# Patient Record
Sex: FEMALE | Race: BLACK OR AFRICAN AMERICAN | Employment: OTHER | URBAN - METROPOLITAN AREA
[De-identification: names, ages, dates, MRNs, and addresses within clinical notes are randomized per-mention and may not be internally consistent; named-entity substitution may affect disease eponyms.]

---

## 2017-10-01 ENCOUNTER — APPOINTMENT (OUTPATIENT)
Dept: GENERAL RADIOLOGY | Age: 77
End: 2017-10-01
Attending: EMERGENCY MEDICINE
Payer: MEDICARE

## 2017-10-01 ENCOUNTER — HOSPITAL ENCOUNTER (EMERGENCY)
Age: 77
Discharge: HOME | End: 2017-10-01
Attending: EMERGENCY MEDICINE
Payer: MEDICARE

## 2017-10-01 ENCOUNTER — APPOINTMENT (OUTPATIENT)
Dept: CT IMAGING | Age: 77
End: 2017-10-01
Attending: EMERGENCY MEDICINE
Payer: MEDICARE

## 2017-10-01 VITALS
HEART RATE: 68 BPM | TEMPERATURE: 98.4 F | RESPIRATION RATE: 16 BRPM | WEIGHT: 277 LBS | DIASTOLIC BLOOD PRESSURE: 81 MMHG | BODY MASS INDEX: 47.29 KG/M2 | SYSTOLIC BLOOD PRESSURE: 162 MMHG | HEIGHT: 64 IN | OXYGEN SATURATION: 98 %

## 2017-10-01 DIAGNOSIS — I82.401 LEG DVT (DEEP VENOUS THROMBOEMBOLISM), ACUTE, RIGHT (HCC): ICD-10-CM

## 2017-10-01 DIAGNOSIS — E87.6 HYPOKALEMIA: ICD-10-CM

## 2017-10-01 DIAGNOSIS — R20.2 PARESTHESIA OF BILATERAL LEGS: ICD-10-CM

## 2017-10-01 DIAGNOSIS — I10 UNCONTROLLED HYPERTENSION: Primary | ICD-10-CM

## 2017-10-01 LAB
ALBUMIN SERPL-MCNC: 3.5 G/DL (ref 3.4–5)
ALBUMIN/GLOB SERPL: 0.9 {RATIO} (ref 0.8–1.7)
ALP SERPL-CCNC: 77 U/L (ref 45–117)
ALT SERPL-CCNC: 24 U/L (ref 13–56)
ANION GAP SERPL CALC-SCNC: 9 MMOL/L (ref 3–18)
APPEARANCE UR: CLEAR
AST SERPL-CCNC: 18 U/L (ref 15–37)
BASOPHILS # BLD: 0 K/UL (ref 0–0.06)
BASOPHILS NFR BLD: 0 % (ref 0–2)
BILIRUB SERPL-MCNC: 0.4 MG/DL (ref 0.2–1)
BILIRUB UR QL: NEGATIVE
BNP SERPL-MCNC: 194 PG/ML (ref 0–1800)
BUN SERPL-MCNC: 23 MG/DL (ref 7–18)
BUN/CREAT SERPL: 26 (ref 12–20)
CALCIUM SERPL-MCNC: 8.6 MG/DL (ref 8.5–10.1)
CHLORIDE SERPL-SCNC: 97 MMOL/L (ref 100–108)
CK MB CFR SERPL CALC: 0.8 % (ref 0–4)
CK MB SERPL-MCNC: 1.3 NG/ML (ref 5–25)
CK SERPL-CCNC: 163 U/L (ref 26–192)
CO2 SERPL-SCNC: 30 MMOL/L (ref 21–32)
COLOR UR: YELLOW
CREAT SERPL-MCNC: 0.9 MG/DL (ref 0.6–1.3)
D DIMER PPP FEU-MCNC: 2.78 UG/ML(FEU)
DIFFERENTIAL METHOD BLD: NORMAL
EOSINOPHIL # BLD: 0.1 K/UL (ref 0–0.4)
EOSINOPHIL NFR BLD: 1 % (ref 0–5)
ERYTHROCYTE [DISTWIDTH] IN BLOOD BY AUTOMATED COUNT: 14.2 % (ref 11.6–14.5)
GLOBULIN SER CALC-MCNC: 3.9 G/DL (ref 2–4)
GLUCOSE SERPL-MCNC: 96 MG/DL (ref 74–99)
GLUCOSE UR STRIP.AUTO-MCNC: NEGATIVE MG/DL
HCT VFR BLD AUTO: 38.7 % (ref 35–45)
HGB BLD-MCNC: 12.4 G/DL (ref 12–16)
HGB UR QL STRIP: NEGATIVE
KETONES UR QL STRIP.AUTO: NEGATIVE MG/DL
LEUKOCYTE ESTERASE UR QL STRIP.AUTO: NEGATIVE
LYMPHOCYTES # BLD: 2.2 K/UL (ref 0.9–3.6)
LYMPHOCYTES NFR BLD: 28 % (ref 21–52)
MCH RBC QN AUTO: 28 PG (ref 24–34)
MCHC RBC AUTO-ENTMCNC: 32 G/DL (ref 31–37)
MCV RBC AUTO: 87.4 FL (ref 74–97)
MONOCYTES # BLD: 0.7 K/UL (ref 0.05–1.2)
MONOCYTES NFR BLD: 8 % (ref 3–10)
NEUTS SEG # BLD: 5 K/UL (ref 1.8–8)
NEUTS SEG NFR BLD: 63 % (ref 40–73)
NITRITE UR QL STRIP.AUTO: NEGATIVE
PH UR STRIP: 6 [PH] (ref 5–8)
PLATELET # BLD AUTO: 235 K/UL (ref 135–420)
PMV BLD AUTO: 10.7 FL (ref 9.2–11.8)
POTASSIUM SERPL-SCNC: 3.2 MMOL/L (ref 3.5–5.5)
PROT SERPL-MCNC: 7.4 G/DL (ref 6.4–8.2)
PROT UR STRIP-MCNC: NEGATIVE MG/DL
RBC # BLD AUTO: 4.43 M/UL (ref 4.2–5.3)
SODIUM SERPL-SCNC: 136 MMOL/L (ref 136–145)
SP GR UR REFRACTOMETRY: 1 (ref 1–1.03)
TROPONIN I SERPL-MCNC: <0.02 NG/ML (ref 0–0.06)
UROBILINOGEN UR QL STRIP.AUTO: 0.2 EU/DL (ref 0.2–1)
WBC # BLD AUTO: 8 K/UL (ref 4.6–13.2)

## 2017-10-01 PROCEDURE — 80053 COMPREHEN METABOLIC PANEL: CPT | Performed by: EMERGENCY MEDICINE

## 2017-10-01 PROCEDURE — 93970 EXTREMITY STUDY: CPT

## 2017-10-01 PROCEDURE — 85025 COMPLETE CBC W/AUTO DIFF WBC: CPT | Performed by: EMERGENCY MEDICINE

## 2017-10-01 PROCEDURE — 74011250637 HC RX REV CODE- 250/637: Performed by: EMERGENCY MEDICINE

## 2017-10-01 PROCEDURE — 81003 URINALYSIS AUTO W/O SCOPE: CPT | Performed by: EMERGENCY MEDICINE

## 2017-10-01 PROCEDURE — 93005 ELECTROCARDIOGRAM TRACING: CPT

## 2017-10-01 PROCEDURE — 82550 ASSAY OF CK (CPK): CPT | Performed by: EMERGENCY MEDICINE

## 2017-10-01 PROCEDURE — 83880 ASSAY OF NATRIURETIC PEPTIDE: CPT | Performed by: EMERGENCY MEDICINE

## 2017-10-01 PROCEDURE — 71010 XR CHEST PORT: CPT

## 2017-10-01 PROCEDURE — 99285 EMERGENCY DEPT VISIT HI MDM: CPT

## 2017-10-01 PROCEDURE — 70450 CT HEAD/BRAIN W/O DYE: CPT

## 2017-10-01 PROCEDURE — 85379 FIBRIN DEGRADATION QUANT: CPT | Performed by: EMERGENCY MEDICINE

## 2017-10-01 RX ORDER — POTASSIUM CHLORIDE 20 MEQ/1
40 TABLET, EXTENDED RELEASE ORAL
Status: COMPLETED | OUTPATIENT
Start: 2017-10-01 | End: 2017-10-01

## 2017-10-01 RX ORDER — POTASSIUM CHLORIDE 750 MG/1
20 CAPSULE, EXTENDED RELEASE ORAL DAILY
Qty: 10 CAP | Refills: 0 | Status: SHIPPED | OUTPATIENT
Start: 2017-10-01 | End: 2017-10-06

## 2017-10-01 RX ORDER — VALSARTAN 160 MG/1
TABLET ORAL DAILY
Status: ON HOLD | COMMUNITY
End: 2021-04-23

## 2017-10-01 RX ADMIN — POTASSIUM CHLORIDE 40 MEQ: 20 TABLET, EXTENDED RELEASE ORAL at 19:26

## 2017-10-01 RX ADMIN — RIVAROXABAN 15 MG: 15 TABLET, FILM COATED ORAL at 19:50

## 2017-10-01 NOTE — ED TRIAGE NOTES
Patient arrived to ER via EMS for HTN, and light headedness. Patient reports that when she took her blood pressure at home it was high. Unable to remember the exact reading. Patient is alert and oriented on arrival. No acute distress noted. Able to stand and pivot to use BSC to void. Urine sample obtained and labs drawn. Vitals are stable.

## 2017-10-01 NOTE — PROCEDURES
[facility]  *** FINAL REPORT ***    Name: Drew Adler  MRN: [id number]  : 12 May 1940  HIS Order #: [id number]  [id number] [facility] Visit #: [id number]  Date: 01 Oct 2017    TYPE OF TEST: Peripheral Venous Testing    REASON FOR TEST  Pain in limb    Right Leg:-  Deep venous thrombosis:           Yes  Proximal extent of thrombus:      Soleal  Superficial venous thrombosis:    No  Deep venous insufficiency:        Not examined  Superficial venous insufficiency: Not examined    Left Leg:-  Deep venous thrombosis:           No  Superficial venous thrombosis:    No  Deep venous insufficiency:        Not examined  Superficial venous insufficiency: Not examined      INTERPRETATION/FINDINGS  Duplex images were obtained using 2-D gray scale, color flow, and  spectral Doppler analysis. Right leg :  1. Acute occlusive deep vein thrombosis identified  in the soleal  vein. 2. Deep vein(s) visualized include the common femoral, proximal  femoral, mid femoral, distal femoral, popliteal(above knee),  popliteal(fossa), popliteal(below knee) and posterior tibial veins. 3. No evidence of superficial thrombosis detected. Left leg :  1. Deep vein(s) visualized include the common femoral, proximal  femoral, mid femoral, distal femoral, popliteal(above knee),  popliteal(fossa), popliteal(below knee), posterior tibial and peroneal   veins. 2. No evidence of deep venous thrombosis detected in the veins  visualized. 3. No evidence of superficial thrombosis detected. ADDITIONAL COMMENTS  I notified Dr Carlos Pace of the finding. I have personally reviewed the data relevant to the interpretation of  this  study.     TECHNOLOGIST: John Spring, [facility], RVT/  Signed: 10/01/2017 07:33 PM    PHYSICIAN: Price Long MD  Signed: 10/04/2017 03:36 PM

## 2017-10-01 NOTE — ED PROVIDER NOTES
[address]  EMERGENCY DEPARTMENT HISTORY AND PHYSICAL EXAM       Date: 10/1/2017   Patient Name: Jack Anthony   YOB: 1940  Medical Record Number: [id number]    History of Presenting Illness     Chief Complaint   Patient presents with    Hypertension        History Provided By:  patient    Additional History:   4:49 PM   Jack Anthony is a 68 y.o. female PMHx HTN (for which she takes Diovan) and Bell's Palsy presenting to the ED via EMS C/O elevated BP, onset PTA. Associated sxs include constant numbness in the left foot that intermittently spreads up to her chest x 1 week, which makes her hyperventilate.  Numbness is worse at night. Pt states she took her BP at home and states its high sometimes, but it changes.  At baseline, pt has right foot numbness due to previous ankle reduction after a fall, BLE swelling, right hand weakness (x 1 year), and left-sided facial droop. PCP is Dr. Tyrone Weir. Pt denies PMHX MI, DM, pain, fall, SOB, new weakness, and any other sxs or complaints. Primary Care Provider: Deepa Miller MD   Specialist:    Past History     Past Medical History:   Past Medical History:   Diagnosis Date    Hypertension     Sleep disorder         Past Surgical History:   No past surgical history on file. Family History:   No family history on file. Social History:   Social History   Substance Use Topics    Smoking status: Never Smoker    Smokeless tobacco: Never Used    Alcohol use 0.6 oz/week     1 Glasses of wine per week      Comment: EACH NIGHT        Allergies: Allergies   Allergen Reactions    Seafood Hives     crabs    Sulfa (Sulfonamide Antibiotics) Hives        Review of Systems   Review of Systems   Constitutional: Negative for activity change, appetite change, fever and unexpected weight change. (+) elevated BP   HENT: Negative for congestion and sore throat. Eyes: Negative for pain and redness.    Respiratory: Negative for cough and shortness of breath. (+) \"hyperventilates\" when numbness spreads up to her chest   Cardiovascular: Positive for leg swelling (BLE at baseline). Negative for chest pain and palpitations. Gastrointestinal: Negative for abdominal pain, diarrhea, nausea and vomiting. Endocrine: Negative for polydipsia and polyuria. Genitourinary: Negative for difficulty urinating and dysuria. Musculoskeletal: Negative for back pain and neck pain. Skin: Negative for pallor and rash. Neurological: Positive for facial asymmetry (at baseline), weakness (right hand at baseline) and numbness (LLE numbness that intermittently spreads up to the chest; RLE numbness at baseline). Negative for headaches. All other systems reviewed and are negative. Physical Exam  Vitals:    10/01/17 1815 10/01/17 1830 10/01/17 1845 10/01/17 1900   BP: 176/71 158/72 138/64 162/81   Pulse: 73 69 68    Resp: 17 14 16    Temp:       SpO2:       Weight:       Height:           Physical Exam   Constitutional: She is oriented to person, place, and time. She appears well-developed and well-nourished. Obese   HENT:   Head: Normocephalic and atraumatic. Right Ear: External ear normal.   Left Ear: External ear normal.   Nose: Nose normal.   Mouth/Throat: Oropharynx is clear and moist.   Eyes: Conjunctivae and EOM are normal. Pupils are equal, round, and reactive to light. Neck: Normal range of motion. Neck supple. No JVD present. No tracheal deviation present. Cardiovascular: Normal rate, regular rhythm, normal heart sounds and intact distal pulses. Exam reveals no gallop and no friction rub. No murmur heard. Pulmonary/Chest: Effort normal and breath sounds normal. No respiratory distress. She has no wheezes. She has no rales. Abdominal: Soft. Bowel sounds are normal. She exhibits no distension and no mass. There is no tenderness. There is no rebound and no guarding. Musculoskeletal: Normal range of motion.  She exhibits no edema or tenderness. Right lower leg: She exhibits no edema. Left lower leg: She exhibits no edema. Neurological: She is alert and oriented to person, place, and time. She has normal reflexes. No cranial nerve deficit. Left lower facial droop which is chronic in nature. Prior Bell's palsy. Subjective numbness over BLE. CN II-XII intact, No pronator drift; finger-nose-finger intact; good  and equal strength 5/5 bilateral upper and lower extremities; DTRs: 2+ upper and lower extremities, symmetric bilaterally; sensation is intact to light touch and position sense upper and lower extremities symmetric bilaterally;  Gait intact. Skin: Skin is warm and dry. No rash noted. Right upper back dorsal lipoma about the size of a grapefruit, soft. Psychiatric: She has a normal mood and affect. Her behavior is normal.   Nursing note and vitals reviewed. Diagnostic Study Results     Labs -      Recent Results (from the past 12 hour(s))   CBC WITH AUTOMATED DIFF    Collection Time: 10/01/17  5:04 PM   Result Value Ref Range    WBC 8.0 4.6 - 13.2 K/uL    RBC 4.43 4.20 - 5.30 M/uL    HGB 12.4 12.0 - 16.0 g/dL    HCT 38.7 35.0 - 45.0 %    MCV 87.4 74.0 - 97.0 FL    MCH 28.0 24.0 - 34.0 PG    MCHC 32.0 31.0 - 37.0 g/dL    RDW 14.2 11.6 - 14.5 %    PLATELET 978 322 - 067 K/uL    MPV 10.7 9.2 - 11.8 FL    NEUTROPHILS 63 40 - 73 %    LYMPHOCYTES 28 21 - 52 %    MONOCYTES 8 3 - 10 %    EOSINOPHILS 1 0 - 5 %    BASOPHILS 0 0 - 2 %    ABS. NEUTROPHILS 5.0 1.8 - 8.0 K/UL    ABS. LYMPHOCYTES 2.2 0.9 - 3.6 K/UL    ABS. MONOCYTES 0.7 0.05 - 1.2 K/UL    ABS. EOSINOPHILS 0.1 0.0 - 0.4 K/UL    ABS.  BASOPHILS 0.0 0.0 - 0.06 K/UL    DF AUTOMATED     URINALYSIS W/ RFLX MICROSCOPIC    Collection Time: 10/01/17  5:04 PM   Result Value Ref Range    Color YELLOW      Appearance CLEAR      Specific gravity 1.005 1.005 - 1.030      pH (UA) 6.0 5.0 - 8.0      Protein NEGATIVE  NEG mg/dL    Glucose NEGATIVE  NEG mg/dL Ketone NEGATIVE  NEG mg/dL    Bilirubin NEGATIVE  NEG      Blood NEGATIVE  NEG      Urobilinogen 0.2 0.2 - 1.0 EU/dL    Nitrites NEGATIVE  NEG      Leukocyte Esterase NEGATIVE  NEG     D DIMER    Collection Time: 10/01/17  5:04 PM   Result Value Ref Range    D DIMER 2.78 (H) <0.46 ug/ml(FEU)   EKG, 12 LEAD, INITIAL    Collection Time: 10/01/17  5:25 PM   Result Value Ref Range    Ventricular Rate 76 BPM    Atrial Rate 76 BPM    P-R Interval 182 ms    QRS Duration 84 ms    Q-T Interval 394 ms    QTC Calculation (Bezet) 443 ms    Calculated P Axis 51 degrees    Calculated R Axis 15 degrees    Calculated T Axis 50 degrees    Diagnosis       Normal sinus rhythm  Normal ECG  No previous ECGs available     CARDIAC PANEL,(CK, CKMB & TROPONIN)    Collection Time: 10/01/17  5:46 PM   Result Value Ref Range     26 - 192 U/L    CK - MB 1.3 <3.6 ng/ml    CK-MB Index 0.8 0.0 - 4.0 %    Troponin-I, Qt. <0.02 0.00 - 9.53 NG/ML   METABOLIC PANEL, COMPREHENSIVE    Collection Time: 10/01/17  5:46 PM   Result Value Ref Range    Sodium 136 136 - 145 mmol/L    Potassium 3.2 (L) 3.5 - 5.5 mmol/L    Chloride 97 (L) 100 - 108 mmol/L    CO2 30 21 - 32 mmol/L    Anion gap 9 3.0 - 18 mmol/L    Glucose 96 74 - 99 mg/dL    BUN 23 (H) 7.0 - 18 MG/DL    Creatinine 0.90 0.6 - 1.3 MG/DL    BUN/Creatinine ratio 26 (H) 12 - 20      GFR est AA >60 >60 ml/min/1.73m2    GFR est non-AA >60 >60 ml/min/1.73m2    Calcium 8.6 8.5 - 10.1 MG/DL    Bilirubin, total 0.4 0.2 - 1.0 MG/DL    ALT (SGPT) 24 13 - 56 U/L    AST (SGOT) 18 15 - 37 U/L    Alk. phosphatase 77 45 - 117 U/L    Protein, total 7.4 6.4 - 8.2 g/dL    Albumin 3.5 3.4 - 5.0 g/dL    Globulin 3.9 2.0 - 4.0 g/dL    A-G Ratio 0.9 0.8 - 1.7     NT-PRO BNP    Collection Time: 10/01/17  5:46 PM   Result Value Ref Range    NT pro- 0 - 1800 PG/ML       Radiologic Studies -    7:26 PM  RADIOLOGY FINDINGS  Chest X-ray shows cardiomegaly.    Pending review by Radiologist  Recorded by Jennifer Powell, ED Scribe, as dictated by Yesi Yeh MD     CT HEAD WO CONT   Final Result   No acute intracranial abnormalities. As read by the radiologist.      XR CHEST PORT    (Results Pending)     DUPLEX LOWER EXT VENOUS BILAT   Preliminary Result   Right leg :  1. Acute occlusive deep vein thrombosis identified  in the soleal  vein. 2. Deep vein(s) visualized include the common femoral, proximal  femoral, mid femoral, distal femoral, popliteal(above knee),  popliteal(fossa), popliteal(below knee) and posterior tibial veins. 3. No evidence of superficial thrombosis detected. Left leg :  1. Deep vein(s) visualized include the common femoral, proximal  femoral, mid femoral, distal femoral, popliteal(above knee),  popliteal(fossa), popliteal(below knee), posterior tibial and peroneal   veins. 2. No evidence of deep venous thrombosis detected in the veins  visualized. 3. No evidence of superficial thrombosis detected. As read by the radiologist.        Medical Decision Making   I am the first provider for this patient. I reviewed the vital signs, available nursing notes, past medical history, past surgical history, family history and social history. Vital Signs-Reviewed the patient's vital signs. Patient Vitals for the past 12 hrs:   Temp Pulse Resp BP SpO2   10/01/17 1900 - - - 162/81 -   10/01/17 1845 - 68 16 138/64 -   10/01/17 1830 - 69 14 158/72 -   10/01/17 1815 - 73 17 176/71 -   10/01/17 1800 - - - 154/59 -   10/01/17 1716 - - - - 98 %   10/01/17 1715 - 69 19 161/58 93 %   10/01/17 1709 98.4 °F (36.9 °C) 77 24 172/82 98 %       DDX: Isolated systolic HTN, neuropathy, electrolyte abnormality, DVT, doubt CVA, TIA    Pulse Oximetry Analysis - Normal 98% on Ra. No intervention needed. Cardiac Monitor:   Rate: 80 bpm  Rhythm: Normal Sinus Rhythm     EKG interpretation: (Preliminary)  5:25 PM  76 bpm, NSR, no acute changes.    EKG read by Yesi Yeh MD at 5:27 PM     ED Course:     4:49 PM Initial assessment performed. The patients presenting problems have been discussed, and they are in agreement with the care plan formulated and outlined with them. I have encouraged them to ask questions as they arise throughout their visit. 7:24 PM Vascular tech at bedside performing BLE doppler. States pt has a RLE DVT. Will put pt on Xarelto. Medications Given in the ED:  Medications   rivaroxaban (XARELTO) tablet 15 mg (not administered)   potassium chloride (K-DUR, KLOR-CON) SR tablet 40 mEq (40 mEq Oral Given 10/1/17 1926)        Discharge Note:  7:28 PM  Patients results have been reviewed with them. Patient and/or family have verbally conveyed their understanding and agreement of the patient's signs, symptoms, diagnosis, treatment and prognosis and additionally agree to follow up as recommended or return to the Emergency Room should their condition change prior to their follow-up appointment. Patient verbally agrees with the care-plan and verbally conveys that all of their questions have been answered. Discharge instructions have also been provided to the patient with some educational information regarding their diagnosis as well a list of reasons why they would want to return to the ER prior to their follow-up appointment should their condition change. Diagnosis   Clinical Impression:   1. Uncontrolled hypertension    2. Hypokalemia    3. Leg DVT (deep venous thromboembolism), acute, right (HCC)         Discussion: Pt was hypertensive in ED with c/o BLE numbness. Head CT unremarkable. ECG and trop showed no evidence of ACS. Labs remarkable for hypokalemia and elevated D-dimer. CXR showed cardiomegaly. Duplex Doppler of BLE showed RLE DVT. Pt was given potassium replacement and Xarelto. Pt will follow up with PCP and neurology for further evaluation tomorrow.      Follow-up Information     Follow up With Details Comments [address] [place] Schedule an appointment as soon as possible for a visit in 2 days  [place] [address]      THE [facility] EMERGENCY DEPT  As needed, If symptoms worsen [address] [place] [facility] [id number] [phone]          Current Discharge Medication List      START taking these medications    Details   rivaroxaban (XARELTO) 15 mg (42)- 20 mg (9) DsPk Take one 15 mg tablet twice a day with food for the first 21 days. Then, take one 20 mg tablet once a day with food for 9 days. Qty: 1 Dose Pack, Refills: 0      potassium chloride SA (MICRO-K) 10 mEq capsule Take 2 Caps by mouth daily for 5 days. Qty: 10 Cap, Refills: 0             _______________________________   Attestations:     SCRIBE ATTESTATION:  This note is prepared by Sarah Khan, acting as Scribe for Millicent Castro MD.    PROVIDER ATTESTATION:  Millicent Castro MD: The scribe's documentation has been prepared under my direction and personally reviewed by me in its entirety.  I confirm that the note above accurately reflects all work, treatment, procedures, and medical decision making performed by me.   _______________________________

## 2017-10-01 NOTE — DISCHARGE INSTRUCTIONS
Numbness and Tingling: Care Instructions  Your Care Instructions  Many things can cause numbness or tingling. Swelling may put pressure on a nerve. This could cause you to lose feeling or have a pins-and-needles sensation on part of your body. Nerves may be damaged from trauma, toxins, or diseases, such as diabetes or multiple sclerosis (MS). Sometimes, though, the cause is not clear. If there is no clear reason for your symptoms, and you are not having any other symptoms, your doctor may suggest watching and waiting for a while to see if the numbness or tingling goes away on its own. Your doctor may want you to have blood or nerve tests to find the cause of your symptoms. Follow-up care is a key part of your treatment and safety. Be sure to make and go to all appointments, and call your doctor if you are having problems. It's also a good idea to know your test results and keep a list of the medicines you take. How can you care for yourself at home? · If your doctor prescribes medicine, take it exactly as directed. Call your doctor if you think you are having a problem with your medicine. · If you have any swelling, put ice or a cold pack on the area for 10 to 20 minutes at a time. Put a thin cloth between the ice and your skin. When should you call for help? Call 911 anytime you think you may need emergency care. For example, call if:  · You have weakness, numbness, or tingling in both legs. · You lose bowel or bladder control. · You have symptoms of a stroke. These may include:  ¨ Sudden numbness, tingling, weakness, or loss of movement in your face, arm, or leg, especially on only one side of your body. ¨ Sudden vision changes. ¨ Sudden trouble speaking. ¨ Sudden confusion or trouble understanding simple statements. ¨ Sudden problems with walking or balance. ¨ A sudden, severe headache that is different from past headaches.   Watch closely for changes in your health, and be sure to contact your doctor if you have any problems, or if:  · You do not get better as expected. Where can you learn more? Go to http://jennifer-christie.info/. Enter H860 in the search box to learn more about \"Numbness and Tingling: Care Instructions. \"  Current as of: October 14, 2016  Content Version: 11.3  © 4459-2888 [employer]. Care instructions adapted under license by [employer] (which disclaims liability or warranty for this information). If you have questions about a medical condition or this instruction, always ask your healthcare professional. [address] any warranty or liability for your use of this information. Deep Vein Thrombosis: Care Instructions  Your Care Instructions    A deep vein thrombosis (DVT) is a blood clot in certain veins of the legs, pelvis, or arms. Blood clots in these veins need to be treated because they can get bigger, break loose, and travel through the bloodstream to the lungs. A blood clot in a lung can be life-threatening. The doctor may have given you a blood thinner (anticoagulant). A blood thinner can stop the blood clot from growing larger and prevent new clots from forming. You will need to take a blood thinner for 3 to 6 months or longer. The doctor has checked you carefully, but problems can develop later. If you notice any problems or new symptoms, get medical treatment right away. Follow-up care is a key part of your treatment and safety. Be sure to make and go to all appointments, and call your doctor if you are having problems. It's also a good idea to know your test results and keep a list of the medicines you take. How can you care for yourself at home? · Take your medicines exactly as prescribed. Call your doctor if you think you are having a problem with your medicine. · If you are taking a blood thinner, be sure you get instructions about how to take your medicine safely.  Blood thinners can cause serious bleeding problems. · Wear compression stockings if your doctor recommends them. These stockings are tighter at the feet than on the legs. They may reduce pain and swelling in your legs. But there are different types of stockings, and they need to fit right. So your doctor will recommend what you need. · When you sit, use a pillow to raise the arm or leg that has the blood clot. Try to keep it above the level of your heart. When should you call for help? Call 911 anytime you think you may need emergency care. For example, call if:  · You passed out (lost consciousness). · You have symptoms of a blood clot in your lung (called a pulmonary embolism). These include:  ¨ Sudden chest pain. ¨ Trouble breathing. ¨ Coughing up blood. Call your doctor now or seek immediate medical care if:  · You have new or worse trouble breathing. · You are dizzy or lightheaded, or you feel like you may faint. · You have symptoms of a blood clot in your arm or leg. These may include:  ¨ Pain in the arm, calf, back of the knee, thigh, or groin. ¨ Redness and swelling in the arm, leg, or groin. Watch closely for changes in your health, and be sure to contact your doctor if:  · You do not get better as expected. Where can you learn more? Go to http://jennifer-christie.info/. Enter A272 in the search box to learn more about \"Deep Vein Thrombosis: Care Instructions. \"  Current as of: March 20, 2017  Content Version: 11.3  © 5271-2554 [employer]. Care instructions adapted under license by [employer] (which disclaims liability or warranty for this information). If you have questions about a medical condition or this instruction, always ask your healthcare professional. [address] any warranty or liability for your use of this information.

## 2017-10-02 NOTE — ED NOTES
Discharged for primary nurse. The patient's diagnosis, condition and treatment were explained to patient and daughter and written aftercare instructions were given. The patient and daughter verbalized good understanding. Patient armband removed and both labels and armband were placed in shred bin. Patient left ER via wheelchair in no acute distress.

## 2017-10-07 LAB
ATRIAL RATE: 76 BPM
CALCULATED P AXIS, ECG09: 51 DEGREES
CALCULATED R AXIS, ECG10: 15 DEGREES
CALCULATED T AXIS, ECG11: 50 DEGREES
DIAGNOSIS, 93000: NORMAL
P-R INTERVAL, ECG05: 182 MS
Q-T INTERVAL, ECG07: 394 MS
QRS DURATION, ECG06: 84 MS
QTC CALCULATION (BEZET), ECG08: 443 MS
VENTRICULAR RATE, ECG03: 76 BPM

## 2021-04-23 ENCOUNTER — APPOINTMENT (OUTPATIENT)
Dept: CT IMAGING | Age: 81
DRG: 948 | End: 2021-04-23
Attending: EMERGENCY MEDICINE
Payer: MEDICARE

## 2021-04-23 ENCOUNTER — APPOINTMENT (OUTPATIENT)
Dept: GENERAL RADIOLOGY | Age: 81
DRG: 948 | End: 2021-04-23
Attending: EMERGENCY MEDICINE
Payer: MEDICARE

## 2021-04-23 ENCOUNTER — HOSPITAL ENCOUNTER (INPATIENT)
Age: 81
LOS: 3 days | Discharge: HOME HEALTH | DRG: 948 | End: 2021-04-26
Attending: EMERGENCY MEDICINE | Admitting: FAMILY MEDICINE
Payer: MEDICARE

## 2021-04-23 DIAGNOSIS — R94.31 ABNORMAL EKG: ICD-10-CM

## 2021-04-23 DIAGNOSIS — N28.9 RENAL INSUFFICIENCY: ICD-10-CM

## 2021-04-23 DIAGNOSIS — E86.0 DEHYDRATION: ICD-10-CM

## 2021-04-23 DIAGNOSIS — R77.8 ELEVATED TROPONIN: Primary | ICD-10-CM

## 2021-04-23 PROBLEM — I10 HTN (HYPERTENSION): Status: ACTIVE | Noted: 2021-04-23

## 2021-04-23 PROBLEM — E66.9 OBESITY (BMI 30-39.9): Status: ACTIVE | Noted: 2021-04-23

## 2021-04-23 PROBLEM — R53.83 FATIGUE: Status: ACTIVE | Noted: 2021-04-23

## 2021-04-23 PROBLEM — I50.9 CHF (CONGESTIVE HEART FAILURE) (HCC): Status: ACTIVE | Noted: 2021-04-23

## 2021-04-23 PROBLEM — G47.33 OSA TREATED WITH BIPAP: Status: ACTIVE | Noted: 2021-04-23

## 2021-04-23 LAB
ALBUMIN SERPL-MCNC: 3.8 G/DL (ref 3.4–5)
ALBUMIN/GLOB SERPL: 1 {RATIO} (ref 0.8–1.7)
ALP SERPL-CCNC: 110 U/L (ref 45–117)
ALT SERPL-CCNC: 52 U/L (ref 13–56)
ANION GAP SERPL CALC-SCNC: 7 MMOL/L (ref 3–18)
APPEARANCE UR: CLEAR
APTT PPP: 24.6 SEC (ref 23–36.4)
AST SERPL-CCNC: 49 U/L (ref 10–38)
BACTERIA URNS QL MICRO: ABNORMAL /HPF
BASOPHILS # BLD: 0 K/UL (ref 0–0.1)
BASOPHILS NFR BLD: 0 % (ref 0–2)
BILIRUB SERPL-MCNC: 0.4 MG/DL (ref 0.2–1)
BILIRUB UR QL: NEGATIVE
BUN SERPL-MCNC: 62 MG/DL (ref 7–18)
BUN/CREAT SERPL: 41 (ref 12–20)
CALCIUM SERPL-MCNC: 10.1 MG/DL (ref 8.5–10.1)
CHLORIDE SERPL-SCNC: 96 MMOL/L (ref 100–111)
CK MB CFR SERPL CALC: 0.9 % (ref 0–4)
CK MB CFR SERPL CALC: 1.2 % (ref 0–4)
CK MB SERPL-MCNC: 2.1 NG/ML (ref 5–25)
CK MB SERPL-MCNC: 2.5 NG/ML (ref 5–25)
CK SERPL-CCNC: 217 U/L (ref 26–192)
CK SERPL-CCNC: 238 U/L (ref 26–192)
CO2 SERPL-SCNC: 36 MMOL/L (ref 21–32)
COLOR UR: YELLOW
CREAT SERPL-MCNC: 1.53 MG/DL (ref 0.6–1.3)
DIFFERENTIAL METHOD BLD: ABNORMAL
EOSINOPHIL # BLD: 0.1 K/UL (ref 0–0.4)
EOSINOPHIL NFR BLD: 1 % (ref 0–5)
EPITH CASTS URNS QL MICRO: ABNORMAL /LPF (ref 0–5)
ERYTHROCYTE [DISTWIDTH] IN BLOOD BY AUTOMATED COUNT: 14.6 % (ref 11.6–14.5)
GLOBULIN SER CALC-MCNC: 3.7 G/DL (ref 2–4)
GLUCOSE SERPL-MCNC: 111 MG/DL (ref 74–99)
GLUCOSE UR STRIP.AUTO-MCNC: NEGATIVE MG/DL
HCT VFR BLD AUTO: 39.4 % (ref 35–45)
HEMOCCULT STL QL: NEGATIVE
HGB BLD-MCNC: 12.4 G/DL (ref 12–16)
HGB UR QL STRIP: ABNORMAL
INR PPP: 1.1 (ref 0.8–1.2)
KETONES UR QL STRIP.AUTO: NEGATIVE MG/DL
LEUKOCYTE ESTERASE UR QL STRIP.AUTO: NEGATIVE
LYMPHOCYTES # BLD: 2.4 K/UL (ref 0.9–3.6)
LYMPHOCYTES NFR BLD: 40 % (ref 21–52)
MAGNESIUM SERPL-MCNC: 1.5 MG/DL (ref 1.6–2.6)
MCH RBC QN AUTO: 30.2 PG (ref 24–34)
MCHC RBC AUTO-ENTMCNC: 31.5 G/DL (ref 31–37)
MCV RBC AUTO: 96.1 FL (ref 74–97)
MONOCYTES # BLD: 0.8 K/UL (ref 0.05–1.2)
MONOCYTES NFR BLD: 14 % (ref 3–10)
NEUTS SEG # BLD: 2.7 K/UL (ref 1.8–8)
NEUTS SEG NFR BLD: 45 % (ref 40–73)
NITRITE UR QL STRIP.AUTO: NEGATIVE
PH UR STRIP: 7 [PH] (ref 5–8)
PLATELET # BLD AUTO: 235 K/UL (ref 135–420)
PMV BLD AUTO: 10.2 FL (ref 9.2–11.8)
POTASSIUM SERPL-SCNC: 3.3 MMOL/L (ref 3.5–5.5)
PROT SERPL-MCNC: 7.5 G/DL (ref 6.4–8.2)
PROT UR STRIP-MCNC: ABNORMAL MG/DL
PROTHROMBIN TIME: 13.7 SEC (ref 11.5–15.2)
RBC # BLD AUTO: 4.1 M/UL (ref 4.2–5.3)
RBC #/AREA URNS HPF: ABNORMAL /HPF (ref 0–5)
SODIUM SERPL-SCNC: 139 MMOL/L (ref 136–145)
SP GR UR REFRACTOMETRY: 1.01 (ref 1–1.03)
TROPONIN I SERPL-MCNC: 0.21 NG/ML (ref 0–0.04)
TROPONIN I SERPL-MCNC: 0.24 NG/ML (ref 0–0.04)
UROBILINOGEN UR QL STRIP.AUTO: 0.2 EU/DL (ref 0.2–1)
WBC # BLD AUTO: 6 K/UL (ref 4.6–13.2)
WBC URNS QL MICRO: ABNORMAL /HPF (ref 0–5)

## 2021-04-23 PROCEDURE — 85730 THROMBOPLASTIN TIME PARTIAL: CPT

## 2021-04-23 PROCEDURE — 81001 URINALYSIS AUTO W/SCOPE: CPT

## 2021-04-23 PROCEDURE — 85610 PROTHROMBIN TIME: CPT

## 2021-04-23 PROCEDURE — 93005 ELECTROCARDIOGRAM TRACING: CPT

## 2021-04-23 PROCEDURE — 74011250637 HC RX REV CODE- 250/637: Performed by: FAMILY MEDICINE

## 2021-04-23 PROCEDURE — 94762 N-INVAS EAR/PLS OXIMTRY CONT: CPT

## 2021-04-23 PROCEDURE — 71045 X-RAY EXAM CHEST 1 VIEW: CPT

## 2021-04-23 PROCEDURE — 80053 COMPREHEN METABOLIC PANEL: CPT

## 2021-04-23 PROCEDURE — 85025 COMPLETE CBC W/AUTO DIFF WBC: CPT

## 2021-04-23 PROCEDURE — 96372 THER/PROPH/DIAG INJ SC/IM: CPT

## 2021-04-23 PROCEDURE — 99285 EMERGENCY DEPT VISIT HI MDM: CPT

## 2021-04-23 PROCEDURE — 82550 ASSAY OF CK (CPK): CPT

## 2021-04-23 PROCEDURE — 82272 OCCULT BLD FECES 1-3 TESTS: CPT

## 2021-04-23 PROCEDURE — 83735 ASSAY OF MAGNESIUM: CPT

## 2021-04-23 PROCEDURE — 70450 CT HEAD/BRAIN W/O DYE: CPT

## 2021-04-23 PROCEDURE — 65660000000 HC RM CCU STEPDOWN

## 2021-04-23 PROCEDURE — 96360 HYDRATION IV INFUSION INIT: CPT

## 2021-04-23 PROCEDURE — 74011250636 HC RX REV CODE- 250/636: Performed by: EMERGENCY MEDICINE

## 2021-04-23 RX ORDER — CARVEDILOL 3.12 MG/1
3.12 TABLET ORAL 2 TIMES DAILY WITH MEALS
Status: DISCONTINUED | OUTPATIENT
Start: 2021-04-23 | End: 2021-04-26 | Stop reason: HOSPADM

## 2021-04-23 RX ORDER — ROSUVASTATIN CALCIUM 5 MG/1
5 TABLET, COATED ORAL
Status: DISCONTINUED | OUTPATIENT
Start: 2021-04-23 | End: 2021-04-26 | Stop reason: HOSPADM

## 2021-04-23 RX ORDER — SODIUM CHLORIDE 0.9 % (FLUSH) 0.9 %
5-40 SYRINGE (ML) INJECTION AS NEEDED
Status: DISCONTINUED | OUTPATIENT
Start: 2021-04-23 | End: 2021-04-26 | Stop reason: HOSPADM

## 2021-04-23 RX ORDER — ASPIRIN 81 MG/1
81 TABLET ORAL DAILY
Status: DISCONTINUED | OUTPATIENT
Start: 2021-04-24 | End: 2021-04-26 | Stop reason: HOSPADM

## 2021-04-23 RX ORDER — ACETAMINOPHEN 650 MG/1
650 SUPPOSITORY RECTAL
Status: DISCONTINUED | OUTPATIENT
Start: 2021-04-23 | End: 2021-04-26 | Stop reason: HOSPADM

## 2021-04-23 RX ORDER — FAMOTIDINE 20 MG/1
20 TABLET, FILM COATED ORAL 2 TIMES DAILY
Status: DISCONTINUED | OUTPATIENT
Start: 2021-04-23 | End: 2021-04-26 | Stop reason: DRUGHIGH

## 2021-04-23 RX ORDER — BUMETANIDE 1 MG/1
2 TABLET ORAL DAILY
Status: DISCONTINUED | OUTPATIENT
Start: 2021-04-24 | End: 2021-04-25

## 2021-04-23 RX ORDER — ONDANSETRON 2 MG/ML
4 INJECTION INTRAMUSCULAR; INTRAVENOUS
Status: DISCONTINUED | OUTPATIENT
Start: 2021-04-23 | End: 2021-04-26 | Stop reason: HOSPADM

## 2021-04-23 RX ORDER — POTASSIUM CHLORIDE 750 MG/1
10 TABLET, EXTENDED RELEASE ORAL DAILY
Status: DISCONTINUED | OUTPATIENT
Start: 2021-04-24 | End: 2021-04-24

## 2021-04-23 RX ORDER — METOLAZONE 5 MG/1
2.5 TABLET ORAL
Status: DISCONTINUED | OUTPATIENT
Start: 2021-04-27 | End: 2021-04-25

## 2021-04-23 RX ORDER — BUMETANIDE 2 MG/1
2 TABLET ORAL DAILY
COMMUNITY
End: 2021-04-26

## 2021-04-23 RX ORDER — ENOXAPARIN SODIUM 150 MG/ML
1 INJECTION SUBCUTANEOUS EVERY 12 HOURS
Status: DISCONTINUED | OUTPATIENT
Start: 2021-04-23 | End: 2021-04-24

## 2021-04-23 RX ORDER — POLYETHYLENE GLYCOL 3350 17 G/17G
17 POWDER, FOR SOLUTION ORAL DAILY PRN
Status: DISCONTINUED | OUTPATIENT
Start: 2021-04-23 | End: 2021-04-26 | Stop reason: HOSPADM

## 2021-04-23 RX ORDER — POTASSIUM CHLORIDE 750 MG/1
10 TABLET, FILM COATED, EXTENDED RELEASE ORAL DAILY
COMMUNITY

## 2021-04-23 RX ORDER — COLCHICINE 0.6 MG/1
0.6 TABLET ORAL 2 TIMES DAILY
Status: ON HOLD | COMMUNITY
End: 2022-03-03 | Stop reason: CLARIF

## 2021-04-23 RX ORDER — ASPIRIN 81 MG/1
81 TABLET ORAL DAILY
COMMUNITY
End: 2022-03-23

## 2021-04-23 RX ORDER — SODIUM CHLORIDE 0.9 % (FLUSH) 0.9 %
5-40 SYRINGE (ML) INJECTION EVERY 8 HOURS
Status: DISCONTINUED | OUTPATIENT
Start: 2021-04-23 | End: 2021-04-26 | Stop reason: HOSPADM

## 2021-04-23 RX ORDER — ERGOCALCIFEROL 1.25 MG/1
50000 CAPSULE ORAL
Status: ON HOLD | COMMUNITY
End: 2022-03-03 | Stop reason: CLARIF

## 2021-04-23 RX ORDER — CARVEDILOL 3.12 MG/1
3.12 TABLET ORAL 2 TIMES DAILY WITH MEALS
Status: DISCONTINUED | OUTPATIENT
Start: 2021-04-24 | End: 2021-04-23

## 2021-04-23 RX ORDER — METOLAZONE 5 MG/1
2.5 TABLET ORAL DAILY
Status: DISCONTINUED | OUTPATIENT
Start: 2021-04-24 | End: 2021-04-23 | Stop reason: DRUGHIGH

## 2021-04-23 RX ORDER — ACETAMINOPHEN 325 MG/1
650 TABLET ORAL
Status: DISCONTINUED | OUTPATIENT
Start: 2021-04-23 | End: 2021-04-26 | Stop reason: HOSPADM

## 2021-04-23 RX ORDER — COLCHICINE 0.6 MG/1
0.6 TABLET ORAL DAILY
Status: DISCONTINUED | OUTPATIENT
Start: 2021-04-24 | End: 2021-04-26 | Stop reason: HOSPADM

## 2021-04-23 RX ORDER — CARVEDILOL 3.12 MG/1
3.12 TABLET ORAL DAILY
COMMUNITY
End: 2022-03-23

## 2021-04-23 RX ORDER — PROMETHAZINE HYDROCHLORIDE 25 MG/1
12.5 TABLET ORAL
Status: DISCONTINUED | OUTPATIENT
Start: 2021-04-23 | End: 2021-04-26 | Stop reason: HOSPADM

## 2021-04-23 RX ORDER — METOLAZONE 2.5 MG/1
2.5 TABLET ORAL DAILY
COMMUNITY
End: 2021-04-26

## 2021-04-23 RX ADMIN — SODIUM CHLORIDE 500 ML: 900 INJECTION, SOLUTION INTRAVENOUS at 17:21

## 2021-04-23 RX ADMIN — Medication 10 ML: at 22:37

## 2021-04-23 RX ADMIN — FAMOTIDINE 20 MG: 20 TABLET, FILM COATED ORAL at 22:36

## 2021-04-23 RX ADMIN — ENOXAPARIN SODIUM 110 MG: 120 INJECTION SUBCUTANEOUS at 18:57

## 2021-04-23 RX ADMIN — SODIUM CHLORIDE 500 ML: 900 INJECTION, SOLUTION INTRAVENOUS at 18:54

## 2021-04-23 NOTE — ED TRIAGE NOTES
Pt in for c/o weakness, excessive sleeping and  decreased level of activity x 1 mos. Pt reports she has noticed abnormal bruising x 2 mos and fell last Saturday. Pt reports she cele hit her head during fall.

## 2021-04-23 NOTE — H&P
History & Physical    Patient: Roann Hamman MRN: [id number]  CSN: [id number]    YOB: 1940  Age: [de-identified] y.o. Sex: female      DOA: 4/23/2021  Primary Care Provider:  Manuel Bonilla MD      Assessment/Plan     Patient Active Problem List   Diagnosis Code    Fatigue R53.83    Elevated troponin R77.8    Obesity (BMI 30-39. 9) E66.9    HTN (hypertension) I10    SERENA treated with BiPAP G47.33    CHF (congestive heart failure) ([facility]) I489     22-year-old female with obesity, hypertension, SERENA on BiPAP, and CHF is admitted with worsening fatigue and elevated troponin. Is possible that her fatigue is due to a recent NSTEMI in the past week. Elevated troponin with possible NSTEMI  Telemetry  Trend troponins  Therapeutic Lovenox  Cardiology consult for possible cardiac catheterization, although patient prefers medical management if possible  Echocardiogram  Add Crestor 5 mg at night given statin induced myopathy with other medications  Check duplex Dopplers of the lower extremities for possible DVT  Consider nuclear medicine scan of the chest to rule out PE if the ultrasound shows DVT    Fatiguepossibly due to recent NSTEMI  Physical therapy evaluation to see if home health PT or rehab is needed    CHFwithout acute exacerbation  Echocardiogram  Continue current medications    Hypertensionat goal  Continue current medications    ObesityBMI 39, has had over 100 pound weight loss by changing diet and exercise  Follow-up A1c and lipid panel  Encouraged continuing lifestyle modification    SERENA treated with BiPAP  Ordered for nighttime use    DNR/DNIconfirmed by the patient and her daughter at bedside    Family discussionher daughter was at bedside during my evaluation and was involved in the care plans. The patient sister was also on the phone during the encounter. All family questions were answered.     Prophylaxis: Pepcid p.o., Therapeutic lovenox SQ    Estimated length of stay : 2 nights    MD Sanjeev Yusufurnist  ---------------------------------------------------------------------------------------------------------------------------------------------------------------------------------------------------------------  CC: Fatigue       HPI:     Harsha Sharma is a [de-identified] y.o. female with obesity, hypertension, SERENA on BiPAP, and CHF presents with her daughter for worsening fatigue over the past 3 weeks, especially bad this past week. Her daughter reports that she is a hard time ambulating even with her walker over the past week. She does not report any chest pain or shortness of breath. She has never had a heart attack or cardiac catheterization. She denies any increase in her baseline leg pain. She has a history of chronic leg pain with the right foot drop and difficulty walking from a remote fracture in childhood as well as a femur fracture in adulthood. She denies any leg swelling, orthopnea, or difficulty sleeping. She had both COVID vaccines with the last one 3 weeks ago. Past Medical History:   Diagnosis Date    Hypertension     Sleep disorder        Past Surgical History:   Procedure Laterality Date    HX ORTHOPAEDIC      broken right ankle, left femur 30 yrs ago       Family History   Problem Relation Age of Onset    Diabetes Mother     Heart Disease Mother     Heart Disease Father    myasthenia gravis in mother and niece    Social History     Socioeconomic History    Marital status: SINGLE     Spouse name: Not on file    Number of children: Not on file    Years of education: Not on file    Highest education level: Not on file   Tobacco Use    Smoking status: Never Smoker    Smokeless tobacco: Never Used   Substance and Sexual Activity    Alcohol use:  Yes     Alcohol/week: 1.0 standard drinks     Types: 1 Glasses of wine per week     Comment: soc    Drug use: No   Other Topics Concern       Prior to Admission medications    Medication Sig Start Date End Date Taking? Authorizing Provider   multivitamin, tx-iron-ca-min (THERA-M w/ IRON) 9 mg iron-400 mcg tab tablet Take 1 Tab by mouth daily. Yes Provider, Historical   metOLazone (ZAROXOLYN) 2.5 mg tablet Take 2.5 mg by mouth daily. On Tuesday and Thursday only   Yes Provider, Historical   bumetanide (BUMEX) 2 mg tablet Take 2 mg by mouth daily. Yes Provider, Historical   ergocalciferol (Vitamin D2) 1,250 mcg (50,000 unit) capsule Take 50,000 Units by mouth every seven (7) days. Every Friday for 12 weeks   Yes Provider, Historical   colchicine 0.6 mg tablet Take 0.6 mg by mouth two (2) times a day. Indications: treatment to prevent acute gout attack   Yes Provider, Historical   aspirin delayed-release 81 mg tablet Take 81 mg by mouth daily. Yes Provider, Historical   potassium chloride SR (KLOR-CON 10) 10 mEq tablet Take 10 mEq by mouth daily. Yes Provider, Historical   carvediloL (COREG) 3.125 mg tablet Take 3.125 mg by mouth two (2) times daily (with meals). Yes Provider, Historical       Allergies   Allergen Reactions    Seafood Hives     crabs    Sulfa (Sulfonamide Antibiotics) Hives       Review of Systems  Gen: No fever, chills, +malaise, no weight loss/gain. Heent: No headache, rhinorrhea, sore throat. Heart: No chest pain, palpitations, LOPEZ, pnd, or orthopnea. Resp: No cough, wheezing and shortness of breath. GI: No nausea, vomiting, diarrhea, constipation, melena or hematochezia. : No urinary obstruction, dysuria or hematuria. Derm: No rash, new skin lesion or pruritis. Musc/skeletal: chronic bilateral leg pain  Vasc: No edema, cyanosis or claudication. Endo: No heat/cold intolerance, no polyuria,polydipsia or polyphagia. Neuro: No unilateral weakness, numbness, tingling. No seizures. Heme: No easy bruising or bleeding.           Physical Exam:     Physical Exam:  Visit Vitals  /73   Pulse 77   Temp 98 °F (36.7 °C)   Resp 19   Ht 5' 5\" (1.651 m)   Wt 107.5 kg (237 lb) SpO2 95%   BMI 39.44 kg/m²           Temp (24hrs), Av °F (36.7 °C), Min:98 °F (36.7 °C), Max:98 °F (36.7 °C)    1901 -  0700  In: 500 [I.V.:500]  Out: 650 [Urine:650]   701 - 1900  In: 500 [I.V.:500]  Out: -     General:  Awake, cooperative, no distress. Head:  Normocephalic, without obvious abnormality, atraumatic. Eyes:  Conjunctivae/corneas clear, sclera anicteric. Neck: Supple, symmetrical, trachea midline. Lungs:   Clear to auscultation bilaterally. Heart:  Regular rate and rhythm, S1, S2 normal, no murmur, click, rub or gallop. Abdomen: Soft, non-tender. Bowel sounds normal. No masses,  No organomegaly. Extremities: Extremities normal, atraumatic, left calf slightly larger than the right and is tender to palpation (patient states this is baseline for her). Capillary refill normal.   Pulses: 2+ and symmetric all extremities. Skin: Skin color pink, turgor normal. No rashes or lesions   Neurologic: No focal motor or sensory deficit. Labs Reviewed: All lab results for the last 24 hours reviewed.   Recent Results (from the past 24 hour(s))   EKG, 12 LEAD, INITIAL    Collection Time: 21  5:11 PM   Result Value Ref Range    Ventricular Rate 75 BPM    Atrial Rate 75 BPM    P-R Interval 168 ms    QRS Duration 82 ms    Q-T Interval 430 ms    QTC Calculation (Bezet) 480 ms    Calculated P Axis 62 degrees    Calculated R Axis -8 degrees    Calculated T Axis 37 degrees    Diagnosis       Sinus rhythm with marked sinus arrhythmia  Otherwise normal ECG  When compared with ECG of 01-OCT-2017 17:25,  No significant change was found     CBC WITH AUTOMATED DIFF    Collection Time: 21  5:15 PM   Result Value Ref Range    WBC 6.0 4.6 - 13.2 K/uL    RBC 4.10 (L) 4.20 - 5.30 M/uL    HGB 12.4 12.0 - 16.0 g/dL    HCT 39.4 35.0 - 45.0 %    MCV 96.1 74.0 - 97.0 FL    MCH 30.2 24.0 - 34.0 PG    MCHC 31.5 31.0 - 37.0 g/dL    RDW 14.6 (H) 11.6 - 14.5 %    PLATELET 905 185 - 420 K/uL    MPV 10.2 9.2 - 11.8 FL    NEUTROPHILS 45 40 - 73 %    LYMPHOCYTES 40 21 - 52 %    MONOCYTES 14 (H) 3 - 10 %    EOSINOPHILS 1 0 - 5 %    BASOPHILS 0 0 - 2 %    ABS. NEUTROPHILS 2.7 1.8 - 8.0 K/UL    ABS. LYMPHOCYTES 2.4 0.9 - 3.6 K/UL    ABS. MONOCYTES 0.8 0.05 - 1.2 K/UL    ABS. EOSINOPHILS 0.1 0.0 - 0.4 K/UL    ABS. BASOPHILS 0.0 0.0 - 0.1 K/UL    DF AUTOMATED     METABOLIC PANEL, COMPREHENSIVE    Collection Time: 04/23/21  5:15 PM   Result Value Ref Range    Sodium 139 136 - 145 mmol/L    Potassium 3.3 (L) 3.5 - 5.5 mmol/L    Chloride 96 (L) 100 - 111 mmol/L    CO2 36 (H) 21 - 32 mmol/L    Anion gap 7 3.0 - 18 mmol/L    Glucose 111 (H) 74 - 99 mg/dL    BUN 62 (H) 7.0 - 18 MG/DL    Creatinine 1.53 (H) 0.6 - 1.3 MG/DL    BUN/Creatinine ratio 41 (H) 12 - 20      GFR est AA 40 (L) >60 ml/min/1.73m2    GFR est non-AA 33 (L) >60 ml/min/1.73m2    Calcium 10.1 8.5 - 10.1 MG/DL    Bilirubin, total 0.4 0.2 - 1.0 MG/DL    ALT (SGPT) 52 13 - 56 U/L    AST (SGOT) 49 (H) 10 - 38 U/L    Alk.  phosphatase 110 45 - 117 U/L    Protein, total 7.5 6.4 - 8.2 g/dL    Albumin 3.8 3.4 - 5.0 g/dL    Globulin 3.7 2.0 - 4.0 g/dL    A-G Ratio 1.0 0.8 - 1.7     PROTHROMBIN TIME + INR    Collection Time: 04/23/21  5:15 PM   Result Value Ref Range    Prothrombin time 13.7 11.5 - 15.2 sec    INR 1.1 0.8 - 1.2     PTT    Collection Time: 04/23/21  5:15 PM   Result Value Ref Range    aPTT 24.6 23.0 - 36.4 SEC   MAGNESIUM    Collection Time: 04/23/21  5:15 PM   Result Value Ref Range    Magnesium 1.5 (L) 1.6 - 2.6 mg/dL   CARDIAC PANEL,(CK, CKMB & TROPONIN)    Collection Time: 04/23/21  5:15 PM   Result Value Ref Range    CK - MB 2.1 <3.6 ng/ml    CK-MB Index 0.9 0.0 - 4.0 %     (H) 26 - 192 U/L    Troponin-I, QT 0.21 (H) 0.0 - 0.045 NG/ML   OCCULT BLOOD, STOOL    Collection Time: 04/23/21  6:55 PM   Result Value Ref Range    Occult blood, stool Negative NEG     CARDIAC PANEL,(CK, CKMB & TROPONIN)    Collection Time: 04/23/21 8:20 PM   Result Value Ref Range    CK - MB 2.5 <3.6 ng/ml    CK-MB Index 1.2 0.0 - 4.0 %     (H) 26 - 192 U/L    Troponin-I, QT 0.24 (H) 0.0 - 0.045 NG/ML   URINALYSIS W/ RFLX MICROSCOPIC    Collection Time: 04/23/21  8:50 PM   Result Value Ref Range    Color YELLOW      Appearance CLEAR      Specific gravity 1.007 1.005 - 1.030      pH (UA) 7.0 5.0 - 8.0      Protein TRACE (A) NEG mg/dL    Glucose Negative NEG mg/dL    Ketone Negative NEG mg/dL    Bilirubin Negative NEG      Blood TRACE (A) NEG      Urobilinogen 0.2 0.2 - 1.0 EU/dL    Nitrites Negative NEG      Leukocyte Esterase Negative NEG     URINE MICROSCOPIC ONLY    Collection Time: 04/23/21  8:50 PM   Result Value Ref Range    WBC 1 to 3 0 - 5 /hpf    RBC 0 to 1 0 - 5 /hpf    Epithelial cells FEW 0 - 5 /lpf    Bacteria FEW (A) NEG /hpf     Results  XR CHEST PORT (Accession [id number]) (Order [id number])  Allergies     Not Specified: Seafood;  Sulfa (Sulfonamide Antibiotics)   Exam Information    Status Exam Begun  Exam Ended    Final [99] 4/23/2021 17:02 4/23/2021  5:15 PM [id number]  5:15 PM   Result Information    Status: Final result (Exam End: 4/23/2021 17:15) Provider Status: Open   Study Result    EXAM: XR CHEST PORT     CLINICAL INDICATION/HISTORY: gen fatigue  -Additional: None     COMPARISON: 10/1/2017     TECHNIQUE: Portable frontal view of the chest     _______________     FINDINGS:     SUPPORT DEVICES: None.     HEART AND MEDIASTINUM: Mild cardiomegaly.     LUNGS AND PLEURAL SPACES: Unremarkable.     BONY THORAX AND SOFT TISSUES: Unremarkable.     _______________     IMPRESSION     Mild cardiomegaly. . No acute cardiopulmonary process.

## 2021-04-23 NOTE — ED PROVIDER NOTES
EMERGENCY DEPARTMENT HISTORY AND PHYSICAL EXAM    Date: 4/23/2021  Patient Name: Jessica Cobb    History of Presenting Illness     Chief Complaint   Patient presents with    Fatigue    Bleeding/Bruising    Fall         History Provided By: Patient and Patient's Daughter    Additional History (Context):   Jessica Cobb is a [de-identified] y.o. 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 Patient's daughter reports that they had a telemedicine consult with her PCP today who advised that the patient come to the emergency department for evaluation. Was not evaluated in person. Patient reports that the fatigue has been increasing over the last month. However denies any lateralizing weakness, numbness, vision changes. Patient's daughter is also concerned that she has noticed bruising around patient's shoulders, leg, no real history of any recent injury. However patient endorses that she \"rolled out of bed\" 6 days ago. Patient denies any head injury. Denies any neck pain, back pain. Patient's daughter endorsing that the patient is still on Xarelto. Pt denies pain, abdominal pain, nausea, vomiting, and any other sxs or complaints. No relieving or exacerbating factors identified.     PCP: Tacho East MD    Current Facility-Administered Medications   Medication Dose Route Frequency Provider Last Rate Last Admin    sodium chloride 0.9 % bolus infusion 500 mL  500 mL IntraVENous ONCE Ruby Burnette,  mL/hr at 04/23/21 1721 500 mL at 04/23/21 1721    sodium chloride 0.9 % bolus infusion 500 mL  500 mL IntraVENous ONCE Ruby Burnette, DO        enoxaparin (LOVENOX) injection 110 mg  1 mg/kg SubCUTAneous Q12H Ruby Burnette DO         Current Outpatient Medications   Medication Sig Dispense Refill    valsartan (DIOVAN) 160 mg tablet Take  by mouth daily.  rivaroxaban (XARELTO) 15 mg (42)- 20 mg (9) DsPk Take one 15 mg tablet twice a day with food for the first 21 days. Then, take one 20 mg tablet once a day with food for 9 days. 1 Dose Pack 0       Past History     Past Medical History:  Past Medical History:   Diagnosis Date    Hypertension     Sleep disorder        Past Surgical History:  Past Surgical History:   Procedure Laterality Date    HX ORTHOPAEDIC      broken right ankle, left femur 30 yrs ago       Family History:  History reviewed. No pertinent family history. Social History:  Social History     Tobacco Use    Smoking status: Never Smoker    Smokeless tobacco: Never Used   Substance Use Topics    Alcohol use: Yes     Alcohol/week: 1.0 standard drinks     Types: 1 Glasses of wine per week     Comment: soc    Drug use: No       Allergies: Allergies   Allergen Reactions    Seafood Hives     crabs    Sulfa (Sulfonamide Antibiotics) Hives         Review of Systems   Review of Systems   Constitutional: Positive for fatigue. Negative for chills and fever. HENT: Negative for congestion, ear pain, sinus pain and sore throat. Eyes: Negative for pain and visual disturbance. Respiratory: Negative for cough and shortness of breath. Cardiovascular: Negative for chest pain and leg swelling. Gastrointestinal: Positive for diarrhea. Negative for abdominal pain, constipation, nausea and vomiting. Genitourinary: Negative for dysuria, hematuria, vaginal bleeding and vaginal discharge. Musculoskeletal: Negative for back pain and neck pain. Skin: Negative for rash and wound. Neurological: Positive for weakness. Negative for dizziness, tremors, light-headedness and numbness. All other systems reviewed and are negative.       Physical Exam     Vitals:    04/23/21 1637   BP: (!) 150/78   Pulse: 80   Resp: 18   Temp: 98 °F (36.7 °C)   SpO2: 98%   Weight: 107.5 kg (237 lb)   Height: 5' 5\" (1.651 m)     Physical Exam    Nursing note and vitals reviewed    Constitutional: Elderly -American female, no acute distress, appears stated age  Head: Normocephalic, Atraumatic  Eyes: Pupils are equal, round, and reactive to light, EOMI, noninjected conjunctiva  Neck: Supple, non-tender, trachea midline, no JVD  Cardiovascular: Regular rate and rhythm, no murmurs, rubs, or gallops, + 2 radial pulses bilaterally  Chest: Normal work of breathing and symmetrical chest excursion bilaterally  Lungs: Clear to ausculation bilaterally, no wheezes, no rhonchi  Abdomen: Soft, non tender, non distended, normoactive bowel sounds  Back: No evidence of trauma or deformity  Extremities: Small areas of old ecchymoses along her left shoulder, upper thighs bilaterally no evidence of trauma or deformity, no LE edema.  No streaking erythema, vesicular lesions, ulcerations or bulla  Skin: Warm and dry, normal cap refill  Neuro: Alert and appropriate, CN intact, normal speech, moving all 4 extremities freely and symmetrically  Psychiatric: Normal mood and affect       Diagnostic Study Results     Labs -     Recent Results (from the past 12 hour(s))   EKG, 12 LEAD, INITIAL    Collection Time: 04/23/21  5:11 PM   Result Value Ref Range    Ventricular Rate 75 BPM    Atrial Rate 75 BPM    P-R Interval 168 ms    QRS Duration 82 ms    Q-T Interval 430 ms    QTC Calculation (Bezet) 480 ms    Calculated P Axis 62 degrees    Calculated R Axis -8 degrees    Calculated T Axis 37 degrees    Diagnosis       Sinus rhythm with marked sinus arrhythmia  Otherwise normal ECG  When compared with ECG of 01-OCT-2017 17:25,  No significant change was found     CBC WITH AUTOMATED DIFF    Collection Time: 04/23/21  5:15 PM   Result Value Ref Range    WBC 6.0 4.6 - 13.2 K/uL    RBC 4.10 (L) 4.20 - 5.30 M/uL    HGB 12.4 12.0 - 16.0 g/dL    HCT 39.4 35.0 - 45.0 %    MCV 96.1 74.0 - 97.0 FL    MCH 30.2 24.0 - 34.0 PG    MCHC 31.5 31.0 - 37.0 g/dL    RDW 14.6 (H) 11.6 - 14.5 %    PLATELET 914 997 - 267 K/uL    MPV 10.2 9.2 - 11.8 FL    NEUTROPHILS 45 40 - 73 %    LYMPHOCYTES 40 21 - 52 %    MONOCYTES 14 (H) 3 - 10 %    EOSINOPHILS 1 0 - 5 %    BASOPHILS 0 0 - 2 %    ABS. NEUTROPHILS 2.7 1.8 - 8.0 K/UL    ABS. LYMPHOCYTES 2.4 0.9 - 3.6 K/UL    ABS. MONOCYTES 0.8 0.05 - 1.2 K/UL    ABS. EOSINOPHILS 0.1 0.0 - 0.4 K/UL    ABS. BASOPHILS 0.0 0.0 - 0.1 K/UL    DF AUTOMATED     METABOLIC PANEL, COMPREHENSIVE    Collection Time: 04/23/21  5:15 PM   Result Value Ref Range    Sodium 139 136 - 145 mmol/L    Potassium 3.3 (L) 3.5 - 5.5 mmol/L    Chloride 96 (L) 100 - 111 mmol/L    CO2 36 (H) 21 - 32 mmol/L    Anion gap 7 3.0 - 18 mmol/L    Glucose 111 (H) 74 - 99 mg/dL    BUN 62 (H) 7.0 - 18 MG/DL    Creatinine 1.53 (H) 0.6 - 1.3 MG/DL    BUN/Creatinine ratio 41 (H) 12 - 20      GFR est AA 40 (L) >60 ml/min/1.73m2    GFR est non-AA 33 (L) >60 ml/min/1.73m2    Calcium 10.1 8.5 - 10.1 MG/DL    Bilirubin, total 0.4 0.2 - 1.0 MG/DL    ALT (SGPT) 52 13 - 56 U/L    AST (SGOT) 49 (H) 10 - 38 U/L    Alk. phosphatase 110 45 - 117 U/L    Protein, total 7.5 6.4 - 8.2 g/dL    Albumin 3.8 3.4 - 5.0 g/dL    Globulin 3.7 2.0 - 4.0 g/dL    A-G Ratio 1.0 0.8 - 1.7     PROTHROMBIN TIME + INR    Collection Time: 04/23/21  5:15 PM   Result Value Ref Range    Prothrombin time 13.7 11.5 - 15.2 sec    INR 1.1 0.8 - 1.2     PTT    Collection Time: 04/23/21  5:15 PM   Result Value Ref Range    aPTT 24.6 23.0 - 36.4 SEC   MAGNESIUM    Collection Time: 04/23/21  5:15 PM   Result Value Ref Range    Magnesium 1.5 (L) 1.6 - 2.6 mg/dL   CARDIAC PANEL,(CK, CKMB & TROPONIN)    Collection Time: 04/23/21  5:15 PM   Result Value Ref Range    CK - MB 2.1 <3.6 ng/ml    CK-MB Index 0.9 0.0 - 4.0 %     (H) 26 - 192 U/L    Troponin-I, QT 0.21 (H) 0.0 - 0.045 NG/ML       Radiologic Studies -   XR CHEST PORT   Final Result      Mild cardiomegaly. . No acute cardiopulmonary process.       CT HEAD WO CONT    (Results Pending)     CT Results  (Last 48 hours)    None        CXR Results  (Last 48 hours)               04/23/21 1715  XR CHEST PORT Final result    Impression:      Mild cardiomegaly. . No acute cardiopulmonary process. Narrative:  EXAM: XR CHEST PORT       CLINICAL INDICATION/HISTORY: gen fatigue   -Additional: None       COMPARISON: 10/1/2017       TECHNIQUE: Portable frontal view of the chest       _______________       FINDINGS:       SUPPORT DEVICES: None. HEART AND MEDIASTINUM: Mild cardiomegaly. LUNGS AND PLEURAL SPACES: Unremarkable. BONY THORAX AND SOFT TISSUES: Unremarkable.       _______________                   Medical Decision Making   I am the first provider for this patient. I reviewed the vital signs, available nursing notes, past medical history, past surgical history, family history and social history. Vital Signs-Reviewed the patient's vital signs. Pulse Oximetry Analysis -98% on room air    Cardiac Monitor:  Rate: 80 bpm  Rhythm: Regular    EKG interpretation: (Preliminary)  4:45 PM   Sinus rhythm at 75 bpm.  VT interval 168 ms. QRS 82 ms. QTc 480 ms. No acute ST elevation  Records Reviewed: Nursing Notes and Old Medical Records    Provider Notes:   [de-identified] y.o. female with a history of DVT on Xarelto, hypertension who presents with her daughter for 1 month of increasing fatigue, generalized weakness. On presentation, patient appears nontoxic, in no acute distress. Blood pressure 150/78. Afebrile, in no respiratory distress, saturating 98% on room air. Patient noted to have small areas of ecchymosis which is not unusual secondary to patient's blood thinner. No large hematomas that is concerning for severe trauma. Patient with no focal neurological deficits, not consistent with an acute stroke. However with her recent fall out of bed, will obtain CT imaging of her head to rule out intracranial bleed.   Will evaluate for any metabolic derangements and explain her fatigue. We will also evaluate for anemia, infectious etiology, check chest x-ray and urinalysis. Procedures:  Procedures    ED Course:   4:46 PM   Initial assessment performed. The patients presenting problems have been discussed, and they are in agreement with the care plan formulated and outlined with them. I have encouraged them to ask questions as they arise throughout their visit. 6:14 PM  Patient's lab work showing stable hemoglobin 12.8. No leukocytosis or bandemia. However her BUN is elevated at 62 and creatinine is elevated 1.53. Patient also noted to have an elevated troponin of 0.21. This may be indicative of dehydration as the patient is on Bumex. On reassessment, patient endorses that she has no chest pain, does not remember history of any chest pain or shortness of breath. At this time, patient and the daughter was reviewing patient's prescriptions and notes that she is no longer on Xarelto. 6:15 PM Discussed patient's history, exam, and available diagnostics results with Bill Alvarez MD, cardiology, who recommends checking Hemoccult with her elevated BUN, starting on Lovenox if the patient has not been taking her Xarelto. Recommend serial cardiac enzymes within 2 hours. Admission to the hospital service    Again on reassessment, went through the patient's medications, confirm being that patient is not currently on Xarelto. Rectal exam with no palpable masses. Good rectal tone. Brown stool obtained. Diagnosis and Disposition       6:17 PM  I have spent 75 minutes of critical care time involved in lab review, consultations with specialist, family decision-making, and documentation. During this entire length of time I was immediately available to the patient. Critical Care:   The reason for providing this level of medical care for this critically ill patient was due a critical illness that impaired one or more vital organ systems such that there was a high probability of imminent or life threatening deterioration in the patients condition. This care involved high complexity decision making to assess, manipulate, and support vital system functions, to treat this degreee vital organ system failure and to prevent further life threatening deterioration of the patients condition. Core Measures:  For Hospitalized Patients:    1. Hospitalization Decision Time:  The decision to hospitalize the patient was made by Aurea Delacruz DO at 6:17 PM on 4/23/2021    2. Aspirin: Aspirin was given    6:17 PM  Patient is being admitted to the hospital by Dr. Davis West  . The results of their tests and reasons for their admission have been discussed with them and/or available family. They convey agreement and understanding for the need to be admitted and for their admission diagnosis. CONDITIONS ON ADMISSION:  Sepsis is not present at the time of admission. Pneumonia is not present at the time of admission. MRSA is not present at the time of admission. Wound infection is not present at the time of admission. Pressure Ulcer is not present at the time of admission. CLINICAL IMPRESSION:    1. Elevated troponin    2. Renal insufficiency    3. Dehydration          ____________________________________     Please note that this dictation was completed with [employer], the computer voice recognition software. Quite often unanticipated grammatical, syntax, homophones, and other interpretive errors are inadvertently transcribed by the computer software. Please disregard these errors. Please excuse any errors that have escaped final proofreading.

## 2021-04-24 LAB
ALBUMIN SERPL-MCNC: 3.2 G/DL (ref 3.4–5)
ALBUMIN/GLOB SERPL: 1 {RATIO} (ref 0.8–1.7)
ALP SERPL-CCNC: 90 U/L (ref 45–117)
ALT SERPL-CCNC: 46 U/L (ref 13–56)
ANION GAP SERPL CALC-SCNC: 8 MMOL/L (ref 3–18)
AST SERPL-CCNC: 42 U/L (ref 10–38)
ATRIAL RATE: 75 BPM
BILIRUB SERPL-MCNC: 0.7 MG/DL (ref 0.2–1)
BUN SERPL-MCNC: 55 MG/DL (ref 7–18)
BUN/CREAT SERPL: 41 (ref 12–20)
CALCIUM SERPL-MCNC: 9.5 MG/DL (ref 8.5–10.1)
CALCULATED P AXIS, ECG09: 62 DEGREES
CALCULATED R AXIS, ECG10: -8 DEGREES
CALCULATED T AXIS, ECG11: 37 DEGREES
CHLORIDE SERPL-SCNC: 100 MMOL/L (ref 100–111)
CK MB CFR SERPL CALC: 0.9 % (ref 0–4)
CK MB CFR SERPL CALC: 1.1 % (ref 0–4)
CK MB CFR SERPL CALC: 1.3 % (ref 0–4)
CK MB SERPL-MCNC: 1.6 NG/ML (ref 5–25)
CK MB SERPL-MCNC: 1.9 NG/ML (ref 5–25)
CK MB SERPL-MCNC: 2.3 NG/ML (ref 5–25)
CK SERPL-CCNC: 171 U/L (ref 26–192)
CK SERPL-CCNC: 172 U/L (ref 26–192)
CK SERPL-CCNC: 180 U/L (ref 26–192)
CO2 SERPL-SCNC: 35 MMOL/L (ref 21–32)
CREAT SERPL-MCNC: 1.33 MG/DL (ref 0.6–1.3)
DIAGNOSIS, 93000: NORMAL
ERYTHROCYTE [DISTWIDTH] IN BLOOD BY AUTOMATED COUNT: 15.8 % (ref 11.6–14.5)
GLOBULIN SER CALC-MCNC: 3.3 G/DL (ref 2–4)
GLUCOSE SERPL-MCNC: 91 MG/DL (ref 74–99)
HCT VFR BLD AUTO: 35.2 % (ref 35–45)
HGB BLD-MCNC: 11.6 G/DL (ref 12–16)
MCH RBC QN AUTO: 32.3 PG (ref 24–34)
MCHC RBC AUTO-ENTMCNC: 33 G/DL (ref 31–37)
MCV RBC AUTO: 98.1 FL (ref 74–97)
P-R INTERVAL, ECG05: 168 MS
PLATELET # BLD AUTO: 208 K/UL (ref 135–420)
PMV BLD AUTO: 10.3 FL (ref 9.2–11.8)
POTASSIUM SERPL-SCNC: 2.9 MMOL/L (ref 3.5–5.5)
PROT SERPL-MCNC: 6.5 G/DL (ref 6.4–8.2)
Q-T INTERVAL, ECG07: 430 MS
QRS DURATION, ECG06: 82 MS
QTC CALCULATION (BEZET), ECG08: 480 MS
RBC # BLD AUTO: 3.59 M/UL (ref 4.2–5.3)
SODIUM SERPL-SCNC: 143 MMOL/L (ref 136–145)
TROPONIN I SERPL-MCNC: 0.29 NG/ML (ref 0–0.04)
TROPONIN I SERPL-MCNC: 0.33 NG/ML (ref 0–0.04)
TROPONIN I SERPL-MCNC: 0.33 NG/ML (ref 0–0.04)
VENTRICULAR RATE, ECG03: 75 BPM
WBC # BLD AUTO: 5 K/UL (ref 4.6–13.2)

## 2021-04-24 PROCEDURE — 82553 CREATINE MB FRACTION: CPT

## 2021-04-24 PROCEDURE — 36415 COLL VENOUS BLD VENIPUNCTURE: CPT

## 2021-04-24 PROCEDURE — 97530 THERAPEUTIC ACTIVITIES: CPT

## 2021-04-24 PROCEDURE — 2709999900 HC NON-CHARGEABLE SUPPLY

## 2021-04-24 PROCEDURE — 65660000000 HC RM CCU STEPDOWN

## 2021-04-24 PROCEDURE — 85027 COMPLETE CBC AUTOMATED: CPT

## 2021-04-24 PROCEDURE — 74011250636 HC RX REV CODE- 250/636: Performed by: FAMILY MEDICINE

## 2021-04-24 PROCEDURE — 97161 PT EVAL LOW COMPLEX 20 MIN: CPT

## 2021-04-24 PROCEDURE — 80053 COMPREHEN METABOLIC PANEL: CPT

## 2021-04-24 PROCEDURE — 94660 CPAP INITIATION&MGMT: CPT

## 2021-04-24 PROCEDURE — 74011250636 HC RX REV CODE- 250/636: Performed by: EMERGENCY MEDICINE

## 2021-04-24 PROCEDURE — 5A09357 ASSISTANCE WITH RESPIRATORY VENTILATION, LESS THAN 24 CONSECUTIVE HOURS, CONTINUOUS POSITIVE AIRWAY PRESSURE: ICD-10-PCS | Performed by: FAMILY MEDICINE

## 2021-04-24 PROCEDURE — 74011250637 HC RX REV CODE- 250/637: Performed by: FAMILY MEDICINE

## 2021-04-24 PROCEDURE — 97116 GAIT TRAINING THERAPY: CPT

## 2021-04-24 RX ORDER — MAGNESIUM SULFATE 1 G/100ML
1 INJECTION INTRAVENOUS ONCE
Status: COMPLETED | OUTPATIENT
Start: 2021-04-24 | End: 2021-04-24

## 2021-04-24 RX ORDER — ENOXAPARIN SODIUM 100 MG/ML
40 INJECTION SUBCUTANEOUS EVERY 24 HOURS
Status: DISCONTINUED | OUTPATIENT
Start: 2021-04-25 | End: 2021-04-26 | Stop reason: HOSPADM

## 2021-04-24 RX ADMIN — Medication 10 ML: at 21:02

## 2021-04-24 RX ADMIN — FAMOTIDINE 20 MG: 20 TABLET, FILM COATED ORAL at 20:58

## 2021-04-24 RX ADMIN — FAMOTIDINE 20 MG: 20 TABLET, FILM COATED ORAL at 08:13

## 2021-04-24 RX ADMIN — CARVEDILOL 3.12 MG: 3.12 TABLET, FILM COATED ORAL at 17:39

## 2021-04-24 RX ADMIN — CARVEDILOL 3.12 MG: 3.12 TABLET, FILM COATED ORAL at 08:14

## 2021-04-24 RX ADMIN — ROSUVASTATIN CALCIUM 5 MG: 5 TABLET, COATED ORAL at 21:00

## 2021-04-24 RX ADMIN — Medication 10 ML: at 06:02

## 2021-04-24 RX ADMIN — MULTIPLE VITAMINS W/ MINERALS TAB 1 TABLET: TAB at 08:13

## 2021-04-24 RX ADMIN — ROSUVASTATIN CALCIUM 5 MG: 5 TABLET, COATED ORAL at 02:24

## 2021-04-24 RX ADMIN — COLCHICINE 0.6 MG: 0.6 TABLET, FILM COATED ORAL at 08:13

## 2021-04-24 RX ADMIN — Medication 81 MG: at 08:14

## 2021-04-24 RX ADMIN — MAGNESIUM SULFATE HEPTAHYDRATE 1 G: 1 INJECTION, SOLUTION INTRAVENOUS at 05:52

## 2021-04-24 RX ADMIN — POTASSIUM BICARBONATE 40 MEQ: 782 TABLET, EFFERVESCENT ORAL at 20:59

## 2021-04-24 RX ADMIN — Medication 10 ML: at 13:41

## 2021-04-24 RX ADMIN — BUMETANIDE 2 MG: 1 TABLET ORAL at 08:13

## 2021-04-24 RX ADMIN — POTASSIUM BICARBONATE 40 MEQ: 782 TABLET, EFFERVESCENT ORAL at 08:14

## 2021-04-24 RX ADMIN — ENOXAPARIN SODIUM 110 MG: 120 INJECTION SUBCUTANEOUS at 05:52

## 2021-04-24 NOTE — PROGRESS NOTES
Problem: Mobility Impaired (Adult and Pediatric)  Goal: *Acute Goals and Plan of Care (Insert Text)  Description: Physical Therapy Goals  Initiated 4/24/2021 and to be accomplished within 7 day(s)  1. Patient will move from supine to sit and sit to supine  in bed with modified independence. 2.  Patient will transfer from bed to chair and chair to bed with modified independence using the least restrictive device. 3.  Patient will perform sit to stand with modified independence. 4.  Patient will ambulate with modified independence for 150 feet with the least restrictive device. Prior Level of Function:   Patient was modified independence for all mobility including gait using bariatric rolling walker (wheelchair for long distances during community mobility). Patient lives with daughter in a [facility], [address] but pt has a stair lift so she doesn't have to worry about stairs. Pt states that she walks daily around the house and in the backyard. Outcome: Progressing Towards Goal   PHYSICAL THERAPY EVALUATION    Patient: Alex Burton (70 y.o. female)  Date: 4/24/2021  Primary Diagnosis: Elevated troponin [R77.8]  Fatigue [R53.83]        Precautions:  Fall    ASSESSMENT :  RN ok's PT eval. Pt supine upon arrival. Agreeable to tx. Daughter at bedside. Supine to sit at L EOB with min A. Pt cued to scoot to EOB, feet on floor to improve sitting posture/balance. STS w/ bariatric RW, GB, and CGA. Cues for foot/hand placement and PLB technique. Pt ambulates 10'x2 with RW, GB, and CGA. Pt requires cues for RW management, posture, PLB, and self-pacing. Pt returns to bed and back to supine with mod A (assist required for LE's). Pt educated on HEP including AP, QS, GS. LAQ. Pt encouraged to sit up in chair for meals. Pt educated on home safety and fall prevention. Pt left with all needs within reach. RN updated on pt progress.      Patient will benefit from skilled intervention to address the above impairments. Patient's rehabilitation potential is considered to be Good  Factors which may influence rehabilitation potential include:   []         None noted  []         Mental ability/status  [x]         Medical condition  [x]         Home/family situation and support systems  [x]         Safety awareness  [x]         Pain tolerance/management  []         Other:      PLAN :  Recommendations and Planned Interventions:   [x]           Bed Mobility Training             [x]    Neuromuscular Re-Education  [x]           Transfer Training                   []    Orthotic/Prosthetic Training  [x]           Gait Training                          []    Modalities  [x]           Therapeutic Exercises           []    Edema Management/Control  [x]           Therapeutic Activities            [x]    Family Training/Education  [x]           Patient Education  []           Other (comment):    Frequency/Duration: Patient will be followed by physical therapy 1-2 times per day/4-7 days per week to address goals. Discharge Recommendations: Home Health Physical Therapy  Further Equipment Recommendations for Discharge: pt reports she has all equipment needed at home     SUBJECTIVE:   Patient stated I can do it myself.     OBJECTIVE DATA SUMMARY:     Past Medical History:   Diagnosis Date    Hypertension     Sleep disorder      Past Surgical History:   Procedure Laterality Date    HX ORTHOPAEDIC      broken right ankle, left femur 30 yrs ago     Barriers to Learning/Limitations: yes;  physical  Compensate with: Verbal Cues  Home Situation:  Home Situation  Home Environment: Private residence  # Steps to Enter:  4(pt has stair lift)  Rails to Enter: Yes  Wheelchair Ramp: No  One/Two Story Residence: One story  Living Alone: No  Support Systems: Family member(s)  Patient Expects to be Discharged to[de-identified] Private residence  Current DME Used/Available at Home: Walker, rolling, Wheelchair, Shower chair(Stair lift)  Critical Behavior:  Neurologic State: Alert; Appropriate for age  Orientation Level: Oriented X4  Cognition: Appropriate decision making  Safety/Judgement: Awareness of environment  Psychosocial  Patient Behaviors: Calm; Cooperative  Family  Behaviors: Calm;Supportive  Family  Behaviors: Calm;Supportive  Strength:    Strength: Generally decreased, functional  Tone & Sensation:   Tone: Normal  Sensation: Intact  Range Of Motion:  AROM: Generally decreased, functional  Functional Mobility:  Bed Mobility:  Supine to Sit: Contact guard assistance; Adaptive equipment; Additional time  Sit to Supine: Moderate assistance; Additional time  Scooting: Contact guard assistance  Transfers:  Sit to Stand: Contact guard assistance; Adaptive equipment; Additional time  Stand to Sit: Contact guard assistance; Adaptive equipment; Additional time  Balance:   Sitting: Intact; Without support  Standing: Intact; With support  Ambulation/Gait Training:  Distance (ft): 20 Feet (ft)  Assistive Device: Gait belt;Orthotic device; Walker, rolling  Ambulation - Level of Assistance: Contact guard assistance  Gait Abnormalities: Decreased step clearance; Step to gait  Base of Support: Widened  Speed/[place]opa: Slow  Step Length: Left shortened;Right shortened  Interventions: Safety awareness training;Verbal cues  Therapeutic Exercises:   Pt educated on HEP including AP, QS, GS. LAQ. Pain:  Pain level pre-treatment: 0/10   Pain level post-treatment: 0/10     Activity Tolerance:   Good  Please refer to the flowsheet for vital signs taken during this treatment. After treatment:   []         Patient left in no apparent distress sitting up in chair  [x]         Patient left in no apparent distress in bed  [x]         Call bell left within reach  [x]         Nursing notified  [x]         Daughter present  []         Bed alarm activated  []         SCDs applied    COMMUNICATION/EDUCATION:   [x]         Role of Physical Therapy in the acute care setting.   [x]         Fall prevention education was provided and the patient/caregiver indicated understanding. [x]         Patient/family have participated as able in goal setting and plan of care. [x]         Patient/family agree to work toward stated goals and plan of care. []         Patient understands intent and goals of therapy, but is neutral about his/her participation. []         Patient is unable to participate in goal setting/plan of care: ongoing with therapy staff.  []         Other:     Thank you for this referral.  Luis Antonio James, PT   Time Calculation: 38 mins      Eval Complexity: History: MEDIUM  Complexity : 1-2 comorbidities / personal factors will impact the outcome/ POC Exam:MEDIUM Complexity : 3 Standardized tests and measures addressing body structure, function, activity limitation and / or participation in recreation  Presentation: MEDIUM Complexity : Evolving with changing characteristics  Clinical Decision Making:Medium Complexity    Overall Complexity:MEDIUM

## 2021-04-24 NOTE — PROGRESS NOTES
Problem: Falls - Risk of  Goal: *Absence of Falls  Description: Document Tod Tushar Fall Risk and appropriate interventions in the flowsheet.   Outcome: Progressing Towards Goal  Note: Fall Risk Interventions:  Mobility Interventions: Bed/chair exit alarm         Medication Interventions: Patient to call before getting OOB    Elimination Interventions: Call light in reach    History of Falls Interventions: Bed/chair exit alarm         Problem: Patient Education: Go to Patient Education Activity  Goal: Patient/Family Education  Outcome: Progressing Towards Goal

## 2021-04-24 NOTE — CONSULTS
[facility] Consult Note      Patient: Genoveva Gonzales MRN: [id number]  SSN: [id number]    YOB: 1940  Age: [de-identified] y.o. Sex: female    Date of Consultation: 04/23/2021  Referring Physician: Freya Osler  Reason for Consultation: Abnormal troponin    Chief complain: Fatigue    HPI;  70-year-old female came to emergency room with complaining of increasing fatigue for last few weeks. She is complaining of exertional fatigue and tired. She denies any chest pain. She denies any unusual shortness of breath on exertion. She denies any dizziness, palpitation, presyncope or syncope. She denies any smoking or alcohol abuse. Cardiology consult called for abnormal troponin. She is complaining of diarrhea for last few days.     Past Medical History:   Diagnosis Date    Hypertension     Sleep disorder      Past Surgical History:   Procedure Laterality Date    HX ORTHOPAEDIC      broken right ankle, left femur 30 yrs ago     Current Facility-Administered Medications   Medication Dose Route Frequency    potassium bicarb-citric acid (EFFER-K) tablet 40 mEq  40 mEq Oral BID    [START ON 4/25/2021] enoxaparin (LOVENOX) injection 40 mg  40 mg SubCUTAneous Q24H    sodium chloride (NS) flush 5-40 mL  5-40 mL IntraVENous Q8H    sodium chloride (NS) flush 5-40 mL  5-40 mL IntraVENous PRN    acetaminophen (TYLENOL) tablet 650 mg  650 mg Oral Q6H PRN    Or    acetaminophen (TYLENOL) suppository 650 mg  650 mg Rectal Q6H PRN    polyethylene glycol (MIRALAX) packet 17 g  17 g Oral DAILY PRN    promethazine (PHENERGAN) tablet 12.5 mg  12.5 mg Oral Q6H PRN    Or    ondansetron (ZOFRAN) injection 4 mg  4 mg IntraVENous Q6H PRN    famotidine (PEPCID) tablet 20 mg  20 mg Oral BID    aspirin delayed-release tablet 81 mg  81 mg Oral DAILY    bumetanide (BUMEX) tablet 2 mg  2 mg Oral DAILY    colchicine tablet 0.6 mg  0.6 mg Oral DAILY    multivitamin, tx-iron-ca-min (THERA-M w/ IRON) tablet 1 Tab  1 Tab Oral DAILY    carvediloL (COREG) tablet 3.125 mg  3.125 mg Oral BID WITH MEALS    rosuvastatin (CRESTOR) tablet 5 mg  5 mg Oral QHS    [START ON 4/27/2021] metOLazone (ZAROXOLYN) tablet 2.5 mg  2.5 mg Oral EVERY TU & TH       Allergies and Intolerances: Allergies   Allergen Reactions    Seafood Hives     crabs    Sulfa (Sulfonamide Antibiotics) Hives       Family History:   Family History   Problem Relation Age of Onset    Diabetes Mother     Heart Disease Mother     Heart Disease Father        Social History:   She  reports that she has never smoked. She has never used smokeless tobacco.  She  reports current alcohol use of about 1.0 standard drinks of alcohol per week. Review of Systems:     Gen: No fever, chills, malaise, weight loss/gain. Heent: No headache, rhinorrhea, epistaxis, ear pain, hearing loss, sinus pain, neck pain/stiffness, sore throat. Heart: No chest pain, palpitations,   Shortness of breath on exertion, pnd, or orthopnea. Resp: No cough, hemoptysis, wheezing and  dyspnea. GI: No nausea, vomiting, diarrhea, constipation, melena or hematochezia. : No urinary obstruction, dysuria or hematuria. Derm: No rash, new skin lesion or pruritis. Musc/skeletal:  positive bone or joint complains. Vasc: No edema, cyanosis or claudication. Endo: No heat/cold intolerance, no polyuria,polydipsia or polyphagia. Neuro: No unilateral weakness, numbness, tingling. No seizures. Heme: No easy bruising or bleeding. Physical:   Patient Vitals for the past 6 hrs:   Temp Pulse Resp BP SpO2   04/24/21 1232 97.9 °F (36.6 °C) 76 18 116/77 98 %         Exam:   General Appearance: Comfortable, not using accessory muscles of respiration. HEENT: NIGEL. HEAD: Atraumatic  NECK: No JVD, no thyroidomeglay. CAROTIDS: no bruit  LUNGS: Clear bilaterally. HEART: S1+S2 audible, no murmur, no pericardial rub. ABD: Non-tender, BS Audible    EXT: No edema, and no cyanosis.   VASCULAR EXAM: Pulses are intact. PSYCHIATRIC EXAM: Mood is appropriate. NEUROLOGICAL: AAO times 3, Motor and sensory sytem intact     Review of Data:   LABS:   Lab Results   Component Value Date/Time    WBC 5.0 04/24/2021 02:15 AM    HGB 11.6 (L) 04/24/2021 02:15 AM    HCT 35.2 04/24/2021 02:15 AM    PLATELET 687 69/64/0404 02:15 AM     Lab Results   Component Value Date/Time    Sodium 143 04/24/2021 02:15 AM    Potassium 2.9 (LL) 04/24/2021 02:15 AM    Chloride 100 04/24/2021 02:15 AM    CO2 35 (H) 04/24/2021 02:15 AM    Glucose 91 04/24/2021 02:15 AM    BUN 55 (H) 04/24/2021 02:15 AM    Creatinine 1.33 (H) 04/24/2021 02:15 AM     No results found for: CHOL, CHOLX, CHLST, CHOLV, HDL, HDLP, LDL, LDLC, DLDLP, TGLX, TRIGL, TRIGP  No components found for: GPT  No results found for: HBA1C, HGBE8, KKT9JRUH, VDO3IHEG      Cardiology Procedures:   Results for orders placed or performed during the hospital encounter of 04/23/21   EKG, 12 LEAD, INITIAL   Result Value Ref Range    Ventricular Rate 75 BPM    Atrial Rate 75 BPM    P-R Interval 168 ms    QRS Duration 82 ms    Q-T Interval 430 ms    QTC Calculation (Bezet) 480 ms    Calculated P Axis 62 degrees    Calculated R Axis -8 degrees    Calculated T Axis 37 degrees    Diagnosis       Sinus rhythm with marked sinus arrhythmia  Poor R wave progression  Non specific ST changes              Impression / Plan:    Patient Active Problem List   Diagnosis Code    Fatigue R53.83    Elevated troponin R77.8    Obesity (BMI 30-39. 9) E66.9    HTN (hypertension) I10    SERENA treated with BiPAP G47.33           abnormal troponin    Hypokalemia     [de-identified]year-old female came with complaining of fatigue. She denies any chest pain or unusual shortness of breath. Troponin is abnormal.  Troponin elevation is not following trend of MI it is flat. Continue aspirin, carvedilol and statin. Discontinue full dose of Lovenox. Give Lovenox for DVT prophylaxis. Continue Bumex and metolazone for now. Will monitor renal function.    echocardiogram   further cardiac workup as clinically indicated    Signed By: Maritza Ramirez MD     April 24, 2021

## 2021-04-24 NOTE — PROGRESS NOTES
Hospitalist Progress Note    Patient: Viktor Walker MRN: [id number]  CSN: [id number]    YOB: 1940  Age: [de-identified] y.o. Sex: female    DOA: 4/23/2021 LOS:  LOS: 1 day            Assessment/Plan     Principal Problem:    Elevated troponin (4/23/2021)    Active Problems:    Fatigue (4/23/2021)      Obesity (BMI 30-39.9) (4/23/2021)      HTN (hypertension) (4/23/2021)      SERENA treated with BiPAP (4/23/2021)      CHF (congestive heart failure) ([facility] [address] (4/23/2021)      CC: 60-year-old female with obesity, hypertension, SERENA on BiPAP, and CHF is admitted with worsening fatigue and elevated troponin. Is possible that her fatigue is due to a recent NSTEMI in the past week.     Elevated troponin with possible NSTEMI: OnTelemetry  Monitor trend troponins: Slightly elevated  Therapeutic Lovenox  Cardiology consult for possible cardiac catheterization, although patient prefers medical management if possible  Echocardiogram  On Crestor 5 mg at night given statin induced myopathy with other medications  Check duplex Dopplers of the lower extremities for possible DVT.        Fatigue: possibly due to recent NSTEMI and hypokalemia. Refer to Physical therapy evaluation to see if home health PT or rehab is needed    Hypokalemia: K repletion     CHFwithout acute exacerbation: Pending echocardiogram  Continue current medications     Hypertension: Controlled continue current medications     Obesity with BMI 39: Increased risks of morbidity and mortality  Follow-up A1c and lipid panel  Encouraged continuing lifestyle modification     SERENA treated with BiPAP at night     DNR/DNI     Prophylaxis: Pepcid p.o., Therapeutic lovenox SQ     Dispo: Home with home health in 2 to 3 days         Subjective:     Pt was seen and examined with the nurse in the morning round. \" I am feeling better\"     Review of systems  General: No fevers or chills. Cardiovascular: No chest pain or pressure. No palpitations.    Pulmonary: No cough, SOB  Gastrointestinal: No nausea, vomiting. Objective:      Visit Vitals  /69 (BP 1 Location: Right arm, BP Patient Position: At rest)   Pulse 80   Temp 98.2 °F (36.8 °C)   Resp 20   Ht 5' 5\" (1.651 m)   Wt 107.5 kg (237 lb)   SpO2 91%   BMI 39.44 kg/m²       Physical Exam:    Gen: NAD, obese. Heent:  MMM, NC, AT. Cor: s1s2 RRR. No MRG. PMI mid 5th intercostal space. Resp:  CTA b/l. No w/r/r. Nml effort and diaphragmatic excursion. Abd:  NT ND.  BS positive. No rebound or guarding. No masses. Ext: No edema or cyanosis. Intake and Output:  Current Shift:  No intake/output data recorded. Last three shifts:  04/22 1901 - 04/24 0700  In: 1000 [I.V.:1000]  Out: 650 [Urine:650]    Labs: Results:       Chemistry Recent Labs     04/24/21 0215 04/23/21 1715   GLU 91 111*    139   K 2.9* 3.3*    96*   CO2 35* 36*   BUN 55* 62*   CREA 1.33* 1.53*   CA 9.5 10.1   AGAP 8 7   BUCR 41* 41*   AP 90 110   TP 6.5 7.5   ALB 3.2* 3.8   GLOB 3.3 3.7   AGRAT 1.0 1.0      CBC w/Diff Recent Labs     04/24/21 0215 04/23/21 1715   WBC 5.0 6.0   RBC 3.59* 4.10*   HGB 11.6* 12.4   HCT 35.2 39.4    235   GRANS  --  45   LYMPH  --  40   EOS  --  1      Cardiac Enzymes Recent Labs     04/24/21 0215 04/23/21 2020    217*   CKND1 1.3 1.2      Coagulation Recent Labs     04/23/21 1715   PTP 13.7   INR 1.1   APTT 24.6       Lipid Panel No results found for: CHOL, CHOLPOCT, CHOLX, CHLST, CHOLV, 780785, HDL, HDLP, LDL, LDLC, DLDLP, 302210, VLDLC, VLDL, TGLX, TRIGL, TRIGP, TGLPOCT, CHHD, CHHDX   BNP No results for input(s): BNPP in the last 72 hours.    Liver Enzymes Recent Labs     04/24/21  0215   TP 6.5   ALB 3.2*   AP 90      Thyroid Studies No results found for: T4, T3U, TSH, TSHEXT     Procedures/imaging: see electronic medical records for all procedures/Xrays and details which were not copied into this note but were reviewed prior to creation of Plan      Medications Reviewed  Marleny Jane, MD

## 2021-04-24 NOTE — ROUTINE PROCESS
Bedside shift change report given to [address] [place] [address] (oncoming nurse) by Clayton Jerome RN (offgoing nurse). Report included the following information SBAR, Kardex, MAR and Recent Results.

## 2021-04-24 NOTE — ED NOTES
TRANSFER - OUT REPORT:    Verbal report given to Krysten MANZANARES(name) on Lindell Cheadle  being transferred to Telemetry (unit) for routine progression of care       Report consisted of patients Situation, Background, Assessment and   Recommendations(SBAR). Information from the following report(s) SBAR, Kardex and MAR was reviewed with the receiving nurse. Lines:   Peripheral IV 04/23/21 Antecubital (Active)   Site Assessment Clean, dry, & intact 04/23/21 1715   Phlebitis Assessment 0 04/23/21 1715   Infiltration Assessment 0 04/23/21 1715   Dressing Status Clean, dry, & intact 04/23/21 1715   Dressing Type Transparent 04/23/21 1715   Hub Color/Line Status Pink;Flushed;Patent 04/23/21 1715   Action Taken Blood drawn 04/23/21 1715        Opportunity for questions and clarification was provided.       Patient transported with:   Monitor  Registered Nurse

## 2021-04-25 ENCOUNTER — APPOINTMENT (OUTPATIENT)
Dept: VASCULAR SURGERY | Age: 81
DRG: 948 | End: 2021-04-25
Attending: FAMILY MEDICINE
Payer: MEDICARE

## 2021-04-25 ENCOUNTER — APPOINTMENT (OUTPATIENT)
Dept: NON INVASIVE DIAGNOSTICS | Age: 81
DRG: 948 | End: 2021-04-25
Attending: FAMILY MEDICINE
Payer: MEDICARE

## 2021-04-25 LAB
ALBUMIN SERPL-MCNC: 3 G/DL (ref 3.4–5)
ALBUMIN/GLOB SERPL: 1 {RATIO} (ref 0.8–1.7)
ALP SERPL-CCNC: 99 U/L (ref 45–117)
ALT SERPL-CCNC: 44 U/L (ref 13–56)
ANION GAP SERPL CALC-SCNC: 5 MMOL/L (ref 3–18)
AST SERPL-CCNC: 39 U/L (ref 10–38)
BILIRUB SERPL-MCNC: 0.4 MG/DL (ref 0.2–1)
BUN SERPL-MCNC: 52 MG/DL (ref 7–18)
BUN/CREAT SERPL: 35 (ref 12–20)
CALCIUM SERPL-MCNC: 9.2 MG/DL (ref 8.5–10.1)
CHLORIDE SERPL-SCNC: 97 MMOL/L (ref 100–111)
CHOLEST SERPL-MCNC: 183 MG/DL
CO2 SERPL-SCNC: 38 MMOL/L (ref 21–32)
CREAT SERPL-MCNC: 1.48 MG/DL (ref 0.6–1.3)
ECHO AV ANNULUS DIAM: 3.14 CM
ECHO AV AREA PEAK VELOCITY: 1.38 CM2
ECHO AV AREA VTI: 1.24 CM2
ECHO AV AREA/BSA PEAK VELOCITY: 0.7 CM2/M2
ECHO AV AREA/BSA VTI: 0.6 CM2/M2
ECHO AV MEAN GRADIENT: 7.14 MMHG
ECHO AV PEAK GRADIENT: 12.01 MMHG
ECHO AV PEAK VELOCITY: 173 CM/S
ECHO AV VTI: 41.64 CM
ECHO IVC PROX: 2.04 CM
ECHO LA VOL 2C: 94.76 ML (ref 22–52)
ECHO LA VOL 4C: 120.44 ML (ref 22–52)
ECHO LA VOL BP: 125.52 ML (ref 22–52)
ECHO LV E' LATERAL VELOCITY: 8 CM/S
ECHO LV E' SEPTAL VELOCITY: 5 CM/S
ECHO LV EDV A2C: 89.83 ML
ECHO LV EDV A4C: 91.83 ML
ECHO LV EDV BP: 93.64 ML (ref 56–104)
ECHO LV EJECTION FRACTION A2C: 43 %
ECHO LV EJECTION FRACTION A4C: 60 %
ECHO LV EJECTION FRACTION BIPLANE: 53.5 % (ref 55–100)
ECHO LV ESV A2C: 51.51 ML
ECHO LV ESV A4C: 36.28 ML
ECHO LV ESV BP: 43.51 ML (ref 19–49)
ECHO LV INTERNAL DIMENSION DIASTOLIC: 4.73 CM (ref 3.9–5.3)
ECHO LV INTERNAL DIMENSION SYSTOLIC: 2.94 CM
ECHO LV IVSD: 1.53 CM (ref 0.6–0.9)
ECHO LV MASS 2D: 305.9 G (ref 67–162)
ECHO LV MASS INDEX 2D: 147.4 G/M2 (ref 43–95)
ECHO LV POSTERIOR WALL DIASTOLIC: 1.53 CM (ref 0.6–0.9)
ECHO LVOT CARDIAC OUTPUT: 3.9 L/MIN
ECHO LVOT DIAM: 2.01 CM
ECHO LVOT PEAK GRADIENT: 2.27 MMHG
ECHO LVOT PEAK VELOCITY: 75 CM/S
ECHO LVOT SV: 38.3 ML
ECHO LVOT SV: 51.5 ML
ECHO LVOT VTI: 16.26 CM
ECHO MV A VELOCITY: 66 CM/S
ECHO MV AREA PHT: 4.16 CM2
ECHO MV E DECELERATION TIME (DT): 182.45 MS
ECHO MV E VELOCITY: 100 CM/S
ECHO MV E/A RATIO: 1.52
ECHO MV E/E' LATERAL: 12.5
ECHO MV E/E' RATIO (AVERAGED): 16.25
ECHO MV E/E' SEPTAL: 20
ECHO MV PRESSURE HALF TIME (PHT): 52.91 MS
ECHO RA AREA 4C: 21.54 CM2
ECHO RV INTERNAL DIMENSION: 4.33 CM
ECHO TV REGURGITANT MAX VELOCITY: 273 CM/S
ECHO TV REGURGITANT PEAK GRADIENT: 29.86 MMHG
ERYTHROCYTE [DISTWIDTH] IN BLOOD BY AUTOMATED COUNT: 15.6 % (ref 11.6–14.5)
GLOBULIN SER CALC-MCNC: 3.1 G/DL (ref 2–4)
GLUCOSE SERPL-MCNC: 100 MG/DL (ref 74–99)
HBA1C MFR BLD: 5.3 % (ref 4.2–5.6)
HCT VFR BLD AUTO: 33.2 % (ref 35–45)
HDLC SERPL-MCNC: 101 MG/DL (ref 40–60)
HDLC SERPL: 1.8 {RATIO} (ref 0–5)
HGB BLD-MCNC: 11.2 G/DL (ref 12–16)
LDLC SERPL CALC-MCNC: 69.8 MG/DL (ref 0–100)
LIPID PROFILE,FLP: ABNORMAL
LVOT MG: 1.36 MMHG
MCH RBC QN AUTO: 33.3 PG (ref 24–34)
MCHC RBC AUTO-ENTMCNC: 33.7 G/DL (ref 31–37)
MCV RBC AUTO: 98.8 FL (ref 74–97)
PLATELET # BLD AUTO: 190 K/UL (ref 135–420)
PMV BLD AUTO: 10.6 FL (ref 9.2–11.8)
POTASSIUM SERPL-SCNC: 3.3 MMOL/L (ref 3.5–5.5)
PROT SERPL-MCNC: 6.1 G/DL (ref 6.4–8.2)
RBC # BLD AUTO: 3.36 M/UL (ref 4.2–5.3)
SODIUM SERPL-SCNC: 140 MMOL/L (ref 136–145)
TRIGL SERPL-MCNC: 61 MG/DL (ref ?–150)
VLDLC SERPL CALC-MCNC: 12.2 MG/DL
WBC # BLD AUTO: 5.7 K/UL (ref 4.6–13.2)

## 2021-04-25 PROCEDURE — 85027 COMPLETE CBC AUTOMATED: CPT

## 2021-04-25 PROCEDURE — 97530 THERAPEUTIC ACTIVITIES: CPT

## 2021-04-25 PROCEDURE — 36415 COLL VENOUS BLD VENIPUNCTURE: CPT

## 2021-04-25 PROCEDURE — 94660 CPAP INITIATION&MGMT: CPT

## 2021-04-25 PROCEDURE — 93970 EXTREMITY STUDY: CPT

## 2021-04-25 PROCEDURE — 83036 HEMOGLOBIN GLYCOSYLATED A1C: CPT

## 2021-04-25 PROCEDURE — 65660000000 HC RM CCU STEPDOWN

## 2021-04-25 PROCEDURE — 93306 TTE W/DOPPLER COMPLETE: CPT

## 2021-04-25 PROCEDURE — 74011250637 HC RX REV CODE- 250/637: Performed by: FAMILY MEDICINE

## 2021-04-25 PROCEDURE — 74011250636 HC RX REV CODE- 250/636: Performed by: INTERNAL MEDICINE

## 2021-04-25 PROCEDURE — 80053 COMPREHEN METABOLIC PANEL: CPT

## 2021-04-25 PROCEDURE — 80061 LIPID PANEL: CPT

## 2021-04-25 PROCEDURE — 97116 GAIT TRAINING THERAPY: CPT

## 2021-04-25 RX ORDER — BUMETANIDE 1 MG/1
1 TABLET ORAL DAILY
Status: DISCONTINUED | OUTPATIENT
Start: 2021-04-26 | End: 2021-04-26 | Stop reason: HOSPADM

## 2021-04-25 RX ADMIN — Medication 10 ML: at 21:57

## 2021-04-25 RX ADMIN — FAMOTIDINE 20 MG: 20 TABLET, FILM COATED ORAL at 08:32

## 2021-04-25 RX ADMIN — COLCHICINE 0.6 MG: 0.6 TABLET, FILM COATED ORAL at 08:32

## 2021-04-25 RX ADMIN — ENOXAPARIN SODIUM 40 MG: 40 INJECTION SUBCUTANEOUS at 09:46

## 2021-04-25 RX ADMIN — MULTIPLE VITAMINS W/ MINERALS TAB 1 TABLET: TAB at 08:32

## 2021-04-25 RX ADMIN — BUMETANIDE 2 MG: 1 TABLET ORAL at 08:31

## 2021-04-25 RX ADMIN — ROSUVASTATIN CALCIUM 5 MG: 5 TABLET, COATED ORAL at 21:57

## 2021-04-25 RX ADMIN — Medication 81 MG: at 08:32

## 2021-04-25 RX ADMIN — CARVEDILOL 3.12 MG: 3.12 TABLET, FILM COATED ORAL at 08:32

## 2021-04-25 RX ADMIN — FAMOTIDINE 20 MG: 20 TABLET, FILM COATED ORAL at 21:57

## 2021-04-25 NOTE — PROGRESS NOTES
Hospitalist Progress Note    Patient: Madie Zavala MRN: [id number]  CSN: [id number]    YOB: 1940  Age: [de-identified] y.o. Sex: female    DOA: 4/23/2021 LOS:  LOS: 2 days            Assessment/Plan     Principal Problem:    Elevated troponin (4/23/2021)    Active Problems:    Fatigue (4/23/2021)      Obesity (BMI 30-39.9) (4/23/2021)      HTN (hypertension) (4/23/2021)      SERENA treated with BiPAP (4/23/2021)      CHF (congestive heart failure) ([facility] [address] (4/23/2021)      CC: 72-year-old female with obesity, hypertension, SERENA on BiPAP, and CHF is admitted with worsening fatigue and elevated troponin.  Is possible that her fatigue is due to a recent NSTEMI in the past week.     Elevated troponin with possible NSTEMI: OnTelemetry  Monitor trend troponins: Slightly elevated  Therapeutic Lovenox  Cardiology consult for possible cardiac catheterization, although patient prefers medical management if possible  Echocardiogram pending  On Crestor 5 mg at night given statin induced myopathy with other medications  Pending duplex Dopplers of the lower extremities for possible DVT.      Fatigue: possibly due to recent NSTEMI and hypokalemia. Refer to Physical therapy evaluation to see if home health PT or rehab is needed     Hypokalemia: K repletion     CHFwithout acute exacerbation: Pending echocardiogram  Continue current medications     Hypertension: Controlled continue current medications     Obesity with BMI 39: Increased risks of morbidity and mortality  Follow-up A1c and lipid panel  Encouraged continuing lifestyle modification     SERENA treated with BiPAP at night     DNR/DNI     Prophylaxis: Pepcid p.o., Therapeutic lovenox SQ     Dispo: Home with home health in 1-2 days         Subjective:      Pt was seen and examined with the nurse in the morning round. \" I am feeling better. Let me go home. I do not need any ultrasound\"     Review of systems  General: No fevers or chills.   Cardiovascular: No chest pain or pressure. No palpitations. Pulmonary: No cough, SOB  Gastrointestinal: No nausea, vomiting. Objective:      Visit Vitals  BP (!) 147/77   Pulse 72   Temp 97.7 °F (36.5 °C)   Resp 16   Ht 5' 5\" (1.651 m)   Wt 101.6 kg (224 lb)   SpO2 97%   BMI 37.28 kg/m²       Physical Exam:    Gen: NAD, morbid obese. Heent:  MMM, NC, AT. Cor: s1s2 RRR. No MRG. PMI mid 5th intercostal space. Resp:  CTA b/l. No w/r/r. Nml effort and diaphragmatic excursion. Abd:  NT ND.  BS positive. No rebound or guarding. No masses. Ext: No edema or cyanosis. Intake and Output:  Current Shift:  04/25 0701 - 04/25 1900  In: -   Out: 1200 [Urine:1200]  Last three shifts:  04/23 1901 - 04/25 0700  In: 740 [P.O.:240; I.V.:500]  Out: 1650 [Urine:1650]    Labs: Results:       Chemistry Recent Labs     04/25/21  0115 04/24/21 0215 04/23/21  1715   * 91 111*    143 139   K 3.3* 2.9* 3.3*   CL 97* 100 96*   CO2 38* 35* 36*   BUN 52* 55* 62*   CREA 1.48* 1.33* 1.53*   CA 9.2 9.5 10.1   AGAP 5 8 7   BUCR 35* 41* 41*   AP 99 90 110   TP 6.1* 6.5 7.5   ALB 3.0* 3.2* 3.8   GLOB 3.1 3.3 3.7   AGRAT 1.0 1.0 1.0      CBC w/Diff Recent Labs     04/25/21  0115 04/24/21  0215 04/23/21  1715   WBC 5.7 5.0 6.0   RBC 3.36* 3.59* 4.10*   HGB 11.2* 11.6* 12.4   HCT 33.2* 35.2 39.4    208 235   GRANS  --   --  45   LYMPH  --   --  40   EOS  --   --  1      Cardiac Enzymes Recent Labs     04/24/21  1353 04/24/21  0808    171   CKND1 0.9 1.1      Coagulation Recent Labs     04/23/21  1715   PTP 13.7   INR 1.1   APTT 24.6       Lipid Panel No results found for: CHOL, CHOLPOCT, CHOLX, CHLST, CHOLV, 443628, HDL, HDLP, LDL, LDLC, DLDLP, 968330, VLDLC, VLDL, TGLX, TRIGL, TRIGP, TGLPOCT, CHHD, CHHDX   BNP No results for input(s): BNPP in the last 72 hours.    Liver Enzymes Recent Labs     04/25/21  0115   TP 6.1*   ALB 3.0*   AP 99      Thyroid Studies No results found for: T4, T3U, TSH, TSHEXT     Procedures/imaging: see electronic medical records for all procedures/Xrays and details which were not copied into this note but were reviewed prior to creation of Plan      Medications Reviewed  Narcisa Sawant MD

## 2021-04-25 NOTE — PROGRESS NOTES
Problem: Falls - Risk of  Goal: *Absence of Falls  Description: Document Delmi Abernathy Fall Risk and appropriate interventions in the flowsheet.   Outcome: Progressing Towards Goal  Note: Fall Risk Interventions:  Mobility Interventions: Bed/chair exit alarm, Communicate number of staff needed for ambulation/transfer, Utilize walker, cane, or other assistive device         Medication Interventions: Patient to call before getting OOB    Elimination Interventions: Bed/chair exit alarm, Call light in reach, Patient to call for help with toileting needs    History of Falls Interventions: Bed/chair exit alarm

## 2021-04-25 NOTE — PROGRESS NOTES
Cardiology Progress Note        Patient: Macho España        Sex: female          DOA: 4/23/2021  YOB: 1940      Age:  [de-identified] y.o.        LOS:  LOS: 2 days     patient seen and examined, chart reviewed  Assessment/Plan     Patient Active Problem List   Diagnosis Code    Fatigue R53.83    Elevated troponin R77.8    Obesity (BMI 30-39. 9) E66.9    HTN (hypertension) I10    SERENA treated with BiPAP G47.33    CHF (congestive heart failure) (HCC) I50.9       abnormal troponin no clear evidence of acute coronary syndrome   abnormal echocardiogram  Hypokalemia   Renal  Insufficiency. In 2017 created in was normal.  Recent baseline is not available     echocardiogram revealed    · Left Ventricle: Normal cavity size and systolic function (ejection fraction normal). Moderate concentric hypertrophy. The estimated EF is 55 - 60%. There is mild (grade 1) left ventricular diastolic dysfunction E/E' ratio is 16.25. Wall Scoring: The following segments are hypokinetic: basal inferior, basal inferolateral, mid inferior and mid inferolateral. All other segments are normal.  · Left Atrium: Severely dilated left atrium. · Right Ventricle: Normal global systolic function. Mildly dilated right ventricle. Assessment of RV function: TAPSE = 20.2 mm. · Right Atrium: Mildly dilated right atrium. · Pulmonary Artery: Pulmonary arteries not well visualized. Pulmonary arterial systolic pressure (PASP) is 33 mmHg. Pulmonary hypertension found to be mild. Plan:    Continue aspirin, carvedilol and Crestor. Discontinue metolazone. Change Bumex to 1 mg once a day  Advised about ischemia workup in view of shortness of breath on exertion, abnormal EKG, abnormal cardiac enzymes and wall motion abnormality on echocardiogram.    Discussed with patient about cardiac catheterization versus nuclear stress test.    All risks, benefits and alternatives explained to patient and she verbally understood. Patient would like to proceed for nuclear stress test.    Schedule for Lexiscan stress test.    NPO after midnight                Subjective:    cc:   denies any chest pain. I feel I have more energy now. REVIEW OF SYSTEMS:     General: No fevers or chills. Cardiovascular: No chest pain,No palpitations, No orthopnea, No PND, No leg swelling, No claudication, Positive shortness of breath on exertion  Pulmonary: No dyspnea. Gastrointestinal: No nausea, vomiting, bleeding  Neurology: No Dizziness    Objective:      Visit Vitals  BP (!) 124/59 (BP 1 Location: Right arm, BP Patient Position: At rest)   Pulse 75   Temp 98.1 °F (36.7 °C)   Resp 16   Ht 5' 5\" (1.651 m)   Wt 101.6 kg (224 lb)   SpO2 97%   BMI 37.28 kg/m²     Body mass index is 37.28 kg/m². Physical Exam:  General Appearance: Comfortable, not using accessory muscles of respiration. HEENT: NIGEL. HEAD: Atraumatic  NECK: No JVD, no thyroidomeglay. CAROTIDS: No bruit  LUNGS: Clear bilaterally. HEART: S1+S2 audible, no murmur, no pericardial rub. ABD: Non-tender, BS Audible    EXT: No edema, and no cyanosis. VASCULAR EXAM: Pulses are intact. PSYCHIATRIC EXAM: Mood is appropriate. MUSCULOSKELETAL: Grossly no joint deformity.   NEUROLOGICAL: AAO times 3, No motor and sensory deficit      Medication:  Current Facility-Administered Medications   Medication Dose Route Frequency    enoxaparin (LOVENOX) injection 40 mg  40 mg SubCUTAneous Q24H    sodium chloride (NS) flush 5-40 mL  5-40 mL IntraVENous Q8H    sodium chloride (NS) flush 5-40 mL  5-40 mL IntraVENous PRN    acetaminophen (TYLENOL) tablet 650 mg  650 mg Oral Q6H PRN    Or    acetaminophen (TYLENOL) suppository 650 mg  650 mg Rectal Q6H PRN    polyethylene glycol (MIRALAX) packet 17 g  17 g Oral DAILY PRN    promethazine (PHENERGAN) tablet 12.5 mg  12.5 mg Oral Q6H PRN    Or    ondansetron (ZOFRAN) injection 4 mg  4 mg IntraVENous Q6H PRN    famotidine (PEPCID) tablet 20 mg 20 mg Oral BID    aspirin delayed-release tablet 81 mg  81 mg Oral DAILY    bumetanide (BUMEX) tablet 2 mg  2 mg Oral DAILY    colchicine tablet 0.6 mg  0.6 mg Oral DAILY    multivitamin, tx-iron-ca-min (THERA-M w/ IRON) tablet 1 Tab  1 Tab Oral DAILY    carvediloL (COREG) tablet 3.125 mg  3.125 mg Oral BID WITH MEALS    rosuvastatin (CRESTOR) tablet 5 mg  5 mg Oral QHS               Lab/Data Reviewed:       Recent Labs     04/25/21  0115 04/24/21 0215 04/23/21  1715   WBC 5.7 5.0 6.0   HGB 11.2* 11.6* 12.4   HCT 33.2* 35.2 39.4    208 235     Recent Labs     04/25/21 0115 04/24/21 0215 04/23/21  1715    143 139   K 3.3* 2.9* 3.3*   CL 97* 100 96*   CO2 38* 35* 36*   * 91 111*   BUN 52* 55* 62*   CREA 1.48* 1.33* 1.53*   CA 9.2 9.5 10.1       Signed By: Prabha Vega MD     April 25, 2021

## 2021-04-25 NOTE — PROGRESS NOTES
04/24/21 2440   Oxygen Therapy   O2 Device CPAP nasal   Patient is on her home Cpap Mask. HR 75, Oxygen Saturation  96%. RN notified. Will continue to monitor.

## 2021-04-25 NOTE — PROGRESS NOTES
Problem: Falls - Risk of  Goal: *Absence of Falls  Description: Document Triston Harris Fall Risk and appropriate interventions in the flowsheet.   4/25/2021 0738 by Mary Wyatt RN  Outcome: Progressing Towards Goal  Note: Fall Risk Interventions:  Mobility Interventions: Patient to call before getting OOB         Medication Interventions: Patient to call before getting OOB    Elimination Interventions: Call light in reach    History of Falls Interventions: Door open when patient unattended      4/25/2021 0737 by Mary Wyatt RN  Outcome: Progressing Towards Goal  Note: Fall Risk Interventions:  Mobility Interventions: Patient to call before getting OOB         Medication Interventions: Patient to call before getting OOB    Elimination Interventions: Call light in reach    History of Falls Interventions: Door open when patient unattended         Problem: Patient Education: Go to Patient Education Activity  Goal: Patient/Family Education  4/25/2021 0738 by Mary Wyatt RN  Outcome: Progressing Towards Goal  4/25/2021 0737 by Mary Wyatt RN  Outcome: Progressing Towards Goal     Problem: Patient Education: Go to Patient Education Activity  Goal: Patient/Family Education  4/25/2021 0738 by Mary Wyatt RN  Outcome: Progressing Towards Goal  4/25/2021 0737 by aMry Wyatt RN  Outcome: Progressing Towards Goal

## 2021-04-26 ENCOUNTER — APPOINTMENT (OUTPATIENT)
Dept: NON INVASIVE DIAGNOSTICS | Age: 81
DRG: 948 | End: 2021-04-26
Attending: INTERNAL MEDICINE
Payer: MEDICARE

## 2021-04-26 ENCOUNTER — HOSPITAL ENCOUNTER (INPATIENT)
Dept: NUCLEAR MEDICINE | Age: 81
Discharge: HOME | DRG: 948 | End: 2021-04-26
Attending: INTERNAL MEDICINE
Payer: MEDICARE

## 2021-04-26 VITALS
BODY MASS INDEX: 37.32 KG/M2 | DIASTOLIC BLOOD PRESSURE: 74 MMHG | TEMPERATURE: 98.6 F | HEIGHT: 65 IN | HEART RATE: 73 BPM | RESPIRATION RATE: 17 BRPM | OXYGEN SATURATION: 100 % | WEIGHT: 224 LBS | SYSTOLIC BLOOD PRESSURE: 143 MMHG

## 2021-04-26 LAB
ALBUMIN SERPL-MCNC: 3 G/DL (ref 3.4–5)
ALBUMIN/GLOB SERPL: 0.9 {RATIO} (ref 0.8–1.7)
ALP SERPL-CCNC: 86 U/L (ref 45–117)
ALT SERPL-CCNC: 45 U/L (ref 13–56)
ANION GAP SERPL CALC-SCNC: 4 MMOL/L (ref 3–18)
AST SERPL-CCNC: 41 U/L (ref 10–38)
BILIRUB SERPL-MCNC: 0.6 MG/DL (ref 0.2–1)
BUN SERPL-MCNC: 43 MG/DL (ref 7–18)
BUN/CREAT SERPL: 30 (ref 12–20)
CALCIUM SERPL-MCNC: 9.1 MG/DL (ref 8.5–10.1)
CHLORIDE SERPL-SCNC: 95 MMOL/L (ref 100–111)
CO2 SERPL-SCNC: 39 MMOL/L (ref 21–32)
CREAT SERPL-MCNC: 1.41 MG/DL (ref 0.6–1.3)
ERYTHROCYTE [DISTWIDTH] IN BLOOD BY AUTOMATED COUNT: 15.9 % (ref 11.6–14.5)
GLOBULIN SER CALC-MCNC: 3.3 G/DL (ref 2–4)
GLUCOSE SERPL-MCNC: 100 MG/DL (ref 74–99)
HCT VFR BLD AUTO: 34.4 % (ref 35–45)
HGB BLD-MCNC: 11.4 G/DL (ref 12–16)
MCH RBC QN AUTO: 32.8 PG (ref 24–34)
MCHC RBC AUTO-ENTMCNC: 33.1 G/DL (ref 31–37)
MCV RBC AUTO: 98.9 FL (ref 74–97)
PLATELET # BLD AUTO: 188 K/UL (ref 135–420)
PMV BLD AUTO: 10.4 FL (ref 9.2–11.8)
POTASSIUM SERPL-SCNC: 3.3 MMOL/L (ref 3.5–5.5)
PROT SERPL-MCNC: 6.3 G/DL (ref 6.4–8.2)
RBC # BLD AUTO: 3.48 M/UL (ref 4.2–5.3)
SODIUM SERPL-SCNC: 138 MMOL/L (ref 136–145)
STRESS BASELINE HR: 71 BPM
STRESS ESTIMATED WORKLOAD: 1 METS
STRESS EXERCISE DUR MIN: NORMAL
STRESS PEAK DIAS BP: 64 MMHG
STRESS PEAK SYS BP: 127 MMHG
STRESS PERCENT HR ACHIEVED: 58 %
STRESS POST PEAK HR: 81 BPM
STRESS RATE PRESSURE PRODUCT: NORMAL BPM*MMHG
STRESS ST DEPRESSION: 0 MM
STRESS ST ELEVATION: 0 MM
STRESS TARGET HR: 140 BPM
WBC # BLD AUTO: 6 K/UL (ref 4.6–13.2)

## 2021-04-26 PROCEDURE — 80053 COMPREHEN METABOLIC PANEL: CPT

## 2021-04-26 PROCEDURE — 74011250637 HC RX REV CODE- 250/637: Performed by: INTERNAL MEDICINE

## 2021-04-26 PROCEDURE — 97116 GAIT TRAINING THERAPY: CPT

## 2021-04-26 PROCEDURE — 74011250636 HC RX REV CODE- 250/636: Performed by: INTERNAL MEDICINE

## 2021-04-26 PROCEDURE — 97530 THERAPEUTIC ACTIVITIES: CPT

## 2021-04-26 PROCEDURE — 74011250637 HC RX REV CODE- 250/637: Performed by: FAMILY MEDICINE

## 2021-04-26 PROCEDURE — 93017 CV STRESS TEST TRACING ONLY: CPT

## 2021-04-26 PROCEDURE — A9500 TC99M SESTAMIBI: HCPCS

## 2021-04-26 PROCEDURE — 36415 COLL VENOUS BLD VENIPUNCTURE: CPT

## 2021-04-26 PROCEDURE — 85027 COMPLETE CBC AUTOMATED: CPT

## 2021-04-26 PROCEDURE — 77010033678 HC OXYGEN DAILY

## 2021-04-26 RX ORDER — POTASSIUM CHLORIDE 20 MEQ/1
40 TABLET, EXTENDED RELEASE ORAL
Status: DISCONTINUED | OUTPATIENT
Start: 2021-04-26 | End: 2021-04-26 | Stop reason: HOSPADM

## 2021-04-26 RX ORDER — ROSUVASTATIN CALCIUM 5 MG/1
5 TABLET, COATED ORAL
Qty: 30 TAB | Refills: 0 | Status: ON HOLD | OUTPATIENT
Start: 2021-04-26 | End: 2022-03-03 | Stop reason: CLARIF

## 2021-04-26 RX ORDER — FAMOTIDINE 20 MG/1
20 TABLET, FILM COATED ORAL DAILY
Status: DISCONTINUED | OUTPATIENT
Start: 2021-04-27 | End: 2021-04-26 | Stop reason: HOSPADM

## 2021-04-26 RX ORDER — BUMETANIDE 1 MG/1
1 TABLET ORAL DAILY
Qty: 30 TAB | Refills: 0 | Status: ON HOLD | OUTPATIENT
Start: 2021-04-27 | End: 2022-03-03 | Stop reason: CLARIF

## 2021-04-26 RX ADMIN — BUMETANIDE 1 MG: 1 TABLET ORAL at 08:53

## 2021-04-26 RX ADMIN — MULTIPLE VITAMINS W/ MINERALS TAB 1 TABLET: TAB at 08:53

## 2021-04-26 RX ADMIN — CARVEDILOL 3.12 MG: 3.12 TABLET, FILM COATED ORAL at 17:00

## 2021-04-26 RX ADMIN — REGADENOSON 0.4 MG: 0.08 INJECTION, SOLUTION INTRAVENOUS at 11:31

## 2021-04-26 RX ADMIN — Medication 5 ML: at 14:00

## 2021-04-26 RX ADMIN — Medication 10 ML: at 06:11

## 2021-04-26 RX ADMIN — FAMOTIDINE 20 MG: 20 TABLET, FILM COATED ORAL at 08:53

## 2021-04-26 RX ADMIN — Medication 81 MG: at 08:52

## 2021-04-26 RX ADMIN — CARVEDILOL 3.12 MG: 3.12 TABLET, FILM COATED ORAL at 08:53

## 2021-04-26 RX ADMIN — ENOXAPARIN SODIUM 40 MG: 40 INJECTION SUBCUTANEOUS at 08:53

## 2021-04-26 RX ADMIN — COLCHICINE 0.6 MG: 0.6 TABLET, FILM COATED ORAL at 08:53

## 2021-04-26 NOTE — PROGRESS NOTES
Problem: Mobility Impaired (Adult and Pediatric)  Goal: *Acute Goals and Plan of Care (Insert Text)  Description: Physical Therapy Goals  Initiated 4/24/2021 and to be accomplished within 7 day(s)  1. Patient will move from supine to sit and sit to supine  in bed with modified independence. 2.  Patient will transfer from bed to chair and chair to bed with modified independence using the least restrictive device. 3.  Patient will perform sit to stand with modified independence. 4.  Patient will ambulate with modified independence for 150 feet with the least restrictive device. Prior Level of Function:   Patient was modified independence for all mobility including gait using bariatric rolling walker (wheelchair for long distances during community mobility). Patient lives with daughter in a [facility], [address] but pt has a stair lift so she doesn't have to worry about stairs. Pt states that she walks daily around the house and in the backyard. 4/26/2021  PT treatment not completed due to:  [x] Off Unit for testing/procedure- at [address]  [] Pain  [] Eating  [] Patient too lethargic  [] Nausea/vomiting  [] Dialysis treatment in progress   [] Other:  Will f/u at later time accordingly.     Isrrael Umaña, PT

## 2021-04-26 NOTE — PROGRESS NOTES
Reason for Admission: Elevated troponin    Chart reviewed. Per H&P: Daniel Coats is a [de-identified] y.o. female with obesity, hypertension, SERENA on BiPAP, and CHF presents with her daughter for worsening fatigue over the past 3 weeks, especially bad this past week. Her daughter reports that she is a hard time ambulating even with her walker over the past week. She does not report any chest pain or shortness of breath. She has never had a heart attack or cardiac catheterization. She denies any increase in her baseline leg pain. She has a history of chronic leg pain with the right foot drop and difficulty walking from a remote fracture in childhood as well as a femur fracture in adulthood. She denies any leg swelling, orthopnea, or difficulty sleeping. She had both COVID vaccines with the last one 3 weeks ago. \"    Care Management/Patient Conversation: [id number]: Chart reviewed. CM spoke with the patient and informed her of the CM's role. The patient confirmed demographics and PCP. CM and the patient discussed discharge plans to include transportation upon discharge, DME, family support, and transportation to and from appointments. The patient states that she currently lives at home with her daughter, using a walker for ambulation. The patient states that she doesn't drive but her daughter, Dianelys Elder, can pick her up when she is ready to discharge and can take her to and from any MD visits. CM informed the patient that therapy is recommending home health upon discharge and the patient states that she is agreeable to this. The patient denies any questions or concerns at this time. CM to follow the patient's progress and be available to assist with discharge planning as needed. CMS referral placed.      Prior to admission patient was living: DaughterTyler    Prior to admission patient was using the following DME: Margarita Tony Score: 15%                   Plan for utilizing home health: TBD         PCP: First and Last name: Melani Amin MD    [facility]   Are you a current patient: Yes/No: Yes   Approximate date of last visit: Roughly 2 months   Can you participate in a virtual visit with your PCP:                     Current Advanced Directive/Advance Care Plan: DNR no ACP docs on file    Healthcare Decision Maker: Jv Bailey Nieto: [phone]                         Transition of Care Plan: In progress , likely home with home health, family assist and MD follow-up     Care Management Interventions  PCP Verified by CM: Yes  Mode of Transport at Discharge:  Other (see comment)(daughter)  Transition of Care Consult (CM Consult): Discharge Planning, [address]: Yes  Physical Therapy Consult: Yes  Occupational Therapy Consult: Yes  Current Support Network: Relative's Home(lives with daughter)  Confirm Follow Up Transport: Family  The Plan for Transition of Care is Related to the Following Treatment Goals : Elevated troponin  The Patient and/or Patient Representative was Provided with a Choice of Provider and Agrees with the Discharge Plan?: Yes  Name of the Patient Representative Who was Provided with a Choice of Provider and Agrees with the Discharge Plan: William Box  Freedom of Choice List was Provided with Basic Dialogue that Supports the Patient's Individualized Plan of Care/Goals, Treatment Preferences and Shares the Quality Data Associated with the Providers?: Yes  Discharge Location  Discharge Placement: Home with home health

## 2021-04-26 NOTE — DISCHARGE SUMMARY
Discharge Summary    Patient: Titi Montoya MRN: [id number]  CSN: [id number]    YOB: 1940  Age: [de-identified] y.o. Sex: female    DOA: 4/23/2021 LOS:  LOS: 3 days   Discharge Date:      Primary Care Provider:  Mejia Motta MD    Admission Diagnoses: Elevated troponin [R77.8]  Fatigue [R53.83]    Discharge Diagnoses:    Problem List as of 4/26/2021 Date Reviewed: 4/23/2021          Codes Class Noted - Resolved    Fatigue ICD-10-CM: R53.83  ICD-9-CM: 780.79  4/23/2021 - Present        * (Principal) Elevated troponin ICD-10-CM: R77.8  ICD-9-CM: 790.6  4/23/2021 - Present        Obesity (BMI 30-39. 9) ICD-10-CM: E66.9  ICD-9-CM: 278.00  4/23/2021 - Present        HTN (hypertension) ICD-10-CM: I10  ICD-9-CM: 401.9  4/23/2021 - Present        SERENA treated with BiPAP ICD-10-CM: G47.33  ICD-9-CM: 327.23  4/23/2021 - Present        CHF (congestive heart failure) (HCC) ICD-10-CM: I50.9  ICD-9-CM: 428.0  4/23/2021 - Present              Discharge Medications:     Current Discharge Medication List      START taking these medications    Details   rosuvastatin (CRESTOR) 5 mg tablet Take 1 Tab by mouth nightly. Qty: 30 Tab, Refills: 0         CONTINUE these medications which have CHANGED    Details   bumetanide (BUMEX) 1 mg tablet Take 1 Tab by mouth daily. Qty: 30 Tab, Refills: 0         CONTINUE these medications which have NOT CHANGED    Details   multivitamin, tx-iron-ca-min (THERA-M w/ IRON) 9 mg iron-400 mcg tab tablet Take 1 Tab by mouth daily. ergocalciferol (Vitamin D2) 1,250 mcg (50,000 unit) capsule Take 50,000 Units by mouth every seven (7) days. Every Friday for 12 weeks      colchicine 0.6 mg tablet Take 0.6 mg by mouth two (2) times a day. Indications: treatment to prevent acute gout attack      aspirin delayed-release 81 mg tablet Take 81 mg by mouth daily. potassium chloride SR (KLOR-CON 10) 10 mEq tablet Take 10 mEq by mouth daily.       carvediloL (COREG) 3.125 mg tablet Take 3.125 mg by mouth two (2) times daily (with meals). STOP taking these medications       metOLazone (ZAROXOLYN) 2.5 mg tablet Comments:   Reason for Stopping:               Discharge Condition: Good    Procedures : None    Consults: Cardiology      PHYSICAL EXAM   Visit Vitals  BP (!) 143/74   Pulse 73   Temp 98.6 °F (37 °C)   Resp 17   Ht 5' 5\" (1.651 m)   Wt 101.6 kg (224 lb)   SpO2 100%   BMI 37.28 kg/m²     General: Awake, cooperative, no acute distress    HEENT: NC, Atraumatic. PERRLA, EOMI. Anicteric sclerae. Lungs:  CTA Bilaterally. No Wheezing/Rhonchi/Rales. Heart:  Regular  rhythm,  No murmur, No Rubs, No Gallops  Abdomen: Soft, Non distended, Non tender. +Bowel sounds,   Extremities: No c/c/e  Psych:   Not anxious or agitated. Neurologic:  No acute neurological deficits. Admission HPI :   William Ivan is a [de-identified] y.o. female with obesity, hypertension, SERENA on BiPAP, and CHF presents with her daughter for worsening fatigue over the past 3 weeks, especially bad this past week. Her daughter reports that she is a hard time ambulating even with her walker over the past week. She does not report any chest pain or shortness of breath. She has never had a heart attack or cardiac catheterization. She denies any increase in her baseline leg pain. She has a history of chronic leg pain with the right foot drop and difficulty walking from a remote fracture in childhood as well as a femur fracture in adulthood. She denies any leg swelling, orthopnea, or difficulty sleeping. She had both COVID vaccines with the last one 3 weeks ago.       Hospital Course :   Ms. Elvis Jiménez was admitted to monitored floor. She was seen and followed by cardiology. She did not had any acute events during hospitalization. Elevated troponin -  Trended troponins, flat  Initially started on Therapeutic Lovenox, once determined no ACS, stopped.   Cardiology consulted f  Echocardiogram with normal LVF, EF of 55-60%  She was continued on her betablocker and aspirn and started on Crestor 5 mg  duplex Dopplers of the lower extremities negative for DVT.   Stress test negative     Hypokalemia -   K repletion     CHFwithout acute exacerbation:   Echo as above  She was in pre renal azotemia. She is on high dose of diuresis with both metolazone and bumex. His diuretics reduced to 1mg daily of bumex.     Hypertension -   Controlled with current medications     Obesity with BMI 39: Increased risks of morbidity and mortality  Encouraged continuing lifestyle modification     SERENA continued with BiPAP at night    Activity: Activity as tolerated    Diet: Cardiac Diet    Follow-up: PCP, Cardiology    Disposition: home    Minutes spent on discharge: 45       Labs: Results:       Chemistry Recent Labs     04/26/21 0315 04/25/21  0115 04/24/21  0215   * 100* 91    140 143   K 3.3* 3.3* 2.9*   CL 95* 97* 100   CO2 39* 38* 35*   BUN 43* 52* 55*   CREA 1.41* 1.48* 1.33*   CA 9.1 9.2 9.5   AGAP 4 5 8   BUCR 30* 35* 41*   AP 86 99 90   TP 6.3* 6.1* 6.5   ALB 3.0* 3.0* 3.2*   GLOB 3.3 3.1 3.3   AGRAT 0.9 1.0 1.0      CBC w/Diff Recent Labs     04/26/21 0315 04/25/21  0115 04/24/21  0215   WBC 6.0 5.7 5.0   RBC 3.48* 3.36* 3.59*   HGB 11.4* 11.2* 11.6*   HCT 34.4* 33.2* 35.2    190 208      Cardiac Enzymes Recent Labs     04/24/21  1353 04/24/21  0808    171   CKND1 0.9 1.1      Coagulation No results for input(s): PTP, INR, APTT, INREXT in the last 72 hours. Lipid Panel Lab Results   Component Value Date/Time    Cholesterol, total 183 04/25/2021 04:00 PM    HDL Cholesterol 101 (H) 04/25/2021 04:00 PM    LDL, calculated 69.8 04/25/2021 04:00 PM    VLDL, calculated 12.2 04/25/2021 04:00 PM    Triglyceride 61 04/25/2021 04:00 PM    CHOL/HDL Ratio 1.8 04/25/2021 04:00 PM      BNP No results for input(s): BNPP in the last 72 hours.    Liver Enzymes Recent Labs     04/26/21  0315   TP 6.3*   ALB 3.0*   AP 86 Thyroid Studies No results found for: T4, T3U, TSH, TSHEXT         Significant Diagnostic Studies: Ct Head Wo Cont    Result Date: 4/23/2021  EXAM: CT head INDICATION: Status post fall with headache. COMPARISON: None. TECHNIQUE: Axial CT imaging of the head was performed without intravenous contrast. One or more dose reduction techniques were used on this CT: automated exposure control, adjustment of the mAs and/or kVp according to patient size, and iterative reconstruction techniques. The specific techniques used on this CT exam have been documented in the patient's electronic medical record. Digital Imaging and Communications in Medicine (DICOM) format image data are available to nonaffiliated external healthcare facilities or entities on a secured, media free, reciprocally searchable basis with patient authorization for at least a 12-month period after this study. _______________ FINDINGS: BRAIN AND POSTERIOR FOSSA: There is no intracranial hemorrhage, mass effect, or midline shift. There are no areas of abnormal parenchymal attenuation. EXTRA-AXIAL SPACES AND MENINGES: There are no abnormal extra-axial fluid collections. CALVARIUM: Intact. SINUSES: Clear. OTHER: None. _______________     1. No acute intracranial process. Xr Chest Port    Result Date: 4/23/2021  EXAM: XR CHEST PORT CLINICAL INDICATION/HISTORY: gen fatigue -Additional: None COMPARISON: 10/1/2017 TECHNIQUE: Portable frontal view of the chest _______________ FINDINGS: SUPPORT DEVICES: None. HEART AND MEDIASTINUM: Mild cardiomegaly. LUNGS AND PLEURAL SPACES: Unremarkable. BONY THORAX AND SOFT TISSUES: Unremarkable. _______________     Mild cardiomegaly. . No acute cardiopulmonary process. Echo Adult Complete    Result Date: 4/25/2021  · Left Ventricle: Normal cavity size and systolic function (ejection fraction normal). Moderate concentric hypertrophy. The estimated EF is 55 - 60%.  There is mild (grade 1) left ventricular diastolic dysfunction E/E' ratio is 16.25. Wall Scoring: The following segments are hypokinetic: basal inferior, basal inferolateral, mid inferior and mid inferolateral. All other segments are normal. · Left Atrium: Severely dilated left atrium. · Right Ventricle: Normal global systolic function. Mildly dilated right ventricle. Assessment of RV function: TAPSE = 20.2 mm. · Right Atrium: Mildly dilated right atrium. · Pulmonary Artery: Pulmonary arteries not well visualized. Pulmonary arterial systolic pressure (PASP) is 33 mmHg. Pulmonary hypertension found to be mild. Duplex Lower Ext Venous Bilat    Result Date: 4/26/2021  · No evidence of deep vein thrombosis in the right lower extremity. · No evidence of deep vein thrombosis in the left lower extremity. No results found for this or any previous visit. Please note that this dictation was completed with Play[employer], the computer voice recognition software. Quite often unanticipated grammatical, syntax, homophones, and other interpretive errors are inadvertently transcribed by the computer software. Please disregard these errors. Please excuse any errors that have escaped final proofreading.      CC: Jarocho Knight MD

## 2021-04-26 NOTE — PROGRESS NOTES
Problem: Mobility Impaired (Adult and Pediatric)  Goal: *Acute Goals and Plan of Care (Insert Text)  Description: Physical Therapy Goals  Initiated 4/24/2021 and to be accomplished within 7 day(s)  1. Patient will move from supine to sit and sit to supine  in bed with modified independence. 2.  Patient will transfer from bed to chair and chair to bed with modified independence using the least restrictive device. 3.  Patient will perform sit to stand with modified independence. 4.  Patient will ambulate with modified independence for 150 feet with the least restrictive device. Prior Level of Function:   Patient was modified independence for all mobility including gait using bariatric rolling walker (wheelchair for long distances during community mobility). Patient lives with daughter in a [facility], [address] but pt has a stair lift so she doesn't have to worry about stairs. Pt states that she walks daily around the house and in the backyard. Outcome: Progressing Towards Goal     physical Therapy TREATMENT    Patient: Quintin Brian (77 y.o. female)  Date: 4/25/2021  Diagnosis: Elevated troponin [R77.8]  Fatigue [R53.83] Elevated troponin       Precautions: Fall   Chart, physical therapy assessment, plan of care and goals were reviewed. ASSESSMENT:  Session initiated to address MD request for potential clearance. Pt is able to increase amb inside room and Ms. Eder Bedolla was demonstrates increase motivation to return home setting. Physical assist is required to return to supine, but most independent with additional time. D/c had been cancelled during session, but update provided to hospitalist and RN.     Progression toward goals:  [x]      Improving appropriately and progressing toward goals  []      Improving slowly and progressing toward goals  []      Not making progress toward goals and plan of care will be adjusted     PLAN:  Patient continues to benefit from skilled intervention to address the above impairments. Continue treatment per established plan of care. Discharge Recommendations:  Home Health  Further Equipment Recommendations for Discharge:  rolling walker     SUBJECTIVE:   Patient stated I'm tired of getting stuck! I want to go home!     OBJECTIVE DATA SUMMARY:   Critical Behavior:  Neurologic State: Alert  Orientation Level: Oriented X4  Cognition: Follows commands  Safety/Judgement: Awareness of environment  Functional Mobility Training:  Bed Mobility:  Rolling: Contact guard assistance  Supine to Sit: Stand-by assistance; Additional time  Sit to Supine: Minimum assistance; Additional time  Scooting: Contact guard assistance  Transfers:  Sit to Stand: Stand-by assistance; Additional time  Stand to Sit: Stand-by assistance; Additional time  Balance:  Sitting: Intact  Standing: Intact; With support  Ambulation/Gait Training:  Distance (ft): 30 Feet (ft)  Assistive Device: Gait belt;Orthotic device; Walker, rolling  Ambulation - Level of Assistance: Stand-by assistance  Gait Abnormalities: Decreased step clearance; Step to gait  Base of Support: Widened  Speed/Roopa: Slow  Step Length: Left shortened;Right shortened  Interventions: Safety awareness training;Verbal cues  Pain:  Pain in: 3 (All over)  Pain out: 3   Activity Tolerance:   Good-  Please refer to the flowsheet for vital signs taken during this treatment.   After treatment:   [] Patient left in no apparent distress sitting up in chair  [x] Patient left in no apparent distress in bed  [x] Call bell left within reach  [x] Nursing notified  [] Caregiver present  [] Bed alarm activated      Jovana Aleman PTA   Time Calculation: 25 mins

## 2021-04-27 ENCOUNTER — HOME HEALTH ADMISSION (OUTPATIENT)
Dept: HOME HEALTH SERVICES | Facility: HOME HEALTH | Age: 81
End: 2021-04-27
Payer: MEDICARE

## 2021-04-27 NOTE — PROGRESS NOTES
Cardiology Progress Note        Patient: Jessica Cobb        Sex: female          DOA: 4/23/2021  YOB: 1940      Age:  [de-identified] y.o.        LOS:  LOS: 3 days     patient seen and examined, chart reviewed  Assessment/Plan     Patient Active Problem List   Diagnosis Code    Fatigue R53.83    Elevated troponin R77.8    Obesity (BMI 30-39. 9) E66.9    HTN (hypertension) I10    SERENA treated with BiPAP G47.33    CHF (congestive heart failure) (HCC) I50.9       abnormal troponin no clear evidence of acute coronary syndrome   abnormal echocardiogram  Hypokalemia   Renal  Insufficiency. In 2017 created in was normal.  Recent baseline is not available     echocardiogram revealed    · Left Ventricle: Normal cavity size and systolic function (ejection fraction normal). Moderate concentric hypertrophy. The estimated EF is 55 - 60%. There is mild (grade 1) left ventricular diastolic dysfunction E/E' ratio is 16.25. Wall Scoring: The following segments are hypokinetic: basal inferior, basal inferolateral, mid inferior and mid inferolateral. All other segments are normal.  · Left Atrium: Severely dilated left atrium. · Right Ventricle: Normal global systolic function. Mildly dilated right ventricle. Assessment of RV function: TAPSE = 20.2 mm. · Right Atrium: Mildly dilated right atrium. · Pulmonary Artery: Pulmonary arteries not well visualized. Pulmonary arterial systolic pressure (PASP) is 33 mmHg. Pulmonary hypertension found to be mild. Plan:    Continue aspirin, carvedilol and Crestor. Continue Bumex to 1 mg once a day   Proceed for Lexiscan stress test.                    Subjective:    cc:   denies any chest pain. I feel I have more energy now. REVIEW OF SYSTEMS:     General: No fevers or chills. Cardiovascular: No chest pain,No palpitations, No orthopnea, No PND, No leg swelling, No claudication, Positive shortness of breath on exertion  Pulmonary: No dyspnea. Gastrointestinal: No nausea, vomiting, bleeding  Neurology: No Dizziness    Objective:      Visit Vitals  BP (!) 143/74   Pulse 73   Temp 98.6 °F (37 °C)   Resp 17   Ht 5' 5\" (1.651 m)   Wt 101.6 kg (224 lb)   SpO2 100%   BMI 37.28 kg/m²     Body mass index is 37.28 kg/m². Physical Exam:  General Appearance: Comfortable, not using accessory muscles of respiration. HEENT: NIGEL. HEAD: Atraumatic  NECK: No JVD, no thyroidomeglay. CAROTIDS: No bruit  LUNGS: Clear bilaterally. HEART: S1+S2 audible, no murmur, no pericardial rub. ABD: Non-tender, BS Audible    EXT: No edema, and no cyanosis. VASCULAR EXAM: Pulses are intact. PSYCHIATRIC EXAM: Mood is appropriate. MUSCULOSKELETAL: Grossly no joint deformity.   NEUROLOGICAL: AAO times 3, No motor and sensory deficit      Medication:  Current Facility-Administered Medications   Medication Dose Route Frequency    [START ON 4/27/2021] famotidine (PEPCID) tablet 20 mg  20 mg Oral DAILY    potassium chloride (K-DUR, KLOR-CON) SR tablet 40 mEq  40 mEq Oral NOW    bumetanide (BUMEX) tablet 1 mg  1 mg Oral DAILY    enoxaparin (LOVENOX) injection 40 mg  40 mg SubCUTAneous Q24H    sodium chloride (NS) flush 5-40 mL  5-40 mL IntraVENous Q8H    sodium chloride (NS) flush 5-40 mL  5-40 mL IntraVENous PRN    acetaminophen (TYLENOL) tablet 650 mg  650 mg Oral Q6H PRN    Or    acetaminophen (TYLENOL) suppository 650 mg  650 mg Rectal Q6H PRN    polyethylene glycol (MIRALAX) packet 17 g  17 g Oral DAILY PRN    promethazine (PHENERGAN) tablet 12.5 mg  12.5 mg Oral Q6H PRN    Or    ondansetron (ZOFRAN) injection 4 mg  4 mg IntraVENous Q6H PRN    aspirin delayed-release tablet 81 mg  81 mg Oral DAILY    colchicine tablet 0.6 mg  0.6 mg Oral DAILY    multivitamin, tx-iron-ca-min (THERA-M w/ IRON) tablet 1 Tab  1 Tab Oral DAILY    carvediloL (COREG) tablet 3.125 mg  3.125 mg Oral BID WITH MEALS    rosuvastatin (CRESTOR) tablet 5 mg  5 mg Oral QHS     Current Outpatient Medications   Medication Sig    [START ON 4/27/2021] bumetanide (BUMEX) 1 mg tablet Take 1 Tab by mouth daily.  rosuvastatin (CRESTOR) 5 mg tablet Take 1 Tab by mouth nightly.  multivitamin, tx-iron-ca-min (THERA-M w/ IRON) 9 mg iron-400 mcg tab tablet Take 1 Tab by mouth daily.  ergocalciferol (Vitamin D2) 1,250 mcg (50,000 unit) capsule Take 50,000 Units by mouth every seven (7) days. Every Friday for 12 weeks    colchicine 0.6 mg tablet Take 0.6 mg by mouth two (2) times a day. Indications: treatment to prevent acute gout attack    aspirin delayed-release 81 mg tablet Take 81 mg by mouth daily.  potassium chloride SR (KLOR-CON 10) 10 mEq tablet Take 10 mEq by mouth daily.  carvediloL (COREG) 3.125 mg tablet Take 3.125 mg by mouth two (2) times daily (with meals).                Lab/Data Reviewed:       Recent Labs     04/26/21 0315 04/25/21  0115 04/24/21 0215   WBC 6.0 5.7 5.0   HGB 11.4* 11.2* 11.6*   HCT 34.4* 33.2* 35.2    190 208     Recent Labs     04/26/21 0315 04/25/21  0115 04/24/21  0215    140 143   K 3.3* 3.3* 2.9*   CL 95* 97* 100   CO2 39* 38* 35*   * 100* 91   BUN 43* 52* 55*   CREA 1.41* 1.48* 1.33*   CA 9.1 9.2 9.5       Signed By: Rosey Milton MD     April 26, 2021

## 2021-04-28 ENCOUNTER — HOME CARE VISIT (OUTPATIENT)
Dept: SCHEDULING | Facility: HOME HEALTH | Age: 81
End: 2021-04-28
Payer: MEDICARE

## 2021-04-28 VITALS
OXYGEN SATURATION: 98 % | DIASTOLIC BLOOD PRESSURE: 60 MMHG | TEMPERATURE: 97.2 F | SYSTOLIC BLOOD PRESSURE: 115 MMHG | RESPIRATION RATE: 17 BRPM | HEART RATE: 70 BPM

## 2021-04-28 PROCEDURE — 3331090001 HH PPS REVENUE CREDIT

## 2021-04-28 PROCEDURE — 400013 HH SOC

## 2021-04-28 PROCEDURE — G0151 HHCP-SERV OF PT,EA 15 MIN: HCPCS

## 2021-04-28 PROCEDURE — 400018 HH-NO PAY CLAIM PROCEDURE

## 2021-04-28 PROCEDURE — 3331090002 HH PPS REVENUE DEBIT

## 2021-04-29 PROCEDURE — 3331090002 HH PPS REVENUE DEBIT

## 2021-04-29 PROCEDURE — 3331090001 HH PPS REVENUE CREDIT

## 2021-04-30 PROCEDURE — 3331090002 HH PPS REVENUE DEBIT

## 2021-04-30 PROCEDURE — 3331090001 HH PPS REVENUE CREDIT

## 2021-05-01 PROCEDURE — 3331090002 HH PPS REVENUE DEBIT

## 2021-05-01 PROCEDURE — 3331090001 HH PPS REVENUE CREDIT

## 2021-05-02 PROCEDURE — 3331090001 HH PPS REVENUE CREDIT

## 2021-05-02 PROCEDURE — 3331090002 HH PPS REVENUE DEBIT

## 2021-05-03 ENCOUNTER — HOME CARE VISIT (OUTPATIENT)
Dept: SCHEDULING | Facility: HOME HEALTH | Age: 81
End: 2021-05-03
Payer: MEDICARE

## 2021-05-03 VITALS
HEART RATE: 77 BPM | DIASTOLIC BLOOD PRESSURE: 84 MMHG | OXYGEN SATURATION: 93 % | TEMPERATURE: 98.2 F | SYSTOLIC BLOOD PRESSURE: 122 MMHG

## 2021-05-03 PROCEDURE — 3331090001 HH PPS REVENUE CREDIT

## 2021-05-03 PROCEDURE — G0152 HHCP-SERV OF OT,EA 15 MIN: HCPCS

## 2021-05-03 PROCEDURE — 3331090002 HH PPS REVENUE DEBIT

## 2021-05-04 PROCEDURE — 3331090001 HH PPS REVENUE CREDIT

## 2021-05-04 PROCEDURE — 3331090002 HH PPS REVENUE DEBIT

## 2021-05-05 ENCOUNTER — HOME CARE VISIT (OUTPATIENT)
Dept: SCHEDULING | Facility: HOME HEALTH | Age: 81
End: 2021-05-05
Payer: MEDICARE

## 2021-05-05 PROCEDURE — 3331090002 HH PPS REVENUE DEBIT

## 2021-05-05 PROCEDURE — 3331090001 HH PPS REVENUE CREDIT

## 2021-05-05 PROCEDURE — G0152 HHCP-SERV OF OT,EA 15 MIN: HCPCS

## 2021-05-06 VITALS
HEART RATE: 80 BPM | DIASTOLIC BLOOD PRESSURE: 88 MMHG | SYSTOLIC BLOOD PRESSURE: 142 MMHG | TEMPERATURE: 98.2 F | OXYGEN SATURATION: 95 %

## 2021-05-06 PROCEDURE — 3331090001 HH PPS REVENUE CREDIT

## 2021-05-06 PROCEDURE — 3331090002 HH PPS REVENUE DEBIT

## 2021-05-07 PROCEDURE — 3331090002 HH PPS REVENUE DEBIT

## 2021-05-07 PROCEDURE — 3331090001 HH PPS REVENUE CREDIT

## 2021-05-08 ENCOUNTER — HOME CARE VISIT (OUTPATIENT)
Dept: SCHEDULING | Facility: HOME HEALTH | Age: 81
End: 2021-05-08
Payer: MEDICARE

## 2021-05-08 VITALS
SYSTOLIC BLOOD PRESSURE: 120 MMHG | DIASTOLIC BLOOD PRESSURE: 80 MMHG | OXYGEN SATURATION: 90 % | RESPIRATION RATE: 17 BRPM | TEMPERATURE: 97.1 F | HEART RATE: 81 BPM

## 2021-05-08 PROCEDURE — G0151 HHCP-SERV OF PT,EA 15 MIN: HCPCS

## 2021-05-08 PROCEDURE — 3331090001 HH PPS REVENUE CREDIT

## 2021-05-08 PROCEDURE — 3331090002 HH PPS REVENUE DEBIT

## 2021-05-09 PROCEDURE — 3331090001 HH PPS REVENUE CREDIT

## 2021-05-09 PROCEDURE — 3331090002 HH PPS REVENUE DEBIT

## 2021-05-10 ENCOUNTER — HOME CARE VISIT (OUTPATIENT)
Dept: SCHEDULING | Facility: HOME HEALTH | Age: 81
End: 2021-05-10
Payer: MEDICARE

## 2021-05-10 PROCEDURE — 3331090001 HH PPS REVENUE CREDIT

## 2021-05-10 PROCEDURE — 3331090002 HH PPS REVENUE DEBIT

## 2021-05-10 PROCEDURE — G0152 HHCP-SERV OF OT,EA 15 MIN: HCPCS

## 2021-05-11 ENCOUNTER — HOME CARE VISIT (OUTPATIENT)
Dept: SCHEDULING | Facility: HOME HEALTH | Age: 81
End: 2021-05-11
Payer: MEDICARE

## 2021-05-11 VITALS
DIASTOLIC BLOOD PRESSURE: 90 MMHG | SYSTOLIC BLOOD PRESSURE: 132 MMHG | HEART RATE: 68 BPM | TEMPERATURE: 98.2 F | OXYGEN SATURATION: 92 %

## 2021-05-11 VITALS
DIASTOLIC BLOOD PRESSURE: 86 MMHG | SYSTOLIC BLOOD PRESSURE: 124 MMHG | TEMPERATURE: 95.7 F | HEART RATE: 71 BPM | OXYGEN SATURATION: 91 %

## 2021-05-11 PROCEDURE — G0157 HHC PT ASSISTANT EA 15: HCPCS

## 2021-05-11 PROCEDURE — 3331090001 HH PPS REVENUE CREDIT

## 2021-05-11 PROCEDURE — 3331090002 HH PPS REVENUE DEBIT

## 2021-05-12 ENCOUNTER — HOME CARE VISIT (OUTPATIENT)
Dept: SCHEDULING | Facility: HOME HEALTH | Age: 81
End: 2021-05-12
Payer: MEDICARE

## 2021-05-12 VITALS
TEMPERATURE: 97.8 F | HEART RATE: 66 BPM | DIASTOLIC BLOOD PRESSURE: 86 MMHG | OXYGEN SATURATION: 93 % | SYSTOLIC BLOOD PRESSURE: 140 MMHG

## 2021-05-12 PROCEDURE — 3331090001 HH PPS REVENUE CREDIT

## 2021-05-12 PROCEDURE — 3331090002 HH PPS REVENUE DEBIT

## 2021-05-12 PROCEDURE — G0152 HHCP-SERV OF OT,EA 15 MIN: HCPCS

## 2021-05-13 ENCOUNTER — HOME CARE VISIT (OUTPATIENT)
Dept: HOME HEALTH SERVICES | Facility: HOME HEALTH | Age: 81
End: 2021-05-13
Payer: MEDICARE

## 2021-05-13 PROCEDURE — 3331090001 HH PPS REVENUE CREDIT

## 2021-05-13 PROCEDURE — 3331090002 HH PPS REVENUE DEBIT

## 2021-05-14 PROCEDURE — 3331090002 HH PPS REVENUE DEBIT

## 2021-05-14 PROCEDURE — 3331090001 HH PPS REVENUE CREDIT

## 2021-05-15 PROCEDURE — 3331090002 HH PPS REVENUE DEBIT

## 2021-05-15 PROCEDURE — 3331090001 HH PPS REVENUE CREDIT

## 2021-05-16 PROCEDURE — 3331090002 HH PPS REVENUE DEBIT

## 2021-05-16 PROCEDURE — 3331090001 HH PPS REVENUE CREDIT

## 2021-05-17 ENCOUNTER — HOME CARE VISIT (OUTPATIENT)
Dept: SCHEDULING | Facility: HOME HEALTH | Age: 81
End: 2021-05-17
Payer: MEDICARE

## 2021-05-17 PROCEDURE — G0152 HHCP-SERV OF OT,EA 15 MIN: HCPCS

## 2021-05-17 PROCEDURE — 3331090001 HH PPS REVENUE CREDIT

## 2021-05-17 PROCEDURE — 3331090002 HH PPS REVENUE DEBIT

## 2021-05-18 ENCOUNTER — HOME CARE VISIT (OUTPATIENT)
Dept: SCHEDULING | Facility: HOME HEALTH | Age: 81
End: 2021-05-18
Payer: MEDICARE

## 2021-05-18 VITALS
SYSTOLIC BLOOD PRESSURE: 135 MMHG | DIASTOLIC BLOOD PRESSURE: 88 MMHG | OXYGEN SATURATION: 97 % | TEMPERATURE: 97.8 F | HEART RATE: 82 BPM

## 2021-05-18 VITALS
TEMPERATURE: 96.7 F | OXYGEN SATURATION: 96 % | HEART RATE: 72 BPM | SYSTOLIC BLOOD PRESSURE: 152 MMHG | DIASTOLIC BLOOD PRESSURE: 91 MMHG

## 2021-05-18 PROCEDURE — G0157 HHC PT ASSISTANT EA 15: HCPCS

## 2021-05-18 PROCEDURE — 3331090001 HH PPS REVENUE CREDIT

## 2021-05-18 PROCEDURE — 3331090002 HH PPS REVENUE DEBIT

## 2021-05-19 ENCOUNTER — HOME CARE VISIT (OUTPATIENT)
Dept: SCHEDULING | Facility: HOME HEALTH | Age: 81
End: 2021-05-19
Payer: MEDICARE

## 2021-05-19 VITALS
HEART RATE: 60 BPM | DIASTOLIC BLOOD PRESSURE: 80 MMHG | OXYGEN SATURATION: 93 % | TEMPERATURE: 98.3 F | SYSTOLIC BLOOD PRESSURE: 130 MMHG

## 2021-05-19 PROCEDURE — G0152 HHCP-SERV OF OT,EA 15 MIN: HCPCS

## 2021-05-19 PROCEDURE — 3331090002 HH PPS REVENUE DEBIT

## 2021-05-19 PROCEDURE — 3331090001 HH PPS REVENUE CREDIT

## 2021-05-20 ENCOUNTER — HOME CARE VISIT (OUTPATIENT)
Dept: SCHEDULING | Facility: HOME HEALTH | Age: 81
End: 2021-05-20
Payer: MEDICARE

## 2021-05-20 PROCEDURE — 3331090002 HH PPS REVENUE DEBIT

## 2021-05-20 PROCEDURE — 3331090001 HH PPS REVENUE CREDIT

## 2021-05-20 PROCEDURE — G0157 HHC PT ASSISTANT EA 15: HCPCS

## 2021-05-21 PROCEDURE — 3331090001 HH PPS REVENUE CREDIT

## 2021-05-21 PROCEDURE — 3331090002 HH PPS REVENUE DEBIT

## 2021-05-22 PROCEDURE — 3331090002 HH PPS REVENUE DEBIT

## 2021-05-22 PROCEDURE — 3331090001 HH PPS REVENUE CREDIT

## 2021-05-23 PROCEDURE — 3331090002 HH PPS REVENUE DEBIT

## 2021-05-23 PROCEDURE — 3331090001 HH PPS REVENUE CREDIT

## 2021-05-24 PROCEDURE — 3331090002 HH PPS REVENUE DEBIT

## 2021-05-24 PROCEDURE — 3331090001 HH PPS REVENUE CREDIT

## 2021-05-25 ENCOUNTER — HOME CARE VISIT (OUTPATIENT)
Dept: SCHEDULING | Facility: HOME HEALTH | Age: 81
End: 2021-05-25
Payer: MEDICARE

## 2021-05-25 VITALS
HEART RATE: 88 BPM | TEMPERATURE: 96.8 F | SYSTOLIC BLOOD PRESSURE: 148 MMHG | OXYGEN SATURATION: 95 % | DIASTOLIC BLOOD PRESSURE: 100 MMHG

## 2021-05-25 PROCEDURE — G0157 HHC PT ASSISTANT EA 15: HCPCS

## 2021-05-25 PROCEDURE — 3331090002 HH PPS REVENUE DEBIT

## 2021-05-25 PROCEDURE — 3331090001 HH PPS REVENUE CREDIT

## 2021-05-26 ENCOUNTER — HOME CARE VISIT (OUTPATIENT)
Dept: SCHEDULING | Facility: HOME HEALTH | Age: 81
End: 2021-05-26
Payer: MEDICARE

## 2021-05-26 VITALS
OXYGEN SATURATION: 100 % | TEMPERATURE: 98 F | SYSTOLIC BLOOD PRESSURE: 124 MMHG | RESPIRATION RATE: 16 BRPM | HEART RATE: 82 BPM | DIASTOLIC BLOOD PRESSURE: 72 MMHG

## 2021-05-26 PROCEDURE — 3331090001 HH PPS REVENUE CREDIT

## 2021-05-26 PROCEDURE — G0151 HHCP-SERV OF PT,EA 15 MIN: HCPCS

## 2021-05-26 PROCEDURE — 3331090002 HH PPS REVENUE DEBIT

## 2022-03-02 ENCOUNTER — HOSPITAL ENCOUNTER (INPATIENT)
Age: 82
LOS: 21 days | Discharge: HOME HEALTH | DRG: 981 | End: 2022-03-23
Attending: EMERGENCY MEDICINE | Admitting: INTERNAL MEDICINE
Payer: MEDICARE

## 2022-03-02 ENCOUNTER — APPOINTMENT (OUTPATIENT)
Dept: GENERAL RADIOLOGY | Age: 82
DRG: 981 | End: 2022-03-02
Attending: EMERGENCY MEDICINE
Payer: MEDICARE

## 2022-03-02 DIAGNOSIS — I50.9 ACUTE ON CHRONIC CONGESTIVE HEART FAILURE, UNSPECIFIED HEART FAILURE TYPE (HCC): ICD-10-CM

## 2022-03-02 DIAGNOSIS — I48.91 ATRIAL FIBRILLATION WITH RVR (HCC): ICD-10-CM

## 2022-03-02 DIAGNOSIS — R09.02 HYPOXIA: Primary | ICD-10-CM

## 2022-03-02 PROBLEM — J96.01 ACUTE RESPIRATORY FAILURE WITH HYPOXEMIA (HCC): Status: ACTIVE | Noted: 2022-03-02

## 2022-03-02 LAB
ALBUMIN SERPL-MCNC: 3.8 G/DL (ref 3.4–5)
ALBUMIN/GLOB SERPL: 1 {RATIO} (ref 0.8–1.7)
ALP SERPL-CCNC: 177 U/L (ref 45–117)
ALT SERPL-CCNC: 190 U/L (ref 13–56)
ANION GAP SERPL CALC-SCNC: 4 MMOL/L (ref 3–18)
APPEARANCE UR: ABNORMAL
APTT PPP: 25.3 SEC (ref 23–36.4)
ARTERIAL PATENCY WRIST A: POSITIVE
AST SERPL-CCNC: 113 U/L (ref 10–38)
BACTERIA URNS QL MICRO: ABNORMAL /HPF
BASE EXCESS BLD CALC-SCNC: 0.7 MMOL/L
BASOPHILS # BLD: 0 K/UL (ref 0–0.1)
BASOPHILS NFR BLD: 0 % (ref 0–2)
BDY SITE: ABNORMAL
BILIRUB SERPL-MCNC: 0.7 MG/DL (ref 0.2–1)
BILIRUB UR QL: NEGATIVE
BNP SERPL-MCNC: ABNORMAL PG/ML (ref 0–1800)
BODY TEMPERATURE: 98.6
BUN SERPL-MCNC: 49 MG/DL (ref 7–18)
BUN/CREAT SERPL: 29 (ref 12–20)
CALCIUM SERPL-MCNC: 10.3 MG/DL (ref 8.5–10.1)
CALCULATED R AXIS, ECG10: -81 DEGREES
CALCULATED T AXIS, ECG11: -20 DEGREES
CHLORIDE SERPL-SCNC: 103 MMOL/L (ref 100–111)
CO2 SERPL-SCNC: 30 MMOL/L (ref 21–32)
COLOR UR: YELLOW
COVID-19 RAPID TEST, COVR: NOT DETECTED
CREAT SERPL-MCNC: 1.71 MG/DL (ref 0.6–1.3)
D DIMER PPP FEU-MCNC: 10.94 UG/ML(FEU)
DIAGNOSIS, 93000: NORMAL
DIFFERENTIAL METHOD BLD: ABNORMAL
EOSINOPHIL # BLD: 0 K/UL (ref 0–0.4)
EOSINOPHIL NFR BLD: 0 % (ref 0–5)
EPITH CASTS URNS QL MICRO: ABNORMAL /LPF (ref 0–5)
ERYTHROCYTE [DISTWIDTH] IN BLOOD BY AUTOMATED COUNT: 15.3 % (ref 11.6–14.5)
FLUAV AG NPH QL IA: NEGATIVE
FLUBV AG NOSE QL IA: NEGATIVE
GAS FLOW.O2 O2 DELIVERY SYS: ABNORMAL L/MIN
GLOBULIN SER CALC-MCNC: 3.7 G/DL (ref 2–4)
GLUCOSE SERPL-MCNC: 141 MG/DL (ref 74–99)
GLUCOSE UR STRIP.AUTO-MCNC: NEGATIVE MG/DL
HCO3 BLD-SCNC: 29.9 MMOL/L (ref 22–26)
HCT VFR BLD AUTO: 42.6 % (ref 35–45)
HGB BLD-MCNC: 13.5 G/DL (ref 12–16)
HGB UR QL STRIP: ABNORMAL
IMM GRANULOCYTES # BLD AUTO: 0 K/UL (ref 0–0.04)
IMM GRANULOCYTES NFR BLD AUTO: 0 % (ref 0–0.5)
INR PPP: 1.2 (ref 0.8–1.2)
KETONES UR QL STRIP.AUTO: ABNORMAL MG/DL
LACTATE BLD-SCNC: 0.89 MMOL/L (ref 0.4–2)
LEUKOCYTE ESTERASE UR QL STRIP.AUTO: NEGATIVE
LYMPHOCYTES # BLD: 0.7 K/UL (ref 0.9–3.6)
LYMPHOCYTES NFR BLD: 10 % (ref 21–52)
MAGNESIUM SERPL-MCNC: 1.9 MG/DL (ref 1.6–2.6)
MCH RBC QN AUTO: 31.2 PG (ref 24–34)
MCHC RBC AUTO-ENTMCNC: 31.7 G/DL (ref 31–37)
MCV RBC AUTO: 98.4 FL (ref 78–100)
MONOCYTES # BLD: 0.5 K/UL (ref 0.05–1.2)
MONOCYTES NFR BLD: 8 % (ref 3–10)
NEUTS SEG # BLD: 5.4 K/UL (ref 1.8–8)
NEUTS SEG NFR BLD: 81 % (ref 40–73)
NITRITE UR QL STRIP.AUTO: NEGATIVE
NRBC # BLD: 0 K/UL (ref 0–0.01)
NRBC BLD-RTO: 0 PER 100 WBC
PCO2 BLD: 66.4 MMHG (ref 35–45)
PH BLD: 7.26 [PH] (ref 7.35–7.45)
PH UR STRIP: 5 [PH] (ref 5–8)
PLATELET # BLD AUTO: 204 K/UL (ref 135–420)
PMV BLD AUTO: 10.5 FL (ref 9.2–11.8)
PO2 BLD: 112 MMHG (ref 80–100)
POTASSIUM SERPL-SCNC: 5 MMOL/L (ref 3.5–5.5)
PROT SERPL-MCNC: 7.5 G/DL (ref 6.4–8.2)
PROT UR STRIP-MCNC: 300 MG/DL
PROTHROMBIN TIME: 14.1 SEC (ref 11.5–15.2)
Q-T INTERVAL, ECG07: 336 MS
QRS DURATION, ECG06: 72 MS
QTC CALCULATION (BEZET), ECG08: 437 MS
RBC # BLD AUTO: 4.33 M/UL (ref 4.2–5.3)
RBC #/AREA URNS HPF: ABNORMAL /HPF (ref 0–5)
SAO2 % BLD: 97.3 % (ref 92–97)
SERVICE CMNT-IMP: ABNORMAL
SODIUM SERPL-SCNC: 137 MMOL/L (ref 136–145)
SOURCE, COVRS: NORMAL
SP GR UR REFRACTOMETRY: 1.02 (ref 1–1.03)
SPECIMEN TYPE: ABNORMAL
TROPONIN-HIGH SENSITIVITY: 208 NG/L (ref 0–54)
UROBILINOGEN UR QL STRIP.AUTO: 1 EU/DL (ref 0.2–1)
VENTRICULAR RATE, ECG03: 102 BPM
WBC # BLD AUTO: 6.7 K/UL (ref 4.6–13.2)
WBC URNS QL MICRO: ABNORMAL /HPF (ref 0–5)

## 2022-03-02 PROCEDURE — 87086 URINE CULTURE/COLONY COUNT: CPT

## 2022-03-02 PROCEDURE — 74011000250 HC RX REV CODE- 250: Performed by: EMERGENCY MEDICINE

## 2022-03-02 PROCEDURE — 96374 THER/PROPH/DIAG INJ IV PUSH: CPT

## 2022-03-02 PROCEDURE — 81001 URINALYSIS AUTO W/SCOPE: CPT

## 2022-03-02 PROCEDURE — 65660000000 HC RM CCU STEPDOWN

## 2022-03-02 PROCEDURE — 71045 X-RAY EXAM CHEST 1 VIEW: CPT

## 2022-03-02 PROCEDURE — 74011000258 HC RX REV CODE- 258: Performed by: EMERGENCY MEDICINE

## 2022-03-02 PROCEDURE — 85025 COMPLETE CBC W/AUTO DIFF WBC: CPT

## 2022-03-02 PROCEDURE — 85610 PROTHROMBIN TIME: CPT

## 2022-03-02 PROCEDURE — 82803 BLOOD GASES ANY COMBINATION: CPT

## 2022-03-02 PROCEDURE — 87635 SARS-COV-2 COVID-19 AMP PRB: CPT

## 2022-03-02 PROCEDURE — 87804 INFLUENZA ASSAY W/OPTIC: CPT

## 2022-03-02 PROCEDURE — 93005 ELECTROCARDIOGRAM TRACING: CPT

## 2022-03-02 PROCEDURE — 74011000250 HC RX REV CODE- 250: Performed by: INTERNAL MEDICINE

## 2022-03-02 PROCEDURE — 87077 CULTURE AEROBIC IDENTIFY: CPT

## 2022-03-02 PROCEDURE — 80053 COMPREHEN METABOLIC PANEL: CPT

## 2022-03-02 PROCEDURE — 96375 TX/PRO/DX INJ NEW DRUG ADDON: CPT

## 2022-03-02 PROCEDURE — 74011250636 HC RX REV CODE- 250/636: Performed by: EMERGENCY MEDICINE

## 2022-03-02 PROCEDURE — 99285 EMERGENCY DEPT VISIT HI MDM: CPT

## 2022-03-02 PROCEDURE — 87186 SC STD MICRODIL/AGAR DIL: CPT

## 2022-03-02 PROCEDURE — 83880 ASSAY OF NATRIURETIC PEPTIDE: CPT

## 2022-03-02 PROCEDURE — 36600 WITHDRAWAL OF ARTERIAL BLOOD: CPT

## 2022-03-02 PROCEDURE — 94640 AIRWAY INHALATION TREATMENT: CPT

## 2022-03-02 PROCEDURE — 94760 N-INVAS EAR/PLS OXIMETRY 1: CPT

## 2022-03-02 PROCEDURE — 85379 FIBRIN DEGRADATION QUANT: CPT

## 2022-03-02 PROCEDURE — 83605 ASSAY OF LACTIC ACID: CPT

## 2022-03-02 PROCEDURE — 85730 THROMBOPLASTIN TIME PARTIAL: CPT

## 2022-03-02 PROCEDURE — 84484 ASSAY OF TROPONIN QUANT: CPT

## 2022-03-02 PROCEDURE — 83735 ASSAY OF MAGNESIUM: CPT

## 2022-03-02 PROCEDURE — 87040 BLOOD CULTURE FOR BACTERIA: CPT

## 2022-03-02 RX ORDER — ONDANSETRON 2 MG/ML
4 INJECTION INTRAMUSCULAR; INTRAVENOUS
Status: DISCONTINUED | OUTPATIENT
Start: 2022-03-02 | End: 2022-03-24 | Stop reason: HOSPADM

## 2022-03-02 RX ORDER — SODIUM CHLORIDE 0.9 % (FLUSH) 0.9 %
5-40 SYRINGE (ML) INJECTION AS NEEDED
Status: DISCONTINUED | OUTPATIENT
Start: 2022-03-02 | End: 2022-03-24 | Stop reason: HOSPADM

## 2022-03-02 RX ORDER — ACETAMINOPHEN 325 MG/1
650 TABLET ORAL
Status: DISCONTINUED | OUTPATIENT
Start: 2022-03-02 | End: 2022-03-24 | Stop reason: HOSPADM

## 2022-03-02 RX ORDER — ENOXAPARIN SODIUM 150 MG/ML
1 INJECTION SUBCUTANEOUS
Status: COMPLETED | OUTPATIENT
Start: 2022-03-02 | End: 2022-03-02

## 2022-03-02 RX ORDER — POTASSIUM CHLORIDE 20 MEQ/1
20 TABLET, EXTENDED RELEASE ORAL DAILY
Status: DISCONTINUED | OUTPATIENT
Start: 2022-03-03 | End: 2022-03-11

## 2022-03-02 RX ORDER — ONDANSETRON 4 MG/1
4 TABLET, ORALLY DISINTEGRATING ORAL
Status: DISCONTINUED | OUTPATIENT
Start: 2022-03-02 | End: 2022-03-24 | Stop reason: HOSPADM

## 2022-03-02 RX ORDER — FUROSEMIDE 10 MG/ML
40 INJECTION INTRAMUSCULAR; INTRAVENOUS
Status: COMPLETED | OUTPATIENT
Start: 2022-03-02 | End: 2022-03-02

## 2022-03-02 RX ORDER — ALLOPURINOL 100 MG/1
100 TABLET ORAL DAILY
COMMUNITY

## 2022-03-02 RX ORDER — IPRATROPIUM BROMIDE AND ALBUTEROL SULFATE 2.5; .5 MG/3ML; MG/3ML
3 SOLUTION RESPIRATORY (INHALATION)
Status: COMPLETED | OUTPATIENT
Start: 2022-03-02 | End: 2022-03-02

## 2022-03-02 RX ORDER — ROSUVASTATIN CALCIUM 5 MG/1
5 TABLET, COATED ORAL
Status: DISCONTINUED | OUTPATIENT
Start: 2022-03-02 | End: 2022-03-02

## 2022-03-02 RX ORDER — SODIUM CHLORIDE 0.9 % (FLUSH) 0.9 %
5-40 SYRINGE (ML) INJECTION EVERY 8 HOURS
Status: DISCONTINUED | OUTPATIENT
Start: 2022-03-02 | End: 2022-03-24 | Stop reason: HOSPADM

## 2022-03-02 RX ORDER — FUROSEMIDE 10 MG/ML
40 INJECTION INTRAMUSCULAR; INTRAVENOUS EVERY 12 HOURS
Status: DISCONTINUED | OUTPATIENT
Start: 2022-03-03 | End: 2022-03-03

## 2022-03-02 RX ORDER — ASPIRIN 81 MG/1
81 TABLET ORAL DAILY
Status: DISCONTINUED | OUTPATIENT
Start: 2022-03-03 | End: 2022-03-24 | Stop reason: HOSPADM

## 2022-03-02 RX ORDER — POLYETHYLENE GLYCOL 3350 17 G/17G
17 POWDER, FOR SOLUTION ORAL DAILY PRN
Status: DISCONTINUED | OUTPATIENT
Start: 2022-03-02 | End: 2022-03-24 | Stop reason: HOSPADM

## 2022-03-02 RX ORDER — ENOXAPARIN SODIUM 150 MG/ML
1 INJECTION SUBCUTANEOUS EVERY 12 HOURS
Status: DISCONTINUED | OUTPATIENT
Start: 2022-03-02 | End: 2022-03-03

## 2022-03-02 RX ORDER — ACETAMINOPHEN 325 MG/1
650 TABLET ORAL
Status: DISCONTINUED | OUTPATIENT
Start: 2022-03-02 | End: 2022-03-11 | Stop reason: SDUPTHER

## 2022-03-02 RX ORDER — SODIUM CHLORIDE 0.9 % (FLUSH) 0.9 %
5-10 SYRINGE (ML) INJECTION AS NEEDED
Status: DISCONTINUED | OUTPATIENT
Start: 2022-03-02 | End: 2022-03-04

## 2022-03-02 RX ORDER — CARVEDILOL 3.12 MG/1
3.12 TABLET ORAL 2 TIMES DAILY WITH MEALS
Status: DISCONTINUED | OUTPATIENT
Start: 2022-03-03 | End: 2022-03-24 | Stop reason: HOSPADM

## 2022-03-02 RX ORDER — ACETAMINOPHEN 650 MG/1
650 SUPPOSITORY RECTAL
Status: DISCONTINUED | OUTPATIENT
Start: 2022-03-02 | End: 2022-03-24 | Stop reason: HOSPADM

## 2022-03-02 RX ADMIN — SODIUM CHLORIDE, PRESERVATIVE FREE 10 ML: 5 INJECTION INTRAVENOUS at 23:13

## 2022-03-02 RX ADMIN — ENOXAPARIN SODIUM 120 MG: 150 INJECTION SUBCUTANEOUS at 22:22

## 2022-03-02 RX ADMIN — METHYLPREDNISOLONE SODIUM SUCCINATE 125 MG: 125 INJECTION, POWDER, FOR SOLUTION INTRAMUSCULAR; INTRAVENOUS at 22:17

## 2022-03-02 RX ADMIN — FUROSEMIDE 40 MG: 10 INJECTION, SOLUTION INTRAVENOUS at 22:18

## 2022-03-02 RX ADMIN — IPRATROPIUM BROMIDE AND ALBUTEROL SULFATE 3 ML: .5; 2.5 SOLUTION RESPIRATORY (INHALATION) at 21:38

## 2022-03-02 RX ADMIN — CEFTRIAXONE SODIUM 2 G: 2 INJECTION, POWDER, FOR SOLUTION INTRAMUSCULAR; INTRAVENOUS at 22:19

## 2022-03-02 NOTE — Clinical Note
Status[de-identified] INPATIENT [101]   Type of Bed: Telemetry [19]   Cardiac Monitoring Required?: Yes   Inpatient Hospitalization Certified Necessary for the Following Reasons: 3.  Patient receiving treatment that can only be provided in an inpatient setting (further clarification in H&P documentation)   Admitting Diagnosis: Acute exacerbation of CHF (congestive heart failure) [facility]) [[id number]]   Admitting Physician: Herbert Monte [[id number]]   Attending Physician: Herbert Monte [[id number]]   Estimated Length of Stay: 2 Midnights   Discharge Plan[de-identified] Home with Office Follow-up

## 2022-03-03 ENCOUNTER — HOSPITAL ENCOUNTER (INPATIENT)
Dept: VASCULAR SURGERY | Age: 82
Discharge: HOME | DRG: 981 | End: 2022-03-03
Attending: INTERNAL MEDICINE
Payer: MEDICARE

## 2022-03-03 ENCOUNTER — APPOINTMENT (OUTPATIENT)
Dept: NUCLEAR MEDICINE | Age: 82
DRG: 981 | End: 2022-03-03
Attending: INTERNAL MEDICINE
Payer: MEDICARE

## 2022-03-03 ENCOUNTER — APPOINTMENT (OUTPATIENT)
Dept: CT IMAGING | Age: 82
DRG: 981 | End: 2022-03-03
Attending: INTERNAL MEDICINE
Payer: MEDICARE

## 2022-03-03 ENCOUNTER — APPOINTMENT (OUTPATIENT)
Dept: NON INVASIVE DIAGNOSTICS | Age: 82
DRG: 981 | End: 2022-03-03
Attending: INTERNAL MEDICINE
Payer: MEDICARE

## 2022-03-03 PROBLEM — N18.30 STAGE 3 CHRONIC KIDNEY DISEASE (HCC): Status: ACTIVE | Noted: 2022-03-03

## 2022-03-03 PROBLEM — G47.33 OSA (OBSTRUCTIVE SLEEP APNEA): Status: ACTIVE | Noted: 2022-03-03

## 2022-03-03 PROBLEM — I50.33 ACUTE ON CHRONIC DIASTOLIC CONGESTIVE HEART FAILURE (HCC): Status: ACTIVE | Noted: 2021-04-23

## 2022-03-03 LAB
ALBUMIN SERPL-MCNC: 3.3 G/DL (ref 3.4–5)
ALBUMIN/GLOB SERPL: 1 {RATIO} (ref 0.8–1.7)
ALP SERPL-CCNC: 163 U/L (ref 45–117)
ALT SERPL-CCNC: 186 U/L (ref 13–56)
ANION GAP SERPL CALC-SCNC: 4 MMOL/L (ref 3–18)
AST SERPL-CCNC: 138 U/L (ref 10–38)
BASOPHILS # BLD: 0 K/UL (ref 0–0.1)
BASOPHILS NFR BLD: 0 % (ref 0–2)
BILIRUB SERPL-MCNC: 0.6 MG/DL (ref 0.2–1)
BUN SERPL-MCNC: 50 MG/DL (ref 7–18)
BUN/CREAT SERPL: 27 (ref 12–20)
CALCIUM SERPL-MCNC: 9.8 MG/DL (ref 8.5–10.1)
CHLORIDE SERPL-SCNC: 102 MMOL/L (ref 100–111)
CO2 SERPL-SCNC: 31 MMOL/L (ref 21–32)
CREAT SERPL-MCNC: 1.85 MG/DL (ref 0.6–1.3)
DIFFERENTIAL METHOD BLD: ABNORMAL
ECHO AO ROOT DIAM: 3.3 CM
ECHO AO ROOT INDEX: 1.55 CM/M2
ECHO AV AREA PEAK VELOCITY: 1.8 CM2
ECHO AV AREA VTI: 2.2 CM2
ECHO AV AREA/BSA PEAK VELOCITY: 0.8 CM2/M2
ECHO AV AREA/BSA VTI: 1 CM2/M2
ECHO AV MEAN GRADIENT: 9 MMHG
ECHO AV MEAN VELOCITY: 1.5 M/S
ECHO AV PEAK GRADIENT: 14 MMHG
ECHO AV PEAK VELOCITY: 1.9 M/S
ECHO AV VELOCITY RATIO: 0.47
ECHO AV VTI: 31.2 CM
ECHO EST RA PRESSURE: 15 MMHG
ECHO LA DIAMETER INDEX: 2.3 CM/M2
ECHO LA DIAMETER: 4.9 CM
ECHO LA TO AORTIC ROOT RATIO: 1.48
ECHO LA VOL 4C: 100 ML (ref 22–52)
ECHO LA VOLUME INDEX A4C: 47 ML/M2 (ref 16–34)
ECHO LV E' LATERAL VELOCITY: 11 CM/S
ECHO LV E' SEPTAL VELOCITY: 9 CM/S
ECHO LV EDV A4C: 78 ML
ECHO LV EDV INDEX A4C: 37 ML/M2
ECHO LV EJECTION FRACTION A4C: 63 %
ECHO LV ESV A4C: 29 ML
ECHO LV ESV INDEX A4C: 14 ML/M2
ECHO LV FRACTIONAL SHORTENING: 24 % (ref 28–44)
ECHO LV INTERNAL DIMENSION DIASTOLE INDEX: 1.74 CM/M2
ECHO LV INTERNAL DIMENSION DIASTOLIC: 3.7 CM (ref 3.9–5.3)
ECHO LV INTERNAL DIMENSION SYSTOLIC INDEX: 1.31 CM/M2
ECHO LV INTERNAL DIMENSION SYSTOLIC: 2.8 CM
ECHO LV IVSD: 2 CM (ref 0.6–0.9)
ECHO LV MASS 2D: 282.1 G (ref 67–162)
ECHO LV MASS INDEX 2D: 132.5 G/M2 (ref 43–95)
ECHO LV POSTERIOR WALL DIASTOLIC: 1.6 CM (ref 0.6–0.9)
ECHO LV RELATIVE WALL THICKNESS RATIO: 0.86
ECHO LVOT AREA: 3.8 CM2
ECHO LVOT AV VTI INDEX: 0.61
ECHO LVOT DIAM: 2.2 CM
ECHO LVOT MEAN GRADIENT: 2 MMHG
ECHO LVOT PEAK GRADIENT: 3 MMHG
ECHO LVOT PEAK VELOCITY: 0.9 M/S
ECHO LVOT STROKE VOLUME INDEX: 33.9 ML/M2
ECHO LVOT SV: 72.2 ML
ECHO LVOT VTI: 19 CM
ECHO MV E VELOCITY: 1.1 M/S
ECHO MV E/E' LATERAL: 10
ECHO MV E/E' RATIO (AVERAGED): 11.11
ECHO MV E/E' SEPTAL: 12.22
ECHO PULMONARY ARTERY END DIASTOLIC PRESSURE: 8 MMHG
ECHO PV REGURGITANT MAX VELOCITY: 1.4 M/S
ECHO RIGHT VENTRICULAR SYSTOLIC PRESSURE (RVSP): 51 MMHG
ECHO RV FREE WALL PEAK S': 10 CM/S
ECHO RV INTERNAL DIMENSION: 4.6 CM
ECHO RV TAPSE: 2 CM (ref 1.5–2)
ECHO TV REGURGITANT MAX VELOCITY: 2.99 M/S
ECHO TV REGURGITANT PEAK GRADIENT: 36 MMHG
EOSINOPHIL # BLD: 0 K/UL (ref 0–0.4)
EOSINOPHIL NFR BLD: 0 % (ref 0–5)
ERYTHROCYTE [DISTWIDTH] IN BLOOD BY AUTOMATED COUNT: 15.8 % (ref 11.6–14.5)
GLOBULIN SER CALC-MCNC: 3.3 G/DL (ref 2–4)
GLUCOSE SERPL-MCNC: 161 MG/DL (ref 74–99)
HCT VFR BLD AUTO: 38.9 % (ref 35–45)
HGB BLD-MCNC: 12.3 G/DL (ref 12–16)
IMM GRANULOCYTES # BLD AUTO: 0 K/UL (ref 0–0.04)
IMM GRANULOCYTES NFR BLD AUTO: 1 % (ref 0–0.5)
LYMPHOCYTES # BLD: 0.3 K/UL (ref 0.9–3.6)
LYMPHOCYTES NFR BLD: 6 % (ref 21–52)
MAGNESIUM SERPL-MCNC: 1.9 MG/DL (ref 1.6–2.6)
MCH RBC QN AUTO: 31.6 PG (ref 24–34)
MCHC RBC AUTO-ENTMCNC: 31.6 G/DL (ref 31–37)
MCV RBC AUTO: 100 FL (ref 78–100)
MONOCYTES # BLD: 0.2 K/UL (ref 0.05–1.2)
MONOCYTES NFR BLD: 3 % (ref 3–10)
NEUTS SEG # BLD: 5.1 K/UL (ref 1.8–8)
NEUTS SEG NFR BLD: 90 % (ref 40–73)
NRBC # BLD: 0 K/UL (ref 0–0.01)
NRBC BLD-RTO: 0 PER 100 WBC
PLATELET # BLD AUTO: 187 K/UL (ref 135–420)
PMV BLD AUTO: 10.6 FL (ref 9.2–11.8)
POTASSIUM SERPL-SCNC: 5.2 MMOL/L (ref 3.5–5.5)
PROT SERPL-MCNC: 6.6 G/DL (ref 6.4–8.2)
RBC # BLD AUTO: 3.89 M/UL (ref 4.2–5.3)
SODIUM SERPL-SCNC: 137 MMOL/L (ref 136–145)
TROPONIN-HIGH SENSITIVITY: 191 NG/L (ref 0–54)
TROPONIN-HIGH SENSITIVITY: 217 NG/L (ref 0–54)
TSH SERPL DL<=0.05 MIU/L-ACNC: 0.58 UIU/ML (ref 0.36–3.74)
WBC # BLD AUTO: 5.7 K/UL (ref 4.6–13.2)

## 2022-03-03 PROCEDURE — 84484 ASSAY OF TROPONIN QUANT: CPT

## 2022-03-03 PROCEDURE — 65660000000 HC RM CCU STEPDOWN

## 2022-03-03 PROCEDURE — 74011250636 HC RX REV CODE- 250/636: Performed by: EMERGENCY MEDICINE

## 2022-03-03 PROCEDURE — 74011250636 HC RX REV CODE- 250/636: Performed by: HOSPITALIST

## 2022-03-03 PROCEDURE — A9540 TC99M MAA: HCPCS

## 2022-03-03 PROCEDURE — 84443 ASSAY THYROID STIM HORMONE: CPT

## 2022-03-03 PROCEDURE — C9113 INJ PANTOPRAZOLE SODIUM, VIA: HCPCS | Performed by: INTERNAL MEDICINE

## 2022-03-03 PROCEDURE — 94660 CPAP INITIATION&MGMT: CPT

## 2022-03-03 PROCEDURE — 93306 TTE W/DOPPLER COMPLETE: CPT

## 2022-03-03 PROCEDURE — 93970 EXTREMITY STUDY: CPT

## 2022-03-03 PROCEDURE — 80053 COMPREHEN METABOLIC PANEL: CPT

## 2022-03-03 PROCEDURE — 71250 CT THORAX DX C-: CPT

## 2022-03-03 PROCEDURE — 97530 THERAPEUTIC ACTIVITIES: CPT

## 2022-03-03 PROCEDURE — 74011250637 HC RX REV CODE- 250/637: Performed by: INTERNAL MEDICINE

## 2022-03-03 PROCEDURE — 85025 COMPLETE CBC W/AUTO DIFF WBC: CPT

## 2022-03-03 PROCEDURE — 97167 OT EVAL HIGH COMPLEX 60 MIN: CPT

## 2022-03-03 PROCEDURE — 83735 ASSAY OF MAGNESIUM: CPT

## 2022-03-03 PROCEDURE — 36415 COLL VENOUS BLD VENIPUNCTURE: CPT

## 2022-03-03 PROCEDURE — 74011000258 HC RX REV CODE- 258: Performed by: EMERGENCY MEDICINE

## 2022-03-03 PROCEDURE — 99221 1ST HOSP IP/OBS SF/LOW 40: CPT | Performed by: NURSE PRACTITIONER

## 2022-03-03 PROCEDURE — 74011000250 HC RX REV CODE- 250: Performed by: INTERNAL MEDICINE

## 2022-03-03 PROCEDURE — 97161 PT EVAL LOW COMPLEX 20 MIN: CPT

## 2022-03-03 PROCEDURE — 97535 SELF CARE MNGMENT TRAINING: CPT

## 2022-03-03 PROCEDURE — 74011250636 HC RX REV CODE- 250/636: Performed by: INTERNAL MEDICINE

## 2022-03-03 RX ORDER — ENOXAPARIN SODIUM 150 MG/ML
1.5 INJECTION SUBCUTANEOUS EVERY 24 HOURS
Status: DISCONTINUED | OUTPATIENT
Start: 2022-03-04 | End: 2022-03-04

## 2022-03-03 RX ORDER — MAGNESIUM SULFATE 1 G/100ML
1 INJECTION INTRAVENOUS ONCE
Status: COMPLETED | OUTPATIENT
Start: 2022-03-03 | End: 2022-03-03

## 2022-03-03 RX ORDER — PANTOPRAZOLE SODIUM 40 MG/10ML
40 INJECTION, POWDER, LYOPHILIZED, FOR SOLUTION INTRAVENOUS DAILY
Status: DISCONTINUED | OUTPATIENT
Start: 2022-03-03 | End: 2022-03-04 | Stop reason: ALTCHOICE

## 2022-03-03 RX ORDER — FUROSEMIDE 10 MG/ML
20 INJECTION INTRAMUSCULAR; INTRAVENOUS EVERY 12 HOURS
Status: DISCONTINUED | OUTPATIENT
Start: 2022-03-03 | End: 2022-03-04

## 2022-03-03 RX ADMIN — FUROSEMIDE 20 MG: 10 INJECTION, SOLUTION INTRAMUSCULAR; INTRAVENOUS at 20:17

## 2022-03-03 RX ADMIN — CEFTRIAXONE SODIUM 2 G: 2 INJECTION, POWDER, FOR SOLUTION INTRAMUSCULAR; INTRAVENOUS at 20:17

## 2022-03-03 RX ADMIN — ACETAMINOPHEN 650 MG: 325 TABLET ORAL at 16:51

## 2022-03-03 RX ADMIN — SODIUM CHLORIDE, PRESERVATIVE FREE 5 ML: 5 INJECTION INTRAVENOUS at 14:00

## 2022-03-03 RX ADMIN — ACETAMINOPHEN 650 MG: 325 TABLET ORAL at 07:52

## 2022-03-03 RX ADMIN — ASPIRIN 81 MG: 81 TABLET, COATED ORAL at 09:00

## 2022-03-03 RX ADMIN — AZITHROMYCIN MONOHYDRATE 500 MG: 500 INJECTION, POWDER, LYOPHILIZED, FOR SOLUTION INTRAVENOUS at 00:18

## 2022-03-03 RX ADMIN — CARVEDILOL 3.12 MG: 3.12 TABLET, FILM COATED ORAL at 08:10

## 2022-03-03 RX ADMIN — POTASSIUM CHLORIDE 20 MEQ: 20 TABLET, EXTENDED RELEASE ORAL at 11:18

## 2022-03-03 RX ADMIN — CARVEDILOL 3.12 MG: 3.12 TABLET, FILM COATED ORAL at 16:51

## 2022-03-03 RX ADMIN — AZITHROMYCIN MONOHYDRATE 500 MG: 500 INJECTION, POWDER, LYOPHILIZED, FOR SOLUTION INTRAVENOUS at 21:13

## 2022-03-03 RX ADMIN — MAGNESIUM SULFATE HEPTAHYDRATE 1 G: 1 INJECTION, SOLUTION INTRAVENOUS at 14:00

## 2022-03-03 RX ADMIN — FUROSEMIDE 40 MG: 10 INJECTION, SOLUTION INTRAVENOUS at 08:11

## 2022-03-03 RX ADMIN — ENOXAPARIN SODIUM 120 MG: 120 INJECTION SUBCUTANEOUS at 10:29

## 2022-03-03 RX ADMIN — PANTOPRAZOLE SODIUM 40 MG: 40 INJECTION, POWDER, FOR SOLUTION INTRAVENOUS at 08:10

## 2022-03-03 NOTE — H&P
History & Physical    Patient: Joni Welch MRN: [id number]  CSN: [id number]    YOB: 1940  Age: 80 y.o. Sex: female      DOA: 3/2/2022    Chief Complaint:   Chief Complaint   Patient presents with    Shortness of Breath          HPI:     Joni Welch is a 80 y.o.  female who has a history of congestive heart failure, sleep apnea, right leg weakness, presents to the emergency room with acute onset of dyspnea and shortness of breath. Patient was found to be hypoxic on room air with sats in the 83% based likely resolve that improved to 95% on 3 L patient was also found to be a new onset rapid ventricular response A. fib that also improved with the addition of oxygen. Patient notes that for the last 4 days she has taken short of breath with minimal exertion and easily fatigued. Her exercise tolerance and activities has also diminished over the last 4 to 5 days. She chronically right leg when she walks but she notes that the last few days she is not been able to move or lift her right leg. Patient lives with her daughter and has had decreased mobility over the last few months. He denies any COVID-19 exposures she has had 2 out of 3 vaccines. In the emergency room she was found to be in new onset A. fib which improved with oxygen as well as CHF she was given 40 of Lasix with diuresis of about 500 cc and improvement of her dyspnea  Patient also complains of a constant tickle down her throat that is causing her to cough but no fevers chills sputum production that is green in nature.               Past Medical History:   Diagnosis Date    Hypertension     Sleep disorder        Past Surgical History:   Procedure Laterality Date    HX ORTHOPAEDIC      broken right ankle, left femur 30 yrs ago       Family History   Problem Relation Age of Onset    Diabetes Mother     Heart Disease Mother     Heart Disease Father        Social History     Socioeconomic History    Marital status: SINGLE Tobacco Use    Smoking status: Never Smoker    Smokeless tobacco: Never Used   Vaping Use    Vaping Use: Never used   Substance and Sexual Activity    Alcohol use: Yes     Alcohol/week: 1.0 standard drink     Types: 1 Glasses of wine per week     Comment: soc    Drug use: No       Prior to Admission medications    Medication Sig Start Date End Date Taking? Authorizing Provider   acetaminophen (TYLENOL) 325 mg tablet Take 650 mg by mouth every six (6) hours as needed for Pain. Provider, Historical   niacin (NIASPAN) 1,000 mg Tb24 tab Take 1,000 mg by mouth daily. Provider, Historical   ibuprofen (AdviL) 200 mg tablet Take 400 mg by mouth every six (6) hours as needed for Pain. Provider, Historical   trolamine salicylate-aloe vera 19% (ASPERCREME) topical cream Apply 2 g to affected area as needed for Pain. Provider, Historical   bumetanide (BUMEX) 1 mg tablet Take 1 Tab by mouth daily. Patient taking differently: Take 2 mg by mouth daily. 4/27/21   Ning Tinajero MD   rosuvastatin (CRESTOR) 5 mg tablet Take 1 Tab by mouth nightly. 4/26/21   Ning Tinajero MD   multivitamin, tx-iron-ca-min (THERA-M w/ IRON) 9 mg iron-400 mcg tab tablet Take 1 Tab by mouth daily. Provider, Historical   ergocalciferol (Vitamin D2) 1,250 mcg (50,000 unit) capsule Take 50,000 Units by mouth every seven (7) days. Every Friday for 12 weeks    Provider, Historical   colchicine 0.6 mg tablet Take 0.6 mg by mouth two (2) times a day. Indications: treatment to prevent acute gout attack    Provider, Historical   aspirin delayed-release 81 mg tablet Take 81 mg by mouth daily. Provider, Historical   potassium chloride SR (KLOR-CON 10) 10 mEq tablet Take 20 mEq by mouth daily. Provider, Historical   carvediloL (COREG) 3.125 mg tablet Take 3.125 mg by mouth two (2) times daily (with meals).     Provider, Historical       Allergies   Allergen Reactions    Seafood Hives     crabs    Statins-Hmg-Coa Reductase Inhibitors Unknown (comments)    Sulfa (Sulfonamide Antibiotics) Hives         Review of Systems  GENERAL: Patient alert, awake and oriented times 3, able to communicate full sentences and not in distress. HEENT: No change in vision, no earache, tinnitus, sore throat or sinus congestion. NECK: No pain or stiffness. PULMONARY: +shortness of breath, cough or wheeze. Cardiovascular: + pnd or orthopnea, no CP constant tickle in clinic  GASTROINTESTINAL: + abdominal pain, nausea, vomiting +diarrhea, melena or bright red blood per rectum. Patient recently started on colchicine by her PCP with discontinuation of one of her diuretics her weakness worsened on colchicine with abdominal pain and diarrhea  GENITOURINARY: No urinary frequency, urgency, hesitancy or dysuria. MUSCULOSKELETAL: No joint or muscle pain, no back pain, no recent trauma. Chronic heaviness on her right leg that is worse over the last 4 days her left leg is shorter than her right leg she uses a left decreased mobility over the last week  DERMATOLOGIC: No rash, no itching, no lesions. ENDOCRINE: No polyuria, polydipsia, no heat or cold intolerance. No recent change in weight. HEMATOLOGICAL: No anemia or easy +bruising or bleeding. NEUROLOGIC: No headache, seizures, numbness, tingling+r weakness. Physical Exam:     Physical Exam:  Visit Vitals  BP (!) 141/90   Pulse 94   Temp 98.6 °F (37 °C)   Resp 23   Ht 5' 5\" (1.651 m)   Wt 115.2 kg (254 lb)   SpO2 99%   BMI 42.27 kg/m²    O2 Flow Rate (L/min): 3 l/min O2 Device: Nasal cannula    Temp (24hrs), Av.6 °F (37 °C), Min:98.6 °F (37 °C), Max:98.6 °F (37 °C)    No intake/output data recorded. No intake/output data recorded. General:  Alert, cooperative, no distress, appears stated age. Head: Normocephalic, without obvious abnormality, atraumatic. Eyes:  Conjunctivae/corneas clear. PERRL, EOMs intact. Nose: Nares normal. No drainage or sinus tenderness.    Neck: Supple, symmetrical, trachea midline, no adenopathy, thyroid: no enlargement, no carotid bruit and no JVD. Lungs:   Clear to auscultation bilaterally. Heart:   Irregularly irregular and rhythm, S1, S2 normal.     Abdomen: Soft, non-tender. Bowel sounds normal.    Extremities:  Right lower leg swelling posterior in nature with weakness compared to the left leg limb deformity noted, atraumatic, no cyanosis or edema. Pulses: 2+ and symmetric all extremities. Skin:  No rashes or lesions   Neurologic: AAOx3, No focal motor or sensory deficit. Labs Reviewed: All lab results for the last 24 hours reviewed. and EKG    Procedures/imaging: see electronic medical records for all procedures/Xrays and details which were not copied into this note but were reviewed prior to creation of Plan      Assessment/Plan     Active Problems:  1. Congestive heart failure  Renal insufficiency she started on IV Lasix for the last month held she is continued on Coreg and echocardiogram will be ordered as well as cardiac enzyme she is placed on a 2 L fluid restriction and low-sodium diet and daily weights    2. New onset A. Fib  Due to difficulty with IV access she started on Lovenox 1 david per cake every 12 TSH is ordered echocardiogram is ordered rate control with carvedilol although this is not necessary as she improved with oxygenation  A VQ scan was also ordered as well as duplex of her legs  3. Possible infiltrate and early UTI she started on Rocephin and Zithromax CT of the chest is ordered    4. Diarrhea with colchicine and vague abdominal pain we will check a dry CT  5. Sleep apnea respiratory therapist consulted for CPAP machine patient will bring her home CPAP machine to regular  6.   Elevated D-dimer with dyspnea check V/Q echo and duplex of legs PT OT will be ordered for generalized deconditioning    Lastly she wishes to be a DO NOT RESUSCITATE       DVT/GI Prophylaxis: Lovenox and Protonix        Olivier Lopez MD [place]  3/2/2022 10:20 PM

## 2022-03-03 NOTE — CONSULTS
Palliative Medicine Consult    Patient Name: Madie Barriga  YOB: 1940    Date of Initial Consult: 3/3/2022  Reason for Consult: Goals of care discussions  Requesting Provider: Dr. Yazan David  Primary Care Physician: Paul Mota MD      SUMMARY:   Madie Barriga is a 80 y.o. with a past history of congestive heart failure, sleep apnea, right leg weakness, and CKD stage 3, who was admitted on 3/2/2022 from home with a diagnosis of New onset A-fib, and acute on chronic diastolic congestive heart failure. Current medical issues leading to Palliative Medicine involvement include: Support and goals of care discussions. PALLIATIVE DIAGNOSES:   1. Goals of care discussions  2. New onset A-fib  3. Acute on chronic diastolic congestive heart failure  4. Advanced age/debility       PLAN:   1. Goals of care: Palliative medicine team including  CECILIO Palumbo and I met with patient at patient's bedside. Also present was patient's sister Joann Rios. Patient is awake, alert, oriented x4. She states that her breathing has improved since admission, overall feels well. Introduced the role of palliative medicine. Patient states she has completed an AMD before naming her daughter Balta Freeman as medical power of . Patient is not , and her daughter Irish Mcclelland is legal NOK. We do not have a copy of this AMD on file, she will ask her daughter if she has a copy. Reconfirmed goals of care for DNR/DNI. She is unsure if she has ever completed a DDNR. She states her daughter is aware and supportive of her healthcare wishes. Patient and patient's sister state that they will review patient's paperwork with daughter today, and patient is willing to complete any necessary forms tomorrow to protect her wishes for DNR/DNI. We will follow up tomorrow once she has opportunity to review her current paperwork.   2. New onset A. fib: Echo pending, cardiology consulted by primary team.  She denies pain, shortness of breath has improved since admission. 3. Acute on chronic diastolic congestive heart failure: Echo results pending, cardiology has been consulted. Had a VQ scan which showed no presence of a pulmonary embolism. She is on Lasix, which has been reduced due to worsening renal function. Monitoring renal function per primary team.  4. Advanced age/debility: 80-year-old female, lives with her daughter Sarah Adan. Ambulatory short distances, needs assist with functional ADLs. 5. Initial consult note routed to primary continuity provider  6.  Communicated plan of care with: Palliative IDT       GOALS OF CARE / TREATMENT PREFERENCES:   [====Goals of Care====]  GOALS OF CARE: DNR/DNI  Patient/Health Care Proxy Stated Goals: Prolong life      TREATMENT PREFERENCES:   Code Status: DNR    Advance Care Planning:  Advance Care Planning 4/23/2021   Patient's Healthcare Decision Maker is: Legal Next of Kin   Confirm Advance Directive None   Patient Would Like to Complete Advance Directive Yes       Medical Interventions: Limited additional interventions            The palliative care team has discussed with patient / health care proxy about goals of care / treatment preferences for patient.  [====Goals of Care====]         HISTORY:     History obtained from: patient, family, chart    CHIEF COMPLAINT: generalized weakness, shortness of breath with has improved since hospitalization     HPI/SUBJECTIVE:    The patient is:   [x] Verbal and participatory  [] Non-participatory due to:   Oriented x 4     Clinical Pain Assessment (nonverbal scale for severity on nonverbal patients):   Clinical Pain Assessment  Severity: 0            FUNCTIONAL ASSESSMENT:     Palliative Performance Scale (PPS):  PPS: 50       PSYCHOSOCIAL/SPIRITUAL SCREENING:     Advance Care Planning:  Advance Care Planning 4/23/2021   Patient's Healthcare Decision Maker is: Legal Next of Kin   Confirm Advance Directive None   Patient Would Like to Complete Advance Directive Yes        Any spiritual / Congregation concerns:  [] Yes /  [x] No    Caregiver Burnout:  [] Yes /  [] No /  [x] No Caregiver Present      Anticipatory grief assessment:   [x] Normal  / [] Maladaptive            REVIEW OF SYSTEMS:     Positive and pertinent negative findings in ROS are noted above in HPI. The following systems were [x] reviewed / [] unable to be reviewed as noted in HPI  Other findings are noted below. Systems: constitutional, ears/nose/mouth/throat, respiratory, gastrointestinal, genitourinary, musculoskeletal, integumentary, neurologic, psychiatric, endocrine. Positive findings noted below. Modified ESAS Completed by: provider   Fatigue: 0       Pain: 0   Anxiety: 0 Nausea: 0     Dyspnea: 0     Constipation: No              PHYSICAL EXAM:     From RN flowsheet:  Wt Readings from Last 3 Encounters:   03/03/22 107.9 kg (237 lb 12.8 oz)   04/25/21 101.6 kg (224 lb)   10/01/17 125.6 kg (277 lb)     Blood pressure 127/81, pulse 67, temperature 97.9 °F (36.6 °C), resp. rate 16, height 5' 5\" (1.651 m), weight 107.9 kg (237 lb 12.8 oz), SpO2 100 %, not currently breastfeeding.     Pain Scale 1: Numeric (0 - 10)  Pain Intensity 1: 10     Pain Location 1: Knee        Pain Intervention(s) 1: Medication (see MAR)      Constitutional:Awake, alert, NAD, appears stated age, obese  Eyes: pupils equal, anicteric  ENMT: no nasal discharge, moist mucous membranes  Cardiovascular: regular rhythm, distal pulses intact  Respiratory: breathing not labored, symmetric  Gastrointestinal: soft non-tender   Musculoskeletal: no deformity, no tenderness to palpation  Skin: warm, dry  Neurologic: following commands, moving all extremities, oriented x 4  Psychiatric: full affect, no hallucinations       HISTORY:     Principal Problem:    Acute respiratory failure with hypoxemia (HCC) (3/2/2022)    Active Problems:    Elevated troponin (4/23/2021)      HTN (hypertension) (4/23/2021)      Acute on chronic diastolic congestive heart failure ([facility] (4/23/2021)      Atrial fibrillation [facility] (3/2/2022)      Stage 3 chronic kidney disease ([facility] (3/3/2022)      SERENA (obstructive sleep apnea) (3/3/2022)      Adult BMI 39.0-39.9 kg/sq m (3/3/2022)      Past Medical History:   Diagnosis Date    Hypertension     Sleep disorder       Past Surgical History:   Procedure Laterality Date    HX ORTHOPAEDIC      broken right ankle, left femur 30 yrs ago      Family History   Problem Relation Age of Onset    Diabetes Mother     Heart Disease Mother     Heart Disease Father       History reviewed, no pertinent family history. Social History     Tobacco Use    Smoking status: Never Smoker    Smokeless tobacco: Never Used   Substance Use Topics    Alcohol use:  Yes     Alcohol/week: 1.0 standard drink     Types: 1 Glasses of wine per week     Comment: soc     Allergies   Allergen Reactions    Seafood Hives     crabs    Statins-Hmg-Coa Reductase Inhibitors Unknown (comments)    Sulfa (Sulfonamide Antibiotics) Hives      Current Facility-Administered Medications   Medication Dose Route Frequency    pantoprazole (PROTONIX) injection 40 mg  40 mg IntraVENous DAILY    furosemide (LASIX) injection 20 mg  20 mg IntraVENous Q12H    [START ON 3/4/2022] enoxaparin (LOVENOX) injection 180 mg  1.5 mg/kg SubCUTAneous Q24H    magnesium sulfate 1 g/100 ml IVPB (premix or compounded)  1 g IntraVENous ONCE    sodium chloride (NS) flush 5-10 mL  5-10 mL IntraVENous PRN    cefTRIAXone (ROCEPHIN) 2 g in 0.9% sodium chloride (MBP/ADV) 50 mL MBP  2 g IntraVENous Q24H    azithromycin (ZITHROMAX) 500 mg in 0.9% sodium chloride 250 mL (VIAL-MATE)  500 mg IntraVENous Q24H    sodium chloride (NS) flush 5-40 mL  5-40 mL IntraVENous Q8H    sodium chloride (NS) flush 5-40 mL  5-40 mL IntraVENous PRN    acetaminophen (TYLENOL) tablet 650 mg  650 mg Oral Q6H PRN    Or    acetaminophen (TYLENOL) suppository 650 mg  650 mg Rectal Q6H PRN    polyethylene glycol (MIRALAX) packet 17 g  17 g Oral DAILY PRN    ondansetron (ZOFRAN ODT) tablet 4 mg  4 mg Oral Q8H PRN    Or    ondansetron (ZOFRAN) injection 4 mg  4 mg IntraVENous Q6H PRN    [Held by provider] potassium chloride (K-DUR, KLOR-CON M20) SR tablet 20 mEq  20 mEq Oral DAILY    carvediloL (COREG) tablet 3.125 mg  3.125 mg Oral BID WITH MEALS    aspirin delayed-release tablet 81 mg  81 mg Oral DAILY    acetaminophen (TYLENOL) tablet 650 mg  650 mg Oral Q6H PRN          LAB AND IMAGING FINDINGS:     Lab Results   Component Value Date/Time    WBC 5.7 03/03/2022 02:21 AM    HGB 12.3 03/03/2022 02:21 AM    PLATELET 377 06/32/1054 02:21 AM     Lab Results   Component Value Date/Time    Sodium 137 03/03/2022 02:21 AM    Potassium 5.2 03/03/2022 02:21 AM    Chloride 102 03/03/2022 02:21 AM    CO2 31 03/03/2022 02:21 AM    BUN 50 (H) 03/03/2022 02:21 AM    Creatinine 1.85 (H) 03/03/2022 02:21 AM    Calcium 9.8 03/03/2022 02:21 AM    Magnesium 1.9 03/03/2022 02:21 AM      Lab Results   Component Value Date/Time    Alk. phosphatase 163 (H) 03/03/2022 02:21 AM    Protein, total 6.6 03/03/2022 02:21 AM    Albumin 3.3 (L) 03/03/2022 02:21 AM    Globulin 3.3 03/03/2022 02:21 AM     Lab Results   Component Value Date/Time    INR 1.2 03/02/2022 08:48 PM    Prothrombin time 14.1 03/02/2022 08:48 PM    aPTT 25.3 03/02/2022 08:48 PM      No results found for: IRON, FE, TIBC, IBCT, PSAT, FERR   No results found for: PH, PCO2, PO2  No components found for: [place]   Lab Results   Component Value Date/Time     04/24/2021 01:53 PM    CK - MB 1.6 04/24/2021 01:53 PM                Total time: 30 minutes  Counseling / coordination time, spent as noted above:   > 50% counseling / coordination?: yes, patient and family    Prolonged service was provided for  []30 min   []75 min in face to face time in the presence of the patient, spent as noted above.   Time Start:   Time End:   Note: this can only be billed with 56912 (initial) or 26228 (follow up). If multiple start / stop times, list each separately.

## 2022-03-03 NOTE — PROGRESS NOTES
Problem: Mobility Impaired (Adult and Pediatric)  Goal: *Acute Goals and Plan of Care (Insert Text)  Description: In one week:  1. Pt will transfer supine <> sitting EOB min assist for increased ability to sit upright during meal times. 2. Pt will transfer sit <> stand min assist for improved lower extremity strengthening. 3. Pt will roll in bed in both directions for increased independence with pressure relief. 4. Pt will sit EOB unsupported for > 5 minutes without LOB for increased safety and improved sitting balance. Note:     PHYSICAL THERAPY EVALUATION    Patient: Valentin Gan (89 y.o. female)  Date: 3/3/2022  Primary Diagnosis: Acute exacerbation of CHF (congestive heart failure) [facility] [I50.9]        Precautions:  Fall  WBAT  PLOF: household ambulation with RW and rollator walker    ASSESSMENT :  Based on the objective data described below, the patient presents with decreased UE strength and ROM R>L, decreased LE strength and ROM L>R, decreased sitting balance, and decreased ability to participate in bed mobility affecting functional ability. Pt reports ambulating with RW and rollator walker within the household. Pt reports increasing weakness in the R LE and demonstrates pitting edema from the R ankle up to the R mid calf. Pt reported 10/10 pain and transitioned supine to sitting EOB max assist x2 with difficulty moving the R LE. Pt scooted forward to sit EOB max assist x3. In sitting, pt required constant support to sit upright from bilateral UEs and intermittent PT support to avoid posterior and lateral trunk lean. Pt sat for 5 minutes supported with verbal cueing to lift head to look straight forward. Pt had decreased bilateral shoulder flexion and R knee extension ROM. Pt was able to reach across midline in sitting without LOB with difficulty in the UEs L>R. Pt transitioned sitting EOB to supine max assist x2 with support to the LEs and trunk.  Pt expressed interest in using a strap to assist moving the R LE while in bed until her daughter can bring her strap from home. PT tightly wrapped a sheet and looped it around the R LE, pt was able adduct the R LE active assist with the strap. PT also placed a rolled towel under the R LE which pt reported increased comfort. Pt was left supine in bed with all needs within reach, nurse aware of end of session and pt positioning. PT recommends continued acute rehab to address the impairments listed above. Home vs rehab upon discharge depending on pt progression and home support. Patient will benefit from skilled intervention to address the above impairments. Patient's rehabilitation potential is considered to be Good  Factors which may influence rehabilitation potential include:   []         None noted  []         Mental ability/status  [x]         Medical condition  [x]         Home/family situation and support systems  []         Safety awareness  [x]         Pain tolerance/management  []         Other:      PLAN :  Recommendations and Planned Interventions:   [x]           Bed Mobility Training             []    Neuromuscular Re-Education  [x]           Transfer Training                   []    Orthotic/Prosthetic Training  [x]           Gait Training                          []    Modalities  [x]           Therapeutic Exercises           []    Edema Management/Control  [x]           Therapeutic Activities            []    Family Training/Education  [x]           Patient Education  []           Other (comment):    Frequency/Duration: Patient will be followed by physical therapy 1-2 times per day/4-7 days per week to address goals. Discharge Recommendations: To Be Determined  Further Equipment Recommendations for Discharge: N/A     SUBJECTIVE:   Patient stated I can try.     OBJECTIVE DATA SUMMARY:     Past Medical History:   Diagnosis Date    Hypertension     Sleep disorder      Past Surgical History:   Procedure Laterality Date    HX ORTHOPAEDIC broken right ankle, left femur 30 yrs ago     Barriers to Learning/Limitations: yes;  sensory deficits-vision/hearing/speech and physical  Compensate with: Visual Cues, Verbal Cues, and Tactile Cues  Home Situation:  Home Situation  Home Environment: Private residence  # Steps to Enter: 4 (has lift to enter home)  One/Two Story Residence: One story  Living Alone: No  Support Systems: Child(doc)  Patient Expects to be Discharged to[de-identified] Home  Current DME Used/Available at Home: Marylou Brianly, rolling,Walker, rollator,Raised toilet seat,Commode, bedside,Grab bars,Other (comment) (adjustable bed)  Tub or Shower Type: Shower  Critical Behavior:  Neurologic State: Alert;Drowsy  Orientation Level: Oriented to person;Oriented to place;Oriented to situation  Cognition: Follows commands  Safety/Judgement: Fall prevention; Awareness of environment; Insight into deficits  Psychosocial  Patient Behaviors: Calm; Cooperative  Family  Behaviors: Calm;Supportive  Purposeful Interaction: Yes  Pt Identified Daily Priority: Clinical issues (comment); Communication issues (comment)  Caritas Process: Nurture loving kindness;Enable kenton/hope;Establish trust;Nurture spiritual self;Teaching/learning; Attend basic human needs;Create healing environment  Caring Interventions: Reassure; Therapeutic modalities  Reassure: Therapeutic listening; Informing; Acceptance; Instilling kenton and hope;Caring rounds  Therapeutic Modalities: Humor; Intentional therapeutic touch  Skin Condition/Temp: Warm;Dry  Family  Behaviors: Calm;Supportive  Skin Integrity: Other (comment) (pitting edema R ankle and LE below knee)  Skin Integumentary  Skin Color: Appropriate for ethnicity  Skin Condition/Temp: Warm;Dry  Skin Integrity: Other (comment) (pitting edema R ankle and LE below knee)     Strength:    Strength: Generally decreased, functional                    Tone & Sensation:   Tone: Normal              Sensation: Intact               Range Of Motion:  AROM: Generally decreased, functional                       Posture:        Functional Mobility:  Bed Mobility:  Rolling: Maximum assistance;Assist x2  Supine to Sit: Maximum assistance;Assist x2; Additional time  Sit to Supine: Maximum assistance;Assist x2  Scooting: Maximum assistance; Other (comment) (assist x3)  Transfers:  Sit to Stand: Maximum assistance; Total assistance; Additional time;Assist x1  Stand to Sit: Maximum assistance; Additional time;Assist x1                       Balance:   Sitting: Impaired; With support  Sitting - Static: Fair (occasional)  Sitting - Dynamic: Fair (occasional)  Standing: Impaired;Pull to stand; With support  Standing - Static: Poor;Constant support  Standing - Dynamic : Not tested    Pain:  Pain level pre-treatment: 10/10   Pain level post-treatment: 10/10   Pain Intervention(s) : Medication (see MAR); Rest, Ice, Repositioning  Response to intervention: Nurse notified, See doc flow    Activity Tolerance:   Good  Please refer to the flowsheet for vital signs taken during this treatment. After treatment:   []         Patient left in no apparent distress sitting up in chair  [x]         Patient left in no apparent distress in bed  [x]         Call bell left within reach  [x]         Nursing notified  [x]         Caregiver present  []         Bed alarm activated  []         SCDs applied    COMMUNICATION/EDUCATION:   [x]         Role of Physical Therapy in the acute care setting. [x]         Fall prevention education was provided and the patient/caregiver indicated understanding. [x]         Patient/family have participated as able in goal setting and plan of care. [x]         Patient/family agree to work toward stated goals and plan of care. []         Patient understands intent and goals of therapy, but is neutral about his/her participation. []         Patient is unable to participate in goal setting/plan of care: ongoing with therapy staff.  []         Other:     Thank you for this referral.  Melinda Solders, SPT   Time Calculation: 43 mins      Eval Complexity: History: HIGH Complexity :3+ comorbidities / personal factors will impact the outcome/ POC Exam:HIGH Complexity : 4+ Standardized tests and measures addressing body structure, function, activity limitation and / or participation in recreation  Presentation: LOW Complexity : Stable, uncomplicated  Clinical Decision Making:Low Complexity    Overall Complexity:LOW

## 2022-03-03 NOTE — ED PROVIDER NOTES
EMERGENCY DEPARTMENT HISTORY AND PHYSICAL EXAM    Date: 3/2/2022  Patient Name: Chirag Hoang    History of Presenting Illness     Chief Complaint   Patient presents with    Shortness of Breath         History Provided By: Patient and EMS    8:05 PM  Chirag Hoang is a 80 y.o. female with PMHX of CHF, hypertension, SERENA, morbid obesity, fully vaccinated for COVID-19 who presents to the emergency department C/O redness of breath. Patient reports she has become progressively more short of breath over the past 4 to 5 days. Denies fever, chest pain, vomiting, bowel complaints. Does note she has chronic lower extremity edema but does not report that it is worse than normal.  No known sick contacts or recent travel. No clear relieving or exacerbating factors identified. Per EMS patient was hypoxic on room air in the 80s today placed on nasal cannula oxygen. PCP: Fer Qureshi MD    Current Facility-Administered Medications   Medication Dose Route Frequency Provider Last Rate Last Admin    sodium chloride (NS) flush 5-10 mL  5-10 mL IntraVENous PRN Melina Camacho MD        cefTRIAXone (ROCEPHIN) 2 g in 0.9% sodium chloride (MBP/ADV) 50 mL MBP  2 g IntraVENous Q24H Mihaela Camacho  mL/hr at 03/02/22 2219 2 g at 03/02/22 2219    azithromycin (ZITHROMAX) 500 mg in 0.9% sodium chloride 250 mL (VIAL-MATE)  500 mg IntraVENous Q24H Melina Camacho MD         Current Outpatient Medications   Medication Sig Dispense Refill    acetaminophen (TYLENOL) 325 mg tablet Take 650 mg by mouth every six (6) hours as needed for Pain.  niacin (NIASPAN) 1,000 mg Tb24 tab Take 1,000 mg by mouth daily.  ibuprofen (AdviL) 200 mg tablet Take 400 mg by mouth every six (6) hours as needed for Pain.  trolamine salicylate-aloe vera 54% (ASPERCREME) topical cream Apply 2 g to affected area as needed for Pain.  bumetanide (BUMEX) 1 mg tablet Take 1 Tab by mouth daily.  (Patient taking differently: Take 2 mg by mouth daily.) 30 Tab 0    rosuvastatin (CRESTOR) 5 mg tablet Take 1 Tab by mouth nightly. 30 Tab 0    multivitamin, tx-iron-ca-min (THERA-M w/ IRON) 9 mg iron-400 mcg tab tablet Take 1 Tab by mouth daily.  ergocalciferol (Vitamin D2) 1,250 mcg (50,000 unit) capsule Take 50,000 Units by mouth every seven (7) days. Every Friday for 12 weeks      colchicine 0.6 mg tablet Take 0.6 mg by mouth two (2) times a day. Indications: treatment to prevent acute gout attack      aspirin delayed-release 81 mg tablet Take 81 mg by mouth daily.  potassium chloride SR (KLOR-CON 10) 10 mEq tablet Take 20 mEq by mouth daily.  carvediloL (COREG) 3.125 mg tablet Take 3.125 mg by mouth two (2) times daily (with meals). Past History       Past Medical History:  Past Medical History:   Diagnosis Date    Hypertension     Sleep disorder        Past Surgical History:  Past Surgical History:   Procedure Laterality Date    HX ORTHOPAEDIC      broken right ankle, left femur 30 yrs ago       Family History:  Family History   Problem Relation Age of Onset    Diabetes Mother     Heart Disease Mother     Heart Disease Father        Social History:  Social History     Tobacco Use    Smoking status: Never Smoker    Smokeless tobacco: Never Used   Vaping Use    Vaping Use: Never used   Substance Use Topics    Alcohol use: Yes     Alcohol/week: 1.0 standard drink     Types: 1 Glasses of wine per week     Comment: soc    Drug use: No       Allergies: Allergies   Allergen Reactions    Seafood Hives     crabs    Statins-Hmg-Coa Reductase Inhibitors Unknown (comments)    Sulfa (Sulfonamide Antibiotics) Hives         Review of Systems   Review of Systems   Constitutional: Negative for fever. Respiratory: Positive for shortness of breath. Cardiovascular: Positive for leg swelling. Negative for chest pain. Gastrointestinal: Negative for abdominal pain.    All other systems reviewed and are negative.         Physical Exam     Vitals:    03/02/22 2008 03/02/22 2016 03/02/22 2115 03/02/22 2127   BP: (!) 141/90      Pulse: (!) 102  (!) 106 94   Resp: 18 22 23   Temp: 98.6 °F (37 °C)      SpO2: (!) 86% 99%     Weight: 115.2 kg (254 lb)      Height: 5' 5\" (1.651 m)        Physical Exam    Nursing notes and vital signs reviewed    Constitutional: Non toxic appearing, morbidly obese, moderate distress  Head: Normocephalic, Atraumatic  Eyes: EOMI  Neck: Supple  Cardiovascular: Tachycardic and irregularly irregular rate and rhythm, no murmurs, rubs, or gallops  Chest: Normal work of breathing and chest excursion bilaterally  Lungs: Diminished to ausculation bilaterally  Abdomen: Soft, non tender, non distended  Back: No evidence of trauma or deformity  Extremities: No evidence of trauma or deformity, moderate bilateral symmetric LE edema  Skin: Warm and dry, normal cap refill  Neuro: Alert and appropriate  Psychiatric: Normal mood and affect      Diagnostic Study Results     Labs -     Recent Results (from the past 12 hour(s))   EKG, 12 LEAD, INITIAL    Collection Time: 03/02/22  8:12 PM   Result Value Ref Range    Ventricular Rate 102 BPM    QRS Duration 72 ms    Q-T Interval 336 ms    QTC Calculation (Bezet) 437 ms    Calculated R Axis -81 degrees    Calculated T Axis -20 degrees    Diagnosis       Atrial fibrillation with rapid ventricular response with premature   ventricular or aberrantly conducted complexes  Left axis deviation  Low voltage QRS  Abnormal ECG  When compared with ECG of 23-APR-2021 17:11,  Atrial fibrillation has replaced Sinus rhythm     INFLUENZA A & B AG (RAPID TEST)    Collection Time: 03/02/22  8:25 PM   Result Value Ref Range    Influenza A Antigen Negative NEG      Influenza B Antigen Negative NEG     COVID-19 RAPID TEST    Collection Time: 03/02/22  8:25 PM   Result Value Ref Range    Specimen source Nasopharyngeal      COVID-19 rapid test Not detected NOTD     BLOOD GAS, ARTERIAL POC    Collection Time: 03/02/22  8:31 PM   Result Value Ref Range    Device: NASAL CANNULA      pH (POC) 7.26 (L) 7.35 - 7.45      pCO2 (POC) 66.4 (H) 35.0 - 45.0 MMHG    pO2 (POC) 112 (H) 80 - 100 MMHG    HCO3 (POC) 29.9 (H) 22 - 26 MMOL/L    sO2 (POC) 97.3 (H) 92 - 97 %    Base excess (POC) 0.7 mmol/L    Allens test (POC) Positive      Site RIGHT RADIAL      Patient temp. 98.6      Specimen type (POC) ARTERIAL      Performed by [facility]    CBC WITH AUTOMATED DIFF    Collection Time: 03/02/22  8:48 PM   Result Value Ref Range    WBC 6.7 4.6 - 13.2 K/uL    RBC 4.33 4.20 - 5.30 M/uL    HGB 13.5 12.0 - 16.0 g/dL    HCT 42.6 35.0 - 45.0 %    MCV 98.4 78.0 - 100.0 FL    MCH 31.2 24.0 - 34.0 PG    MCHC 31.7 31.0 - 37.0 g/dL    RDW 15.3 (H) 11.6 - 14.5 %    PLATELET 467 319 - 423 K/uL    MPV 10.5 9.2 - 11.8 FL    NRBC 0.0 0  WBC    ABSOLUTE NRBC 0.00 0.00 - 0.01 K/uL    NEUTROPHILS 81 (H) 40 - 73 %    LYMPHOCYTES 10 (L) 21 - 52 %    MONOCYTES 8 3 - 10 %    EOSINOPHILS 0 0 - 5 %    BASOPHILS 0 0 - 2 %    IMMATURE GRANULOCYTES 0 0.0 - 0.5 %    ABS. NEUTROPHILS 5.4 1.8 - 8.0 K/UL    ABS. LYMPHOCYTES 0.7 (L) 0.9 - 3.6 K/UL    ABS. MONOCYTES 0.5 0.05 - 1.2 K/UL    ABS. EOSINOPHILS 0.0 0.0 - 0.4 K/UL    ABS. BASOPHILS 0.0 0.0 - 0.1 K/UL    ABS. IMM. GRANS. 0.0 0.00 - 0.04 K/UL    DF AUTOMATED     METABOLIC PANEL, COMPREHENSIVE    Collection Time: 03/02/22  8:48 PM   Result Value Ref Range    Sodium 137 136 - 145 mmol/L    Potassium 5.0 3.5 - 5.5 mmol/L    Chloride 103 100 - 111 mmol/L    CO2 30 21 - 32 mmol/L    Anion gap 4 3.0 - 18 mmol/L    Glucose 141 (H) 74 - 99 mg/dL    BUN 49 (H) 7.0 - 18 MG/DL    Creatinine 1.71 (H) 0.6 - 1.3 MG/DL    BUN/Creatinine ratio 29 (H) 12 - 20      GFR est AA 35 (L) >60 ml/min/1.73m2    GFR est non-AA 29 (L) >60 ml/min/1.73m2    Calcium 10.3 (H) 8.5 - 10.1 MG/DL    Bilirubin, total 0.7 0.2 - 1.0 MG/DL    ALT (SGPT) 190 (H) 13 - 56 U/L    AST (SGOT) 113 (H) 10 - 38 U/L    Alk. phosphatase 177 (H) 45 - 117 U/L    Protein, total 7.5 6.4 - 8.2 g/dL    Albumin 3.8 3.4 - 5.0 g/dL    Globulin 3.7 2.0 - 4.0 g/dL    A-G Ratio 1.0 0.8 - 1.7     NT-PRO BNP    Collection Time: 03/02/22  8:48 PM   Result Value Ref Range    NT pro-BNP 13,434 (H) 0 - 1,800 PG/ML   TROPONIN-HIGH SENSITIVITY    Collection Time: 03/02/22  8:48 PM   Result Value Ref Range    Troponin-High Sensitivity 208 (HH) 0 - 54 ng/L   PROTHROMBIN TIME + INR    Collection Time: 03/02/22  8:48 PM   Result Value Ref Range    Prothrombin time 14.1 11.5 - 15.2 sec    INR 1.2 0.8 - 1.2     PTT    Collection Time: 03/02/22  8:48 PM   Result Value Ref Range    aPTT 25.3 23.0 - 36.4 SEC   MAGNESIUM    Collection Time: 03/02/22  8:48 PM   Result Value Ref Range    Magnesium 1.9 1.6 - 2.6 mg/dL   D DIMER    Collection Time: 03/02/22  8:48 PM   Result Value Ref Range    D DIMER 10.94 (H) <0.46 ug/ml(FEU)   POC LACTIC ACID    Collection Time: 03/02/22  8:52 PM   Result Value Ref Range    Lactic Acid (POC) 0.89 0.40 - 2.00 mmol/L   URINALYSIS W/ RFLX MICROSCOPIC    Collection Time: 03/02/22  9:04 PM   Result Value Ref Range    Color YELLOW      Appearance CLOUDY      Specific gravity 1.021 1.005 - 1.030      pH (UA) 5.0 5.0 - 8.0      Protein 300 (A) NEG mg/dL    Glucose Negative NEG mg/dL    Ketone TRACE (A) NEG mg/dL    Bilirubin Negative NEG      Blood MODERATE (A) NEG      Urobilinogen 1.0 0.2 - 1.0 EU/dL    Nitrites Negative NEG      Leukocyte Esterase Negative NEG     URINE MICROSCOPIC ONLY    Collection Time: 03/02/22  9:04 PM   Result Value Ref Range    WBC 0 to 3 0 - 5 /hpf    RBC 0 to 3 0 - 5 /hpf    Epithelial cells 2+ 0 - 5 /lpf    Bacteria FEW (A) NEG /hpf       Radiologic Studies -   XR CHEST PORT   Final Result   Increased right retrocardiac density which may represent airspace   disease or underlying pulmonary nodule. Recommend CT chest for further   evaluation.       NM LUNG PERFUSION W VENT    (Results Pending)     CT Results (Last 48 hours)    None        CXR Results  (Last 48 hours)               03/02/22 2129  XR CHEST PORT Final result    Impression:  Increased right retrocardiac density which may represent airspace   disease or underlying pulmonary nodule. Recommend CT chest for further   evaluation. Narrative:  EXAM:  AP Portable Chest X-ray 1 view        INDICATION: Shortness of breath       COMPARISON: April 23, 2021       _______________       FINDINGS: Cardiomegaly and mediastinal contours are within normal limits for   portable radiograph. There is increased right retrocardiac/right paraspinal   density. There are no pleural effusions. No acute osseous findings. ________________                     Medications given in the ED-  Medications   sodium chloride (NS) flush 5-10 mL (has no administration in time range)   cefTRIAXone (ROCEPHIN) 2 g in 0.9% sodium chloride (MBP/ADV) 50 mL MBP (2 g IntraVENous New Bag 3/2/22 2219)   azithromycin (ZITHROMAX) 500 mg in 0.9% sodium chloride 250 mL (VIAL-MATE) (has no administration in time range)   methylPREDNISolone (PF) (Solu-MEDROL) injection 125 mg (125 mg IntraVENous Given 3/2/22 2217)   albuterol-ipratropium (DUO-NEB) 2.5 MG-0.5 MG/3 ML (3 mL Nebulization Given 3/2/22 2138)   furosemide (LASIX) injection 40 mg (40 mg IntraVENous Given 3/2/22 2218)   enoxaparin (LOVENOX) injection 120 mg (120 mg SubCUTAneous Given 3/2/22 2222)         Medical Decision Making   I am the first provider for this patient. I reviewed the vital signs, available nursing notes, past medical history, past surgical history, family history and social history. Vital Signs-Reviewed the patient's vital signs.     Pulse Oximetry Analysis - 99% on room air, not hypoxic     Cardiac Monitor:  Rate: 99 bpm  Rhythm: Atrial fibrillation    EKG interpretation: (Preliminary)  EKG read by Dr. Hook Look at 8:16 PM  Atrial fibrillation with RVR at a rate of 102 bpm, QS duration 72 ms, QTC of 437 ms, compared to prior in April 2021 atrial fibrillation appears to be new    Records Reviewed: Nursing Notes, Old Medical Records and Previous electrocardiograms    Provider Notes (Medical Decision Making): Shaquille Sanders is a 80 y.o. female presenting for shortness of breath over the last 4 to 5 days. Patient hypoxic on room air but improved with supplemental oxygen by nasal cannula. Found to be in new onset atrial fibrillation with RVR but heart rate improved with submental oxygen. Labs with significantly elevated proBNP and troponin. X-ray also consistent with volume overload. Patient has chronic kidney disease. Significantly elevated D-dimer but CTA precluded due to kidney disease. VQ ordered and pending. Discussed with cardiology and given Lovenox here in ED as patient is very difficult IV access. IV Lasix for volume overload and antibiotics initiated for possible pneumonia on chest x-ray. Rapid flu and Covid negative. Discussed with both cardiology and hospitalist for further in-hospital evaluation management. Patient and her daughter understand and agree with this plan. .    Procedures:  Procedures    ED Course:   9:53 PM  Updated patient and her daughter on all results and plan. All questions answered. CONSULT NOTE:   10:08 PM  Dr. Abad Benitez spoke with Dr. Richie Dos Santos   Specialty: Cardiology  Discussed pt's hx, disposition, and available diagnostic and imaging results over the telephone. Reviewed care plans. Start on heparin gtt or lovenox and admit. CONSULT NOTE:   10:19 PM  Dr. Abad Benitez spoke with Dr. Michelle Powell   Specialty: Hospitalist  Discussed pt's hx, disposition, and available diagnostic and imaging results over the telephone. Reviewed care plans. Accepts to telemetry. Diagnosis and Disposition     Critical Care Time: None    Core Measures:  For Hospitalized Patients:    1.  Hospitalization Decision Time:  The decision to hospitalize the patient was made by Dr. Abad Benitez at 10:05 PM on 3/2/2022    2. Aspirin: Aspirin was not given because the patient did not present with a stroke at the time of their Emergency Department evaluation    10:08 PM  Patient is being admitted to the hospital by Dr. Alberto Villaseñor. The results of their tests and reasons for their admission have been discussed with them and/or available family. They convey agreement and understanding for the need to be admitted and for their admission diagnosis. CONDITIONS ON ADMISSION:  Sepsis is not present at the time of admission. Deep Vein Thrombosis maybe present at the time of admission. Thrombosis maybe present at the time of admission. Urinary Tract Infection is not present at the time of admission. Pneumonia maybe present at the time of admission. MRSA is not present at the time of admission. Wound infection is not present at the time of admission. Pressure Ulcer is not present at the time of admission. CLINICAL IMPRESSION:    1. Hypoxia    2. Acute on chronic congestive heart failure, unspecified heart failure type ([address]    3. Atrial fibrillation with RVR ([address]      _______________________________      Please note that this dictation was completed with Whisper, the computer voice recognition software. Quite often unanticipated grammatical, syntax, homophones, and other interpretive errors are inadvertently transcribed by the computer software. Please disregard these errors. Please excuse any errors that have escaped final proofreading.

## 2022-03-03 NOTE — PROGRESS NOTES
Hospitalist Progress Note-critical care note     Patient: Enma Morgan MRN: [id number]  [facility]: [id number]    YOB: 1940  Age: 80 y.o. Sex: female    DOA: 3/2/2022 LOS:  LOS: 1 day            Chief complaint: ckd3, afib, elevated trop.      Assessment/Plan         Hospital Problems  Date Reviewed: 3/2/2022          Codes Class Noted POA    Stage 3 chronic kidney disease ([facility] ICD-10-CM: N18.30  ICD-9-CM: 585.3  3/3/2022 Unknown        SERENA (obstructive sleep apnea) ICD-10-CM: G47.33  ICD-9-CM: 327.23  3/3/2022 Unknown        Adult BMI 39.0-39.9 kg/sq m ICD-10-CM: Z68.39  ICD-9-CM: V85.39  3/3/2022 Unknown        * (Principal) Acute respiratory failure with hypoxemia (HCC) ICD-10-CM: J96.01  ICD-9-CM: 518.81  3/2/2022 Unknown        Atrial fibrillation (HCC) ICD-10-CM: I48.91  ICD-9-CM: 427.31  3/2/2022 Unknown        Elevated troponin ICD-10-CM: R77.8  ICD-9-CM: 790.6  4/23/2021 Yes        HTN (hypertension) ICD-10-CM: I10  ICD-9-CM: 401.9  4/23/2021 Yes        Acute on chronic diastolic congestive heart failure (HCC) ICD-10-CM: I50.33  ICD-9-CM: 428.33, 428.0  4/23/2021 Yes                 Congestive heart failure , acute on chronic diastolic   Echo done results pending   Echo in 2021 with normal ef , diastolic dysfunction and mild pulm Hypertension  Dr. Joseph Tejeda is consulted   Trop trending down   V/q scan :Perfusion images show no segmental perfusion defects to suggest the presence of  pulmonary embolism  Will decrease lasix to 20 bid due to worsening renal function , hold K replacement for now K 5.2 today   Continue monitoring renal function and K level   Low na and liquid restriction     Elevated trop  No chest pain    Trop trending down   No acs     New afib   Change lovenox to renal dose for ac   Echo pending   tsh wnl , mg 1.9-will give 1 g to keep at 2     Elevated d dimer   V/q scan negative   pvl pending   On lovenox therapeutic     Diarrhea   Colchicine hold for now   Continue monitoring , reported improving     francia   On cpap or bipap ? at night ,  Need verify with pt     ckd3   Mild elevated from baseline   Decrease lasix  Continue monitoring       htn continue current regimen     ?uti on rocephin for 3 days      subjective: sob mild improving     Sister was at the bedside. Daughter is care giver   All questions have been answered. 35 total min's spent on patient care including >50% on counseling/coordinating care. Discussed the above assessments. also discussed labs, medications and hospital course    Will have palliative care  Team on board      Disposition :tbd,   Review of systems:    General: No fevers or chills. Cardiovascular: No chest pain or pressure. No palpitations. Pulmonary: +shortness of breath. Gastrointestinal: No nausea, vomiting. Vital signs/Intake and Output:  Visit Vitals  /81   Pulse 67   Temp 97.9 °F (36.6 °C)   Resp 16   Ht 5' 5\" (1.651 m)   Wt 107.9 kg (237 lb 12.8 oz)   SpO2 100%   Breastfeeding No   BMI 39.57 kg/m²     Current Shift:  No intake/output data recorded. Last three shifts:  03/01 1901 - 03/03 0700  In: 582 [P. O.:582]  Out: 1000 [Urine:1000]    Physical Exam:  General: WD, WN. Alert, cooperative, no acute distress    HEENT: NC, Atraumatic. PERRLA, anicteric sclerae. Lungs: CTA Bilaterally. No Wheezing/Rhonchi/Rales. Heart:  Regular  rhythm,  No murmur, No Rubs, No Gallops  Abdomen: Soft, Non distended, Non tender. +Bowel sounds,   Extremities: No c/c, mild   Psych:   Not anxious or agitated. Neurologic:  No acute neurological deficit.              Labs: Results:       Chemistry Recent Labs     03/03/22 0221 03/02/22 2048   * 141*    137   K 5.2 5.0    103   CO2 31 30   BUN 50* 49*   CREA 1.85* 1.71*   CA 9.8 10.3*   AGAP 4 4   BUCR 27* 29*   * 177*   TP 6.6 7.5   ALB 3.3* 3.8   GLOB 3.3 3.7   AGRAT 1.0 1.0      CBC w/Diff Recent Labs     03/03/22 0221 03/02/22 2048   WBC 5.7 6.7   RBC 3.89* 4.33   HGB 12.3 13.5 HCT 38.9 42.6    204   GRANS 90* 81*   LYMPH 6* 10*   EOS 0 0      Cardiac Enzymes No results for input(s): CPK, CKND1, FARA in the last 72 hours. No lab exists for component: CKRMB, TROIP   Coagulation Recent Labs     03/02/22 2048   PTP 14.1   INR 1.2   APTT 25.3       Lipid Panel Lab Results   Component Value Date/Time    Cholesterol, total 183 04/25/2021 04:00 PM    HDL Cholesterol 101 (H) 04/25/2021 04:00 PM    LDL, calculated 69.8 04/25/2021 04:00 PM    VLDL, calculated 12.2 04/25/2021 04:00 PM    Triglyceride 61 04/25/2021 04:00 PM    CHOL/HDL Ratio 1.8 04/25/2021 04:00 PM      BNP No results for input(s): BNPP in the last 72 hours. Liver Enzymes Recent Labs     03/03/22 0221   TP 6.6   ALB 3.3*   *      Thyroid Studies Lab Results   Component Value Date/Time    TSH 0.58 03/03/2022 02:21 AM        Procedures/imaging: see electronic medical records for all procedures/Xrays and details which were not copied into this note but were reviewed prior to creation of Plan    NM LUNG SCAN PERF    Result Date: 3/3/2022  EXAM: NM LUNG SCAN PERF. CLINICAL INDICATION/HISTORY: d dimer increase dyspnea. -Additional: Clinical concern for pulmonary embolus. COMPARISON: Correlation is made with the radiographic study performed one day prior. TECHNIQUE: 7.2 mCi Tc-99m MAA was injected intravenously and the 8 standard views of the chest were obtained. This perfusion only examination is interpreted using the PISAPED criteria. _______________ FINDINGS: Perfusion images show no segmental perfusion defects to suggest the presence of pulmonary embolism. _______________     o scintigraphic findings to suggest the presence of acute pulmonary embolism.  _______________     CT CHEST ABD PELV WO CONT    Result Date: 3/3/2022  EXAM: CT CHEST, ABDOMEN AND PELVIS CLINICAL INDICATION/HISTORY: Hypoxemia, retrocardiac density, diarrhea, weakness and shortness of breath COMPARISON: 3/2/2022 TECHNIQUE: Axial CT imaging of the chest, abdomen, and pelvis was performed without intravenous contrast. Multiplanar reformats were generated. One or more dose reduction techniques were used on this CT: automated exposure control, adjustment of the mAs and/or kVp according to patient size, and iterative reconstruction techniques. The specific techniques used on this CT exam have been documented in the patient's electronic medical record. Digital Imaging and Communications in Medicine (DICOM) format image data are available to nonaffiliated external healthcare facilities or entities on a secure, media free, reciprocally searchable basis with patient authorization for at least a 12-month period after this study. ________________ FINDINGS: LIMITATIONS:   > Suboptimal evaluation given lack of intravenous contrast.   > Motion artifact is present which limits evaluation.   > Suboptimal exam due to patient body habitus and arms by the side. CHEST: LUNGS: Respiratory motion significantly limits evaluation. Interlobar septal thickening in the upper lobes. Mild bilateral dependent atelectasis. No large consolidation or suspicious pulmonary mass. PLEURA: Very small volume bilateral pleural effusions. AIRWAY: Normal. MEDIASTINUM: Four-chamber cardiomegaly with asymmetric biatrial enlargement, mitral and aortic annular calcifications. The main pulmonary artery is enlarged suggesting pulmonary arterial hypertension. Normal caliber aorta with scattered calcified atherosclerotic plaque. No pericardial effusion. LYMPH NODES: No enlarged lymph nodes. CHEST WALL: Diffuse anasarca. Heterogeneous thyroid. _______________ ABDOMEN/PELVIS: LIVER: No enhancing hepatic mass. The portal and hepatic veins are patent. BILIARY: Gallstones are present in the nondistended gallbladder lumen. No biliary dilation. SPLEEN: Normal. PANCREAS: Normal. ADRENALS: Left adrenal gland measures 9 HU (axial image 76), most consistent with lipid rich adenoma. Normal right adrenal gland. KIDNEYS: Asymmetrically small left kidney. No rate of a stone. No hydronephrosis. GI TRACT:  A small hiatal hernia is present. Normal caliber small and large bowel loops. No morphology of bowel obstruction. Normal appendix. BLADDER: Diffuse wall thickening in the largely decompressed bladder. PELVIC ORGANS: No acute abnormality. VASCULATURE: No arterial aneurysm. 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 LYMPH NODES: No mesenteric or retroperitoneal lymphadenopathy. OTHER: No free intraperitoneal air. No ascites. Diffuse anasarca. OSSEOUS STRUCTURES: No acute osseous abnormality. Multilevel degenerative changes most pronounced in the lumbar spine. Degenerative changes in both shoulders (right greater than left). Please note the included portions of the upper extremities are not well evaluated on this study _______________     1. Findings of congestive heart failure with cardiomegaly, interstitial edema, very small bilateral pleural effusions, and diffuse anasarca. 2.  Bladder wall thickening may be due to underdistention though superimposed infection cannot be excluded. Recommend correlation for cystitis. 3.  Osseous degenerative changes and additional findings, as detailed in the body of the report. XR CHEST PORT    Result Date: 3/2/2022  EXAM:  AP Portable Chest X-ray 1 view INDICATION: Shortness of breath COMPARISON: April 23, 2021 _______________ FINDINGS: Cardiomegaly and mediastinal contours are within normal limits for portable radiograph. There is increased right retrocardiac/right paraspinal density. There are no pleural effusions. No acute osseous findings. ________________      Increased right retrocardiac density which may represent airspace disease or underlying pulmonary nodule. Recommend CT chest for further evaluation.       Jamil Maciel MD

## 2022-03-03 NOTE — PROGRESS NOTES
D/C Plan: TBD. Home w/ HH vs SNF. Chart Review assessment completed. Therapy is at bedside to work w/ pt. The pt came from home. The pt is on room air at baseline. D/c Plan pending therapy recommendations and discussion w/ pt and family. Reason for Admission:  Per H&P Rj Ulloa is a 80 y.o.  female who has a history of congestive heart failure, sleep apnea, right leg weakness, presents to the emergency room with acute onset of dyspnea and shortness of breath. Patient was found to be hypoxic on room air with sats in the 83% based likely resolve that improved to 95% on 3 L patient was also found to be a new onset rapid ventricular response A. fib that also improved with the addition of oxygen. Patient notes that for the last 4 days she has taken short of breath with minimal exertion and easily fatigued. Her exercise tolerance and activities has also diminished over the last 4 to 5 days. She chronically right leg when she walks but she notes that the last few days she is not been able to move or lift her right leg. Patient lives with her daughter and has had decreased mobility over the last few months. He denies any COVID-19 exposures she has had 2 out of 3 vaccines. In the emergency room she was found to be in new onset A. fib which improved with oxygen as well as CHF she was given 40 of Lasix with diuresis of about 500 cc and improvement of her dyspnea  Patient also complains of a constant tickle down her throat that is causing her to cough but no fevers chills sputum production that is green in nature. \"                     RUR Score:         14%            Plan for utilizing home health:   TBD.  [facility]       PCP: First and Last name:  Mer Mak MD     Name of Practice:    Are you a current patient: Yes/No:    Approximate date of last visit:    Can you participate in a virtual visit with your PCP:                     Current Advanced Directive/Advance Care Plan: DNR      Healthcare Decision Maker:   Click here to complete [id number] including selection of the Healthcare Decision Maker Relationship (ie \"Primary\")             Primary Decision MakerGlo Bailey - [phone]                  Transition of Care Plan:        TBD pending therapy recommendations and discussion w/ pt and family. Home w/ HH vs SNF     Care Management Interventions  PCP Verified by CM: Yes (Chart review: Dr. Glenroy Zheng. )  Mode of Transport at Discharge: Other (see comment) (family )  Transition of Care Consult (CM Consult):  Other (TBD: Home w/ HH vs SNF. )  Discharge Durable Medical Equipment:  (TBD)  Physical Therapy Consult: Yes  Occupational Therapy Consult: Yes  Support Systems: Child(doc)  Confirm Follow Up Transport: Family  Discharge Location  Patient Expects to be Discharged to[de-identified]  (TBD)

## 2022-03-03 NOTE — PROGRESS NOTES
[address] [facility] [phone]  DR. HAIDER'[facility] [phone]        Palliative Care Initial Visit:    This writer, along with Nia Echeverria NP, with the Palliative Care team; visited with patient today to offer support and to also discuss advance medical directive (AMD) and goals of care. Patient was laying in bed and alert and oriented. Patient has a history of CHF and sleep apnea. Patient resides with her daughter CELESTE Black#[phone]). Patient's sister Paul Schneider, BY#[phone]) was at the bedside. Ms. Amanda June was texting patient's daughter, during this visit. The topic of AMD/POA was then brought up. Patient stated that she has completed an advance direct; naming her daughter Obi [place]) as her primary decision maker. Patient stated that Gerda Parks has a copy of the paperwork. Patient is not  and her daughter is her only child. Gerda Parks is her legal next of kin. The topic of goals of care was then brought up. Patient verbalized that she would not want to be resuscitated, if her heart and breathing were to stop. Patient stated that she thinks that she completed a DNR. Patient's sister sent patient's daughter a text to bring in all the paperwork that has been completed, with patient, tomorrow (3/4/2022). The Palliative Care team will visit with patient, tomorrow, to determine what paperwork is available and what paperwork needs to be completed. All paperwork that will need to be completed; can be completed by the Palliative Care team, with patient, tomorrow. At this time, patient will remain a DNR/DNI, per the verbal order. Recommendations: The Palliative Care team will continue to offer support to patient and her family, at this time. The Palliative Care team will follow up with patient and her family, tomorrow (3/4/2022) to determine if they have the needed paperwork.  Palliative Care is prepared to complete new paperwork, with patient, if needed. Holly Mera., [facility]  Palliative Care   NQ#[phone]

## 2022-03-03 NOTE — ED TRIAGE NOTES
Patient arrived from home via EMS c/o shortness of breath over the past few days but worse this evening. Patient 86% on room air. Increased to 97% on 3L oxygen via nasal cannula. Patient denies any chest pain.

## 2022-03-03 NOTE — CONSULTS
[facility] Consult Note      Patient: Ridge Dowling MRN: [id number]  SSN: [id number]    YOB: 1940  Age: 80 y.o. Sex: female        Date of Consultation: 3/3/2022  Referring Physician: Dr. Jovani Andrew  Reason for Consultation: SOB and CHF/AFIB    HPI:  I was asked by  to see this patient for shortness of breath and atrial fibrillation, CHF. Arnold Wang a 80year-old pleasant patient came to the hospital with symptoms of shortness of breath and she was hypoxic which improved with the 3 L nasal cannula and after diuretic treatment. Patient have history of chronic congestive heart failure history of paroxysmal atrial fibrillation not on any anticoagulation due to recurrent fall, obesity see sleep apnea, diastolic heart failure. Patient does not want invasive treatment. I have discussed in detail about anticoagulation with patient and sister at this point  Both does not want therapeutic anticoagulation. No history of CAD /CABG.                   Past Medical History:   Diagnosis Date    Hypertension     Sleep disorder      Past Surgical History:   Procedure Laterality Date    HX ORTHOPAEDIC      broken right ankle, left femur 30 yrs ago     Current Facility-Administered Medications   Medication Dose Route Frequency    pantoprazole (PROTONIX) injection 40 mg  40 mg IntraVENous DAILY    furosemide (LASIX) injection 20 mg  20 mg IntraVENous Q12H    [START ON 3/4/2022] enoxaparin (LOVENOX) injection 180 mg  1.5 mg/kg SubCUTAneous Q24H    magnesium sulfate 1 g/100 ml IVPB (premix or compounded)  1 g IntraVENous ONCE    sodium chloride (NS) flush 5-10 mL  5-10 mL IntraVENous PRN    cefTRIAXone (ROCEPHIN) 2 g in 0.9% sodium chloride (MBP/ADV) 50 mL MBP  2 g IntraVENous Q24H    azithromycin (ZITHROMAX) 500 mg in 0.9% sodium chloride 250 mL (VIAL-MATE)  500 mg IntraVENous Q24H    sodium chloride (NS) flush 5-40 mL  5-40 mL IntraVENous Q8H    sodium chloride (NS) flush 5-40 mL  5-40 mL IntraVENous PRN    acetaminophen (TYLENOL) tablet 650 mg  650 mg Oral Q6H PRN    Or    acetaminophen (TYLENOL) suppository 650 mg  650 mg Rectal Q6H PRN    polyethylene glycol (MIRALAX) packet 17 g  17 g Oral DAILY PRN    ondansetron (ZOFRAN ODT) tablet 4 mg  4 mg Oral Q8H PRN    Or    ondansetron (ZOFRAN) injection 4 mg  4 mg IntraVENous Q6H PRN    [Held by provider] potassium chloride (K-DUR, KLOR-CON M20) SR tablet 20 mEq  20 mEq Oral DAILY    carvediloL (COREG) tablet 3.125 mg  3.125 mg Oral BID WITH MEALS    aspirin delayed-release tablet 81 mg  81 mg Oral DAILY    acetaminophen (TYLENOL) tablet 650 mg  650 mg Oral Q6H PRN       Allergies and Intolerances: Allergies   Allergen Reactions    Seafood Hives     crabs    Statins-Hmg-Coa Reductase Inhibitors Unknown (comments)    Sulfa (Sulfonamide Antibiotics) Hives       Family History:   Family History   Problem Relation Age of Onset    Diabetes Mother     Heart Disease Mother     Heart Disease Father        Social History:   She  reports that she has never smoked. She has never used smokeless tobacco.  She  reports current alcohol use of about 1.0 standard drink of alcohol per week. Review of Systems  Gen: No fever, chills, malaise, weight loss/gain. Heent: No headache, rhinorrhea, epistaxis, ear pain, hearing loss, sinus pain, neck pain/stiffness, sore throat. Heart: No chest pain, palpitations, LOPEZ, pnd, or orthopnea. Resp: No cough, hemoptysis, wheezing and +shortness of breath. GI: No nausea, vomiting, diarrhea, constipation, melena or hematochezia. : No urinary obstruction, dysuria or hematuria. Derm: No rash, new skin lesion or pruritis. Musc/skeletal: no bone or joint complains. Vasc: No edema, cyanosis or claudication. Endo: No heat/cold intolerance, no polyuria,polydipsia or polyphagia. Neuro: No unilateral weakness, numbness, tingling. No seizures. Heme: No easy bruising or bleeding.         Physical: Patient Vitals for the past 6 hrs:   Pulse Resp BP SpO2   03/03/22 1204 67 16 127/81 100 %         Exam:   General Appearance: Comfortable, not using accessory muscles of respiration. HEENT: NIGEL. HEAD: Atraumatic  NECK: No JVD, no thyroidomeglay. :  LUNGS: Clear bilaterally. HEART: S1 irregular +S2     ABD: Non-tender, BS Audible    EXT: No edema, and no cysnosis. VASCULAR EXAM: Pulses are intact. PSYCHIATRIC EXAM: Mood is appropriate. MUSCULOSKELETAL: Grossly no joint deformity. NEUROLOGICAL: Motor and sensory sytem intact and Cranial nerves II-XII intact. Review of Data:   LABS:   Lab Results   Component Value Date/Time    WBC 5.7 03/03/2022 02:21 AM    HGB 12.3 03/03/2022 02:21 AM    HCT 38.9 03/03/2022 02:21 AM    PLATELET 434 69/15/3580 02:21 AM     Lab Results   Component Value Date/Time    Sodium 137 03/03/2022 02:21 AM    Potassium 5.2 03/03/2022 02:21 AM    Chloride 102 03/03/2022 02:21 AM    CO2 31 03/03/2022 02:21 AM    Glucose 161 (H) 03/03/2022 02:21 AM    BUN 50 (H) 03/03/2022 02:21 AM    Creatinine 1.85 (H) 03/03/2022 02:21 AM     Lab Results   Component Value Date/Time    Cholesterol, total 183 04/25/2021 04:00 PM    HDL Cholesterol 101 (H) 04/25/2021 04:00 PM    LDL, calculated 69.8 04/25/2021 04:00 PM    Triglyceride 61 04/25/2021 04:00 PM     No components found for: GPT  Lab Results   Component Value Date/Time    Hemoglobin A1c 5.3 04/25/2021 01:15 AM       RADIOLOGY:  CT Results  (Last 48 hours)               03/03/22 0558  CT CHEST ABD PELV WO CONT Final result    Impression:      1. Findings of congestive heart failure with cardiomegaly, interstitial edema,   very small bilateral pleural effusions, and diffuse anasarca. 2.  Bladder wall thickening may be due to underdistention though superimposed   infection cannot be excluded. Recommend correlation for cystitis. 3.  Osseous degenerative changes and additional findings, as detailed in the   body of the report. Narrative:  EXAM: CT CHEST, ABDOMEN AND PELVIS        CLINICAL INDICATION/HISTORY: Hypoxemia, retrocardiac density, diarrhea, weakness   and shortness of breath       COMPARISON: 3/2/2022       TECHNIQUE: Axial CT imaging of the chest, abdomen, and pelvis was performed   without intravenous contrast. Multiplanar reformats were generated. One or more   dose reduction techniques were used on this CT: automated exposure control,   adjustment of the mAs and/or kVp according to patient size, and iterative   reconstruction techniques. The specific techniques used on this CT exam have   been documented in the patient's electronic medical record. Digital Imaging and   Communications in Medicine (DICOM) format image data are available to   nonaffiliated external healthcare facilities or entities on a secure, media   free, reciprocally searchable basis with patient authorization for at least a   12-month period after this study. ________________       FINDINGS:       LIMITATIONS:      > Suboptimal evaluation given lack of intravenous contrast.      > Motion artifact is present which limits evaluation.     > Suboptimal exam due to patient body habitus and arms by the side. CHEST:       LUNGS: Respiratory motion significantly limits evaluation. Interlobar septal   thickening in the upper lobes. Mild bilateral dependent atelectasis. No large   consolidation or suspicious pulmonary mass. PLEURA: Very small volume bilateral pleural effusions. AIRWAY: Normal.       MEDIASTINUM: Four-chamber cardiomegaly with asymmetric biatrial enlargement,   mitral and aortic annular calcifications. The main pulmonary artery is enlarged   suggesting pulmonary arterial hypertension. Normal caliber aorta with scattered   calcified atherosclerotic plaque. No pericardial effusion. LYMPH NODES: No enlarged lymph nodes. CHEST WALL: Diffuse anasarca.  Heterogeneous thyroid.       _______________       ABDOMEN/PELVIS: LIVER: No enhancing hepatic mass. The portal and hepatic veins are patent. BILIARY: Gallstones are present in the nondistended gallbladder lumen. No   biliary dilation. SPLEEN: Normal.       PANCREAS: Normal.       ADRENALS: Left adrenal gland measures 9 HU (axial image 76), most consistent   with lipid rich adenoma. Normal right adrenal gland. KIDNEYS: Asymmetrically small left kidney. No rate of a stone. No   hydronephrosis. GI TRACT:  A small hiatal hernia is present. Normal caliber small and large   bowel loops. No morphology of bowel obstruction. Normal appendix. BLADDER: Diffuse wall thickening in the largely decompressed bladder. PELVIC ORGANS: No acute abnormality. VASCULATURE: No arterial aneurysm. Fusiform dilation of the infrarenal abdominal   aorta measures up to 2.6 cm. LYMPH NODES: No mesenteric or retroperitoneal lymphadenopathy. OTHER: No free intraperitoneal air. No ascites. Diffuse anasarca. OSSEOUS STRUCTURES: No acute osseous abnormality. Multilevel degenerative   changes most pronounced in the lumbar spine. Degenerative changes in both   shoulders (right greater than left). Please note the included portions of the   upper extremities are not well evaluated on this study       _______________               CXR Results  (Last 48 hours)               03/02/22 2129  XR CHEST PORT Final result    Impression:  Increased right retrocardiac density which may represent airspace   disease or underlying pulmonary nodule. Recommend CT chest for further   evaluation. Narrative:  EXAM:  AP Portable Chest X-ray 1 view        INDICATION: Shortness of breath       COMPARISON: April 23, 2021       _______________       FINDINGS: Cardiomegaly and mediastinal contours are within normal limits for   portable radiograph. There is increased right retrocardiac/right paraspinal   density. There are no pleural effusions.  No acute osseous findings. ________________                       Cardiology Procedures:   Results for orders placed or performed during the hospital encounter of 03/02/22   EKG, 12 LEAD, INITIAL   Result Value Ref Range    Ventricular Rate 102 BPM    QRS Duration 72 ms    Q-T Interval 336 ms    QTC Calculation (Bezet) 437 ms    Calculated R Axis -81 degrees    Calculated T Axis -20 degrees    Diagnosis       Atrial fibrillation with rapid ventricular response with premature   ventricular or aberrantly conducted complexes  Left axis deviation  Low voltage QRS  Abnormal ECG  When compared with ECG of 23-APR-2021 17:11,  Atrial fibrillation has replaced Sinus rhythm  Confirmed by Alexis Kapoor MD. ([id number]) on 3/2/2022 10:26:52 PM        Echo Results  (Last 48 hours)    None       Cardiolite (Tc-99m Sestamibi) stress test        Impression / Plan:    Patient Active Problem List   Diagnosis Code    Fatigue R53.83    Elevated troponin R77.8    Obesity (BMI 30-39. 9) E66.9    HTN (hypertension) I10    SERENA treated with BiPAP G47.33    Acute on chronic diastolic congestive heart failure (HCC) I50.33    Acute respiratory failure with hypoxemia (HCC) J96.01    Atrial fibrillation (HCC) I48.91    Stage 3 chronic kidney disease (HCC) N18.30    SERENA (obstructive sleep apnea) G47.33    Adult BMI 39.0-39.9 kg/sq m Z68.39         A/P    Acute on chronic diastolic heart failure. Paroxysmal atrial fib  Hypertension  Obesity   Obstructive sleep apnea  CKD  Recurrent Fall      Plan:     I have discussed in detail with patient and sister both does not want anticoagulation due to recurrent fall.   Mitral valve is calcified not well visualized on transthoracic echocardiogram and patient does not want LORRAINE  Fluid restriction 1800 mL per day  Salt restriction 2-3 g per day  Continue with Lasix  Continue with antihypertensive treatment including carvedilol  Continue with treatment of sleep apnea     Signed By: Patricio Landa MD     March 3, 2022

## 2022-03-03 NOTE — PROGRESS NOTES
Problem: Pressure Injury - Risk of  Goal: *Prevention of pressure injury  Description: Document Capo Scale and appropriate interventions in the flowsheet. Outcome: Progressing Towards Goal  Note: Pressure Injury Interventions:  Sensory Interventions: Assess changes in LOC,Minimize linen layers    Moisture Interventions: Absorbent underpads    Activity Interventions: Pressure redistribution bed/mattress(bed type)    Mobility Interventions: PT/OT evaluation    Nutrition Interventions: Document food/fluid/supplement intake    Friction and Shear Interventions: HOB 30 degrees or less                Problem: Patient Education: Go to Patient Education Activity  Goal: Patient/Family Education  Outcome: Progressing Towards Goal     Problem: Falls - Risk of  Goal: *Absence of Falls  Description: Document Andrei Fall Risk and appropriate interventions in the flowsheet.   Outcome: Progressing Towards Goal  Note: Fall Risk Interventions:            Medication Interventions: Patient to call before getting OOB    Elimination Interventions: Call light in reach,Patient to call for help with toileting needs

## 2022-03-03 NOTE — PROGRESS NOTES
Problem: Self Care Deficits Care Plan (Adult)  Goal: *Acute Goals and Plan of Care (Insert Text)  Description: Occupational Therapy Goals  Initiated 3/3/2022 within 7 day(s). 1.  Patient will perform grooming with moderate assistance seated in chair. 2.  Patient will perform toilet transfers with moderate assistance . 3. Patient will perform all aspects of toileting with moderate assistance . 4. Patient will participate in upper extremity therapeutic exercise/activities with minimal assistance/contact guard assist for 10 minutes. 5.  Patient will utilize energy conservation techniques during functional activities with verbal, visual, and tactile cues. OCCUPATIONAL THERAPY EVALUATION    Patient: Franco Buerger (89 y.o. female)  Date: 3/3/2022  Primary Diagnosis: Acute exacerbation of CHF (congestive heart failure) [facility] [I50.9]        Precautions:   Fall  PLOF: mod-min A for ADLs and transfers; reported multiple falls in the past few months    ASSESSMENT :  Based on the objective data described below, the patient presents with decreased strength, endurance, balance for carryover of ADLs and transfers following above mentioned medical diagnosis. Pt presented supine in bed at the beginning of session and agrees to participate with therapy. Pt performed bed mobility, supine<>sit with mod A. Pt demo poor/fair- sitting balance and requires frequent cues to maintain midline in sitting during this session; tolerate sitting EOB for ~10 minutes. Pt attempted sit<>stand transfers x2 but unable to clear buttocks off the bed at both attempts. Pt requires max A for LB dressing but reports having AEs for LB ADLs at home that pt uses on a daily basis. Pt was left in supine in bed at the end of session in NAD. Patient will benefit from skilled intervention to address the above impairments.   Patient's rehabilitation potential is considered to be Good  Factors which may influence rehabilitation potential include: []             None noted  []             Mental ability/status  [x]             Medical condition  []             Home/family situation and support systems  []             Safety awareness  []             Pain tolerance/management  []             Other:      PLAN :  Recommendations and Planned Interventions:   [x]               Self Care Training                  [x]      Therapeutic Activities  [x]               Functional Mobility Training   []      Cognitive Retraining  [x]               Therapeutic Exercises           [x]      Endurance Activities  [x]               Balance Training                    []      Neuromuscular Re-Education  []               Visual/Perceptual Training     [x]      Home Safety Training  [x]               Patient Education                   []      Family Training/Education  []               Other (comment):    Frequency/Duration: Patient will be followed by occupational therapy 1-2 times per day/4-7 days per week to address goals. Discharge Recommendations: Rehab vs Home Health  Further Equipment Recommendations for Discharge: N/A     SUBJECTIVE:   Patient stated  I would like to get up.     OBJECTIVE DATA SUMMARY:     Past Medical History:   Diagnosis Date    Hypertension     Sleep disorder      Past Surgical History:   Procedure Laterality Date    HX ORTHOPAEDIC      broken right ankle, left femur 30 yrs ago     Barriers to Learning/Limitations: None  Compensate with: visual, verbal, tactile, kinesthetic cues/model    Home Situation:   Home Situation  Home Environment: Private residence  # Steps to Enter: 4 (has list to get up the steps to enter house)  One/Two Story Residence: One story  Living Alone: No  Support Systems: Child(doc),Other Family Member(s)  Patient Expects to be Discharged to[de-identified] Home  Current DME Used/Available at Home: Cane, straight,Walker, rolling,Walker, rollator,Commode, bedside,Grab bars,CPAP,Shower chair,Adaptive dressing aides (adjustable bed)  Tub or Shower Type: Shower  []  Right hand dominant   []  Left hand dominant    Cognitive/Behavioral Status:  Neurologic State: Alert  Orientation Level: Oriented X4  Cognition: Appropriate for age attention/concentration; Follows commands  Safety/Judgement: Fall prevention    Skin: intact  Edema: none    Vision/Perceptual:    Tracking: Able to track stimulus in all quadrants w/o difficulty    Corrective Lenses: Glasses  Coordination: BUE  Coordination: Within functional limits  Fine Motor Skills-Upper: Left Intact; Right Intact    Gross Motor Skills-Upper: Left Intact; Right Intact  Balance:  Sitting: Impaired; With support  Sitting - Static: Fair (occasional)  Sitting - Dynamic: Fair (occasional) (-)  Standing: Impaired;Pull to stand; With support  Standing - Static: Poor;Constant support  Standing - Dynamic : Not tested  Strength: BUE  Strength: Generally decreased, functional  Tone & Sensation: BUE  Sensation: Intact  Range of Motion: BUE  AROM: Generally decreased, functional  Functional Mobility and Transfers for ADLs:  Bed Mobility:  Rolling: Moderate assistance  Supine to Sit: Maximum assistance  Sit to Supine: Moderate assistance  Scooting: Moderate assistance;Minimum assistance  Transfers:  Sit to Stand: Maximum assistance; Total assistance; Additional time;Assist x1  Stand to Sit: Maximum assistance; Additional time;Assist x1  ADL Assessment:   Feeding: Independent    Oral Facial Hygiene/Grooming: Minimum assistance;Contact guard assistance    Upper Body Dressing: Minimum assistance    Lower Body Dressing: Minimum assistance    Toileting: Maximum assistance; Moderate assistance  ADL Intervention:  Lower Body Dressing Assistance  Dressing Assistance: Moderate assistance  Shoes with Velcro:  Moderate assistance  Leg Crossed Method Used: No  Position Performed: Seated edge of bed  Cues: Don;Doff    Cognitive Retraining  Safety/Judgement: Fall prevention  Pain:  Pain level pre-treatment: 0/10   Pain level post-treatment: 0/10 Pain Intervention(s): Medication (see MAR); Rest, Ice, Repositioning   Response to intervention: Nurse notified, See doc flow    Activity Tolerance:   Fair     Please refer to the flowsheet for vital signs taken during this treatment. After treatment:   [] Patient left in no apparent distress sitting up in chair  [x] Patient left in no apparent distress in bed  [x] Call bell left within reach  [x] Nursing notified  [x] Caregiver present  [] Bed alarm activated    COMMUNICATION/EDUCATION:   [x] Role of Occupational Therapy in the acute care setting  [x] Home safety education was provided and the patient/caregiver indicated understanding. [x] Patient/family have participated as able in goal setting and plan of care. [x] Patient/family agree to work toward stated goals and plan of care. [] Patient understands intent and goals of therapy, but is neutral about his/her participation. [] Patient is unable to participate in goal setting and plan of care. Thank you for this referral.  Sander Che, OTR/L  Time Calculation: 39 mins    Eval Complexity: History: HIGH Complexity : Extensive review of history including physical, cognitive and psychosocial history ; Examination: HIGH Complexity : 5 or more performance deficits relating to physical, cognitive , or psychosocial skils that result in activity limitations and / or participation restrictions; Decision Making:HIGH Complexity : Patient presents with comorbidities that affect occupational performance.  Signifigant modification of tasks or assistance (eg, physical or verbal) with assessment (s) is necessary to enable patient to complete evaluation

## 2022-03-03 NOTE — PROGRESS NOTES
TRANSFER - IN REPORT:    Verbal report received from Nima Livingston RN(name) on Chirag Hoang  being received from 3N(unit) for routine progression of care      Report consisted of patients Situation, Background, Assessment and   Recommendations(SBAR). Information from the following report(s) SBAR, Kardex, ED Summary, Intake/Output, MAR, Recent Results, Med Rec Status and Cardiac Rhythm Afib was reviewed with the receiving nurse. Opportunity for questions and clarification was provided. Assessment completed upon patients arrival to unit and care assumed. 5307  Admission assessment complete  Pt resting quietly with eyes open and chest rising and falling evenly   No c/o pain or signs of distress  Bed locked and in lowest position   Call light within reach     0500  Pt taken to [address] Cardiac/Medical Night Shift Chart Audit    Chart Audit completed? YES    Bedside and Verbal shift change report given to Ladi Arriaga (oncoming nurse) by Nils Brantley RN (offgoing nurse). Report included the following information SBAR, Kardex, ED Summary, Intake/Output, MAR, Recent Results, Med Rec Status and Cardiac Rhythm Afib.

## 2022-03-04 LAB
ALBUMIN SERPL-MCNC: 2.9 G/DL (ref 3.4–5)
ALBUMIN/GLOB SERPL: 0.8 {RATIO} (ref 0.8–1.7)
ALP SERPL-CCNC: 136 U/L (ref 45–117)
ALT SERPL-CCNC: 135 U/L (ref 13–56)
ANION GAP SERPL CALC-SCNC: 4 MMOL/L (ref 3–18)
AST SERPL-CCNC: 59 U/L (ref 10–38)
BASOPHILS # BLD: 0 K/UL (ref 0–0.1)
BASOPHILS NFR BLD: 0 % (ref 0–2)
BILIRUB SERPL-MCNC: 0.4 MG/DL (ref 0.2–1)
BUN SERPL-MCNC: 57 MG/DL (ref 7–18)
BUN/CREAT SERPL: 31 (ref 12–20)
CALCIUM SERPL-MCNC: 9.8 MG/DL (ref 8.5–10.1)
CHLORIDE SERPL-SCNC: 101 MMOL/L (ref 100–111)
CO2 SERPL-SCNC: 30 MMOL/L (ref 21–32)
CREAT SERPL-MCNC: 1.81 MG/DL (ref 0.6–1.3)
DIFFERENTIAL METHOD BLD: ABNORMAL
EOSINOPHIL # BLD: 0 K/UL (ref 0–0.4)
EOSINOPHIL NFR BLD: 0 % (ref 0–5)
ERYTHROCYTE [DISTWIDTH] IN BLOOD BY AUTOMATED COUNT: 15.2 % (ref 11.6–14.5)
GLOBULIN SER CALC-MCNC: 3.5 G/DL (ref 2–4)
GLUCOSE SERPL-MCNC: 109 MG/DL (ref 74–99)
HCT VFR BLD AUTO: 36.5 % (ref 35–45)
HGB BLD-MCNC: 11.5 G/DL (ref 12–16)
IMM GRANULOCYTES # BLD AUTO: 0 K/UL (ref 0–0.04)
IMM GRANULOCYTES NFR BLD AUTO: 0 % (ref 0–0.5)
LYMPHOCYTES # BLD: 0.9 K/UL (ref 0.9–3.6)
LYMPHOCYTES NFR BLD: 13 % (ref 21–52)
MAGNESIUM SERPL-MCNC: 2.2 MG/DL (ref 1.6–2.6)
MCH RBC QN AUTO: 30.7 PG (ref 24–34)
MCHC RBC AUTO-ENTMCNC: 31.5 G/DL (ref 31–37)
MCV RBC AUTO: 97.3 FL (ref 78–100)
MONOCYTES # BLD: 0.7 K/UL (ref 0.05–1.2)
MONOCYTES NFR BLD: 10 % (ref 3–10)
NEUTS SEG # BLD: 5.7 K/UL (ref 1.8–8)
NEUTS SEG NFR BLD: 77 % (ref 40–73)
NRBC # BLD: 0 K/UL (ref 0–0.01)
NRBC BLD-RTO: 0 PER 100 WBC
PLATELET # BLD AUTO: 169 K/UL (ref 135–420)
PMV BLD AUTO: 10.9 FL (ref 9.2–11.8)
POTASSIUM SERPL-SCNC: 4.8 MMOL/L (ref 3.5–5.5)
PROT SERPL-MCNC: 6.4 G/DL (ref 6.4–8.2)
RBC # BLD AUTO: 3.75 M/UL (ref 4.2–5.3)
SODIUM SERPL-SCNC: 135 MMOL/L (ref 136–145)
WBC # BLD AUTO: 7.3 K/UL (ref 4.6–13.2)

## 2022-03-04 PROCEDURE — 80053 COMPREHEN METABOLIC PANEL: CPT

## 2022-03-04 PROCEDURE — 36415 COLL VENOUS BLD VENIPUNCTURE: CPT

## 2022-03-04 PROCEDURE — 74011250637 HC RX REV CODE- 250/637: Performed by: INTERNAL MEDICINE

## 2022-03-04 PROCEDURE — C9113 INJ PANTOPRAZOLE SODIUM, VIA: HCPCS | Performed by: INTERNAL MEDICINE

## 2022-03-04 PROCEDURE — 97530 THERAPEUTIC ACTIVITIES: CPT

## 2022-03-04 PROCEDURE — 74011000250 HC RX REV CODE- 250: Performed by: INTERNAL MEDICINE

## 2022-03-04 PROCEDURE — 94660 CPAP INITIATION&MGMT: CPT

## 2022-03-04 PROCEDURE — 99231 SBSQ HOSP IP/OBS SF/LOW 25: CPT | Performed by: NURSE PRACTITIONER

## 2022-03-04 PROCEDURE — 85025 COMPLETE CBC W/AUTO DIFF WBC: CPT

## 2022-03-04 PROCEDURE — 74011000258 HC RX REV CODE- 258: Performed by: EMERGENCY MEDICINE

## 2022-03-04 PROCEDURE — 83735 ASSAY OF MAGNESIUM: CPT

## 2022-03-04 PROCEDURE — 65660000000 HC RM CCU STEPDOWN

## 2022-03-04 PROCEDURE — 74011250636 HC RX REV CODE- 250/636: Performed by: EMERGENCY MEDICINE

## 2022-03-04 PROCEDURE — 74011250636 HC RX REV CODE- 250/636: Performed by: INTERNAL MEDICINE

## 2022-03-04 PROCEDURE — 74011250636 HC RX REV CODE- 250/636: Performed by: HOSPITALIST

## 2022-03-04 RX ORDER — PANTOPRAZOLE SODIUM 40 MG/1
40 TABLET, DELAYED RELEASE ORAL
Status: DISCONTINUED | OUTPATIENT
Start: 2022-03-05 | End: 2022-03-11 | Stop reason: ALTCHOICE

## 2022-03-04 RX ORDER — FUROSEMIDE 10 MG/ML
40 INJECTION INTRAMUSCULAR; INTRAVENOUS EVERY 12 HOURS
Status: DISCONTINUED | OUTPATIENT
Start: 2022-03-04 | End: 2022-03-12

## 2022-03-04 RX ADMIN — CARVEDILOL 3.12 MG: 3.12 TABLET, FILM COATED ORAL at 17:22

## 2022-03-04 RX ADMIN — FUROSEMIDE 40 MG: 10 INJECTION, SOLUTION INTRAMUSCULAR; INTRAVENOUS at 20:37

## 2022-03-04 RX ADMIN — ACETAMINOPHEN 650 MG: 325 TABLET ORAL at 06:26

## 2022-03-04 RX ADMIN — ASPIRIN 81 MG: 81 TABLET, COATED ORAL at 08:32

## 2022-03-04 RX ADMIN — CARVEDILOL 3.12 MG: 3.12 TABLET, FILM COATED ORAL at 08:33

## 2022-03-04 RX ADMIN — AZITHROMYCIN MONOHYDRATE 500 MG: 500 INJECTION, POWDER, LYOPHILIZED, FOR SOLUTION INTRAVENOUS at 21:40

## 2022-03-04 RX ADMIN — CEFTRIAXONE SODIUM 2 G: 2 INJECTION, POWDER, FOR SOLUTION INTRAMUSCULAR; INTRAVENOUS at 20:37

## 2022-03-04 RX ADMIN — SODIUM CHLORIDE, PRESERVATIVE FREE 10 ML: 5 INJECTION INTRAVENOUS at 14:00

## 2022-03-04 RX ADMIN — PANTOPRAZOLE SODIUM 40 MG: 40 INJECTION, POWDER, FOR SOLUTION INTRAVENOUS at 08:32

## 2022-03-04 RX ADMIN — ENOXAPARIN SODIUM 180 MG: 120 INJECTION SUBCUTANEOUS at 13:15

## 2022-03-04 RX ADMIN — FUROSEMIDE 20 MG: 10 INJECTION, SOLUTION INTRAMUSCULAR; INTRAVENOUS at 08:33

## 2022-03-04 NOTE — PROGRESS NOTES
Advance Care Planning     General Advance Care Planning (ACP) Conversation    Date of Conversation: 3/4/2022  Conducted with: Patient with Decision Making Capacity    Healthcare Decision Maker:     Primary Decision Maker: Agus Putnam - Leo - [phone]    Today we documented Decision Maker(s). The patient has been asked to provide ACP documents. Content/Action Overview: Has ACP document(s) NOT on file - requested patient to provide  Reviewed DNR/DNI and patient confirms current DNR status    3/4/2022 0915 Seen today in room 358 along with Gila Hooks NP. Lying in bed. Awake, alert. Oriented x 4 Respirations unlabored. Oxygen on at 2 lpm per NC. States she wasn't feeling all that well and was tired. Reviewed the conversation from yesterday about her MPOA documents and DDNR documents. She said that her sister was checking in on it. She did not want to complete new documents at this time. Will follow up next week if she remains an inpatient. Disposition plan: anticipate home with family support but awaiting input from therapy    Palliative care will continue to follow Carlee Jane  and her family during her hospitalization and support them as they make healthcare decisions and define goals of care.       Lesvia Kessler, RN, MSN  Palliative Medicine  P: [phone]

## 2022-03-04 NOTE — PROGRESS NOTES
Alert and oriented x4, assessments and VS completed, pt has no reports of pain, pt encouraged to turn frequently, intake and output monitored

## 2022-03-04 NOTE — ROUTINE PROCESS
Bedside and Verbal shift change report given to John Mcnally RN (oncoming nurse) by Cheri Osman RN (offgoing nurse). Report included the following information SBAR, Kardex, MAR and Recent Results.

## 2022-03-04 NOTE — PROGRESS NOTES
Problem: Pressure Injury - Risk of  Goal: *Prevention of pressure injury  Description: Document Capo Scale and appropriate interventions in the flowsheet. Outcome: Progressing Towards Goal  Note: Pressure Injury Interventions:  Sensory Interventions: Assess changes in LOC,Minimize linen layers    Moisture Interventions: Absorbent underpads    Activity Interventions: Pressure redistribution bed/mattress(bed type)    Mobility Interventions: PT/OT evaluation    Nutrition Interventions: Document food/fluid/supplement intake    Friction and Shear Interventions: HOB 30 degrees or less                Problem: Patient Education: Go to Patient Education Activity  Goal: Patient/Family Education  Outcome: Progressing Towards Goal     Problem: Falls - Risk of  Goal: *Absence of Falls  Description: Document Andrei Fall Risk and appropriate interventions in the flowsheet.   Outcome: Progressing Towards Goal  Note: Fall Risk Interventions:            Medication Interventions: Patient to call before getting OOB    Elimination Interventions: Call light in reach,Patient to call for help with toileting needs              Problem: Patient Education: Go to Patient Education Activity  Goal: Patient/Family Education  Outcome: Progressing Towards Goal     Problem: Patient Education: Go to Patient Education Activity  Goal: Patient/Family Education  Outcome: Progressing Towards Goal     Problem: Patient Education: Go to Patient Education Activity  Goal: Patient/Family Education  Outcome: Progressing Towards Goal

## 2022-03-04 NOTE — PROGRESS NOTES
Problem: Self Care Deficits Care Plan (Adult)  Goal: *Acute Goals and Plan of Care (Insert Text)  Description: Occupational Therapy Goals  Initiated 3/3/2022 within 7 day(s). 1.  Patient will perform grooming with moderate assistance seated in chair. 2.  Patient will perform toilet transfers with moderate assistance . 3. Patient will perform all aspects of toileting with moderate assistance . 4. Patient will participate in upper extremity therapeutic exercise/activities with minimal assistance/contact guard assist for 10 minutes. 5.  Patient will utilize energy conservation techniques during functional activities with verbal, visual, and tactile cues. Outcome: Progressing Towards Goal  OCCUPATIONAL THERAPY TREATMENT    Patient: Jessy Alves (41 y.o. female)  Date: 3/4/2022  Diagnosis: Acute exacerbation of CHF (congestive heart failure) ([facility]) [I50.9] Acute respiratory failure with hypoxemia [facility])       Precautions: Fall  PLOF:     Chart, occupational therapy assessment, plan of care, and goals were reviewed. ASSESSMENT:  Pt was seen with PT for extra skilled hands. Pt presented long sit in bed with sister in room. Pt required TotalA to sit EOB after attempts to use Leg  for RLE. Pt was able to complete AROM of the RUE to facilitate Independent ADLS. Next Pt completed weight bearing activities on BUE with tactile and verbal cues required. Pt then was transferred Spine with Andrew Jose 50 x2. Progression toward goals:  [x]          Improving appropriately and progressing toward goals  []          Improving slowly and progressing toward goals  []          Not making progress toward goals and plan of care will be adjusted     PLAN:  Patient continues to benefit from skilled intervention to address the above impairments. Continue treatment per established plan of care.   Discharge Recommendations:  Perry Abraham  Further Equipment Recommendations for Discharge:  bedside commode, shower chair, transfer bench, and rolling walker     SUBJECTIVE:   Patient stated I pray.     OBJECTIVE DATA SUMMARY:   Cognitive/Behavioral Status:  Neurologic State: Alert  Orientation Level: Oriented X4  Cognition: Appropriate decision making,Appropriate for age attention/concentration,Follows commands  Safety/Judgement: Fall prevention,Awareness of environment,Insight into deficits    Functional Mobility and Transfers for ADLs:   Bed Mobility:  Rolling: Total assistance  Supine to Sit: Total assistance  Sit to Supine: Total assistance;Assist x2  Scooting: Maximum assistance   Transfers:         Balance:  Sitting: Impaired; With support  Sitting - Static: Fair (occasional)  Sitting - Dynamic: Fair (occasional)  Standing: Impaired;Pull to stand  Standing - Static: Poor    ADL Intervention:       Neuro Re-Education:       UE Therapeutic Exercises:   AROM of BUE  Weight bearing EOB (BUE)    Pain:  Pain level pre-treatment: /10   Pain level post-treatment: /10  Pain Intervention(s): Medication (see MAR); Rest, Ice, Repositioning   Response to intervention: Nurse notified, See doc flow    Activity Tolerance:    Fair  Please refer to the flowsheet for vital signs taken during this treatment. After treatment:   []  Patient left in no apparent distress sitting up in chair  [x]  Patient left in no apparent distress in bed  [x]  Call bell left within reach  []  Nursing notified  [x]  Caregiver present  []  Bed alarm activated    COMMUNICATION/EDUCATION:   [x] Role of Occupational Therapy in the acute care setting  [] Home safety education was provided and the patient/caregiver indicated understanding. [x] Patient/family have participated as able in working towards goals and plan of care. [] Patient/family agree to work toward stated goals and plan of care. [] Patient understands intent and goals of therapy, but is neutral about his/her participation.   [] Patient is unable to participate in goal setting and plan of care.      Thank you for this referral.  Olinda Irwin  Time Calculation: 32 mins

## 2022-03-04 NOTE — PROGRESS NOTES
Palliative Medicine Consult    Patient Name: Rodriguez Baker  YOB: 1940    Date of Initial Consult: 3/3/2022  Date of follow-up: 3/4/2022  Reason for Consult: Goals of care discussions  Requesting Provider: Dr. Sherrie Essex  Primary Care Physician: Citlali Doll MD      SUMMARY:   Rodriguez Baker is a 80 y.o. with a past history of congestive heart failure, sleep apnea, right leg weakness, and CKD stage 3, who was admitted on 3/2/2022 from home with a diagnosis of New onset A-fib, and acute on chronic diastolic congestive heart failure. Current medical issues leading to Palliative Medicine involvement include: Support and goals of care discussions. 3/4/2022: Patient is awake, alert, and oriented x4. No family present. States she did not sleep well last night due to the hospital CPAP machine being louder than when she is used to at home. PALLIATIVE DIAGNOSES:   1. Goals of care discussions  2. New onset A-fib  3. Acute on chronic diastolic congestive heart failure  4. Advanced age/debility       PLAN:   3/4/2022: Palliative medicine team including Favian More RN and I met with patient at patient's bedside. Patient is awake, alert, and oriented x4. No family present. Followed up on goals of care discussions from yesterday (see below). Family is still looking at current documentation regarding AMD and DO NOT RESUSCITATE paperwork. Patient is not comfortable signing any paperwork until she is able to review her current paperwork. Explained that we will honor the DNR/DNI order while she is an inpatient, however when she discharges she needs paperwork to protect her. She did not wish to complete new documents at this time. We will follow up next week if she remains inpatient. Advised patient that if she gets discharged prior to document completion, she can complete this paperwork to protect her wishes with her primary care provider. At this time continue DNR/DNI.     See previous discussions below    3/3/2022: Goals of care: Palliative medicine team including  Shelbi Franco, CECILIO and I met with patient at patient's bedside. Also present was patient's sister Murali Fam. Patient is awake, alert, oriented x4. She states that her breathing has improved since admission, overall feels well. Introduced the role of palliative medicine. Patient states she has completed an AMD before naming her daughter Katherine Tan as medical power of . Patient is not , and her daughter Chai Thornton is legal NOK. We do not have a copy of this AMD on file, she will ask her daughter if she has a copy. Reconfirmed goals of care for DNR/DNI. She is unsure if she has ever completed a DDNR. She states her daughter is aware and supportive of her healthcare wishes. Patient and patient's sister state that they will review patient's paperwork with daughter today, and patient is willing to complete any necessary forms tomorrow to protect her wishes for DNR/DNI. We will follow up tomorrow once she has opportunity to review her current paperwork. 1. New onset A. fib: Echo pending, cardiology consulted by primary team.  She denies pain, shortness of breath has improved since admission. 2. Acute on chronic diastolic congestive heart failure: Echo results pending, cardiology has been consulted. Had a VQ scan which showed no presence of a pulmonary embolism. She is on Lasix, which has been reduced due to worsening renal function. Monitoring renal function per primary team.  3. Advanced age/debility: 44-year-old female, lives with her daughter Chai Thornton. Ambulatory short distances, needs assist with functional ADLs. 4. Initial consult note routed to primary continuity provider  5.  Communicated plan of care with: Palliative IDT       GOALS OF CARE / TREATMENT PREFERENCES:   [====Goals of Care====]  GOALS OF CARE: DNR/DNI  Patient/Health Care Proxy Stated Goals: Prolong life      TREATMENT PREFERENCES:   Code Status: DNR    Advance Care Planning:  Advance Care Planning 3/3/2022   Patient's Healthcare Decision Maker is: -   Confirm Advance Directive Yes, on file   Patient Would Like to Complete Advance Directive No   Does the patient have other document types Do Not Resuscitate       Medical Interventions: Limited additional interventions            The palliative care team has discussed with patient / health care proxy about goals of care / treatment preferences for patient.  [====Goals of Care====]         HISTORY:     History obtained from: patient, family, chart    CHIEF COMPLAINT: generalized weakness, shortness of breath with has improved since hospitalization     HPI/SUBJECTIVE:    The patient is:   [x] Verbal and participatory  [] Non-participatory due to:   Oriented x 4     Clinical Pain Assessment (nonverbal scale for severity on nonverbal patients):   Clinical Pain Assessment  Severity: 0            FUNCTIONAL ASSESSMENT:     Palliative Performance Scale (PPS):  PPS: 50       PSYCHOSOCIAL/SPIRITUAL SCREENING:     Advance Care Planning:  Advance Care Planning 3/3/2022   Patient's Healthcare Decision Maker is: -   Confirm Advance Directive Yes, on file   Patient Would Like to Complete Advance Directive No   Does the patient have other document types Do Not Resuscitate        Any spiritual / Protestant concerns:  [] Yes /  [x] No    Caregiver Burnout:  [] Yes /  [] No /  [x] No Caregiver Present      Anticipatory grief assessment:   [x] Normal  / [] Maladaptive            REVIEW OF SYSTEMS:     Positive and pertinent negative findings in ROS are noted above in HPI. The following systems were [x] reviewed / [] unable to be reviewed as noted in HPI  Other findings are noted below. Systems: constitutional, ears/nose/mouth/throat, respiratory, gastrointestinal, genitourinary, musculoskeletal, integumentary, neurologic, psychiatric, endocrine. Positive findings noted below.   Modified ESAS Completed by: provider   Fatigue: 5 Pain: 0   Anxiety: 0 Nausea: 0     Dyspnea: 0     Constipation: No              PHYSICAL EXAM:     From RN flowsheet:  Wt Readings from Last 3 Encounters:   03/04/22 112.5 kg (248 lb)   04/25/21 101.6 kg (224 lb)   10/01/17 125.6 kg (277 lb)     Blood pressure 114/84, pulse 82, temperature 97.3 °F (36.3 °C), resp. rate 17, height 5' 5\" (1.651 m), weight 112.5 kg (248 lb), SpO2 96 %, not currently breastfeeding. Pain Scale 1: Numeric (0 - 10)  Pain Intensity 1: 0     Pain Location 1: Hip  Pain Orientation 1: Right  Pain Description 1: Aching  Pain Intervention(s) 1: Medication (see MAR)      Constitutional:Awake, alert, NAD, appears stated age, obese  Eyes: pupils equal, anicteric  ENMT: no nasal discharge, moist mucous membranes  Cardiovascular: regular rhythm, distal pulses intact  Respiratory: breathing not labored, symmetric  Gastrointestinal: soft non-tender   Musculoskeletal: no deformity, no tenderness to palpation  Skin: warm, dry  Neurologic: following commands, moving all extremities, oriented x 4  Psychiatric: full affect, no hallucinations       HISTORY:     Principal Problem:    Acute respiratory failure with hypoxemia (HCC) (3/2/2022)    Active Problems:    Elevated troponin (4/23/2021)      HTN (hypertension) (4/23/2021)      Acute on chronic diastolic congestive heart failure (HCC) (4/23/2021)      Atrial fibrillation (HCC) (3/2/2022)      Stage 3 chronic kidney disease (HCC) (3/3/2022)      SERENA (obstructive sleep apnea) (3/3/2022)      Adult BMI 39.0-39.9 kg/sq m (3/3/2022)      Past Medical History:   Diagnosis Date    Hypertension     Sleep disorder       Past Surgical History:   Procedure Laterality Date    HX ORTHOPAEDIC      broken right ankle, left femur 30 yrs ago      Family History   Problem Relation Age of Onset    Diabetes Mother     Heart Disease Mother     Heart Disease Father       History reviewed, no pertinent family history.   Social History     Tobacco Use    Smoking status: Never Smoker    Smokeless tobacco: Never Used   Substance Use Topics    Alcohol use:  Yes     Alcohol/week: 1.0 standard drink     Types: 1 Glasses of wine per week     Comment: soc     Allergies   Allergen Reactions    Seafood Hives     crabs    Statins-Hmg-Coa Reductase Inhibitors Unknown (comments)    Sulfa (Sulfonamide Antibiotics) Hives      Current Facility-Administered Medications   Medication Dose Route Frequency    pantoprazole (PROTONIX) injection 40 mg  40 mg IntraVENous DAILY    furosemide (LASIX) injection 20 mg  20 mg IntraVENous Q12H    enoxaparin (LOVENOX) injection 180 mg  1.5 mg/kg SubCUTAneous Q24H    sodium chloride (NS) flush 5-10 mL  5-10 mL IntraVENous PRN    cefTRIAXone (ROCEPHIN) 2 g in 0.9% sodium chloride (MBP/ADV) 50 mL MBP  2 g IntraVENous Q24H    azithromycin (ZITHROMAX) 500 mg in 0.9% sodium chloride 250 mL (VIAL-MATE)  500 mg IntraVENous Q24H    sodium chloride (NS) flush 5-40 mL  5-40 mL IntraVENous Q8H    sodium chloride (NS) flush 5-40 mL  5-40 mL IntraVENous PRN    acetaminophen (TYLENOL) tablet 650 mg  650 mg Oral Q6H PRN    Or    acetaminophen (TYLENOL) suppository 650 mg  650 mg Rectal Q6H PRN    polyethylene glycol (MIRALAX) packet 17 g  17 g Oral DAILY PRN    ondansetron (ZOFRAN ODT) tablet 4 mg  4 mg Oral Q8H PRN    Or    ondansetron (ZOFRAN) injection 4 mg  4 mg IntraVENous Q6H PRN    [Held by provider] potassium chloride (K-DUR, KLOR-CON M20) SR tablet 20 mEq  20 mEq Oral DAILY    carvediloL (COREG) tablet 3.125 mg  3.125 mg Oral BID WITH MEALS    aspirin delayed-release tablet 81 mg  81 mg Oral DAILY    acetaminophen (TYLENOL) tablet 650 mg  650 mg Oral Q6H PRN          LAB AND IMAGING FINDINGS:     Lab Results   Component Value Date/Time    WBC 7.3 03/04/2022 03:24 AM    HGB 11.5 (L) 03/04/2022 03:24 AM    PLATELET 565 02/30/4605 03:24 AM     Lab Results   Component Value Date/Time    Sodium 135 (L) 03/04/2022 03:24 AM    Potassium 4.8 03/04/2022 03:24 AM    Chloride 101 03/04/2022 03:24 AM    CO2 30 03/04/2022 03:24 AM    BUN 57 (H) 03/04/2022 03:24 AM    Creatinine 1.81 (H) 03/04/2022 03:24 AM    Calcium 9.8 03/04/2022 03:24 AM    Magnesium 2.2 03/04/2022 03:24 AM      Lab Results   Component Value Date/Time    Alk. phosphatase 136 (H) 03/04/2022 03:24 AM    Protein, total 6.4 03/04/2022 03:24 AM    Albumin 2.9 (L) 03/04/2022 03:24 AM    Globulin 3.5 03/04/2022 03:24 AM     Lab Results   Component Value Date/Time    INR 1.2 03/02/2022 08:48 PM    Prothrombin time 14.1 03/02/2022 08:48 PM    aPTT 25.3 03/02/2022 08:48 PM      No results found for: IRON, FE, TIBC, IBCT, PSAT, FERR   No results found for: PH, PCO2, PO2  No components found for: [place]   Lab Results   Component Value Date/Time     04/24/2021 01:53 PM    CK - MB 1.6 04/24/2021 01:53 PM                Total time: 15 minutes  Counseling / coordination time, spent as noted above:   > 50% counseling / coordination?: yes, patient and family    Prolonged service was provided for  []30 min   []75 min in face to face time in the presence of the patient, spent as noted above. Time Start:   Time End:   Note: this can only be billed with 01549 (initial) or 45514 (follow up). If multiple start / stop times, list each separately.

## 2022-03-04 NOTE — PROGRESS NOTES
Pharmacy Dosing Services: IV - PO conversion    This patient meets  [address] [employer] approved criteria for conversion from IV to oral therapy for the following medication: Pantoprazole. Pt has regular diet ordered and is taking other oral medications. Order adjusted to:  Pantoprazole 40 mg PO daily before breakfast.     (prior order:  Pantoprazole 40 mg IV daily)    Pharmacy will continue to monitor the patient's status daily.    Signed ARAMIS Lorenzo Contact information:   [phone]

## 2022-03-04 NOTE — ROUTINE PROCESS
Bedside and Verbal shift change report given to [place] RN  (oncoming nurse) by Charles Nino RN  (offgoing nurse). Report given with SBAR, Kardex, Intake/Output and Recent Results.

## 2022-03-04 NOTE — PROGRESS NOTES
Problem: Mobility Impaired (Adult and Pediatric)  Goal: *Acute Goals and Plan of Care (Insert Text)  Description: In one week:  1. Pt will transfer supine <> sitting EOB min assist for increased ability to sit upright during meal times. 2. Pt will transfer sit <> stand min assist for improved lower extremity strengthening. 3. Pt will roll in bed in both directions for increased independence with pressure relief. 4. Pt will sit EOB unsupported for > 5 minutes without LOB for increased safety and improved sitting balance. Outcome: Progressing Towards Goal   physical Therapy TREATMENT    Patient: Roshan Cruz (10 y.o. female)  Date: 3/4/2022  Diagnosis: Acute exacerbation of CHF (congestive heart failure) ([facility]) [I50.9] Acute respiratory failure with hypoxemia [facility])       Precautions: Fall   Chart, physical therapy assessment, plan of care and goals were reviewed. ASSESSMENT:  Pt is found in bed, with sister at bedside. C/o moderate R LE pain and is presents with external rotation. Pt is not able to provide enough self assistance for transition to EOB. Totally assist to sitting. Dependent for shoe donning. Poor sitting balance, requiring support 70% of the time (static). Exercises were PROM for R side due to poor activation. Pt is not safe for standing attempt, due to poor strength. Pt is considering SNF, as she would like to be able to make transfers. Progression toward goals:  []      Improving appropriately and progressing toward goals  [x]      Improving slowly and progressing toward goals  []      Not making progress toward goals and plan of care will be adjusted     PLAN:  Patient continues to benefit from skilled intervention to address the above impairments. Continue treatment per established plan of care.   Discharge Recommendations:  Perry Abraham  Further Equipment Recommendations for Discharge:  hospital bed, mechanical lift, and rolling walker     SUBJECTIVE:   Patient stated I can usually do all this stuff by myself .     OBJECTIVE DATA SUMMARY:   Critical Behavior:  Neurologic State: Alert  Orientation Level: Oriented X4  Cognition: Appropriate decision making,Appropriate for age attention/concentration  Safety/Judgement: Fall prevention,Awareness of environment,Insight into deficits  Functional Mobility Training:  Bed Mobility:  Rolling: Total assistance  Supine to Sit: Total assistance  Sit to Supine: Total assistance;Assist x2  Scooting: Maximum assistance  Balance:  Sitting: Impaired; With support  Sitting - Static: Fair (occasional)  Sitting - Dynamic: Fair (occasional)  Standing: Impaired;Pull to stand  Standing - Static: Poor  Therapeutic Exercises:       EXERCISE   Sets   Reps   Active Active Assist   Passive Self ROM   Comments   Ankle Pumps 1 20  [] [] [x] []    Quad Sets/Glut Sets   [] [] [] []    Hamstring Sets   [] [] [] []    Short Arc Quads   [] [] [] []    Heel Slides   [] [] [] []    Straight Leg Raises   [] [] [] []    Hip Abd/Add 1 10 [] [] [x] []    Long Arc Quads 1 5 [] [] [x] []    Seated Marching 1 3 [] [] [x] []    Standing Marching   [] [] [] []       [] [] [] []        Pain:  Pain Scale 1: Numeric (0 - 10)  Pain Intensity 1: 4 (R LE)  Pain out: 4  Activity Tolerance:   Fair-  Please refer to the flowsheet for vital signs taken during this treatment.   After treatment:   [] Patient left in no apparent distress sitting up in chair  [x] Patient left in no apparent distress in bed  [x] Call bell left within reach  [x] Nursing notified  [x] Caregiver present  [] Bed alarm activated      Fide Nipper, PTA   Time Calculation: 35 mins

## 2022-03-04 NOTE — PROGRESS NOTES
[id number] Shift Summary: Pt rested well overnight with no complaints. No new clinical concerns noted.      Nightshift Chart Audit Completed

## 2022-03-04 NOTE — PROGRESS NOTES
Hospitalist Progress Note-critical care note     Patient: Carmen Mota MRN: [id number]  CSN: [id number]    YOB: 1940  Age: 80 y.o. Sex: female    DOA: 3/2/2022 LOS:  LOS: 2 days            Chief complaint: ckd3, afib, elevated trop.      Assessment/Plan         Hospital Problems  Date Reviewed: 3/2/2022          Codes Class Noted POA    Stage 3 chronic kidney disease ([facility] [address] ICD-10-CM: N18.30  ICD-9-CM: 585.3  3/3/2022 Unknown        SERENA (obstructive sleep apnea) ICD-10-CM: G47.33  ICD-9-CM: 327.23  3/3/2022 Unknown        Adult BMI 39.0-39.9 kg/sq m ICD-10-CM: Z68.39  ICD-9-CM: V85.39  3/3/2022 Unknown        * (Principal) Acute respiratory failure with hypoxemia (HCC) ICD-10-CM: J96.01  ICD-9-CM: 518.81  3/2/2022 Unknown        Atrial fibrillation (HCC) ICD-10-CM: I48.91  ICD-9-CM: 427.31  3/2/2022 Unknown        Elevated troponin ICD-10-CM: R77.8  ICD-9-CM: 790.6  4/23/2021 Yes        HTN (hypertension) ICD-10-CM: I10  ICD-9-CM: 401.9  4/23/2021 Yes        Acute on chronic diastolic congestive heart failure (HCC) ICD-10-CM: I50.33  ICD-9-CM: 428.33, 428.0  4/23/2021 Yes                 Congestive heart failure , acute on chronic diastolic   Echo done with normal ef,  Pulmonary hypertension found to be moderate-LORRAINE is declined per pt   Dr. Manuel Olszewski f/u  Trop trending down   V/q scan :Perfusion images show no segmental perfusion defects to suggest the presence of  pulmonary embolism, pvl negative for dvt   Continue monitoring renal function and K level   Low na and liquid restriction     Elevated trop  No chest pain    Trop trending down   No acs     New afib   On On lovenox therapeutic, coreg   tsh wnl , mg 1.9-will give 1 g to keep at 2     Elevated d dimer   V/q scan negative   pvl no dvt        Diarrhea   Colchicine hold for now   Continue monitoring , reported improving     serena   On cpap at home       ckd3   Stable now   Continue monitoring       htn continue current regimen     ?uti on rocephin for 3 days      subjective: I feel fine, not using oxygen at home, use cpap         Disposition :tbd,   Review of systems:    General: No fevers or chills. Cardiovascular: No chest pain or pressure. No palpitations. Pulmonary: +shortness of breath. Gastrointestinal: No nausea, vomiting. Vital signs/Intake and Output:  Visit Vitals  /84   Pulse 82   Temp 97.3 °F (36.3 °C)   Resp 17   Ht 5' 5\" (1.651 m)   Wt 112.5 kg (248 lb)   SpO2 96%   Breastfeeding No   BMI 41.27 kg/m²     Current Shift:  No intake/output data recorded. Last three shifts:  03/02 1901 - 03/04 0700  In: 582 [P. O.:582]  Out: 1600 [Urine:1600]    Physical Exam:  General: WD, WN. Alert, cooperative, no acute distress    HEENT: NC, Atraumatic. PERRLA, anicteric sclerae. Lungs: CTA Bilaterally. No Wheezing/Rhonchi/Rales. Heart:  Regular  rhythm,  No murmur, No Rubs, No Gallops  Abdomen: Soft, Non distended, Non tender. +Bowel sounds,   Extremities: No c/c, mild edema   Psych:   Not anxious or agitated. Neurologic:  No acute neurological deficit. Labs: Results:       Chemistry Recent Labs     03/04/22 0324 03/03/22 0221 03/02/22 2048   * 161* 141*   * 137 137   K 4.8 5.2 5.0    102 103   CO2 30 31 30   BUN 57* 50* 49*   CREA 1.81* 1.85* 1.71*   CA 9.8 9.8 10.3*   AGAP 4 4 4   BUCR 31* 27* 29*   * 163* 177*   TP 6.4 6.6 7.5   ALB 2.9* 3.3* 3.8   GLOB 3.5 3.3 3.7   AGRAT 0.8 1.0 1.0      CBC w/Diff Recent Labs     03/04/22 0324 03/03/22 0221 03/02/22 2048   WBC 7.3 5.7 6.7   RBC 3.75* 3.89* 4.33   HGB 11.5* 12.3 13.5   HCT 36.5 38.9 42.6    187 204   GRANS 77* 90* 81*   LYMPH 13* 6* 10*   EOS 0 0 0      Cardiac Enzymes No results for input(s): CPK, CKND1, FARA in the last 72 hours.     No lab exists for component: CKRMB, TROIP   Coagulation Recent Labs     03/02/22 2048   PTP 14.1   INR 1.2   APTT 25.3       Lipid Panel Lab Results   Component Value Date/Time    Cholesterol, total 183 04/25/2021 04:00 PM    HDL Cholesterol 101 (H) 04/25/2021 04:00 PM    LDL, calculated 69.8 04/25/2021 04:00 PM    VLDL, calculated 12.2 04/25/2021 04:00 PM    Triglyceride 61 04/25/2021 04:00 PM    CHOL/HDL Ratio 1.8 04/25/2021 04:00 PM      BNP No results for input(s): BNPP in the last 72 hours. Liver Enzymes Recent Labs     03/04/22  0324   TP 6.4   ALB 2.9*   *      Thyroid Studies Lab Results   Component Value Date/Time    TSH 0.58 03/03/2022 02:21 AM        Procedures/imaging: see electronic medical records for all procedures/Xrays and details which were not copied into this note but were reviewed prior to creation of Plan    NM LUNG SCAN PERF    Result Date: 3/3/2022  EXAM: NM LUNG SCAN PERF. CLINICAL INDICATION/HISTORY: d dimer increase dyspnea. -Additional: Clinical concern for pulmonary embolus. COMPARISON: Correlation is made with the radiographic study performed one day prior. TECHNIQUE: 7.2 mCi Tc-99m MAA was injected intravenously and the 8 standard views of the chest were obtained. This perfusion only examination is interpreted using the PISAPED criteria. _______________ FINDINGS: Perfusion images show no segmental perfusion defects to suggest the presence of pulmonary embolism. _______________     o scintigraphic findings to suggest the presence of acute pulmonary embolism. _______________     CT CHEST ABD PELV WO CONT    Result Date: 3/3/2022  EXAM: CT CHEST, ABDOMEN AND PELVIS CLINICAL INDICATION/HISTORY: Hypoxemia, retrocardiac density, diarrhea, weakness and shortness of breath COMPARISON: 3/2/2022 TECHNIQUE: Axial CT imaging of the chest, abdomen, and pelvis was performed without intravenous contrast. Multiplanar reformats were generated. One or more dose reduction techniques were used on this CT: automated exposure control, adjustment of the mAs and/or kVp according to patient size, and iterative reconstruction techniques.   The specific techniques used on this CT exam have been documented in the patient's electronic medical record. Digital Imaging and Communications in Medicine (DICOM) format image data are available to nonaffiliated external healthcare facilities or entities on a secure, media free, reciprocally searchable basis with patient authorization for at least a 12-month period after this study. ________________ FINDINGS: LIMITATIONS:   > Suboptimal evaluation given lack of intravenous contrast.   > Motion artifact is present which limits evaluation.   > Suboptimal exam due to patient body habitus and arms by the side. CHEST: LUNGS: Respiratory motion significantly limits evaluation. Interlobar septal thickening in the upper lobes. Mild bilateral dependent atelectasis. No large consolidation or suspicious pulmonary mass. PLEURA: Very small volume bilateral pleural effusions. AIRWAY: Normal. MEDIASTINUM: Four-chamber cardiomegaly with asymmetric biatrial enlargement, mitral and aortic annular calcifications. The main pulmonary artery is enlarged suggesting pulmonary arterial hypertension. Normal caliber aorta with scattered calcified atherosclerotic plaque. No pericardial effusion. LYMPH NODES: No enlarged lymph nodes. CHEST WALL: Diffuse anasarca. Heterogeneous thyroid. _______________ ABDOMEN/PELVIS: LIVER: No enhancing hepatic mass. The portal and hepatic veins are patent. BILIARY: Gallstones are present in the nondistended gallbladder lumen. No biliary dilation. SPLEEN: Normal. PANCREAS: Normal. ADRENALS: Left adrenal gland measures 9 HU (axial image 76), most consistent with lipid rich adenoma. Normal right adrenal gland. KIDNEYS: Asymmetrically small left kidney. No rate of a stone. No hydronephrosis. GI TRACT:  A small hiatal hernia is present. Normal caliber small and large bowel loops. No morphology of bowel obstruction. Normal appendix. BLADDER: Diffuse wall thickening in the largely decompressed bladder. PELVIC ORGANS: No acute abnormality.  VASCULATURE: No arterial aneurysm. 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 LYMPH NODES: No mesenteric or retroperitoneal lymphadenopathy. OTHER: No free intraperitoneal air. No ascites. Diffuse anasarca. OSSEOUS STRUCTURES: No acute osseous abnormality. Multilevel degenerative changes most pronounced in the lumbar spine. Degenerative changes in both shoulders (right greater than left). Please note the included portions of the upper extremities are not well evaluated on this study _______________     1. Findings of congestive heart failure with cardiomegaly, interstitial edema, very small bilateral pleural effusions, and diffuse anasarca. 2.  Bladder wall thickening may be due to underdistention though superimposed infection cannot be excluded. Recommend correlation for cystitis. 3.  Osseous degenerative changes and additional findings, as detailed in the body of the report. XR CHEST PORT    Result Date: 3/2/2022  EXAM:  AP Portable Chest X-ray 1 view INDICATION: Shortness of breath COMPARISON: April 23, 2021 _______________ FINDINGS: Cardiomegaly and mediastinal contours are within normal limits for portable radiograph. There is increased right retrocardiac/right paraspinal density. There are no pleural effusions. No acute osseous findings. ________________      Increased right retrocardiac density which may represent airspace disease or underlying pulmonary nodule. Recommend CT chest for further evaluation.       Daron Antoine MD

## 2022-03-04 NOTE — PROGRESS NOTES
D/C Plan: Inpt rehab vs SNF. Pt will require stretcher transport. Met w/ pt at bedside. Pt's sister is in the room and pt's dtr: Alexandra Hurley is on the phone. The pt resides with her dtr; Marlena. Pt is independent in ADLs at baseline and utilizes a cane and sometimes a RW. The pt has a cpap at home, but is on Room air at baseline. The pt's goals for d/c are rehab prior to d/c home. Pt's dtr: Alexandra Hurley is in agreement. Alexandra Hurley would like the pt to d/c to [facility]. Explained to the pt and her dtr that she needs to be able to complete 3 hours of therapy a day. Advised them that CM would place the referral. Also provided them w/ a list of skilled nursing facilities to review this weekend in the event [facility] denies her. CM to f/u. Care Management Interventions  PCP Verified by CM:  (Dr. Polina Leo (female) )  Mode of Transport at Discharge: BLS (.)  Transition of Care Consult (CM Consult): Acute Rehab (Pt's family is requesting [facility] at d/c. Referral placed.)  Discharge Durable Medical Equipment:  (TBD)  Physical Therapy Consult: Yes  Occupational Therapy Consult: Yes  Support Systems: Child(doc),Other Family Member(s)  Confirm Follow Up Transport: Family  The Plan for Transition of Care is Related to the Following Treatment Goals : acute rehab at [facility]. The Patient and/or Patient Representative was Provided with a Choice of Provider and Agrees with the Discharge Plan?: Yes (The pt and her dtr: Victory Akron)  [place] of Choice List was Provided with Basic Dialogue that Supports the Patient's Individualized Plan of Care/Goals, Treatment Preferences and Shares the Quality Data Associated with the Providers?: Yes (Pt's dtr has requested [facility]. Advised her the referral would be placed. However, it requires the pt being able to w/stand 3 hours of therapy.  Provided the pt's family w/ a list of SNFs in the area to review this weekend in case [facility] denies her. )  Discharge Location  Patient Expects to be Discharged to[de-identified] Skilled nursing facility

## 2022-03-05 ENCOUNTER — APPOINTMENT (OUTPATIENT)
Dept: GENERAL RADIOLOGY | Age: 82
DRG: 981 | End: 2022-03-05
Attending: INTERNAL MEDICINE
Payer: MEDICARE

## 2022-03-05 LAB
ALBUMIN SERPL-MCNC: 3.1 G/DL (ref 3.4–5)
ALBUMIN/GLOB SERPL: 0.8 {RATIO} (ref 0.8–1.7)
ALP SERPL-CCNC: 129 U/L (ref 45–117)
ALT SERPL-CCNC: 125 U/L (ref 13–56)
ANION GAP SERPL CALC-SCNC: 6 MMOL/L (ref 3–18)
ARTERIAL PATENCY WRIST A: POSITIVE
AST SERPL-CCNC: 125 U/L (ref 10–38)
BACTERIA SPEC CULT: ABNORMAL
BASE EXCESS BLD CALC-SCNC: 2.2 MMOL/L
BASOPHILS # BLD: 0 K/UL (ref 0–0.1)
BASOPHILS NFR BLD: 0 % (ref 0–2)
BDY SITE: ABNORMAL
BILIRUB SERPL-MCNC: 0.5 MG/DL (ref 0.2–1)
BUN SERPL-MCNC: 69 MG/DL (ref 7–18)
BUN/CREAT SERPL: 32 (ref 12–20)
CALCIUM SERPL-MCNC: 9.8 MG/DL (ref 8.5–10.1)
CC UR VC: ABNORMAL
CHLORIDE SERPL-SCNC: 101 MMOL/L (ref 100–111)
CO2 SERPL-SCNC: 25 MMOL/L (ref 21–32)
CREAT SERPL-MCNC: 2.13 MG/DL (ref 0.6–1.3)
DIFFERENTIAL METHOD BLD: ABNORMAL
EOSINOPHIL # BLD: 0 K/UL (ref 0–0.4)
EOSINOPHIL NFR BLD: 1 % (ref 0–5)
ERYTHROCYTE [DISTWIDTH] IN BLOOD BY AUTOMATED COUNT: 14.9 % (ref 11.6–14.5)
GAS FLOW.O2 O2 DELIVERY SYS: ABNORMAL L/MIN
GLOBULIN SER CALC-MCNC: 3.9 G/DL (ref 2–4)
GLUCOSE BLD STRIP.AUTO-MCNC: 123 MG/DL (ref 70–110)
GLUCOSE SERPL-MCNC: 104 MG/DL (ref 74–99)
HCO3 BLD-SCNC: 33.5 MMOL/L (ref 22–26)
HCT VFR BLD AUTO: 39.8 % (ref 35–45)
HGB BLD-MCNC: 12.5 G/DL (ref 12–16)
IMM GRANULOCYTES # BLD AUTO: 0.1 K/UL (ref 0–0.04)
IMM GRANULOCYTES NFR BLD AUTO: 1 % (ref 0–0.5)
LYMPHOCYTES # BLD: 1.3 K/UL (ref 0.9–3.6)
LYMPHOCYTES NFR BLD: 18 % (ref 21–52)
MAGNESIUM SERPL-MCNC: 2.1 MG/DL (ref 1.6–2.6)
MCH RBC QN AUTO: 30.7 PG (ref 24–34)
MCHC RBC AUTO-ENTMCNC: 31.4 G/DL (ref 31–37)
MCV RBC AUTO: 97.8 FL (ref 78–100)
MONOCYTES # BLD: 1 K/UL (ref 0.05–1.2)
MONOCYTES NFR BLD: 14 % (ref 3–10)
NEUTS SEG # BLD: 4.9 K/UL (ref 1.8–8)
NEUTS SEG NFR BLD: 66 % (ref 40–73)
NRBC # BLD: 0 K/UL (ref 0–0.01)
NRBC BLD-RTO: 0 PER 100 WBC
O2/TOTAL GAS SETTING VFR VENT: 28 %
PCO2 BLD: 85.7 MMHG (ref 35–45)
PH BLD: 7.2 [PH] (ref 7.35–7.45)
PLATELET # BLD AUTO: 147 K/UL (ref 135–420)
PMV BLD AUTO: 10.8 FL (ref 9.2–11.8)
PO2 BLD: 87 MMHG (ref 80–100)
POTASSIUM SERPL-SCNC: 5 MMOL/L (ref 3.5–5.5)
PROT SERPL-MCNC: 7 G/DL (ref 6.4–8.2)
RBC # BLD AUTO: 4.07 M/UL (ref 4.2–5.3)
SAO2 % BLD: 93.3 % (ref 92–97)
SERVICE CMNT-IMP: ABNORMAL
SERVICE CMNT-IMP: ABNORMAL
SODIUM SERPL-SCNC: 132 MMOL/L (ref 136–145)
SPECIMEN TYPE: ABNORMAL
WBC # BLD AUTO: 7.4 K/UL (ref 4.6–13.2)

## 2022-03-05 PROCEDURE — 74011250636 HC RX REV CODE- 250/636: Performed by: HOSPITALIST

## 2022-03-05 PROCEDURE — 85025 COMPLETE CBC W/AUTO DIFF WBC: CPT

## 2022-03-05 PROCEDURE — 83735 ASSAY OF MAGNESIUM: CPT

## 2022-03-05 PROCEDURE — 36415 COLL VENOUS BLD VENIPUNCTURE: CPT

## 2022-03-05 PROCEDURE — 82962 GLUCOSE BLOOD TEST: CPT

## 2022-03-05 PROCEDURE — 36600 WITHDRAWAL OF ARTERIAL BLOOD: CPT

## 2022-03-05 PROCEDURE — 94660 CPAP INITIATION&MGMT: CPT

## 2022-03-05 PROCEDURE — 71045 X-RAY EXAM CHEST 1 VIEW: CPT

## 2022-03-05 PROCEDURE — 80053 COMPREHEN METABOLIC PANEL: CPT

## 2022-03-05 PROCEDURE — 82803 BLOOD GASES ANY COMBINATION: CPT

## 2022-03-05 PROCEDURE — 77010033678 HC OXYGEN DAILY

## 2022-03-05 PROCEDURE — 74011250637 HC RX REV CODE- 250/637: Performed by: INTERNAL MEDICINE

## 2022-03-05 PROCEDURE — 74011000250 HC RX REV CODE- 250: Performed by: INTERNAL MEDICINE

## 2022-03-05 PROCEDURE — 65660000000 HC RM CCU STEPDOWN

## 2022-03-05 RX ADMIN — FUROSEMIDE 40 MG: 10 INJECTION, SOLUTION INTRAMUSCULAR; INTRAVENOUS at 09:15

## 2022-03-05 RX ADMIN — APIXABAN 2.5 MG: 2.5 TABLET, FILM COATED ORAL at 21:52

## 2022-03-05 RX ADMIN — PANTOPRAZOLE SODIUM 40 MG: 40 TABLET, DELAYED RELEASE ORAL at 09:15

## 2022-03-05 RX ADMIN — SODIUM CHLORIDE, PRESERVATIVE FREE 10 ML: 5 INJECTION INTRAVENOUS at 21:56

## 2022-03-05 RX ADMIN — FUROSEMIDE 40 MG: 10 INJECTION, SOLUTION INTRAMUSCULAR; INTRAVENOUS at 21:52

## 2022-03-05 RX ADMIN — APIXABAN 2.5 MG: 2.5 TABLET, FILM COATED ORAL at 09:15

## 2022-03-05 RX ADMIN — CARVEDILOL 3.12 MG: 3.12 TABLET, FILM COATED ORAL at 09:15

## 2022-03-05 RX ADMIN — SODIUM CHLORIDE, PRESERVATIVE FREE 10 ML: 5 INJECTION INTRAVENOUS at 17:47

## 2022-03-05 RX ADMIN — POLYETHYLENE GLYCOL 3350 17 G: 17 POWDER, FOR SOLUTION ORAL at 09:15

## 2022-03-05 RX ADMIN — ASPIRIN 81 MG: 81 TABLET, COATED ORAL at 09:15

## 2022-03-05 RX ADMIN — ACETAMINOPHEN 650 MG: 325 TABLET ORAL at 02:30

## 2022-03-05 NOTE — PROGRESS NOTES
Hospitalist Progress Note    Patient: Jeane Quintanilla MRN: [id number]  [facility]: [id number]    YOB: 1940  Age: 80 y.o. Sex: female    DOA: 3/2/2022 LOS:  LOS: 3 days            Patient Active Problem List   Diagnosis Code    Fatigue R53.83    Elevated troponin R77.8    Obesity (BMI 30-39. 9) E66.9    HTN (hypertension) I10    SERENA treated with BiPAP G47.33    Acute on chronic diastolic congestive heart failure (HCC) I50.33    Acute respiratory failure with hypoxemia (HCC) J96.01    Atrial fibrillation (HCC) I48.91    Stage 3 chronic kidney disease (HCC) N18.30    SERENA (obstructive sleep apnea) G47.33    Adult BMI 39.0-39.9 kg/sq m Z68.39        IMPRESSION and Plan:    Jeane Quintanilla is a 80 y.o. female with   Patient Active Problem List    Diagnosis Date Noted    Stage 3 chronic kidney disease ([facility] [address] 03/03/2022    SERENA (obstructive sleep apnea) 03/03/2022    Adult BMI 39.0-39.9 kg/sq m 03/03/2022    Acute respiratory failure with hypoxemia (HCC) 03/02/2022    Atrial fibrillation (HCC) 03/02/2022    Fatigue 04/23/2021    Elevated troponin 04/23/2021    Obesity (BMI 30-39.9) 04/23/2021    HTN (hypertension) 04/23/2021    SERENA treated with BiPAP 04/23/2021    Acute on chronic diastolic congestive heart failure ([facility] [address] 04/23/2021     Principal Problem:    Acute respiratory failure with hypoxemia (HCC) (3/2/2022)    Active Problems:    Elevated troponin (4/23/2021)      HTN (hypertension) (4/23/2021)      Acute on chronic diastolic congestive heart failure (HCC) (4/23/2021)      Atrial fibrillation (HCC) (3/2/2022)      Stage 3 chronic kidney disease (HCC) (3/3/2022)      SERENA (obstructive sleep apnea) (3/3/2022)      Adult BMI 39.0-39.9 kg/sq m (3/3/2022)          Congestive heart failure , acute on chronic diastolic     -- cont IV lasxi.        Elevated trop  No chest pain    Trop trending down   No acs      New afib  -- on eliquis         Diarrhea  -- improved    serena   On cpap at home       TK  With ckd3 -- cr is somehwhat worse            htn bp/hr stable    uti - dc ROcephin (completed 3 days course)    On Citroma --- no pneumonia on cxr. Will stop    Patient's condition is fair    D/w sister        Recommend to continue hospitalization. Discussed with patient. Chief Complaints:   Chief Complaint   Patient presents with    Shortness of Breath     SUBJECTIVE:  Pt is seen and examined. No CP or SOB  No Fever, chills, Nausea, vomitting. Review of systems:    Review of Systems   Constitutional: Positive for malaise/fatigue. HENT: Negative. Eyes: Negative. Respiratory: Negative for shortness of breath. Cardiovascular: Negative for chest pain, palpitations, orthopnea and leg swelling. Gastrointestinal: Negative. Negative for abdominal pain, diarrhea and heartburn. Genitourinary: Negative for dysuria and hematuria. Skin: Negative. Neurological: Positive for weakness. Psychiatric/Behavioral: Negative for depression, substance abuse and suicidal ideas. The patient is not nervous/anxious. PE:  Patient Vitals for the past 24 hrs:   BP Temp Pulse Resp SpO2   03/05/22 0305 130/78 97.6 °F (36.4 °C) 82 16 90 %   03/05/22 0000 106/64 97.8 °F (36.6 °C) 88 18 98 %   03/04/22 2104 112/76 97.6 °F (36.4 °C) 70 18 96 %   03/04/22 1628 (!) 118/59 98 °F (36.7 °C) 97 17 100 %   03/04/22 1122 (!) 104/90 97.3 °F (36.3 °C) 78 18 100 %       Intake/Output Summary (Last 24 hours) at 3/5/2022 0801  Last data filed at 3/4/2022 1122  Gross per 24 hour   Intake 360 ml   Output 200 ml   Net 160 ml     Patient Vitals for the past 120 hrs:   Weight   03/02/22 2008 115.2 kg (254 lb)   03/03/22 0400 107.9 kg (237 lb 12.8 oz)   03/04/22 0017 112.5 kg (248 lb)         Physical Exam  Vitals and nursing note reviewed. Constitutional:       General: She is in acute distress. Appearance: She is ill-appearing. Neck:      Vascular: No JVD.    Cardiovascular:      Rate and Rhythm: Normal rate and regular rhythm. Heart sounds: Normal heart sounds. Pulmonary:      Effort: No respiratory distress. Breath sounds: Normal breath sounds. Abdominal:      General: Bowel sounds are normal. There is no distension. Palpations: Abdomen is soft. Tenderness: There is no abdominal tenderness. There is no rebound. Musculoskeletal:         General: Normal range of motion. Cervical back: Normal range of motion and neck supple. Skin:     General: Skin is warm and dry. Neurological:      Mental Status: She is alert and oriented to person, place, and time. Psychiatric:         Mood and Affect: Affect normal.             Intake and Output:  Current Shift:  No intake/output data recorded. Last three shifts:  03/03 1901 - 03/05 0700  In: 5 [P.O.:720]  Out: 800 [Urine:800]    Lab/Data Reviewed:  No results found for this or any previous visit (from the past 8 hour(s)).   Medications:  Current Facility-Administered Medications   Medication Dose Route Frequency    furosemide (LASIX) injection 40 mg  40 mg IntraVENous Q12H    pantoprazole (PROTONIX) tablet 40 mg  40 mg Oral ACB    apixaban (ELIQUIS) tablet 2.5 mg  2.5 mg Oral BID    cefTRIAXone (ROCEPHIN) 2 g in 0.9% sodium chloride (MBP/ADV) 50 mL MBP  2 g IntraVENous Q24H    azithromycin (ZITHROMAX) 500 mg in 0.9% sodium chloride 250 mL (VIAL-MATE)  500 mg IntraVENous Q24H    sodium chloride (NS) flush 5-40 mL  5-40 mL IntraVENous Q8H    sodium chloride (NS) flush 5-40 mL  5-40 mL IntraVENous PRN    acetaminophen (TYLENOL) tablet 650 mg  650 mg Oral Q6H PRN    Or    acetaminophen (TYLENOL) suppository 650 mg  650 mg Rectal Q6H PRN    polyethylene glycol (MIRALAX) packet 17 g  17 g Oral DAILY PRN    ondansetron (ZOFRAN ODT) tablet 4 mg  4 mg Oral Q8H PRN    Or    ondansetron (ZOFRAN) injection 4 mg  4 mg IntraVENous Q6H PRN    [Held by provider] potassium chloride (K-DUR, KLOR-CON M20) SR tablet 20 mEq  20 mEq Oral DAILY    carvediloL (COREG) tablet 3.125 mg  3.125 mg Oral BID WITH MEALS    aspirin delayed-release tablet 81 mg  81 mg Oral DAILY    acetaminophen (TYLENOL) tablet 650 mg  650 mg Oral Q6H PRN       No results found for this or any previous visit (from the past 24 hour(s)).     Procedures/imaging: see electronic medical records for all procedures/Xrays and details which were not copied into this note but were reviewed prior to creation of Nelly Ramsey MD   3/5/2022, 8:01 AM

## 2022-03-05 NOTE — ROUTINE PROCESS
Bedside and Verbal shift change report given to [place] RN  (oncoming nurse) by Fiorella Dailey RN  (offgoing nurse). Report given with SBAR, Kardex, Intake/Output and Recent Results.

## 2022-03-05 NOTE — PROGRESS NOTES
Cardiology Progress Note        Patient: Tobi Wynne        Sex: female          DOA: 3/2/2022  YOB: 1940      Age:  80 y.o.        LOS:  LOS: 3 days   Assessment/Plan     Principal Problem:    Acute respiratory failure with hypoxemia ([address] (3/2/2022)    Active Problems:    Elevated troponin (4/23/2021)      HTN (hypertension) (4/23/2021)      Acute on chronic diastolic congestive heart failure ([address] (4/23/2021)      Atrial fibrillation ([address] (3/2/2022)      Stage 3 chronic kidney disease ([address] (3/3/2022)      SERENA (obstructive sleep apnea) (3/3/2022)      Adult BMI 39.0-39.9 kg/sq m (3/3/2022)        Plan:    A. fib rate controlled  Patient will need p.o. Lasix on discharge  Continue Eliquis 2.5 mg twice daily  Discussed with patient and family      Continue with Lasix 40 mg twice daily  I have long lengthy discussion with the patient and daughter about anticoagulation both of them agreed with low-dose Eliquis 2.5 mg twice daily  DC Lovenox   start Eliquis 2.5 mg twice daily from a.m. Discussed with patient and daughter findings of echocardiogram including mitral valve disease, prefer not to do transesophageal echocardiogram  Continue with current medical treatment                        Subjective:    cc:      Shortness of breath  A. fib        REVIEW OF SYSTEMS:     General: No fevers or chills. Cardiovascular: No chest pain or pressure. No palpitations. mild ankle swelling  Pulmonary: ++SOB, orthopnea, PND  Gastrointestinal: No nausea, vomiting or diarrhea      Objective:      Visit Vitals  /88   Pulse 66   Temp 97.8 °F (36.6 °C)   Resp 18   Ht 5' 5\" (1.651 m)   Wt 112.5 kg (248 lb)   SpO2 94%   Breastfeeding No   BMI 41.27 kg/m²     Body mass index is 41.27 kg/m². Physical Exam:  General Appearance: Comfortable, not using accessory muscles of respiration. NECK: No JVD, no thyroidomeglay. LUNGS: Clear bilaterally.    HEART: S1 irregular +S2 audible,    ABD: Non-tender, BS Audible    EXT: No edema, and no cysnosis. VASCULAR EXAM: Pulses are intact. PSYCHIATRIC EXAM: Mood is appropriate. Medication:  Current Facility-Administered Medications   Medication Dose Route Frequency    furosemide (LASIX) injection 40 mg  40 mg IntraVENous Q12H    pantoprazole (PROTONIX) tablet 40 mg  40 mg Oral ACB    apixaban (ELIQUIS) tablet 2.5 mg  2.5 mg Oral BID    cefTRIAXone (ROCEPHIN) 2 g in 0.9% sodium chloride (MBP/ADV) 50 mL MBP  2 g IntraVENous Q24H    azithromycin (ZITHROMAX) 500 mg in 0.9% sodium chloride 250 mL (VIAL-MATE)  500 mg IntraVENous Q24H    sodium chloride (NS) flush 5-40 mL  5-40 mL IntraVENous Q8H    sodium chloride (NS) flush 5-40 mL  5-40 mL IntraVENous PRN    acetaminophen (TYLENOL) tablet 650 mg  650 mg Oral Q6H PRN    Or    acetaminophen (TYLENOL) suppository 650 mg  650 mg Rectal Q6H PRN    polyethylene glycol (MIRALAX) packet 17 g  17 g Oral DAILY PRN    ondansetron (ZOFRAN ODT) tablet 4 mg  4 mg Oral Q8H PRN    Or    ondansetron (ZOFRAN) injection 4 mg  4 mg IntraVENous Q6H PRN    [Held by provider] potassium chloride (K-DUR, KLOR-CON M20) SR tablet 20 mEq  20 mEq Oral DAILY    carvediloL (COREG) tablet 3.125 mg  3.125 mg Oral BID WITH MEALS    aspirin delayed-release tablet 81 mg  81 mg Oral DAILY    acetaminophen (TYLENOL) tablet 650 mg  650 mg Oral Q6H PRN               Lab/Data Reviewed:  Procedures/imaging: see electronic medical records for all procedures/Xrays   and details which were not copied into this note but were reviewed prior to creation of Plan       All lab results for the last 24 hours reviewed.      Recent Labs     03/05/22  0930 03/04/22 0324 03/03/22 0221   WBC 7.4 7.3 5.7   HGB 12.5 11.5* 12.3   HCT 39.8 36.5 38.9    169 187     Recent Labs     03/05/22  0721 03/04/22 0324 03/03/22 0221   * 135* 137   K 5.0 4.8 5.2    101 102   CO2 25 30 31   * 109* 161*   BUN 69* 57* 50* CREA 2.13* 1.81* 1.85*   CA 9.8 9.8 9.8       RADIOLOGY:  CT Results  (Last 48 hours)    None        CXR Results  (Last 48 hours)    None            Cardiology Procedures:   Results for orders placed or performed during the hospital encounter of 03/02/22   EKG, 12 LEAD, INITIAL   Result Value Ref Range    Ventricular Rate 102 BPM    QRS Duration 72 ms    Q-T Interval 336 ms    QTC Calculation (Bezet) 437 ms    Calculated R Axis -81 degrees    Calculated T Axis -20 degrees    Diagnosis       Atrial fibrillation with rapid ventricular response with premature   ventricular or aberrantly conducted complexes  Left axis deviation  Low voltage QRS  Abnormal ECG  When compared with ECG of 23-APR-2021 17:11,  Atrial fibrillation has replaced Sinus rhythm  Confirmed by James Doll MD. ([id number]) on 3/2/2022 10:26:52 PM        Echo Results  (Last 48 hours)    None       Cardiolite (Tc-99m Sestamibi) stress test    Signed By: Brionna Mo MD     March 5, 2022

## 2022-03-05 NOTE — PROGRESS NOTES
Cardiology Progress Note        Patient: Sam Pandey        Sex: female          DOA: 3/2/2022  YOB: 1940      Age:  80 y.o.        LOS:  LOS: 2 days   Assessment/Plan     Principal Problem:    Acute respiratory failure with hypoxemia ([address] (3/2/2022)    Active Problems:    Elevated troponin (4/23/2021)      HTN (hypertension) (4/23/2021)      Acute on chronic diastolic congestive heart failure ([address] (4/23/2021)      Atrial fibrillation ([address] (3/2/2022)      Stage 3 chronic kidney disease ([address] (3/3/2022)      SERENA (obstructive sleep apnea) (3/3/2022)      Adult BMI 39.0-39.9 kg/sq m (3/3/2022)        Plan:    Continue with Lasix 40 mg twice daily  I have long lengthy discussion with the patient and daughter about anticoagulation both of them agreed with low-dose Eliquis 2.5 mg twice daily  DC Lovenox   start Eliquis 2.5 mg twice daily from a.m. Discussed with patient and daughter findings of echocardiogram including mitral valve disease, prefer not to do transesophageal echocardiogram  Continue with current medical treatment                        Subjective:    cc:      Shortness of breath  A. fib        REVIEW OF SYSTEMS:     General: No fevers or chills. Cardiovascular: No chest pain or pressure. No palpitations. mild ankle swelling  Pulmonary: ++SOB, orthopnea, PND  Gastrointestinal: No nausea, vomiting or diarrhea      Objective:      Visit Vitals  BP (!) 118/59   Pulse 97   Temp 98 °F (36.7 °C)   Resp 17   Ht 5' 5\" (1.651 m)   Wt 112.5 kg (248 lb)   SpO2 100%   Breastfeeding No   BMI 41.27 kg/m²     Body mass index is 41.27 kg/m². Physical Exam:  General Appearance: Comfortable, not using accessory muscles of respiration. NECK: No JVD, no thyroidomeglay. LUNGS: Clear bilaterally. HEART: S1 irregular +S2 audible,    ABD: Non-tender, BS Audible    EXT: No edema, and no cysnosis. VASCULAR EXAM: Pulses are intact.     PSYCHIATRIC EXAM: Mood is appropriate. Medication:  Current Facility-Administered Medications   Medication Dose Route Frequency    furosemide (LASIX) injection 40 mg  40 mg IntraVENous Q12H    [START ON 3/5/2022] pantoprazole (PROTONIX) tablet 40 mg  40 mg Oral ACB    enoxaparin (LOVENOX) injection 180 mg  1.5 mg/kg SubCUTAneous Q24H    cefTRIAXone (ROCEPHIN) 2 g in 0.9% sodium chloride (MBP/ADV) 50 mL MBP  2 g IntraVENous Q24H    azithromycin (ZITHROMAX) 500 mg in 0.9% sodium chloride 250 mL (VIAL-MATE)  500 mg IntraVENous Q24H    sodium chloride (NS) flush 5-40 mL  5-40 mL IntraVENous Q8H    sodium chloride (NS) flush 5-40 mL  5-40 mL IntraVENous PRN    acetaminophen (TYLENOL) tablet 650 mg  650 mg Oral Q6H PRN    Or    acetaminophen (TYLENOL) suppository 650 mg  650 mg Rectal Q6H PRN    polyethylene glycol (MIRALAX) packet 17 g  17 g Oral DAILY PRN    ondansetron (ZOFRAN ODT) tablet 4 mg  4 mg Oral Q8H PRN    Or    ondansetron (ZOFRAN) injection 4 mg  4 mg IntraVENous Q6H PRN    [Held by provider] potassium chloride (K-DUR, KLOR-CON M20) SR tablet 20 mEq  20 mEq Oral DAILY    carvediloL (COREG) tablet 3.125 mg  3.125 mg Oral BID WITH MEALS    aspirin delayed-release tablet 81 mg  81 mg Oral DAILY    acetaminophen (TYLENOL) tablet 650 mg  650 mg Oral Q6H PRN               Lab/Data Reviewed:  Procedures/imaging: see electronic medical records for all procedures/Xrays   and details which were not copied into this note but were reviewed prior to creation of Plan       All lab results for the last 24 hours reviewed.      Recent Labs     03/04/22 0324 03/03/22 0221 03/02/22 2048   WBC 7.3 5.7 6.7   HGB 11.5* 12.3 13.5   HCT 36.5 38.9 42.6    187 204     Recent Labs     03/04/22 0324 03/03/22 0221 03/02/22 2048   * 137 137   K 4.8 5.2 5.0    102 103   CO2 30 31 30   * 161* 141*   BUN 57* 50* 49*   CREA 1.81* 1.85* 1.71*   CA 9.8 9.8 10.3*       RADIOLOGY:  CT Results  (Last 48 hours) 03/03/22 0558  CT CHEST ABD PELV WO CONT Final result    Impression:      1. Findings of congestive heart failure with cardiomegaly, interstitial edema,   very small bilateral pleural effusions, and diffuse anasarca. 2.  Bladder wall thickening may be due to underdistention though superimposed   infection cannot be excluded. Recommend correlation for cystitis. 3.  Osseous degenerative changes and additional findings, as detailed in the   body of the report. Narrative:  EXAM: CT CHEST, ABDOMEN AND PELVIS        CLINICAL INDICATION/HISTORY: Hypoxemia, retrocardiac density, diarrhea, weakness   and shortness of breath       COMPARISON: 3/2/2022       TECHNIQUE: Axial CT imaging of the chest, abdomen, and pelvis was performed   without intravenous contrast. Multiplanar reformats were generated. One or more   dose reduction techniques were used on this CT: automated exposure control,   adjustment of the mAs and/or kVp according to patient size, and iterative   reconstruction techniques. The specific techniques used on this CT exam have   been documented in the patient's electronic medical record. Digital Imaging and   Communications in Medicine (DICOM) format image data are available to   nonaffiliated external healthcare facilities or entities on a secure, media   free, reciprocally searchable basis with patient authorization for at least a   12-month period after this study. ________________       FINDINGS:       LIMITATIONS:      > Suboptimal evaluation given lack of intravenous contrast.      > Motion artifact is present which limits evaluation.     > Suboptimal exam due to patient body habitus and arms by the side. CHEST:       LUNGS: Respiratory motion significantly limits evaluation. Interlobar septal   thickening in the upper lobes. Mild bilateral dependent atelectasis. No large   consolidation or suspicious pulmonary mass. PLEURA: Very small volume bilateral pleural effusions. AIRWAY: Normal.       MEDIASTINUM: Four-chamber cardiomegaly with asymmetric biatrial enlargement,   mitral and aortic annular calcifications. The main pulmonary artery is enlarged   suggesting pulmonary arterial hypertension. Normal caliber aorta with scattered   calcified atherosclerotic plaque. No pericardial effusion. LYMPH NODES: No enlarged lymph nodes. CHEST WALL: Diffuse anasarca. Heterogeneous thyroid.       _______________       ABDOMEN/PELVIS:       LIVER: No enhancing hepatic mass. The portal and hepatic veins are patent. BILIARY: Gallstones are present in the nondistended gallbladder lumen. No   biliary dilation. SPLEEN: Normal.       PANCREAS: Normal.       ADRENALS: Left adrenal gland measures 9 HU (axial image 76), most consistent   with lipid rich adenoma. Normal right adrenal gland. KIDNEYS: Asymmetrically small left kidney. No rate of a stone. No   hydronephrosis. GI TRACT:  A small hiatal hernia is present. Normal caliber small and large   bowel loops. No morphology of bowel obstruction. Normal appendix. BLADDER: Diffuse wall thickening in the largely decompressed bladder. PELVIC ORGANS: No acute abnormality. VASCULATURE: No arterial aneurysm. Fusiform dilation of the infrarenal abdominal   aorta measures up to 2.6 cm. LYMPH NODES: No mesenteric or retroperitoneal lymphadenopathy. OTHER: No free intraperitoneal air. No ascites. Diffuse anasarca. OSSEOUS STRUCTURES: No acute osseous abnormality. Multilevel degenerative   changes most pronounced in the lumbar spine. Degenerative changes in both   shoulders (right greater than left).  Please note the included portions of the   upper extremities are not well evaluated on this study       _______________               CXR Results  (Last 48 hours)               03/02/22 6682  XR CHEST PORT Final result    Impression:  Increased right retrocardiac density which may represent airspace   disease or underlying pulmonary nodule. Recommend CT chest for further   evaluation. Narrative:  EXAM:  AP Portable Chest X-ray 1 view        INDICATION: Shortness of breath       COMPARISON: April 23, 2021       _______________       FINDINGS: Cardiomegaly and mediastinal contours are within normal limits for   portable radiograph. There is increased right retrocardiac/right paraspinal   density. There are no pleural effusions. No acute osseous findings.        ________________                       Cardiology Procedures:   Results for orders placed or performed during the hospital encounter of 03/02/22   EKG, 12 LEAD, INITIAL   Result Value Ref Range    Ventricular Rate 102 BPM    QRS Duration 72 ms    Q-T Interval 336 ms    QTC Calculation (Bezet) 437 ms    Calculated R Axis -81 degrees    Calculated T Axis -20 degrees    Diagnosis       Atrial fibrillation with rapid ventricular response with premature   ventricular or aberrantly conducted complexes  Left axis deviation  Low voltage QRS  Abnormal ECG  When compared with ECG of 23-APR-2021 17:11,  Atrial fibrillation has replaced Sinus rhythm  Confirmed by Shanta Blake MD. ([id number]) on 3/2/2022 10:26:52 PM        Echo Results  (Last 48 hours)    None       Cardiolite (Tc-99m Sestamibi) stress test    Signed By: Pan Calhoun MD     March 4, 2022

## 2022-03-05 NOTE — PROGRESS NOTES
[id number] Shift Summary: The pt rested fair overnight with no complaints. No new clinical concerns noted.      Nightshift Chart Audit Completed

## 2022-03-05 NOTE — PROGRESS NOTES
Alert and oriented x4, assessments and VS completed, medications administered, pt has no reports of pain. within the 1700 hour, pt noted to be more lethargic and slow to follow commands and answer questions . Provider called, chest xray ordered and ABG. RRT called at 1810 due to abnormal abg's. Per provider order to place patient on CPAP.

## 2022-03-05 NOTE — PROGRESS NOTES
Pt just starting to eat lunch will follow up as schedule permits. 1350: Pt can hardly keep eyes open to talk, will follow up as schedule permits.

## 2022-03-06 LAB
ALBUMIN SERPL-MCNC: 3 G/DL (ref 3.4–5)
ALBUMIN/GLOB SERPL: 0.8 {RATIO} (ref 0.8–1.7)
ALP SERPL-CCNC: 127 U/L (ref 45–117)
ALT SERPL-CCNC: 106 U/L (ref 13–56)
ANION GAP SERPL CALC-SCNC: 7 MMOL/L (ref 3–18)
AST SERPL-CCNC: 36 U/L (ref 10–38)
BASOPHILS # BLD: 0 K/UL (ref 0–0.1)
BASOPHILS NFR BLD: 0 % (ref 0–2)
BILIRUB SERPL-MCNC: 0.5 MG/DL (ref 0.2–1)
BUN SERPL-MCNC: 74 MG/DL (ref 7–18)
BUN/CREAT SERPL: 36 (ref 12–20)
CALCIUM SERPL-MCNC: 10 MG/DL (ref 8.5–10.1)
CHLORIDE SERPL-SCNC: 99 MMOL/L (ref 100–111)
CO2 SERPL-SCNC: 29 MMOL/L (ref 21–32)
CREAT SERPL-MCNC: 2.04 MG/DL (ref 0.6–1.3)
DIFFERENTIAL METHOD BLD: ABNORMAL
EOSINOPHIL # BLD: 0 K/UL (ref 0–0.4)
EOSINOPHIL NFR BLD: 1 % (ref 0–5)
ERYTHROCYTE [DISTWIDTH] IN BLOOD BY AUTOMATED COUNT: 15.1 % (ref 11.6–14.5)
GLOBULIN SER CALC-MCNC: 3.8 G/DL (ref 2–4)
GLUCOSE SERPL-MCNC: 117 MG/DL (ref 74–99)
HCT VFR BLD AUTO: 37.5 % (ref 35–45)
HGB BLD-MCNC: 11.8 G/DL (ref 12–16)
IMM GRANULOCYTES # BLD AUTO: 0 K/UL (ref 0–0.04)
IMM GRANULOCYTES NFR BLD AUTO: 1 % (ref 0–0.5)
LYMPHOCYTES # BLD: 1.1 K/UL (ref 0.9–3.6)
LYMPHOCYTES NFR BLD: 18 % (ref 21–52)
MAGNESIUM SERPL-MCNC: 2.1 MG/DL (ref 1.6–2.6)
MCH RBC QN AUTO: 31.4 PG (ref 24–34)
MCHC RBC AUTO-ENTMCNC: 31.5 G/DL (ref 31–37)
MCV RBC AUTO: 99.7 FL (ref 78–100)
MONOCYTES # BLD: 0.9 K/UL (ref 0.05–1.2)
MONOCYTES NFR BLD: 13 % (ref 3–10)
NEUTS SEG # BLD: 4.3 K/UL (ref 1.8–8)
NEUTS SEG NFR BLD: 68 % (ref 40–73)
NRBC # BLD: 0.02 K/UL (ref 0–0.01)
NRBC BLD-RTO: 0.3 PER 100 WBC
PHOSPHATE SERPL-MCNC: 3.9 MG/DL (ref 2.5–4.9)
PLATELET # BLD AUTO: 172 K/UL (ref 135–420)
PMV BLD AUTO: 10.8 FL (ref 9.2–11.8)
POTASSIUM SERPL-SCNC: 4.8 MMOL/L (ref 3.5–5.5)
PROT SERPL-MCNC: 6.8 G/DL (ref 6.4–8.2)
RBC # BLD AUTO: 3.76 M/UL (ref 4.2–5.3)
SODIUM SERPL-SCNC: 135 MMOL/L (ref 136–145)
WBC # BLD AUTO: 6.3 K/UL (ref 4.6–13.2)

## 2022-03-06 PROCEDURE — 36415 COLL VENOUS BLD VENIPUNCTURE: CPT

## 2022-03-06 PROCEDURE — 97530 THERAPEUTIC ACTIVITIES: CPT

## 2022-03-06 PROCEDURE — 80053 COMPREHEN METABOLIC PANEL: CPT

## 2022-03-06 PROCEDURE — 65660000000 HC RM CCU STEPDOWN

## 2022-03-06 PROCEDURE — 85025 COMPLETE CBC W/AUTO DIFF WBC: CPT

## 2022-03-06 PROCEDURE — 74011000250 HC RX REV CODE- 250: Performed by: INTERNAL MEDICINE

## 2022-03-06 PROCEDURE — 84100 ASSAY OF PHOSPHORUS: CPT

## 2022-03-06 PROCEDURE — 74011250636 HC RX REV CODE- 250/636: Performed by: HOSPITALIST

## 2022-03-06 PROCEDURE — 83735 ASSAY OF MAGNESIUM: CPT

## 2022-03-06 PROCEDURE — 74011250637 HC RX REV CODE- 250/637: Performed by: INTERNAL MEDICINE

## 2022-03-06 RX ADMIN — PANTOPRAZOLE SODIUM 40 MG: 40 TABLET, DELAYED RELEASE ORAL at 08:41

## 2022-03-06 RX ADMIN — ASPIRIN 81 MG: 81 TABLET, COATED ORAL at 08:41

## 2022-03-06 RX ADMIN — CARVEDILOL 3.12 MG: 3.12 TABLET, FILM COATED ORAL at 17:27

## 2022-03-06 RX ADMIN — SODIUM CHLORIDE, PRESERVATIVE FREE 10 ML: 5 INJECTION INTRAVENOUS at 05:26

## 2022-03-06 RX ADMIN — FUROSEMIDE 40 MG: 10 INJECTION, SOLUTION INTRAMUSCULAR; INTRAVENOUS at 20:48

## 2022-03-06 RX ADMIN — SODIUM CHLORIDE, PRESERVATIVE FREE 10 ML: 5 INJECTION INTRAVENOUS at 14:00

## 2022-03-06 RX ADMIN — APIXABAN 2.5 MG: 2.5 TABLET, FILM COATED ORAL at 20:48

## 2022-03-06 RX ADMIN — ACETAMINOPHEN 650 MG: 325 TABLET ORAL at 09:43

## 2022-03-06 RX ADMIN — FUROSEMIDE 40 MG: 10 INJECTION, SOLUTION INTRAMUSCULAR; INTRAVENOUS at 08:42

## 2022-03-06 RX ADMIN — APIXABAN 2.5 MG: 2.5 TABLET, FILM COATED ORAL at 08:42

## 2022-03-06 RX ADMIN — CARVEDILOL 3.12 MG: 3.12 TABLET, FILM COATED ORAL at 08:42

## 2022-03-06 NOTE — PROGRESS NOTES
Problem: Mobility Impaired (Adult and Pediatric)  Goal: *Acute Goals and Plan of Care (Insert Text)  Description: In one week:  1. Pt will transfer supine <> sitting EOB min assist for increased ability to sit upright during meal times. 2. Pt will transfer sit <> stand min assist for improved lower extremity strengthening. 3. Pt will roll in bed in both directions for increased independence with pressure relief. 4. Pt will sit EOB unsupported for > 5 minutes without LOB for increased safety and improved sitting balance. Note:   physical Therapy TREATMENT    Patient: Aurelio Adhikari (23 y.o. female)  Date: 3/6/2022  Diagnosis: Acute exacerbation of CHF (congestive heart failure) ([facility]) [I50.9] Acute respiratory failure with hypoxemia [facility])       Precautions: Fall   Chart, physical therapy assessment, plan of care and goals were reviewed. ASSESSMENT:  Pt resistant to mobility however with encouragement and support from daughter via phone able to progress with mobility. Pt required extra time, frequent rest breaks and totalA to transition to sitting EOB. Pt shaking head when attempting Right LE movement. Pt with stiffness in B LE with all movements. Will continue to attempt to increase joint and muscle flexibility which will in turn reduce soreness. Appreciate RN assistance at end of session to position pt to maximize comfort and family education. Consider SNF at d/c. Progression toward goals:  []      Improving appropriately and progressing toward goals  []      Improving slowly and progressing toward goals  [x]      Not making progress toward goals     PLAN:  Patient continues to benefit from skilled intervention to address the above impairments. Continue treatment per established plan of care. Discharge Recommendations:  Perry Abraham  Further Equipment Recommendations for Discharge:  N/A     SUBJECTIVE:   Patient stated I'm sore.     OBJECTIVE DATA SUMMARY:   Critical Behavior:  Neurologic State: Alert  Orientation Level: Oriented X4  Cognition: Follows commands  Safety/Judgement: Fall prevention,Awareness of environment,Insight into deficits  Functional Mobility Training:  Bed Mobility:  Rolling: Total assistance  Supine to Sit: Total assistance  Sit to Supine: Total assistance  Balance:  Sitting: Impaired; With support  Pain:  Pain Scale 1: Numeric (0 - 10)  Pain Intensity 1: 0  Activity Tolerance:   Poor  Please refer to the flowsheet for vital signs taken during this treatment.   After treatment:   [] Patient left in no apparent distress sitting up in chair  [x] Patient left in no apparent distress in bed  [x] Call bell left within reach  [x] Nursing notified  [] Caregiver present  [] Bed alarm activated      Everette Johnson   Time Calculation: 23 mins

## 2022-03-06 NOTE — PROGRESS NOTES
Shift change report received on pt    2034: shift assessment completed, pt daughter and family member at bedside, pt alert and able to hold conversation, she denies any pain and no signs of discomfort noted.

## 2022-03-06 NOTE — PROGRESS NOTES
Patient remains on Resmed CPAP with oxygen bled in, titrated   oxygen from 3 liters to 2 , SP02 96%. Patient alert and talking, daughter at bedside. RN updated.

## 2022-03-06 NOTE — PROGRESS NOTES
Bedside shift change report given to Ishaan Webber RN (oncoming nurse) by Wesly Wild RN (offgoing nurse). Report included the following information SBAR, Procedure Summary, Intake/Output, MAR and Recent Results.

## 2022-03-06 NOTE — PROGRESS NOTES
Alert and oriented x4, assessments and VS completed per orders, medications admininstered, intake and output monitored.

## 2022-03-06 NOTE — PROGRESS NOTES
Cardiology Progress Note        Patient: Rio Roth        Sex: female          DOA: 3/2/2022  YOB: 1940      Age:  80 y.o.        LOS:  LOS: 4 days   Assessment/Plan     Principal Problem:    Acute respiratory failure with hypoxemia ([address] (3/2/2022)    Active Problems:    Elevated troponin (4/23/2021)      HTN (hypertension) (4/23/2021)      Acute on chronic diastolic congestive heart failure ([address] (4/23/2021)      Atrial fibrillation ([address] (3/2/2022)      Stage 3 chronic kidney disease ([address] (3/3/2022)      SERENA (obstructive sleep apnea) (3/3/2022)      Adult BMI 39.0-39.9 kg/sq m (3/3/2022)        Plan:  Patient continues to do well. H&H stable  Patient will need diuretic on discharge probably Lasix 40 mg twice daily  Discussed with patient and family in the room      A. fib rate controlled  Patient will need p.o. Lasix on discharge  Continue Eliquis 2.5 mg twice daily  Discussed with patient and family      Continue with Lasix 40 mg twice daily  I have long lengthy discussion with the patient and daughter about anticoagulation both of them agreed with low-dose Eliquis 2.5 mg twice daily  DC Lovenox   start Eliquis 2.5 mg twice daily from a.m. Discussed with patient and daughter findings of echocardiogram including mitral valve disease, prefer not to do transesophageal echocardiogram  Continue with current medical treatment                        Subjective:    cc:      Shortness of breath  A. fib        REVIEW OF SYSTEMS:     General: No fevers or chills. Cardiovascular: No chest pain or pressure. No palpitations. mild ankle swelling  Pulmonary: ++SOB, orthopnea, PND  Gastrointestinal: No nausea, vomiting or diarrhea      Objective:      Visit Vitals  /69   Pulse 73   Temp 98.1 °F (36.7 °C)   Resp 20   Ht 5' 5\" (1.651 m)   Wt 113.9 kg (251 lb)   SpO2 99%   Breastfeeding No   BMI 41.77 kg/m²     Body mass index is 41.77 kg/m².     Physical Exam:  General Appearance: Comfortable, not using accessory muscles of respiration. NECK: No JVD, no thyroidomeglay. LUNGS: Clear bilaterally. HEART: S1 irregular +S2 audible,    ABD: Non-tender, BS Audible    EXT: No edema, and no cysnosis. VASCULAR EXAM: Pulses are intact. PSYCHIATRIC EXAM: Mood is appropriate. Medication:  Current Facility-Administered Medications   Medication Dose Route Frequency    furosemide (LASIX) injection 40 mg  40 mg IntraVENous Q12H    pantoprazole (PROTONIX) tablet 40 mg  40 mg Oral ACB    apixaban (ELIQUIS) tablet 2.5 mg  2.5 mg Oral BID    sodium chloride (NS) flush 5-40 mL  5-40 mL IntraVENous Q8H    sodium chloride (NS) flush 5-40 mL  5-40 mL IntraVENous PRN    acetaminophen (TYLENOL) tablet 650 mg  650 mg Oral Q6H PRN    Or    acetaminophen (TYLENOL) suppository 650 mg  650 mg Rectal Q6H PRN    polyethylene glycol (MIRALAX) packet 17 g  17 g Oral DAILY PRN    ondansetron (ZOFRAN ODT) tablet 4 mg  4 mg Oral Q8H PRN    Or    ondansetron (ZOFRAN) injection 4 mg  4 mg IntraVENous Q6H PRN    [Held by provider] potassium chloride (K-DUR, KLOR-CON M20) SR tablet 20 mEq  20 mEq Oral DAILY    carvediloL (COREG) tablet 3.125 mg  3.125 mg Oral BID WITH MEALS    aspirin delayed-release tablet 81 mg  81 mg Oral DAILY    acetaminophen (TYLENOL) tablet 650 mg  650 mg Oral Q6H PRN               Lab/Data Reviewed:  Procedures/imaging: see electronic medical records for all procedures/Xrays   and details which were not copied into this note but were reviewed prior to creation of Plan       All lab results for the last 24 hours reviewed.      Recent Labs     03/06/22 0319 03/05/22  0930 03/04/22  0324   WBC 6.3 7.4 7.3   HGB 11.8* 12.5 11.5*   HCT 37.5 39.8 36.5    147 169     Recent Labs     03/06/22 0319 03/05/22  0721 03/04/22  0324   * 132* 135*   K 4.8 5.0 4.8   CL 99* 101 101   CO2 29 25 30   * 104* 109*   BUN 74* 69* 57*   CREA 2.04* 2.13* 1.81*   CA 10.0 9.8 9. 8       RADIOLOGY:  CT Results  (Last 48 hours)    None        CXR Results  (Last 48 hours)               03/05/22 1814  XR CHEST PORT Final result    Impression:      Subsegmental atelectasis left base. Possible small effusions. Moderate   cardiomegaly without change. Narrative:  EXAM: PORTABLE CHEST       HISTORY: Shortness of breath. COMPARISON: 3/2/2022       TECHNIQUE: Single portable view. _______________       FINDINGS:       SUPPORT DEVICES: None       HEART AND MEDIASTINUM: Moderate cardiomegaly. LUNGS AND PLEURAL SPACES: Subsegmental atelectasis left base. Possible small   effusions. BONES AND SOFT TISSUES: Dextroscoliosis.        _______________                   Cardiology Procedures:   Results for orders placed or performed during the hospital encounter of 03/02/22   EKG, 12 LEAD, INITIAL   Result Value Ref Range    Ventricular Rate 102 BPM    QRS Duration 72 ms    Q-T Interval 336 ms    QTC Calculation (Bezet) 437 ms    Calculated R Axis -81 degrees    Calculated T Axis -20 degrees    Diagnosis       Atrial fibrillation with rapid ventricular response with premature   ventricular or aberrantly conducted complexes  Left axis deviation  Low voltage QRS  Abnormal ECG  When compared with ECG of 23-APR-2021 17:11,  Atrial fibrillation has replaced Sinus rhythm  Confirmed by Tonie Odom MD. ([id number]) on 3/2/2022 10:26:52 PM        Echo Results  (Last 48 hours)    None       Cardiolite (Tc-99m Sestamibi) stress test    Signed By: Patricio Landa MD     March 6, 2022

## 2022-03-06 NOTE — PROGRESS NOTES
Hospitalist Progress Note    Patient: Danette Woodard MRN: [id number]  [facility]: [id number]    YOB: 1940  Age: 80 y.o. Sex: female    DOA: 3/2/2022 LOS:  LOS: 4 days            Patient Active Problem List   Diagnosis Code    Fatigue R53.83    Elevated troponin R77.8    Obesity (BMI 30-39. 9) E66.9    HTN (hypertension) I10    FRANCIA treated with BiPAP G47.33    Acute on chronic diastolic congestive heart failure (HCC) I50.33    Acute respiratory failure with hypoxemia (HCC) J96.01    Atrial fibrillation (HCC) I48.91    Stage 3 chronic kidney disease (HCC) N18.30    FRANCIA (obstructive sleep apnea) G47.33    Adult BMI 39.0-39.9 kg/sq m Z68.39        IMPRESSION and Plan:    Danette Woodard is a 80 y.o. female with   Patient Active Problem List    Diagnosis Date Noted    Stage 3 chronic kidney disease ([facility] [address] 03/03/2022    FRANCIA (obstructive sleep apnea) 03/03/2022    Adult BMI 39.0-39.9 kg/sq m 03/03/2022    Acute respiratory failure with hypoxemia (HCC) 03/02/2022    Atrial fibrillation (HCC) 03/02/2022    Fatigue 04/23/2021    Elevated troponin 04/23/2021    Obesity (BMI 30-39.9) 04/23/2021    HTN (hypertension) 04/23/2021    FRANCIA treated with BiPAP 04/23/2021    Acute on chronic diastolic congestive heart failure ([facility] [address] 04/23/2021     Principal Problem:    Acute respiratory failure with hypoxemia (HCC) (3/2/2022)    Active Problems:    Elevated troponin (4/23/2021)      HTN (hypertension) (4/23/2021)      Acute on chronic diastolic congestive heart failure (HCC) (4/23/2021)      Atrial fibrillation (HCC) (3/2/2022)      Stage 3 chronic kidney disease (HCC) (3/3/2022)      FRANCIA (obstructive sleep apnea) (3/3/2022)      Adult BMI 39.0-39.9 kg/sq m (3/3/2022)          Congestive heart failure , acute on chronic diastolic     -- cont IV lasix.  Good diuresis      Elevated trop  No chest pain    Trop trending down   No acs      New afib  -- on eliquis         Diarrhea  -- improved    francia   On cpap at home         TK  With ckd3 -- cr is somehwhat improved  Cont to monitor     AMS / metabolic encephalopathy -- improving. Cont to monitor    [employer]. Pt/ot. moslty will need SNF              htn bp/hr stable    uti -  completed abx         Patient's condition is fair             Recommend to continue hospitalization. Discussed with patient. Chief Complaints:   Chief Complaint   Patient presents with    Shortness of Breath     SUBJECTIVE:  Pt is seen and examined. \" feels better\" per nurisng staff, much more alert and oriented         Review of systems:    Review of Systems   Constitutional: Positive for malaise/fatigue. HENT: Negative. Eyes: Negative. Respiratory: Negative for shortness of breath. Cardiovascular: Negative for chest pain, palpitations, orthopnea and leg swelling. Gastrointestinal: Negative. Negative for abdominal pain, diarrhea and heartburn. Genitourinary: Negative for dysuria and hematuria. Skin: Negative. Neurological: Positive for weakness. Psychiatric/Behavioral: Negative for depression, substance abuse and suicidal ideas. The patient is not nervous/anxious.         PE:  Patient Vitals for the past 24 hrs:   BP Temp Pulse Resp SpO2 Weight   03/06/22 0837 (!) 152/88 98.4 °F (36.9 °C) 95 19 92 %    03/06/22 0606   95  95 %    03/06/22 0441 (!) 145/77 97.7 °F (36.5 °C) 81 18 94 %    03/06/22 0102 132/76 98.6 °F (37 °C) 74 20 96 % 113.9 kg (251 lb)   03/05/22 2016 110/69 97.5 °F (36.4 °C) 70 20 96 %    03/05/22 1814 121/72 97.9 °F (36.6 °C) 78 20 100 %    03/05/22 1726 124/79        03/05/22 1541 107/89 98.1 °F (36.7 °C)  19 94 %    03/05/22 1135 136/88 97.8 °F (36.6 °C) 66 18 94 %        Intake/Output Summary (Last 24 hours) at 3/6/2022 1008  Last data filed at 3/6/2022 0758  Gross per 24 hour   Intake 60 ml   Output 2700 ml   Net -2640 ml     Patient Vitals for the past 120 hrs:   Weight   03/02/22 2008 115.2 kg (254 lb)   03/03/22 0400 107.9 kg (237 lb 12.8 oz)   03/04/22 0017 112.5 kg (248 lb)   03/06/22 0102 113.9 kg (251 lb)         Physical Exam  Vitals and nursing note reviewed. Constitutional:       General: She is in acute distress. Appearance: She is ill-appearing. Neck:      Vascular: No JVD. Cardiovascular:      Rate and Rhythm: Normal rate and regular rhythm. Heart sounds: Normal heart sounds. Pulmonary:      Effort: No respiratory distress. Breath sounds: Normal breath sounds. Abdominal:      General: Bowel sounds are normal. There is no distension. Palpations: Abdomen is soft. Tenderness: There is no abdominal tenderness. There is no rebound. Musculoskeletal:         General: Normal range of motion. Cervical back: Normal range of motion and neck supple. Skin:     General: Skin is warm and dry. Neurological:      Mental Status: She is alert and oriented to person, place, and time.    Psychiatric:         Mood and Affect: Affect normal.             Intake and Output:  Current Shift:  03/06 0701 - 03/06 1900  In: -   Out: 400 [Urine:400]  Last three shifts:  03/04 1901 - 03/06 0700  In: 61 [P.O.:60]  Out: 2300 [Urine:2300]    Lab/Data Reviewed:  Recent Results (from the past 8 hour(s))   MAGNESIUM    Collection Time: 03/06/22  3:19 AM   Result Value Ref Range    Magnesium 2.1 1.6 - 2.6 mg/dL   CBC WITH AUTOMATED DIFF    Collection Time: 03/06/22  3:19 AM   Result Value Ref Range    WBC 6.3 4.6 - 13.2 K/uL    RBC 3.76 (L) 4.20 - 5.30 M/uL    HGB 11.8 (L) 12.0 - 16.0 g/dL    HCT 37.5 35.0 - 45.0 %    MCV 99.7 78.0 - 100.0 FL    MCH 31.4 24.0 - 34.0 PG    MCHC 31.5 31.0 - 37.0 g/dL    RDW 15.1 (H) 11.6 - 14.5 %    PLATELET 936 606 - 314 K/uL    MPV 10.8 9.2 - 11.8 FL    NRBC 0.3 (H) 0  WBC    ABSOLUTE NRBC 0.02 (H) 0.00 - 0.01 K/uL    NEUTROPHILS 68 40 - 73 %    LYMPHOCYTES 18 (L) 21 - 52 %    MONOCYTES 13 (H) 3 - 10 %    EOSINOPHILS 1 0 - 5 %    BASOPHILS 0 0 - 2 %    IMMATURE GRANULOCYTES 1 (H) 0.0 - 0.5 %    ABS. NEUTROPHILS 4.3 1.8 - 8.0 K/UL    ABS. LYMPHOCYTES 1.1 0.9 - 3.6 K/UL    ABS. MONOCYTES 0.9 0.05 - 1.2 K/UL    ABS. EOSINOPHILS 0.0 0.0 - 0.4 K/UL    ABS. BASOPHILS 0.0 0.0 - 0.1 K/UL    ABS. IMM. GRANS. 0.0 0.00 - 0.04 K/UL    DF AUTOMATED     METABOLIC PANEL, COMPREHENSIVE    Collection Time: 03/06/22  3:19 AM   Result Value Ref Range    Sodium 135 (L) 136 - 145 mmol/L    Potassium 4.8 3.5 - 5.5 mmol/L    Chloride 99 (L) 100 - 111 mmol/L    CO2 29 21 - 32 mmol/L    Anion gap 7 3.0 - 18 mmol/L    Glucose 117 (H) 74 - 99 mg/dL    BUN 74 (H) 7.0 - 18 MG/DL    Creatinine 2.04 (H) 0.6 - 1.3 MG/DL    BUN/Creatinine ratio 36 (H) 12 - 20      GFR est AA 28 (L) >60 ml/min/1.73m2    GFR est non-AA 23 (L) >60 ml/min/1.73m2    Calcium 10.0 8.5 - 10.1 MG/DL    Bilirubin, total 0.5 0.2 - 1.0 MG/DL    ALT (SGPT) 106 (H) 13 - 56 U/L    AST (SGOT) 36 10 - 38 U/L    Alk.  phosphatase 127 (H) 45 - 117 U/L    Protein, total 6.8 6.4 - 8.2 g/dL    Albumin 3.0 (L) 3.4 - 5.0 g/dL    Globulin 3.8 2.0 - 4.0 g/dL    A-G Ratio 0.8 0.8 - 1.7     PHOSPHORUS    Collection Time: 03/06/22  3:19 AM   Result Value Ref Range    Phosphorus 3.9 2.5 - 4.9 MG/DL     Medications:  Current Facility-Administered Medications   Medication Dose Route Frequency    furosemide (LASIX) injection 40 mg  40 mg IntraVENous Q12H    pantoprazole (PROTONIX) tablet 40 mg  40 mg Oral ACB    apixaban (ELIQUIS) tablet 2.5 mg  2.5 mg Oral BID    sodium chloride (NS) flush 5-40 mL  5-40 mL IntraVENous Q8H    sodium chloride (NS) flush 5-40 mL  5-40 mL IntraVENous PRN    acetaminophen (TYLENOL) tablet 650 mg  650 mg Oral Q6H PRN    Or    acetaminophen (TYLENOL) suppository 650 mg  650 mg Rectal Q6H PRN    polyethylene glycol (MIRALAX) packet 17 g  17 g Oral DAILY PRN    ondansetron (ZOFRAN ODT) tablet 4 mg  4 mg Oral Q8H PRN    Or    ondansetron (ZOFRAN) injection 4 mg  4 mg IntraVENous Q6H PRN    [Held by provider] potassium chloride (K-DUR, KLOR-CON M20) SR tablet 20 mEq  20 mEq Oral DAILY    carvediloL (COREG) tablet 3.125 mg  3.125 mg Oral BID WITH MEALS    aspirin delayed-release tablet 81 mg  81 mg Oral DAILY    acetaminophen (TYLENOL) tablet 650 mg  650 mg Oral Q6H PRN       Recent Results (from the past 24 hour(s))   BLOOD GAS, ARTERIAL POC    Collection Time: 03/05/22  6:07 PM   Result Value Ref Range    Device: NASAL CANNULA      FIO2 (POC) 28 %    pH (POC) 7.20 (LL) 7.35 - 7.45      pCO2 (POC) 85.7 (H) 35.0 - 45.0 MMHG    pO2 (POC) 87 80 - 100 MMHG    HCO3 (POC) 33.5 (H) 22 - 26 MMOL/L    sO2 (POC) 93.3 92 - 97 %    Base excess (POC) 2.2 mmol/L    Allens test (POC) Positive      Site RIGHT RADIAL      Specimen type (POC) ARTERIAL      Performed by Corey FLOWER    GLUCOSE, POC    Collection Time: 03/05/22  6:14 PM   Result Value Ref Range    Glucose (POC) 123 (H) 70 - 110 mg/dL   MAGNESIUM    Collection Time: 03/06/22  3:19 AM   Result Value Ref Range    Magnesium 2.1 1.6 - 2.6 mg/dL   CBC WITH AUTOMATED DIFF    Collection Time: 03/06/22  3:19 AM   Result Value Ref Range    WBC 6.3 4.6 - 13.2 K/uL    RBC 3.76 (L) 4.20 - 5.30 M/uL    HGB 11.8 (L) 12.0 - 16.0 g/dL    HCT 37.5 35.0 - 45.0 %    MCV 99.7 78.0 - 100.0 FL    MCH 31.4 24.0 - 34.0 PG    MCHC 31.5 31.0 - 37.0 g/dL    RDW 15.1 (H) 11.6 - 14.5 %    PLATELET 481 136 - 854 K/uL    MPV 10.8 9.2 - 11.8 FL    NRBC 0.3 (H) 0  WBC    ABSOLUTE NRBC 0.02 (H) 0.00 - 0.01 K/uL    NEUTROPHILS 68 40 - 73 %    LYMPHOCYTES 18 (L) 21 - 52 %    MONOCYTES 13 (H) 3 - 10 %    EOSINOPHILS 1 0 - 5 %    BASOPHILS 0 0 - 2 %    IMMATURE GRANULOCYTES 1 (H) 0.0 - 0.5 %    ABS. NEUTROPHILS 4.3 1.8 - 8.0 K/UL    ABS. LYMPHOCYTES 1.1 0.9 - 3.6 K/UL    ABS. MONOCYTES 0.9 0.05 - 1.2 K/UL    ABS. EOSINOPHILS 0.0 0.0 - 0.4 K/UL    ABS. BASOPHILS 0.0 0.0 - 0.1 K/UL    ABS. IMM.  GRANS. 0.0 0.00 - 0.04 K/UL    DF AUTOMATED     METABOLIC PANEL, COMPREHENSIVE    Collection Time: 03/06/22  3:19 AM   Result Value Ref Range    Sodium 135 (L) 136 - 145 mmol/L    Potassium 4.8 3.5 - 5.5 mmol/L    Chloride 99 (L) 100 - 111 mmol/L    CO2 29 21 - 32 mmol/L    Anion gap 7 3.0 - 18 mmol/L    Glucose 117 (H) 74 - 99 mg/dL    BUN 74 (H) 7.0 - 18 MG/DL    Creatinine 2.04 (H) 0.6 - 1.3 MG/DL    BUN/Creatinine ratio 36 (H) 12 - 20      GFR est AA 28 (L) >60 ml/min/1.73m2    GFR est non-AA 23 (L) >60 ml/min/1.73m2    Calcium 10.0 8.5 - 10.1 MG/DL    Bilirubin, total 0.5 0.2 - 1.0 MG/DL    ALT (SGPT) 106 (H) 13 - 56 U/L    AST (SGOT) 36 10 - 38 U/L    Alk.  phosphatase 127 (H) 45 - 117 U/L    Protein, total 6.8 6.4 - 8.2 g/dL    Albumin 3.0 (L) 3.4 - 5.0 g/dL    Globulin 3.8 2.0 - 4.0 g/dL    A-G Ratio 0.8 0.8 - 1.7     PHOSPHORUS    Collection Time: 03/06/22  3:19 AM   Result Value Ref Range    Phosphorus 3.9 2.5 - 4.9 MG/DL       Procedures/imaging: see electronic medical records for all procedures/Xrays and details which were not copied into this note but were reviewed prior to creation of Nohelia Paul MD   3/6/2022, 8:01 AM

## 2022-03-07 ENCOUNTER — APPOINTMENT (OUTPATIENT)
Dept: CT IMAGING | Age: 82
DRG: 981 | End: 2022-03-07
Attending: INTERNAL MEDICINE
Payer: MEDICARE

## 2022-03-07 ENCOUNTER — APPOINTMENT (OUTPATIENT)
Dept: GENERAL RADIOLOGY | Age: 82
DRG: 981 | End: 2022-03-07
Attending: INTERNAL MEDICINE
Payer: MEDICARE

## 2022-03-07 LAB
ALBUMIN SERPL-MCNC: 2.9 G/DL (ref 3.4–5)
ALBUMIN/GLOB SERPL: 0.8 {RATIO} (ref 0.8–1.7)
ALP SERPL-CCNC: 113 U/L (ref 45–117)
ALT SERPL-CCNC: 89 U/L (ref 13–56)
ANION GAP SERPL CALC-SCNC: 5 MMOL/L (ref 3–18)
ARTERIAL PATENCY WRIST A: POSITIVE
AST SERPL-CCNC: 28 U/L (ref 10–38)
BASE EXCESS BLD CALC-SCNC: 7 MMOL/L
BASOPHILS # BLD: 0 K/UL (ref 0–0.1)
BASOPHILS NFR BLD: 0 % (ref 0–2)
BDY SITE: ABNORMAL
BILIRUB SERPL-MCNC: 0.5 MG/DL (ref 0.2–1)
BUN SERPL-MCNC: 69 MG/DL (ref 7–18)
BUN/CREAT SERPL: 37 (ref 12–20)
CALCIUM SERPL-MCNC: 9.8 MG/DL (ref 8.5–10.1)
CHLORIDE SERPL-SCNC: 99 MMOL/L (ref 100–111)
CO2 SERPL-SCNC: 33 MMOL/L (ref 21–32)
CREAT SERPL-MCNC: 1.87 MG/DL (ref 0.6–1.3)
DIFFERENTIAL METHOD BLD: ABNORMAL
EOSINOPHIL # BLD: 0 K/UL (ref 0–0.4)
EOSINOPHIL NFR BLD: 1 % (ref 0–5)
ERYTHROCYTE [DISTWIDTH] IN BLOOD BY AUTOMATED COUNT: 15.7 % (ref 11.6–14.5)
GAS FLOW.O2 O2 DELIVERY SYS: ABNORMAL L/MIN
GLOBULIN SER CALC-MCNC: 3.6 G/DL (ref 2–4)
GLUCOSE SERPL-MCNC: 115 MG/DL (ref 74–99)
HCO3 BLD-SCNC: 35.8 MMOL/L (ref 22–26)
HCT VFR BLD AUTO: 35.1 % (ref 35–45)
HGB BLD-MCNC: 11.5 G/DL (ref 12–16)
IMM GRANULOCYTES # BLD AUTO: 0 K/UL (ref 0–0.04)
IMM GRANULOCYTES NFR BLD AUTO: 0 % (ref 0–0.5)
LYMPHOCYTES # BLD: 1.1 K/UL (ref 0.9–3.6)
LYMPHOCYTES NFR BLD: 20 % (ref 21–52)
MAGNESIUM SERPL-MCNC: 2 MG/DL (ref 1.6–2.6)
MCH RBC QN AUTO: 32.8 PG (ref 24–34)
MCHC RBC AUTO-ENTMCNC: 32.8 G/DL (ref 31–37)
MCV RBC AUTO: 100 FL (ref 78–100)
MONOCYTES # BLD: 0.9 K/UL (ref 0.05–1.2)
MONOCYTES NFR BLD: 15 % (ref 3–10)
NEUTS SEG # BLD: 3.6 K/UL (ref 1.8–8)
NEUTS SEG NFR BLD: 64 % (ref 40–73)
NRBC # BLD: 0.02 K/UL (ref 0–0.01)
NRBC BLD-RTO: 0.4 PER 100 WBC
PCO2 BLD: 68.6 MMHG (ref 35–45)
PH BLD: 7.33 [PH] (ref 7.35–7.45)
PHOSPHATE SERPL-MCNC: 3.7 MG/DL (ref 2.5–4.9)
PLATELET # BLD AUTO: 166 K/UL (ref 135–420)
PMV BLD AUTO: 11 FL (ref 9.2–11.8)
PO2 BLD: 48 MMHG (ref 80–100)
POTASSIUM SERPL-SCNC: 4.4 MMOL/L (ref 3.5–5.5)
PROT SERPL-MCNC: 6.5 G/DL (ref 6.4–8.2)
RBC # BLD AUTO: 3.51 M/UL (ref 4.2–5.3)
SAO2 % BLD: 78.5 % (ref 92–97)
SERVICE CMNT-IMP: ABNORMAL
SODIUM SERPL-SCNC: 137 MMOL/L (ref 136–145)
SPECIMEN TYPE: ABNORMAL
WBC # BLD AUTO: 5.6 K/UL (ref 4.6–13.2)

## 2022-03-07 PROCEDURE — 85025 COMPLETE CBC W/AUTO DIFF WBC: CPT

## 2022-03-07 PROCEDURE — 77010033678 HC OXYGEN DAILY

## 2022-03-07 PROCEDURE — 74011000250 HC RX REV CODE- 250: Performed by: INTERNAL MEDICINE

## 2022-03-07 PROCEDURE — 73502 X-RAY EXAM HIP UNI 2-3 VIEWS: CPT

## 2022-03-07 PROCEDURE — 82803 BLOOD GASES ANY COMBINATION: CPT

## 2022-03-07 PROCEDURE — 70450 CT HEAD/BRAIN W/O DYE: CPT

## 2022-03-07 PROCEDURE — 97530 THERAPEUTIC ACTIVITIES: CPT

## 2022-03-07 PROCEDURE — 65660000000 HC RM CCU STEPDOWN

## 2022-03-07 PROCEDURE — 36600 WITHDRAWAL OF ARTERIAL BLOOD: CPT

## 2022-03-07 PROCEDURE — 74011250636 HC RX REV CODE- 250/636: Performed by: HOSPITALIST

## 2022-03-07 PROCEDURE — 84100 ASSAY OF PHOSPHORUS: CPT

## 2022-03-07 PROCEDURE — 74011250637 HC RX REV CODE- 250/637: Performed by: INTERNAL MEDICINE

## 2022-03-07 PROCEDURE — 36415 COLL VENOUS BLD VENIPUNCTURE: CPT

## 2022-03-07 PROCEDURE — 80053 COMPREHEN METABOLIC PANEL: CPT

## 2022-03-07 PROCEDURE — 99232 SBSQ HOSP IP/OBS MODERATE 35: CPT | Performed by: NURSE PRACTITIONER

## 2022-03-07 PROCEDURE — 83735 ASSAY OF MAGNESIUM: CPT

## 2022-03-07 RX ADMIN — APIXABAN 2.5 MG: 2.5 TABLET, FILM COATED ORAL at 21:43

## 2022-03-07 RX ADMIN — FUROSEMIDE 40 MG: 10 INJECTION, SOLUTION INTRAMUSCULAR; INTRAVENOUS at 21:43

## 2022-03-07 RX ADMIN — FUROSEMIDE 40 MG: 10 INJECTION, SOLUTION INTRAMUSCULAR; INTRAVENOUS at 16:01

## 2022-03-07 RX ADMIN — SODIUM CHLORIDE, PRESERVATIVE FREE 10 ML: 5 INJECTION INTRAVENOUS at 21:44

## 2022-03-07 RX ADMIN — SODIUM CHLORIDE, PRESERVATIVE FREE 10 ML: 5 INJECTION INTRAVENOUS at 06:29

## 2022-03-07 RX ADMIN — SODIUM CHLORIDE, PRESERVATIVE FREE 10 ML: 5 INJECTION INTRAVENOUS at 00:30

## 2022-03-07 NOTE — PROGRESS NOTES
Palliative Medicine Consult    Patient Name: Connie Andres  YOB: 1940    Date of Initial Consult: 3/3/2022  Date of follow-up: 3/7/2022  Reason for Consult: Goals of care discussions  Requesting Provider: Dr. Jack Pearson  Primary Care Physician: Kamran Ghosh NP      SUMMARY:   Connie Andres is a 80 y.o. with a past history of congestive heart failure, sleep apnea, right leg weakness, and CKD stage 3, who was admitted on 3/2/2022 from home with a diagnosis of New onset A-fib, and acute on chronic diastolic congestive heart failure. Current medical issues leading to Palliative Medicine involvement include: Support and goals of care discussions. 3/4/2022: Patient is awake, alert, and oriented x4. No family present. States she did not sleep well last night due to the hospital CPAP machine being louder than when she is used to at home. 3/7/2022: Patient was drowsy and not engaged in goals of care conversations today. PALLIATIVE DIAGNOSES:   1. Goals of care discussions  2. New onset A-fib  3. Acute on chronic diastolic congestive heart failure  4. Advanced age/debility       PLAN:   3/7/2022: Palliative medicine team including Agusto Deras RN and I met with patient at patient's bedside. Also present was patient's daughter/MPOA/legal next of kin Arpit. Reviewed previous conversations had (see below). Marlena did bring AMD naming herself Gamaliel Saini) as patient's medical power of . Copy placed on paper chart. Reviewed patient's previous wish for DNR/DNI. Daughter is supportive, and states that this has been patient's consistent wish. Patient was drowsy and not engaged in goals of care conversations today. POST form reviewed daughter, she will review with her mother once she is less drowsy, and we will follow up tomorrow for POST Form completion. At this time continue DNR/DNI.     See previous discussions below per palliative team    3/4/2022: Palliative medicine team including Mushtaq Rousseau RN and I met with patient at patient's bedside. Patient is awake, alert, and oriented x4. No family present. Followed up on goals of care discussions from yesterday (see below). Family is still looking at current documentation regarding AMD and DO NOT RESUSCITATE paperwork. Patient is not comfortable signing any paperwork until she is able to review her current paperwork. Explained that we will honor the DNR/DNI order while she is an inpatient, however when she discharges she needs paperwork to protect her. She did not wish to complete new documents at this time. We will follow up next week if she remains inpatient. Advised patient that if she gets discharged prior to document completion, she can complete this paperwork to protect her wishes with her primary care provider. At this time continue DNR/DNI. See previous discussions below    3/3/2022: Goals of care: Palliative medicine team including  CECILIO Hwang and I met with patient at patient's bedside. Also present was patient's sister Jimena Martínez. Patient is awake, alert, oriented x4. She states that her breathing has improved since admission, overall feels well. Introduced the role of palliative medicine. Patient states she has completed an AMD before naming her daughter Sulma Ventura as medical power of . Patient is not , and her daughter Jyoti Grady is legal NOK. We do not have a copy of this AMD on file, she will ask her daughter if she has a copy. Reconfirmed goals of care for DNR/DNI. She is unsure if she has ever completed a DDNR. She states her daughter is aware and supportive of her healthcare wishes. Patient and patient's sister state that they will review patient's paperwork with daughter today, and patient is willing to complete any necessary forms tomorrow to protect her wishes for DNR/DNI. We will follow up tomorrow once she has opportunity to review her current paperwork. 1. New onset A. fib: Echo pending, cardiology consulted by primary team.  She denies pain, shortness of breath has improved since admission. 2. Acute on chronic diastolic congestive heart failure: Echo results pending, cardiology has been consulted. Had a VQ scan which showed no presence of a pulmonary embolism. She is on Lasix, which has been reduced due to worsening renal function. Monitoring renal function per primary team.  3. Advanced age/debility: 29-year-old female, lives with her daughter [place]. Ambulatory short distances, needs assist with functional ADLs. 4. Initial consult note routed to primary continuity provider  5.  Communicated plan of care with: Palliative IDT       GOALS OF CARE / TREATMENT PREFERENCES:   [====Goals of Care====]  GOALS OF CARE: DNR/DNI  Patient/Health Care Proxy Stated Goals: Prolong life      TREATMENT PREFERENCES:   Code Status: DNR    Advance Care Planning:  Advance Care Planning 3/3/2022   Patient's Healthcare Decision Maker is: -   Confirm Advance Directive Yes, on file   Patient Would Like to Complete Advance Directive No   Does the patient have other document types Do Not Resuscitate       Medical Interventions: Limited additional interventions            The palliative care team has discussed with patient / health care proxy about goals of care / treatment preferences for patient.  [====Goals of Care====]         HISTORY:     History obtained from: patient, family, chart    CHIEF COMPLAINT: generalized weakness, shortness of breath with has improved since hospitalization     HPI/SUBJECTIVE:    The patient is:   [x] Verbal and participatory  [] Non-participatory due to:   Oriented x 4 but drowsy this AM    Clinical Pain Assessment (nonverbal scale for severity on nonverbal patients):   Clinical Pain Assessment  Severity: 0            FUNCTIONAL ASSESSMENT:     Palliative Performance Scale (PPS):  PPS: 50       PSYCHOSOCIAL/SPIRITUAL SCREENING:     Advance Care Planning:  Advance Care Planning 3/3/2022   Patient's Healthcare Decision Maker is: -   Confirm Advance Directive Yes, on file   Patient Would Like to Complete Advance Directive No   Does the patient have other document types Do Not Resuscitate        Any spiritual / Jain concerns:  [] Yes /  [x] No    Caregiver Burnout:  [] Yes /  [] No /  [x] No Caregiver Present      Anticipatory grief assessment:   [x] Normal  / [] Maladaptive            REVIEW OF SYSTEMS:     Positive and pertinent negative findings in ROS are noted above in HPI. The following systems were [x] reviewed / [] unable to be reviewed as noted in HPI  Other findings are noted below. Systems: constitutional, ears/nose/mouth/throat, respiratory, gastrointestinal, genitourinary, musculoskeletal, integumentary, neurologic, psychiatric, endocrine. Positive findings noted below. Modified ESAS Completed by: provider   Fatigue: 5       Pain: 0   Anxiety: 0 Nausea: 0     Dyspnea: 0     Constipation: No              PHYSICAL EXAM:     From RN flowsheet:  Wt Readings from Last 3 Encounters:   03/07/22 112 kg (246 lb 14.4 oz)   04/25/21 101.6 kg (224 lb)   10/01/17 125.6 kg (277 lb)     Blood pressure 127/74, pulse 74, temperature 98.2 °F (36.8 °C), resp. rate 18, height 5' 5\" (1.651 m), weight 112 kg (246 lb 14.4 oz), SpO2 100 %, not currently breastfeeding.     Pain Scale 1: Numeric (0 - 10)  Pain Intensity 1: 0     Pain Location 1: Hip  Pain Orientation 1: Right  Pain Description 1: Aching  Pain Intervention(s) 1: Medication (see MAR)      Constitutional: drowsy, NAD, appears stated age, obese  Eyes: pupils equal, anicteric  ENMT: no nasal discharge, moist mucous membranes  Cardiovascular: regular rhythm, distal pulses intact  Respiratory: breathing not labored, symmetric  Gastrointestinal: soft non-tender   Musculoskeletal: no deformity, no tenderness to palpation  Skin: warm, dry  Neurologic: following commands, moving all extremities, oriented x 4  Psychiatric: drowsy affect, no hallucinations       HISTORY:     Principal Problem:    Acute respiratory failure with hypoxemia ([facility] [address] (3/2/2022)    Active Problems:    Elevated troponin (4/23/2021)      HTN (hypertension) (4/23/2021)      Acute on chronic diastolic congestive heart failure (HCC) (4/23/2021)      Atrial fibrillation ([facility] [address] (3/2/2022)      Stage 3 chronic kidney disease ([facility] [address] (3/3/2022)      SERENA (obstructive sleep apnea) (3/3/2022)      Adult BMI 39.0-39.9 kg/sq m (3/3/2022)      Past Medical History:   Diagnosis Date    Hypertension     Sleep disorder       Past Surgical History:   Procedure Laterality Date    HX ORTHOPAEDIC      broken right ankle, left femur 30 yrs ago      Family History   Problem Relation Age of Onset    Diabetes Mother     Heart Disease Mother     Heart Disease Father       History reviewed, no pertinent family history. Social History     Tobacco Use    Smoking status: Never Smoker    Smokeless tobacco: Never Used   Substance Use Topics    Alcohol use:  Yes     Alcohol/week: 1.0 standard drink     Types: 1 Glasses of wine per week     Comment: soc     Allergies   Allergen Reactions    Seafood Hives     crabs    Statins-Hmg-Coa Reductase Inhibitors Unknown (comments)    Sulfa (Sulfonamide Antibiotics) Hives      Current Facility-Administered Medications   Medication Dose Route Frequency    furosemide (LASIX) injection 40 mg  40 mg IntraVENous Q12H    pantoprazole (PROTONIX) tablet 40 mg  40 mg Oral ACB    apixaban (ELIQUIS) tablet 2.5 mg  2.5 mg Oral BID    sodium chloride (NS) flush 5-40 mL  5-40 mL IntraVENous Q8H    sodium chloride (NS) flush 5-40 mL  5-40 mL IntraVENous PRN    acetaminophen (TYLENOL) tablet 650 mg  650 mg Oral Q6H PRN    Or    acetaminophen (TYLENOL) suppository 650 mg  650 mg Rectal Q6H PRN    polyethylene glycol (MIRALAX) packet 17 g  17 g Oral DAILY PRN    ondansetron (ZOFRAN ODT) tablet 4 mg  4 mg Oral Q8H PRN    Or    ondansetron (ZOFRAN) injection 4 mg 4 mg IntraVENous Q6H PRN    [Held by provider] potassium chloride (K-DUR, KLOR-CON M20) SR tablet 20 mEq  20 mEq Oral DAILY    carvediloL (COREG) tablet 3.125 mg  3.125 mg Oral BID WITH MEALS    aspirin delayed-release tablet 81 mg  81 mg Oral DAILY    acetaminophen (TYLENOL) tablet 650 mg  650 mg Oral Q6H PRN          LAB AND IMAGING FINDINGS:     Lab Results   Component Value Date/Time    WBC 5.6 03/07/2022 03:45 AM    HGB 11.5 (L) 03/07/2022 03:45 AM    PLATELET 187 51/45/2872 03:45 AM     Lab Results   Component Value Date/Time    Sodium 137 03/07/2022 03:45 AM    Potassium 4.4 03/07/2022 03:45 AM    Chloride 99 (L) 03/07/2022 03:45 AM    CO2 33 (H) 03/07/2022 03:45 AM    BUN 69 (H) 03/07/2022 03:45 AM    Creatinine 1.87 (H) 03/07/2022 03:45 AM    Calcium 9.8 03/07/2022 03:45 AM    Magnesium 2.0 03/07/2022 03:45 AM    Phosphorus 3.7 03/07/2022 03:45 AM      Lab Results   Component Value Date/Time    Alk. phosphatase 113 03/07/2022 03:45 AM    Protein, total 6.5 03/07/2022 03:45 AM    Albumin 2.9 (L) 03/07/2022 03:45 AM    Globulin 3.6 03/07/2022 03:45 AM     Lab Results   Component Value Date/Time    INR 1.2 03/02/2022 08:48 PM    Prothrombin time 14.1 03/02/2022 08:48 PM    aPTT 25.3 03/02/2022 08:48 PM      No results found for: IRON, FE, TIBC, IBCT, PSAT, FERR   No results found for: PH, PCO2, PO2  No components found for: [place]   Lab Results   Component Value Date/Time     04/24/2021 01:53 PM    CK - MB 1.6 04/24/2021 01:53 PM                Total time: 25 minutes  Counseling / coordination time, spent as noted above:   > 50% counseling / coordination?: yes, patient and family     Prolonged service was provided for  []30 min   []75 min in face to face time in the presence of the patient, spent as noted above. Time Start:   Time End:   Note: this can only be billed with 08860 (initial) or 58832 (follow up). If multiple start / stop times, list each separately.

## 2022-03-07 NOTE — PROGRESS NOTES
Palliative Medicine    CODE STATUS: DNR    AMD Status: on file animg her daughter Erika Sotelo as her MPOA (place on chart today for scanning)     3/7/2022 0915 Seen today in room 358 along with Adalberto Lam NP. Lying in bed with eyes closed. Shy at bedside. Respirations unlabored. Oxygen on at 2 lpm per NC . Reviewed code status with Shy and completing a POST for formalizing choices and having documents at home. Reviewed POST with Shy and left a copy for her to read and discuss with her mother. Will follow up tomorrow to answer any questions offer opportunity for document completion, if desired. Disposition plan: PT recommending SNF. Will await final discharge plans    Palliative care will continue to follow Michael Cummings  and her family during her hospitalization and support them as they make healthcare decisions and define goals of care.       eBn Saravia RN, MSN  Palliative Medicine  P: [phone]

## 2022-03-07 NOTE — PROGRESS NOTES
1215 ABG drawn on RA per doctor order. PAO2 48. Increased Fio2 to 3lpm nc.  Doctor Amarilis called results

## 2022-03-07 NOTE — PROGRESS NOTES
Problem: Mobility Impaired (Adult and Pediatric)  Goal: *Acute Goals and Plan of Care (Insert Text)  Description: In one week:  1. Pt will transfer supine <> sitting EOB min assist for increased ability to sit upright during meal times. 2. Pt will transfer sit <> stand min assist for improved lower extremity strengthening. 3. Pt will roll in bed in both directions for increased independence with pressure relief. 4. Pt will sit EOB unsupported for > 5 minutes without LOB for increased safety and improved sitting balance. Outcome: Not Progressing Towards Goal   PHYSICAL THERAPY TREATMENT    Patient: Dinesh Mayo (58 y.o. female)  Date: 3/7/2022  Diagnosis: Acute exacerbation of CHF (congestive heart failure) ([facility]) [I50.9] Acute respiratory failure with hypoxemia ([facility])       Precautions: Fall  PLOF: ambulated in home with rollator or RW, per daughter dragged her right LE    ASSESSMENT:  Pt was in bed and calm on arrival, would not open her eyes but was continually saying \"Claudy, Claudy\" in a sing song voice. Patient's daughter was present during session and attempting to motivate patient to participate. Pt did not actively move either LE however did not resist or grimace with PROM on either leg. Bruising noted on right LE just below the knee joint and patient's daughter thinks she hit her leg when she fell at home prior to admission. Initiated supine to sit transfer however patient began moaning when right LE was moved during attempted transfer. Due to noted bruising, pain and fall history patient was returned to supine and further PT intervention was held for today. Progression toward goals:   []      Improving appropriately and progressing toward goals  []      Improving slowly and progressing toward goals  [x]      Not making progress toward goals and plan of care will be adjusted     PLAN:  Patient continues to benefit from skilled intervention to address the above impairments.   Continue treatment per established plan of care. Discharge Recommendations:  Perry Dillarduel  Further Equipment Recommendations for Discharge:  N/A     SUBJECTIVE:   Patient stated Claudy.     OBJECTIVE DATA SUMMARY:   Critical Behavior:  Did not open eyes, patient was not resisting movement but also did not attempt to assist    Functional Mobility Training:  Bed Mobility:  Rolling: Total assistance     Therapeutic Exercises:         EXERCISE   Sets   Reps   Active Active Assist   Passive Self ROM   Comments   Ankle Pumps 1 10  [] [] [x] []    Quad Sets/Glut Sets    [] [] [] [] Hold for 5 secs   Hamstring Sets   [] [] [] []    Short Arc Quads   [] [] [] []    Heel Slides 1 6 [] [] [x] []    Straight Leg Raises   [] [] [] []    Hip abd/Add 1 6 [] [] [x] []    Long Arc Quads   [] [] [] []    Seated Marching   [] [] [] []    Standing Marching   [] [] [] []       [] [] [] []        Pain:  Pain level pre-treatment: 0/10  Pain level post-treatment: 0/10   Pain Intervention(s):  Rest  Response to intervention: Nurse notified, See doc flow    Activity Tolerance:   poor  Please refer to the flowsheet for vital signs taken during this treatment. After treatment:   [] Patient left in no apparent distress sitting up in chair  [x] Patient left in no apparent distress in bed  [x] Call bell left within reach  [x] Nursing notified  [] Caregiver present  [] Bed alarm activated  [] SCDs applied      COMMUNICATION/EDUCATION:   [x]         Role of Physical Therapy in the acute care setting. [x]         Fall prevention education was provided and the caregiver indicated understanding. [x]         family have participated as able in working toward goals and plan of care. [x]         family agree to work toward stated goals and plan of care. []         Patient understands intent and goals of therapy, but is neutral about his/her participation.   []         Patient is unable to participate in stated goals/plan of care: ongoing with therapy staff.  []         Other:        Mayelin Del Cid, PT   Time Calculation: 15 mins

## 2022-03-07 NOTE — PROGRESS NOTES
D/c Plan: SNF pending acceptance. Pt will need a stretcher transport at d/c. Met w/ pt's sister at bedside and spoke w/ her dtr via phone. Advised them that [facility] has stated that the pt is not appropriate for that level of care at this time per their review of therapy notes. They verbalized an understanding. FOC offered for SNF. They chose [facility] as it is currently the only 5 star facility in this area. Informed them that they take their residents first. However, CM will place the referral for review and f/u w/ them s/p an acceptance or decline. Care Management Interventions  PCP Verified by CM:  (Dr. Elif Krishnan (female) )  Mode of Transport at Discharge: BLS (.)  Transition of Care Consult (CM Consult): SNF [facility] has stated that according to the current therapy notes, she is not Acute inpt rehab appropriate. SNF choices obtained from the pt's dtr;  [facility]. )  Partner SNF: No  Discharge Durable Medical Equipment:  (TBD)  Physical Therapy Consult: Yes  Occupational Therapy Consult: Yes  Support Systems: Child(doc),Other Family Member(s)  Confirm Follow Up Transport: Family  The Plan for Transition of Care is Related to the Following Treatment Goals : skilled nursing facility  The Patient and/or Patient Representative was Provided with a Choice of Provider and Agrees with the Discharge Plan?: Yes (The pt and her dtr: Marlena)  Freedom of Choice List was Provided with Basic Dialogue that Supports the Patient's Individualized Plan of Care/Goals, Treatment Preferences and Shares the Quality Data Associated with the Providers?: Yes ([facility])  Discharge Location  Patient Expects to be Discharged to[de-identified] Skilled nursing facility

## 2022-03-07 NOTE — PROGRESS NOTES
Hospitalist Progress Note    Patient: Alexis Boland MRN: [id number]  CSN: [id number]    YOB: 1940  Age: 80 y.o. Sex: female    DOA: 3/2/2022 LOS:  LOS: 5 days          Chief Complaint:          Assessment/Plan   Congestive heart failure , acute on chronic diastolic   Echo done Mitral stenosis,  Pulmonary hypertension found to be moderate-LORRAINE is deferred  Dr. Davion Reddy f/u  Trop trending down   V/q scan :Perfusion images show no segmental perfusion defects to suggest the presence of  pulmonary embolism, pvl negative for dvt   Continue monitoring renal function and K level   Low na and liquid restriction      Lethargy  ?hypercapnia  Will check CT head and abg    Recent fall  Check xray of hip    Elevated trop  No chest pain    Trop trending down   No acs likely demand ischemia     New afib   On On Eliquis therapeutic, coreg   tsh wnl ,       Elevated d dimer   V/q scan negative   pvl no dvt      SERENA   On cpap at home   Now on oxygen did not use prior to hospital    Patient Active Problem List   Diagnosis Code    Fatigue R53.83    Elevated troponin R77.8    Obesity (BMI 30-39. 9) E66.9    HTN (hypertension) I10    SERENA treated with BiPAP G47.33    Acute on chronic diastolic congestive heart failure (HCC) I50.33    Acute respiratory failure with hypoxemia (HCC) J96.01    Atrial fibrillation (HCC) I48.91    Stage 3 chronic kidney disease (HCC) N18.30    SERENA (obstructive sleep apnea) G47.33    Adult BMI 39.0-39.9 kg/sq m Z68.39       Subjective:      Daughter at bedside  Slow to answer questions  Not speaking as well as on admission  Did not wear cpap entire night  Last Co2 was high    Review of systems:    Constitutional: denies fevers, chills, myalgias  Respiratory: + SOB, cough  Cardiovascular: denies chest pain, palpitations  Gastrointestinal: denies nausea, vomiting, diarrhea      Vital signs/Intake and Output:  Visit Vitals  /79 (BP 1 Location: Left lower arm, BP Patient Position:  At rest)   Pulse 77   Temp 98.3 °F (36.8 °C)   Resp 18   Ht 5' 5\" (1.651 m)   Wt 112 kg (246 lb 14.4 oz)   SpO2 100%   Breastfeeding No   BMI 41.09 kg/m²     Current Shift:  No intake/output data recorded. Last three shifts:  03/05 1901 - 03/07 0700  In: 60 [P.O.:60]  Out: 3800 [Urine:3800]    Exam:    General: Well developed, alert, NAD, OX3  Head/Neck: NCAT, supple, No masses, No lymphadenopathy  CVS:Regular rate and rhythm, no M/R/G, S1/S2 heard, no thrill  Lungs:Clear to auscultation bilaterally, no wheezes, rhonchi, or rales  Abdomen: Soft, Nontender, No distention, Normal Bowel sounds, No hepatomegaly  Extremities: No C/C/E, pulses palpable 2+  Skin:normal texture and turgor, no rashes, no lesions  Neuro:grossly normal , follows commands  Psych:appropriate                Labs: Results:       Chemistry Recent Labs     03/07/22 0345 03/06/22 0319 03/05/22  0721   * 117* 104*    135* 132*   K 4.4 4.8 5.0   CL 99* 99* 101   CO2 33* 29 25   BUN 69* 74* 69*   CREA 1.87* 2.04* 2.13*   CA 9.8 10.0 9.8   AGAP 5 7 6   BUCR 37* 36* 32*    127* 129*   TP 6.5 6.8 7.0   ALB 2.9* 3.0* 3.1*   GLOB 3.6 3.8 3.9   AGRAT 0.8 0.8 0.8      CBC w/Diff Recent Labs     03/07/22 0345 03/06/22 0319 03/05/22  0930   WBC 5.6 6.3 7.4   RBC 3.51* 3.76* 4.07*   HGB 11.5* 11.8* 12.5   HCT 35.1 37.5 39.8    172 147   GRANS 64 68 66   LYMPH 20* 18* 18*   EOS 1 1 1      Cardiac Enzymes No results for input(s): CPK, CKND1, FARA in the last 72 hours. No lab exists for component: CKRMB, TROIP   Coagulation No results for input(s): PTP, INR, APTT, INREXT in the last 72 hours.     Lipid Panel Lab Results   Component Value Date/Time    Cholesterol, total 183 04/25/2021 04:00 PM    HDL Cholesterol 101 (H) 04/25/2021 04:00 PM    LDL, calculated 69.8 04/25/2021 04:00 PM    VLDL, calculated 12.2 04/25/2021 04:00 PM    Triglyceride 61 04/25/2021 04:00 PM    CHOL/HDL Ratio 1.8 04/25/2021 04:00 PM      BNP No results for input(s): BNPP in the last 72 hours.    Liver Enzymes Recent Labs     03/07/22  0345   TP 6.5   ALB 2.9*         Thyroid Studies Lab Results   Component Value Date/Time    TSH 0.58 03/03/2022 02:21 AM        Procedures/imaging: see electronic medical records for all procedures/Xrays and details which were not copied into this note but were reviewed prior to creation of Alex Jade MD

## 2022-03-07 NOTE — ROUTINE PROCESS
Bedside shift change report given to Puma Barksdale RN (oncoming nurse) by Mabel Mo RN (offgoing nurse). Report included the following information SBAR, Procedure Summary, Intake/Output, MAR and Recent Results.

## 2022-03-08 PROBLEM — S72.001A FRACTURE OF NECK OF RIGHT FEMUR (HCC): Status: ACTIVE | Noted: 2022-03-08

## 2022-03-08 LAB
BACTERIA SPEC CULT: NORMAL
BACTERIA SPEC CULT: NORMAL
MAGNESIUM SERPL-MCNC: 1.9 MG/DL (ref 1.6–2.6)
SERVICE CMNT-IMP: NORMAL
SERVICE CMNT-IMP: NORMAL

## 2022-03-08 PROCEDURE — 74011250637 HC RX REV CODE- 250/637: Performed by: INTERNAL MEDICINE

## 2022-03-08 PROCEDURE — 36415 COLL VENOUS BLD VENIPUNCTURE: CPT

## 2022-03-08 PROCEDURE — 74011000258 HC RX REV CODE- 258: Performed by: INTERNAL MEDICINE

## 2022-03-08 PROCEDURE — 74011250636 HC RX REV CODE- 250/636: Performed by: HOSPITALIST

## 2022-03-08 PROCEDURE — 74011000250 HC RX REV CODE- 250: Performed by: INTERNAL MEDICINE

## 2022-03-08 PROCEDURE — 65660000000 HC RM CCU STEPDOWN

## 2022-03-08 PROCEDURE — 77010033678 HC OXYGEN DAILY

## 2022-03-08 PROCEDURE — 99231 SBSQ HOSP IP/OBS SF/LOW 25: CPT | Performed by: NURSE PRACTITIONER

## 2022-03-08 PROCEDURE — 74011250636 HC RX REV CODE- 250/636: Performed by: INTERNAL MEDICINE

## 2022-03-08 PROCEDURE — 83735 ASSAY OF MAGNESIUM: CPT

## 2022-03-08 RX ADMIN — APIXABAN 2.5 MG: 2.5 TABLET, FILM COATED ORAL at 09:37

## 2022-03-08 RX ADMIN — SODIUM CHLORIDE, PRESERVATIVE FREE 20 ML: 5 INJECTION INTRAVENOUS at 05:55

## 2022-03-08 RX ADMIN — FUROSEMIDE 40 MG: 10 INJECTION, SOLUTION INTRAMUSCULAR; INTRAVENOUS at 09:37

## 2022-03-08 RX ADMIN — CEFTRIAXONE 1 G: 1 INJECTION, POWDER, FOR SOLUTION INTRAMUSCULAR; INTRAVENOUS at 13:39

## 2022-03-08 RX ADMIN — FUROSEMIDE 40 MG: 10 INJECTION, SOLUTION INTRAMUSCULAR; INTRAVENOUS at 21:20

## 2022-03-08 RX ADMIN — CARVEDILOL 3.12 MG: 3.12 TABLET, FILM COATED ORAL at 09:38

## 2022-03-08 RX ADMIN — SODIUM CHLORIDE, PRESERVATIVE FREE 10 ML: 5 INJECTION INTRAVENOUS at 13:39

## 2022-03-08 RX ADMIN — CARVEDILOL 3.12 MG: 3.12 TABLET, FILM COATED ORAL at 16:49

## 2022-03-08 RX ADMIN — ACETAMINOPHEN 650 MG: 325 TABLET ORAL at 16:49

## 2022-03-08 RX ADMIN — SODIUM CHLORIDE, PRESERVATIVE FREE 10 ML: 5 INJECTION INTRAVENOUS at 21:20

## 2022-03-08 RX ADMIN — ASPIRIN 81 MG: 81 TABLET, COATED ORAL at 09:37

## 2022-03-08 RX ADMIN — PANTOPRAZOLE SODIUM 40 MG: 40 TABLET, DELAYED RELEASE ORAL at 09:38

## 2022-03-08 NOTE — PROGRESS NOTES
Cardiology Progress Note        Patient: Alfredito Melchor        Sex: female          DOA: 3/2/2022  YOB: 1940      Age:  80 y.o.        LOS:  LOS: 5 days   Assessment/Plan     Principal Problem:    Acute respiratory failure with hypoxemia ([address] (3/2/2022)    Active Problems:    Elevated troponin (4/23/2021)      HTN (hypertension) (4/23/2021)      Acute on chronic diastolic congestive heart failure (HCC) (4/23/2021)      Atrial fibrillation ([address] (3/2/2022)      Stage 3 chronic kidney disease ([address] (3/3/2022)      SERENA (obstructive sleep apnea) (3/3/2022)      Adult BMI 39.0-39.9 kg/sq m (3/3/2022)        Plan:  Patient denied any chest pain   Mitral calcification without stenosis  Continue with current medical treatment    Patient continues to do well. H&H stable  Patient will need diuretic on discharge probably Lasix 40 mg twice daily  Discussed with patient and family in the room      A. fib rate controlled  Patient will need p.o. Lasix on discharge  Continue Eliquis 2.5 mg twice daily  Discussed with patient and family      Continue with Lasix 40 mg twice daily  I have long lengthy discussion with the patient and daughter about anticoagulation both of them agreed with low-dose Eliquis 2.5 mg twice daily  DC Lovenox   start Eliquis 2.5 mg twice daily from a.m. Discussed with patient and daughter findings of echocardiogram including mitral valve disease, prefer not to do transesophageal echocardiogram  Continue with current medical treatment                        Subjective:    cc:      Shortness of breath  A. fib        REVIEW OF SYSTEMS:     General: No fevers or chills. Cardiovascular: No chest pain or pressure. No palpitations.  mild ankle swelling  Pulmonary: ++SOB, orthopnea, PND  Gastrointestinal: No nausea, vomiting or diarrhea      Objective:      Visit Vitals  /78 (BP 1 Location: Left lower arm, BP Patient Position: At rest)   Pulse 91   Temp 98.3 °F (36.8 °C)   Resp 16   Ht 5' 5\" (1.651 m)   Wt 112 kg (246 lb 14.4 oz)   SpO2 99%   Breastfeeding No   BMI 41.09 kg/m²     Body mass index is 41.09 kg/m². Physical Exam:  General Appearance: Comfortable, not using accessory muscles of respiration. NECK: No JVD, no thyroidomeglay. LUNGS: Clear bilaterally. HEART: S1 irregular +S2 audible,    ABD: Non-tender, BS Audible    EXT: No edema, and no cysnosis. VASCULAR EXAM: Pulses are intact. PSYCHIATRIC EXAM: Mood is appropriate. Medication:  Current Facility-Administered Medications   Medication Dose Route Frequency    furosemide (LASIX) injection 40 mg  40 mg IntraVENous Q12H    pantoprazole (PROTONIX) tablet 40 mg  40 mg Oral ACB    apixaban (ELIQUIS) tablet 2.5 mg  2.5 mg Oral BID    sodium chloride (NS) flush 5-40 mL  5-40 mL IntraVENous Q8H    sodium chloride (NS) flush 5-40 mL  5-40 mL IntraVENous PRN    acetaminophen (TYLENOL) tablet 650 mg  650 mg Oral Q6H PRN    Or    acetaminophen (TYLENOL) suppository 650 mg  650 mg Rectal Q6H PRN    polyethylene glycol (MIRALAX) packet 17 g  17 g Oral DAILY PRN    ondansetron (ZOFRAN ODT) tablet 4 mg  4 mg Oral Q8H PRN    Or    ondansetron (ZOFRAN) injection 4 mg  4 mg IntraVENous Q6H PRN    [Held by provider] potassium chloride (K-DUR, KLOR-CON M20) SR tablet 20 mEq  20 mEq Oral DAILY    carvediloL (COREG) tablet 3.125 mg  3.125 mg Oral BID WITH MEALS    aspirin delayed-release tablet 81 mg  81 mg Oral DAILY    acetaminophen (TYLENOL) tablet 650 mg  650 mg Oral Q6H PRN               Lab/Data Reviewed:  Procedures/imaging: see electronic medical records for all procedures/Xrays   and details which were not copied into this note but were reviewed prior to creation of Plan       All lab results for the last 24 hours reviewed.      Recent Labs     03/07/22  0345 03/06/22  0319 03/05/22  0930   WBC 5.6 6.3 7.4   HGB 11.5* 11.8* 12.5   HCT 35.1 37.5 39.8    172 147     Recent Labs 03/07/22  0345 03/06/22  0319 03/05/22  0721    135* 132*   K 4.4 4.8 5.0   CL 99* 99* 101   CO2 33* 29 25   * 117* 104*   BUN 69* 74* 69*   CREA 1.87* 2.04* 2.13*   CA 9.8 10.0 9.8       RADIOLOGY:  CT Results  (Last 48 hours)               03/07/22 1140  CT HEAD WO CONT Final result    Impression:      No acute intracranial abnormality. Narrative:  EXAM: CT head       INDICATION: Altered mental status. COMPARISON: 4/23/2021. TECHNIQUE: Axial CT imaging of the head was performed without intravenous   contrast. Standard multiplanar coronal and sagittal reformatted images were   obtained and are included in interpretation. One or more dose reduction techniques were used on this CT: automated exposure   control, adjustment of the mAs and/or kVp according to patient size, and   iterative reconstruction techniques. The specific techniques used on this CT   exam have been documented in the patient's electronic medical record. Digital   Imaging and Communications in Medicine (DICOM) format image data are available   to nonaffiliated external healthcare facilities or entities on a secure, media   free, reciprocally searchable basis with patient authorization for at least a   12-month period after this study. _______________       FINDINGS:       BRAIN AND POSTERIOR FOSSA: Periventricular white matter hypoattenuation which is   nonspecific but likely represents chronic ischemic changes. No evidence of   acute large vessel transcortical infarct or acute parenchymal hemorrhage. No   midline shift or hydrocephalus. EXTRA-AXIAL SPACES AND MENINGES: There are no abnormal extra-axial fluid   collections. CALVARIUM: Intact. SINUSES: Clear.        OTHER: None.       _______________               CXR Results  (Last 48 hours)    None            Cardiology Procedures:   Results for orders placed or performed during the hospital encounter of 03/02/22   EKG, 12 LEAD, INITIAL   Result Value Ref Range    Ventricular Rate 102 BPM    QRS Duration 72 ms    Q-T Interval 336 ms    QTC Calculation (Bezet) 437 ms    Calculated R Axis -81 degrees    Calculated T Axis -20 degrees    Diagnosis       Atrial fibrillation with rapid ventricular response with premature   ventricular or aberrantly conducted complexes  Left axis deviation  Low voltage QRS  Abnormal ECG  When compared with ECG of 23-APR-2021 17:11,  Atrial fibrillation has replaced Sinus rhythm  Confirmed by Mauricio Mills MD. ([id number]) on 3/2/2022 10:26:52 PM        Echo Results  (Last 48 hours)    None       Cardiolite (Tc-99m Sestamibi) stress test    Signed By: Kassidy Hillman MD     March 7, 2022

## 2022-03-08 NOTE — PROGRESS NOTES
Hospitalist Progress Note    Patient: Charo Owens MRN: [id number]  CSN: [id number]    YOB: 1940  Age: 80 y.o. Sex: female    DOA: 3/2/2022 LOS:  LOS: 6 days          Chief Complaint:      Still Sleepy  Daughter at bedside  Questions about  Hip fracture answered  She was able to show me video of her mom walking with rolling walker and letting dog out  She has always dragged her foot     Assessment/Plan   Congestive heart failure , acute on chronic diastolic   Echo done Mitral stenosis,  Pulmonary hypertension found to be moderate-LORRAINE is deferred  Dr. Vincent Roe f/u  Trop trending down   V/q scan :Perfusion images show no segmental perfusion defects to suggest the presence of  pulmonary embolism, pvl negative for dvt   Continue monitoring renal function and K level   Low na and liquid restriction      Lethargy  Hypercapnia improved   check CT head and abg CT no stroke  ABG improving but hypoxic      UTI  Urine pos for klebsiella  Will restart Rocephin    Recent fall  Check xray of hip which showed fracture  Appreciate ortho input   Discussed quality vs quantity with daughter   Will need aggressive rehab  To improve to her previous level one week ago which was  Marginal     Elevated trop  No chest pain    Trop trending down   No acs likely demand ischemia     New afib   On On Eliquis therapeutic, coreg   tsh wnl ,  Appreciate input will check with cardiac for clearance and transition to heparin drip  Last dose was at 9 am   Of eliquis will hold second dose for now in anticipation      Elevated d dimer   V/q scan negative   pvl no dvt      SERENA   On cpap at home   Now on oxygen did not use prior to hospital    Patient Active Problem List   Diagnosis Code    Fatigue R53.83    Elevated troponin R77.8    Obesity (BMI 30-39. 9) E66.9    HTN (hypertension) I10    SERENA treated with BiPAP G47.33    Acute on chronic diastolic congestive heart failure (HCC) I50.33    Acute respiratory failure with hypoxemia ([facility] J96.01    Atrial fibrillation (HCC) I48.91    Stage 3 chronic kidney disease (HCC) N18.30    SERENA (obstructive sleep apnea) G47.33    Adult BMI 39.0-39.9 kg/sq m Z68.39       Subjective:      Daughter at bedside  Slow to answer questions  Not speaking as well as on admission  Did not wear cpap entire night  Last Co2 was high    Review of systems: limited due lethargy    Constitutional: denies fevers, chills, myalgias  Respiratory: + SOB, cough  Cardiovascular: denies chest pain, palpitations  Gastrointestinal: denies nausea, vomiting, diarrhea      Vital signs/Intake and Output:  Visit Vitals  BP 93/67 (BP 1 Location: Left arm, BP Patient Position: At rest)   Pulse 79   Temp 98.6 °F (37 °C)   Resp 16   Ht 5' 5\" (1.651 m)   Wt 105.1 kg (231 lb 9.6 oz)   SpO2 100%   Breastfeeding No   BMI 38.54 kg/m²     Current Shift:  03/08 0701 - 03/08 1900  In: -   Out: 900 [Urine:900]  Last three shifts:  03/06 1901 - 03/08 0700  In: -   Out: 4750 [Urine:4750]    Exam:    General: Well developed, alert, arousable but lethargic  Head/Neck: NCAT, supple, No masses, No lymphadenopathy  CVS:irregularly irregular ll  Lungs:Clear to auscultation bilaterally, no wheezes, rhonchi, or rales  Abdomen: Soft, Nontender, No distention, Normal Bowel sounds, No hepatomegaly  Extremities: No C/C/E, pulses palpable 2+                  Labs: Results:       Chemistry Recent Labs     03/07/22 0345 03/06/22 0319   * 117*    135*   K 4.4 4.8   CL 99* 99*   CO2 33* 29   BUN 69* 74*   CREA 1.87* 2.04*   CA 9.8 10.0   AGAP 5 7   BUCR 37* 36*    127*   TP 6.5 6.8   ALB 2.9* 3.0*   GLOB 3.6 3.8   AGRAT 0.8 0.8      CBC w/Diff Recent Labs     03/07/22  0345 03/06/22 0319   WBC 5.6 6.3   RBC 3.51* 3.76*   HGB 11.5* 11.8*   HCT 35.1 37.5    172   GRANS 64 68   LYMPH 20* 18*   EOS 1 1      Cardiac Enzymes No results for input(s): CPK, CKND1, FARA in the last 72 hours.     No lab exists for component: [place] Coagulation No results for input(s): PTP, INR, APTT, INREXT, INREXT in the last 72 hours. Lipid Panel Lab Results   Component Value Date/Time    Cholesterol, total 183 04/25/2021 04:00 PM    HDL Cholesterol 101 (H) 04/25/2021 04:00 PM    LDL, calculated 69.8 04/25/2021 04:00 PM    VLDL, calculated 12.2 04/25/2021 04:00 PM    Triglyceride 61 04/25/2021 04:00 PM    CHOL/HDL Ratio 1.8 04/25/2021 04:00 PM      BNP No results for input(s): BNPP in the last 72 hours.    Liver Enzymes Recent Labs     03/07/22  0345   TP 6.5   ALB 2.9*         Thyroid Studies Lab Results   Component Value Date/Time    TSH 0.58 03/03/2022 02:21 AM        Procedures/imaging: see electronic medical records for all procedures/Xrays and details which were not copied into this note but were reviewed prior to creation of Ariela Hobson MD

## 2022-03-08 NOTE — PROGRESS NOTES
Physician Progress Note      PATIENT:               Roge Rios  CSN #:                  [id number]  :                       1940  ADMIT DATE:       3/2/2022 8:02 PM  [address] [place] DATE:  RESPONDING  PROVIDER #:        Rosalina Coello MD          QUERY TEXT:    Dear Dr. Keary Brittle,    Pt admitted with CHF and UTI. Culture and sensitivity was performed on 3/2/2022 and results indicated resistance to Ampicillin. After further review, can this patient be considered to have: The medical record reflects the following:  Risk Factors: UTI, CKD3, KT, acute on chronic diastolic CHF    Clinical Indicators:  \"uti -  completed abx\"  per Dr. Keary Brittle, PN, 3/6. [place] Count   >100,000 COLONIES/mL  Culture result:   KLEBSIELLA PNEUMONIAE  Collected 3/2/2022 21:04  Status: Final result  resistant to ampicillin  per urine culture results, 3/2/2022. Treatment: Rocephin IV    Thank you,  [email or web address]  Options provided:  -- Ampicillin Resistance  -- No antibiotic resistance  -- Other - I will add my own diagnosis  -- Disagree - Not applicable / Not valid  -- Disagree - Clinically unable to determine / Unknown  -- Refer to Clinical Documentation Reviewer    PROVIDER RESPONSE TEXT:    This patient does not have antibiotic resistance. Query created by:  Lakhwinder Westbrook on 3/8/2022 11:24 AM      Electronically signed by:  Rosalina Coello MD 3/8/2022 5:09 PM

## 2022-03-08 NOTE — PROGRESS NOTES
Pt now with a R hip fracture, will hold PT until consulted by ortho and will rony a weight bearing order.

## 2022-03-08 NOTE — PROGRESS NOTES
Cardiology Progress Note        Patient: Joni Welch        Sex: female          DOA: 3/2/2022  YOB: 1940      Age:  80 y.o.        LOS:  LOS: 6 days   Assessment/Plan     Principal Problem:    Acute respiratory failure with hypoxemia ([address] (3/2/2022)    Active Problems:    Elevated troponin (4/23/2021)      HTN (hypertension) (4/23/2021)      Acute on chronic diastolic congestive heart failure (HCC) (4/23/2021)      Atrial fibrillation ([address] (3/2/2022)      Stage 3 chronic kidney disease ([address] (3/3/2022)      SERENA (obstructive sleep apnea) (3/3/2022)      Adult BMI 39.0-39.9 kg/sq m (3/3/2022)      Fracture of neck of right femur ([address] (3/8/2022)        Plan:  Patient is diagnosed with hip fracture and being scheduled for surgery  I have long lengthy discussion with patient and daughter Kimber Fayr in the room  Patient have negative stress test in April 2021  EF is stable  Patient is with elevated but acceptable risk for hip surgery  Eliquis can be hold  Consider DVT prophylaxis anticoagulation from tomorrow patient have taken morning dose of Eliquis      Patient denied any chest pain   Mitral calcification without stenosis  Continue with current medical treatment    Patient continues to do well. H&H stable  Patient will need diuretic on discharge probably Lasix 40 mg twice daily  Discussed with patient and family in the room      A. fib rate controlled  Patient will need p.o. Lasix on discharge  Continue Eliquis 2.5 mg twice daily  Discussed with patient and family      Continue with Lasix 40 mg twice daily  I have long lengthy discussion with the patient and daughter about anticoagulation both of them agreed with low-dose Eliquis 2.5 mg twice daily  DC Lovenox   start Eliquis 2.5 mg twice daily from a.m.     Discussed with patient and daughter findings of echocardiogram including mitral valve disease, prefer not to do transesophageal echocardiogram  Continue with current medical treatment                        Subjective:    cc:      Shortness of breath  A. fib        REVIEW OF SYSTEMS:     General: No fevers or chills. Cardiovascular: No chest pain or pressure. No palpitations. mild ankle swelling  Pulmonary: ++SOB, orthopnea, PND  Gastrointestinal: No nausea, vomiting or diarrhea      Objective:      Visit Vitals  BP (!) 128/92 (BP 1 Location: Left leg, BP Patient Position: At rest)   Pulse 95   Temp 98.7 °F (37.1 °C)   Resp 16   Ht 5' 5\" (1.651 m)   Wt 105.1 kg (231 lb 9.6 oz)   SpO2 100%   Breastfeeding No   BMI 38.54 kg/m²     Body mass index is 38.54 kg/m². Physical Exam:  General Appearance: Comfortable, not using accessory muscles of respiration. NECK: No JVD, no thyroidomeglay. LUNGS: Clear bilaterally. HEART: S1 irregular +S2 audible,    ABD: Non-tender, BS Audible    EXT: No edema, and no cysnosis. VASCULAR EXAM: Pulses are intact. PSYCHIATRIC EXAM: Mood is appropriate.     Medication:  Current Facility-Administered Medications   Medication Dose Route Frequency    cefTRIAXone (ROCEPHIN) 1 g in 0.9% sodium chloride (MBP/ADV) 50 mL MBP  1 g IntraVENous Q24H    furosemide (LASIX) injection 40 mg  40 mg IntraVENous Q12H    pantoprazole (PROTONIX) tablet 40 mg  40 mg Oral ACB    [Held by provider] apixaban (ELIQUIS) tablet 2.5 mg  2.5 mg Oral BID    sodium chloride (NS) flush 5-40 mL  5-40 mL IntraVENous Q8H    sodium chloride (NS) flush 5-40 mL  5-40 mL IntraVENous PRN    acetaminophen (TYLENOL) tablet 650 mg  650 mg Oral Q6H PRN    Or    acetaminophen (TYLENOL) suppository 650 mg  650 mg Rectal Q6H PRN    polyethylene glycol (MIRALAX) packet 17 g  17 g Oral DAILY PRN    ondansetron (ZOFRAN ODT) tablet 4 mg  4 mg Oral Q8H PRN    Or    ondansetron (ZOFRAN) injection 4 mg  4 mg IntraVENous Q6H PRN    [Held by provider] potassium chloride (K-DUR, KLOR-CON M20) SR tablet 20 mEq  20 mEq Oral DAILY    carvediloL (COREG) tablet 3.125 mg  3.125 mg Oral BID WITH MEALS    aspirin delayed-release tablet 81 mg  81 mg Oral DAILY    acetaminophen (TYLENOL) tablet 650 mg  650 mg Oral Q6H PRN               Lab/Data Reviewed:  Procedures/imaging: see electronic medical records for all procedures/Xrays   and details which were not copied into this note but were reviewed prior to creation of Plan       All lab results for the last 24 hours reviewed. Recent Labs     03/07/22  0345 03/06/22  0319   WBC 5.6 6.3   HGB 11.5* 11.8*   HCT 35.1 37.5    172     Recent Labs     03/07/22  0345 03/06/22  0319    135*   K 4.4 4.8   CL 99* 99*   CO2 33* 29   * 117*   BUN 69* 74*   CREA 1.87* 2.04*   CA 9.8 10.0       RADIOLOGY:  CT Results  (Last 48 hours)               03/07/22 1140  CT HEAD WO CONT Final result    Impression:      No acute intracranial abnormality. Narrative:  EXAM: CT head       INDICATION: Altered mental status. COMPARISON: 4/23/2021. TECHNIQUE: Axial CT imaging of the head was performed without intravenous   contrast. Standard multiplanar coronal and sagittal reformatted images were   obtained and are included in interpretation. One or more dose reduction techniques were used on this CT: automated exposure   control, adjustment of the mAs and/or kVp according to patient size, and   iterative reconstruction techniques. The specific techniques used on this CT   exam have been documented in the patient's electronic medical record. Digital   Imaging and Communications in Medicine (DICOM) format image data are available   to nonaffiliated external healthcare facilities or entities on a secure, media   free, reciprocally searchable basis with patient authorization for at least a   12-month period after this study. _______________       FINDINGS:       BRAIN AND POSTERIOR FOSSA: Periventricular white matter hypoattenuation which is   nonspecific but likely represents chronic ischemic changes.   No evidence of   acute large vessel transcortical infarct or acute parenchymal hemorrhage. No   midline shift or hydrocephalus. EXTRA-AXIAL SPACES AND MENINGES: There are no abnormal extra-axial fluid   collections. CALVARIUM: Intact. SINUSES: Clear.        OTHER: None.       _______________               CXR Results  (Last 48 hours)    None            Cardiology Procedures:   Results for orders placed or performed during the hospital encounter of 03/02/22   EKG, 12 LEAD, INITIAL   Result Value Ref Range    Ventricular Rate 102 BPM    QRS Duration 72 ms    Q-T Interval 336 ms    QTC Calculation (Bezet) 437 ms    Calculated R Axis -81 degrees    Calculated T Axis -20 degrees    Diagnosis       Atrial fibrillation with rapid ventricular response with premature   ventricular or aberrantly conducted complexes  Left axis deviation  Low voltage QRS  Abnormal ECG  When compared with ECG of 23-APR-2021 17:11,  Atrial fibrillation has replaced Sinus rhythm  Confirmed by James Romero MD. ([id number]) on 3/2/2022 10:26:52 PM        Echo Results  (Last 48 hours)    None       Cardiolite (Tc-99m Sestamibi) stress test    Signed By: Karlos Hay MD     March 8, 2022

## 2022-03-08 NOTE — PROGRESS NOTES
Palliative Medicine Consult    Patient Name: Connie Andres  YOB: 1940    Date of Initial Consult: 3/3/2022  Date of follow-up: 3/8/2022  Reason for Consult: Goals of care discussions  Requesting Provider: Dr. Jack Pearson  Primary Care Physician: Kamran Ghosh NP      SUMMARY:   Connie Andres is a 80 y.o. with a past history of congestive heart failure, sleep apnea, right leg weakness, and CKD stage 3, who was admitted on 3/2/2022 from home with a diagnosis of New onset A-fib, and acute on chronic diastolic congestive heart failure. Current medical issues leading to Palliative Medicine involvement include: Support and goals of care discussions. 3/4/2022: Patient is awake, alert, and oriented x4. No family present. States she did not sleep well last night due to the hospital CPAP machine being louder than when she is used to at home. 3/7/2022: Patient was drowsy and not engaged in goals of care conversations today. 3/8/2022: Patient is awake, alert, talking to her brother on the phone. Patient and daughter agreeable to POST form completion to protect patient's healthcare wishes. PALLIATIVE DIAGNOSES:   1. Goals of care discussions  2. New onset A-fib  3. Acute on chronic diastolic congestive heart failure  4. Advanced age/debility       PLAN:   3/8/2022: Palliative medicine team including Agusto Deras RN and I met with patient at patient's bedside. Daughter/AUBREY Carl also present. Patient and daughter agreeable to POST form completion to protect patient's healthcare wishes. Patient deferred signing to daughter/AUBREY Solares. Marlena signed POST Form with the following measures: DNR/DNI, limited interventions, no feeding tubes. Will sign off as goals of care have been established. Please re-consult should it be warranted. Thank you for the opportunity to provide care to this patient.      See previous discussions below:    3/7/2022: Palliative medicine team including Anayeli Colindres GLENNA Marlow and I met with patient at patient's bedside. Also present was patient's daughter/MPOA/legal next of kin Arpit. Reviewed previous conversations had (see below). Marlena did bring AMD naming herself Flavia Hammonds) as patient's medical power of . Copy placed on paper chart. Reviewed patient's previous wish for DNR/DNI. Daughter is supportive, and states that this has been patient's consistent wish. Patient was drowsy and not engaged in goals of care conversations today. POST form reviewed daughter, she will review with her mother once she is less drowsy, and we will follow up tomorrow for POST Form completion. At this time continue DNR/DNI. See previous discussions below per palliative team    3/4/2022: Palliative medicine team including John Vazquez RN and I met with patient at patient's bedside. Patient is awake, alert, and oriented x4. No family present. Followed up on goals of care discussions from yesterday (see below). Family is still looking at current documentation regarding AMD and DO NOT RESUSCITATE paperwork. Patient is not comfortable signing any paperwork until she is able to review her current paperwork. Explained that we will honor the DNR/DNI order while she is an inpatient, however when she discharges she needs paperwork to protect her. She did not wish to complete new documents at this time. We will follow up next week if she remains inpatient. Advised patient that if she gets discharged prior to document completion, she can complete this paperwork to protect her wishes with her primary care provider. At this time continue DNR/DNI. See previous discussions below    3/3/2022: Goals of care: Palliative medicine team including  CECILIO Garcia and I met with patient at patient's bedside. Also present was patient's sister Anayeli Rojas. Patient is awake, alert, oriented x4.   She states that her breathing has improved since admission, overall feels well.  Introduced the role of palliative medicine. Patient states she has completed an AMD before naming her daughter Dorene Solares as medical power of . Patient is not , and her daughter Musa Cral is legal NOK. We do not have a copy of this AMD on file, she will ask her daughter if she has a copy. Reconfirmed goals of care for DNR/DNI. She is unsure if she has ever completed a DDNR. She states her daughter is aware and supportive of her healthcare wishes. Patient and patient's sister state that they will review patient's paperwork with daughter today, and patient is willing to complete any necessary forms tomorrow to protect her wishes for DNR/DNI. We will follow up tomorrow once she has opportunity to review her current paperwork. 1. New onset A. fib: Echo pending, cardiology consulted by primary team.  She denies pain, shortness of breath has improved since admission. 2. Acute on chronic diastolic congestive heart failure: Echo results pending, cardiology has been consulted. Had a VQ scan which showed no presence of a pulmonary embolism. She is on Lasix, which has been reduced due to worsening renal function. Monitoring renal function per primary team.  3. Advanced age/debility: 44-year-old female, lives with her daughter Musa Carl. Ambulatory short distances, needs assist with functional ADLs. 4. Initial consult note routed to primary continuity provider  5.  Communicated plan of care with: Palliative IDT       GOALS OF CARE / TREATMENT PREFERENCES:   [====Goals of Care====]  GOALS OF CARE: DNR/DNI, limited interventions, no feeding tubes  Patient/Health Care Proxy Stated Goals: Prolong life      TREATMENT PREFERENCES:   Code Status: DNR    Advance Care Planning:  Advance Care Planning 3/3/2022   Patient's Healthcare Decision Maker is: -   Confirm Advance Directive Yes, on file   Patient Would Like to Complete Advance Directive No   Does the patient have other document types Do Not Resuscitate       Medical Interventions: Limited additional interventions     Artificially Administered Nutrition: No feeding tube      The palliative care team has discussed with patient / health care proxy about goals of care / treatment preferences for patient.  [====Goals of Care====]         HISTORY:     History obtained from: patient, family, chart    CHIEF COMPLAINT: generalized weakness, shortness of breath with has improved since hospitalization     HPI/SUBJECTIVE:    The patient is:   [x] Verbal and participatory  [] Non-participatory due to:   Oriented x 4     Clinical Pain Assessment (nonverbal scale for severity on nonverbal patients):   Clinical Pain Assessment  Severity: 0            FUNCTIONAL ASSESSMENT:     Palliative Performance Scale (PPS):  PPS: 50       PSYCHOSOCIAL/SPIRITUAL SCREENING:     Advance Care Planning:  Advance Care Planning 3/3/2022   Patient's Healthcare Decision Maker is: -   Confirm Advance Directive Yes, on file   Patient Would Like to Complete Advance Directive No   Does the patient have other document types Do Not Resuscitate        Any spiritual / Hindu concerns:  [] Yes /  [x] No    Caregiver Burnout:  [] Yes /  [] No /  [x] No Caregiver Present      Anticipatory grief assessment:   [x] Normal  / [] Maladaptive            REVIEW OF SYSTEMS:     Positive and pertinent negative findings in ROS are noted above in HPI. The following systems were [x] reviewed / [] unable to be reviewed as noted in HPI  Other findings are noted below. Systems: constitutional, ears/nose/mouth/throat, respiratory, gastrointestinal, genitourinary, musculoskeletal, integumentary, neurologic, psychiatric, endocrine. Positive findings noted below.   Modified ESAS Completed by: provider   Fatigue: 5       Pain: 0   Anxiety: 0 Nausea: 0     Dyspnea: 0     Constipation: No              PHYSICAL EXAM:     From RN flowsheet:  Wt Readings from Last 3 Encounters:   03/08/22 105.1 kg (231 lb 9.6 oz) 04/25/21 101.6 kg (224 lb)   10/01/17 125.6 kg (277 lb)     Blood pressure 138/89, pulse 91, temperature 98.1 °F (36.7 °C), resp. rate 16, height 5' 5\" (1.651 m), weight 105.1 kg (231 lb 9.6 oz), SpO2 95 %, not currently breastfeeding. Pain Scale 1: Numeric (0 - 10)  Pain Intensity 1: 0     Pain Location 1: Hip  Pain Orientation 1: Right  Pain Description 1: Aching  Pain Intervention(s) 1: Medication (see MAR)      Constitutional: awake, NAD, appears stated age, obese  Eyes: pupils equal, anicteric  ENMT: no nasal discharge, moist mucous membranes  Cardiovascular: regular rhythm, distal pulses intact  Respiratory: breathing not labored, symmetric  Gastrointestinal: soft non-tender   Musculoskeletal: no deformity, no tenderness to palpation  Skin: warm, dry  Neurologic: following commands, moving all extremities, oriented x 4  Psychiatric: full affect, no hallucinations       HISTORY:     Principal Problem:    Acute respiratory failure with hypoxemia (HCC) (3/2/2022)    Active Problems:    Elevated troponin (4/23/2021)      HTN (hypertension) (4/23/2021)      Acute on chronic diastolic congestive heart failure (HCC) (4/23/2021)      Atrial fibrillation (HCC) (3/2/2022)      Stage 3 chronic kidney disease (HCC) (3/3/2022)      SERENA (obstructive sleep apnea) (3/3/2022)      Adult BMI 39.0-39.9 kg/sq m (3/3/2022)      Past Medical History:   Diagnosis Date    Hypertension     Sleep disorder       Past Surgical History:   Procedure Laterality Date    HX ORTHOPAEDIC      broken right ankle, left femur 30 yrs ago      Family History   Problem Relation Age of Onset    Diabetes Mother     Heart Disease Mother     Heart Disease Father       History reviewed, no pertinent family history. Social History     Tobacco Use    Smoking status: Never Smoker    Smokeless tobacco: Never Used   Substance Use Topics    Alcohol use:  Yes     Alcohol/week: 1.0 standard drink     Types: 1 Glasses of wine per week     Comment: soc     Allergies   Allergen Reactions    Seafood Hives     crabs    Statins-Hmg-Coa Reductase Inhibitors Unknown (comments)    Sulfa (Sulfonamide Antibiotics) Hives      Current Facility-Administered Medications   Medication Dose Route Frequency    furosemide (LASIX) injection 40 mg  40 mg IntraVENous Q12H    pantoprazole (PROTONIX) tablet 40 mg  40 mg Oral ACB    apixaban (ELIQUIS) tablet 2.5 mg  2.5 mg Oral BID    sodium chloride (NS) flush 5-40 mL  5-40 mL IntraVENous Q8H    sodium chloride (NS) flush 5-40 mL  5-40 mL IntraVENous PRN    acetaminophen (TYLENOL) tablet 650 mg  650 mg Oral Q6H PRN    Or    acetaminophen (TYLENOL) suppository 650 mg  650 mg Rectal Q6H PRN    polyethylene glycol (MIRALAX) packet 17 g  17 g Oral DAILY PRN    ondansetron (ZOFRAN ODT) tablet 4 mg  4 mg Oral Q8H PRN    Or    ondansetron (ZOFRAN) injection 4 mg  4 mg IntraVENous Q6H PRN    [Held by provider] potassium chloride (K-DUR, KLOR-CON M20) SR tablet 20 mEq  20 mEq Oral DAILY    carvediloL (COREG) tablet 3.125 mg  3.125 mg Oral BID WITH MEALS    aspirin delayed-release tablet 81 mg  81 mg Oral DAILY    acetaminophen (TYLENOL) tablet 650 mg  650 mg Oral Q6H PRN          LAB AND IMAGING FINDINGS:     Lab Results   Component Value Date/Time    WBC 5.6 03/07/2022 03:45 AM    HGB 11.5 (L) 03/07/2022 03:45 AM    PLATELET 467 35/03/1982 03:45 AM     Lab Results   Component Value Date/Time    Sodium 137 03/07/2022 03:45 AM    Potassium 4.4 03/07/2022 03:45 AM    Chloride 99 (L) 03/07/2022 03:45 AM    CO2 33 (H) 03/07/2022 03:45 AM    BUN 69 (H) 03/07/2022 03:45 AM    Creatinine 1.87 (H) 03/07/2022 03:45 AM    Calcium 9.8 03/07/2022 03:45 AM    Magnesium 1.9 03/08/2022 04:50 AM    Phosphorus 3.7 03/07/2022 03:45 AM      Lab Results   Component Value Date/Time    Alk.  phosphatase 113 03/07/2022 03:45 AM    Protein, total 6.5 03/07/2022 03:45 AM    Albumin 2.9 (L) 03/07/2022 03:45 AM    Globulin 3.6 03/07/2022 03:45 AM Lab Results   Component Value Date/Time    INR 1.2 03/02/2022 08:48 PM    Prothrombin time 14.1 03/02/2022 08:48 PM    aPTT 25.3 03/02/2022 08:48 PM      No results found for: IRON, FE, TIBC, IBCT, PSAT, FERR   No results found for: PH, PCO2, PO2  No components found for: [place]   Lab Results   Component Value Date/Time     04/24/2021 01:53 PM    CK - MB 1.6 04/24/2021 01:53 PM                Total time: 15 minutes  Counseling / coordination time, spent as noted above:   > 50% counseling / coordination?: yes, patient and family     Prolonged service was provided for  []30 min   []75 min in face to face time in the presence of the patient, spent as noted above. Time Start:   Time End:   Note: this can only be billed with 71507 (initial) or 21661 (follow up). If multiple start / stop times, list each separately.

## 2022-03-08 NOTE — CONSULTS
Consult Note    Patient: Valentin Gan               Sex: female          DOA: 3/2/2022         YOB: 1940      Age:  80 y.o.        LOS:  LOS: 6 days              HPI:     Valentin Gan is a 80 y.o. female who has been seen for right hip pain. Pt not answering questions but daughter present. Pt normally not very mobile but alert and MS intact. Here for Afib and CHF. Past Medical History:   Diagnosis Date    Hypertension     Sleep disorder        Past Surgical History:   Procedure Laterality Date    HX ORTHOPAEDIC      broken right ankle, left femur 30 yrs ago       Family History   Problem Relation Age of Onset    Diabetes Mother     Heart Disease Mother     Heart Disease Father        Social History     Socioeconomic History    Marital status: SINGLE   Tobacco Use    Smoking status: Never Smoker    Smokeless tobacco: Never Used   Vaping Use    Vaping Use: Never used   Substance and Sexual Activity    Alcohol use: Yes     Alcohol/week: 1.0 standard drink     Types: 1 Glasses of wine per week     Comment: soc    Drug use: No       Prior to Admission medications    Medication Sig Start Date End Date Taking? Authorizing Provider   allopurinoL (ZYLOPRIM) 100 mg tablet Take 100 mg by mouth daily. Yes Provider, Historical   acetaminophen (TYLENOL) 325 mg tablet Take 650 mg by mouth every six (6) hours as needed for Pain. Yes Provider, Historical   niacin (NIASPAN) 1,000 mg Tb24 tab Take 1,000 mg by mouth daily. Yes Provider, Historical   trolamine salicylate-aloe vera 72% (ASPERCREME) topical cream Apply 2 g to affected area as needed for Pain. Yes Provider, Historical   multivitamin, tx-iron-ca-min (THERA-M w/ IRON) 9 mg iron-400 mcg tab tablet Take 1 Tab by mouth daily. Yes Provider, Historical   aspirin delayed-release 81 mg tablet Take 81 mg by mouth daily. Yes Provider, Historical   potassium chloride SR (KLOR-CON 10) 10 mEq tablet Take 10 mEq by mouth daily.    Yes Provider, Historical   carvediloL (COREG) 3.125 mg tablet Take 3.125 mg by mouth daily. Yes Provider, Historical       Allergies   Allergen Reactions    Seafood Hives     crabs    Statins-Hmg-Coa Reductase Inhibitors Unknown (comments)    Sulfa (Sulfonamide Antibiotics) Hives       Review of Systems  Pertinent items are noted in the History of Present Illness. Physical Exam:      Visit Vitals  /89 (BP 1 Location: Left arm, BP Patient Position: At rest)   Pulse 91   Temp 98.1 °F (36.7 °C)   Resp 16   Ht 5' 5\" (1.651 m)   Wt 105.1 kg (231 lb 9.6 oz)   SpO2 95%   Breastfeeding No   BMI 38.54 kg/m²       Physical Exam:  no motion right foot, does not follow commands well    Labs Reviewed: All lab results for the last 24 hours reviewed. Assessment/Plan     Principal Problem:    Acute respiratory failure with hypoxemia (HCC) (3/2/2022)    Active Problems:    Elevated troponin (4/23/2021)      HTN (hypertension) (4/23/2021)      Acute on chronic diastolic congestive heart failure (HCC) (4/23/2021)      Atrial fibrillation ([facility] (3/2/2022)      Stage 3 chronic kidney disease ([facility] [facility] (3/3/2022)      SERENA (obstructive sleep apnea) (3/3/2022)      Adult BMI 39.0-39.9 kg/sq m (3/3/2022)        Displaced right subcaptial fracture  Previous foot drop, LLD  Afib  Mental status changes  Limited mobility    Discussed with daughter operative vs non operative. On eliquis will need to discuss holding for possible Caden arthroplasty    Will discuss with .

## 2022-03-08 NOTE — PROGRESS NOTES
03/07/22 2004   Oxygen Therapy   O2 Sat (%) 99 %   Pulse via Oximetry 71 beats per minute   O2 Device Nasal cannula   O2 Flow Rate (L/min) 2 l/min  (Reduced form 3lpm)

## 2022-03-08 NOTE — PROGRESS NOTES
DC Plan:  SNF    Care manager met with daughter Gabriela Solorzano at bedside; reviewed with her that patient has been accepted to [facility] and [facility]; daughter does not want any of these and requested search extend to [place] facilities to include [address] [place] [address] and [facility]; transfer consideration on hold at this time until ortho evaluates right hip fx for treatment intervention,    Care Management Interventions  PCP Verified by CM:  (Dr. Shayne Wakefield (female) )  Mode of Transport at Discharge: BLS (.)  Transition of Care Consult (CM Consult): SN[facility] has stated that according to the current therapy notes, she is not Acute inpt rehab appropriate. SNF choices obtained from the pt's dtr;  [facility] [place]. )  Partner SNF: No  Discharge Durable Medical Equipment:  (TBD)  Physical Therapy Consult: Yes  Occupational Therapy Consult: Yes  Support Systems: Child(doc),Other Family Member(s)  Confirm Follow Up Transport: Family  The Plan for Transition of Care is Related to the Following Treatment Goals : skilled nursing facility  The Patient and/or Patient Representative was Provided with a Choice of Provider and Agrees with the Discharge Plan?: Yes (The pt and her dtr: Marlena)  Freedom of Choice List was Provided with Basic Dialogue that Supports the Patient's Individualized Plan of Care/Goals, Treatment Preferences and Shares the Quality Data Associated with the Providers?: Yes ([facility] worth)  Discharge Location  Patient Expects to be Discharged to[de-identified] Skilled nursing facility

## 2022-03-08 NOTE — PROGRESS NOTES
Occupational Therapy Treatment Attempt     Chart reviewed. Attempted Occupational Therapy Treatment, however, patient unable to be seen due to:  []  Nausea/vomiting  []  Eating  []  Pain  []  Patient too lethargic  []  Off Unit for testing/procedure  []  Dialysis treatment in progress  []  Telemetry Results  [x]  Other: pt now with R hip fracture. Per ortho consult, On eliquis will need to discuss holding for possible Caden arthroplasty. Will f/u tomorrow as patient's schedule allows.   Desi Saavedra, OTR/L

## 2022-03-09 ENCOUNTER — ANESTHESIA EVENT (OUTPATIENT)
Dept: SURGERY | Age: 82
DRG: 981 | End: 2022-03-09
Payer: MEDICARE

## 2022-03-09 LAB
ALBUMIN SERPL-MCNC: 2.8 G/DL (ref 3.4–5)
ALBUMIN/GLOB SERPL: 0.8 {RATIO} (ref 0.8–1.7)
ALP SERPL-CCNC: 118 U/L (ref 45–117)
ALT SERPL-CCNC: 62 U/L (ref 13–56)
ANION GAP SERPL CALC-SCNC: 1 MMOL/L (ref 3–18)
AST SERPL-CCNC: 21 U/L (ref 10–38)
BASOPHILS # BLD: 0 K/UL (ref 0–0.1)
BASOPHILS NFR BLD: 0 % (ref 0–2)
BILIRUB SERPL-MCNC: 0.5 MG/DL (ref 0.2–1)
BUN SERPL-MCNC: 64 MG/DL (ref 7–18)
BUN/CREAT SERPL: 46 (ref 12–20)
CALCIUM SERPL-MCNC: 10.3 MG/DL (ref 8.5–10.1)
CHLORIDE SERPL-SCNC: 101 MMOL/L (ref 100–111)
CO2 SERPL-SCNC: 41 MMOL/L (ref 21–32)
CREAT SERPL-MCNC: 1.39 MG/DL (ref 0.6–1.3)
DIFFERENTIAL METHOD BLD: ABNORMAL
EOSINOPHIL # BLD: 0.1 K/UL (ref 0–0.4)
EOSINOPHIL NFR BLD: 1 % (ref 0–5)
ERYTHROCYTE [DISTWIDTH] IN BLOOD BY AUTOMATED COUNT: 14.8 % (ref 11.6–14.5)
GLOBULIN SER CALC-MCNC: 3.4 G/DL (ref 2–4)
GLUCOSE SERPL-MCNC: 127 MG/DL (ref 74–99)
HCT VFR BLD AUTO: 40 % (ref 35–45)
HGB BLD-MCNC: 11.8 G/DL (ref 12–16)
IMM GRANULOCYTES # BLD AUTO: 0 K/UL (ref 0–0.04)
IMM GRANULOCYTES NFR BLD AUTO: 1 % (ref 0–0.5)
LYMPHOCYTES # BLD: 0.8 K/UL (ref 0.9–3.6)
LYMPHOCYTES NFR BLD: 10 % (ref 21–52)
MAGNESIUM SERPL-MCNC: 2 MG/DL (ref 1.6–2.6)
MCH RBC QN AUTO: 30.2 PG (ref 24–34)
MCHC RBC AUTO-ENTMCNC: 29.5 G/DL (ref 31–37)
MCV RBC AUTO: 102.3 FL (ref 78–100)
MONOCYTES # BLD: 1.2 K/UL (ref 0.05–1.2)
MONOCYTES NFR BLD: 16 % (ref 3–10)
NEUTS SEG # BLD: 5.7 K/UL (ref 1.8–8)
NEUTS SEG NFR BLD: 74 % (ref 40–73)
NRBC # BLD: 0 K/UL (ref 0–0.01)
NRBC BLD-RTO: 0 PER 100 WBC
PLATELET # BLD AUTO: 203 K/UL (ref 135–420)
PMV BLD AUTO: 10.3 FL (ref 9.2–11.8)
POTASSIUM SERPL-SCNC: 4.4 MMOL/L (ref 3.5–5.5)
PROT SERPL-MCNC: 6.2 G/DL (ref 6.4–8.2)
RBC # BLD AUTO: 3.91 M/UL (ref 4.2–5.3)
SODIUM SERPL-SCNC: 143 MMOL/L (ref 136–145)
WBC # BLD AUTO: 7.7 K/UL (ref 4.6–13.2)

## 2022-03-09 PROCEDURE — 74011250636 HC RX REV CODE- 250/636: Performed by: HOSPITALIST

## 2022-03-09 PROCEDURE — 77010033678 HC OXYGEN DAILY

## 2022-03-09 PROCEDURE — 85025 COMPLETE CBC W/AUTO DIFF WBC: CPT

## 2022-03-09 PROCEDURE — 74011000250 HC RX REV CODE- 250: Performed by: INTERNAL MEDICINE

## 2022-03-09 PROCEDURE — 74011250636 HC RX REV CODE- 250/636: Performed by: INTERNAL MEDICINE

## 2022-03-09 PROCEDURE — P9047 ALBUMIN (HUMAN), 25%, 50ML: HCPCS | Performed by: INTERNAL MEDICINE

## 2022-03-09 PROCEDURE — 74011000258 HC RX REV CODE- 258: Performed by: INTERNAL MEDICINE

## 2022-03-09 PROCEDURE — 65660000000 HC RM CCU STEPDOWN

## 2022-03-09 PROCEDURE — 83735 ASSAY OF MAGNESIUM: CPT

## 2022-03-09 PROCEDURE — 92526 ORAL FUNCTION THERAPY: CPT

## 2022-03-09 PROCEDURE — 92610 EVALUATE SWALLOWING FUNCTION: CPT

## 2022-03-09 PROCEDURE — 74011250637 HC RX REV CODE- 250/637: Performed by: INTERNAL MEDICINE

## 2022-03-09 PROCEDURE — 80053 COMPREHEN METABOLIC PANEL: CPT

## 2022-03-09 PROCEDURE — 36415 COLL VENOUS BLD VENIPUNCTURE: CPT

## 2022-03-09 RX ORDER — NYSTATIN 100000 [USP'U]/ML
500000 SUSPENSION ORAL 4 TIMES DAILY
Status: DISCONTINUED | OUTPATIENT
Start: 2022-03-09 | End: 2022-03-24 | Stop reason: HOSPADM

## 2022-03-09 RX ORDER — ALBUMIN HUMAN 250 G/1000ML
25 SOLUTION INTRAVENOUS ONCE
Status: COMPLETED | OUTPATIENT
Start: 2022-03-09 | End: 2022-03-10

## 2022-03-09 RX ORDER — SODIUM CHLORIDE 9 MG/ML
250 INJECTION, SOLUTION INTRAVENOUS ONCE
Status: COMPLETED | OUTPATIENT
Start: 2022-03-09 | End: 2022-03-10

## 2022-03-09 RX ADMIN — SODIUM CHLORIDE, PRESERVATIVE FREE 20 ML: 5 INJECTION INTRAVENOUS at 06:00

## 2022-03-09 RX ADMIN — SODIUM CHLORIDE 250 ML: 9 INJECTION, SOLUTION INTRAVENOUS at 16:51

## 2022-03-09 RX ADMIN — ALBUMIN (HUMAN) 25 G: 0.25 INJECTION, SOLUTION INTRAVENOUS at 16:51

## 2022-03-09 RX ADMIN — FUROSEMIDE 40 MG: 10 INJECTION, SOLUTION INTRAMUSCULAR; INTRAVENOUS at 22:01

## 2022-03-09 RX ADMIN — NYSTATIN 500000 UNITS: 100000 SUSPENSION ORAL at 13:22

## 2022-03-09 RX ADMIN — PANTOPRAZOLE SODIUM 40 MG: 40 TABLET, DELAYED RELEASE ORAL at 08:22

## 2022-03-09 RX ADMIN — CARVEDILOL 3.12 MG: 3.12 TABLET, FILM COATED ORAL at 08:22

## 2022-03-09 RX ADMIN — CEFTRIAXONE 1 G: 1 INJECTION, POWDER, FOR SOLUTION INTRAMUSCULAR; INTRAVENOUS at 13:22

## 2022-03-09 RX ADMIN — SODIUM CHLORIDE, PRESERVATIVE FREE 10 ML: 5 INJECTION INTRAVENOUS at 22:01

## 2022-03-09 RX ADMIN — ACETAMINOPHEN 650 MG: 325 TABLET ORAL at 13:22

## 2022-03-09 RX ADMIN — NYSTATIN 500000 UNITS: 100000 SUSPENSION ORAL at 18:53

## 2022-03-09 RX ADMIN — FUROSEMIDE 40 MG: 10 INJECTION, SOLUTION INTRAMUSCULAR; INTRAVENOUS at 08:23

## 2022-03-09 RX ADMIN — ASPIRIN 81 MG: 81 TABLET, COATED ORAL at 08:22

## 2022-03-09 RX ADMIN — SODIUM CHLORIDE, PRESERVATIVE FREE 10 ML: 5 INJECTION INTRAVENOUS at 13:23

## 2022-03-09 NOTE — CONSULTS
Consult Note    Patient: Joni Welch               Sex: female          DOA: 3/2/2022         YOB: 1940      Age:  80 y.o.        LOS:  LOS: 7 days              HPI:     Joni Welch is a 80 y.o. female who has been seen for right hip pain. Patient's daughter and sister in the room. Patient is sleeping comfortably  Here for Afib and CHF. Past Medical History:   Diagnosis Date    Hypertension     Sleep disorder        Past Surgical History:   Procedure Laterality Date    HX ORTHOPAEDIC      broken right ankle, left femur 30 yrs ago       Family History   Problem Relation Age of Onset    Diabetes Mother     Heart Disease Mother     Heart Disease Father        Social History     Socioeconomic History    Marital status: SINGLE   Tobacco Use    Smoking status: Never Smoker    Smokeless tobacco: Never Used   Vaping Use    Vaping Use: Never used   Substance and Sexual Activity    Alcohol use: Yes     Alcohol/week: 1.0 standard drink     Types: 1 Glasses of wine per week     Comment: soc    Drug use: No       Prior to Admission medications    Medication Sig Start Date End Date Taking? Authorizing Provider   allopurinoL (ZYLOPRIM) 100 mg tablet Take 100 mg by mouth daily. Yes Provider, Historical   acetaminophen (TYLENOL) 325 mg tablet Take 650 mg by mouth every six (6) hours as needed for Pain. Yes Provider, Historical   niacin (NIASPAN) 1,000 mg Tb24 tab Take 1,000 mg by mouth daily. Yes Provider, Historical   trolamine salicylate-aloe vera 09% (ASPERCREME) topical cream Apply 2 g to affected area as needed for Pain. Yes Provider, Historical   multivitamin, tx-iron-ca-min (THERA-M w/ IRON) 9 mg iron-400 mcg tab tablet Take 1 Tab by mouth daily. Yes Provider, Historical   aspirin delayed-release 81 mg tablet Take 81 mg by mouth daily. Yes Provider, Historical   potassium chloride SR (KLOR-CON 10) 10 mEq tablet Take 10 mEq by mouth daily.    Yes Provider, Historical   carvediloL (COREG) 3.125 mg tablet Take 3.125 mg by mouth daily. Yes Provider, Historical       Allergies   Allergen Reactions    Seafood Hives     crabs    Statins-Hmg-Coa Reductase Inhibitors Unknown (comments)    Sulfa (Sulfonamide Antibiotics) Hives       Review of Systems  Pertinent items are noted in the History of Present Illness. Physical Exam:      Visit Vitals  BP (!) 119/59   Pulse 86   Temp 97.2 °F (36.2 °C)   Resp 16   Ht 5' 5\" (1.651 m)   Wt 104.2 kg (229 lb 12.8 oz)   SpO2 100%   Breastfeeding No   BMI 38.24 kg/m²       Physical Exam:  no motion right foot, does not follow commands well. Patient patient's right hip is shortened and externally rotated. She does have good DP and PT pulses. Neurologic exam unable to be performed as patient is sleeping. Labs Reviewed: All lab results for the last 24 hours reviewed. Assessment/Plan     Principal Problem:    Acute respiratory failure with hypoxemia ([address] (3/2/2022)    Active Problems:    Elevated troponin (4/23/2021)      HTN (hypertension) (4/23/2021)      Acute on chronic diastolic congestive heart failure (HCC) (4/23/2021)      Atrial fibrillation ([address] (3/2/2022)      Stage 3 chronic kidney disease ([address] (3/3/2022)      SERENA (obstructive sleep apnea) (3/3/2022)      Adult BMI 39.0-39.9 kg/sq m (3/3/2022)      Fracture of neck of right femur ([address] (3/8/2022)        Displaced right subcaptial fracture  Previous foot drop, LLD  Afib  Mental status changes  Limited mobility    Eliquis stopped yesterday. I discussed the case in detail with the patient's daughter. Dr. Viri Mills has reviewed the x-rays. Plan is for medical optimization. Plan is to proceed with right total hip arthroplasty versus possible hemiarthroplasty tomorrow. Patient will be n.p.o. after midnight. The risks associated with procedure were reviewed and all questions were answered. The patient's daughter understands and is willing to proceed.     Case discussed with Dr. Viri Mills who is in agreement with the above.

## 2022-03-09 NOTE — PROGRESS NOTES
1632-Patient BP trending low. MD paged. Coreg to be held per MD. Other orders to be placed. Will continue to monitor.

## 2022-03-09 NOTE — PROGRESS NOTES
Chart reviewed for OT session. Pt scheduled for surgery tomorrow; BLAIR vs possible hemiarthroplasty. Please re-order therapy post procedure.    Thank you for the referral.     Alpa Krueger, OTR/L

## 2022-03-09 NOTE — PROGRESS NOTES
Bedside and Verbal shift change report given to Sandy García RN (oncoming nurse) by Lynne Adler RN (offgoing nurse). Report included the following information SBAR, Kardex, Intake/Output, MAR and Recent Results.

## 2022-03-09 NOTE — ANESTHESIA PREPROCEDURE EVALUATION
Relevant Problems   RESPIRATORY SYSTEM   (+) SERENA (obstructive sleep apnea)   (+) SERENA treated with BiPAP      CARDIOVASCULAR   (+) Acute on chronic diastolic congestive heart failure (HCC)   (+) Atrial fibrillation (HCC)   (+) HTN (hypertension)      RENAL FAILURE   (+) Stage 3 chronic kidney disease (HCC)       Anesthetic History        Pertinent negatives: No PONV       Review of Systems / Medical History  Patient summary reviewed, nursing notes reviewed and pertinent labs reviewed    Pulmonary        Sleep apnea: BiPAP      Pertinent negatives: No smoker     Neuro/Psych              Cardiovascular    Hypertension      CHF  Dysrhythmias : atrial fibrillation      Exercise tolerance: <4 METS: Mobility problems over last several months  Comments: Echo 3/3/22: LVH EF 55-60%, no aortic stenosis,  mitral valve calcification without stenosis, moderate pulmonary HTN    Stress Test 20181.   Small-to-moderate area of inferior and apical infarction with very   minimal ischemia. 2.   Gated SPECT image showed normal wall motion with an estimated   ejection fraction of 59%. Cleared by cardiology with elevated but not prohibitive risk. GI/Hepatic/Renal         Renal disease (stage 3 CKD): CRI       Endo/Other        Obesity and morbid obesity     Other Findings            Physical Exam    Airway  Mallampati: III  TM Distance: < 4 cm  Neck ROM: normal range of motion   Mouth opening: Normal     Cardiovascular    Rhythm: irregular  Rate: abnormal        Comments: afib with HR 90's to 100's Dental         Pulmonary  Breath sounds clear to auscultation               Abdominal  GI exam deferred       Other Findings            Anesthetic Plan    ASA: 4  Anesthesia type: general          Induction: Intravenous  Anesthetic plan and risks discussed with: Patient      Options of general anesthesia vs. spinal block discussed with daughter since pt is not responding appropriately to questions.   Risks associated with spinal block and PENG block include but not limited to pain, bleeding/hematoma formation, infection/abcess, block failure or procedure duration outlasting block duration necessitating conversion to general anesthesia, and nerve irritation/damage (transient or permanent) resulting from trauma, infection and/or bleeding. Pt with newly diagnosed afib with RVR and placed on eliquis. Last dose eliquis 3/8/22. CKD stage 3 with GFR 27-31. According to MARY guidelines, minimum time between last dose of eliquis and neuraxial injection based on patient's GFR is 72 hours. Because only 48 hours will have elapsed from the time of her last eliquis dose to her surgical time, neuraxial anesthesia contraindicated. Will discuss PENG block for post-op pain with patient. PENG block discussed but after U/S scanning aborted due to inability to distinguish landmarks due to body habitus.

## 2022-03-09 NOTE — PROGRESS NOTES
Hospitalist Progress Note    Patient: Madie Barriga MRN: [id number]  CSN: [id number]    YOB: 1940  Age: 80 y.o. Sex: female    DOA: 3/2/2022 LOS:  LOS: 7 days          Chief Complaint:    Singing Anabaptist songs with sister at bedside for surgery tomorrow      Assessment/Plan   Congestive heart failure , acute on chronic diastolic   Echo done Mitral stenosis,  Pulmonary hypertension found to be moderate-LORRAINE is deferred  Dr. Jaylene Nicolas f/u  Trop trending down   V/q scan :Perfusion images show no segmental perfusion defects to suggest the presence of  pulmonary embolism, pvl negative for dvt   Continue monitoring renal function and K level   Low na and liquid restriction      Lethargy  Hypercapnia improved   check CT head and abg CT no stroke  ABG improving but hypoxic oxygen adjusted  Speech consult         UTI  Urine pos for klebsiella  IV Rocephin    Recent fall  Check xray of hip which showed fracture  Appreciate ortho input will have surgery  3/10  Discussed quality vs quantity with daughter   Will need aggressive rehab  To improve to her previous level one week ago which was  Marginal (watched video)    Elevated trop  No chest pain    Trop trending down   No acs likely demand ischemia  Needs diuretic on discharge     New afib   On On Eliquis  Last dose 3/8 at 8 am still on hold started SCD for DVT prevention  Heparin when ok with ortho   Elevated d dimer   V/q scan negative   pvl no dvt  SCD now for prevention      SERENA   On cpap at home   Now on oxygen did not use oxygen prior to hospital   Wean as tolerated    Patient Active Problem List   Diagnosis Code    Fatigue R53.83    Elevated troponin R77.8    Obesity (BMI 30-39. 9) E66.9    HTN (hypertension) I10    SERENA treated with BiPAP G47.33    Acute on chronic diastolic congestive heart failure (HCC) I50.33    Acute respiratory failure with hypoxemia (HCC) J96.01    Atrial fibrillation (HCC) I48.91    Stage 3 chronic kidney disease ([facility] N18.30    SERENA (obstructive sleep apnea) G47.33    Adult BMI 39.0-39.9 kg/sq m Z68.39    Fracture of neck of right femur (HCC) S72.001A       Subjective:      Sister at bedside  Singing in Episcopalian songs  She is more awake today  With periodic slurring of speech   Which family says happens at home    Review of systems: limited     Constitutional: denies fevers, chills, myalgias  Respiratory: + SOB improved, cough  Cardiovascular: denies chest pain, palpitations  Gastrointestinal: denies nausea, vomiting, diarrhea      Vital signs/Intake and Output:  Visit Vitals  BP (!) 119/59   Pulse 86   Temp 97.2 °F (36.2 °C)   Resp 16   Ht 5' 5\" (1.651 m)   Wt 104.2 kg (229 lb 12.8 oz)   SpO2 100%   Breastfeeding No   BMI 38.24 kg/m²     Current Shift:  No intake/output data recorded. Last three shifts:  03/07 1901 - 03/09 0700  In: -   Out: 4400 [Urine:4400]    Exam:    General: Well developed, alert, arousable but less lethargic protuding tongue thrush  Head/Neck: NCAT, supple, No masses, No lymphadenopathy  CVS:irregularly irregular ll  Lungs:Clear to auscultation bilaterally, no wheezes, rhonchi, or rales  Abdomen: Soft, Nontender, No distention, Normal Bowel sounds, No hepatomegaly  Extremities: noted edema                  Labs: Results:       Chemistry Recent Labs     03/07/22  0345   *      K 4.4   CL 99*   CO2 33*   BUN 69*   CREA 1.87*   CA 9.8   AGAP 5   BUCR 37*      TP 6.5   ALB 2.9*   GLOB 3.6   AGRAT 0.8      CBC w/Diff Recent Labs     03/07/22  0345   WBC 5.6   RBC 3.51*   HGB 11.5*   HCT 35.1      GRANS 64   LYMPH 20*   EOS 1      Cardiac Enzymes No results for input(s): CPK, CKND1, FARA in the last 72 hours. No lab exists for component: CKRMB, TROIP   Coagulation No results for input(s): PTP, INR, APTT, INREXT, INREXT in the last 72 hours.     Lipid Panel Lab Results   Component Value Date/Time    Cholesterol, total 183 04/25/2021 04:00 PM    HDL Cholesterol 101 (H) 04/25/2021 04:00 PM    LDL, calculated 69.8 04/25/2021 04:00 PM    VLDL, calculated 12.2 04/25/2021 04:00 PM    Triglyceride 61 04/25/2021 04:00 PM    CHOL/HDL Ratio 1.8 04/25/2021 04:00 PM      BNP No results for input(s): BNPP in the last 72 hours.    Liver Enzymes Recent Labs     03/07/22  0345   TP 6.5   ALB 2.9*         Thyroid Studies Lab Results   Component Value Date/Time    TSH 0.58 03/03/2022 02:21 AM        Procedures/imaging: see electronic medical records for all procedures/Xrays and details which were not copied into this note but were reviewed prior to creation of Ifeoma Florian MD

## 2022-03-09 NOTE — PROGRESS NOTES
Problem: Dysphagia (Adult)  Goal: *Acute Goals and Plan of Care (Insert Text)  Description: Patient will:  1. Tolerate PO trials with 0 s/s overt distress in 4/5 trials. 2. Utilize compensatory swallow strategies/maneuvers (decrease bite/sip, size/rate, alt. liq/sol) with min cues in 4/5 trials. Rec:     Regular diet with assistance   Pt may require assistance with meal set up  Aspiration precautions  HOB >45 during po intake, remain >30 for 30-45 minutes after po   Small bites/sips; alternate liquid/solid with slow feeding rate   Oral care TID  Meds crushed  Outcome: Progressing Towards Goal  SPEECH LANGUAGE PATHOLOGY BEDSIDE SWALLOW   EVALUATION & TREATMENT     Patient: Danette Woodard (3 y.o. female)  Date: 3/9/2022  Primary Diagnosis: Acute exacerbation of CHF (congestive heart failure) (HCC) [I50.9]  Procedure(s) (LRB):  RIGHT TOTAL HIP ARTHROPLASTY WITH C-ARM AND NAVIGATION (PT IN ROOM # 358) (Right)     Precautions: Aspiration, Fall  PLOF: Regular diet per family report    ASSESSMENT :  Based on the objective data described below, the patient presents with mild oropharyngeal dysphagia in the setting of respiratory difficulty; characterized by limited bolus acceptance, poor bolus formation with delayed transit and piecemeal deglutition with delayed swallow initiation and prolonged/reduced hyolaryngeal excursion upon palpation. Patient with s/sx aspiration during the course of exam with large sips of thin liquids via straw c/b weak cough. Remediated by taking small sips of thin liquids. Recommendations as above, and have been discussed with patient, patient's family, nurse, Lois Boyer. TREATMENT :  Skilled therapy initiated; Educated pt on aspiration precautions and importance of compensatory swallow techniques to decrease aspiration risk (decrease rate of intake & sip/bite size, upright @HOB for all po intake and ~30 minutes after po); verbalized comprehension. SLP to follow as indicated.      Patient will benefit from skilled intervention to address the above impairments. Patient's rehabilitation potential is considered to be Fair  Factors which may influence rehabilitation potential include:  []            None noted  [x]            Mental ability/status  [x]            Medical condition  [x]            Home/family situation and support systems  [x]            Safety awareness  []            Pain tolerance/management  []            Other:      PLAN :  Recommendations and Planned Interventions:  As above. Frequency/Duration: Patient will be followed by speech-language pathology 3 times a week to address goals. Discharge Recommendations: To Be Determined     SUBJECTIVE:   Patient stated I'm ready. \"    OBJECTIVE:     Past Medical History:   Diagnosis Date    Hypertension     Sleep disorder      Past Surgical History:   Procedure Laterality Date    HX ORTHOPAEDIC      broken right ankle, left femur 30 yrs ago     Home Situation:   Home Situation  Home Environment: Private residence  # Steps to Enter: 4 (has lift to enter home)  One/Two Story Residence: One story  Living Alone: No  Support Systems: Child(doc),Other Family Member(s)  Patient Expects to be Discharged to[de-identified] Skilled nursing facility  Current DME Used/Available at Home: Elnoria Darwin, rolling,Walker, rollator,Raised toilet seat,Commode, bedside,Grab bars,Other (comment) (adjustable bed)  Tub or Shower Type: Shower  Diet prior to admission: Regular  Current Diet:  Regular    Cognitive and Communication Status:  Neurologic State: Alert,Confused  Orientation Level: Oriented to person,Oriented to place,Oriented to situation,Disoriented to time  Cognition: Decreased command following,Memory loss  Perception: Appears intact  Perseveration: Perseverates during conversation,Tactile cues provided,Verbal cues provided  Safety/Judgement: Decreased insight into deficits  Oral Assessment:  Oral Assessment  Labial: Decreased rate,Decreased seal  Dentition: Limited  Oral Hygiene:  [facility])  Lingual: Decreased rate,Decreased strength  Velum: No impairment  Mandible: No impairment  Gag Reflex: Other (comment) (DNT)  P.O. Trials:  Patient Position:  (HOB >45)  Vocal quality prior to P.O.: Breathy,Low volume  Consistency Presented: Mixed consistency,Puree,Solid,Thin liquid  How Presented: SLP-fed/presented,Spoon,Straw,Successive swallows     Bolus Acceptance: No impairment  Bolus Formation/Control: Impaired  Type of Impairment: Delayed,Incomplete  Propulsion: Delayed (# of seconds)  Oral Residue: Less than 10% of bolus  Initiation of Swallow: Delayed (# of seconds)  Laryngeal Elevation: Decreased,Weak  Aspiration Signs/Symptoms: Clear throat,Weak cough  Pharyngeal Phase Characteristics: Effortful swallow,Poor endurance,Suspected pharyngeal residue  Effective Modifications: Alternate liquids/solids,Spoon,Small sips and bites  Cues for Modifications: Minimal     Oral Phase Severity: Mild  Pharyngeal Phase Severity : Mild  PAIN:  Pain level pre-treatment: 0/10   Pain level post-treatment: 0/10     After treatment:   []            Patient left in no apparent distress sitting up in chair  [x]            Patient left in no apparent distress in bed  [x]            Call bell left within reach  [x]            Nursing notified  []            Family present  []            Caregiver present  []            Bed alarm activated    COMMUNICATION/EDUCATION:   [x]            Aspiration precautions; swallow safety; compensatory techniques. []            Patient/family have participated as able in goal setting and plan of care. []            Patient/family agree to work toward stated goals and plan of care. []            Patient understands intent and goals of therapy; neutral about participation. []            Patient unable to participate in goal setting/plan of care; educ ongoing with interdisciplinary staff  []         Posted safety precautions in patient's room.     Thank you for this referral.  JAVON CarmichaelEd, CCC-SLP  Time Calculation: 22 mins  Evaluation Time: 12 minutes   Treatment Time: 10 minutes

## 2022-03-09 NOTE — PROGRESS NOTES
Comprehensive Nutrition Assessment    Type and Reason for Visit: Initial,RD nutrition re-screen/LOS    Nutrition Recommendations/Plan: Continue w/ current diet order. Recc restarting oral diet after surgery. Monitor intake and weight, and ONS needs. Nutrition Assessment:  PMHx: htn, sleep disorder, right side weakness. Admitted for Congestive heart failure, acute on chronic diastolic, recent fall- x ray shows hip fracture, new a fib. Pt plan for  surgery tomorrow. Estimated Daily Nutrient Needs:  Energy (kcal): 8691-7972; Weight Used for Energy Requirements: Current  Protein (g): ; Weight Used for Protein Requirements: Current (0.8-1g)  Fluid (ml/day): 1882-9271; Method Used for Fluid Requirements: 1 ml/kcal    Nutrition Related Findings:  Lab reviewed. Med: KCl, protonix, lasix. +BM 3/8. Minimal PO documented; recent documentation was 3/7/2022 of 26-50%. Attempt to speak with pt- unavailable. Pt is ecpected to be NPO 3/10 @ midnight. Wounds:    None       Current Nutrition Therapies:  ADULT DIET Regular; 5 carb choices (75 gm/meal); 2000 ml  DIET NPO    Anthropometric Measures:  · Height:  5' 5\" (165.1 cm)  · Current Body Wt:  104.2 kg (229 lb 11.5 oz)   · Usual Body Wt:  101.6 kg (224 lb) (4/2021)     · Ideal Body Wt:  125 lbs:  183.8 %   · BMI Category:  Obese class 2 (BMI 35.0-39. 9)       Nutrition Diagnosis:   · Inadequate energy intake related to acute injury/trauma as evidenced by intake 26-50%    Nutrition Interventions:   Food and/or Nutrient Delivery: Start oral diet,Continue current diet  Nutrition Education and Counseling: No recommendations at this time  Coordination of Nutrition Care: Continue to monitor while inpatient    Goals:  PO intake >50% of needs throughout the next 3-5 days       Nutrition Monitoring and Evaluation:   Behavioral-Environmental Outcomes: None identified  Food/Nutrient Intake Outcomes: Diet advancement/tolerance  Physical Signs/Symptoms Outcomes: Biochemical data,Meal time behavior,Skin,Weight,GI status    Discharge Planning:    No discharge needs at this time     Electronically signed by Sangeetha Ramey RD on 3/9/2022 at 11:03 AM

## 2022-03-10 ENCOUNTER — APPOINTMENT (OUTPATIENT)
Dept: GENERAL RADIOLOGY | Age: 82
DRG: 981 | End: 2022-03-10
Attending: HOSPITALIST
Payer: MEDICARE

## 2022-03-10 ENCOUNTER — ANESTHESIA (OUTPATIENT)
Dept: SURGERY | Age: 82
DRG: 981 | End: 2022-03-10
Payer: MEDICARE

## 2022-03-10 ENCOUNTER — APPOINTMENT (OUTPATIENT)
Dept: GENERAL RADIOLOGY | Age: 82
DRG: 981 | End: 2022-03-10
Attending: INTERNAL MEDICINE
Payer: MEDICARE

## 2022-03-10 ENCOUNTER — APPOINTMENT (OUTPATIENT)
Dept: GENERAL RADIOLOGY | Age: 82
DRG: 981 | End: 2022-03-10
Attending: ORTHOPAEDIC SURGERY
Payer: MEDICARE

## 2022-03-10 LAB
ABO + RH BLD: NORMAL
ALBUMIN SERPL-MCNC: 3.1 G/DL (ref 3.4–5)
ALBUMIN/GLOB SERPL: 0.9 {RATIO} (ref 0.8–1.7)
ALP SERPL-CCNC: 113 U/L (ref 45–117)
ALT SERPL-CCNC: 58 U/L (ref 13–56)
ANION GAP SERPL CALC-SCNC: 5 MMOL/L (ref 3–18)
ARTERIAL PATENCY WRIST A: POSITIVE
AST SERPL-CCNC: 25 U/L (ref 10–38)
BASE EXCESS BLD CALC-SCNC: 12.9 MMOL/L
BASOPHILS # BLD: 0 K/UL (ref 0–0.1)
BASOPHILS NFR BLD: 0 % (ref 0–2)
BDY SITE: ABNORMAL
BILIRUB SERPL-MCNC: 0.8 MG/DL (ref 0.2–1)
BLOOD GROUP ANTIBODIES SERPL: NORMAL
BUN SERPL-MCNC: 65 MG/DL (ref 7–18)
BUN/CREAT SERPL: 48 (ref 12–20)
CALCIUM SERPL-MCNC: 10.6 MG/DL (ref 8.5–10.1)
CHLORIDE SERPL-SCNC: 103 MMOL/L (ref 100–111)
CO2 SERPL-SCNC: 37 MMOL/L (ref 21–32)
CREAT SERPL-MCNC: 1.35 MG/DL (ref 0.6–1.3)
DIFFERENTIAL METHOD BLD: ABNORMAL
EOSINOPHIL # BLD: 0.1 K/UL (ref 0–0.4)
EOSINOPHIL NFR BLD: 1 % (ref 0–5)
ERYTHROCYTE [DISTWIDTH] IN BLOOD BY AUTOMATED COUNT: 15.3 % (ref 11.6–14.5)
GAS FLOW.O2 O2 DELIVERY SYS: ABNORMAL L/MIN
GAS FLOW.O2 SETTING OXYMISER: 14 BPM
GLOBULIN SER CALC-MCNC: 3.3 G/DL (ref 2–4)
GLUCOSE BLD STRIP.AUTO-MCNC: 82 MG/DL (ref 70–110)
GLUCOSE SERPL-MCNC: 115 MG/DL (ref 74–99)
HCO3 BLD-SCNC: 38.9 MMOL/L (ref 22–26)
HCT VFR BLD AUTO: 38.2 % (ref 35–45)
HGB BLD-MCNC: 11.8 G/DL (ref 12–16)
IMM GRANULOCYTES # BLD AUTO: 0.1 K/UL (ref 0–0.04)
IMM GRANULOCYTES NFR BLD AUTO: 1 % (ref 0–0.5)
LYMPHOCYTES # BLD: 0.8 K/UL (ref 0.9–3.6)
LYMPHOCYTES NFR BLD: 10 % (ref 21–52)
MAGNESIUM SERPL-MCNC: 1.9 MG/DL (ref 1.6–2.6)
MCH RBC QN AUTO: 32.2 PG (ref 24–34)
MCHC RBC AUTO-ENTMCNC: 30.9 G/DL (ref 31–37)
MCV RBC AUTO: 104.1 FL (ref 78–100)
MONOCYTES # BLD: 1.2 K/UL (ref 0.05–1.2)
MONOCYTES NFR BLD: 15 % (ref 3–10)
NEUTS SEG # BLD: 5.8 K/UL (ref 1.8–8)
NEUTS SEG NFR BLD: 74 % (ref 40–73)
NRBC # BLD: 0 K/UL (ref 0–0.01)
NRBC BLD-RTO: 0 PER 100 WBC
O2/TOTAL GAS SETTING VFR VENT: 100 %
PCO2 BLD: 54.1 MMHG (ref 35–45)
PEEP RESPIRATORY: 5 CMH2O
PH BLD: 7.47 [PH] (ref 7.35–7.45)
PLATELET # BLD AUTO: 199 K/UL (ref 135–420)
PMV BLD AUTO: 10.5 FL (ref 9.2–11.8)
PO2 BLD: 226 MMHG (ref 80–100)
POTASSIUM SERPL-SCNC: 4.5 MMOL/L (ref 3.5–5.5)
PROT SERPL-MCNC: 6.4 G/DL (ref 6.4–8.2)
RBC # BLD AUTO: 3.67 M/UL (ref 4.2–5.3)
SAO2 % BLD: 99.8 % (ref 92–97)
SERVICE CMNT-IMP: ABNORMAL
SODIUM SERPL-SCNC: 145 MMOL/L (ref 136–145)
SPECIMEN EXP DATE BLD: NORMAL
SPECIMEN TYPE: ABNORMAL
VENTILATION MODE VENT: ABNORMAL
VT SETTING VENT: 450 ML
WBC # BLD AUTO: 7.9 K/UL (ref 4.6–13.2)

## 2022-03-10 PROCEDURE — 77030034694 HC SCPL CANADY PLSM DISP USMD -E: Performed by: ORTHOPAEDIC SURGERY

## 2022-03-10 PROCEDURE — 87186 SC STD MICRODIL/AGAR DIL: CPT

## 2022-03-10 PROCEDURE — 77030013079 HC BLNKT BAIR HGGR 3M -A: Performed by: ANESTHESIOLOGY

## 2022-03-10 PROCEDURE — 74011250637 HC RX REV CODE- 250/637: Performed by: INTERNAL MEDICINE

## 2022-03-10 PROCEDURE — 76010000132 HC OR TIME 2.5 TO 3 HR: Performed by: ORTHOPAEDIC SURGERY

## 2022-03-10 PROCEDURE — 77030038010: Performed by: ORTHOPAEDIC SURGERY

## 2022-03-10 PROCEDURE — 76060000036 HC ANESTHESIA 2.5 TO 3 HR: Performed by: ORTHOPAEDIC SURGERY

## 2022-03-10 PROCEDURE — 77030006643: Performed by: ANESTHESIOLOGY

## 2022-03-10 PROCEDURE — 65610000006 HC RM INTENSIVE CARE

## 2022-03-10 PROCEDURE — 83735 ASSAY OF MAGNESIUM: CPT

## 2022-03-10 PROCEDURE — 87070 CULTURE OTHR SPECIMN AEROBIC: CPT

## 2022-03-10 PROCEDURE — C1776 JOINT DEVICE (IMPLANTABLE): HCPCS | Performed by: ORTHOPAEDIC SURGERY

## 2022-03-10 PROCEDURE — 86900 BLOOD TYPING SEROLOGIC ABO: CPT

## 2022-03-10 PROCEDURE — 74011000250 HC RX REV CODE- 250: Performed by: ORTHOPAEDIC SURGERY

## 2022-03-10 PROCEDURE — 77030040815: Performed by: ORTHOPAEDIC SURGERY

## 2022-03-10 PROCEDURE — 77030034479 HC ADH SKN CLSR PRINEO J&J -B: Performed by: ORTHOPAEDIC SURGERY

## 2022-03-10 PROCEDURE — 74011250636 HC RX REV CODE- 250/636: Performed by: NURSE ANESTHETIST, CERTIFIED REGISTERED

## 2022-03-10 PROCEDURE — 74011000250 HC RX REV CODE- 250: Performed by: PHYSICIAN ASSISTANT

## 2022-03-10 PROCEDURE — 74011250636 HC RX REV CODE- 250/636: Performed by: ORTHOPAEDIC SURGERY

## 2022-03-10 PROCEDURE — 77030008683 HC TU ET CUF COVD -A: Performed by: ANESTHESIOLOGY

## 2022-03-10 PROCEDURE — 74011000250 HC RX REV CODE- 250: Performed by: INTERNAL MEDICINE

## 2022-03-10 PROCEDURE — 0SR904A REPLACEMENT OF RIGHT HIP JOINT WITH CERAMIC ON POLYETHYLENE SYNTHETIC SUBSTITUTE, UNCEMENTED, OPEN APPROACH: ICD-10-PCS | Performed by: ORTHOPAEDIC SURGERY

## 2022-03-10 PROCEDURE — 2709999900 HC NON-CHARGEABLE SUPPLY: Performed by: ORTHOPAEDIC SURGERY

## 2022-03-10 PROCEDURE — 74011000258 HC RX REV CODE- 258: Performed by: ORTHOPAEDIC SURGERY

## 2022-03-10 PROCEDURE — 82962 GLUCOSE BLOOD TEST: CPT

## 2022-03-10 PROCEDURE — 77030013708 HC HNDPC SUC IRR PULS STRY –B: Performed by: ORTHOPAEDIC SURGERY

## 2022-03-10 PROCEDURE — 36415 COLL VENOUS BLD VENIPUNCTURE: CPT

## 2022-03-10 PROCEDURE — 77030031139 HC SUT VCRL2 J&J -A: Performed by: ORTHOPAEDIC SURGERY

## 2022-03-10 PROCEDURE — 74011000250 HC RX REV CODE- 250: Performed by: NURSE ANESTHETIST, CERTIFIED REGISTERED

## 2022-03-10 PROCEDURE — 77030041075 HC DRSG AG OPTIFRM MDII -B: Performed by: ORTHOPAEDIC SURGERY

## 2022-03-10 PROCEDURE — 77030040361 HC SLV COMPR DVT MDII -B: Performed by: ORTHOPAEDIC SURGERY

## 2022-03-10 PROCEDURE — 74011250636 HC RX REV CODE- 250/636: Performed by: ANESTHESIOLOGY

## 2022-03-10 PROCEDURE — 77030008477 HC STYL SATN SLP COVD -A: Performed by: ANESTHESIOLOGY

## 2022-03-10 PROCEDURE — 36600 WITHDRAWAL OF ARTERIAL BLOOD: CPT

## 2022-03-10 PROCEDURE — 80053 COMPREHEN METABOLIC PANEL: CPT

## 2022-03-10 PROCEDURE — 74011250636 HC RX REV CODE- 250/636: Performed by: INTERNAL MEDICINE

## 2022-03-10 PROCEDURE — 77030010507 HC ADH SKN DERMBND J&J -B: Performed by: ORTHOPAEDIC SURGERY

## 2022-03-10 PROCEDURE — 77030020782 HC GWN BAIR PAWS FLX 3M -B: Performed by: ORTHOPAEDIC SURGERY

## 2022-03-10 PROCEDURE — 74011250636 HC RX REV CODE- 250/636: Performed by: HOSPITALIST

## 2022-03-10 PROCEDURE — 0BH17EZ INSERTION OF ENDOTRACHEAL AIRWAY INTO TRACHEA, VIA NATURAL OR ARTIFICIAL OPENING: ICD-10-PCS | Performed by: INTERNAL MEDICINE

## 2022-03-10 PROCEDURE — 82803 BLOOD GASES ANY COMBINATION: CPT

## 2022-03-10 PROCEDURE — 87077 CULTURE AEROBIC IDENTIFY: CPT

## 2022-03-10 PROCEDURE — 94002 VENT MGMT INPAT INIT DAY: CPT

## 2022-03-10 PROCEDURE — 71045 X-RAY EXAM CHEST 1 VIEW: CPT

## 2022-03-10 PROCEDURE — 85025 COMPLETE CBC W/AUTO DIFF WBC: CPT

## 2022-03-10 PROCEDURE — 74011000272 HC RX REV CODE- 272: Performed by: ORTHOPAEDIC SURGERY

## 2022-03-10 PROCEDURE — 76210000016 HC OR PH I REC 1 TO 1.5 HR: Performed by: ORTHOPAEDIC SURGERY

## 2022-03-10 PROCEDURE — 5A1955Z RESPIRATORY VENTILATION, GREATER THAN 96 CONSECUTIVE HOURS: ICD-10-PCS | Performed by: INTERNAL MEDICINE

## 2022-03-10 PROCEDURE — 77030018673: Performed by: ORTHOPAEDIC SURGERY

## 2022-03-10 DEVICE — STEM FEM SZ 8 L111MM 12/14 TAPR STD OFFSET HIP DUOFIX CLLRD: Type: IMPLANTABLE DEVICE | Site: HIP | Status: FUNCTIONAL

## 2022-03-10 DEVICE — HEAD FEM DIA36MM +5MM OFFSET 12/14 TAPR HIP CERAMIC BIOLOX: Type: IMPLANTABLE DEVICE | Site: HIP | Status: FUNCTIONAL

## 2022-03-10 DEVICE — HIP H2 TOT ADV OTHER HD IMPL CAPPED SYNTHES: Type: IMPLANTABLE DEVICE | Site: HIP | Status: FUNCTIONAL

## 2022-03-10 DEVICE — CUP ACET DIA52MM HIP GRIPTION PRI CEMENTLESS FIX SECT SER: Type: IMPLANTABLE DEVICE | Site: HIP | Status: FUNCTIONAL

## 2022-03-10 DEVICE — LINER ACET OD52MM ID36MM HIP ALTRX PINN: Type: IMPLANTABLE DEVICE | Site: HIP | Status: FUNCTIONAL

## 2022-03-10 RX ORDER — TRANEXAMIC ACID 10 MG/ML
1 INJECTION, SOLUTION INTRAVENOUS ONCE
Status: COMPLETED | OUTPATIENT
Start: 2022-03-10 | End: 2022-03-10

## 2022-03-10 RX ORDER — SODIUM CHLORIDE 0.9 G/100ML
IRRIGANT IRRIGATION AS NEEDED
Status: DISCONTINUED | OUTPATIENT
Start: 2022-03-10 | End: 2022-03-10 | Stop reason: HOSPADM

## 2022-03-10 RX ORDER — CHLORHEXIDINE GLUCONATE 1.2 MG/ML
10 RINSE ORAL ONCE
Status: ACTIVE | OUTPATIENT
Start: 2022-03-10 | End: 2022-03-11

## 2022-03-10 RX ORDER — SODIUM CHLORIDE 0.9 % (FLUSH) 0.9 %
5-40 SYRINGE (ML) INJECTION AS NEEDED
Status: CANCELLED | OUTPATIENT
Start: 2022-03-10

## 2022-03-10 RX ORDER — FENTANYL CITRATE 50 UG/ML
25 INJECTION, SOLUTION INTRAMUSCULAR; INTRAVENOUS
Status: DISCONTINUED | OUTPATIENT
Start: 2022-03-10 | End: 2022-03-21

## 2022-03-10 RX ORDER — NALOXONE HYDROCHLORIDE 0.4 MG/ML
0.4 INJECTION, SOLUTION INTRAMUSCULAR; INTRAVENOUS; SUBCUTANEOUS AS NEEDED
Status: DISCONTINUED | OUTPATIENT
Start: 2022-03-10 | End: 2022-03-10 | Stop reason: HOSPADM

## 2022-03-10 RX ORDER — ONDANSETRON 2 MG/ML
INJECTION INTRAMUSCULAR; INTRAVENOUS AS NEEDED
Status: DISCONTINUED | OUTPATIENT
Start: 2022-03-10 | End: 2022-03-10 | Stop reason: HOSPADM

## 2022-03-10 RX ORDER — SODIUM CHLORIDE, SODIUM LACTATE, POTASSIUM CHLORIDE, CALCIUM CHLORIDE 600; 310; 30; 20 MG/100ML; MG/100ML; MG/100ML; MG/100ML
125 INJECTION, SOLUTION INTRAVENOUS CONTINUOUS
Status: DISCONTINUED | OUTPATIENT
Start: 2022-03-10 | End: 2022-03-10

## 2022-03-10 RX ORDER — SODIUM CHLORIDE 0.9 % (FLUSH) 0.9 %
5-40 SYRINGE (ML) INJECTION EVERY 8 HOURS
Status: DISCONTINUED | OUTPATIENT
Start: 2022-03-10 | End: 2022-03-10 | Stop reason: HOSPADM

## 2022-03-10 RX ORDER — PROPOFOL 10 MG/ML
INJECTION, EMULSION INTRAVENOUS
Status: DISPENSED
Start: 2022-03-10 | End: 2022-03-11

## 2022-03-10 RX ORDER — LIDOCAINE HYDROCHLORIDE 20 MG/ML
INJECTION, SOLUTION EPIDURAL; INFILTRATION; INTRACAUDAL; PERINEURAL AS NEEDED
Status: DISCONTINUED | OUTPATIENT
Start: 2022-03-10 | End: 2022-03-10 | Stop reason: HOSPADM

## 2022-03-10 RX ORDER — MIDAZOLAM HYDROCHLORIDE 1 MG/ML
1 INJECTION, SOLUTION INTRAMUSCULAR; INTRAVENOUS
Status: DISCONTINUED | OUTPATIENT
Start: 2022-03-10 | End: 2022-03-21

## 2022-03-10 RX ORDER — PROPOFOL 10 MG/ML
0-50 VIAL (ML) INTRAVENOUS
Status: DISCONTINUED | OUTPATIENT
Start: 2022-03-10 | End: 2022-03-12

## 2022-03-10 RX ORDER — PROPOFOL 10 MG/ML
5 VIAL (ML) INTRAVENOUS
Status: DISCONTINUED | OUTPATIENT
Start: 2022-03-10 | End: 2022-03-10 | Stop reason: HOSPADM

## 2022-03-10 RX ORDER — FLUMAZENIL 0.1 MG/ML
0.2 INJECTION INTRAVENOUS
Status: DISCONTINUED | OUTPATIENT
Start: 2022-03-10 | End: 2022-03-10 | Stop reason: HOSPADM

## 2022-03-10 RX ORDER — CEFAZOLIN SODIUM/WATER 2 G/20 ML
2 SYRINGE (ML) INTRAVENOUS ONCE
Status: COMPLETED | OUTPATIENT
Start: 2022-03-10 | End: 2022-03-10

## 2022-03-10 RX ORDER — FENTANYL CITRATE 50 UG/ML
50 INJECTION, SOLUTION INTRAMUSCULAR; INTRAVENOUS AS NEEDED
Status: DISCONTINUED | OUTPATIENT
Start: 2022-03-10 | End: 2022-03-10 | Stop reason: HOSPADM

## 2022-03-10 RX ORDER — ROCURONIUM BROMIDE 10 MG/ML
INJECTION, SOLUTION INTRAVENOUS AS NEEDED
Status: DISCONTINUED | OUTPATIENT
Start: 2022-03-10 | End: 2022-03-10 | Stop reason: HOSPADM

## 2022-03-10 RX ORDER — SODIUM CHLORIDE, SODIUM LACTATE, POTASSIUM CHLORIDE, CALCIUM CHLORIDE 600; 310; 30; 20 MG/100ML; MG/100ML; MG/100ML; MG/100ML
125 INJECTION, SOLUTION INTRAVENOUS CONTINUOUS
Status: CANCELLED | OUTPATIENT
Start: 2022-03-10 | End: 2022-03-11

## 2022-03-10 RX ORDER — CHLORHEXIDINE GLUCONATE 1.2 MG/ML
10 RINSE ORAL EVERY 12 HOURS
Status: DISCONTINUED | OUTPATIENT
Start: 2022-03-10 | End: 2022-03-15

## 2022-03-10 RX ORDER — NALOXONE HYDROCHLORIDE 0.4 MG/ML
INJECTION, SOLUTION INTRAMUSCULAR; INTRAVENOUS; SUBCUTANEOUS AS NEEDED
Status: DISCONTINUED | OUTPATIENT
Start: 2022-03-10 | End: 2022-03-10 | Stop reason: HOSPADM

## 2022-03-10 RX ORDER — PROPOFOL 10 MG/ML
INJECTION, EMULSION INTRAVENOUS AS NEEDED
Status: DISCONTINUED | OUTPATIENT
Start: 2022-03-10 | End: 2022-03-10 | Stop reason: HOSPADM

## 2022-03-10 RX ORDER — HYDROMORPHONE HYDROCHLORIDE 2 MG/ML
INJECTION, SOLUTION INTRAMUSCULAR; INTRAVENOUS; SUBCUTANEOUS AS NEEDED
Status: DISCONTINUED | OUTPATIENT
Start: 2022-03-10 | End: 2022-03-10 | Stop reason: HOSPADM

## 2022-03-10 RX ORDER — HYDROMORPHONE HYDROCHLORIDE 1 MG/ML
0.2 INJECTION, SOLUTION INTRAMUSCULAR; INTRAVENOUS; SUBCUTANEOUS
Status: DISCONTINUED | OUTPATIENT
Start: 2022-03-10 | End: 2022-03-10 | Stop reason: HOSPADM

## 2022-03-10 RX ORDER — TRANEXAMIC ACID 10 MG/ML
1 INJECTION, SOLUTION INTRAVENOUS ONCE
Status: CANCELLED | OUTPATIENT
Start: 2022-03-10 | End: 2022-03-10

## 2022-03-10 RX ORDER — CEFAZOLIN SODIUM/WATER 2 G/20 ML
2 SYRINGE (ML) INTRAVENOUS ONCE
Status: CANCELLED | OUTPATIENT
Start: 2022-03-10 | End: 2022-03-10

## 2022-03-10 RX ORDER — SODIUM CHLORIDE 0.9 % (FLUSH) 0.9 %
5-40 SYRINGE (ML) INJECTION EVERY 8 HOURS
Status: CANCELLED | OUTPATIENT
Start: 2022-03-10

## 2022-03-10 RX ORDER — PHENYLEPHRINE HCL IN 0.9% NACL 1 MG/10 ML
SYRINGE (ML) INTRAVENOUS AS NEEDED
Status: DISCONTINUED | OUTPATIENT
Start: 2022-03-10 | End: 2022-03-10 | Stop reason: HOSPADM

## 2022-03-10 RX ORDER — SODIUM CHLORIDE 0.9 % (FLUSH) 0.9 %
5-40 SYRINGE (ML) INJECTION AS NEEDED
Status: DISCONTINUED | OUTPATIENT
Start: 2022-03-10 | End: 2022-03-10 | Stop reason: HOSPADM

## 2022-03-10 RX ADMIN — FENTANYL CITRATE 50 MCG: 50 INJECTION, SOLUTION INTRAMUSCULAR; INTRAVENOUS at 14:28

## 2022-03-10 RX ADMIN — TRANEXAMIC ACID 1 G: 10 INJECTION, SOLUTION INTRAVENOUS at 11:30

## 2022-03-10 RX ADMIN — CHLORHEXIDINE GLUCONATE 10 ML: 1.2 RINSE ORAL at 20:42

## 2022-03-10 RX ADMIN — TRANEXAMIC ACID 1 G: 10 INJECTION, SOLUTION INTRAVENOUS at 12:21

## 2022-03-10 RX ADMIN — HYDROMORPHONE HYDROCHLORIDE 0.25 MG: 2 INJECTION, SOLUTION INTRAMUSCULAR; INTRAVENOUS; SUBCUTANEOUS at 11:58

## 2022-03-10 RX ADMIN — SODIUM CHLORIDE, PRESERVATIVE FREE 10 ML: 5 INJECTION INTRAVENOUS at 20:42

## 2022-03-10 RX ADMIN — Medication 100 MCG: at 11:03

## 2022-03-10 RX ADMIN — FUROSEMIDE 40 MG: 10 INJECTION, SOLUTION INTRAMUSCULAR; INTRAVENOUS at 20:42

## 2022-03-10 RX ADMIN — LIDOCAINE HYDROCHLORIDE 100 MG: 20 INJECTION, SOLUTION INTRAVENOUS at 10:52

## 2022-03-10 RX ADMIN — ROCURONIUM BROMIDE 50 MG: 10 INJECTION, SOLUTION INTRAVENOUS at 10:54

## 2022-03-10 RX ADMIN — HYDROMORPHONE HYDROCHLORIDE 0.25 MG: 2 INJECTION, SOLUTION INTRAMUSCULAR; INTRAVENOUS; SUBCUTANEOUS at 11:32

## 2022-03-10 RX ADMIN — Medication 2 G: at 11:20

## 2022-03-10 RX ADMIN — ROCURONIUM BROMIDE 30 MG: 10 INJECTION, SOLUTION INTRAVENOUS at 11:35

## 2022-03-10 RX ADMIN — PROPOFOL 20 MCG/KG/MIN: 10 INJECTION, EMULSION INTRAVENOUS at 21:34

## 2022-03-10 RX ADMIN — PROPOFOL 60 MG: 10 INJECTION, EMULSION INTRAVENOUS at 13:31

## 2022-03-10 RX ADMIN — SUGAMMADEX 50 MG: 100 INJECTION, SOLUTION INTRAVENOUS at 12:46

## 2022-03-10 RX ADMIN — ROCURONIUM BROMIDE 30 MG: 10 INJECTION, SOLUTION INTRAVENOUS at 13:31

## 2022-03-10 RX ADMIN — SODIUM CHLORIDE, SODIUM LACTATE, POTASSIUM CHLORIDE, AND CALCIUM CHLORIDE 125 ML/HR: 600; 310; 30; 20 INJECTION, SOLUTION INTRAVENOUS at 10:04

## 2022-03-10 RX ADMIN — SUGAMMADEX 50 MG: 100 INJECTION, SOLUTION INTRAVENOUS at 12:50

## 2022-03-10 RX ADMIN — SODIUM CHLORIDE, SODIUM LACTATE, POTASSIUM CHLORIDE, AND CALCIUM CHLORIDE 125 ML: 600; 310; 30; 20 INJECTION, SOLUTION INTRAVENOUS at 14:00

## 2022-03-10 RX ADMIN — FUROSEMIDE 40 MG: 10 INJECTION, SOLUTION INTRAMUSCULAR; INTRAVENOUS at 08:44

## 2022-03-10 RX ADMIN — NYSTATIN 500000 UNITS: 100000 SUSPENSION ORAL at 20:42

## 2022-03-10 RX ADMIN — NALOXONE HYDROCHLORIDE 0 MG: 0.4 INJECTION, SOLUTION INTRAMUSCULAR; INTRAVENOUS; SUBCUTANEOUS at 13:41

## 2022-03-10 RX ADMIN — PANTOPRAZOLE SODIUM 40 MG: 40 TABLET, DELAYED RELEASE ORAL at 06:34

## 2022-03-10 RX ADMIN — HYDROMORPHONE HYDROCHLORIDE 0.25 MG: 2 INJECTION, SOLUTION INTRAMUSCULAR; INTRAVENOUS; SUBCUTANEOUS at 12:18

## 2022-03-10 RX ADMIN — PROPOFOL 100 MG: 10 INJECTION, EMULSION INTRAVENOUS at 10:54

## 2022-03-10 RX ADMIN — Medication 100 MCG: at 11:52

## 2022-03-10 RX ADMIN — ONDANSETRON HYDROCHLORIDE 4 MG: 2 INJECTION INTRAMUSCULAR; INTRAVENOUS at 12:25

## 2022-03-10 RX ADMIN — HYDROMORPHONE HYDROCHLORIDE 0.25 MG: 2 INJECTION, SOLUTION INTRAMUSCULAR; INTRAVENOUS; SUBCUTANEOUS at 11:46

## 2022-03-10 RX ADMIN — PROPOFOL 20 MCG/KG/MIN: 10 INJECTION, EMULSION INTRAVENOUS at 17:00

## 2022-03-10 RX ADMIN — SODIUM CHLORIDE, PRESERVATIVE FREE 10 ML: 5 INJECTION INTRAVENOUS at 06:33

## 2022-03-10 RX ADMIN — Medication 100 MCG: at 11:00

## 2022-03-10 RX ADMIN — SUGAMMADEX 50 MG: 100 INJECTION, SOLUTION INTRAVENOUS at 12:58

## 2022-03-10 NOTE — OP NOTES
RIGHT DIRECT ANTERIOR HIP REPLACEMENT FOR FNF    Patient: Jessy Alves MRN: [id number]  CSN: [id number]   YOB: 1940  Age: 80 y.o. Sex: female        Date of Surgery:3/10/2022    PREOPERATIVE DIAGNOSES   RIGHT HIP FRACTURE      POSTOPERATIVE DIAGNOSES   RIGHT HIP FRACTURE    PROCEDURE: Right press fit direct anterior total hip arthroplasty for Femoral Neck Fracture using X-Ray    IMPLANTS:   Implant Name Type Inv. Item Serial No.  Lot No. LRB No. Used Action   LINER ACET OD52MM [id number] HIP ALTRX PINN - [id number]  LINER ACET OD52MM ID36MM HIP ALTRX PINN  [facility] [employer] [facility] [id number] Right 1 Implanted   CUP ACET CCT31UD HIP GRIPTION SOO CEMENTLESS FIX SECT SER - [id number]  CUP ACET [id number] HIP GRIPTION SOO CEMENTLESS FIX SECT SER  [facility] [employer] [facility] [id number] Right 1 Implanted   STEM FEM SZ 8 L111MM 12/14 TAPR STD OFFSET HIP DUOFIX CLLRD - [id number]  STEM FEM SZ 8 L111MM 12/14 TAPR STD OFFSET HIP DUOFIX CLLRD  [facility] [employer] [facility] [id number] Right 1 Implanted   HEAD FEM [id number] +5MM OFFSET 12/14 TAPR HIP CERAMIC BIOLOX - [id number]  HEAD FEM [id number] +5MM OFFSET 12/14 TAPR HIP CERAMIC BIOLOX  [facility] [employer] [facility] [id number] Right 1 Implanted       SURGEON: Lor Germain MD    FIRST ASSISTANT: Cuca Jerome PA-C    SECOND ASSISTANT: Physician Assistant: Anthony Almaraz PA-C    ANESTHESIA: General    SPECIMENS TO PATHOLOGY:  * No specimens in log *    ESTIMATED BLOOD LOSS: 100cc    FINDINGS: Same    COMPLICATIONS: None    INDICATIONS:  A 80 y.o. BLACK/ female with a displaced femoral neck fracture documented by clinical exam and x-ray. The patient fell recently and was admitted to the hospital for stabilization and hip replacement for fracture. OPERATION:  The patient was brought into the operating theater, and after adequate appropriate anesthesia the patient was placed on the Ramona table.  The surgical hip was prepped and draped for typical direct anterior computer navigated hip surgery. The 1.5gm of iv tranexamic acid was already administered by incision time. The analgesic cocktail used for the case was 50cc of .25% Marcaine with epinephrine mixed with 30 mg( 1cc ) of Toradol and 30cc of NS ( total of 81 cc). This was injected in the skin as well as the various muscle muscle groups encountered during the procedure using all 100cc's. A 9 cm incision was then made, 3 cm lateral to the anterior superior iliac spine, and 1 cm distal. The incision was deepened down to the fascia of the tensor fascia chris muscle, where the fascia was incised the length of the wound. A Cobra was placed just lateral to the anterior inferior iliac spine and just lateral to the capsule of the hip. The tensor fascia muscle was then retracted with the Rhys, and the rectus femoris was retracted medially with another Rhys. The ascending branches of the lateral femoral circumflex vessels were then clamped and electrocauterized. Using a kelley elevator, the soft tissue was elevated off the anterior part of the femoral capsule, and a Cobra retractor was placed medially. An anterolateral capsulotomy was then performed, from the acetabulum to the intertrochanteric line. The lateral capsule was excised, and the anterior capsule was elevated off the medial neck. The leg was then slightly externally rotated with traction and cut 1 fingerbreadth above the lesser trochanter. The corkscrew was inserted into the femoral head and it was removed easily rotating the head 180 degrees. There were multiple small pieces that had to removed as well. A Cobra retractor was placed behind the acetabulum. A nancyny Hohmann retractor was placed on the anterior part of the acetabulum rim, and then the labrum and any osteophytes around the acetabulum were resected.  The pulvinar in the fovea was resected, and then the acetabulum was reamed in 45 degrees of abduction and the patient's natural anteversion. The reamer was medialized to the base of the fovea and then widened to 1mm less than the actual cup to be implanted. There was good peripheral fit with good bleeding bone. An  appropriately sized acetabular cup was selected to be implanted. The acetabulum was Simpulse lavage irrigated and then dried with a sponge stick. The cup was then seated fully with excellent purchase and good stability at approximately 40 degrees of abduction and 20 degrees of anteversion. The anterior lip of the cup was just inside the natural acetabulum. No hole eliminator was placed, and the appropriately sized acetabular liner was then Dick-tapered into position. No screws were used in the acetabular cup. Attention was now turned to the femur. The femoral hook was placed behind the femur. Traction was taken off the leg, and the hip was maximally externally rotated, adducted and extended. The hip was then brought up into the wound as maximally as possible. A Muniz retractor was placed on the medial neck, and a large Hohmann was placed around the greater trochanter. The capsule, the obturator internus and the piriformis were then released from the inside of the greater trochanter, from anterior to posterior. The patient had excellent mobility of the femur. The bone closest to the greater trochanter, at the femoral neck, was then removed with a rongeur. A box osteotome was then used to open the canal, then the lateralizer, the starter broach and finally by the zero broach. This was then broached up to the appropriate size progressively, following the anteversion of the posterior neck of the femur. Once the broach was seated completely the calcar was planed to make the femoral neck cut smooth and even. There was excellent stability on torquing the femoral broach in the femoral canal. The patient was trialed with a  neck and with different neck lengths.  The femur was reduced in the acetabulum and the hip was checked for stability clinically and X-Ray was used for leg lengths. Xray showed good filling of the femoral canal with the stem. The appropriately sized femoral head gave excellent anterior stability. The hip could be rotated externally 90 degrees at the knee and placing a bone hook behind the femoral neck it could not be dislocated anteriorly. The leg length was slightly longer( stability) by X-Ray. These components were selected for implantation. All trial components were removed. The femur was Simpulse lavaged, and  the appropriately sized femoral stem was impacted down the femur, with excellent purchase and rotational stability. The appropriately sized femoral head was Dick tapered on top of the femoral component, reduced into the socket, and the patient had the exact same stability characteristics and leg lengths as noted previously. The wound was irrigated out. The fascia of the tensor muscle was closed with a running locking #1 Vicryl. The skin was closed with inverted 2-0 Vicryls and then dermabonded ( Dermabond Prineo ) in 2 layers. The wound was dressed with a Mepilex dressing. The 3 small stab incisions used for the [email or web address]) system were Dermabonded closed. The patient returned to the recovery room awake and in stable condition. All instrument, sponge, and needle counts were correct. During multiple steps in the procedure the simpulse lavage was used with a 500cc bag of normal saline with 30cc of 5% sterile opthalmologic povidone solution ( .30% ). Before component implantation the bone was irrigated  with normal saline on a bulb syringe. At the end of the case another 500cc normal saline bag (with 50,000 units of bacitracin and 500,000 units of polymixin )was attached to the simpulse lavage and the entire wound reirrgated before closure.     A physician assistant was used during the surgery which contributes to better patient outcomes by decreasing operating room time and decreasing the amount of anesthesia the patient had to undergo. The physician assistant helped with positioning and draping of the patient for implantation of implants, retraction of soft tissues so as not to traumatize the tissues. During several parts of the procedure the PA used the Simpulse lavage to irrigate/suction the sterile field which contributed to a  surgical field and decrease in infection rates. The physician assistant used the cauterization under my guidance to decrease blood loss which limits the need for blood transfusion in the post operative period. The physician assistant instilled local anesthetic which decreased the need for post operative narcotics. During closure the physician assistant was able to close the fascia layer independently using a running #1 Vicryl stitch ensuring a water tight fascia closure and decreasing the risk of deep cathy-prosthetic infection. The superficial tissues were closed using inverted 2-0 Vicryl sutures by the physician assistant as well. The skin was closed using an Exofin skin closure system so that there was no discharge from the incision. This helps contribute to a lower risk of infection. During the procedure the physician assistant was given the task of irrigating the wound allowing myself to prepare the prosthesis for implantation.           Paul Dutta MD  3/10/2022

## 2022-03-10 NOTE — PROGRESS NOTES
Cardiology Progress Note        Patient: Danette Woodard        Sex: female          DOA: 3/2/2022  YOB: 1940      Age:  80 y.o.        LOS:  LOS: 8 days   Assessment/Plan     Principal Problem:    Acute respiratory failure with hypoxemia ([address] (3/2/2022)    Active Problems:    Elevated troponin (4/23/2021)      HTN (hypertension) (4/23/2021)      Acute on chronic diastolic congestive heart failure (HCC) (4/23/2021)      Atrial fibrillation ([address] (3/2/2022)      Stage 3 chronic kidney disease ([address] (3/3/2022)      SERENA (obstructive sleep apnea) (3/3/2022)      Adult BMI 39.0-39.9 kg/sq m (3/3/2022)      Fracture of neck of right femur ([address] (3/8/2022)        Plan:  Patient is status post hip surgery  Patient is intubated due to difficulty in breathing and hypoxemia  A. fib rate controlled  Discussed with Dr. Ginny Landis with the current medical treatment        Patient denied chest pain or shortness of breath   Atrial fibrillation rate controlled   Patient is scheduled for hip surgery tomorrow  Discussed with patient's daughter  Resume anticoagulation after surgery. Patient is diagnosed with hip fracture and being scheduled for surgery  I have long lengthy discussion with patient and daughter Wild Early in the room  Patient have negative stress test in April 2021  EF is stable  Patient is with elevated but acceptable risk for hip surgery  Eliquis can be hold  Consider DVT prophylaxis anticoagulation from tomorrow patient have taken morning dose of Eliquis      Patient denied any chest pain   Mitral calcification without stenosis  Continue with current medical treatment    Patient continues to do well. H&H stable  Patient will need diuretic on discharge probably Lasix 40 mg twice daily  Discussed with patient and family in the room      A. fib rate controlled  Patient will need p.o.  Lasix on discharge  Continue Eliquis 2.5 mg twice daily  Discussed with patient and family      Continue with Lasix 40 mg twice daily  I have long lengthy discussion with the patient and daughter about anticoagulation both of them agreed with low-dose Eliquis 2.5 mg twice daily  DC Lovenox   start Eliquis 2.5 mg twice daily from a.m. Discussed with patient and daughter findings of echocardiogram including mitral valve disease, prefer not to do transesophageal echocardiogram  Continue with current medical treatment                        Subjective:    cc:      Shortness of breath  A. fib        REVIEW OF SYSTEMS:     Intubated      Objective:      Visit Vitals  /64   Pulse 70   Temp 98 °F (36.7 °C)   Resp 14   Ht 5' 5\" (1.651 m)   Wt 104.2 kg (229 lb 12.8 oz)   SpO2 100%   Breastfeeding No   BMI 38.24 kg/m²     Body mass index is 38.24 kg/m². Physical Exam:  General Appearance: Comfortable, intubated  NECK: No JVD, no thyroidomeglay. LUNGS: Clear bilaterally. HEART: S1 irregular +S2 audible,    ABD: Non-tender, BS Audible    EXT: No edema, and no cysnosis. VASCULAR EXAM: Pulses are intact. PSYCHIATRIC EXAM: Mood is appropriate.     Medication:  Current Facility-Administered Medications   Medication Dose Route Frequency    ELECTROLYTE REPLACEMENT PROTOCOL - Potassium Renal Dosing  1 Each Other PRN    ELECTROLYTE REPLACEMENT PROTOCOL - Magnesium   1 Each Other PRN    ELECTROLYTE REPLACEMENT PROTOCOL  - Phosphorus Renal Dosing  1 Each Other PRN    ELECTROLYTE REPLACEMENT PROTOCOL - Calcium   1 Each Other PRN    midazolam (VERSED) injection 1 mg  1 mg IntraVENous Q2H PRN    fentaNYL citrate (PF) injection 25 mcg  25 mcg IntraVENous Q4H PRN    propofol (DIPRIVAN) 10 mg/mL infusion  0-50 mcg/kg/min IntraVENous TITRATE    chlorhexidine (PERIDEX) 0.12 % mouthwash 10 mL  10 mL Oral ONCE    chlorhexidine (PERIDEX) 0.12 % mouthwash 10 mL  10 mL Oral Q12H    propofoL (DIPRIVAN) 10 mg/mL injection        nystatin (MYCOSTATIN) 100,000 unit/mL oral suspension 500,000 Units  500,000 Units Oral QID    cefTRIAXone (ROCEPHIN) 1 g in 0.9% sodium chloride (MBP/ADV) 50 mL MBP  1 g IntraVENous Q24H    furosemide (LASIX) injection 40 mg  40 mg IntraVENous Q12H    pantoprazole (PROTONIX) tablet 40 mg  40 mg Oral ACB    [Held by provider] apixaban (ELIQUIS) tablet 2.5 mg  2.5 mg Oral BID    sodium chloride (NS) flush 5-40 mL  5-40 mL IntraVENous Q8H    sodium chloride (NS) flush 5-40 mL  5-40 mL IntraVENous PRN    acetaminophen (TYLENOL) tablet 650 mg  650 mg Oral Q6H PRN    Or    acetaminophen (TYLENOL) suppository 650 mg  650 mg Rectal Q6H PRN    polyethylene glycol (MIRALAX) packet 17 g  17 g Oral DAILY PRN    ondansetron (ZOFRAN ODT) tablet 4 mg  4 mg Oral Q8H PRN    Or    ondansetron (ZOFRAN) injection 4 mg  4 mg IntraVENous Q6H PRN    [Held by provider] potassium chloride (K-DUR, KLOR-CON M20) SR tablet 20 mEq  20 mEq Oral DAILY    carvediloL (COREG) tablet 3.125 mg  3.125 mg Oral BID WITH MEALS    aspirin delayed-release tablet 81 mg  81 mg Oral DAILY    acetaminophen (TYLENOL) tablet 650 mg  650 mg Oral Q6H PRN               Lab/Data Reviewed:  Procedures/imaging: see electronic medical records for all procedures/Xrays   and details which were not copied into this note but were reviewed prior to creation of Plan       All lab results for the last 24 hours reviewed.      Recent Labs     03/10/22  0140 03/09/22  1812   WBC 7.9 7.7   HGB 11.8* 11.8*   HCT 38.2 40.0    203     Recent Labs     03/10/22  0140 03/09/22  1812    143   K 4.5 4.4    101   CO2 37* 41*   * 127*   BUN 65* 64*   CREA 1.35* 1.39*   CA 10.6* 10.3*       RADIOLOGY:  CT Results  (Last 48 hours)    None        CXR Results  (Last 48 hours)    None            Cardiology Procedures:   Results for orders placed or performed during the hospital encounter of 03/02/22   EKG, 12 LEAD, INITIAL   Result Value Ref Range    Ventricular Rate 102 BPM    QRS Duration 72 ms    Q-T Interval 336 ms    QTC Calculation (Bezet) 437 ms    Calculated R Axis -81 degrees    Calculated T Axis -20 degrees    Diagnosis       Atrial fibrillation with rapid ventricular response with premature   ventricular or aberrantly conducted complexes  Left axis deviation  Low voltage QRS  Abnormal ECG  When compared with ECG of 23-APR-2021 17:11,  Atrial fibrillation has replaced Sinus rhythm  Confirmed by Chun Soto MD. ([id number]) on 3/2/2022 10:26:52 PM        Echo Results  (Last 48 hours)    None       Cardiolite (Tc-99m Sestamibi) stress test    Signed By: Cookie Henry MD     March 10, 2022

## 2022-03-10 NOTE — PROGRESS NOTES
Care manager had rounded on sister Ashley who was waiting in patient room; noted that now patient continues on vent and was moved postoperatively to ICU 7. Care management will follow for assistance with discharge planning once patient is off of vent and able to participate with PT/OT for recommendations. Care Management Interventions  PCP Verified by CM:  (Dr. Anam Ramírez (female) )  Mode of Transport at Discharge: BLS (.)  Transition of Care Consult (CM Consult): [facility] that according to the current therapy notes, she is not Acute inpt rehab appropriate. SNF choices obtained from the pt's dtr;  [facility]. )  Partner SNF: No  Discharge Durable Medical Equipment:  (TBD)  Physical Therapy Consult: Yes  Occupational Therapy Consult: Yes  Support Systems: Child(doc),Other Family Member(s)  Confirm Follow Up Transport: Family  The Plan for Transition of Care is Related to the Following Treatment Goals : skilled nursing facility  The Patient and/or Patient Representative was Provided with a Choice of Provider and Agrees with the Discharge Plan?: Yes (The pt and her dtr: Marlena)  Freedom of Choice List was Provided with Basic Dialogue that Supports the Patient's Individualized Plan of Care/Goals, Treatment Preferences and Shares the Quality Data Associated with the Providers?: Yes ([facility])  Discharge Location  Patient Expects to be Discharged to[de-identified] Skilled nursing facility

## 2022-03-10 NOTE — ANESTHESIA POSTPROCEDURE EVALUATION
----- Message from Guanaco Baker DO sent at 11/29/2021  8:36 AM CST -----  Please call patient regarding results. Please let the patient know that her vitamin D is a bit low. I would recommend supplementing with 2,000 units of vitamin D 3 otc. Her vitamin B12 level is a bit elevated. If she is taking any over the counter supplements with vitamin B12, she should stop it. I would recommend a recheck of her vitamin B12 in 1 month. Please ask how her headaches are doing. The rest of her labs look okay    Post-Anesthesia Evaluation and Assessment    Cardiovascular Function/Vital Signs  Visit Vitals  /64   Pulse 70   Temp 36.7 °C (98 °F)   Resp 14   Ht 5' 5\" (1.651 m)   Wt 104.2 kg (229 lb 12.8 oz)   SpO2 100%   Breastfeeding No   BMI 38.24 kg/m²       Patient is status post Procedure(s):  RIGHT TOTAL HIP ARTHROPLASTY WITH C-ARM  (PT IN ROOM # 358). Nausea/Vomiting: Controlled. Postoperative hydration reviewed and adequate. Pain:  Pain Scale 1: Visual (03/10/22 1419)  Pain Intensity 1: 0 (03/10/22 1419)   Managed. Neurological Status:   Neuro (WDL): Exceptions to WDL (03/10/22 1419)   At baseline. Mental Status and Level of Consciousness: Arousable but sedated on the ventilator. Propofol infusion ordered. Pulmonary Status:   O2 Device: Ventilator (03/10/22 1419)   Adequate oxygenation and airway patent. Complications related to anesthesia: None. Pt hypoventilating at emergence. Breathing spontaneously but poor tidal volumes and saturations in the OR after extubation and re-intubated for transfer to PACU. Hospitalist consulted. Pt hemodynamically stable with good oxygenation and ventilation on the ventilator. Plan to keep on ventilator and transfer to ICU for observation and eventual weaning. Post-anesthesia assessment completed. No concerns. Patient has met all discharge requirements.     Signed By: Otto Art MD    March 10, 2022

## 2022-03-10 NOTE — PROGRESS NOTES
Patient left unit (3N) for the OR via bed. Patient's daughter has been updated and informed.     Radha Guerrero [phone]

## 2022-03-10 NOTE — CONSULTS
Consult Note    Patient: Tobi Wynne               Sex: female          DOA: 3/2/2022         YOB: 1940      Age:  80 y.o.        LOS:  LOS: 8 days              HPI:     Tobi Wynne is a 80 y.o. female who has been seen for right hip pain. Patient is sleeping comfortably in the room. Here for Afib and CHF. Past Medical History:   Diagnosis Date    Hypertension     Sleep disorder        Past Surgical History:   Procedure Laterality Date    HX ORTHOPAEDIC      broken right ankle, left femur 30 yrs ago       Family History   Problem Relation Age of Onset    Diabetes Mother     Heart Disease Mother     Heart Disease Father        Social History     Socioeconomic History    Marital status: SINGLE   Tobacco Use    Smoking status: Never Smoker    Smokeless tobacco: Never Used   Vaping Use    Vaping Use: Never used   Substance and Sexual Activity    Alcohol use: Yes     Alcohol/week: 1.0 standard drink     Types: 1 Glasses of wine per week     Comment: soc    Drug use: No       Prior to Admission medications    Medication Sig Start Date End Date Taking? Authorizing Provider   allopurinoL (ZYLOPRIM) 100 mg tablet Take 100 mg by mouth daily. Yes Provider, Historical   acetaminophen (TYLENOL) 325 mg tablet Take 650 mg by mouth every six (6) hours as needed for Pain. Yes Provider, Historical   niacin (NIASPAN) 1,000 mg Tb24 tab Take 1,000 mg by mouth daily. Yes Provider, Historical   trolamine salicylate-aloe vera 38% (ASPERCREME) topical cream Apply 2 g to affected area as needed for Pain. Yes Provider, Historical   multivitamin, tx-iron-ca-min (THERA-M w/ IRON) 9 mg iron-400 mcg tab tablet Take 1 Tab by mouth daily. Yes Provider, Historical   aspirin delayed-release 81 mg tablet Take 81 mg by mouth daily. Yes Provider, Historical   potassium chloride SR (KLOR-CON 10) 10 mEq tablet Take 10 mEq by mouth daily.    Yes Provider, Historical   carvediloL (COREG) 3.125 mg tablet Take 3.125 mg by mouth daily. Yes Provider, Historical       Allergies   Allergen Reactions    Seafood Hives     crabs    Statins-Hmg-Coa Reductase Inhibitors Unknown (comments)    Sulfa (Sulfonamide Antibiotics) Hives       Review of Systems  Pertinent items are noted in the History of Present Illness. Physical Exam:      Visit Vitals  /78   Pulse 95   Temp 98.7 °F (37.1 °C)   Resp 16   Ht 5' 5\" (1.651 m)   Wt 104.2 kg (229 lb 12.8 oz)   SpO2 97%   Breastfeeding No   BMI 38.24 kg/m²       Physical Exam:  no motion right foot, does not follow commands well. Patient patient's right hip is shortened and externally rotated. She does have good DP and PT pulses. Neurologic exam unable to be performed as patient is sleeping. Labs Reviewed: All lab results for the last 24 hours reviewed. Assessment/Plan     Principal Problem:    Acute respiratory failure with hypoxemia (HCC) (3/2/2022)    Active Problems:    Elevated troponin (4/23/2021)      HTN (hypertension) (4/23/2021)      Acute on chronic diastolic congestive heart failure ([address] (4/23/2021)      Atrial fibrillation ([address] (3/2/2022)      Stage 3 chronic kidney disease ([address] (3/3/2022)      SERENA (obstructive sleep apnea) (3/3/2022)      Adult BMI 39.0-39.9 kg/sq m (3/3/2022)      Fracture of neck of right femur ([address] (3/8/2022)        Displaced right subcaptial fracture  Previous foot drop, LLD  Afib  Mental status changes  Limited mobility    Eliquis stopped has been on hold since Tuesday. The case in detail with the patient's daughter yesterday. Daughter not present today. Dr. Shelbie Mario has reviewed the x-rays. Plan is for medical optimization. Plan is to proceed with right total hip arthroplasty versus possible hemiarthroplasty today. Patient has been NPO since midnight. The risks associated with procedure were reviewed and all questions were answered.  The risks including bleeding, pain, fracture, DVT, PE, Infection, stroke, death amongst others were reviewed. We will use Eliquis for post operative DVT prevention. The patient's daughter understands and is willing to proceed. Case discussed with Dr. Cruz Medeiros who is in agreement with the above.

## 2022-03-10 NOTE — PROGRESS NOTES
Hospitalist Progress Note-critical care note     Patient: Vidal Tierney MRN: [id number]  CSN: [id number]    YOB: 1940  Age: 80 y.o. Sex: female    DOA: 3/2/2022 LOS:  LOS: 8 days            Chief complaint: hip fracture m ckd3, afib chf ,     Assessment/Plan         Hospital Problems  Date Reviewed: 3/9/2022          Codes Class Noted POA    Fracture of neck of right femur ([facility] [address] ICD-10-CM: S72.001A  ICD-9-CM: 820.8  3/8/2022 Unknown        Stage 3 chronic kidney disease ([facility] [address] ICD-10-CM: N18.30  ICD-9-CM: 585.3  3/3/2022 Unknown        SERENA (obstructive sleep apnea) ICD-10-CM: G47.33  ICD-9-CM: 327.23  3/3/2022 Unknown        Adult BMI 39.0-39.9 kg/sq m ICD-10-CM: Z68.39  ICD-9-CM: V85.39  3/3/2022 Unknown        * (Principal) Acute respiratory failure with hypoxemia (HCC) ICD-10-CM: J96.01  ICD-9-CM: 518.81  3/2/2022 Unknown        Atrial fibrillation (HCC) ICD-10-CM: I48.91  ICD-9-CM: 427.31  3/2/2022 Unknown        Elevated troponin ICD-10-CM: R77.8  ICD-9-CM: 790.6  4/23/2021 Yes        HTN (hypertension) ICD-10-CM: I10  ICD-9-CM: 401.9  4/23/2021 Yes        Acute on chronic diastolic congestive heart failure (HCC) ICD-10-CM: I50.33  ICD-9-CM: 428.33, 428.0  4/23/2021 Yes             pt was admitted for afib and chf, found rt hip fracture. She has serena on cpap at night, she received rt hip replacement, she was noted decreased tide volume and hypoxia while extubation post procedure, intubated with oxygen 65%, peep 6.  Case discussed with Dr. Isma Aguirre and Dr. Kayleen Ivory       Respiratory   Acute on chronic respiratory failure with hypoxia and hypercapnia   Will continue vent   Transfer to icu   Will have cxr , abg     Case discussed with Dr. Kayleen Ivory   V/q scan :Perfusion images show no segmental perfusion defects to suggest the presence of  pulmonary embolism, pvl negative for dvt  on 3/3    Pulmonary hypertension   Echo on 3/3 pulmonary arterial systolic pressure is 51 mmhg  LORRAINE is deferred cv   Congestive heart failure , acute on chronic diastolic   Echo done Mitral stenosis,  Pulmonary hypertension found to be moderate-LORRAINE is deferred  Dr. Edie Sun  On lasix      Htn : on coreg   afib new   On eliquis before , hold for surgery , on coreg   Will restart heparin vs eliquis when ortho is ok     Elevated trop   No acs cardiologist on board      ID   UTI  Urine pos for klebsiella  Completed IV Rocephin     msk :  Post surgery day 0: Right press fit direct anterior total hip arthroplasty   Continue post surgery care   Elevated trop  No chest pain    Trop trending down   No acs likely demand ischemia  Needs diuretic on discharge     Renal   ckd3   Continue watch for renal function ,watch for renal function and urine-out-put     Heme   Anemia   Continue monitoring h/h     FEN  icu electrolytes replacement protocol       Neuro   Ct head on 3/7 no acute issue, on propofol       45 minutes of critical care time spent in the direct evaluation and treatment of this high risk patient. The reason for providing this level of medical care for this critically ill patient was due a critical illness that impaired one or more vital organ systems such that there was a high probability of imminent or life threatening deterioration in the patients condition. This care involved high complexity decision making to assess, manipulate, and support vital system functions, to treat this degreee vital organ system failure and to prevent further life threatening deterioration of the patients condition.     Disposition :tbd,   Review of systems:  Limited due to on vent   Vital signs/Intake and Output:  Visit Vitals  /72   Pulse 78   Temp 97.8 °F (36.6 °C)   Resp 15   Ht 5' 5\" (1.651 m)   Wt 104.2 kg (229 lb 12.8 oz)   SpO2 100%   Breastfeeding No   BMI 38.24 kg/m²     Current Shift:  03/10 0701 - 03/10 1900  In: 700 [I.V.:700]  Out: 350   Last three shifts:  03/08 1901 - 03/10 0700  In: 930 [P.O.:480; I.V.:450]  Out: 2400 [Urine:2400]    Physical Exam:  General: Intubated   HEENT: NC, Atraumatic. anicteric sclerae. ET tube noted   Lungs: CTA Bilaterally. No Wheezing/Rhonchi/Rales. Heart:  Regular  rhythm,  No murmur, No Rubs, No Gallops  Abdomen: Soft, Non distended, Non tender. +Bowel sounds,   Extremities: No c/c, rt hip surgical site covered with gauze and wound vac noted   Psych:   Not anxious or agitated. Neurologic:  Intubated on propofol           Labs: Results:       Chemistry Recent Labs     03/10/22  0140 03/09/22  1812   * 127*    143   K 4.5 4.4    101   CO2 37* 41*   BUN 65* 64*   CREA 1.35* 1.39*   CA 10.6* 10.3*   AGAP 5 1*   BUCR 48* 46*    118*   TP 6.4 6.2*   ALB 3.1* 2.8*   GLOB 3.3 3.4   AGRAT 0.9 0.8      CBC w/Diff Recent Labs     03/10/22  0140 03/09/22  1812   WBC 7.9 7.7   RBC 3.67* 3.91*   HGB 11.8* 11.8*   HCT 38.2 40.0    203   GRANS 74* 74*   LYMPH 10* 10*   EOS 1 1      Cardiac Enzymes No results for input(s): CPK, CKND1, FARA in the last 72 hours. No lab exists for component: CKRMB, TROIP   Coagulation No results for input(s): PTP, INR, APTT, INREXT in the last 72 hours. Lipid Panel Lab Results   Component Value Date/Time    Cholesterol, total 183 04/25/2021 04:00 PM    HDL Cholesterol 101 (H) 04/25/2021 04:00 PM    LDL, calculated 69.8 04/25/2021 04:00 PM    VLDL, calculated 12.2 04/25/2021 04:00 PM    Triglyceride 61 04/25/2021 04:00 PM    CHOL/HDL Ratio 1.8 04/25/2021 04:00 PM      BNP No results for input(s): BNPP in the last 72 hours. Liver Enzymes Recent Labs     03/10/22  0140   TP 6.4   ALB 3.1*         Thyroid Studies Lab Results   Component Value Date/Time    TSH 0.58 03/03/2022 02:21 AM        Procedures/imaging: see electronic medical records for all procedures/Xrays and details which were not copied into this note but were reviewed prior to creation of Plan    NM LUNG SCAN PERF    Result Date: 3/3/2022  EXAM: NM LUNG SCAN PERF.  CLINICAL INDICATION/HISTORY: d dimer increase dyspnea. -Additional: Clinical concern for pulmonary embolus. COMPARISON: Correlation is made with the radiographic study performed one day prior. TECHNIQUE: 7.2 mCi Tc-99m MAA was injected intravenously and the 8 standard views of the chest were obtained. This perfusion only examination is interpreted using the PISAPED criteria. _______________ FINDINGS: Perfusion images show no segmental perfusion defects to suggest the presence of pulmonary embolism. _______________     o scintigraphic findings to suggest the presence of acute pulmonary embolism. _______________     XR HIP RT W OR WO PELV 2-3 VWS    Result Date: 3/7/2022  EXAM: RIGHT HIP RADIOGRAPHS CLINICAL INDICATION/HISTORY: right hip pain -Additional: None COMPARISON: May March 3, 2022. TECHNIQUE: AP pelvis and frog-leg lateral view of right hip. _______________ FINDINGS: BONES: Intact appearing ilioischial and iliopectineal lines. There is a displaced and impacted subcapital right femoral neck fracture, with mild progressive displacement on follow-up imaging. Mild-moderate bilateral hip joint arthrosis. SOFT TISSUES: Unremarkable. _______________     1. Displaced and impacted subcapital right femoral neck fracture, increased in displacement from CT performed 4 days prior. CT HEAD WO CONT    Result Date: 3/7/2022  EXAM: CT head INDICATION: Altered mental status. COMPARISON: 4/23/2021. TECHNIQUE: Axial CT imaging of the head was performed without intravenous contrast. Standard multiplanar coronal and sagittal reformatted images were obtained and are included in interpretation. One or more dose reduction techniques were used on this CT: automated exposure control, adjustment of the mAs and/or kVp according to patient size, and iterative reconstruction techniques. The specific techniques used on this CT exam have been documented in the patient's electronic medical record.   Digital Imaging and Communications in Medicine (DICOM) format image data are available to nonaffiliated external healthcare facilities or entities on a secure, media free, reciprocally searchable basis with patient authorization for at least a 12-month period after this study. _______________ FINDINGS: BRAIN AND POSTERIOR FOSSA: Periventricular white matter hypoattenuation which is nonspecific but likely represents chronic ischemic changes. No evidence of acute large vessel transcortical infarct or acute parenchymal hemorrhage. No midline shift or hydrocephalus. EXTRA-AXIAL SPACES AND MENINGES: There are no abnormal extra-axial fluid collections. CALVARIUM: Intact. SINUSES: Clear. OTHER: None. _______________     No acute intracranial abnormality. CT CHEST ABD PELV WO CONT    Result Date: 3/3/2022  EXAM: CT CHEST, ABDOMEN AND PELVIS CLINICAL INDICATION/HISTORY: Hypoxemia, retrocardiac density, diarrhea, weakness and shortness of breath COMPARISON: 3/2/2022 TECHNIQUE: Axial CT imaging of the chest, abdomen, and pelvis was performed without intravenous contrast. Multiplanar reformats were generated. One or more dose reduction techniques were used on this CT: automated exposure control, adjustment of the mAs and/or kVp according to patient size, and iterative reconstruction techniques. The specific techniques used on this CT exam have been documented in the patient's electronic medical record. Digital Imaging and Communications in Medicine (DICOM) format image data are available to nonaffiliated external healthcare facilities or entities on a secure, media free, reciprocally searchable basis with patient authorization for at least a 12-month period after this study. ________________ FINDINGS: LIMITATIONS:   > Suboptimal evaluation given lack of intravenous contrast.   > Motion artifact is present which limits evaluation.   > Suboptimal exam due to patient body habitus and arms by the side. CHEST: LUNGS: Respiratory motion significantly limits evaluation. Interlobar septal thickening in the upper lobes. Mild bilateral dependent atelectasis. No large consolidation or suspicious pulmonary mass. PLEURA: Very small volume bilateral pleural effusions. AIRWAY: Normal. MEDIASTINUM: Four-chamber cardiomegaly with asymmetric biatrial enlargement, mitral and aortic annular calcifications. The main pulmonary artery is enlarged suggesting pulmonary arterial hypertension. Normal caliber aorta with scattered calcified atherosclerotic plaque. No pericardial effusion. LYMPH NODES: No enlarged lymph nodes. CHEST WALL: Diffuse anasarca. Heterogeneous thyroid. _______________ ABDOMEN/PELVIS: LIVER: No enhancing hepatic mass. The portal and hepatic veins are patent. BILIARY: Gallstones are present in the nondistended gallbladder lumen. No biliary dilation. SPLEEN: Normal. PANCREAS: Normal. ADRENALS: Left adrenal gland measures 9 HU (axial image 76), most consistent with lipid rich adenoma. Normal right adrenal gland. KIDNEYS: Asymmetrically small left kidney. No rate of a stone. No hydronephrosis. GI TRACT:  A small hiatal hernia is present. Normal caliber small and large bowel loops. No morphology of bowel obstruction. Normal appendix. BLADDER: Diffuse wall thickening in the largely decompressed bladder. PELVIC ORGANS: No acute abnormality. VASCULATURE: No arterial aneurysm. 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 LYMPH NODES: No mesenteric or retroperitoneal lymphadenopathy. OTHER: No free intraperitoneal air. No ascites. Diffuse anasarca. OSSEOUS STRUCTURES: No acute osseous abnormality. Multilevel degenerative changes most pronounced in the lumbar spine. Degenerative changes in both shoulders (right greater than left). Please note the included portions of the upper extremities are not well evaluated on this study _______________     1.   Findings of congestive heart failure with cardiomegaly, interstitial edema, very small bilateral pleural effusions, and diffuse anasarca. 2.  Bladder wall thickening may be due to underdistention though superimposed infection cannot be excluded. Recommend correlation for cystitis. 3.  Osseous degenerative changes and additional findings, as detailed in the body of the report. XR CHEST PORT    Result Date: 3/6/2022  EXAM: PORTABLE CHEST HISTORY: Shortness of breath. COMPARISON: 3/2/2022 TECHNIQUE: Single portable view. _______________ FINDINGS: SUPPORT DEVICES: None HEART AND MEDIASTINUM: Moderate cardiomegaly. LUNGS AND PLEURAL SPACES: Subsegmental atelectasis left base. Possible small effusions. BONES AND SOFT TISSUES: Dextroscoliosis. _______________     Subsegmental atelectasis left base. Possible small effusions. Moderate cardiomegaly without change. XR CHEST PORT    Result Date: 3/2/2022  EXAM:  AP Portable Chest X-ray 1 view INDICATION: Shortness of breath COMPARISON: April 23, 2021 _______________ FINDINGS: Cardiomegaly and mediastinal contours are within normal limits for portable radiograph. There is increased right retrocardiac/right paraspinal density. There are no pleural effusions. No acute osseous findings. ________________      Increased right retrocardiac density which may represent airspace disease or underlying pulmonary nodule. Recommend CT chest for further evaluation. ECHO ADULT COMPLETE    Result Date: 3/3/2022    Left Ventricle: Left ventricle size is normal. Moderately increased wall thickness. Findings consistent with moderate concentric hypertrophy. Normal wall motion. Normal left ventricular systolic function with a visually estimated EF of 55 - 60%.   Left Atrium: Left atrium is mildly dilated.   Right Ventricle: Right ventricle is mildly dilated.   Right Atrium: Right atrium is mildly dilated.   Pulmonary artery :  Estimated pulmonary arterial systolic pressure is 51 mmhg. Pulmonary hypertension found to be moderate   Mitral Valve: Moderately thickened leaflet.  Mildly calcified leaflet. Moderate annular calcification of the mitral valve.   IVC/SVC : IVC diameter is greater than 21 mm and decreases less than 50% during inspiration; therefore the estimated right atrial pressure is elevated (~15 mmHg). IVC is dilated. Consider LORRAINE for better evaluation of mitral valve if clinically indicated. DUPLEX LOWER EXT VENOUS BILAT    Result Date: 3/4/2022  · No evidence of deep vein thrombosis in the right lower extremity. · No evidence of deep vein thrombosis in the left lower extremity.         Nathaniel Bello MD

## 2022-03-10 NOTE — PROGRESS NOTES
PLACED ON VENT AT 1345. BBS are clear and diminished. Pt tolerating well. Pt is not breathing over the vent but is starting to move around and gum the tube. Tube is secured.  RN and RT at bedside,

## 2022-03-10 NOTE — CONSULTS
[facility]  Pulmonary, Critical Care, and Sleep Medicine    Name: Alexis Boland MRN: [id number]   : 1940 Hospital: [facility] [place]    Date: 3/10/2022  Room: [address] [place] [address] Note                                              Consult requesting physician: Dr. Aldair Loo  Reason for Consult: Respiratory failure    IMPRESSION:   Acute respiratory failure with hypoxemia. Acute on chronic diastolic congestive heart failure. Fracture of neck of right femur. SERENA. Active Hospital Problems    Diagnosis Date Noted    Fracture of neck of right femur ([facility] 2022    Stage 3 chronic kidney disease ([facility] 2022    SERENA (obstructive sleep apnea) 2022    Adult BMI 39.0-39.9 kg/sq m 2022    Acute respiratory failure with hypoxemia (HCC) 2022    Atrial fibrillation ([facility] 2022    [facility]    Elevated troponin 2021    HTN (hypertension) 2021   ·      Patient Active Problem List   Diagnosis Code    Fatigue R53.83    Elevated troponin R77.8    Obesity (BMI 30-39. 9) E66.9    HTN (hypertension) I10    SERENA treated with BiPAP G47.33    Acute on chronic diastolic congestive heart failure (HCC) I50.33    Acute respiratory failure with hypoxemia ([facility]) J96.01    Atrial fibrillation ([facility]) I48.91    Stage 3 chronic kidney disease (HCC) N18.30    SERENA (obstructive sleep apnea) G47.33    Adult BMI 39.0-39.9 kg/sq m Z68.39    Fracture of neck of right femur ([facility]) S72.001A   ·       · Code status: Full Code       RECOMMENDATIONS:     Respiratory: History of obesity, SERENA on BiPAP. Postoperative respiratory failure, reintubated in PACU 3/10/2022. Sedation: Patient is unresponsive after receiving propofol during intubation. If required, restart propofol and keep it at lowest possible dose. Use fentanyl/Versed as needed boluses. ABG, CXR and vent bundle ordered.     Ventilator bundle & Sedation protocol followed. Daily sedation holiday, assessment for readiness for SBT and then re-titrate if required. Chlorhexidine mouth washes. Keep SPO2 >=92%. HOB 30 degree elevation all the time. Aggressive pulmonary toileting. Aspiration precautions. Incentive spirometry. CVS: New onset A. fib on admission, chronic diastolic CHF. On Coreg, aspirin, Lasix 40 every 12. Eliquis held for surgery. Dr. Leopold Massing following. Echo 3/3/2022: LVEF 55 to 60%. RV mildly dilated. RA mildly dilated. PVL LE 3/3/2022: No DVT. VQ scan 3/3/2022: No PE.    ID:   No fever or leukocytosis. Rapid influenza and COVID19 3/2/2022: Negative. Urine culture 3/2/2022: Klebsiella pneumonia. Blood culture 3/2/2022: Negative. Antibiotics: Continue Rocephin. Follow cultures. Deescalate antibiotic when appropriate. Hematology/Oncology:   Mild anemia; monitor. Normal platelets. Renal:   Mildly elevated creatinine. Normal electrolytes. GI/: No acute issues. Endocrine: Monitor BS. SSI. Neurology:   Patient was lethargic and confused before going for hip surgery on 3/10/2022 morning. At present, patient is on ventilator and was sedated with propofol at the time of intubation. CT head 3/7/2022: Nil acute. Dr. Aaliyah Uribe is planning for repeat CT head. Psychiatry: No acute issues. Toxicology: No acute issues. Pain/Sedation: Sedation protocol as above    Skin/Wound: Right hip wound VAC in place after surgery    Electrolytes: Replace electrolytes per ICU electrolyte replacement protocol. IVF: None    Nutrition: n.p.o.  OGT tube in place. Prophylaxis: DVT Prophylaxis: SCD. GI Prophylaxis: Protonix. Restraints: As needed wrist soft restraints for patient interfering with medical therapy/management and patient safety. Lines/Tubes: PIV  ETT: 3/10/2022. OGT: To be placed 3/10/2022. [place]:  To be placed 3/10/2022 (Medically necessary for strict input/output monitoring in critically ill patient, will remove it when not needed. [place] bundle followed). Advance Directive/Palliative Care: Per clinical course. Will defer respective systems problem management to primary and other respective consultant and follow patient in ICU with primary and other medical team.  Further recommendations will be based on the patient's response to recommended treatment and results of the investigation ordered. Quality Care: PPI, DVT prophylaxis, HOB elevated, Infection control all reviewed and addressed. Care of plan d/w RN, RT, Dr. Rae Anne.    High complexity decision making was performed during the evaluation of this patient at high risk for decompensation with multiple organ involvement. Total critical care time spent rendering care exclusive of procedures/family discussion/coordination of care: 39 minutes. Subjective/History of Present Illness:     Patient is a 80 y.o. female with PMHx significant for morbid obesity, SERENA, right leg weakness, HTN, A. fib, CHF; admitted to medical floor on 3/3/2022 for dyspnea. Patient had new onset of A. fib on admission. Patient was admitted on medical floor and A. fib/CHF was being treated medically; and was using home BiPAP nightly. Found to have right femoral fracture; underwent right press-fit direct anterior total hip arthroplasty for femoral neck fracture. Postoperatively patient was extubated in PACU; but due to respiratory distress, reintubated and transferred to ICU.      3/10/2022 :     Seen in ICU room 107. Patient received propofol at the time of intubation; currently unresponsive; on ventilator. Moderate amount of tracheal secretions heard while doing a move back mas bleeding being performed by RT. No reported history of vomiting. Per report from Dr. Rae Anne, nurses and RT, patient was lethargic and confused and had difficulty swallowing pills before going into surgery. At patient review of system is unavailable as patient is on ventilator.   No fever. Blood pressure stable. I/O last 24 hrs: Intake/Output Summary (Last 24 hours) at 3/10/2022 1521  Last data filed at 3/10/2022 1304  Gross per 24 hour   Intake 1170 ml   Output 1250 ml   Net -80 ml         The patient is critically ill and can not provide additional history due to Ventilated    History taken from Dr. Cathy Haley, EMR     Review of Systems:  ROS not obtained due to patient factor. Allergies   Allergen Reactions    Seafood Hives     crabs    Statins-Hmg-Coa Reductase Inhibitors Unknown (comments)    Sulfa (Sulfonamide Antibiotics) Hives      Past Medical History:   Diagnosis Date    Hypertension     Sleep disorder       Past Surgical History:   Procedure Laterality Date    HX ORTHOPAEDIC      broken right ankle, left femur 30 yrs ago      Social History     Tobacco Use    Smoking status: Never Smoker    Smokeless tobacco: Never Used   Substance Use Topics    Alcohol use: Yes     Alcohol/week: 1.0 standard drink     Types: 1 Glasses of wine per week     Comment: soc      Family History   Problem Relation Age of Onset    Diabetes Mother     Heart Disease Mother     Heart Disease Father       Prior to Admission medications    Medication Sig Start Date End Date Taking? Authorizing Provider   allopurinoL (ZYLOPRIM) 100 mg tablet Take 100 mg by mouth daily. Yes Provider, Historical   acetaminophen (TYLENOL) 325 mg tablet Take 650 mg by mouth every six (6) hours as needed for Pain. Yes Provider, Historical   niacin (NIASPAN) 1,000 mg Tb24 tab Take 1,000 mg by mouth daily. Yes Provider, Historical   trolamine salicylate-aloe vera 89% (ASPERCREME) topical cream Apply 2 g to affected area as needed for Pain. Yes Provider, Historical   multivitamin, tx-iron-ca-min (THERA-M w/ IRON) 9 mg iron-400 mcg tab tablet Take 1 Tab by mouth daily. Yes Provider, Historical   aspirin delayed-release 81 mg tablet Take 81 mg by mouth daily.    Yes Provider, Historical   potassium chloride SR (KLOR-CON 10) 10 mEq tablet Take 10 mEq by mouth daily. Yes Provider, Historical   carvediloL (COREG) 3.125 mg tablet Take 3.125 mg by mouth daily.    Yes Provider, Historical     Current Facility-Administered Medications   Medication Dose Route Frequency    nystatin (MYCOSTATIN) 100,000 unit/mL oral suspension 500,000 Units  500,000 Units Oral QID    cefTRIAXone (ROCEPHIN) 1 g in 0.9% sodium chloride (MBP/ADV) 50 mL MBP  1 g IntraVENous Q24H    furosemide (LASIX) injection 40 mg  40 mg IntraVENous Q12H    pantoprazole (PROTONIX) tablet 40 mg  40 mg Oral ACB    [Held by provider] apixaban (ELIQUIS) tablet 2.5 mg  2.5 mg Oral BID    sodium chloride (NS) flush 5-40 mL  5-40 mL IntraVENous Q8H    [Held by provider] potassium chloride (K-DUR, KLOR-CON M20) SR tablet 20 mEq  20 mEq Oral DAILY    carvediloL (COREG) tablet 3.125 mg  3.125 mg Oral BID WITH MEALS    aspirin delayed-release tablet 81 mg  81 mg Oral DAILY         Objective:   Vital Signs:    Visit Vitals  /64   Pulse 70   Temp 97.7 °F (36.5 °C)   Resp 14   Ht 5' 5\" (1.651 m)   Wt 104.2 kg (229 lb 12.8 oz)   SpO2 100%   Breastfeeding No   BMI 38.24 kg/m²       O2 Device: Ventilator   O2 Flow Rate (L/min): 2 l/min   Temp (24hrs), Av.1 °F (36.7 °C), Min:97.7 °F (36.5 °C), Max:98.7 °F (37.1 °C)       Intake/Output:   Last shift:      03/10 0701 - 03/10 1900  In: 700 [I.V.:700]  Out: 350     Last 3 shifts: 1901 - 03/10 0700  In: 930 [P.O.:480; I.V.:450]  Out: 2400 [Urine:2400]      Intake/Output Summary (Last 24 hours) at 3/10/2022 1521  Last data filed at 3/10/2022 1304  Gross per 24 hour   Intake 1170 ml   Output 1250 ml   Net -80 ml       Last 3 Recorded Weights in this Encounter    22 0412 22 0454 22 0459   Weight: 112 kg (246 lb 14.4 oz) 105.1 kg (231 lb 9.6 oz) 104.2 kg (229 lb 12.8 oz)         Ventilator Settings:  Mode Rate Tidal Volume Pressure FiO2 PEEP   Assist control,VC+   450 ml    65 % 5 cm H20 Peak airway pressure: 27 cm H2O    Plateau pressure:     Tidal volume:    Minute ventilation: 3.96 l/min     No results for input(s): PHI, PHI, POC2, PCO2I, PO2, PO2I, HCO3, HCO3I, FIO2, FIO2I in the last 72 hours. Physical Exam:     General/Neurology: Morbidly obese. Intubated, on ventilator. Unresponsive. Head:   Normocephalic, without obvious abnormality, atraumatic. Eye:   Pupils bilaterally equal reactive to light. No scleral icterus, no pallor, no cyanosis. Nose:   No sinus tenderness  Throat:  Lips, mucosa, and tongue normal. No oral thrush. Neck:   Supple, symmetric. No lymphadenopathy. Trachea midline  Lung: Moderate air entry bilateral equal. No rales. No rhonchi. No wheezing. No stridors. No prolongded expiration. No accessory muscle use. Heart:   Regular rate & rhythm. S1 S2 present. No murmur. No JVD. Abdomen:  Soft. NT. ND. +BS. No masses. Extremities:  No pedal edema. No cyanosis. No clubbing. Pulses: 2+ and symmetric in DP. Capillary refill: normal  Lymphatic:  No cervical or supraclavicular palpable lymphadenopathy. Musculoskeletal: No joint swelling. No tenderness. Right hip surgical site under dressing with wound VAC. Skin:   Color, texture, turgor normal. No rashes or lesions. Data:       Recent Results (from the past 24 hour(s))   CBC WITH AUTOMATED DIFF    Collection Time: 03/09/22  6:12 PM   Result Value Ref Range    WBC 7.7 4.6 - 13.2 K/uL    RBC 3.91 (L) 4.20 - 5.30 M/uL    HGB 11.8 (L) 12.0 - 16.0 g/dL    HCT 40.0 35.0 - 45.0 %    .3 (H) 78.0 - 100.0 FL    MCH 30.2 24.0 - 34.0 PG    MCHC 29.5 (L) 31.0 - 37.0 g/dL    RDW 14.8 (H) 11.6 - 14.5 %    PLATELET 460 623 - 124 K/uL    MPV 10.3 9.2 - 11.8 FL    NRBC 0.0 0  WBC    ABSOLUTE NRBC 0.00 0.00 - 0.01 K/uL    NEUTROPHILS 74 (H) 40 - 73 %    LYMPHOCYTES 10 (L) 21 - 52 %    MONOCYTES 16 (H) 3 - 10 %    EOSINOPHILS 1 0 - 5 %    BASOPHILS 0 0 - 2 %    IMMATURE GRANULOCYTES 1 (H) 0.0 - 0.5 %    ABS. NEUTROPHILS 5.7 1.8 - 8.0 K/UL    ABS. LYMPHOCYTES 0.8 (L) 0.9 - 3.6 K/UL    ABS. MONOCYTES 1.2 0.05 - 1.2 K/UL    ABS. EOSINOPHILS 0.1 0.0 - 0.4 K/UL    ABS. BASOPHILS 0.0 0.0 - 0.1 K/UL    ABS. IMM. GRANS. 0.0 0.00 - 0.04 K/UL    DF AUTOMATED     METABOLIC PANEL, COMPREHENSIVE    Collection Time: 03/09/22  6:12 PM   Result Value Ref Range    Sodium 143 136 - 145 mmol/L    Potassium 4.4 3.5 - 5.5 mmol/L    Chloride 101 100 - 111 mmol/L    CO2 41 (HH) 21 - 32 mmol/L    Anion gap 1 (L) 3.0 - 18 mmol/L    Glucose 127 (H) 74 - 99 mg/dL    BUN 64 (H) 7.0 - 18 MG/DL    Creatinine 1.39 (H) 0.6 - 1.3 MG/DL    BUN/Creatinine ratio 46 (H) 12 - 20      GFR est AA 44 (L) >60 ml/min/1.73m2    GFR est non-AA 36 (L) >60 ml/min/1.73m2    Calcium 10.3 (H) 8.5 - 10.1 MG/DL    Bilirubin, total 0.5 0.2 - 1.0 MG/DL    ALT (SGPT) 62 (H) 13 - 56 U/L    AST (SGOT) 21 10 - 38 U/L    Alk. phosphatase 118 (H) 45 - 117 U/L    Protein, total 6.2 (L) 6.4 - 8.2 g/dL    Albumin 2.8 (L) 3.4 - 5.0 g/dL    Globulin 3.4 2.0 - 4.0 g/dL    A-G Ratio 0.8 0.8 - 1.7     MAGNESIUM    Collection Time: 03/10/22  1:40 AM   Result Value Ref Range    Magnesium 1.9 1.6 - 2.6 mg/dL   CBC WITH AUTOMATED DIFF    Collection Time: 03/10/22  1:40 AM   Result Value Ref Range    WBC 7.9 4.6 - 13.2 K/uL    RBC 3.67 (L) 4.20 - 5.30 M/uL    HGB 11.8 (L) 12.0 - 16.0 g/dL    HCT 38.2 35.0 - 45.0 %    .1 (H) 78.0 - 100.0 FL    MCH 32.2 24.0 - 34.0 PG    MCHC 30.9 (L) 31.0 - 37.0 g/dL    RDW 15.3 (H) 11.6 - 14.5 %    PLATELET 494 050 - 488 K/uL    MPV 10.5 9.2 - 11.8 FL    NRBC 0.0 0  WBC    ABSOLUTE NRBC 0.00 0.00 - 0.01 K/uL    NEUTROPHILS 74 (H) 40 - 73 %    LYMPHOCYTES 10 (L) 21 - 52 %    MONOCYTES 15 (H) 3 - 10 %    EOSINOPHILS 1 0 - 5 %    BASOPHILS 0 0 - 2 %    IMMATURE GRANULOCYTES 1 (H) 0.0 - 0.5 %    ABS. NEUTROPHILS 5.8 1.8 - 8.0 K/UL    ABS. LYMPHOCYTES 0.8 (L) 0.9 - 3.6 K/UL    ABS. MONOCYTES 1.2 0.05 - 1.2 K/UL    ABS.  EOSINOPHILS 0.1 0.0 - 0.4 K/UL ABS. BASOPHILS 0.0 0.0 - 0.1 K/UL    ABS. IMM. GRANS. 0.1 (H) 0.00 - 0.04 K/UL    DF AUTOMATED     METABOLIC PANEL, COMPREHENSIVE    Collection Time: 03/10/22  1:40 AM   Result Value Ref Range    Sodium 145 136 - 145 mmol/L    Potassium 4.5 3.5 - 5.5 mmol/L    Chloride 103 100 - 111 mmol/L    CO2 37 (H) 21 - 32 mmol/L    Anion gap 5 3.0 - 18 mmol/L    Glucose 115 (H) 74 - 99 mg/dL    BUN 65 (H) 7.0 - 18 MG/DL    Creatinine 1.35 (H) 0.6 - 1.3 MG/DL    BUN/Creatinine ratio 48 (H) 12 - 20      GFR est AA 46 (L) >60 ml/min/1.73m2    GFR est non-AA 38 (L) >60 ml/min/1.73m2    Calcium 10.6 (H) 8.5 - 10.1 MG/DL    Bilirubin, total 0.8 0.2 - 1.0 MG/DL    ALT (SGPT) 58 (H) 13 - 56 U/L    AST (SGOT) 25 10 - 38 U/L    Alk. phosphatase 113 45 - 117 U/L    Protein, total 6.4 6.4 - 8.2 g/dL    Albumin 3.1 (L) 3.4 - 5.0 g/dL    Globulin 3.3 2.0 - 4.0 g/dL    A-G Ratio 0.9 0.8 - 1.7     TYPE & SCREEN    Collection Time: 03/10/22  9:00 AM   Result Value Ref Range    Crossmatch Expiration 03/13/2022,2359     ABO/Rh(D) O POSITIVE     Antibody screen NEG          Chemistry Recent Labs     03/10/22  0140 03/09/22  1812 03/09/22  0755 03/08/22  0450   * 127*  --   --     143  --   --    K 4.5 4.4  --   --     101  --   --    CO2 37* 41*  --   --    BUN 65* 64*  --   --    CREA 1.35* 1.39*  --   --    CA 10.6* 10.3*  --   --    MG 1.9  --  2.0 1.9   AGAP 5 1*  --   --    BUCR 48* 46*  --   --     118*  --   --    TP 6.4 6.2*  --   --    ALB 3.1* 2.8*  --   --    GLOB 3.3 3.4  --   --    AGRAT 0.9 0.8  --   --         Lactic Acid No results found for: LAC  No results for input(s): LAC in the last 72 hours.      Liver Enzymes Protein, total   Date Value Ref Range Status   03/10/2022 6.4 6.4 - 8.2 g/dL Final     Albumin   Date Value Ref Range Status   03/10/2022 3.1 (L) 3.4 - 5.0 g/dL Final     Globulin   Date Value Ref Range Status   03/10/2022 3.3 2.0 - 4.0 g/dL Final     A-G Ratio   Date Value Ref Range Status 03/10/2022 0.9 0.8 - 1.7   Final     Alk. phosphatase   Date Value Ref Range Status   03/10/2022 113 45 - 117 U/L Final     Recent Labs     03/10/22  0140 03/09/22  1812   TP 6.4 6.2*   ALB 3.1* 2.8*   GLOB 3.3 3.4   AGRAT 0.9 0.8    118*        CBC w/Diff Recent Labs     03/10/22  0140 03/09/22 1812   WBC 7.9 7.7   RBC 3.67* 3.91*   HGB 11.8* 11.8*   HCT 38.2 40.0    203   GRANS 74* 74*   LYMPH 10* 10*   EOS 1 1        Cardiac Enzymes No results found for: CPK, CK, CKMMB, CKMB, RCK3, CKMBT, CKNDX, CKND1, FARA, TROPT, TROIQ, ADOLFO, TROPT, TNIPOC, BNP, BNPP     BNP No results found for: BNP, BNPP, XBNPT     Coagulation No results for input(s): PTP, INR, APTT, INREXT in the last 72 hours. Thyroid  Lab Results   Component Value Date/Time    TSH 0.58 03/03/2022 02:21 AM       No results found for: T4     Urinalysis Lab Results   Component Value Date/Time    Color YELLOW 03/02/2022 09:04 PM    Appearance CLOUDY 03/02/2022 09:04 PM    Specific gravity 1.021 03/02/2022 09:04 PM    pH (UA) 5.0 03/02/2022 09:04 PM    Protein 300 (A) 03/02/2022 09:04 PM    Glucose Negative 03/02/2022 09:04 PM    Ketone TRACE (A) 03/02/2022 09:04 PM    Bilirubin Negative 03/02/2022 09:04 PM    Urobilinogen 1.0 03/02/2022 09:04 PM    Nitrites Negative 03/02/2022 09:04 PM    Leukocyte Esterase Negative 03/02/2022 09:04 PM    Epithelial cells 2+ 03/02/2022 09:04 PM    Bacteria FEW (A) 03/02/2022 09:04 PM    WBC 0 to 3 03/02/2022 09:04 PM    RBC 0 to 3 03/02/2022 09:04 PM        Micro  No results for input(s): SDES, CULT in the last 72 hours. No results for input(s): CULT in the last 72 hours. Culture data during this hospitalization.    All Micro Results     Procedure Component Value Units Date/Time    CULTURE, BLOOD [[id number]] Collected: 03/02/22 2048    Order Status: Completed Specimen: Blood Updated: 03/08/22 0655     Special Requests: NO SPECIAL REQUESTS        Culture result: NO GROWTH 6 DAYS       CULTURE, BLOOD [[id number]] Collected: 03/02/22 2048    Order Status: Completed Specimen: Blood Updated: 03/08/22 0655     Special Requests: NO SPECIAL REQUESTS        Culture result: NO GROWTH 6 DAYS       CULTURE, URINE [[id number]]  (Abnormal)  (Susceptibility) Collected: 03/02/22 2104    Order Status: Completed Specimen: Cath Urine Updated: 03/05/22 1226     Special Requests: NO SPECIAL REQUESTS        [place] Count --        >100,000  COLONIES/mL       Culture result: KLEBSIELLA PNEUMONIAE       COVID-19 RAPID TEST [[id number]] Collected: 03/02/22 2025    Order Status: Completed Specimen: Nasopharyngeal Updated: 03/02/22 2052     Specimen source Nasopharyngeal        COVID-19 rapid test Not detected        Comment: Rapid Abbott ID Now       Rapid NAAT:  The specimen is NEGATIVE for SARS-CoV-2, the novel coronavirus associated with COVID-19. Negative results should be treated as presumptive and, if inconsistent with clinical signs and symptoms or necessary for patient management, should be tested with an alternative molecular assay. Negative results do not preclude SARS-CoV-2 infection and should not be used as the sole basis for patient management decisions. This test has been authorized by the FDA under an Emergency Use Authorization (EUA) for use by authorized laboratories. Fact sheet for Healthcare Providers: ConventionUpdate.co.nz  Fact sheet for Patients: ConventionUpdate.co.nz       Methodology: Isothermal Nucleic Acid Amplification         INFLUENZA A & B AG (RAPID TEST) [[id number]] Collected: 03/02/22 2025    Order Status: Completed Specimen: Nasopharyngeal from Nasal washing Updated: 03/02/22 2047     Influenza A Antigen Negative        Comment: A negative result does not exclude influenza virus infection, seasonal or H1N1 due to suboptimal sensitivity.  If influenza is circulating in your community, a diagnosis of influenza should be considered based on a patients clinical presentation and empiric antiviral treatment should be considered, if indicated. Influenza B Antigen Negative                NM LUNG SCAN PERF    Result Date: 3/3/2022  EXAM: NM LUNG SCAN PERF. CLINICAL INDICATION/HISTORY: d dimer increase dyspnea. -Additional: Clinical concern for pulmonary embolus. COMPARISON: Correlation is made with the radiographic study performed one day prior. TECHNIQUE: 7.2 mCi Tc-99m MAA was injected intravenously and the 8 standard views of the chest were obtained. This perfusion only examination is interpreted using the PISAPED criteria. _______________ FINDINGS: Perfusion images show no segmental perfusion defects to suggest the presence of pulmonary embolism. _______________     o scintigraphic findings to suggest the presence of acute pulmonary embolism. _______________     XR HIP RT W OR WO PELV 2-3 VWS    Result Date: 3/7/2022  EXAM: RIGHT HIP RADIOGRAPHS CLINICAL INDICATION/HISTORY: right hip pain -Additional: None COMPARISON: May March 3, 2022. TECHNIQUE: AP pelvis and frog-leg lateral view of right hip. _______________ FINDINGS: BONES: Intact appearing ilioischial and iliopectineal lines. There is a displaced and impacted subcapital right femoral neck fracture, with mild progressive displacement on follow-up imaging. Mild-moderate bilateral hip joint arthrosis. SOFT TISSUES: Unremarkable. _______________     1. Displaced and impacted subcapital right femoral neck fracture, increased in displacement from CT performed 4 days prior. CT HEAD WO CONT    Result Date: 3/7/2022  EXAM: CT head INDICATION: Altered mental status. COMPARISON: 4/23/2021. TECHNIQUE: Axial CT imaging of the head was performed without intravenous contrast. Standard multiplanar coronal and sagittal reformatted images were obtained and are included in interpretation.  One or more dose reduction techniques were used on this CT: automated exposure control, adjustment of the mAs and/or kVp according to patient size, and iterative reconstruction techniques. The specific techniques used on this CT exam have been documented in the patient's electronic medical record. Digital Imaging and Communications in Medicine (DICOM) format image data are available to nonaffiliated external healthcare facilities or entities on a secure, media free, reciprocally searchable basis with patient authorization for at least a 12-month period after this study. _______________ FINDINGS: BRAIN AND POSTERIOR FOSSA: Periventricular white matter hypoattenuation which is nonspecific but likely represents chronic ischemic changes. No evidence of acute large vessel transcortical infarct or acute parenchymal hemorrhage. No midline shift or hydrocephalus. EXTRA-AXIAL SPACES AND MENINGES: There are no abnormal extra-axial fluid collections. CALVARIUM: Intact. SINUSES: Clear. OTHER: None. _______________     No acute intracranial abnormality. CT CHEST ABD PELV WO CONT    Result Date: 3/3/2022  EXAM: CT CHEST, ABDOMEN AND PELVIS CLINICAL INDICATION/HISTORY: Hypoxemia, retrocardiac density, diarrhea, weakness and shortness of breath COMPARISON: 3/2/2022 TECHNIQUE: Axial CT imaging of the chest, abdomen, and pelvis was performed without intravenous contrast. Multiplanar reformats were generated. One or more dose reduction techniques were used on this CT: automated exposure control, adjustment of the mAs and/or kVp according to patient size, and iterative reconstruction techniques. The specific techniques used on this CT exam have been documented in the patient's electronic medical record. Digital Imaging and Communications in Medicine (DICOM) format image data are available to nonaffiliated external healthcare facilities or entities on a secure, media free, reciprocally searchable basis with patient authorization for at least a 12-month period after this study.  ________________ FINDINGS: LIMITATIONS:   > Suboptimal evaluation given lack of intravenous contrast.   > Motion artifact is present which limits evaluation.   > Suboptimal exam due to patient body habitus and arms by the side. CHEST: LUNGS: Respiratory motion significantly limits evaluation. Interlobar septal thickening in the upper lobes. Mild bilateral dependent atelectasis. No large consolidation or suspicious pulmonary mass. PLEURA: Very small volume bilateral pleural effusions. AIRWAY: Normal. MEDIASTINUM: Four-chamber cardiomegaly with asymmetric biatrial enlargement, mitral and aortic annular calcifications. The main pulmonary artery is enlarged suggesting pulmonary arterial hypertension. Normal caliber aorta with scattered calcified atherosclerotic plaque. No pericardial effusion. LYMPH NODES: No enlarged lymph nodes. CHEST WALL: Diffuse anasarca. Heterogeneous thyroid. _______________ ABDOMEN/PELVIS: LIVER: No enhancing hepatic mass. The portal and hepatic veins are patent. BILIARY: Gallstones are present in the nondistended gallbladder lumen. No biliary dilation. SPLEEN: Normal. PANCREAS: Normal. ADRENALS: Left adrenal gland measures 9 HU (axial image 76), most consistent with lipid rich adenoma. Normal right adrenal gland. KIDNEYS: Asymmetrically small left kidney. No rate of a stone. No hydronephrosis. GI TRACT:  A small hiatal hernia is present. Normal caliber small and large bowel loops. No morphology of bowel obstruction. Normal appendix. BLADDER: Diffuse wall thickening in the largely decompressed bladder. PELVIC ORGANS: No acute abnormality. VASCULATURE: No arterial aneurysm. 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 LYMPH NODES: No mesenteric or retroperitoneal lymphadenopathy. OTHER: No free intraperitoneal air. No ascites. Diffuse anasarca. OSSEOUS STRUCTURES: No acute osseous abnormality. Multilevel degenerative changes most pronounced in the lumbar spine. Degenerative changes in both shoulders (right greater than left).  Please note the included portions of the upper extremities are not well evaluated on this study _______________     1. Findings of congestive heart failure with cardiomegaly, interstitial edema, very small bilateral pleural effusions, and diffuse anasarca. 2.  Bladder wall thickening may be due to underdistention though superimposed infection cannot be excluded. Recommend correlation for cystitis. 3.  Osseous degenerative changes and additional findings, as detailed in the body of the report. XR CHEST PORT    Result Date: 3/6/2022  EXAM: PORTABLE CHEST HISTORY: Shortness of breath. COMPARISON: 3/2/2022 TECHNIQUE: Single portable view. _______________ FINDINGS: SUPPORT DEVICES: None HEART AND MEDIASTINUM: Moderate cardiomegaly. LUNGS AND PLEURAL SPACES: Subsegmental atelectasis left base. Possible small effusions. BONES AND SOFT TISSUES: Dextroscoliosis. _______________     Subsegmental atelectasis left base. Possible small effusions. Moderate cardiomegaly without change. XR CHEST PORT    Result Date: 3/2/2022  EXAM:  AP Portable Chest X-ray 1 view INDICATION: Shortness of breath COMPARISON: April 23, 2021 _______________ FINDINGS: Cardiomegaly and mediastinal contours are within normal limits for portable radiograph. There is increased right retrocardiac/right paraspinal density. There are no pleural effusions. No acute osseous findings. ________________      Increased right retrocardiac density which may represent airspace disease or underlying pulmonary nodule. Recommend CT chest for further evaluation. ECHO ADULT COMPLETE    Result Date: 3/3/2022    Left Ventricle: Left ventricle size is normal. Moderately increased wall thickness. Findings consistent with moderate concentric hypertrophy. Normal wall motion. Normal left ventricular systolic function with a visually estimated EF of 55 - 60%.   Left Atrium: Left atrium is mildly dilated.   Right Ventricle: Right ventricle is mildly dilated.    Right Atrium: Right atrium is mildly dilated.   Pulmonary artery :  Estimated pulmonary arterial systolic pressure is 51 mmhg. Pulmonary hypertension found to be moderate   Mitral Valve: Moderately thickened leaflet. Mildly calcified leaflet. Moderate annular calcification of the mitral valve.   IVC/SVC : IVC diameter is greater than 21 mm and decreases less than 50% during inspiration; therefore the estimated right atrial pressure is elevated (~15 mmHg). IVC is dilated. Consider LORRAINE for better evaluation of mitral valve if clinically indicated. DUPLEX LOWER EXT VENOUS BILAT    Result Date: 3/4/2022  · No evidence of deep vein thrombosis in the right lower extremity. · No evidence of deep vein thrombosis in the left lower extremity. ECHO 3/3/2022: Result status: Final result       Left Ventricle: Left ventricle size is normal. Moderately increased wall thickness. Findings consistent with moderate concentric hypertrophy. Normal wall motion. Normal left ventricular systolic function with a visually estimated EF of 55 - 60%.   Left Atrium: Left atrium is mildly dilated.   Right Ventricle: Right ventricle is mildly dilated.   Right Atrium: Right atrium is mildly dilated.   Pulmonary artery :  Estimated pulmonary arterial systolic pressure is 51 mmhg. Pulmonary hypertension found to be moderate    Mitral Valve: Moderately thickened leaflet. Mildly calcified leaflet. Moderate annular calcification of the mitral valve.   IVC/SVC : IVC diameter is greater than 21 mm and decreases less than 50% during inspiration; therefore the estimated right atrial pressure is elevated (~15 mmHg). IVC is dilated.     Consider LORRAINE for better evaluation of mitral valve if clinically indicated. Images report reviewed by me:  CT (Most Recent) (CT chest reviewed by me) Results from Hospital Encounter encounter on 03/02/22    CT HEAD WO CONT    Narrative  EXAM: CT head    INDICATION: Altered mental status.     COMPARISON: 4/23/2021. TECHNIQUE: Axial CT imaging of the head was performed without intravenous  contrast. Standard multiplanar coronal and sagittal reformatted images were  obtained and are included in interpretation. One or more dose reduction techniques were used on this CT: automated exposure  control, adjustment of the mAs and/or kVp according to patient size, and  iterative reconstruction techniques. The specific techniques used on this CT  exam have been documented in the patient's electronic medical record. Digital  Imaging and Communications in Medicine (DICOM) format image data are available  to nonaffiliated external healthcare facilities or entities on a secure, media  free, reciprocally searchable basis with patient authorization for at least a  12-month period after this study. _______________    FINDINGS:    BRAIN AND POSTERIOR FOSSA: Periventricular white matter hypoattenuation which is  nonspecific but likely represents chronic ischemic changes. No evidence of  acute large vessel transcortical infarct or acute parenchymal hemorrhage. No  midline shift or hydrocephalus. EXTRA-AXIAL SPACES AND MENINGES: There are no abnormal extra-axial fluid  collections. CALVARIUM: Intact. SINUSES: Clear. OTHER: None.    _______________    Impression  No acute intracranial abnormality. CXR reviewed by me:  XR (Most Recent). CXR  reviewed by me and compared with previous CXR Results from Hospital Encounter encounter on 03/02/22    XR HIP RT W OR WO PELV 2-3 VWS    Narrative  EXAM: RIGHT HIP RADIOGRAPHS    CLINICAL INDICATION/HISTORY: right hip pain  -Additional: None    COMPARISON: May March 3, 2022. TECHNIQUE: AP pelvis and frog-leg lateral view of right hip.    _______________    FINDINGS:    BONES: Intact appearing ilioischial and iliopectineal lines. There is a  displaced and impacted subcapital right femoral neck fracture, with mild  progressive displacement on follow-up imaging. Mild-moderate bilateral hip joint  arthrosis. SOFT TISSUES: Unremarkable.    _______________    Impression  1. Displaced and impacted subcapital right femoral neck fracture, increased in  displacement from CT performed 4 days prior. ·Please note: Voice-recognition software may have been used to generate this report, which may have resulted in some phonetic-based errors in grammar and contents. Even though attempts were made to correct all the mistakes, some may have been missed, and remained in the body of the document.       Christal García MD  3/10/2022

## 2022-03-10 NOTE — PERIOP NOTES
TRANSFER - OUT REPORT:    Verbal report given to Laura Underwood 44 on Franco Buerger  being transferred to [facility] (unit) for routine progression of care       Report consisted of patients Situation, Background, Assessment and   Recommendations(SBAR). Information from the following report(s) SBAR, OR Summary, Procedure Summary, Intake/Output and Cardiac Rhythm SR was reviewed with the receiving nurse. Lines:   Peripheral IV 03/02/22 Right Hand (Active)   Site Assessment Clean, dry, & intact 03/10/22 1424   Phlebitis Assessment 0 03/10/22 1424   Infiltration Assessment 0 03/10/22 1424   Dressing Status Clean, dry, & intact 03/10/22 1424   Dressing Type Tape;Transparent 03/10/22 1424   Hub Color/Line Status Blue;Capped 03/10/22 1424   Action Taken Open ports on tubing capped 03/10/22 0022   Alcohol Cap Used Yes 03/10/22 0022       Peripheral IV 03/10/22 Anterior;Proximal;Right Forearm (Active)   Site Assessment Clean, dry, & intact 03/10/22 1424   Phlebitis Assessment 0 03/10/22 1424   Infiltration Assessment 0 03/10/22 1424   Dressing Status Clean, dry, & intact 03/10/22 1424   Dressing Type Tape;Transparent 03/10/22 1424   Hub Color/Line Status Pink; Infusing;Patent 03/10/22 1424        Opportunity for questions and clarification was provided.       Patient transported with:  Monitor  Ventilator  Respiratory

## 2022-03-10 NOTE — PROGRESS NOTES
Daughter and sister arrived at bedside; I spoke with them and updated with all the events happened in PACU. Discussed that we will keep her sedated overnight and will stop sedation in the morning and evaluate for SBT and liberation from ventilator. [facility] RN was present during the family discussion. Answered all questions to their satisfaction. Sputum culture ordered; discussed with RN.   Cadence Licea MD 3/10/2022 4:23 PM

## 2022-03-10 NOTE — H&P (VIEW-ONLY)
Consult Note    Patient: Sam Pandey               Sex: female          DOA: 3/2/2022         YOB: 1940      Age:  80 y.o.        LOS:  LOS: 8 days              HPI:     Sam Pandey is a 80 y.o. female who has been seen for right hip pain. Patient is sleeping comfortably in the room. Here for Afib and CHF. Past Medical History:   Diagnosis Date    Hypertension     Sleep disorder        Past Surgical History:   Procedure Laterality Date    HX ORTHOPAEDIC      broken right ankle, left femur 30 yrs ago       Family History   Problem Relation Age of Onset    Diabetes Mother     Heart Disease Mother     Heart Disease Father        Social History     Socioeconomic History    Marital status: SINGLE   Tobacco Use    Smoking status: Never Smoker    Smokeless tobacco: Never Used   Vaping Use    Vaping Use: Never used   Substance and Sexual Activity    Alcohol use: Yes     Alcohol/week: 1.0 standard drink     Types: 1 Glasses of wine per week     Comment: soc    Drug use: No       Prior to Admission medications    Medication Sig Start Date End Date Taking? Authorizing Provider   allopurinoL (ZYLOPRIM) 100 mg tablet Take 100 mg by mouth daily. Yes Provider, Historical   acetaminophen (TYLENOL) 325 mg tablet Take 650 mg by mouth every six (6) hours as needed for Pain. Yes Provider, Historical   niacin (NIASPAN) 1,000 mg Tb24 tab Take 1,000 mg by mouth daily. Yes Provider, Historical   trolamine salicylate-aloe vera 34% (ASPERCREME) topical cream Apply 2 g to affected area as needed for Pain. Yes Provider, Historical   multivitamin, tx-iron-ca-min (THERA-M w/ IRON) 9 mg iron-400 mcg tab tablet Take 1 Tab by mouth daily. Yes Provider, Historical   aspirin delayed-release 81 mg tablet Take 81 mg by mouth daily. Yes Provider, Historical   potassium chloride SR (KLOR-CON 10) 10 mEq tablet Take 10 mEq by mouth daily.    Yes Provider, Historical   carvediloL (COREG) 3.125 mg tablet Take 3.125 mg by mouth daily. Yes Provider, Historical       Allergies   Allergen Reactions    Seafood Hives     crabs    Statins-Hmg-Coa Reductase Inhibitors Unknown (comments)    Sulfa (Sulfonamide Antibiotics) Hives       Review of Systems  Pertinent items are noted in the History of Present Illness. Physical Exam:      Visit Vitals  /78   Pulse 95   Temp 98.7 °F (37.1 °C)   Resp 16   Ht 5' 5\" (1.651 m)   Wt 104.2 kg (229 lb 12.8 oz)   SpO2 97%   Breastfeeding No   BMI 38.24 kg/m²       Physical Exam:  no motion right foot, does not follow commands well. Patient patient's right hip is shortened and externally rotated. She does have good DP and PT pulses. Neurologic exam unable to be performed as patient is sleeping. Labs Reviewed: All lab results for the last 24 hours reviewed. Assessment/Plan     Principal Problem:    Acute respiratory failure with hypoxemia (HCC) (3/2/2022)    Active Problems:    Elevated troponin (4/23/2021)      HTN (hypertension) (4/23/2021)      Acute on chronic diastolic congestive heart failure ([address] (4/23/2021)      Atrial fibrillation ([address] (3/2/2022)      Stage 3 chronic kidney disease ([address] (3/3/2022)      SERENA (obstructive sleep apnea) (3/3/2022)      Adult BMI 39.0-39.9 kg/sq m (3/3/2022)      Fracture of neck of right femur ([address] (3/8/2022)        Displaced right subcaptial fracture  Previous foot drop, LLD  Afib  Mental status changes  Limited mobility    Eliquis stopped has been on hold since Tuesday. The case in detail with the patient's daughter yesterday. Daughter not present today. Dr. Vipul Mora has reviewed the x-rays. Plan is for medical optimization. Plan is to proceed with right total hip arthroplasty versus possible hemiarthroplasty today. Patient has been NPO since midnight. The risks associated with procedure were reviewed and all questions were answered.  The risks including bleeding, pain, fracture, DVT, PE, Infection, stroke, death amongst others were reviewed. We will use Eliquis for post operative DVT prevention. The patient's daughter understands and is willing to proceed. Case discussed with Dr. Manju Sabillon who is in agreement with the above.

## 2022-03-10 NOTE — INTERVAL H&P NOTE
Update History & Physical    The Patient's History and Physical of March 10,   The surgery was reviewed with the daughter and I examined the patient. There was no change. The surgical site was confirmed by the patient and me. Plan:  The risk, benefits, expected outcome, and alternative to the recommended procedure have been discussed with the patient. Patient understands and wants to proceed with the procedure.     Electronically signed by Jenna Middleton MD on 3/10/2022 at 9:52 AM

## 2022-03-11 ENCOUNTER — APPOINTMENT (OUTPATIENT)
Dept: GENERAL RADIOLOGY | Age: 82
DRG: 981 | End: 2022-03-11
Attending: INTERNAL MEDICINE
Payer: MEDICARE

## 2022-03-11 ENCOUNTER — APPOINTMENT (OUTPATIENT)
Dept: CT IMAGING | Age: 82
DRG: 981 | End: 2022-03-11
Attending: INTERNAL MEDICINE
Payer: MEDICARE

## 2022-03-11 ENCOUNTER — APPOINTMENT (OUTPATIENT)
Dept: GENERAL RADIOLOGY | Age: 82
DRG: 981 | End: 2022-03-11
Attending: PHYSICIAN ASSISTANT
Payer: MEDICARE

## 2022-03-11 LAB
ALBUMIN SERPL-MCNC: 2.8 G/DL (ref 3.4–5)
ALBUMIN/GLOB SERPL: 0.9 {RATIO} (ref 0.8–1.7)
ALP SERPL-CCNC: 103 U/L (ref 45–117)
ALT SERPL-CCNC: 44 U/L (ref 13–56)
ANION GAP SERPL CALC-SCNC: 6 MMOL/L (ref 3–18)
ARTERIAL PATENCY WRIST A: POSITIVE
AST SERPL-CCNC: 27 U/L (ref 10–38)
BASE EXCESS BLD CALC-SCNC: 17.5 MMOL/L
BASOPHILS # BLD: 0 K/UL (ref 0–0.1)
BASOPHILS NFR BLD: 0 % (ref 0–2)
BDY SITE: ABNORMAL
BILIRUB SERPL-MCNC: 1.3 MG/DL (ref 0.2–1)
BODY TEMPERATURE: 98.6
BUN SERPL-MCNC: 74 MG/DL (ref 7–18)
BUN/CREAT SERPL: 47 (ref 12–20)
CALCIUM SERPL-MCNC: 10.5 MG/DL (ref 8.5–10.1)
CHLORIDE SERPL-SCNC: 103 MMOL/L (ref 100–111)
CO2 SERPL-SCNC: 36 MMOL/L (ref 21–32)
CREAT SERPL-MCNC: 1.58 MG/DL (ref 0.6–1.3)
DIFFERENTIAL METHOD BLD: ABNORMAL
EOSINOPHIL # BLD: 0 K/UL (ref 0–0.4)
EOSINOPHIL NFR BLD: 0 % (ref 0–5)
ERYTHROCYTE [DISTWIDTH] IN BLOOD BY AUTOMATED COUNT: 15 % (ref 11.6–14.5)
GAS FLOW.O2 O2 DELIVERY SYS: ABNORMAL L/MIN
GAS FLOW.O2 SETTING OXYMISER: 14 BPM
GLOBULIN SER CALC-MCNC: 3.2 G/DL (ref 2–4)
GLUCOSE SERPL-MCNC: 94 MG/DL (ref 74–99)
HCO3 BLD-SCNC: 39.3 MMOL/L (ref 22–26)
HCT VFR BLD AUTO: 41.1 % (ref 35–45)
HGB BLD-MCNC: 12.9 G/DL (ref 12–16)
IMM GRANULOCYTES # BLD AUTO: 0.1 K/UL (ref 0–0.04)
IMM GRANULOCYTES NFR BLD AUTO: 1 % (ref 0–0.5)
LYMPHOCYTES # BLD: 1.3 K/UL (ref 0.9–3.6)
LYMPHOCYTES NFR BLD: 12 % (ref 21–52)
MAGNESIUM SERPL-MCNC: 1.9 MG/DL (ref 1.6–2.6)
MCH RBC QN AUTO: 31.4 PG (ref 24–34)
MCHC RBC AUTO-ENTMCNC: 31.4 G/DL (ref 31–37)
MCV RBC AUTO: 100 FL (ref 78–100)
MONOCYTES # BLD: 1.7 K/UL (ref 0.05–1.2)
MONOCYTES NFR BLD: 16 % (ref 3–10)
NEUTS SEG # BLD: 7.6 K/UL (ref 1.8–8)
NEUTS SEG NFR BLD: 71 % (ref 40–73)
NRBC # BLD: 0.02 K/UL (ref 0–0.01)
NRBC BLD-RTO: 0.2 PER 100 WBC
O2/TOTAL GAS SETTING VFR VENT: 50 %
PCO2 BLD: 32.9 MMHG (ref 35–45)
PEEP RESPIRATORY: 5 CMH2O
PH BLD: 7.69 [PH] (ref 7.35–7.45)
PLATELET # BLD AUTO: 205 K/UL (ref 135–420)
PMV BLD AUTO: 10.7 FL (ref 9.2–11.8)
PO2 BLD: 101 MMHG (ref 80–100)
POTASSIUM SERPL-SCNC: 5 MMOL/L (ref 3.5–5.5)
PROT SERPL-MCNC: 6 G/DL (ref 6.4–8.2)
RBC # BLD AUTO: 4.11 M/UL (ref 4.2–5.3)
SAO2 % BLD: 99 % (ref 92–97)
SERVICE CMNT-IMP: ABNORMAL
SODIUM SERPL-SCNC: 145 MMOL/L (ref 136–145)
SPECIMEN TYPE: ABNORMAL
VENTILATION MODE VENT: ABNORMAL
VT SETTING VENT: 450 ML
WBC # BLD AUTO: 10.7 K/UL (ref 4.6–13.2)

## 2022-03-11 PROCEDURE — 94003 VENT MGMT INPAT SUBQ DAY: CPT

## 2022-03-11 PROCEDURE — 73501 X-RAY EXAM HIP UNI 1 VIEW: CPT

## 2022-03-11 PROCEDURE — 74011250636 HC RX REV CODE- 250/636: Performed by: INTERNAL MEDICINE

## 2022-03-11 PROCEDURE — 74011000250 HC RX REV CODE- 250: Performed by: PHYSICIAN ASSISTANT

## 2022-03-11 PROCEDURE — 74011250637 HC RX REV CODE- 250/637: Performed by: PHYSICIAN ASSISTANT

## 2022-03-11 PROCEDURE — 74011250637 HC RX REV CODE- 250/637: Performed by: INTERNAL MEDICINE

## 2022-03-11 PROCEDURE — 70450 CT HEAD/BRAIN W/O DYE: CPT

## 2022-03-11 PROCEDURE — 74011000250 HC RX REV CODE- 250: Performed by: INTERNAL MEDICINE

## 2022-03-11 PROCEDURE — 74011250636 HC RX REV CODE- 250/636: Performed by: HOSPITALIST

## 2022-03-11 PROCEDURE — 74018 RADEX ABDOMEN 1 VIEW: CPT

## 2022-03-11 PROCEDURE — 36415 COLL VENOUS BLD VENIPUNCTURE: CPT

## 2022-03-11 PROCEDURE — 99232 SBSQ HOSP IP/OBS MODERATE 35: CPT | Performed by: NURSE PRACTITIONER

## 2022-03-11 PROCEDURE — 85025 COMPLETE CBC W/AUTO DIFF WBC: CPT

## 2022-03-11 PROCEDURE — 77010033678 HC OXYGEN DAILY

## 2022-03-11 PROCEDURE — 71045 X-RAY EXAM CHEST 1 VIEW: CPT

## 2022-03-11 PROCEDURE — 83735 ASSAY OF MAGNESIUM: CPT

## 2022-03-11 PROCEDURE — 51798 US URINE CAPACITY MEASURE: CPT

## 2022-03-11 PROCEDURE — 65610000006 HC RM INTENSIVE CARE

## 2022-03-11 PROCEDURE — 80053 COMPREHEN METABOLIC PANEL: CPT

## 2022-03-11 PROCEDURE — 82803 BLOOD GASES ANY COMBINATION: CPT

## 2022-03-11 PROCEDURE — 74011250636 HC RX REV CODE- 250/636: Performed by: PHYSICIAN ASSISTANT

## 2022-03-11 PROCEDURE — 36600 WITHDRAWAL OF ARTERIAL BLOOD: CPT

## 2022-03-11 RX ORDER — FAMOTIDINE 20 MG/1
20 TABLET, FILM COATED ORAL DAILY
Status: DISCONTINUED | OUTPATIENT
Start: 2022-03-11 | End: 2022-03-13 | Stop reason: SDUPTHER

## 2022-03-11 RX ORDER — LANOLIN ALCOHOL/MO/W.PET/CERES
1 CREAM (GRAM) TOPICAL 3 TIMES DAILY
Status: DISCONTINUED | OUTPATIENT
Start: 2022-03-11 | End: 2022-03-24 | Stop reason: HOSPADM

## 2022-03-11 RX ORDER — HYDROCODONE BITARTRATE AND ACETAMINOPHEN 5; 325 MG/1; MG/1
1 TABLET ORAL
Status: DISCONTINUED | OUTPATIENT
Start: 2022-03-11 | End: 2022-03-11

## 2022-03-11 RX ORDER — DIPHENHYDRAMINE HCL 25 MG
25 CAPSULE ORAL
Status: DISCONTINUED | OUTPATIENT
Start: 2022-03-11 | End: 2022-03-24 | Stop reason: HOSPADM

## 2022-03-11 RX ORDER — CEFAZOLIN SODIUM/WATER 2 G/20 ML
2 SYRINGE (ML) INTRAVENOUS EVERY 8 HOURS
Status: DISCONTINUED | OUTPATIENT
Start: 2022-03-11 | End: 2022-03-11

## 2022-03-11 RX ORDER — SODIUM CHLORIDE 0.9 % (FLUSH) 0.9 %
5-40 SYRINGE (ML) INJECTION AS NEEDED
Status: DISCONTINUED | OUTPATIENT
Start: 2022-03-11 | End: 2022-03-19 | Stop reason: SDUPTHER

## 2022-03-11 RX ORDER — SODIUM CHLORIDE 0.9 % (FLUSH) 0.9 %
5-40 SYRINGE (ML) INJECTION EVERY 8 HOURS
Status: DISCONTINUED | OUTPATIENT
Start: 2022-03-11 | End: 2022-03-11

## 2022-03-11 RX ORDER — SODIUM CHLORIDE, SODIUM LACTATE, POTASSIUM CHLORIDE, CALCIUM CHLORIDE 600; 310; 30; 20 MG/100ML; MG/100ML; MG/100ML; MG/100ML
75 INJECTION, SOLUTION INTRAVENOUS CONTINUOUS
Status: DISPENSED | OUTPATIENT
Start: 2022-03-11 | End: 2022-03-12

## 2022-03-11 RX ORDER — FACIAL-BODY WIPES
10 EACH TOPICAL DAILY PRN
Status: DISCONTINUED | OUTPATIENT
Start: 2022-03-11 | End: 2022-03-24 | Stop reason: HOSPADM

## 2022-03-11 RX ORDER — ACETAMINOPHEN 325 MG/1
650 TABLET ORAL EVERY 6 HOURS
Status: DISCONTINUED | OUTPATIENT
Start: 2022-03-11 | End: 2022-03-21

## 2022-03-11 RX ORDER — NALOXONE HYDROCHLORIDE 0.4 MG/ML
0.4 INJECTION, SOLUTION INTRAMUSCULAR; INTRAVENOUS; SUBCUTANEOUS AS NEEDED
Status: DISCONTINUED | OUTPATIENT
Start: 2022-03-11 | End: 2022-03-24 | Stop reason: HOSPADM

## 2022-03-11 RX ADMIN — FAMOTIDINE 20 MG: 20 TABLET ORAL at 16:44

## 2022-03-11 RX ADMIN — PROPOFOL 10 MCG/KG/MIN: 10 INJECTION, EMULSION INTRAVENOUS at 06:58

## 2022-03-11 RX ADMIN — CARVEDILOL 3.12 MG: 3.12 TABLET, FILM COATED ORAL at 16:44

## 2022-03-11 RX ADMIN — NYSTATIN 500000 UNITS: 100000 SUSPENSION ORAL at 21:17

## 2022-03-11 RX ADMIN — SODIUM CHLORIDE, SODIUM LACTATE, POTASSIUM CHLORIDE, AND CALCIUM CHLORIDE 75 ML/HR: 600; 310; 30; 20 INJECTION, SOLUTION INTRAVENOUS at 08:34

## 2022-03-11 RX ADMIN — APIXABAN 2.5 MG: 2.5 TABLET, FILM COATED ORAL at 21:17

## 2022-03-11 RX ADMIN — ACETAMINOPHEN 650 MG: 325 TABLET ORAL at 23:36

## 2022-03-11 RX ADMIN — CHLORHEXIDINE GLUCONATE 10 ML: 1.2 RINSE ORAL at 09:00

## 2022-03-11 RX ADMIN — CHLORHEXIDINE GLUCONATE 10 ML: 1.2 RINSE ORAL at 21:16

## 2022-03-11 RX ADMIN — SODIUM CHLORIDE, PRESERVATIVE FREE 10 ML: 5 INJECTION INTRAVENOUS at 13:18

## 2022-03-11 RX ADMIN — SODIUM CHLORIDE, PRESERVATIVE FREE 10 ML: 5 INJECTION INTRAVENOUS at 22:00

## 2022-03-11 RX ADMIN — FERROUS SULFATE TAB 325 MG (65 MG ELEMENTAL FE) 325 MG: 325 (65 FE) TAB at 16:44

## 2022-03-11 RX ADMIN — FUROSEMIDE 40 MG: 10 INJECTION, SOLUTION INTRAMUSCULAR; INTRAVENOUS at 08:33

## 2022-03-11 RX ADMIN — NYSTATIN 500000 UNITS: 100000 SUSPENSION ORAL at 13:18

## 2022-03-11 RX ADMIN — SODIUM CHLORIDE, SODIUM LACTATE, POTASSIUM CHLORIDE, AND CALCIUM CHLORIDE 75 ML/HR: 600; 310; 30; 20 INJECTION, SOLUTION INTRAVENOUS at 23:36

## 2022-03-11 RX ADMIN — FENTANYL CITRATE 25 MCG: 50 INJECTION, SOLUTION INTRAMUSCULAR; INTRAVENOUS at 21:16

## 2022-03-11 RX ADMIN — NYSTATIN 500000 UNITS: 100000 SUSPENSION ORAL at 08:33

## 2022-03-11 RX ADMIN — NYSTATIN 500000 UNITS: 100000 SUSPENSION ORAL at 18:03

## 2022-03-11 RX ADMIN — SODIUM CHLORIDE, PRESERVATIVE FREE 10 ML: 5 INJECTION INTRAVENOUS at 08:28

## 2022-03-11 RX ADMIN — ACETAMINOPHEN 650 MG: 325 TABLET ORAL at 18:03

## 2022-03-11 RX ADMIN — SODIUM CHLORIDE, PRESERVATIVE FREE 10 ML: 5 INJECTION INTRAVENOUS at 07:00

## 2022-03-11 RX ADMIN — FUROSEMIDE 40 MG: 10 INJECTION, SOLUTION INTRAMUSCULAR; INTRAVENOUS at 21:16

## 2022-03-11 NOTE — PROGRESS NOTES
Progress Note      Patient: Roshan Cruz               Sex: female          DOA: 3/2/2022     YOB: 1940      Age:  80 y.o.        LOS:  LOS: 9 days     Status Post: Procedure(s):  RIGHT TOTAL HIP ARTHROPLASTY WITH C-ARM  (PT IN ROOM # 992)  Surgery Date: 3/10/2022            Subjective:     Roshan Cruz is a 80 y.o. female S/P RTHA 3/11/22. Patient remains intubated. She is not responsive. Post operative orders were not released until now. Will hold the Ancef as the patient is on Rocephin. Objective:      Visit Vitals  BP (!) 95/33   Pulse 77   Temp 98.4 °F (36.9 °C)   Resp 14   Ht 5' 5\" (1.651 m)   Wt 104.2 kg (229 lb 12.8 oz)   SpO2 100%   Breastfeeding No   BMI 38.24 kg/m²       Physical Exam:   Dressing:  clean, dry, intact, Suction dressing. Neuro exam unable to be performed do to patient being intubated. She does have edema,  +1 Posterior Tibial Pulse      Xray - Looks good in the or. Intake and Output:  Current Shift:  03/10 1901 - 03/11 0700  In: 146.9 [I.V.:146.9]  Out: 500 [Urine:500]  Last three shifts:  03/09 0701 - 03/10 1900  In: 9695 [P.O.:480; I.V.:1370]  Out: 2125 [Urine:1775]  Voiding Status:  Catheter in place. Lab/Data Reviewed:  Recent Labs     03/11/22  0310   HGB 12.9   HCT 41.1      K 5.0   BUN 74*   CREA 1.58*   GLU 94         Medications Reviewed    Assessment/Plan     Principal Problem:    Acute respiratory failure with hypoxemia (HCC) (3/2/2022)    Active Problems:    Elevated troponin (4/23/2021)      HTN (hypertension) (4/23/2021)      Acute on chronic diastolic congestive heart failure (HCC) (4/23/2021)      Atrial fibrillation ([facility] (3/2/2022)      Stage 3 chronic kidney disease ([facility] (3/3/2022)      SERENA (obstructive sleep apnea) (3/3/2022)      Adult BMI 39.0-39.9 kg/sq m (3/3/2022)      Fracture of neck of right femur ([facility] (3/8/2022)        · Discharge Planning as per primary team.  · Patient may resume Eliquis today.   · Continue PT WBAT, ROM as tolerated. Continue exercises hourly using the physical therapy exercises sheet, when medically stable. · Maintain the dressing at all times. If the dressing looses suction, then can be replaced with Mepilex. Please try to keep the incision clean and dry. · Will continue to follow. · Xrays of the right hip are pending as they were not done in PACU yesterday. The plan was discussed with Dr. Duane Parrish today as well and he is in agreement with above.

## 2022-03-11 NOTE — PROGRESS NOTES
TRANSFER - IN REPORT:    Verbal report received from [address] on [place]  being received from PACU for routine progression of care      Report consisted of patients Situation, Background, Assessment and   Recommendations(SBAR). Information from the following report(s) SBAR, Kardex, Procedure Summary and Cardiac Rhythm AFIB was reviewed with the receiving nurse. Opportunity for questions and clarification was provided. Assessment completed upon patients arrival to unit and care assumed.

## 2022-03-11 NOTE — PROGRESS NOTES
Pt seen and assessed, pt was tried on SBT with sedation off, and pt went into apnea, will try again later when pt is more awake. ett is secured at this time. No distress noted.

## 2022-03-11 NOTE — PROGRESS NOTES
Palliative Medicine Consult    Patient Name: Sachi Guzman  YOB: 1940    Date of Initial Consult: 3/3/2022  Date of follow-up: 3/11/2022  Reason for Consult: Goals of care discussions  Requesting Provider: Dr. Dang Vaughan  Primary Care Physician: Johnanna Shone, NP      SUMMARY:   Sachi Guzman is a 80 y.o. with a past history of congestive heart failure, sleep apnea, right leg weakness, and CKD stage 3, who was admitted on 3/2/2022 from home with a diagnosis of New onset A-fib, and acute on chronic diastolic congestive heart failure. Current medical issues leading to Palliative Medicine involvement include: Support and goals of care discussions. 3/4/2022: Patient is awake, alert, and oriented x4. No family present. States she did not sleep well last night due to the hospital CPAP machine being louder than when she is used to at home. 3/7/2022: Patient was drowsy and not engaged in goals of care conversations today. 3/8/2022: Patient is awake, alert, talking to her brother on the phone. Patient and daughter agreeable to POST form completion to protect patient's healthcare wishes. 3/11/2022: Reconsulted for complications postoperatively, she required reintubation and mechanical ventilator support due to impending respiratory failure. At this time daughter/MPOA would like to continue full code with full interventions. PALLIATIVE DIAGNOSES:   1. Goals of care discussions  2. New onset A-fib  3. Acute on chronic diastolic congestive heart failure  4. Advanced age/debility       PLAN:   3/11/2022: Palliative medicine team including Mary Mota RN and I met with patient at patient's bedside. Patient is now in the ICU, orally intubated on a mechanical ventilator since having impending respiratory failure postoperatively requiring reintubation. Patient underwent SBT today, and experienced episodes of apnea. We were reconsulted to address goals of care.   Spoke to patient's daughter/MPOA Marlena to readdress goals of care. Day Sharma states that she did agree to rescind the DNR/DNI for surgery. Asked daughter about CODE STATUS while continuing intubation and evaluations for possibly liberating the ventilator. Day Sharma would like patient to remain a full code with full interventions at this time. Day Sharma wants to see how patient responds to aggressive measures prior to making any further changes in goals of care. While Day Sharma understands the patient's original wishes for DNR, she feels this is a unique situation postoperatively that she thinks patient would want all aggressive efforts at this time. Support offered during this difficult time. We will continue to follow closely with you. Our team appreciates the re-consult. See previous discussions below    3/8/2022: Palliative medicine team including Manny Churchill RN and I met with patient at patient's bedside. Daughter/MPOSTANLEY Sharma also present. Patient and daughter agreeable to POST form completion to protect patient's healthcare wishes. Patient deferred signing to daughter/MPOA Jessica Ledesma. Marlena signed POST Form with the following measures: DNR/DNI, limited interventions, no feeding tubes. Will sign off as goals of care have been established. Please re-consult should it be warranted. Thank you for the opportunity to provide care to this patient. See previous discussions below:    3/7/2022: Palliative medicine team including Manny Churchill RN and I met with patient at patient's bedside. Also present was patient's daughter/MPOA/legal next of kin Day Sharma. Reviewed previous conversations had (see below). Marlena did bring AMD naming herself Dang Diggs as patient's medical power of . Copy placed on paper chart. Reviewed patient's previous wish for DNR/DNI. Daughter is supportive, and states that this has been patient's consistent wish. Patient was drowsy and not engaged in goals of care conversations today.  POST form reviewed daughter, she will review with her mother once she is less drowsy, and we will follow up tomorrow for POST Form completion. At this time continue DNR/DNI. See previous discussions below per palliative team    3/4/2022: Palliative medicine team including Naga Bhakta RN and I met with patient at patient's bedside. Patient is awake, alert, and oriented x4. No family present. Followed up on goals of care discussions from yesterday (see below). Family is still looking at current documentation regarding AMD and DO NOT RESUSCITATE paperwork. Patient is not comfortable signing any paperwork until she is able to review her current paperwork. Explained that we will honor the DNR/DNI order while she is an inpatient, however when she discharges she needs paperwork to protect her. She did not wish to complete new documents at this time. We will follow up next week if she remains inpatient. Advised patient that if she gets discharged prior to document completion, she can complete this paperwork to protect her wishes with her primary care provider. At this time continue DNR/DNI. See previous discussions below    3/3/2022: Goals of care: Palliative medicine team including  Claudean Slough, MSW and I met with patient at patient's bedside. Also present was patient's sister Cheri Aguirre. Patient is awake, alert, oriented x4. She states that her breathing has improved since admission, overall feels well. Introduced the role of palliative medicine. Patient states she has completed an AMD before naming her daughter Devika Naranjo as medical power of . Patient is not , and her daughter Sonja Vera is legal NOK. We do not have a copy of this AMD on file, she will ask her daughter if she has a copy. Reconfirmed goals of care for DNR/DNI. She is unsure if she has ever completed a DDNR. She states her daughter is aware and supportive of her healthcare wishes.   Patient and patient's sister state that they will review patient's paperwork with daughter today, and patient is willing to complete any necessary forms tomorrow to protect her wishes for DNR/DNI. We will follow up tomorrow once she has opportunity to review her current paperwork. 1. New onset A. fib: Echo pending, cardiology consulted by primary team.  She denies pain, shortness of breath has improved since admission. 2. Acute on chronic diastolic congestive heart failure: Echo results pending, cardiology has been consulted. Had a VQ scan which showed no presence of a pulmonary embolism. She is on Lasix, which has been reduced due to worsening renal function. Monitoring renal function per primary team.  3. Advanced age/debility: 75-year-old female, lives with her daughter Johan Walker. Ambulatory short distances, needs assist with functional ADLs. 4. Initial consult note routed to primary continuity provider  5.  Communicated plan of care with: Palliative IDT       GOALS OF CARE / TREATMENT PREFERENCES:   [====Goals of Care====]  GOALS OF CARE: Full code with full interventions    Patient/Health Care Proxy Stated Goals: Prolong life      TREATMENT PREFERENCES:   Code Status: Full Code    Advance Care Planning:  Advance Care Planning 3/3/2022   Patient's Healthcare Decision Maker is: -   Confirm Advance Directive Yes, on file   Patient Would Like to Complete Advance Directive No   Does the patient have other document types Do Not Resuscitate       Medical Interventions: Full interventions     Artificially Administered Nutrition: No feeding tube      The palliative care team has discussed with patient / health care proxy about goals of care / treatment preferences for patient.  [====Goals of Care====]         HISTORY:     History obtained from: patient, family, chart    CHIEF COMPLAINT: generalized weakness, shortness of breath with has improved since hospitalization, respiratory failure postop requiring reintubation    HPI/SUBJECTIVE: The patient is:   [] Verbal and participatory  [x] Non-participatory due to:   Orally intubated on mechanical ventilator    Clinical Pain Assessment (nonverbal scale for severity on nonverbal patients):   Clinical Pain Assessment  Severity: 0    Adult Nonverbal Pain Scale  Face: No particular expression or smile  Activity (Movement): Seeking attention through movement or slow, cautious movement  Guarding: Lying quietly, no positioning of hands over areas of body  Physiology (Vital Signs): Stable vital signs  Respiratory: Baseline RR/SpO2 compliant with ventilator  Total Score: 1       FUNCTIONAL ASSESSMENT:     Palliative Performance Scale (PPS):  PPS: 30       PSYCHOSOCIAL/SPIRITUAL SCREENING:     Advance Care Planning:  Advance Care Planning 3/3/2022   Patient's Healthcare Decision Maker is: -   Confirm Advance Directive Yes, on file   Patient Would Like to Complete Advance Directive No   Does the patient have other document types Do Not Resuscitate        Any spiritual / Orthodox concerns:  [] Yes /  [x] No    Caregiver Burnout:  [] Yes /  [] No /  [x] No Caregiver Present      Anticipatory grief assessment:   [x] Normal  / [] Maladaptive            REVIEW OF SYSTEMS:     Positive and pertinent negative findings in ROS are noted above in HPI. The following systems were [x] reviewed / [] unable to be reviewed as noted in HPI  Other findings are noted below. Systems: constitutional, ears/nose/mouth/throat, respiratory, gastrointestinal, genitourinary, musculoskeletal, integumentary, neurologic, psychiatric, endocrine. Positive findings noted below.   Modified ESAS Completed by: provider   Fatigue: 5       Pain: 0   Anxiety: 0 Nausea: 0     Dyspnea: 0     Constipation: No              PHYSICAL EXAM:     From RN flowsheet:  Wt Readings from Last 3 Encounters:   03/11/22 111.1 kg (244 lb 14.9 oz)   04/25/21 101.6 kg (224 lb)   10/01/17 125.6 kg (277 lb)     Blood pressure (!) 120/54, pulse 97, temperature 98.3 °F (36.8 °C), resp. rate 12, height 5' 5\" (1.651 m), weight 111.1 kg (244 lb 14.9 oz), SpO2 97 %, not currently breastfeeding. Pain Scale 1: Adult Nonverbal Pain Scale  Pain Intensity 1: 0     Pain Location 1: Hip  Pain Orientation 1: Right  Pain Description 1: Aching  Pain Intervention(s) 1: Medication (see MAR)      Constitutional: Orally intubated, appears critically ill  Eyes: pupils equal, anicteric, opens eyes but does not track  ENMT: Orally intubated  Cardiovascular: regular rhythm, distal pulses intact  Respiratory: Orally intubated on mechanical ventilator  Gastrointestinal: soft non-tender   Musculoskeletal: no deformity, no tenderness to palpation  Skin: warm, dry  Neurologic: Not following commands, on no sedation, will open eyes and move all extremities intermittently       HISTORY:     Principal Problem:    Acute respiratory failure with hypoxemia (HCC) (3/2/2022)    Active Problems:    Elevated troponin (4/23/2021)      HTN (hypertension) (4/23/2021)      Acute on chronic diastolic congestive heart failure (HCC) (4/23/2021)      Atrial fibrillation ([address] (3/2/2022)      Stage 3 chronic kidney disease ([address] (3/3/2022)      SERENA (obstructive sleep apnea) (3/3/2022)      Adult BMI 39.0-39.9 kg/sq m (3/3/2022)      Fracture of neck of right femur ([address] (3/8/2022)      Past Medical History:   Diagnosis Date    Hypertension     Sleep disorder       Past Surgical History:   Procedure Laterality Date    HX ORTHOPAEDIC      broken right ankle, left femur 30 yrs ago      Family History   Problem Relation Age of Onset    Diabetes Mother     Heart Disease Mother     Heart Disease Father       History reviewed, no pertinent family history. Social History     Tobacco Use    Smoking status: Never Smoker    Smokeless tobacco: Never Used   Substance Use Topics    Alcohol use:  Yes     Alcohol/week: 1.0 standard drink     Types: 1 Glasses of wine per week     Comment: soc     Allergies   Allergen Reactions    Seafood Hives     crabs    Statins-Hmg-Coa Reductase Inhibitors Unknown (comments)    Sulfa (Sulfonamide Antibiotics) Hives      Current Facility-Administered Medications   Medication Dose Route Frequency    lactated Ringers infusion  75 mL/hr IntraVENous CONTINUOUS    sodium chloride (NS) flush 5-40 mL  5-40 mL IntraVENous Q8H    sodium chloride (NS) flush 5-40 mL  5-40 mL IntraVENous PRN    acetaminophen (TYLENOL) tablet 650 mg  650 mg Oral Q6H    naloxone (NARCAN) injection 0.4 mg  0.4 mg IntraVENous PRN    ferrous sulfate tablet 325 mg  1 Tablet Oral TID    diphenhydrAMINE (BENADRYL) capsule 25 mg  25 mg Oral Q4H PRN    bisacodyL (DULCOLAX) suppository 10 mg  10 mg Rectal DAILY PRN    ELECTROLYTE REPLACEMENT PROTOCOL - Magnesium   1 Each Other PRN    ELECTROLYTE REPLACEMENT PROTOCOL - Calcium   1 Each Other PRN    ELECTROLYTE REPLACEMENT PROTOCOL  - Phosphorus Renal Dosing  1 Each Other PRN    ELECTROLYTE REPLACEMENT PROTOCOL - Potassium Renal Dosing  1 Each Other PRN    midazolam (VERSED) injection 1 mg  1 mg IntraVENous Q2H PRN    fentaNYL citrate (PF) injection 25 mcg  25 mcg IntraVENous Q4H PRN    propofol (DIPRIVAN) 10 mg/mL infusion  0-50 mcg/kg/min IntraVENous TITRATE    chlorhexidine (PERIDEX) 0.12 % mouthwash 10 mL  10 mL Oral Q12H    nystatin (MYCOSTATIN) 100,000 unit/mL oral suspension 500,000 Units  500,000 Units Oral QID    furosemide (LASIX) injection 40 mg  40 mg IntraVENous Q12H    pantoprazole (PROTONIX) tablet 40 mg  40 mg Oral ACB    [Held by provider] apixaban (ELIQUIS) tablet 2.5 mg  2.5 mg Oral BID    sodium chloride (NS) flush 5-40 mL  5-40 mL IntraVENous Q8H    sodium chloride (NS) flush 5-40 mL  5-40 mL IntraVENous PRN    acetaminophen (TYLENOL) tablet 650 mg  650 mg Oral Q6H PRN    Or    acetaminophen (TYLENOL) suppository 650 mg  650 mg Rectal Q6H PRN    polyethylene glycol (MIRALAX) packet 17 g  17 g Oral DAILY PRN    ondansetron (ZOFRAN ODT) tablet 4 mg  4 mg Oral Q8H PRN    Or    ondansetron (ZOFRAN) injection 4 mg  4 mg IntraVENous Q6H PRN    [Held by provider] potassium chloride (K-DUR, KLOR-CON M20) SR tablet 20 mEq  20 mEq Oral DAILY    carvediloL (COREG) tablet 3.125 mg  3.125 mg Oral BID WITH MEALS    aspirin delayed-release tablet 81 mg  81 mg Oral DAILY    acetaminophen (TYLENOL) tablet 650 mg  650 mg Oral Q6H PRN          LAB AND IMAGING FINDINGS:     Lab Results   Component Value Date/Time    WBC 10.7 03/11/2022 03:10 AM    HGB 12.9 03/11/2022 03:10 AM    PLATELET 117 77/80/9545 03:10 AM     Lab Results   Component Value Date/Time    Sodium 145 03/11/2022 03:10 AM    Potassium 5.0 03/11/2022 03:10 AM    Chloride 103 03/11/2022 03:10 AM    CO2 36 (H) 03/11/2022 03:10 AM    BUN 74 (H) 03/11/2022 03:10 AM    Creatinine 1.58 (H) 03/11/2022 03:10 AM    Calcium 10.5 (H) 03/11/2022 03:10 AM    Magnesium 1.9 03/11/2022 03:10 AM    Phosphorus 3.7 03/07/2022 03:45 AM      Lab Results   Component Value Date/Time    Alk. phosphatase 103 03/11/2022 03:10 AM    Protein, total 6.0 (L) 03/11/2022 03:10 AM    Albumin 2.8 (L) 03/11/2022 03:10 AM    Globulin 3.2 03/11/2022 03:10 AM     Lab Results   Component Value Date/Time    INR 1.2 03/02/2022 08:48 PM    Prothrombin time 14.1 03/02/2022 08:48 PM    aPTT 25.3 03/02/2022 08:48 PM      No results found for: IRON, FE, TIBC, IBCT, PSAT, FERR   No results found for: PH, PCO2, PO2  No components found for: [place]   Lab Results   Component Value Date/Time     04/24/2021 01:53 PM    CK - MB 1.6 04/24/2021 01:53 PM                Total time: 25 minutes  Counseling / coordination time, spent as noted above:   > 50% counseling / coordination?: yes, patient and family, medical team     Prolonged service was provided for  []30 min   []75 min in face to face time in the presence of the patient, spent as noted above. Time Start:   Time End:   Note: this can only be billed with 05843 (initial) or 73678 (follow up).   If multiple start / stop times, list each separately.

## 2022-03-11 NOTE — PROGRESS NOTES
Palliative Medicine   RECONSULTATION NOTE    CODE STATUS: FULL CODE    AMD Status: on file naming her daughter, Gerda Parks, as her MPOA     3/11/2022 1105 Seen today in room ICU 7 along with Nia Echeverria NP. Lying in bed. Occasionally opens eyes but does not track. Moves extremities but does not follow directions. Intubated/ventilated. No sedation on    Reconsulted for re-evaluation of goals of care because she had surgery yesterday for a broken hip and required re-intubation post-operatively and her code status was returned to [address] [place] [address].    1205: Ms Lou Cousin spoke with Gerda Parks, daughter, on the phone (on speaker in our office and I was able to hear the conversation). Reviewed POST form that was signed for DNR/DNI pre-operatively and how those orders were rescinded when her mother went to surgery. Asked her thoughts about returning to DNR with continued intubation as the evaluations for possibility for ventilation liberation continue. Currently Shy wants the code status to remain FULL CODE and wants to see how her mother progresses. Decision supported. Disposition plan: to be determined based on response to treatment and family decisions    Palliative care will continue to follow Vee Ramírez  and her family during her hospitalization and support them as they make healthcare decisions and define goals of care.       Markus Urbano RN, MSN  Palliative Medicine  P: [phone]

## 2022-03-11 NOTE — PROGRESS NOTES
1930- Report and care received, assessment completed per flow sheet. Intubate, sedated, ETT secured. Grimaces to noxious stimuli, moves extremities spontaneously and equally. PERRL. Squeezes eyes shut when pupil assessment completed. Soft bilateral wrist restraints in place to protect integrity  Of lines/tubes, flow sheet in progress. 2300- Reassessment without change. 0400- Reassessment without change.

## 2022-03-11 NOTE — PROGRESS NOTES
Cardiology Progress Note        Patient: Joni Welch        Sex: female          DOA: 3/2/2022  YOB: 1940      Age:  80 y.o.        LOS:  LOS: 9 days   Assessment/Plan     Principal Problem:    Acute respiratory failure with hypoxemia ([address] (3/2/2022)    Active Problems:    Elevated troponin (4/23/2021)      HTN (hypertension) (4/23/2021)      Acute on chronic diastolic congestive heart failure (HCC) (4/23/2021)      Atrial fibrillation ([address] (3/2/2022)      Stage 3 chronic kidney disease ([address] (3/3/2022)      SERENA (obstructive sleep apnea) (3/3/2022)      Adult BMI 39.0-39.9 kg/sq m (3/3/2022)      Fracture of neck of right femur ([address] (3/8/2022)        Plan:  Patient remained intubated  Hemodynamically stable  A. fib rate controlled  Discussed with patient's daughter Elfredia Challenger on phone    Patient is status post hip surgery  Patient is intubated due to difficulty in breathing and hypoxemia  A. fib rate controlled  Discussed with Dr. Jaya Perales with the current medical treatment        Patient denied chest pain or shortness of breath   Atrial fibrillation rate controlled   Patient is scheduled for hip surgery tomorrow  Discussed with patient's daughter  Resume anticoagulation after surgery. Patient is diagnosed with hip fracture and being scheduled for surgery  I have long lengthy discussion with patient and daughter Elfredia Challenger in the room  Patient have negative stress test in April 2021  EF is stable  Patient is with elevated but acceptable risk for hip surgery  Eliquis can be hold  Consider DVT prophylaxis anticoagulation from tomorrow patient have taken morning dose of Eliquis      Patient denied any chest pain   Mitral calcification without stenosis  Continue with current medical treatment    Patient continues to do well.   H&H stable  Patient will need diuretic on discharge probably Lasix 40 mg twice daily  Discussed with patient and family in the room      A. fib rate controlled  Patient will need p.o. Lasix on discharge  Continue Eliquis 2.5 mg twice daily  Discussed with patient and family      Continue with Lasix 40 mg twice daily  I have long lengthy discussion with the patient and daughter about anticoagulation both of them agreed with low-dose Eliquis 2.5 mg twice daily  DC Lovenox   start Eliquis 2.5 mg twice daily from a.m. Discussed with patient and daughter findings of echocardiogram including mitral valve disease, prefer not to do transesophageal echocardiogram  Continue with current medical treatment                        Subjective:    cc:      Shortness of breath  A. fib        REVIEW OF SYSTEMS:     Intubated      Objective:      Visit Vitals  BP 97/60   Pulse 97   Temp 98.3 °F (36.8 °C)   Resp (!) 0   Ht 5' 5\" (1.651 m)   Wt 111.1 kg (244 lb 14.9 oz)   SpO2 97%   Breastfeeding No   BMI 40.76 kg/m²     Body mass index is 40.76 kg/m². Physical Exam:  General Appearance: Comfortable, intubated  NECK: No JVD, no thyroidomeglay. LUNGS: Clear bilaterally. HEART: S1 irregular +S2 audible,    ABD: Non-tender, BS Audible    EXT: No edema, and no cysnosis. VASCULAR EXAM: Pulses are intact. PSYCHIATRIC EXAM: Mood is appropriate.     Medication:  Current Facility-Administered Medications   Medication Dose Route Frequency    lactated Ringers infusion  75 mL/hr IntraVENous CONTINUOUS    sodium chloride (NS) flush 5-40 mL  5-40 mL IntraVENous Q8H    sodium chloride (NS) flush 5-40 mL  5-40 mL IntraVENous PRN    acetaminophen (TYLENOL) tablet 650 mg  650 mg Oral Q6H    naloxone (NARCAN) injection 0.4 mg  0.4 mg IntraVENous PRN    ferrous sulfate tablet 325 mg  1 Tablet Oral TID    diphenhydrAMINE (BENADRYL) capsule 25 mg  25 mg Oral Q4H PRN    bisacodyL (DULCOLAX) suppository 10 mg  10 mg Rectal DAILY PRN    ELECTROLYTE REPLACEMENT PROTOCOL - Magnesium   1 Each Other PRN    ELECTROLYTE REPLACEMENT PROTOCOL - Calcium   1 Each Other PRN    ELECTROLYTE REPLACEMENT PROTOCOL  - Phosphorus Renal Dosing  1 Each Other PRN    ELECTROLYTE REPLACEMENT PROTOCOL - Potassium Renal Dosing  1 Each Other PRN    famotidine (PEPCID) tablet 20 mg  20 mg Oral DAILY    midazolam (VERSED) injection 1 mg  1 mg IntraVENous Q2H PRN    fentaNYL citrate (PF) injection 25 mcg  25 mcg IntraVENous Q4H PRN    propofol (DIPRIVAN) 10 mg/mL infusion  0-50 mcg/kg/min IntraVENous TITRATE    chlorhexidine (PERIDEX) 0.12 % mouthwash 10 mL  10 mL Oral Q12H    nystatin (MYCOSTATIN) 100,000 unit/mL oral suspension 500,000 Units  500,000 Units Oral QID    furosemide (LASIX) injection 40 mg  40 mg IntraVENous Q12H    [Held by provider] apixaban (ELIQUIS) tablet 2.5 mg  2.5 mg Oral BID    sodium chloride (NS) flush 5-40 mL  5-40 mL IntraVENous Q8H    sodium chloride (NS) flush 5-40 mL  5-40 mL IntraVENous PRN    acetaminophen (TYLENOL) tablet 650 mg  650 mg Oral Q6H PRN    Or    acetaminophen (TYLENOL) suppository 650 mg  650 mg Rectal Q6H PRN    polyethylene glycol (MIRALAX) packet 17 g  17 g Oral DAILY PRN    ondansetron (ZOFRAN ODT) tablet 4 mg  4 mg Oral Q8H PRN    Or    ondansetron (ZOFRAN) injection 4 mg  4 mg IntraVENous Q6H PRN    [Held by provider] potassium chloride (K-DUR, KLOR-CON M20) SR tablet 20 mEq  20 mEq Oral DAILY    carvediloL (COREG) tablet 3.125 mg  3.125 mg Oral BID WITH MEALS    aspirin delayed-release tablet 81 mg  81 mg Oral DAILY    acetaminophen (TYLENOL) tablet 650 mg  650 mg Oral Q6H PRN               Lab/Data Reviewed:  Procedures/imaging: see electronic medical records for all procedures/Xrays   and details which were not copied into this note but were reviewed prior to creation of Plan       All lab results for the last 24 hours reviewed.      Recent Labs     03/11/22  0310 03/10/22  0140 03/09/22  1812   WBC 10.7 7.9 7.7   HGB 12.9 11.8* 11.8*   HCT 41.1 38.2 40.0    199 203     Recent Labs     03/11/22  0310 03/10/22  0140 03/09/22  1812    145 143   K 5.0 4.5 4.4    103 101   CO2 36* 37* 41*   GLU 94 115* 127*   BUN 74* 65* 64*   CREA 1.58* 1.35* 1.39*   CA 10.5* 10.6* 10.3*       RADIOLOGY:  CT Results  (Last 48 hours)               03/11/22 1429  CT HEAD WO CONT Final result    Impression:      No acute intracranial abnormality. Narrative:  EXAM: CT head       INDICATION: Headache. COMPARISON: 3/7/2022       TECHNIQUE: Axial CT imaging of the head was performed without intravenous   contrast. Standard multiplanar coronal and sagittal reformatted images were   obtained and are included in interpretation. One or more dose reduction techniques were used on this CT: automated exposure   control, adjustment of the mAs and/or kVp according to patient size, and   iterative reconstruction techniques. The specific techniques used on this CT   exam have been documented in the patient's electronic medical record. Digital   Imaging and Communications in Medicine (DICOM) format image data are available   to nonaffiliated external healthcare facilities or entities on a secure, media   free, reciprocally searchable basis with patient authorization for at least a   12-month period after this study. _______________       FINDINGS:       BRAIN AND POSTERIOR FOSSA: No evidence of acute large vessel transcortical   infarct or acute parenchymal hemorrhage. No midline shift or hydrocephalus. EXTRA-AXIAL SPACES AND MENINGES: There are no abnormal extra-axial fluid   collections. CALVARIUM: Intact. SINUSES: Clear. OTHER: None.       _______________               CXR Results  (Last 48 hours)               03/11/22 0611  XR CHEST PORT Final result    Impression:      No plain film evidence of acute cardiopulmonary disease, allowing for technique.            Narrative:  HISTORY:    -Provided with order: intubated   -Additional: Respiratory failure       Technique : AP PORTABLE CHEST Comparison : 03/10/22        FINDINGS:       HEART AND MEDIASTINUM: Enlarged cardiopericardial silhouette. Aortic vascular   calcification. Endotracheal tube tip estimated at 3.5 cm above bella. LUNGS AND PLEURAL SPACES: No consolidation, congestive heart failure, pleural   effusion or pneumothorax. BONY THORAX AND SOFT TISSUES: Degenerative scoliosis. 03/10/22 1928  XR CHEST PORT Final result    Impression:      Patient is intubated, endotracheal tube tip approximately 26 cm over bella. Narrative:  HISTORY: Respiratory failure. Intubation. .       COMPARISON: 3/5/2022. FINDINGS:       A portable view of the chest:       Patient is intubated, endotracheal tube tip approximately 26 cm over bella. Mediastinal contour stable. Cardiomegaly. Atherosclerosis. No focal consolidation, pleural effusion or pneumothorax. Lungs are overall   clear.                    Cardiology Procedures:   Results for orders placed or performed during the hospital encounter of 03/02/22   EKG, 12 LEAD, INITIAL   Result Value Ref Range    Ventricular Rate 102 BPM    QRS Duration 72 ms    Q-T Interval 336 ms    QTC Calculation (Bezet) 437 ms    Calculated R Axis -81 degrees    Calculated T Axis -20 degrees    Diagnosis       Atrial fibrillation with rapid ventricular response with premature   ventricular or aberrantly conducted complexes  Left axis deviation  Low voltage QRS  Abnormal ECG  When compared with ECG of 23-APR-2021 17:11,  Atrial fibrillation has replaced Sinus rhythm  Confirmed by José Antonio Manning MD. ([id number]) on 3/2/2022 10:26:52 PM        Echo Results  (Last 48 hours)    None       Cardiolite (Tc-99m Sestamibi) stress test    Signed By: Luz Garcia MD     March 11, 2022

## 2022-03-11 NOTE — PROGRESS NOTES
Hospitalist Progress Note-critical care note     Patient: Danette Woodard MRN: [id number]  CSN: [id number]    YOB: 1940  Age: 80 y.o. Sex: female    DOA: 3/2/2022 LOS:  LOS: 9 days            Chief complaint: hip fracture m ckd3, afib chf ,     Assessment/Plan         Hospital Problems  Date Reviewed: 3/9/2022          Codes Class Noted POA    Fracture of neck of right femur ([facility] ICD-10-CM: S72.001A  ICD-9-CM: 820.8  3/8/2022 Unknown        Stage 3 chronic kidney disease ([facility] ICD-10-CM: N18.30  ICD-9-CM: 585.3  3/3/2022 Unknown        SERENA (obstructive sleep apnea) ICD-10-CM: G47.33  ICD-9-CM: 327.23  3/3/2022 Unknown        Adult BMI 39.0-39.9 kg/sq m ICD-10-CM: Z68.39  ICD-9-CM: V85.39  3/3/2022 Unknown        * (Principal) Acute respiratory failure with hypoxemia (HCC) ICD-10-CM: J96.01  ICD-9-CM: 518.81  3/2/2022 Unknown        Atrial fibrillation (HCC) ICD-10-CM: I48.91  ICD-9-CM: 427.31  3/2/2022 Unknown        Elevated troponin ICD-10-CM: R77.8  ICD-9-CM: 790.6  4/23/2021 Yes        HTN (hypertension) ICD-10-CM: I10  ICD-9-CM: 401.9  4/23/2021 Yes        Acute on chronic diastolic congestive heart failure (HCC) ICD-10-CM: I50.33  ICD-9-CM: 428.33, 428.0  4/23/2021 Yes             pt was admitted for afib and chf, found rt hip fracture. She has serena on cpap at night, she received rt hip replacement, she was noted decreased tide volume and hypoxia while extubation post procedure, intubated with oxygen 65%, peep 6.  Case discussed with Dr. Mariya Nova and Dr. Lona Leal       Respiratory   Acute on chronic respiratory failure with hypoxia and hypercapnia   Will continue vent , was on Fio2 45 %    Continue wean per protocol   V/q scan :Perfusion images show no segmental perfusion defects to suggest the presence of  pulmonary embolism, pvl negative for dvt  on 3/3    Pulmonary hypertension   Echo on 3/3 pulmonary arterial systolic pressure is 51 mmhg  LORRAINE is deferred       cv   Congestive heart failure , acute on chronic diastolic   Echo done Mitral stenosis,  Pulmonary hypertension found to be moderate-LORRAINE is deferred  Dr. Boogie Villafuerte and On lasix      Htn : on coreg   afib new   On eliquis before , hold for surgery , on coreg   Ortho is ok to restart eliquis     Elevated trop   No acs cardiologist on board      ID   UTI  Urine pos for klebsiella  Completed IV Rocephin     msk :  Post surgery day 1: Right press fit direct anterior total hip arthroplasty   Continue post surgery care        Renal   ckd3 -mild worsening cr   Continue watch for renal function ,watch for renal function and urine-out-put will dc K replacement     Heme   Anemia   Continue monitoring h/h     FEN  icu electrolytes replacement protocol       Neuro   Ct head on 3/7 no acute issue, on propofol     rn : move all extremities, not follow the command   30 minutes of critical care time spent in the direct evaluation and treatment of this high risk patient. The reason for providing this level of medical care for this critically ill patient was due a critical illness that impaired one or more vital organ systems such that there was a high probability of imminent or life threatening deterioration in the patients condition. This care involved high complexity decision making to assess, manipulate, and support vital system functions, to treat this degreee vital organ system failure and to prevent further life threatening deterioration of the patients condition. Palliative care team f/u   Disposition :tbd,   Review of systems:  Limited due to on vent   Vital signs/Intake and Output:  Visit Vitals  BP 97/60   Pulse 92   Temp 98.2 °F (36.8 °C)   Resp 12   Ht 5' 5\" (1.651 m)   Wt 111.1 kg (244 lb 14.9 oz)   SpO2 93%   Breastfeeding No   BMI 40.76 kg/m²     Current Shift:  No intake/output data recorded.   Last three shifts:  03/09 1901 - 03/11 0700  In: 1066.9 [I.V.:1066.9]  Out: 2025 [Urine:1675]    Physical Exam:  General: Intubated ,  HEENT: NC, Atraumatic. anicteric sclerae. ET tube noted   Lungs: CTA Bilaterally. No Wheezing/Rhonchi/Rales. Heart:  Regular  rhythm,  No murmur, No Rubs, No Gallops  Abdomen: Soft, Non distended, Non tender. +Bowel sounds,   Extremities: No c/c, rt hip surgical site covered with gauze and wound vac noted   Psych:   Not anxious or agitated. Neurologic:  Intubated on propofol respond to pain stimuli           Labs: Results:       Chemistry Recent Labs     03/11/22  0310 03/10/22  0140 03/09/22  1812   GLU 94 115* 127*    145 143   K 5.0 4.5 4.4    103 101   CO2 36* 37* 41*   BUN 74* 65* 64*   CREA 1.58* 1.35* 1.39*   CA 10.5* 10.6* 10.3*   AGAP 6 5 1*   BUCR 47* 48* 46*    113 118*   TP 6.0* 6.4 6.2*   ALB 2.8* 3.1* 2.8*   GLOB 3.2 3.3 3.4   AGRAT 0.9 0.9 0.8      CBC w/Diff Recent Labs     03/11/22  0310 03/10/22  0140 03/09/22  1812   WBC 10.7 7.9 7.7   RBC 4.11* 3.67* 3.91*   HGB 12.9 11.8* 11.8*   HCT 41.1 38.2 40.0    199 203   GRANS 71 74* 74*   LYMPH 12* 10* 10*   EOS 0 1 1      Cardiac Enzymes No results for input(s): CPK, CKND1, FARA in the last 72 hours. No lab exists for component: CKRMB, TROIP   Coagulation No results for input(s): PTP, INR, APTT, INREXT, INREXT in the last 72 hours. Lipid Panel Lab Results   Component Value Date/Time    Cholesterol, total 183 04/25/2021 04:00 PM    HDL Cholesterol 101 (H) 04/25/2021 04:00 PM    LDL, calculated 69.8 04/25/2021 04:00 PM    VLDL, calculated 12.2 04/25/2021 04:00 PM    Triglyceride 61 04/25/2021 04:00 PM    CHOL/HDL Ratio 1.8 04/25/2021 04:00 PM      BNP No results for input(s): BNPP in the last 72 hours.    Liver Enzymes Recent Labs     03/11/22  0310   TP 6.0*   ALB 2.8*         Thyroid Studies Lab Results   Component Value Date/Time    TSH 0.58 03/03/2022 02:21 AM        Procedures/imaging: see electronic medical records for all procedures/Xrays and details which were not copied into this note but were reviewed prior to creation of Plan    NM LUNG SCAN PERF    Result Date: 3/3/2022  EXAM: NM LUNG SCAN PERF. CLINICAL INDICATION/HISTORY: d dimer increase dyspnea. -Additional: Clinical concern for pulmonary embolus. COMPARISON: Correlation is made with the radiographic study performed one day prior. TECHNIQUE: 7.2 mCi Tc-99m MAA was injected intravenously and the 8 standard views of the chest were obtained. This perfusion only examination is interpreted using the PISAPED criteria. _______________ FINDINGS: Perfusion images show no segmental perfusion defects to suggest the presence of pulmonary embolism. _______________     o scintigraphic findings to suggest the presence of acute pulmonary embolism. _______________     XR HIP RT W OR WO PELV 2-3 VWS    Result Date: 3/7/2022  EXAM: RIGHT HIP RADIOGRAPHS CLINICAL INDICATION/HISTORY: right hip pain -Additional: None COMPARISON: May March 3, 2022. TECHNIQUE: AP pelvis and frog-leg lateral view of right hip. _______________ FINDINGS: BONES: Intact appearing ilioischial and iliopectineal lines. There is a displaced and impacted subcapital right femoral neck fracture, with mild progressive displacement on follow-up imaging. Mild-moderate bilateral hip joint arthrosis. SOFT TISSUES: Unremarkable. _______________     1. Displaced and impacted subcapital right femoral neck fracture, increased in displacement from CT performed 4 days prior. CT HEAD WO CONT    Result Date: 3/7/2022  EXAM: CT head INDICATION: Altered mental status. COMPARISON: 4/23/2021. TECHNIQUE: Axial CT imaging of the head was performed without intravenous contrast. Standard multiplanar coronal and sagittal reformatted images were obtained and are included in interpretation. One or more dose reduction techniques were used on this CT: automated exposure control, adjustment of the mAs and/or kVp according to patient size, and iterative reconstruction techniques.   The specific techniques used on this CT exam have been documented in the patient's electronic medical record. Digital Imaging and Communications in Medicine (DICOM) format image data are available to nonaffiliated external healthcare facilities or entities on a secure, media free, reciprocally searchable basis with patient authorization for at least a 12-month period after this study. _______________ FINDINGS: BRAIN AND POSTERIOR FOSSA: Periventricular white matter hypoattenuation which is nonspecific but likely represents chronic ischemic changes. No evidence of acute large vessel transcortical infarct or acute parenchymal hemorrhage. No midline shift or hydrocephalus. EXTRA-AXIAL SPACES AND MENINGES: There are no abnormal extra-axial fluid collections. CALVARIUM: Intact. SINUSES: Clear. OTHER: None. _______________     No acute intracranial abnormality. CT CHEST ABD PELV WO CONT    Result Date: 3/3/2022  EXAM: CT CHEST, ABDOMEN AND PELVIS CLINICAL INDICATION/HISTORY: Hypoxemia, retrocardiac density, diarrhea, weakness and shortness of breath COMPARISON: 3/2/2022 TECHNIQUE: Axial CT imaging of the chest, abdomen, and pelvis was performed without intravenous contrast. Multiplanar reformats were generated. One or more dose reduction techniques were used on this CT: automated exposure control, adjustment of the mAs and/or kVp according to patient size, and iterative reconstruction techniques. The specific techniques used on this CT exam have been documented in the patient's electronic medical record. Digital Imaging and Communications in Medicine (DICOM) format image data are available to nonaffiliated external healthcare facilities or entities on a secure, media free, reciprocally searchable basis with patient authorization for at least a 12-month period after this study.  ________________ FINDINGS: LIMITATIONS:   > Suboptimal evaluation given lack of intravenous contrast.   > Motion artifact is present which limits evaluation.   > Suboptimal exam due to patient body habitus and arms by the side. CHEST: LUNGS: Respiratory motion significantly limits evaluation. Interlobar septal thickening in the upper lobes. Mild bilateral dependent atelectasis. No large consolidation or suspicious pulmonary mass. PLEURA: Very small volume bilateral pleural effusions. AIRWAY: Normal. MEDIASTINUM: Four-chamber cardiomegaly with asymmetric biatrial enlargement, mitral and aortic annular calcifications. The main pulmonary artery is enlarged suggesting pulmonary arterial hypertension. Normal caliber aorta with scattered calcified atherosclerotic plaque. No pericardial effusion. LYMPH NODES: No enlarged lymph nodes. CHEST WALL: Diffuse anasarca. Heterogeneous thyroid. _______________ ABDOMEN/PELVIS: LIVER: No enhancing hepatic mass. The portal and hepatic veins are patent. BILIARY: Gallstones are present in the nondistended gallbladder lumen. No biliary dilation. SPLEEN: Normal. PANCREAS: Normal. ADRENALS: Left adrenal gland measures 9 HU (axial image 76), most consistent with lipid rich adenoma. Normal right adrenal gland. KIDNEYS: Asymmetrically small left kidney. No rate of a stone. No hydronephrosis. GI TRACT:  A small hiatal hernia is present. Normal caliber small and large bowel loops. No morphology of bowel obstruction. Normal appendix. BLADDER: Diffuse wall thickening in the largely decompressed bladder. PELVIC ORGANS: No acute abnormality. VASCULATURE: No arterial aneurysm. 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 LYMPH NODES: No mesenteric or retroperitoneal lymphadenopathy. OTHER: No free intraperitoneal air. No ascites. Diffuse anasarca. OSSEOUS STRUCTURES: No acute osseous abnormality. Multilevel degenerative changes most pronounced in the lumbar spine. Degenerative changes in both shoulders (right greater than left). Please note the included portions of the upper extremities are not well evaluated on this study _______________     1.   Findings of congestive heart failure with cardiomegaly, interstitial edema, very small bilateral pleural effusions, and diffuse anasarca. 2.  Bladder wall thickening may be due to underdistention though superimposed infection cannot be excluded. Recommend correlation for cystitis. 3.  Osseous degenerative changes and additional findings, as detailed in the body of the report. XR CHEST PORT    Result Date: 3/6/2022  EXAM: PORTABLE CHEST HISTORY: Shortness of breath. COMPARISON: 3/2/2022 TECHNIQUE: Single portable view. _______________ FINDINGS: SUPPORT DEVICES: None HEART AND MEDIASTINUM: Moderate cardiomegaly. LUNGS AND PLEURAL SPACES: Subsegmental atelectasis left base. Possible small effusions. BONES AND SOFT TISSUES: Dextroscoliosis. _______________     Subsegmental atelectasis left base. Possible small effusions. Moderate cardiomegaly without change. XR CHEST PORT    Result Date: 3/2/2022  EXAM:  AP Portable Chest X-ray 1 view INDICATION: Shortness of breath COMPARISON: April 23, 2021 _______________ FINDINGS: Cardiomegaly and mediastinal contours are within normal limits for portable radiograph. There is increased right retrocardiac/right paraspinal density. There are no pleural effusions. No acute osseous findings. ________________      Increased right retrocardiac density which may represent airspace disease or underlying pulmonary nodule. Recommend CT chest for further evaluation. ECHO ADULT COMPLETE    Result Date: 3/3/2022    Left Ventricle: Left ventricle size is normal. Moderately increased wall thickness. Findings consistent with moderate concentric hypertrophy. Normal wall motion. Normal left ventricular systolic function with a visually estimated EF of 55 - 60%.   Left Atrium: Left atrium is mildly dilated.   Right Ventricle: Right ventricle is mildly dilated.   Right Atrium: Right atrium is mildly dilated.   Pulmonary artery :  Estimated pulmonary arterial systolic pressure is 51 mmhg.   Pulmonary hypertension found to be moderate   Mitral Valve: Moderately thickened leaflet. Mildly calcified leaflet. Moderate annular calcification of the mitral valve.   IVC/SVC : IVC diameter is greater than 21 mm and decreases less than 50% during inspiration; therefore the estimated right atrial pressure is elevated (~15 mmHg). IVC is dilated. Consider LORRAINE for better evaluation of mitral valve if clinically indicated. DUPLEX LOWER EXT VENOUS BILAT    Result Date: 3/4/2022  · No evidence of deep vein thrombosis in the right lower extremity. · No evidence of deep vein thrombosis in the left lower extremity.         Huan Kim MD

## 2022-03-11 NOTE — PROGRESS NOTES
[facility]ists  Pulmonary, Critical Care, and Sleep Medicine    Name: Enma Morgan MRN: [id number]   : 1940 Hospital: [facility] [place]    Date: 3/11/2022  Room: [address] [place] [address] Note                                              Consult requesting physician: Dr. Oma Solano  Reason for Consult: Respiratory failure    IMPRESSION:   Acute respiratory failure with hypoxemia. Acute on chronic diastolic congestive heart failure. Fracture of neck of right femur. SERENA. Active Hospital Problems    Diagnosis Date Noted    Fracture of neck of right femur ([facility] [address] 2022    Stage 3 chronic kidney disease ([facility] [address] 2022    SERENA (obstructive sleep apnea) 2022    Adult BMI 39.0-39.9 kg/sq m 2022    Acute respiratory failure with hypoxemia ([facility]) 2022    Atrial fibrillation ([facility] [address] 2022    [facility] on [facility]    Elevated troponin 2021    HTN (hypertension) 2021   ·      Patient Active Problem List   Diagnosis Code    Fatigue R53.83    Elevated troponin R77.8    Obesity (BMI 30-39. 9) E66.9    HTN (hypertension) I10    SERENA treated with BiPAP G47.33    Acute on chronic diastolic congestive heart failure (HCC) I50.33    Acute respiratory failure with hypoxemia ([facility]) J96.01    Atrial fibrillation ([facility]) I48.91    Stage 3 chronic kidney disease (HCC) N18.30    SERENA (obstructive sleep apnea) G47.33    Adult BMI 39.0-39.9 kg/sq m Z68.39    Fracture of neck of right femur ([facility]) S72.001A         · Code status: Full Code       RECOMMENDATIONS:     Respiratory: History of obesity, SERENA on BiPAP. Postoperative respiratory failure, reintubated in PACU 3/10/2022. CXR 3/11/2022: ETT in place. No acute process. ABG: Respiratory alkalosis. Tidal volume reduced from 450 mL to 4 mL after the ABG showing respiratory alkalosis. Reduce minute ventilation; reduce respiratory rate 10 tidal volume.   Order to be on [facility] 12/400/450/5. Sedation: Propofol drip stopped today morning. Use fentanyl/Versed as needed boluses. Attempted SBT but patient is not following commands. Patient is not breathing over the vent either. Ventilator bundle & Sedation protocol followed. Daily sedation holiday, assessment for readiness for SBT and then re-titrate if required. Chlorhexidine mouth washes. Keep SPO2 >=92%. HOB 30 degree elevation all the time. Aggressive pulmonary toileting. Aspiration precautions. Incentive spirometry. CVS: New onset A. fib on admission, chronic diastolic CHF. On Coreg, aspirin, Lasix 40 every 12. Eliquis held for surgery. Monitor hemodynamics. Dr. Ishaan Rosen following. Echo 3/3/2022: LVEF 55 to 60%. RV mildly dilated. RA mildly dilated. PVL LE 3/3/2022: No DVT. VQ scan 3/3/2022: No PE.    ID:   No fever or leukocytosis. Rapid influenza and COVID19 3/2/2022: Negative. Urine culture 3/2/2022: Klebsiella pneumonia. Blood culture 3/2/2022: Negative. Endotracheal aspirate culture: Gram stain negative. NGTD. Antibiotics: DC Rocephin as there is no pneumonia on CXR; no fever or leukocytosis; hemodynamically stable; no endotracheal secretions and sputum culture NGTD. Follow cultures. Deescalate antibiotic when appropriate. Hematology/Oncology:   Mild anemia; monitor. Normal platelets. Renal:   Mildly elevated creatinine but making good urine output. Normal electrolytes. GI/: No acute issues. Endocrine: Monitor BS. SSI. Neurology:   Patient was lethargic and confused before going for hip surgery on 3/10/2022 morning. At present, patient is off propofol; awake and alert but not following commands. Repeat CT head ordered. CT head 3/7/2022: Nil acute. Psychiatry: No acute issues. Toxicology: No acute issues. Pain/Sedation: Sedation protocol as above    Skin/Wound: Right hip wound VAC in place after surgery; local care per nursing protocol.     Electrolytes: Replace electrolytes per ICU electrolyte replacement protocol. IVF: None    Nutrition: n.p.o. discussed with RN to place OGT and start tube feed. Prophylaxis: DVT Prophylaxis: SCD. GI Prophylaxis: Protonix. Restraints: wrist soft restraints for patient interfering with medical therapy/management and patient safety. Lines/Tubes: PIV  ETT: 3/10/2022. OGT: To be placed 3/11/2022. [place]: 3/10/2022 (Medically necessary for strict input/output monitoring in critically ill patient, will remove it when not needed. [place] bundle followed). Advance Directive/Palliative Care: Consulted. Will defer respective systems problem management to primary and other respective consultant and follow patient in ICU with primary and other medical team.  Further recommendations will be based on the patient's response to recommended treatment and results of the investigation ordered. Quality Care: PPI, DVT prophylaxis, HOB elevated, Infection control all reviewed and addressed. Care of plan d/w RN, RT, hospitalist team.    High complexity decision making was performed during the evaluation of this patient at high risk for decompensation with multiple organ involvement. Total critical care time spent rendering care exclusive of procedures/family discussion/coordination of care: 40 minutes. Subjective/History of Present Illness:     Patient is a 80 y.o. female with PMHx significant for morbid obesity, SERENA, right leg weakness, HTN, A. fib, CHF; admitted to medical floor on 3/3/2022 for dyspnea. Patient had new onset of A. fib on admission. Patient was admitted on medical floor and A. fib/CHF was being treated medically; and was using home BiPAP nightly. Found to have right femoral fracture; underwent right press-fit direct anterior total hip arthroplasty for femoral neck fracture.   Postoperatively patient was extubated in PACU; but due to respiratory distress, reintubated and transferred to ICU.      3/11/2022 : Remains in ICU room 107. Off propofol drip since today morning; patient is awake but not following commands. Drowsy and falls back to sleep. Failed SBT attempt today. We will place OGT and start feeding. CT head ordered; pending. No significant ETT secretions; culture NGTD. Hemodynamically stable. Not on any vasopressors. No external bleeding noted. No abdominal pain, vomiting, diarrhea, fever. No other overnight issues reported. [facility] was not called for any issues overnight. I/O last 24 hrs: Intake/Output Summary (Last 24 hours) at 3/11/2022 1140  Last data filed at 3/11/2022 7591  Gross per 24 hour   Intake 866.92 ml   Output 1125 ml   Net -258.08 ml         The patient is critically ill and can not provide additional history due to Ventilated    History taken from Dr. Aaliyah Uribe, EMR     Review of Systems:  ROS not obtained due to patient factor. Allergies   Allergen Reactions    Seafood Hives     crabs    Statins-Hmg-Coa Reductase Inhibitors Unknown (comments)    Sulfa (Sulfonamide Antibiotics) Hives      Past Medical History:   Diagnosis Date    Hypertension     Sleep disorder       Past Surgical History:   Procedure Laterality Date    HX ORTHOPAEDIC      broken right ankle, left femur 30 yrs ago      Social History     Tobacco Use    Smoking status: Never Smoker    Smokeless tobacco: Never Used   Substance Use Topics    Alcohol use: Yes     Alcohol/week: 1.0 standard drink     Types: 1 Glasses of wine per week     Comment: soc      Family History   Problem Relation Age of Onset    Diabetes Mother     Heart Disease Mother     Heart Disease Father       Prior to Admission medications    Medication Sig Start Date End Date Taking? Authorizing Provider   allopurinoL (ZYLOPRIM) 100 mg tablet Take 100 mg by mouth daily. Yes Provider, Historical   acetaminophen (TYLENOL) 325 mg tablet Take 650 mg by mouth every six (6) hours as needed for Pain.    Yes Provider, Historical   niacin (NIASPAN) 1,000 mg Tb24 tab Take 1,000 mg by mouth daily. Yes Provider, Historical   trolamine salicylate-aloe vera 28% (ASPERCREME) topical cream Apply 2 g to affected area as needed for Pain. Yes Provider, Historical   multivitamin, tx-iron-ca-min (THERA-M w/ IRON) 9 mg iron-400 mcg tab tablet Take 1 Tab by mouth daily. Yes Provider, Historical   aspirin delayed-release 81 mg tablet Take 81 mg by mouth daily. Yes Provider, Historical   potassium chloride SR (KLOR-CON 10) 10 mEq tablet Take 10 mEq by mouth daily. Yes Provider, Historical   carvediloL (COREG) 3.125 mg tablet Take 3.125 mg by mouth daily.    Yes Provider, Historical     Current Facility-Administered Medications   Medication Dose Route Frequency    lactated Ringers infusion  75 mL/hr IntraVENous CONTINUOUS    sodium chloride (NS) flush 5-40 mL  5-40 mL IntraVENous Q8H    acetaminophen (TYLENOL) tablet 650 mg  650 mg Oral Q6H    ferrous sulfate tablet 325 mg  1 Tablet Oral TID    propofol (DIPRIVAN) 10 mg/mL infusion  0-50 mcg/kg/min IntraVENous TITRATE    chlorhexidine (PERIDEX) 0.12 % mouthwash 10 mL  10 mL Oral Q12H    nystatin (MYCOSTATIN) 100,000 unit/mL oral suspension 500,000 Units  500,000 Units Oral QID    cefTRIAXone (ROCEPHIN) 1 g in 0.9% sodium chloride (MBP/ADV) 50 mL MBP  1 g IntraVENous Q24H    furosemide (LASIX) injection 40 mg  40 mg IntraVENous Q12H    pantoprazole (PROTONIX) tablet 40 mg  40 mg Oral ACB    [Held by provider] apixaban (ELIQUIS) tablet 2.5 mg  2.5 mg Oral BID    sodium chloride (NS) flush 5-40 mL  5-40 mL IntraVENous Q8H    [Held by provider] potassium chloride (K-DUR, KLOR-CON M20) SR tablet 20 mEq  20 mEq Oral DAILY    carvediloL (COREG) tablet 3.125 mg  3.125 mg Oral BID WITH MEALS    aspirin delayed-release tablet 81 mg  81 mg Oral DAILY         Objective:   Vital Signs:    Visit Vitals  BP (!) 120/54   Pulse 80   Temp 98.3 °F (36.8 °C)   Resp 14   Ht 5' 5\" (1.651 m)   Wt 111.1 kg (244 lb 14.9 oz)   SpO2 97%   Breastfeeding No   BMI 40.76 kg/m²       O2 Device: Endotracheal tube,Ventilator   O2 Flow Rate (L/min): 2 l/min   Temp (24hrs), Av.1 °F (36.7 °C), Min:97.7 °F (36.5 °C), Max:98.4 °F (36.9 °C)       Intake/Output:   Last shift:      No intake/output data recorded. Last 3 shifts:  190 - 700  In: 1066.9 [I.V.:1066.9]  Out:  [Urine:1675]      Intake/Output Summary (Last 24 hours) at 3/11/2022 1140  Last data filed at 3/11/2022 0607  Gross per 24 hour   Intake 866.92 ml   Output 1125 ml   Net -258.08 ml       Last 3 Recorded Weights in this Encounter    22 0454 22 0459 22 0750   Weight: 105.1 kg (231 lb 9.6 oz) 104.2 kg (229 lb 12.8 oz) 111.1 kg (244 lb 14.9 oz)         Ventilator Settings:  Mode Rate Tidal Volume Pressure FiO2 PEEP   Assist control,VC+   400 ml    45 % 5 cm H20     Peak airway pressure: 22 cm H2O    Plateau pressure:     Tidal volume:    Minute ventilation: 5.7 l/min     Recent Labs     22  0454 03/10/22  1656   PHI 7.69* 7.47*   PCO2I 32.9* 54.1*   PO2I 101* 226*   HCO3I 39.3* 38.9*   FIO2I 50 100       Physical Exam:       General/Neurology: Alert, off sedation but not following commands. Moving all 4 extremities spontaneously. NAD. Head:   NCAT. Eye:   Pupils reactive to light. No icterus/pallor/cyanosis. Nose:   No nasal drainage/discharge. Neck:   Trachea midline. Lung: Moderate air entry bilateral equal.  No rales, rhonchi. No wheezing or stridor. No prolonged expiration or accessory muscle use. Heart:   S1 S2 present. No murmur or JVD. Abdomen:  Soft. NT. ND. No palpable masses. Extremities:  No edema. No cyanosis or clubbing. Right hip surgical site under dressing.   Pulses: 2+ and symmetric in DP      Data:       Recent Results (from the past 24 hour(s))   CULTURE, RESPIRATORY/SPUTUM/BRONCH W GRAM STAIN    Collection Time: 03/10/22  3:30 PM    Specimen: Endotracheal aspirate; Sputum   Result Value Ref Range Special Requests: NO SPECIAL REQUESTS      GRAM STAIN NO WBC'S SEEN      GRAM STAIN NO EPITHELIAL CELLS SEEN      GRAM STAIN NO ORGANISMS SEEN      Culture result: PENDING    BLOOD GAS, ARTERIAL POC    Collection Time: 03/10/22  4:56 PM   Result Value Ref Range    Device: ADULT VENT      FIO2 (POC) 100 %    pH (POC) 7.47 (H) 7.35 - 7.45      pCO2 (POC) 54.1 (H) 35.0 - 45.0 MMHG    pO2 (POC) 226 (H) 80 - 100 MMHG    HCO3 (POC) 38.9 (H) 22 - 26 MMOL/L    sO2 (POC) 99.8 (H) 92 - 97 %    Base excess (POC) 12.9 mmol/L    Mode ASSIST CONTROL      Tidal volume 450 ml    Set Rate 14 bpm    PEEP/CPAP (POC) 5 cmH2O    Allens test (POC) Positive      Site RIGHT RADIAL      Specimen type (POC) ARTERIAL      Performed by Marissa Moore    GLUCOSE, POC    Collection Time: 03/10/22  5:26 PM   Result Value Ref Range    Glucose (POC) 82 70 - 110 mg/dL   MAGNESIUM    Collection Time: 03/11/22  3:10 AM   Result Value Ref Range    Magnesium 1.9 1.6 - 2.6 mg/dL   METABOLIC PANEL, COMPREHENSIVE    Collection Time: 03/11/22  3:10 AM   Result Value Ref Range    Sodium 145 136 - 145 mmol/L    Potassium 5.0 3.5 - 5.5 mmol/L    Chloride 103 100 - 111 mmol/L    CO2 36 (H) 21 - 32 mmol/L    Anion gap 6 3.0 - 18 mmol/L    Glucose 94 74 - 99 mg/dL    BUN 74 (H) 7.0 - 18 MG/DL    Creatinine 1.58 (H) 0.6 - 1.3 MG/DL    BUN/Creatinine ratio 47 (H) 12 - 20      GFR est AA 38 (L) >60 ml/min/1.73m2    GFR est non-AA 31 (L) >60 ml/min/1.73m2    Calcium 10.5 (H) 8.5 - 10.1 MG/DL    Bilirubin, total 1.3 (H) 0.2 - 1.0 MG/DL    ALT (SGPT) 44 13 - 56 U/L    AST (SGOT) 27 10 - 38 U/L    Alk.  phosphatase 103 45 - 117 U/L    Protein, total 6.0 (L) 6.4 - 8.2 g/dL    Albumin 2.8 (L) 3.4 - 5.0 g/dL    Globulin 3.2 2.0 - 4.0 g/dL    A-G Ratio 0.9 0.8 - 1.7     CBC WITH AUTOMATED DIFF    Collection Time: 03/11/22  3:10 AM   Result Value Ref Range    WBC 10.7 4.6 - 13.2 K/uL    RBC 4.11 (L) 4.20 - 5.30 M/uL    HGB 12.9 12.0 - 16.0 g/dL    HCT 41.1 35.0 - 45.0 % .0 78.0 - 100.0 FL    MCH 31.4 24.0 - 34.0 PG    MCHC 31.4 31.0 - 37.0 g/dL    RDW 15.0 (H) 11.6 - 14.5 %    PLATELET 707 807 - 137 K/uL    MPV 10.7 9.2 - 11.8 FL    NRBC 0.2 (H) 0  WBC    ABSOLUTE NRBC 0.02 (H) 0.00 - 0.01 K/uL    NEUTROPHILS 71 40 - 73 %    LYMPHOCYTES 12 (L) 21 - 52 %    MONOCYTES 16 (H) 3 - 10 %    EOSINOPHILS 0 0 - 5 %    BASOPHILS 0 0 - 2 %    IMMATURE GRANULOCYTES 1 (H) 0.0 - 0.5 %    ABS. NEUTROPHILS 7.6 1.8 - 8.0 K/UL    ABS. LYMPHOCYTES 1.3 0.9 - 3.6 K/UL    ABS. MONOCYTES 1.7 (H) 0.05 - 1.2 K/UL    ABS. EOSINOPHILS 0.0 0.0 - 0.4 K/UL    ABS. BASOPHILS 0.0 0.0 - 0.1 K/UL    ABS. IMM. GRANS. 0.1 (H) 0.00 - 0.04 K/UL    DF AUTOMATED     BLOOD GAS, ARTERIAL POC    Collection Time: 03/11/22  4:54 AM   Result Value Ref Range    Device: ADULT VENT      FIO2 (POC) 50 %    pH (POC) 7.69 (HH) 7.35 - 7.45      pCO2 (POC) 32.9 (L) 35.0 - 45.0 MMHG    pO2 (POC) 101 (H) 80 - 100 MMHG    HCO3 (POC) 39.3 (H) 22 - 26 MMOL/L    sO2 (POC) 99.0 (H) 92 - 97 %    Base excess (POC) 17.5 mmol/L    Mode ASSIST CONTROL      Tidal volume 450 ml    Set Rate 14 bpm    PEEP/CPAP (POC) 5 cmH2O    Allens test (POC) Positive      Site RIGHT RADIAL      Patient temp. 98.6      Specimen type (POC) ARTERIAL      Performed by Carlee Gamble          Chemistry Recent Labs     03/11/22  0310 03/10/22  0140 03/09/22  1812 03/09/22  0755   GLU 94 115* 127*  --     145 143  --    K 5.0 4.5 4.4  --     103 101  --    CO2 36* 37* 41*  --    BUN 74* 65* 64*  --    CREA 1.58* 1.35* 1.39*  --    CA 10.5* 10.6* 10.3*  --    MG 1.9 1.9  --  2.0   AGAP 6 5 1*  --    BUCR 47* 48* 46*  --     113 118*  --    TP 6.0* 6.4 6.2*  --    ALB 2.8* 3.1* 2.8*  --    GLOB 3.2 3.3 3.4  --    AGRAT 0.9 0.9 0.8  --         Lactic Acid No results found for: LAC  No results for input(s): LAC in the last 72 hours.      Liver Enzymes Protein, total   Date Value Ref Range Status   03/11/2022 6.0 (L) 6.4 - 8.2 g/dL Final Albumin   Date Value Ref Range Status   03/11/2022 2.8 (L) 3.4 - 5.0 g/dL Final     Globulin   Date Value Ref Range Status   03/11/2022 3.2 2.0 - 4.0 g/dL Final     A-G Ratio   Date Value Ref Range Status   03/11/2022 0.9 0.8 - 1.7   Final     Alk. phosphatase   Date Value Ref Range Status   03/11/2022 103 45 - 117 U/L Final     Recent Labs     03/11/22 0310 03/10/22  0140 03/09/22  1812   TP 6.0* 6.4 6.2*   ALB 2.8* 3.1* 2.8*   GLOB 3.2 3.3 3.4   AGRAT 0.9 0.9 0.8    113 118*        CBC w/Diff Recent Labs     03/11/22  0310 03/10/22  0140 03/09/22 1812   WBC 10.7 7.9 7.7   RBC 4.11* 3.67* 3.91*   HGB 12.9 11.8* 11.8*   HCT 41.1 38.2 40.0    199 203   GRANS 71 74* 74*   LYMPH 12* 10* 10*   EOS 0 1 1        Cardiac Enzymes No results found for: CPK, CK, CKMMB, CKMB, RCK3, CKMBT, CKNDX, CKND1, FARA, TROPT, TROIQ, ADOLFO, TROPT, TNIPOC, BNP, BNPP     BNP No results found for: BNP, BNPP, XBNPT     Coagulation No results for input(s): PTP, INR, APTT, INREXT, INREXT in the last 72 hours. Thyroid  Lab Results   Component Value Date/Time    TSH 0.58 03/03/2022 02:21 AM       No results found for: T4     Urinalysis Lab Results   Component Value Date/Time    Color YELLOW 03/02/2022 09:04 PM    Appearance CLOUDY 03/02/2022 09:04 PM    Specific gravity 1.021 03/02/2022 09:04 PM    pH (UA) 5.0 03/02/2022 09:04 PM    Protein 300 (A) 03/02/2022 09:04 PM    Glucose Negative 03/02/2022 09:04 PM    Ketone TRACE (A) 03/02/2022 09:04 PM    Bilirubin Negative 03/02/2022 09:04 PM    Urobilinogen 1.0 03/02/2022 09:04 PM    Nitrites Negative 03/02/2022 09:04 PM    Leukocyte Esterase Negative 03/02/2022 09:04 PM    Epithelial cells 2+ 03/02/2022 09:04 PM    Bacteria FEW (A) 03/02/2022 09:04 PM    WBC 0 to 3 03/02/2022 09:04 PM    RBC 0 to 3 03/02/2022 09:04 PM        Micro  Recent Labs     03/10/22  1530   CULT PENDING     Recent Labs     03/10/22  1530   CULT PENDING          Culture data during this hospitalization. All Micro Results     Procedure Component Value Units Date/Time    CULTURE, RESPIRATORY/SPUTUM/BRONCH Donnamaria Feliz [[id number]] Collected: 03/10/22 1530    Order Status: Completed Specimen: Sputum from Endotracheal aspirate Updated: 03/10/22 2354     Special Requests: NO SPECIAL REQUESTS        GRAM STAIN NO WBC'S SEEN         NO EPITHELIAL CELLS SEEN         NO ORGANISMS SEEN        Culture result: PENDING    CULTURE, RESPIRATORY/SPUTUM/BRONCH Lugenia Chain STAIN [[id number]]     Order Status: Sent Specimen: Sputum from Endotracheal aspirate     CULTURE, BLOOD [[id number]] Collected: 03/02/22 2048    Order Status: Completed Specimen: Blood Updated: 03/08/22 0655     Special Requests: NO SPECIAL REQUESTS        Culture result: NO GROWTH 6 DAYS       CULTURE, BLOOD [[id number]] Collected: 03/02/22 2048    Order Status: Completed Specimen: Blood Updated: 03/08/22 0655     Special Requests: NO SPECIAL REQUESTS        Culture result: NO GROWTH 6 DAYS       CULTURE, URINE [[id number]]  (Abnormal)  (Susceptibility) Collected: 03/02/22 2104    Order Status: Completed Specimen: Cath Urine Updated: 03/05/22 1226     Special Requests: NO SPECIAL REQUESTS        [place] Count --        >100,000  COLONIES/mL       Culture result: KLEBSIELLA PNEUMONIAE       COVID-19 RAPID TEST [[id number]] Collected: 03/02/22 2025    Order Status: Completed Specimen: Nasopharyngeal Updated: 03/02/22 2052     Specimen source Nasopharyngeal        COVID-19 rapid test Not detected        Comment: Rapid Abbott ID Now       Rapid NAAT:  The specimen is NEGATIVE for SARS-CoV-2, the novel coronavirus associated with COVID-19. Negative results should be treated as presumptive and, if inconsistent with clinical signs and symptoms or necessary for patient management, should be tested with an alternative molecular assay. Negative results do not preclude SARS-CoV-2 infection and should not be used as the sole basis for patient management decisions.        This test has been authorized by the FDA under an Emergency Use Authorization (EUA) for use by authorized laboratories. Fact sheet for Healthcare Providers: ConventionUpdate.co.nz  Fact sheet for Patients: ConventionUpdate.co.nz       Methodology: Isothermal Nucleic Acid Amplification         INFLUENZA A & B AG (RAPID TEST) [[id number]] Collected: 03/02/22 2025    Order Status: Completed Specimen: Nasopharyngeal from Nasal washing Updated: 03/02/22 2047     Influenza A Antigen Negative        Comment: A negative result does not exclude influenza virus infection, seasonal or H1N1 due to suboptimal sensitivity. If influenza is circulating in your community, a diagnosis of influenza should be considered based on a patients clinical presentation and empiric antiviral treatment should be considered, if indicated. Influenza B Antigen Negative                NM LUNG SCAN PERF    Result Date: 3/3/2022  EXAM: NM LUNG SCAN PERF. CLINICAL INDICATION/HISTORY: d dimer increase dyspnea. -Additional: Clinical concern for pulmonary embolus. COMPARISON: Correlation is made with the radiographic study performed one day prior. TECHNIQUE: 7.2 mCi Tc-99m MAA was injected intravenously and the 8 standard views of the chest were obtained. This perfusion only examination is interpreted using the PISAPED criteria. _______________ FINDINGS: Perfusion images show no segmental perfusion defects to suggest the presence of pulmonary embolism. _______________     o scintigraphic findings to suggest the presence of acute pulmonary embolism. _______________     XR HIP RT W OR WO PELV 2-3 VWS    Result Date: 3/7/2022  EXAM: RIGHT HIP RADIOGRAPHS CLINICAL INDICATION/HISTORY: right hip pain -Additional: None COMPARISON: May March 3, 2022. TECHNIQUE: AP pelvis and frog-leg lateral view of right hip. _______________ FINDINGS: BONES: Intact appearing ilioischial and iliopectineal lines.  There is a displaced and impacted subcapital right femoral neck fracture, with mild progressive displacement on follow-up imaging. Mild-moderate bilateral hip joint arthrosis. SOFT TISSUES: Unremarkable. _______________     1. Displaced and impacted subcapital right femoral neck fracture, increased in displacement from CT performed 4 days prior. CT HEAD WO CONT    Result Date: 3/7/2022  EXAM: CT head INDICATION: Altered mental status. COMPARISON: 4/23/2021. TECHNIQUE: Axial CT imaging of the head was performed without intravenous contrast. Standard multiplanar coronal and sagittal reformatted images were obtained and are included in interpretation. One or more dose reduction techniques were used on this CT: automated exposure control, adjustment of the mAs and/or kVp according to patient size, and iterative reconstruction techniques. The specific techniques used on this CT exam have been documented in the patient's electronic medical record. Digital Imaging and Communications in Medicine (DICOM) format image data are available to nonaffiliated external healthcare facilities or entities on a secure, media free, reciprocally searchable basis with patient authorization for at least a 12-month period after this study. _______________ FINDINGS: BRAIN AND POSTERIOR FOSSA: Periventricular white matter hypoattenuation which is nonspecific but likely represents chronic ischemic changes. No evidence of acute large vessel transcortical infarct or acute parenchymal hemorrhage. No midline shift or hydrocephalus. EXTRA-AXIAL SPACES AND MENINGES: There are no abnormal extra-axial fluid collections. CALVARIUM: Intact. SINUSES: Clear. OTHER: None. _______________     No acute intracranial abnormality.      CT CHEST ABD PELV WO CONT    Result Date: 3/3/2022  EXAM: CT CHEST, ABDOMEN AND PELVIS CLINICAL INDICATION/HISTORY: Hypoxemia, retrocardiac density, diarrhea, weakness and shortness of breath COMPARISON: 3/2/2022 TECHNIQUE: Axial CT imaging of the chest, abdomen, and pelvis was performed without intravenous contrast. Multiplanar reformats were generated. One or more dose reduction techniques were used on this CT: automated exposure control, adjustment of the mAs and/or kVp according to patient size, and iterative reconstruction techniques. The specific techniques used on this CT exam have been documented in the patient's electronic medical record. Digital Imaging and Communications in Medicine (DICOM) format image data are available to nonaffiliated external healthcare facilities or entities on a secure, media free, reciprocally searchable basis with patient authorization for at least a 12-month period after this study. ________________ FINDINGS: LIMITATIONS:   > Suboptimal evaluation given lack of intravenous contrast.   > Motion artifact is present which limits evaluation.   > Suboptimal exam due to patient body habitus and arms by the side. CHEST: LUNGS: Respiratory motion significantly limits evaluation. Interlobar septal thickening in the upper lobes. Mild bilateral dependent atelectasis. No large consolidation or suspicious pulmonary mass. PLEURA: Very small volume bilateral pleural effusions. AIRWAY: Normal. MEDIASTINUM: Four-chamber cardiomegaly with asymmetric biatrial enlargement, mitral and aortic annular calcifications. The main pulmonary artery is enlarged suggesting pulmonary arterial hypertension. Normal caliber aorta with scattered calcified atherosclerotic plaque. No pericardial effusion. LYMPH NODES: No enlarged lymph nodes. CHEST WALL: Diffuse anasarca. Heterogeneous thyroid. _______________ ABDOMEN/PELVIS: LIVER: No enhancing hepatic mass. The portal and hepatic veins are patent. BILIARY: Gallstones are present in the nondistended gallbladder lumen. No biliary dilation. SPLEEN: Normal. PANCREAS: Normal. ADRENALS: Left adrenal gland measures 9 HU (axial image 76), most consistent with lipid rich adenoma. Normal right adrenal gland. KIDNEYS: Asymmetrically small left kidney. No rate of a stone. No hydronephrosis. GI TRACT:  A small hiatal hernia is present. Normal caliber small and large bowel loops. No morphology of bowel obstruction. Normal appendix. BLADDER: Diffuse wall thickening in the largely decompressed bladder. PELVIC ORGANS: No acute abnormality. VASCULATURE: No arterial aneurysm. 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 LYMPH NODES: No mesenteric or retroperitoneal lymphadenopathy. OTHER: No free intraperitoneal air. No ascites. Diffuse anasarca. OSSEOUS STRUCTURES: No acute osseous abnormality. Multilevel degenerative changes most pronounced in the lumbar spine. Degenerative changes in both shoulders (right greater than left). Please note the included portions of the upper extremities are not well evaluated on this study _______________     1. Findings of congestive heart failure with cardiomegaly, interstitial edema, very small bilateral pleural effusions, and diffuse anasarca. 2.  Bladder wall thickening may be due to underdistention though superimposed infection cannot be excluded. Recommend correlation for cystitis. 3.  Osseous degenerative changes and additional findings, as detailed in the body of the report. XR CHEST PORT    Result Date: 3/6/2022  EXAM: PORTABLE CHEST HISTORY: Shortness of breath. COMPARISON: 3/2/2022 TECHNIQUE: Single portable view. _______________ FINDINGS: SUPPORT DEVICES: None HEART AND MEDIASTINUM: Moderate cardiomegaly. LUNGS AND PLEURAL SPACES: Subsegmental atelectasis left base. Possible small effusions. BONES AND SOFT TISSUES: Dextroscoliosis. _______________     Subsegmental atelectasis left base. Possible small effusions. Moderate cardiomegaly without change.     XR CHEST PORT    Result Date: 3/2/2022  EXAM:  AP Portable Chest X-ray 1 view INDICATION: Shortness of breath COMPARISON: April 23, 2021 _______________ FINDINGS: Cardiomegaly and mediastinal contours are within normal limits for portable radiograph. There is increased right retrocardiac/right paraspinal density. There are no pleural effusions. No acute osseous findings. ________________      Increased right retrocardiac density which may represent airspace disease or underlying pulmonary nodule. Recommend CT chest for further evaluation. Hip x-ray 3/11/2022:  Status post total right hip arthroplasty, without complication. ECHO ADULT COMPLETE  Result Date: 3/3/2022    Left Ventricle: Left ventricle size is normal. Moderately increased wall thickness. Findings consistent with moderate concentric hypertrophy. Normal wall motion. Normal left ventricular systolic function with a visually estimated EF of 55 - 60%.   Left Atrium: Left atrium is mildly dilated.   Right Ventricle: Right ventricle is mildly dilated.   Right Atrium: Right atrium is mildly dilated.   Pulmonary artery :  Estimated pulmonary arterial systolic pressure is 51 mmhg. Pulmonary hypertension found to be moderate   Mitral Valve: Moderately thickened leaflet. Mildly calcified leaflet. Moderate annular calcification of the mitral valve.   IVC/SVC : IVC diameter is greater than 21 mm and decreases less than 50% during inspiration; therefore the estimated right atrial pressure is elevated (~15 mmHg). IVC is dilated. Consider LORRAINE for better evaluation of mitral valve if clinically indicated. DUPLEX LOWER EXT VENOUS BILAT    Result Date: 3/4/2022  · No evidence of deep vein thrombosis in the right lower extremity. · No evidence of deep vein thrombosis in the left lower extremity. ECHO 3/3/2022: Result status: Final result       Left Ventricle: Left ventricle size is normal. Moderately increased wall thickness. Findings consistent with moderate concentric hypertrophy. Normal wall motion. Normal left ventricular systolic function with a visually estimated EF of 55 - 60%.   Left Atrium: Left atrium is mildly dilated.    Right Ventricle: Right ventricle is mildly dilated.   Right Atrium: Right atrium is mildly dilated.   Pulmonary artery :  Estimated pulmonary arterial systolic pressure is 51 mmhg. Pulmonary hypertension found to be moderate    Mitral Valve: Moderately thickened leaflet. Mildly calcified leaflet. Moderate annular calcification of the mitral valve.   IVC/SVC : IVC diameter is greater than 21 mm and decreases less than 50% during inspiration; therefore the estimated right atrial pressure is elevated (~15 mmHg). IVC is dilated.     Consider LORRAINE for better evaluation of mitral valve if clinically indicated. Images report reviewed by me:  CT (Most Recent) (CT chest reviewed by me) Results from Hospital Encounter encounter on 03/02/22    CT HEAD WO CONT    Narrative  EXAM: CT head    INDICATION: Altered mental status. COMPARISON: 4/23/2021. TECHNIQUE: Axial CT imaging of the head was performed without intravenous  contrast. Standard multiplanar coronal and sagittal reformatted images were  obtained and are included in interpretation. One or more dose reduction techniques were used on this CT: automated exposure  control, adjustment of the mAs and/or kVp according to patient size, and  iterative reconstruction techniques. The specific techniques used on this CT  exam have been documented in the patient's electronic medical record. Digital  Imaging and Communications in Medicine (DICOM) format image data are available  to nonaffiliated external healthcare facilities or entities on a secure, media  free, reciprocally searchable basis with patient authorization for at least a  12-month period after this study. _______________    FINDINGS:    BRAIN AND POSTERIOR FOSSA: Periventricular white matter hypoattenuation which is  nonspecific but likely represents chronic ischemic changes. No evidence of  acute large vessel transcortical infarct or acute parenchymal hemorrhage.  No  midline shift or hydrocephalus. EXTRA-AXIAL SPACES AND MENINGES: There are no abnormal extra-axial fluid  collections. CALVARIUM: Intact. SINUSES: Clear. OTHER: None.    _______________    Impression  No acute intracranial abnormality. CXR reviewed by me:  XR (Most Recent). CXR  reviewed by me and compared with previous CXR Results from Hospital Encounter encounter on 03/02/22    XR HIP RT W OR WO PELV  1 VW    Narrative  EXAM: Right hip, single view  COMPARISON: None. INDICATION: Right hip pain, status post right hip arthroplasty. _______________  FINDINGS: Single portable AP view of the right hip was obtained. Surgical  changes and hardware of total right hip arthroplasty. No lucency adjacent to the  hardware or other findings to suggest complication. No acute fracture. Expected  adjacent soft tissue swelling and emphysema.  _______________    Impression  Status post total right hip arthroplasty, without complication. CXR 3/11/2022  Comparison : 03/10/22      FINDINGS:     HEART AND MEDIASTINUM: Enlarged cardiopericardial silhouette. Aortic vascular  calcification. Endotracheal tube tip estimated at 3.5 cm above bella.     LUNGS AND PLEURAL SPACES: No consolidation, congestive heart failure, pleural  effusion or pneumothorax.     BONY THORAX AND SOFT TISSUES: Degenerative scoliosis.     IMPRESSION     No plain film evidence of acute cardiopulmonary disease, allowing for technique. ·Please note: Voice-recognition software may have been used to generate this report, which may have resulted in some phonetic-based errors in grammar and contents. Even though attempts were made to correct all the mistakes, some may have been missed, and remained in the body of the document.       Holly Copeland MD  3/11/2022

## 2022-03-11 NOTE — PROGRESS NOTES
Physician Progress Note      PATIENT:               Ana Prieto  CSN #:                  [id number]  :                       1940  ADMIT DATE:       3/2/2022 8:02 PM  [address] [place] DATE:  RESPONDING  PROVIDER #:        Belen CELAYA MD          QUERY TEXT:    Pt admitted with  SOB / CHF. Pt noted to have Femoral Neck Fracture  . Documented in [facility] [address] progress note patient states \" that the last few days she is not been able to move or lift her right leg\". Cardiology documents on 3/3 progress note , not on any anticoagulation due to recurrent fall,   If possible, please document in progress notes and discharge summary the present on admission status of  POA :    The medical record reflects the following:  Risk Factors: falls  Clinical Indicators: Recurrent falls-  unable to move rt leg Hip xray: Displaced and impacted subcapital right femoral neck fracture, increased in  displacement. Treatment: S/P   RT THR    Thank You  Marshal Kevin RN CDI CRCR  Options provided:  -- Yes, Right hip fracture  was present at the time of the order to admit to the hospital  -- No, Right hip fracture  was not present on admission and developed during the inpatient stay  -- Other - I will add my own diagnosis  -- Disagree - Not applicable / Not valid  -- Disagree - Clinically unable to determine / Unknown  -- Refer to Clinical Documentation Reviewer    PROVIDER RESPONSE TEXT:    Provider is clinically unable to determine a response to this query.     Query created by: Marlena Rudolph on 3/10/2022 4:28 PM      Electronically signed by:  Belen Correia MD 3/11/2022 5:17 PM

## 2022-03-11 NOTE — PROGRESS NOTES
Attempt for OT eval post procedure. Pt continues to be sedated and intubated at this time. Will f/u tomorrow.      Desi Saavedra, OTR/L

## 2022-03-11 NOTE — PROGRESS NOTES
PT orders received and chart was reviewed, per nursing patient is not stable for evaluation at this time. Will continue to follow.   Kristy Reynoso, PT

## 2022-03-11 NOTE — PROGRESS NOTES
Nutrition Note    Nutrition Recommendations/Plan:  Will place tube feeding order per recommendation below. Initiate within 24-48 hours to avoid prolong NPO status. 03/11/22 1354   Enteral Nutrition   Feeding Route Orogastric   EN Formula Renal  (Nepro)   Schedule Continuous   Feeding Regimen Goal: nepro @ 30ml/hr + 4 prosource daily. Initiate @ 10ml/hr. Increase by 10ml Q8 until until goal rate. Additives/Modulars Protein  (4 prosource (60g PRO 240kcal))   Water Flushes 150ml Q6   Goal EN & Flush Order Provides Nepro @ 30ml/hr + 4 prosource daily will provide:     1428kcal, 113g PRO, 110g CHO, and 479ml free water + 600ml flushes (1079ml)     Pt with CKD 3. Pt is s/p right total hip arthroplasty on 3/10. Noted pt remained on vent and was moved to ICU. Noted SBT this AM and then went into apnea. Per MD, off propofol drip since this morning. Spoke with pt nurse- plan to go to CT then place OG tube and plan to start tube feeding. Made aware will place order for recc for tube feeding.      Estimated Daily Nutrient Needs: Current weight 111.1kg  Energy (kcal): 5359-7858; Weight Used for Energy Requirements: Current (11-14kcals/kg)  Protein (g): ; Weight Used for Protein Requirements: Ideal (2-2.5g/kg of 57kg)  Fluid (ml/day): 8431-4058; Method Used for Fluid Requirements: 1 ml/kcal    Electronically signed by Dallin Pulido RD on 3/11/2022 at 2:01 PM

## 2022-03-12 ENCOUNTER — APPOINTMENT (OUTPATIENT)
Dept: ULTRASOUND IMAGING | Age: 82
DRG: 981 | End: 2022-03-12
Attending: STUDENT IN AN ORGANIZED HEALTH CARE EDUCATION/TRAINING PROGRAM
Payer: MEDICARE

## 2022-03-12 ENCOUNTER — APPOINTMENT (OUTPATIENT)
Dept: GENERAL RADIOLOGY | Age: 82
DRG: 981 | End: 2022-03-12
Attending: INTERNAL MEDICINE
Payer: MEDICARE

## 2022-03-12 LAB
ALBUMIN SERPL-MCNC: 2.2 G/DL (ref 3.4–5)
ALBUMIN/GLOB SERPL: 0.7 {RATIO} (ref 0.8–1.7)
ALP SERPL-CCNC: 94 U/L (ref 45–117)
ALT SERPL-CCNC: 30 U/L (ref 13–56)
ANION GAP SERPL CALC-SCNC: 3 MMOL/L (ref 3–18)
ARTERIAL PATENCY WRIST A: POSITIVE
AST SERPL-CCNC: 33 U/L (ref 10–38)
BASE EXCESS BLD CALC-SCNC: 17.4 MMOL/L
BASOPHILS # BLD: 0 K/UL (ref 0–0.1)
BASOPHILS NFR BLD: 0 % (ref 0–2)
BDY SITE: ABNORMAL
BILIRUB SERPL-MCNC: 1 MG/DL (ref 0.2–1)
BUN SERPL-MCNC: 97 MG/DL (ref 7–18)
BUN/CREAT SERPL: 47 (ref 12–20)
CALCIUM SERPL-MCNC: 10.4 MG/DL (ref 8.5–10.1)
CHLORIDE SERPL-SCNC: 105 MMOL/L (ref 100–111)
CO2 SERPL-SCNC: 38 MMOL/L (ref 21–32)
CREAT SERPL-MCNC: 2.05 MG/DL (ref 0.6–1.3)
DIFFERENTIAL METHOD BLD: ABNORMAL
EOSINOPHIL # BLD: 0 K/UL (ref 0–0.4)
EOSINOPHIL NFR BLD: 0 % (ref 0–5)
ERYTHROCYTE [DISTWIDTH] IN BLOOD BY AUTOMATED COUNT: 15.1 % (ref 11.6–14.5)
GAS FLOW.O2 O2 DELIVERY SYS: ABNORMAL L/MIN
GAS FLOW.O2 SETTING OXYMISER: 12 BPM
GLOBULIN SER CALC-MCNC: 3.3 G/DL (ref 2–4)
GLUCOSE SERPL-MCNC: 109 MG/DL (ref 74–99)
HCO3 BLD-SCNC: 41.5 MMOL/L (ref 22–26)
HCT VFR BLD AUTO: 29.1 % (ref 35–45)
HGB BLD-MCNC: 9.5 G/DL (ref 12–16)
IMM GRANULOCYTES # BLD AUTO: 0.1 K/UL (ref 0–0.04)
IMM GRANULOCYTES NFR BLD AUTO: 1 % (ref 0–0.5)
LYMPHOCYTES # BLD: 1.4 K/UL (ref 0.9–3.6)
LYMPHOCYTES NFR BLD: 14 % (ref 21–52)
MAGNESIUM SERPL-MCNC: 2 MG/DL (ref 1.6–2.6)
MCH RBC QN AUTO: 32.1 PG (ref 24–34)
MCHC RBC AUTO-ENTMCNC: 32.6 G/DL (ref 31–37)
MCV RBC AUTO: 98.3 FL (ref 78–100)
MONOCYTES # BLD: 1 K/UL (ref 0.05–1.2)
MONOCYTES NFR BLD: 11 % (ref 3–10)
NEUTS SEG # BLD: 7.1 K/UL (ref 1.8–8)
NEUTS SEG NFR BLD: 74 % (ref 40–73)
NRBC # BLD: 0 K/UL (ref 0–0.01)
NRBC BLD-RTO: 0 PER 100 WBC
O2/TOTAL GAS SETTING VFR VENT: 40 %
PCO2 BLD: 46.2 MMHG (ref 35–45)
PEEP RESPIRATORY: 5 CMH2O
PH BLD: 7.56 [PH] (ref 7.35–7.45)
PLATELET # BLD AUTO: 203 K/UL (ref 135–420)
PMV BLD AUTO: 11.1 FL (ref 9.2–11.8)
PO2 BLD: 75 MMHG (ref 80–100)
POTASSIUM SERPL-SCNC: 4.1 MMOL/L (ref 3.5–5.5)
PROT SERPL-MCNC: 5.5 G/DL (ref 6.4–8.2)
RBC # BLD AUTO: 2.96 M/UL (ref 4.2–5.3)
SAO2 % BLD: 96.4 % (ref 92–97)
SERVICE CMNT-IMP: ABNORMAL
SODIUM SERPL-SCNC: 146 MMOL/L (ref 136–145)
SPECIMEN TYPE: ABNORMAL
VENTILATION MODE VENT: ABNORMAL
VT SETTING VENT: 400 ML
WBC # BLD AUTO: 9.6 K/UL (ref 4.6–13.2)

## 2022-03-12 PROCEDURE — 85025 COMPLETE CBC W/AUTO DIFF WBC: CPT

## 2022-03-12 PROCEDURE — 76770 US EXAM ABDO BACK WALL COMP: CPT

## 2022-03-12 PROCEDURE — 74011250637 HC RX REV CODE- 250/637: Performed by: PHYSICIAN ASSISTANT

## 2022-03-12 PROCEDURE — 36600 WITHDRAWAL OF ARTERIAL BLOOD: CPT

## 2022-03-12 PROCEDURE — 99232 SBSQ HOSP IP/OBS MODERATE 35: CPT | Performed by: INTERNAL MEDICINE

## 2022-03-12 PROCEDURE — 36415 COLL VENOUS BLD VENIPUNCTURE: CPT

## 2022-03-12 PROCEDURE — 71045 X-RAY EXAM CHEST 1 VIEW: CPT

## 2022-03-12 PROCEDURE — P9047 ALBUMIN (HUMAN), 25%, 50ML: HCPCS | Performed by: FAMILY MEDICINE

## 2022-03-12 PROCEDURE — 80053 COMPREHEN METABOLIC PANEL: CPT

## 2022-03-12 PROCEDURE — 74011250636 HC RX REV CODE- 250/636: Performed by: INTERNAL MEDICINE

## 2022-03-12 PROCEDURE — 74011000250 HC RX REV CODE- 250: Performed by: INTERNAL MEDICINE

## 2022-03-12 PROCEDURE — 74011250637 HC RX REV CODE- 250/637: Performed by: INTERNAL MEDICINE

## 2022-03-12 PROCEDURE — 94003 VENT MGMT INPAT SUBQ DAY: CPT

## 2022-03-12 PROCEDURE — 74011000250 HC RX REV CODE- 250: Performed by: STUDENT IN AN ORGANIZED HEALTH CARE EDUCATION/TRAINING PROGRAM

## 2022-03-12 PROCEDURE — 74011250636 HC RX REV CODE- 250/636: Performed by: FAMILY MEDICINE

## 2022-03-12 PROCEDURE — 83735 ASSAY OF MAGNESIUM: CPT

## 2022-03-12 PROCEDURE — 82803 BLOOD GASES ANY COMBINATION: CPT

## 2022-03-12 PROCEDURE — 77010033678 HC OXYGEN DAILY

## 2022-03-12 PROCEDURE — 65610000006 HC RM INTENSIVE CARE

## 2022-03-12 RX ORDER — ALBUMIN HUMAN 250 G/1000ML
25 SOLUTION INTRAVENOUS EVERY 6 HOURS
Status: COMPLETED | OUTPATIENT
Start: 2022-03-12 | End: 2022-03-13

## 2022-03-12 RX ORDER — DEXTROSE MONOHYDRATE AND SODIUM CHLORIDE 5; .45 G/100ML; G/100ML
25 INJECTION, SOLUTION INTRAVENOUS CONTINUOUS
Status: DISCONTINUED | OUTPATIENT
Start: 2022-03-12 | End: 2022-03-15

## 2022-03-12 RX ORDER — FAMOTIDINE 10 MG/ML
20 INJECTION INTRAVENOUS DAILY
Status: DISCONTINUED | OUTPATIENT
Start: 2022-03-13 | End: 2022-03-14 | Stop reason: SDUPTHER

## 2022-03-12 RX ADMIN — FERROUS SULFATE TAB 325 MG (65 MG ELEMENTAL FE) 325 MG: 325 (65 FE) TAB at 21:26

## 2022-03-12 RX ADMIN — FERROUS SULFATE TAB 325 MG (65 MG ELEMENTAL FE) 325 MG: 325 (65 FE) TAB at 08:38

## 2022-03-12 RX ADMIN — ALBUMIN (HUMAN) 25 G: 0.25 INJECTION, SOLUTION INTRAVENOUS at 17:06

## 2022-03-12 RX ADMIN — NYSTATIN 500000 UNITS: 100000 SUSPENSION ORAL at 17:06

## 2022-03-12 RX ADMIN — FERROUS SULFATE TAB 325 MG (65 MG ELEMENTAL FE) 325 MG: 325 (65 FE) TAB at 17:06

## 2022-03-12 RX ADMIN — ALBUMIN (HUMAN) 25 G: 0.25 INJECTION, SOLUTION INTRAVENOUS at 05:32

## 2022-03-12 RX ADMIN — ACETAMINOPHEN 650 MG: 325 TABLET ORAL at 00:00

## 2022-03-12 RX ADMIN — DEXTROSE AND SODIUM CHLORIDE 50 ML/HR: 5; 450 INJECTION, SOLUTION INTRAVENOUS at 10:11

## 2022-03-12 RX ADMIN — SODIUM CHLORIDE, PRESERVATIVE FREE 10 ML: 5 INJECTION INTRAVENOUS at 05:33

## 2022-03-12 RX ADMIN — ACETAMINOPHEN 650 MG: 325 TABLET ORAL at 12:18

## 2022-03-12 RX ADMIN — FAMOTIDINE 20 MG: 20 TABLET ORAL at 08:38

## 2022-03-12 RX ADMIN — CHLORHEXIDINE GLUCONATE 10 ML: 1.2 RINSE ORAL at 21:26

## 2022-03-12 RX ADMIN — NYSTATIN 500000 UNITS: 100000 SUSPENSION ORAL at 12:18

## 2022-03-12 RX ADMIN — CARVEDILOL 3.12 MG: 3.12 TABLET, FILM COATED ORAL at 17:06

## 2022-03-12 RX ADMIN — SODIUM CHLORIDE, PRESERVATIVE FREE 10 ML: 5 INJECTION INTRAVENOUS at 21:25

## 2022-03-12 RX ADMIN — ACETAMINOPHEN 650 MG: 325 TABLET ORAL at 17:06

## 2022-03-12 RX ADMIN — WATER 250 MG: 1 INJECTION INTRAMUSCULAR; INTRAVENOUS; SUBCUTANEOUS at 09:52

## 2022-03-12 RX ADMIN — ACETAMINOPHEN 650 MG: 325 TABLET ORAL at 05:33

## 2022-03-12 RX ADMIN — NYSTATIN 500000 UNITS: 100000 SUSPENSION ORAL at 08:38

## 2022-03-12 RX ADMIN — SODIUM CHLORIDE, PRESERVATIVE FREE 10 ML: 5 INJECTION INTRAVENOUS at 15:05

## 2022-03-12 RX ADMIN — WATER 250 MG: 1 INJECTION INTRAMUSCULAR; INTRAVENOUS; SUBCUTANEOUS at 21:26

## 2022-03-12 RX ADMIN — CHLORHEXIDINE GLUCONATE 10 ML: 1.2 RINSE ORAL at 08:38

## 2022-03-12 RX ADMIN — APIXABAN 2.5 MG: 2.5 TABLET, FILM COATED ORAL at 21:26

## 2022-03-12 RX ADMIN — NYSTATIN 500000 UNITS: 100000 SUSPENSION ORAL at 21:26

## 2022-03-12 RX ADMIN — APIXABAN 2.5 MG: 2.5 TABLET, FILM COATED ORAL at 08:39

## 2022-03-12 RX ADMIN — CARVEDILOL 3.12 MG: 3.12 TABLET, FILM COATED ORAL at 08:39

## 2022-03-12 RX ADMIN — ALBUMIN (HUMAN) 25 G: 0.25 INJECTION, SOLUTION INTRAVENOUS at 12:18

## 2022-03-12 NOTE — PROGRESS NOTES
Cardiology Progress Note      3/12/2022 12:45 PM    Admit Date: 3/2/2022    Admit Diagnosis: Acute exacerbation of CHF (congestive heart failure) ([facility] [address] [I50.9]      Subjective:     Jessy Alves has Multiple problems and issues. .    Visit Vitals  BP (!) 93/50   Pulse 81   Temp 98.8 °F (37.1 °C)   Resp 12   Ht 5' 5\" (1.651 m)   Wt 111.1 kg (244 lb 14.9 oz)   SpO2 100%   Breastfeeding No   BMI 40.76 kg/m²     Current Facility-Administered Medications   Medication Dose Route Frequency    albumin human 25% (BUMINATE) solution 25 g  25 g IntraVENous Q6H    acetaZOLAMIDE (DIAMOX) 250 mg in sterile water (preservative free) 2.5 mL injection  250 mg IntraVENous Q12H    dextrose 5 % - 0.45% NaCl infusion  50 mL/hr IntraVENous CONTINUOUS    sodium chloride (NS) flush 5-40 mL  5-40 mL IntraVENous PRN    acetaminophen (TYLENOL) tablet 650 mg  650 mg Oral Q6H    naloxone (NARCAN) injection 0.4 mg  0.4 mg IntraVENous PRN    ferrous sulfate tablet 325 mg  1 Tablet Oral TID    diphenhydrAMINE (BENADRYL) capsule 25 mg  25 mg Oral Q4H PRN    bisacodyL (DULCOLAX) suppository 10 mg  10 mg Rectal DAILY PRN    ELECTROLYTE REPLACEMENT PROTOCOL - Magnesium   1 Each Other PRN    ELECTROLYTE REPLACEMENT PROTOCOL - Calcium   1 Each Other PRN    ELECTROLYTE REPLACEMENT PROTOCOL  - Phosphorus Renal Dosing  1 Each Other PRN    ELECTROLYTE REPLACEMENT PROTOCOL - Potassium Renal Dosing  1 Each Other PRN    famotidine (PEPCID) tablet 20 mg  20 mg Oral DAILY    midazolam (VERSED) injection 1 mg  1 mg IntraVENous Q2H PRN    fentaNYL citrate (PF) injection 25 mcg  25 mcg IntraVENous Q4H PRN    chlorhexidine (PERIDEX) 0.12 % mouthwash 10 mL  10 mL Oral Q12H    nystatin (MYCOSTATIN) 100,000 unit/mL oral suspension 500,000 Units  500,000 Units Oral QID    apixaban (ELIQUIS) tablet 2.5 mg  2.5 mg Oral BID    sodium chloride (NS) flush 5-40 mL  5-40 mL IntraVENous Q8H    sodium chloride (NS) flush 5-40 mL  5-40 mL IntraVENous PRN    acetaminophen (TYLENOL) tablet 650 mg  650 mg Oral Q6H PRN    Or    acetaminophen (TYLENOL) suppository 650 mg  650 mg Rectal Q6H PRN    polyethylene glycol (MIRALAX) packet 17 g  17 g Oral DAILY PRN    ondansetron (ZOFRAN ODT) tablet 4 mg  4 mg Oral Q8H PRN    Or    ondansetron (ZOFRAN) injection 4 mg  4 mg IntraVENous Q6H PRN    carvediloL (COREG) tablet 3.125 mg  3.125 mg Oral BID WITH MEALS    aspirin delayed-release tablet 81 mg  81 mg Oral DAILY         Objective:      Physical Exam:  General Appearance: Comfortable, intubated  NECK: No JVD, no thyroidomeglay. LUNGS: Clear bilaterally. HEART: S1 irregular +S2 audible,                   ABD: Non-tender, BS Audible                          EXT: No edema, and no cysnosis. VASCULAR EXAM: Pulses are intact. PSYCHIATRIC EXAM: Mood is appropriate    Data Review:   Labs:    Recent Results (from the past 24 hour(s))   MAGNESIUM    Collection Time: 03/12/22  5:06 AM   Result Value Ref Range    Magnesium 2.0 1.6 - 2.6 mg/dL   METABOLIC PANEL, COMPREHENSIVE    Collection Time: 03/12/22  5:06 AM   Result Value Ref Range    Sodium 146 (H) 136 - 145 mmol/L    Potassium 4.1 3.5 - 5.5 mmol/L    Chloride 105 100 - 111 mmol/L    CO2 38 (H) 21 - 32 mmol/L    Anion gap 3 3.0 - 18 mmol/L    Glucose 109 (H) 74 - 99 mg/dL    BUN 97 (H) 7.0 - 18 MG/DL    Creatinine 2.05 (H) 0.6 - 1.3 MG/DL    BUN/Creatinine ratio 47 (H) 12 - 20      GFR est AA 28 (L) >60 ml/min/1.73m2    GFR est non-AA 23 (L) >60 ml/min/1.73m2    Calcium 10.4 (H) 8.5 - 10.1 MG/DL    Bilirubin, total 1.0 0.2 - 1.0 MG/DL    ALT (SGPT) 30 13 - 56 U/L    AST (SGOT) 33 10 - 38 U/L    Alk.  phosphatase 94 45 - 117 U/L    Protein, total 5.5 (L) 6.4 - 8.2 g/dL    Albumin 2.2 (L) 3.4 - 5.0 g/dL    Globulin 3.3 2.0 - 4.0 g/dL    A-G Ratio 0.7 (L) 0.8 - 1.7     CBC WITH AUTOMATED DIFF    Collection Time: 03/12/22  5:06 AM   Result Value Ref Range    WBC 9.6 4.6 - 13.2 K/uL    RBC 2.96 (L) 4.20 - 5.30 M/uL    HGB 9.5 (L) 12.0 - 16.0 g/dL    HCT 29.1 (L) 35.0 - 45.0 %    MCV 98.3 78.0 - 100.0 FL    MCH 32.1 24.0 - 34.0 PG    MCHC 32.6 31.0 - 37.0 g/dL    RDW 15.1 (H) 11.6 - 14.5 %    PLATELET 380 468 - 719 K/uL    MPV 11.1 9.2 - 11.8 FL    NRBC 0.0 0  WBC    ABSOLUTE NRBC 0.00 0.00 - 0.01 K/uL    NEUTROPHILS 74 (H) 40 - 73 %    LYMPHOCYTES 14 (L) 21 - 52 %    MONOCYTES 11 (H) 3 - 10 %    EOSINOPHILS 0 0 - 5 %    BASOPHILS 0 0 - 2 %    IMMATURE GRANULOCYTES 1 (H) 0.0 - 0.5 %    ABS. NEUTROPHILS 7.1 1.8 - 8.0 K/UL    ABS. LYMPHOCYTES 1.4 0.9 - 3.6 K/UL    ABS. MONOCYTES 1.0 0.05 - 1.2 K/UL    ABS. EOSINOPHILS 0.0 0.0 - 0.4 K/UL    ABS. BASOPHILS 0.0 0.0 - 0.1 K/UL    ABS. IMM. GRANS. 0.1 (H) 0.00 - 0.04 K/UL    DF AUTOMATED     BLOOD GAS, ARTERIAL POC    Collection Time: 03/12/22  5:13 AM   Result Value Ref Range    Device: ADULT VENT      FIO2 (POC) 40 %    pH (POC) 7.56 (HH) 7.35 - 7.45      pCO2 (POC) 46.2 (H) 35.0 - 45.0 MMHG    pO2 (POC) 75 (L) 80 - 100 MMHG    HCO3 (POC) 41.5 (H) 22 - 26 MMOL/L    sO2 (POC) 96.4 92 - 97 %    Base excess (POC) 17.4 mmol/L    Mode ASSIST CONTROL      Tidal volume 400 ml    Set Rate 12 bpm    PEEP/CPAP (POC) 5 cmH2O    Allens test (POC) Positive      Site LEFT RADIAL      Specimen type (POC) ARTERIAL      Performed by Parker Madison        Telemetry: Sinus      Assessment:     Principal Problem:    Acute respiratory failure with hypoxemia (HCC) (3/2/2022)    Active Problems:    Elevated troponin (4/23/2021)      HTN (hypertension) (4/23/2021)      Acute on chronic diastolic congestive heart failure (HCC) (4/23/2021)      Atrial fibrillation ([address] (3/2/2022)      Stage 3 chronic kidney disease ([address] (3/3/2022)      SERENA (obstructive sleep apnea) (3/3/2022)      Adult BMI 39.0-39.9 kg/sq m (3/3/2022)      Fracture of neck of right femur ([facility] [address] (3/8/2022)        Plan:     Patient remains intubated. Currently she is hemodynamically stable.   A. fib rate is controlled. Continue with current medical regimen.       Laura Live MD

## 2022-03-12 NOTE — PROGRESS NOTES
1930- Report and care received, assessment completed per flow sheet. Intubated, opens eyes spontaneously but does not make eye contact or follow commands. Moves extremities spontaneously. ETT secured. Currently calm. Right hip suction dressing intact. 0000- Bath and linen change completed, reassessment without change. 0300- Reassessment without change. Gino-  updated regarding hypotension.

## 2022-03-12 NOTE — PROGRESS NOTES
Pt did squeeze my hands when I asked. Pt did not open their eye. Pt is not on sedation. SBT was tired and pt went into apnea immediately. No distress noted. Vent check done, pt placed back on AC/VC+. No changes made at this time. BBS are clear and diminished. ett is secured.  7.0 [email or web address]

## 2022-03-12 NOTE — PROGRESS NOTES
hypotension reported, pt sustaining sbp in the 80s   Will try trial of albumin to see if improvement in bP

## 2022-03-12 NOTE — PROGRESS NOTES
Hospitalist Progress Note-critical care note     Patient: Clau French MRN: [id number]  CSN: [id number]    YOB: 1940  Age: 80 y.o. Sex: female    DOA: 3/2/2022 LOS:  LOS: 10 days            Chief complaint: hip fracture m ckd3, afib chf ,     Assessment/Plan   ITNUBATED   FOLLOWS COMMNds failed sbt      Hospital Problems  Date Reviewed: 3/9/2022          Codes Class Noted POA    Fracture of neck of right femur ([facility] [address] ICD-10-CM: S72.001A  ICD-9-CM: 820.8  3/8/2022 Unknown        Stage 3 chronic kidney disease ([facility] [address] ICD-10-CM: N18.30  ICD-9-CM: 585.3  3/3/2022 Unknown        SERENA (obstructive sleep apnea) ICD-10-CM: G47.33  ICD-9-CM: 327.23  3/3/2022 Unknown        Adult BMI 39.0-39.9 kg/sq m ICD-10-CM: Z68.39  ICD-9-CM: V85.39  3/3/2022 Unknown        * (Principal) Acute respiratory failure with hypoxemia (HCC) ICD-10-CM: J96.01  ICD-9-CM: 518.81  3/2/2022 Unknown        Atrial fibrillation (HCC) ICD-10-CM: I48.91  ICD-9-CM: 427.31  3/2/2022 Unknown        Elevated troponin ICD-10-CM: R77.8  ICD-9-CM: 790.6  4/23/2021 Yes        HTN (hypertension) ICD-10-CM: I10  ICD-9-CM: 401.9  4/23/2021 Yes        Acute on chronic diastolic congestive heart failure (HCC) ICD-10-CM: I50.33  ICD-9-CM: 428.33, 428.0  4/23/2021 Yes             pt was admitted for afib and chf, found rt hip fracture. She has serena on cpap at night, she received rt hip replacement, she was noted decreased tide volume and hypoxia while extubation post procedure, intubated with oxygen 65%, peep 6.      Respiratory   Acute on chronic respiratory failure with hypoxia and hypercapnia   Will continue vent , was on Fio2 45 %    Continue wean per protocol   V/q scan :Perfusion images show no segmental perfusion defects to suggest the presence of  pulmonary embolism, pvl negative for dvt  on 3/3  Now on Demadex to help metabolic alk  Failed sbt    Pulmonary hypertension   Echo on 3/3 pulmonary arterial systolic pressure is 51 mmhg  LORRAINE is deferred Has Mitral stenosis      cv   Congestive heart failure , acute on chronic diastolic   Echo done Mitral stenosis,  Pulmonary hypertension found to be moderate-LORRAINE is deferred  Dr. Gracie Wick and On lasix      Htn : on coreg   afib new   On eliquis before , hold for surgery , on coreg    restart eliquis 3/11    Elevated trop   No acs cardiologist on board      ID   UTI  Urine pos for klebsiella  Completed IV Rocephin     msk :  Post surgery day 1: Right press fit direct anterior total hip arthroplasty   Continue post surgery care        Renal   ckd3 -mild worsening cr   Continue watch for renal function ,watch for renal function and urine-out-  appreciae renal input now on d 5 for hypernatremiat     Heme   Anemia   Continue monitoring h/h     FEN  icu electrolytes replacement protocol       Neuro   Ct head on 3/7 no acute issue, on propofol     rn : , not follow commands SBT failed hypotension overnight   30 minutes of critical care time spent in the direct evaluation and treatment of this high risk patient. The reason for providing this level of medical care for this critically ill patient was due a critical illness that impaired one or more vital organ systems such that there was a high probability of imminent or life threatening deterioration in the patients condition. This care involved high complexity decision making to assess, manipulate, and support vital system functions, to treat this degreee vital organ system failure and to prevent further life threatening deterioration of the patients condition.     Palliative care team f/u   Disposition :tbd,   Review of systems:  Limited due to on vent   Vital signs/Intake and Output:  Visit Vitals  BP (!) 93/50   Pulse 81   Temp 98.8 °F (37.1 °C)   Resp 12   Ht 5' 5\" (1.651 m)   Wt 111.1 kg (244 lb 14.9 oz)   SpO2 100%   Breastfeeding No   BMI 40.76 kg/m²     Current Shift:  03/12 0701 - 03/12 1900  In: 150   Out: 25 [Urine:25]  Last three shifts:  03/10 1901 - 03/12 0700  In: 1393.2 [I.V.:1043.2]  Out: 1380 [Urine:1380]    Physical Exam:  General: Intubated ,  HEENT: NC, Atraumatic. anicteric sclerae. ET tube noted   Lungs: CTA Bilaterally. No Wheezing/Rhonchi/Rales. Heart:  Regular  rhythm,  No murmur, No Rubs, No Gallops  Abdomen: Soft, Non distended, Non tender. +Bowel sounds,   Extremities: No c/c, rt hip surgical site covered with gauze and wound vac noted   Psych:   Not anxious or agitated. Neurologic:  Intubated off propofol  respond to pain stimuli           Labs: Results:       Chemistry Recent Labs     03/12/22  0506 03/11/22  0310 03/10/22  0140   * 94 115*   * 145 145   K 4.1 5.0 4.5    103 103   CO2 38* 36* 37*   BUN 97* 74* 65*   CREA 2.05* 1.58* 1.35*   CA 10.4* 10.5* 10.6*   AGAP 3 6 5   BUCR 47* 47* 48*   AP 94 103 113   TP 5.5* 6.0* 6.4   ALB 2.2* 2.8* 3.1*   GLOB 3.3 3.2 3.3   AGRAT 0.7* 0.9 0.9      CBC w/Diff Recent Labs     03/12/22  0506 03/11/22  0310 03/10/22  0140   WBC 9.6 10.7 7.9   RBC 2.96* 4.11* 3.67*   HGB 9.5* 12.9 11.8*   HCT 29.1* 41.1 38.2    205 199   GRANS 74* 71 74*   LYMPH 14* 12* 10*   EOS 0 0 1      Cardiac Enzymes No results for input(s): CPK, CKND1, FARA in the last 72 hours. No lab exists for component: CKRMB, TROIP   Coagulation No results for input(s): PTP, INR, APTT, INREXT, INREXT in the last 72 hours. Lipid Panel Lab Results   Component Value Date/Time    Cholesterol, total 183 04/25/2021 04:00 PM    HDL Cholesterol 101 (H) 04/25/2021 04:00 PM    LDL, calculated 69.8 04/25/2021 04:00 PM    VLDL, calculated 12.2 04/25/2021 04:00 PM    Triglyceride 61 04/25/2021 04:00 PM    CHOL/HDL Ratio 1.8 04/25/2021 04:00 PM      BNP No results for input(s): BNPP in the last 72 hours.    Liver Enzymes Recent Labs     03/12/22  0506   TP 5.5*   ALB 2.2*   AP 94      Thyroid Studies Lab Results   Component Value Date/Time    TSH 0.58 03/03/2022 02:21 AM        Procedures/imaging: see electronic medical records for all procedures/Xrays and details which were not copied into this note but were reviewed prior to creation of Plan    NM LUNG SCAN PERF    Result Date: 3/3/2022  EXAM: NM LUNG SCAN PERF. CLINICAL INDICATION/HISTORY: d dimer increase dyspnea. -Additional: Clinical concern for pulmonary embolus. COMPARISON: Correlation is made with the radiographic study performed one day prior. TECHNIQUE: 7.2 mCi Tc-99m MAA was injected intravenously and the 8 standard views of the chest were obtained. This perfusion only examination is interpreted using the PISAPED criteria. _______________ FINDINGS: Perfusion images show no segmental perfusion defects to suggest the presence of pulmonary embolism. _______________     o scintigraphic findings to suggest the presence of acute pulmonary embolism. _______________     XR HIP RT W OR WO PELV 2-3 VWS    Result Date: 3/7/2022  EXAM: RIGHT HIP RADIOGRAPHS CLINICAL INDICATION/HISTORY: right hip pain -Additional: None COMPARISON: May March 3, 2022. TECHNIQUE: AP pelvis and frog-leg lateral view of right hip. _______________ FINDINGS: BONES: Intact appearing ilioischial and iliopectineal lines. There is a displaced and impacted subcapital right femoral neck fracture, with mild progressive displacement on follow-up imaging. Mild-moderate bilateral hip joint arthrosis. SOFT TISSUES: Unremarkable. _______________     1. Displaced and impacted subcapital right femoral neck fracture, increased in displacement from CT performed 4 days prior. CT HEAD WO CONT    Result Date: 3/7/2022  EXAM: CT head INDICATION: Altered mental status. COMPARISON: 4/23/2021. TECHNIQUE: Axial CT imaging of the head was performed without intravenous contrast. Standard multiplanar coronal and sagittal reformatted images were obtained and are included in interpretation.  One or more dose reduction techniques were used on this CT: automated exposure control, adjustment of the mAs and/or kVp according to patient size, and iterative reconstruction techniques. The specific techniques used on this CT exam have been documented in the patient's electronic medical record. Digital Imaging and Communications in Medicine (DICOM) format image data are available to nonaffiliated external healthcare facilities or entities on a secure, media free, reciprocally searchable basis with patient authorization for at least a 12-month period after this study. _______________ FINDINGS: BRAIN AND POSTERIOR FOSSA: Periventricular white matter hypoattenuation which is nonspecific but likely represents chronic ischemic changes. No evidence of acute large vessel transcortical infarct or acute parenchymal hemorrhage. No midline shift or hydrocephalus. EXTRA-AXIAL SPACES AND MENINGES: There are no abnormal extra-axial fluid collections. CALVARIUM: Intact. SINUSES: Clear. OTHER: None. _______________     No acute intracranial abnormality. CT CHEST ABD PELV WO CONT    Result Date: 3/3/2022  EXAM: CT CHEST, ABDOMEN AND PELVIS CLINICAL INDICATION/HISTORY: Hypoxemia, retrocardiac density, diarrhea, weakness and shortness of breath COMPARISON: 3/2/2022 TECHNIQUE: Axial CT imaging of the chest, abdomen, and pelvis was performed without intravenous contrast. Multiplanar reformats were generated. One or more dose reduction techniques were used on this CT: automated exposure control, adjustment of the mAs and/or kVp according to patient size, and iterative reconstruction techniques. The specific techniques used on this CT exam have been documented in the patient's electronic medical record. Digital Imaging and Communications in Medicine (DICOM) format image data are available to nonaffiliated external healthcare facilities or entities on a secure, media free, reciprocally searchable basis with patient authorization for at least a 12-month period after this study.  ________________ FINDINGS: LIMITATIONS:   > Suboptimal evaluation given lack of intravenous contrast. > Motion artifact is present which limits evaluation.   > Suboptimal exam due to patient body habitus and arms by the side. CHEST: LUNGS: Respiratory motion significantly limits evaluation. Interlobar septal thickening in the upper lobes. Mild bilateral dependent atelectasis. No large consolidation or suspicious pulmonary mass. PLEURA: Very small volume bilateral pleural effusions. AIRWAY: Normal. MEDIASTINUM: Four-chamber cardiomegaly with asymmetric biatrial enlargement, mitral and aortic annular calcifications. The main pulmonary artery is enlarged suggesting pulmonary arterial hypertension. Normal caliber aorta with scattered calcified atherosclerotic plaque. No pericardial effusion. LYMPH NODES: No enlarged lymph nodes. CHEST WALL: Diffuse anasarca. Heterogeneous thyroid. _______________ ABDOMEN/PELVIS: LIVER: No enhancing hepatic mass. The portal and hepatic veins are patent. BILIARY: Gallstones are present in the nondistended gallbladder lumen. No biliary dilation. SPLEEN: Normal. PANCREAS: Normal. ADRENALS: Left adrenal gland measures 9 HU (axial image 76), most consistent with lipid rich adenoma. Normal right adrenal gland. KIDNEYS: Asymmetrically small left kidney. No rate of a stone. No hydronephrosis. GI TRACT:  A small hiatal hernia is present. Normal caliber small and large bowel loops. No morphology of bowel obstruction. Normal appendix. BLADDER: Diffuse wall thickening in the largely decompressed bladder. PELVIC ORGANS: No acute abnormality. VASCULATURE: No arterial aneurysm. 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 LYMPH NODES: No mesenteric or retroperitoneal lymphadenopathy. OTHER: No free intraperitoneal air. No ascites. Diffuse anasarca. OSSEOUS STRUCTURES: No acute osseous abnormality. Multilevel degenerative changes most pronounced in the lumbar spine. Degenerative changes in both shoulders (right greater than left).  Please note the included portions of the upper extremities are not well evaluated on this study _______________     1. Findings of congestive heart failure with cardiomegaly, interstitial edema, very small bilateral pleural effusions, and diffuse anasarca. 2.  Bladder wall thickening may be due to underdistention though superimposed infection cannot be excluded. Recommend correlation for cystitis. 3.  Osseous degenerative changes and additional findings, as detailed in the body of the report. XR CHEST PORT    Result Date: 3/6/2022  EXAM: PORTABLE CHEST HISTORY: Shortness of breath. COMPARISON: 3/2/2022 TECHNIQUE: Single portable view. _______________ FINDINGS: SUPPORT DEVICES: None HEART AND MEDIASTINUM: Moderate cardiomegaly. LUNGS AND PLEURAL SPACES: Subsegmental atelectasis left base. Possible small effusions. BONES AND SOFT TISSUES: Dextroscoliosis. _______________     Subsegmental atelectasis left base. Possible small effusions. Moderate cardiomegaly without change. XR CHEST PORT    Result Date: 3/2/2022  EXAM:  AP Portable Chest X-ray 1 view INDICATION: Shortness of breath COMPARISON: April 23, 2021 _______________ FINDINGS: Cardiomegaly and mediastinal contours are within normal limits for portable radiograph. There is increased right retrocardiac/right paraspinal density. There are no pleural effusions. No acute osseous findings. ________________      Increased right retrocardiac density which may represent airspace disease or underlying pulmonary nodule. Recommend CT chest for further evaluation. ECHO ADULT COMPLETE    Result Date: 3/3/2022    Left Ventricle: Left ventricle size is normal. Moderately increased wall thickness. Findings consistent with moderate concentric hypertrophy. Normal wall motion. Normal left ventricular systolic function with a visually estimated EF of 55 - 60%.   Left Atrium: Left atrium is mildly dilated.   Right Ventricle: Right ventricle is mildly dilated.   Right Atrium: Right atrium is mildly dilated.   Pulmonary artery :  Estimated pulmonary arterial systolic pressure is 51 mmhg. Pulmonary hypertension found to be moderate   Mitral Valve: Moderately thickened leaflet. Mildly calcified leaflet. Moderate annular calcification of the mitral valve.   IVC/SVC : IVC diameter is greater than 21 mm and decreases less than 50% during inspiration; therefore the estimated right atrial pressure is elevated (~15 mmHg). IVC is dilated. Consider LORRAINE for better evaluation of mitral valve if clinically indicated. DUPLEX LOWER EXT VENOUS BILAT    Result Date: 3/4/2022  · No evidence of deep vein thrombosis in the right lower extremity. · No evidence of deep vein thrombosis in the left lower extremity.

## 2022-03-12 NOTE — PROGRESS NOTES
Progress Note      Patient: Aurelio Adhikari               Sex: female          DOA: 3/2/2022     YOB: 1940      Age:  80 y.o.        LOS:  LOS: 10 days     Status Post: Procedure(s):  RIGHT TOTAL HIP ARTHROPLASTY WITH C-ARM  (PT IN ROOM # 966)  Surgery Date: 3/10/2022            Subjective:     Aurelio Adhikari is a 80 y.o. female S/P RTHA 3/11/22. Patient remains intubated. She is more responsive today. She is able to nod her head that she is intact to light touch throughout the right lower extremity. Objective:      Visit Vitals  BP (!) 93/50   Pulse 81   Temp 98.8 °F (37.1 °C)   Resp 12   Ht 5' 5\" (1.651 m)   Wt 111.1 kg (244 lb 14.9 oz)   SpO2 100%   Breastfeeding No   BMI 40.76 kg/m²       Physical Exam:   Dressing:  clean, dry, intact, Suction dressing. Neuro exam unable to be performed do to patient being intubated. She does have edema,  +1 Posterior Tibial Pulse  Swelling around the soft tissues of the hip is expected. No bleeding is noted. Xray - Looks good from yesterday. Intake and Output:  Current Shift:  03/12 0701 - 03/12 1900  In: 150   Out: 25 [Urine:25]  Last three shifts:  03/10 1901 - 03/12 0700  In: 1393.2 [I.V.:1043.2]  Out: 2370 [Urine:1380]  Voiding Status:  Catheter in place.       Lab/Data Reviewed:  Recent Labs     03/12/22  0506   HGB 9.5*   HCT 29.1*   *   K 4.1   BUN 97*   CREA 2.05*   *         Medications Reviewed    Assessment/Plan     Principal Problem:    Acute respiratory failure with hypoxemia (HCC) (3/2/2022)    Active Problems:    Elevated troponin (4/23/2021)      HTN (hypertension) (4/23/2021)      Acute on chronic diastolic congestive heart failure (HCC) (4/23/2021)      Atrial fibrillation ([facility] [address] (3/2/2022)      Stage 3 chronic kidney disease ([facility] [address] (3/3/2022)      SERENA (obstructive sleep apnea) (3/3/2022)      Adult BMI 39.0-39.9 kg/sq m (3/3/2022)      Fracture of neck of right femur ([facility] [address] (3/8/2022)        · Discharge Planning as per primary team.  · Patient may continue Eliquis. · Plan is for PT WBAT when medically stable. · Maintain the dressing at all times. If the dressing looses suction, then can be replaced with Mepilex. Please try to keep the incision clean and dry. · Will continue to follow. · Discussed with the patient's sister and was able to speak with the patient's daughter over the phone. The plan was discussed with Dr. Amador Khalil today as well and he is in agreement with above.

## 2022-03-12 NOTE — PROGRESS NOTES
Attempt for OT eval. Pt continues to be intubated and per nursing, following simple one step commands only. Requests to hold therapy for today. To be followed tomorrow if medically appropriate.      Rose Moreno, OTR/L

## 2022-03-12 NOTE — PROGRESS NOTES
3/12/2022 PT note: consult received and chart reviewed. Have spoken with nurse Lalitha Martin and will continue to hold PT evaluation at this time per recommendation. Will f/u in the am as appropriate.  Thank you for this referral.   Anjel Jose, PT

## 2022-03-12 NOTE — CONSULTS
RENAL CONSULT  3/12/2022    Patient:  Valentin Gan  :  1940  Gender:  female  MRN #:  [id number]    Reason for Consult: Metabolic Alkalosis       Assessment:    Valentin Gan is a 80y.o. year old female  With h/o  morbid obesity, SERENA, right leg weakness, HTN, A. fib, CHF admitted  on 3/3/2022 for shortness of breath    she developed  onset of A. fib on admission. HF was treated with diuretics and was using home BiPAP nightly. Still on mechanical vent and is hypotensive    She developed acute renal failure with creatinine slowly rising most likely due to hypotension and over diuresis    # Acute renal failure  # Metabolic alkalosis - due to diuretics , upon review of serum bicarbonate she had baseline elevated bicarbonate which must be metabolic compensation for chronic respiratory acidosis   #Hypernatremia         Plan:      - Hold diuretics for now   - Agree with diamox BID to treat metabolic alkalosis, which is not strong diuretics  - will start D5W and half NS which should correct hypernatremia and will provide chloride/volume expansion to help correct alkalosis   - intake and output charting, [place]'s cath, ensure she is not retaining urine   -  CT scan on  3/3 showed asymmetrical small kidney , will get USG retroperitoneum   - avoid nsaids , contrast and nephrotoxin   - No clinical indication of dialysis for now   - Dose all meds for current eGFR  - keep MAP 65 mmhg          History of Present Illness:    Valentin Gan is a 80y.o. year old female  With h/o  morbid obesity, SERENA, right leg weakness, HTN, A. fib, CHF admitted  on 3/3/2022 for shortness of breath    she developed  onset of A. fib on admission. HF was treated with diuretics and was using home BiPAP nightly. She was found to have right femoral fracture; underwent right press-fit direct anterior total hip arthroplasty   Postoperatively patient was extubated in PACU; but due to respiratory distress, reintubated and transferred to ICU.   She is still on ventilator and hypotensive on iv fluid diuretics was stopped due to alkalosis     Unable  obtained review of systems       Past Medical History:   Diagnosis Date    Hypertension     Sleep disorder      Past Surgical History:   Procedure Laterality Date    HX ORTHOPAEDIC      broken right ankle, left femur 30 yrs ago     Family History   Problem Relation Age of Onset    Diabetes Mother     Heart Disease Mother     Heart Disease Father      Allergies   Allergen Reactions    Seafood Hives     crabs    Statins-Hmg-Coa Reductase Inhibitors Unknown (comments)    Sulfa (Sulfonamide Antibiotics) Hives     Current Facility-Administered Medications   Medication Dose Route Frequency Provider Last Rate Last Admin    albumin human 25% (BUMINATE) solution 25 g  25 g IntraVENous Q6H Griffin Bernal MD   25 g at 03/12/22 0532    acetaZOLAMIDE (DIAMOX) 250 mg in sterile water (preservative free) 2.5 mL injection  250 mg IntraVENous Q12H Georgina Martínez MD   250 mg at 03/12/22 0952    dextrose 5 % - 0.45% NaCl infusion  50 mL/hr IntraVENous CONTINUOUS Salazar Gamez MD 50 mL/hr at 03/12/22 1011 50 mL/hr at 03/12/22 1011    sodium chloride (NS) flush 5-40 mL  5-40 mL IntraVENous PRN Donya Huber PA-C        acetaminophen (TYLENOL) tablet 650 mg  650 mg Oral Q6H Donya Huber PA-C   650 mg at 03/12/22 0533    naloxone [facility]) injection 0.4 mg  0.4 mg IntraVENous PRN Donya Huber PA-C        ferrous sulfate tablet 325 mg  1 Tablet Oral TID Donya Huber PA-C   325 mg at 03/12/22 0996    diphenhydrAMINE (BENADRYL) capsule 25 mg  25 mg Oral Q4H PRN Donya Huber PA-C        bisacodyL (DULCOLAX) suppository 10 mg  10 mg Rectal DAILY PRN Donya Huber PA-C        ELECTROLYTE REPLACEMENT PROTOCOL - Magnesium   1 Each Other PRN Georgina Martínez MD        ELECTROLYTE REPLACEMENT PROTOCOL - Calcium   1 Each Other PRN Georgina Martínez MD       Aetdeanna ELECTROLYTE REPLACEMENT PROTOCOL  - Phosphorus Renal Dosing  1 Each Other PRN Georgina Martínez MD        ELECTROLYTE REPLACEMENT PROTOCOL - Potassium Renal Dosing  1 Each Other PRN Georgina Martínez MD        famotidine (PEPCID) tablet 20 mg  20 mg Oral DAILY Georgina Martínez MD   20 mg at 03/12/22 9650    midazolam (VERSED) injection 1 mg  1 mg IntraVENous Q2H PRN eGorgina Martínez MD        fentaNYL citrate (PF) injection 25 mcg  25 mcg IntraVENous Q4H PRN Georgina Martínez MD   25 mcg at 03/11/22 2116    chlorhexidine (PERIDEX) 0.12 % mouthwash 10 mL  10 mL Oral Q12H Georgina Martínez MD   10 mL at 03/12/22 0838    nystatin (MYCOSTATIN) 100,000 unit/mL oral suspension 500,000 Units  500,000 Units Oral QID Deborah Olmos MD   500,000 Units at 03/12/22 0838    [Held by provider] furosemide (LASIX) injection 40 mg  40 mg IntraVENous Q12H Baldemar Eli MD   40 mg at 03/11/22 2116    apixaban (ELIQUIS) tablet 2.5 mg  2.5 mg Oral BID Duy Stephens MD   2.5 mg at 03/12/22 6844    sodium chloride (NS) flush 5-40 mL  5-40 mL IntraVENous Q8H Deborah Olmos MD   10 mL at 03/12/22 0533    sodium chloride (NS) flush 5-40 mL  5-40 mL IntraVENous PRN Deborah Olmos MD        acetaminophen (TYLENOL) tablet 650 mg  650 mg Oral Q6H PRN Deborah Olmos MD   650 mg at 03/11/22 2336    Or    acetaminophen (TYLENOL) suppository 650 mg  650 mg Rectal Q6H PRN Deborah Olmos MD        polyethylene glycol (MIRALAX) packet 17 g  17 g Oral DAILY PRN Deborah Olmos MD   17 g at 03/05/22 0915    ondansetron (ZOFRAN ODT) tablet 4 mg  4 mg Oral Q8H PRN Deborah Olmos MD        Or    ondansetron [facility]) injection 4 mg  4 mg IntraVENous Q6H PRN Deborah Olmos MD        carvediloL (COREG) tablet 3.125 mg  3.125 mg Oral BID WITH MEALS Georgina Martínez MD   3.125 mg at 03/12/22 0003    aspirin delayed-release tablet 81 mg  81 mg Oral DAILY Deborah Olmos MD   81 mg at 03/09/22 0169         Review of Symptoms:   Unable to obtained review of systems due to patient condition     Objective:    Visit Vitals  BP (!) 93/50   Pulse 78   Temp 98.8 °F (37.1 °C)   Resp 12   Ht 5' 5\" (1.651 m)   Wt 111.1 kg (244 lb 14.9 oz)   SpO2 98%   Breastfeeding No   BMI 40.76 kg/m²       Physical Exam:    Sedated on mechanical vent   HEENT:ET tube ,  no neck swelling  Lung: clear to auscultation   Heart: s1s2 irregular   Abdomen: soft, normal bowel sounds. Ext: no edema  Skin: No rash and erythema  CNS- unconscious    Laboratory Data:    Lab Results   Component Value Date    BUN 97 (H) 03/12/2022    BUN 74 (H) 03/11/2022    BUN 65 (H) 03/10/2022     (H) 03/12/2022     03/11/2022     03/10/2022    CO2 38 (H) 03/12/2022    CO2 36 (H) 03/11/2022    CO2 37 (H) 03/10/2022     Lab Results   Component Value Date    WBC 9.6 03/12/2022    HGB 9.5 (L) 03/12/2022    HCT 29.1 (L) 03/12/2022     No components found for: CALCIUM, PHOSPHORUS, MAGNESIUM  Lab Results   Component Value Date     (H) 04/25/2021     No results found for: SPECIMENTYP, TURBIDITY, UGLU    Imaging Reveiwed:       Approx 65   minutes of critical care time spent in the direct evaluation and formulation of treatment plan of this high risk patient. The reason for providing this level of medical care was due a critical illness that impaired one or more vital organ systems such that there was a high probability of imminent or life threatening deterioration in the patients condition. This care involved high complexity decision making to assess, manipulate, and support vital system functions, to treat this degreee vital organ system failure and to prevent further life threatening deterioration of the patients condition.          Los Torres MD

## 2022-03-12 NOTE — RT PROTOCOL NOTE
Pt remains orally intubated with size 7.0 mm et tube secured with tube long @ 222 cm shannon at the lip; breath sounds were decreased throughout with pt suctioned for moderate amount thick, yellow, white secretions; all alarms on & functioning with ambu bag @ hob

## 2022-03-12 NOTE — PROGRESS NOTES
[facility]ists  Pulmonary, Critical Care, and Sleep Medicine    Name: Boby Alfaro MRN: [id number]   : 1940 Hospital: [facility] [place]    Date: 3/12/2022  Room: [address] [place] [address] Note                                              Consult requesting physician: Dr. Jeimy Castillo  Reason for Consult: Respiratory failure    IMPRESSION:   Acute respiratory failure with hypoxemia. Acute on chronic diastolic congestive heart failure. Fracture of neck of right femur. SERENA. Active Hospital Problems    Diagnosis Date Noted    Fracture of neck of right femur ([facility] 2022    Stage 3 chronic kidney disease ([facility] 2022    SERENA (obstructive sleep apnea) 2022    Adult BMI 39.0-39.9 kg/sq m 2022    Acute respiratory failure with hypoxemia (HCC) 2022    Atrial fibrillation ([facility] 2022    Acute on chronic diastolic congestive heart failure [facility] 2021    Elevated troponin 2021    HTN (hypertension) 2021   ·      Patient Active Problem List   Diagnosis Code    Fatigue R53.83    Elevated troponin R77.8    Obesity (BMI 30-39. 9) E66.9    HTN (hypertension) I10    SERENA treated with BiPAP G47.33    Acute on chronic diastolic congestive heart failure (HCC) I50.33    Acute respiratory failure with hypoxemia (HCC) J96.01    Atrial fibrillation (HCC) I48.91    Stage 3 chronic kidney disease (HCC) N18.30    SERENA (obstructive sleep apnea) G47.33    Adult BMI 39.0-39.9 kg/sq m Z68.39    Fracture of neck of right femur ([facility]) S72.001A         · Code status: Full Code       RECOMMENDATIONS:     Respiratory: History of obesity, SERENA on BiPAP. Postoperative respiratory failure, reintubated in PACU 3/10/2022. CXR 3/12/2022: ETT and OGT in place. Cardiomegaly but no CHF. ABG: Metabolic alkalosis with respiratory compensation. Reduce minute ventilation by reducing respiratory rate to 10 and tidal volume 2/3/1975.   On FiO2 50% and PEEP of 5.  Patient has breath stacking and so we will try pressure control ventilator mode. Due to metabolic alkalosis, patient is not breathing over the vent and likely will fail SBT/weaning from ventilator. See renal section for metabolic alkalosis management. Sedation: Remains off propofol since 3/11/2022 morning; propofol discontinued. Use fentanyl/Versed as needed boluses. Failed SBT again today; will reattempt later today. Ventilator bundle & Sedation protocol followed. Daily sedation holiday, assessment for readiness for SBT and then re-titrate if required. Chlorhexidine mouth washes. Keep SPO2 >=92%. HOB 30 degree elevation all the time. Aggressive pulmonary toileting. Aspiration precautions. CVS: New onset A. fib on admission, chronic diastolic CHF. Continue aspirin. Continue Coreg but hold for SBP less than 100. Continue Eliquis; monitor for any external bleeding. Hold Lasix due to metabolic alkalosis; will give Diamox. Blood pressure soft; SBP in 90s. Cardiologist on board. Echo 3/3/2022: LVEF 55 to 60%. RV mildly dilated. RA mildly dilated. PVL LE 3/3/2022: No DVT. VQ scan 3/3/2022: No PE.    ID:   No fever or leukocytosis. Rapid influenza and COVID19 3/2/2022: Negative. Urine culture 3/2/2022: Klebsiella pneumonia. Blood culture 3/2/2022: Negative. Endotracheal aspirate culture: Light GNR. Heavy normal forrest. Antibiotics: Discontinued Rocephin on 3/11/2022 as there is no pneumonia on CXR; no fever or leukocytosis; hemodynamically stable; no endotracheal secretions and sputum culture NGTD. Monitor off antibiotics. Follow cultures. Hematology/Oncology:   Anemia; but no external bleeding. Normal platelets. Renal:   Mild TK; likely due to diuretics use. Mild hypernatremia; expected to resolve with NGT feeding. Metabolic alkalosis due to Lasix use; hold Lasix in view Diamox 250 mg every 12 hours x2 doses and repeat ABG in the morning.   Once alkalosis resolved, then it would help in SBT and weaning from ventilator. Discussed with Dr. June Godinez; consulted. GI/: No acute issues. Endocrine: Monitor BS. SSI. Neurology:   Patient was lethargic and confused before going for hip surgery on 3/10/2022 morning. Patient remains off propofol since 3/11/2022; but still remains lethargic and hard to arouse; could be due to metabolic alkalosis. Repeat CT head nil acute. CT head 3/7/2022: Nil acute. Repeat CT head 3/11/2022: Nil acute. Psychiatry: No acute issues. Toxicology: No acute issues. Pain/Sedation: Sedation protocol as above    Skin/Wound: Right hip wound VAC in place after surgery; local care per nursing protocol. Electrolytes: Replace electrolytes per ICU electrolyte replacement protocol. IVF: None    Nutrition: Start OGT feed. Prophylaxis: DVT Prophylaxis: SCD. GI Prophylaxis: Protonix. Restraints: wrist soft restraints for patient interfering with medical therapy/management and patient safety. Lines/Tubes: PIV  ETT: 3/10/2022. OGT: 3/11/2022. [place]: 3/10/2022 (Medically necessary for strict input/output monitoring in critically ill patient, will remove it when not needed. [place] bundle followed). Advance Directive/Palliative Care: Consulted. Will defer respective systems problem management to primary and other respective consultant and follow patient in ICU with primary and other medical team.  Further recommendations will be based on the patient's response to recommended treatment and results of the investigation ordered. Quality Care: PPI, DVT prophylaxis, HOB elevated, Infection control all reviewed and addressed. Care of plan d/w RN, RT, hospitalist team.    High complexity decision making was performed during the evaluation of this patient at high risk for decompensation with multiple organ involvement.     Total critical care time spent rendering care exclusive of procedures/family discussion/coordination of care: 37 minutes. Subjective/History of Present Illness:     Patient is a 80 y.o. female with PMHx significant for morbid obesity, SERENA, right leg weakness, HTN, A. fib, CHF; admitted to medical floor on 3/3/2022 for dyspnea. Patient had new onset of A. fib on admission. Patient was admitted on medical floor and A. fib/CHF was being treated medically; and was using home BiPAP nightly. Found to have right femoral fracture; underwent right press-fit direct anterior total hip arthroplasty for femoral neck fracture. Postoperatively patient was extubated in PACU; but due to respiratory distress, reintubated and transferred to ICU.      3/12/2022 :     Remains in ICU room 107. Remains off propofol since 3/11/2022 morning. Patient opens eyes but does not follow instruction and commands. Patient appears weak and lethargic. Failed SBT attempt again today. ABG with metabolic alkalosis with respiratory compensation; Lasix held, will give Diamox. Minute ventilation reduced. Blood pressure soft in 46I systolic; used antihypertensive medication with holding parameters. No fever. No significant ETT secretions. Not on any vasopressors. No external bleeding noted. No vomiting, diarrhea. No other overnight issues reported. [facility] was not called for any issues overnight. I/O last 24 hrs: Intake/Output Summary (Last 24 hours) at 3/12/2022 0916  Last data filed at 3/12/2022 0800  Gross per 24 hour   Intake 1396.25 ml   Output 905 ml   Net 491.25 ml         The patient is critically ill and can not provide additional history due to Ventilated    History taken from Dr. Aimee Macdonald, EMR     Review of Systems:  ROS not obtained due to patient factor.            Allergies   Allergen Reactions    Seafood Hives     crabs    Statins-Hmg-Coa Reductase Inhibitors Unknown (comments)    Sulfa (Sulfonamide Antibiotics) Hives      Past Medical History:   Diagnosis Date    Hypertension     Sleep disorder       Past Surgical History: Procedure Laterality Date    HX ORTHOPAEDIC      broken right ankle, left femur 30 yrs ago      Social History     Tobacco Use    Smoking status: Never Smoker    Smokeless tobacco: Never Used   Substance Use Topics    Alcohol use: Yes     Alcohol/week: 1.0 standard drink     Types: 1 Glasses of wine per week     Comment: soc      Family History   Problem Relation Age of Onset    Diabetes Mother     Heart Disease Mother     Heart Disease Father       Prior to Admission medications    Medication Sig Start Date End Date Taking? Authorizing Provider   allopurinoL (ZYLOPRIM) 100 mg tablet Take 100 mg by mouth daily. Yes Provider, Historical   acetaminophen (TYLENOL) 325 mg tablet Take 650 mg by mouth every six (6) hours as needed for Pain. Yes Provider, Historical   niacin (NIASPAN) 1,000 mg Tb24 tab Take 1,000 mg by mouth daily. Yes Provider, Historical   trolamine salicylate-aloe vera 96% (ASPERCREME) topical cream Apply 2 g to affected area as needed for Pain. Yes Provider, Historical   multivitamin, tx-iron-ca-min (THERA-M w/ IRON) 9 mg iron-400 mcg tab tablet Take 1 Tab by mouth daily. Yes Provider, Historical   aspirin delayed-release 81 mg tablet Take 81 mg by mouth daily. Yes Provider, Historical   potassium chloride SR (KLOR-CON 10) 10 mEq tablet Take 10 mEq by mouth daily. Yes Provider, Historical   carvediloL (COREG) 3.125 mg tablet Take 3.125 mg by mouth daily.    Yes Provider, Historical     Current Facility-Administered Medications   Medication Dose Route Frequency    albumin human 25% (BUMINATE) solution 25 g  25 g IntraVENous Q6H    acetaminophen (TYLENOL) tablet 650 mg  650 mg Oral Q6H    ferrous sulfate tablet 325 mg  1 Tablet Oral TID    famotidine (PEPCID) tablet 20 mg  20 mg Oral DAILY    propofol (DIPRIVAN) 10 mg/mL infusion  0-50 mcg/kg/min IntraVENous TITRATE    chlorhexidine (PERIDEX) 0.12 % mouthwash 10 mL  10 mL Oral Q12H    nystatin (MYCOSTATIN) 100,000 unit/mL oral suspension 500,000 Units  500,000 Units Oral QID    furosemide (LASIX) injection 40 mg  40 mg IntraVENous Q12H    apixaban (ELIQUIS) tablet 2.5 mg  2.5 mg Oral BID    sodium chloride (NS) flush 5-40 mL  5-40 mL IntraVENous Q8H    carvediloL (COREG) tablet 3.125 mg  3.125 mg Oral BID WITH MEALS    aspirin delayed-release tablet 81 mg  81 mg Oral DAILY         Objective:   Vital Signs:    Visit Vitals  BP (!) 93/50   Pulse 78   Temp 98.8 °F (37.1 °C)   Resp 12   Ht 5' 5\" (1.651 m)   Wt 111.1 kg (244 lb 14.9 oz)   SpO2 98%   Breastfeeding No   BMI 40.76 kg/m²       O2 Device: Endotracheal tube,Ventilator   O2 Flow Rate (L/min): 2 l/min   Temp (24hrs), Av.7 °F (37.1 °C), Min:98.2 °F (36.8 °C), Max:99.3 °F (37.4 °C)       Intake/Output:   Last shift:       07 - 1900  In: 150   Out: 25 [Urine:25]    Last 3 shifts: 03/10 1901 -  0700  In: 1393.2 [I.V.:1043.2]  Out: 1380 [Urine:1380]      Intake/Output Summary (Last 24 hours) at 3/12/2022 0916  Last data filed at 3/12/2022 0800  Gross per 24 hour   Intake 1396.25 ml   Output 905 ml   Net 491.25 ml       Last 3 Recorded Weights in this Encounter    22 0454 22 0459 22 0750   Weight: 105.1 kg (231 lb 9.6 oz) 104.2 kg (229 lb 12.8 oz) 111.1 kg (244 lb 14.9 oz)         Ventilator Settings:  Mode Rate Tidal Volume Pressure FiO2 PEEP   Assist control,VIVE   375 ml (decreased)    40 % 5 cm H20     Peak airway pressure: 20 cm H2O    Plateau pressure:     Tidal volume:    Minute ventilation: 4.87 l/min     Recent Labs     22  0513 22  0454 03/10/22  1656   PHI 7.56* 7.69* 7.47*   PCO2I 46.2* 32.9* 54.1*   PO2I 75* 101* 226*   HCO3I 41.5* 39.3* 38.9*   FIO2I 40 50 100       Physical Exam:       General/Neurology: Drowsy and sleepy despite of being off sedation; opens eyes on verbal stimuli but does not follow instructions. Head:   NCAT. Eye:   Pupils bilateral equal and reactive to light.   No icterus/pallor/cyanosis. Nose:   No nasal drainage/discharge. Neck:   Trachea midline. Lung: Moderate air entry bilateral equal.  No rales, rhonchi. No wheezing or stridor. No prolonged expiration or accessory muscle use. Heart:   S1 S2 present. No murmur or JVD. Abdomen:  Soft. NT. ND. No palpable masses. Extremities:  No edema. No cyanosis or clubbing. Pulses: 2+ and symmetric in DP. Data:       Recent Results (from the past 24 hour(s))   MAGNESIUM    Collection Time: 03/12/22  5:06 AM   Result Value Ref Range    Magnesium 2.0 1.6 - 2.6 mg/dL   METABOLIC PANEL, COMPREHENSIVE    Collection Time: 03/12/22  5:06 AM   Result Value Ref Range    Sodium 146 (H) 136 - 145 mmol/L    Potassium 4.1 3.5 - 5.5 mmol/L    Chloride 105 100 - 111 mmol/L    CO2 38 (H) 21 - 32 mmol/L    Anion gap 3 3.0 - 18 mmol/L    Glucose 109 (H) 74 - 99 mg/dL    BUN 97 (H) 7.0 - 18 MG/DL    Creatinine 2.05 (H) 0.6 - 1.3 MG/DL    BUN/Creatinine ratio 47 (H) 12 - 20      GFR est AA 28 (L) >60 ml/min/1.73m2    GFR est non-AA 23 (L) >60 ml/min/1.73m2    Calcium 10.4 (H) 8.5 - 10.1 MG/DL    Bilirubin, total 1.0 0.2 - 1.0 MG/DL    ALT (SGPT) 30 13 - 56 U/L    AST (SGOT) 33 10 - 38 U/L    Alk.  phosphatase 94 45 - 117 U/L    Protein, total 5.5 (L) 6.4 - 8.2 g/dL    Albumin 2.2 (L) 3.4 - 5.0 g/dL    Globulin 3.3 2.0 - 4.0 g/dL    A-G Ratio 0.7 (L) 0.8 - 1.7     CBC WITH AUTOMATED DIFF    Collection Time: 03/12/22  5:06 AM   Result Value Ref Range    WBC 9.6 4.6 - 13.2 K/uL    RBC 2.96 (L) 4.20 - 5.30 M/uL    HGB 9.5 (L) 12.0 - 16.0 g/dL    HCT 29.1 (L) 35.0 - 45.0 %    MCV 98.3 78.0 - 100.0 FL    MCH 32.1 24.0 - 34.0 PG    MCHC 32.6 31.0 - 37.0 g/dL    RDW 15.1 (H) 11.6 - 14.5 %    PLATELET 040 555 - 071 K/uL    MPV 11.1 9.2 - 11.8 FL    NRBC 0.0 0  WBC    ABSOLUTE NRBC 0.00 0.00 - 0.01 K/uL    NEUTROPHILS 74 (H) 40 - 73 %    LYMPHOCYTES 14 (L) 21 - 52 %    MONOCYTES 11 (H) 3 - 10 %    EOSINOPHILS 0 0 - 5 %    BASOPHILS 0 0 - 2 %    IMMATURE GRANULOCYTES 1 (H) 0.0 - 0.5 %    ABS. NEUTROPHILS 7.1 1.8 - 8.0 K/UL    ABS. LYMPHOCYTES 1.4 0.9 - 3.6 K/UL    ABS. MONOCYTES 1.0 0.05 - 1.2 K/UL    ABS. EOSINOPHILS 0.0 0.0 - 0.4 K/UL    ABS. BASOPHILS 0.0 0.0 - 0.1 K/UL    ABS. IMM. GRANS. 0.1 (H) 0.00 - 0.04 K/UL    DF AUTOMATED     BLOOD GAS, ARTERIAL POC    Collection Time: 03/12/22  5:13 AM   Result Value Ref Range    Device: ADULT VENT      FIO2 (POC) 40 %    pH (POC) 7.56 (HH) 7.35 - 7.45      pCO2 (POC) 46.2 (H) 35.0 - 45.0 MMHG    pO2 (POC) 75 (L) 80 - 100 MMHG    HCO3 (POC) 41.5 (H) 22 - 26 MMOL/L    sO2 (POC) 96.4 92 - 97 %    Base excess (POC) 17.4 mmol/L    Mode ASSIST CONTROL      Tidal volume 400 ml    Set Rate 12 bpm    PEEP/CPAP (POC) 5 cmH2O    Allens test (POC) Positive      Site LEFT RADIAL      Specimen type (POC) ARTERIAL      Performed by Margaret Mendez          Chemistry Recent Labs     03/12/22  0506 03/11/22  0310 03/10/22  0140   * 94 115*   * 145 145   K 4.1 5.0 4.5    103 103   CO2 38* 36* 37*   BUN 97* 74* 65*   CREA 2.05* 1.58* 1.35*   CA 10.4* 10.5* 10.6*   MG 2.0 1.9 1.9   AGAP 3 6 5   BUCR 47* 47* 48*   AP 94 103 113   TP 5.5* 6.0* 6.4   ALB 2.2* 2.8* 3.1*   GLOB 3.3 3.2 3.3   AGRAT 0.7* 0.9 0.9        Lactic Acid No results found for: LAC  No results for input(s): LAC in the last 72 hours. Liver Enzymes Protein, total   Date Value Ref Range Status   03/12/2022 5.5 (L) 6.4 - 8.2 g/dL Final     Albumin   Date Value Ref Range Status   03/12/2022 2.2 (L) 3.4 - 5.0 g/dL Final     Globulin   Date Value Ref Range Status   03/12/2022 3.3 2.0 - 4.0 g/dL Final     A-G Ratio   Date Value Ref Range Status   03/12/2022 0.7 (L) 0.8 - 1.7   Final     Alk.  phosphatase   Date Value Ref Range Status   03/12/2022 94 45 - 117 U/L Final     Recent Labs     03/12/22  0506 03/11/22  0310 03/10/22  0140   TP 5.5* 6.0* 6.4   ALB 2.2* 2.8* 3.1*   GLOB 3.3 3.2 3.3   AGRAT 0.7* 0.9 0.9   AP 94 103 113        CBC w/Diff Recent Labs     03/12/22  0506 03/11/22  0310 03/10/22  0140   WBC 9.6 10.7 7.9   RBC 2.96* 4.11* 3.67*   HGB 9.5* 12.9 11.8*   HCT 29.1* 41.1 38.2    205 199   GRANS 74* 71 74*   LYMPH 14* 12* 10*   EOS 0 0 1        Cardiac Enzymes No results found for: CPK, CK, CKMMB, CKMB, RCK3, CKMBT, CKNDX, CKND1, FARA, TROPT, TROIQ, ADOLFO, TROPT, TNIPOC, BNP, BNPP     BNP No results found for: BNP, BNPP, XBNPT     Coagulation No results for input(s): PTP, INR, APTT, INREXT, INREXT in the last 72 hours. Thyroid  Lab Results   Component Value Date/Time    TSH 0.58 03/03/2022 02:21 AM       No results found for: T4     Urinalysis Lab Results   Component Value Date/Time    Color YELLOW 03/02/2022 09:04 PM    Appearance CLOUDY 03/02/2022 09:04 PM    Specific gravity 1.021 03/02/2022 09:04 PM    pH (UA) 5.0 03/02/2022 09:04 PM    Protein 300 (A) 03/02/2022 09:04 PM    Glucose Negative 03/02/2022 09:04 PM    Ketone TRACE (A) 03/02/2022 09:04 PM    Bilirubin Negative 03/02/2022 09:04 PM    Urobilinogen 1.0 03/02/2022 09:04 PM    Nitrites Negative 03/02/2022 09:04 PM    Leukocyte Esterase Negative 03/02/2022 09:04 PM    Epithelial cells 2+ 03/02/2022 09:04 PM    Bacteria FEW (A) 03/02/2022 09:04 PM    WBC 0 to 3 03/02/2022 09:04 PM    RBC 0 to 3 03/02/2022 09:04 PM        Micro  Recent Labs     03/10/22  1530   CULT LIGHT GRAM NEGATIVE RODS IDENTIFICATION TO FOLLOW*  HEAVY NORMAL RESPIRATORY ELSIE     Recent Labs     03/10/22  1530   CULT LIGHT GRAM NEGATIVE RODS IDENTIFICATION TO FOLLOW*  HEAVY NORMAL RESPIRATORY ELSIE          Culture data during this hospitalization.    All Micro Results     Procedure Component Value Units Date/Time    CULTURE, RESPIRATORY/SPUTUM/BRONCH Laurian Heller STAIN [[id number]]  (Abnormal) Collected: 03/10/22 1530    Order Status: Completed Specimen: Sputum from Endotracheal aspirate Updated: 03/12/22 0907     Special Requests: NO SPECIAL REQUESTS        GRAM STAIN NO WBC'S SEEN         NO EPITHELIAL CELLS SEEN         NO ORGANISMS SEEN        Culture result:       LIGHT GRAM NEGATIVE RODS IDENTIFICATION TO FOLLOW                  HEAVY NORMAL RESPIRATORY ELSIE          CULTURE, RESPIRATORY/SPUTUM/BRONCH W [address]illes [address] [[id number]]     Order Status: Sent Specimen: Sputum from Endotracheal aspirate     CULTURE, BLOOD [[id number]] Collected: 03/02/22 2048    Order Status: Completed Specimen: Blood Updated: 03/08/22 0655     Special Requests: NO SPECIAL REQUESTS        Culture result: NO GROWTH 6 DAYS       CULTURE, BLOOD [[id number]] Collected: 03/02/22 2048    Order Status: Completed Specimen: Blood Updated: 03/08/22 0655     Special Requests: NO SPECIAL REQUESTS        Culture result: NO GROWTH 6 DAYS       CULTURE, URINE [[id number]]  (Abnormal)  (Susceptibility) Collected: 03/02/22 2104    Order Status: Completed Specimen: Cath Urine Updated: 03/05/22 1226     Special Requests: NO SPECIAL REQUESTS        [place] Count --        >100,000  COLONIES/mL       Culture result: KLEBSIELLA PNEUMONIAE       COVID-19 RAPID TEST [[id number]] Collected: 03/02/22 2025    Order Status: Completed Specimen: Nasopharyngeal Updated: 03/02/22 2052     Specimen source Nasopharyngeal        COVID-19 rapid test Not detected        Comment: Rapid Abbott ID Now       Rapid NAAT:  The specimen is NEGATIVE for SARS-CoV-2, the novel coronavirus associated with COVID-19. Negative results should be treated as presumptive and, if inconsistent with clinical signs and symptoms or necessary for patient management, should be tested with an alternative molecular assay. Negative results do not preclude SARS-CoV-2 infection and should not be used as the sole basis for patient management decisions. This test has been authorized by the FDA under an Emergency Use Authorization (EUA) for use by authorized laboratories.    Fact sheet for Healthcare Providers: ConventionUpdate.co.nz  Fact sheet for Patients: [email or web address]       Methodology: Isothermal Nucleic Acid Amplification         INFLUENZA A & B AG (RAPID TEST) [[id number]] Collected: 03/02/22 2025    Order Status: Completed Specimen: Nasopharyngeal from Nasal washing Updated: 03/02/22 2047     Influenza A Antigen Negative        Comment: A negative result does not exclude influenza virus infection, seasonal or H1N1 due to suboptimal sensitivity. If influenza is circulating in your community, a diagnosis of influenza should be considered based on a patients clinical presentation and empiric antiviral treatment should be considered, if indicated. Influenza B Antigen Negative                NM LUNG SCAN PERF    Result Date: 3/3/2022  EXAM: NM LUNG SCAN PERF. CLINICAL INDICATION/HISTORY: d dimer increase dyspnea. -Additional: Clinical concern for pulmonary embolus. COMPARISON: Correlation is made with the radiographic study performed one day prior. TECHNIQUE: 7.2 mCi Tc-99m MAA was injected intravenously and the 8 standard views of the chest were obtained. This perfusion only examination is interpreted using the PISAPED criteria. _______________ FINDINGS: Perfusion images show no segmental perfusion defects to suggest the presence of pulmonary embolism. _______________     o scintigraphic findings to suggest the presence of acute pulmonary embolism. _______________     XR HIP RT W OR WO PELV 2-3 VWS    Result Date: 3/7/2022  EXAM: RIGHT HIP RADIOGRAPHS CLINICAL INDICATION/HISTORY: right hip pain -Additional: None COMPARISON: May March 3, 2022. TECHNIQUE: AP pelvis and frog-leg lateral view of right hip. _______________ FINDINGS: BONES: Intact appearing ilioischial and iliopectineal lines. There is a displaced and impacted subcapital right femoral neck fracture, with mild progressive displacement on follow-up imaging. Mild-moderate bilateral hip joint arthrosis. SOFT TISSUES: Unremarkable. _______________     1.  Displaced and impacted subcapital right femoral neck fracture, increased in displacement from CT performed 4 days prior. CT HEAD WO CONT    Result Date: 3/7/2022  EXAM: CT head INDICATION: Altered mental status. COMPARISON: 4/23/2021. TECHNIQUE: Axial CT imaging of the head was performed without intravenous contrast. Standard multiplanar coronal and sagittal reformatted images were obtained and are included in interpretation. One or more dose reduction techniques were used on this CT: automated exposure control, adjustment of the mAs and/or kVp according to patient size, and iterative reconstruction techniques. The specific techniques used on this CT exam have been documented in the patient's electronic medical record. Digital Imaging and Communications in Medicine (DICOM) format image data are available to nonaffiliated external healthcare facilities or entities on a secure, media free, reciprocally searchable basis with patient authorization for at least a 12-month period after this study. _______________ FINDINGS: BRAIN AND POSTERIOR FOSSA: Periventricular white matter hypoattenuation which is nonspecific but likely represents chronic ischemic changes. No evidence of acute large vessel transcortical infarct or acute parenchymal hemorrhage. No midline shift or hydrocephalus. EXTRA-AXIAL SPACES AND MENINGES: There are no abnormal extra-axial fluid collections. CALVARIUM: Intact. SINUSES: Clear. OTHER: None. _______________     No acute intracranial abnormality. CT CHEST ABD PELV WO CONT    Result Date: 3/3/2022  EXAM: CT CHEST, ABDOMEN AND PELVIS CLINICAL INDICATION/HISTORY: Hypoxemia, retrocardiac density, diarrhea, weakness and shortness of breath COMPARISON: 3/2/2022 TECHNIQUE: Axial CT imaging of the chest, abdomen, and pelvis was performed without intravenous contrast. Multiplanar reformats were generated.  One or more dose reduction techniques were used on this CT: automated exposure control, adjustment of the mAs and/or kVp according to patient size, and iterative reconstruction techniques. The specific techniques used on this CT exam have been documented in the patient's electronic medical record. Digital Imaging and Communications in Medicine (DICOM) format image data are available to nonaffiliated external healthcare facilities or entities on a secure, media free, reciprocally searchable basis with patient authorization for at least a 12-month period after this study. ________________ FINDINGS: LIMITATIONS:   > Suboptimal evaluation given lack of intravenous contrast.   > Motion artifact is present which limits evaluation.   > Suboptimal exam due to patient body habitus and arms by the side. CHEST: LUNGS: Respiratory motion significantly limits evaluation. Interlobar septal thickening in the upper lobes. Mild bilateral dependent atelectasis. No large consolidation or suspicious pulmonary mass. PLEURA: Very small volume bilateral pleural effusions. AIRWAY: Normal. MEDIASTINUM: Four-chamber cardiomegaly with asymmetric biatrial enlargement, mitral and aortic annular calcifications. The main pulmonary artery is enlarged suggesting pulmonary arterial hypertension. Normal caliber aorta with scattered calcified atherosclerotic plaque. No pericardial effusion. LYMPH NODES: No enlarged lymph nodes. CHEST WALL: Diffuse anasarca. Heterogeneous thyroid. _______________ ABDOMEN/PELVIS: LIVER: No enhancing hepatic mass. The portal and hepatic veins are patent. BILIARY: Gallstones are present in the nondistended gallbladder lumen. No biliary dilation. SPLEEN: Normal. PANCREAS: Normal. ADRENALS: Left adrenal gland measures 9 HU (axial image 76), most consistent with lipid rich adenoma. Normal right adrenal gland. KIDNEYS: Asymmetrically small left kidney. No rate of a stone. No hydronephrosis. GI TRACT:  A small hiatal hernia is present. Normal caliber small and large bowel loops. No morphology of bowel obstruction. Normal appendix. BLADDER: Diffuse wall thickening in the largely decompressed bladder. PELVIC ORGANS: No acute abnormality. VASCULATURE: No arterial aneurysm. 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 LYMPH NODES: No mesenteric or retroperitoneal lymphadenopathy. OTHER: No free intraperitoneal air. No ascites. Diffuse anasarca. OSSEOUS STRUCTURES: No acute osseous abnormality. Multilevel degenerative changes most pronounced in the lumbar spine. Degenerative changes in both shoulders (right greater than left). Please note the included portions of the upper extremities are not well evaluated on this study _______________     1. Findings of congestive heart failure with cardiomegaly, interstitial edema, very small bilateral pleural effusions, and diffuse anasarca. 2.  Bladder wall thickening may be due to underdistention though superimposed infection cannot be excluded. Recommend correlation for cystitis. 3.  Osseous degenerative changes and additional findings, as detailed in the body of the report. XR CHEST PORT    Result Date: 3/6/2022  EXAM: PORTABLE CHEST HISTORY: Shortness of breath. COMPARISON: 3/2/2022 TECHNIQUE: Single portable view. _______________ FINDINGS: SUPPORT DEVICES: None HEART AND MEDIASTINUM: Moderate cardiomegaly. LUNGS AND PLEURAL SPACES: Subsegmental atelectasis left base. Possible small effusions. BONES AND SOFT TISSUES: Dextroscoliosis. _______________     Subsegmental atelectasis left base. Possible small effusions. Moderate cardiomegaly without change. XR CHEST PORT    Result Date: 3/2/2022  EXAM:  AP Portable Chest X-ray 1 view INDICATION: Shortness of breath COMPARISON: April 23, 2021 _______________ FINDINGS: Cardiomegaly and mediastinal contours are within normal limits for portable radiograph. There is increased right retrocardiac/right paraspinal density. There are no pleural effusions. No acute osseous findings.  ________________      Increased right retrocardiac density which may represent airspace disease or underlying pulmonary nodule. Recommend CT chest for further evaluation. Hip x-ray 3/11/2022:  Status post total right hip arthroplasty, without complication. ECHO ADULT COMPLETE  Result Date: 3/3/2022    Left Ventricle: Left ventricle size is normal. Moderately increased wall thickness. Findings consistent with moderate concentric hypertrophy. Normal wall motion. Normal left ventricular systolic function with a visually estimated EF of 55 - 60%.   Left Atrium: Left atrium is mildly dilated.   Right Ventricle: Right ventricle is mildly dilated.   Right Atrium: Right atrium is mildly dilated.   Pulmonary artery :  Estimated pulmonary arterial systolic pressure is 51 mmhg. Pulmonary hypertension found to be moderate   Mitral Valve: Moderately thickened leaflet. Mildly calcified leaflet. Moderate annular calcification of the mitral valve.   IVC/SVC : IVC diameter is greater than 21 mm and decreases less than 50% during inspiration; therefore the estimated right atrial pressure is elevated (~15 mmHg). IVC is dilated. Consider LORRAINE for better evaluation of mitral valve if clinically indicated. DUPLEX LOWER EXT VENOUS BILAT    Result Date: 3/4/2022  · No evidence of deep vein thrombosis in the right lower extremity. · No evidence of deep vein thrombosis in the left lower extremity. ECHO 3/3/2022: Result status: Final result       Left Ventricle: Left ventricle size is normal. Moderately increased wall thickness. Findings consistent with moderate concentric hypertrophy. Normal wall motion. Normal left ventricular systolic function with a visually estimated EF of 55 - 60%.   Left Atrium: Left atrium is mildly dilated.   Right Ventricle: Right ventricle is mildly dilated.   Right Atrium: Right atrium is mildly dilated.   Pulmonary artery :  Estimated pulmonary arterial systolic pressure is 51 mmhg.   Pulmonary hypertension found to be moderate    Mitral Valve: Moderately thickened leaflet. Mildly calcified leaflet. Moderate annular calcification of the mitral valve.   IVC/SVC : IVC diameter is greater than 21 mm and decreases less than 50% during inspiration; therefore the estimated right atrial pressure is elevated (~15 mmHg). IVC is dilated.     Consider LORRAINE for better evaluation of mitral valve if clinically indicated. Images report reviewed by me:  CT (Most Recent) (CT chest reviewed by me) Results from Hospital Encounter encounter on 03/02/22    CT HEAD WO CONT    Narrative  EXAM: CT head    INDICATION: Headache. COMPARISON: 3/7/2022    TECHNIQUE: Axial CT imaging of the head was performed without intravenous  contrast. Standard multiplanar coronal and sagittal reformatted images were  obtained and are included in interpretation. One or more dose reduction techniques were used on this CT: automated exposure  control, adjustment of the mAs and/or kVp according to patient size, and  iterative reconstruction techniques. The specific techniques used on this CT  exam have been documented in the patient's electronic medical record. Digital  Imaging and Communications in Medicine (DICOM) format image data are available  to nonaffiliated external healthcare facilities or entities on a secure, media  free, reciprocally searchable basis with patient authorization for at least a  12-month period after this study. _______________    FINDINGS:    BRAIN AND POSTERIOR FOSSA: No evidence of acute large vessel transcortical  infarct or acute parenchymal hemorrhage. No midline shift or hydrocephalus. EXTRA-AXIAL SPACES AND MENINGES: There are no abnormal extra-axial fluid  collections. CALVARIUM: Intact. SINUSES: Clear. OTHER: None.    _______________    Impression  No acute intracranial abnormality. CXR reviewed by me:  XR (Most Recent).  CXR  reviewed by me and compared with previous CXR Results from [facility] encounter on 03/02/22    XR ABD (KUB)    Narrative  EXAM: SUPINE ABDOMINAL RADIOGRAPH    CLINICAL INDICATION/HISTORY: og tube placement  -Additional: None    COMPARISON: None    TECHNIQUE: Single supine view of the abdomen.    _______________    FINDINGS:    BOWEL GAS PATTERN: Enteric tube tip within the distal stomach. No evidence of  obstruction. No visible free air, given limitations of supine view. BONES: Scoliosis and multilevel degenerative changes. Right hip prosthesis. OTHER: None.    _______________    Impression  No significant abnormality. CXR 3/11/2022  Comparison : 03/10/22      FINDINGS:     HEART AND MEDIASTINUM: Enlarged cardiopericardial silhouette. Aortic vascular  calcification. Endotracheal tube tip estimated at 3.5 cm above bella.     LUNGS AND PLEURAL SPACES: No consolidation, congestive heart failure, pleural  effusion or pneumothorax.     BONY THORAX AND SOFT TISSUES: Degenerative scoliosis.     IMPRESSION     No plain film evidence of acute cardiopulmonary disease, allowing for technique. ·Please note: Voice-recognition software may have been used to generate this report, which may have resulted in some phonetic-based errors in grammar and contents. Even though attempts were made to correct all the mistakes, some may have been missed, and remained in the body of the document.       Sherita Prince MD  3/12/2022

## 2022-03-13 ENCOUNTER — APPOINTMENT (OUTPATIENT)
Dept: GENERAL RADIOLOGY | Age: 82
DRG: 981 | End: 2022-03-13
Attending: INTERNAL MEDICINE
Payer: MEDICARE

## 2022-03-13 LAB
ALBUMIN SERPL-MCNC: 3.3 G/DL (ref 3.4–5)
ALBUMIN/GLOB SERPL: 1.2 {RATIO} (ref 0.8–1.7)
ALP SERPL-CCNC: 128 U/L (ref 45–117)
ALT SERPL-CCNC: 39 U/L (ref 13–56)
ANION GAP SERPL CALC-SCNC: 4 MMOL/L (ref 3–18)
ARTERIAL PATENCY WRIST A: POSITIVE
AST SERPL-CCNC: 54 U/L (ref 10–38)
BACTERIA SPEC CULT: ABNORMAL
BACTERIA SPEC CULT: ABNORMAL
BASE EXCESS BLD CALC-SCNC: 9.5 MMOL/L
BASOPHILS # BLD: 0 K/UL (ref 0–0.1)
BASOPHILS NFR BLD: 0 % (ref 0–2)
BDY SITE: ABNORMAL
BILIRUB SERPL-MCNC: 1.4 MG/DL (ref 0.2–1)
BUN SERPL-MCNC: 98 MG/DL (ref 7–18)
BUN/CREAT SERPL: 45 (ref 12–20)
CALCIUM SERPL-MCNC: 10.4 MG/DL (ref 8.5–10.1)
CHLORIDE SERPL-SCNC: 103 MMOL/L (ref 100–111)
CO2 SERPL-SCNC: 37 MMOL/L (ref 21–32)
CREAT SERPL-MCNC: 2.17 MG/DL (ref 0.6–1.3)
DIFFERENTIAL METHOD BLD: ABNORMAL
EOSINOPHIL # BLD: 0.1 K/UL (ref 0–0.4)
EOSINOPHIL NFR BLD: 1 % (ref 0–5)
ERYTHROCYTE [DISTWIDTH] IN BLOOD BY AUTOMATED COUNT: 14.8 % (ref 11.6–14.5)
GAS FLOW.O2 O2 DELIVERY SYS: ABNORMAL L/MIN
GAS FLOW.O2 SETTING OXYMISER: 10 BPM
GLOBULIN SER CALC-MCNC: 2.7 G/DL (ref 2–4)
GLUCOSE SERPL-MCNC: 137 MG/DL (ref 74–99)
GRAM STN SPEC: ABNORMAL
HCO3 BLD-SCNC: 34 MMOL/L (ref 22–26)
HCT VFR BLD AUTO: 28.7 % (ref 35–45)
HGB BLD-MCNC: 9.2 G/DL (ref 12–16)
IMM GRANULOCYTES # BLD AUTO: 0.1 K/UL (ref 0–0.04)
IMM GRANULOCYTES NFR BLD AUTO: 1 % (ref 0–0.5)
LYMPHOCYTES # BLD: 1.1 K/UL (ref 0.9–3.6)
LYMPHOCYTES NFR BLD: 12 % (ref 21–52)
MAGNESIUM SERPL-MCNC: 2.1 MG/DL (ref 1.6–2.6)
MCH RBC QN AUTO: 31.1 PG (ref 24–34)
MCHC RBC AUTO-ENTMCNC: 32.1 G/DL (ref 31–37)
MCV RBC AUTO: 97 FL (ref 78–100)
MONOCYTES # BLD: 1 K/UL (ref 0.05–1.2)
MONOCYTES NFR BLD: 12 % (ref 3–10)
NEUTS SEG # BLD: 6.6 K/UL (ref 1.8–8)
NEUTS SEG NFR BLD: 75 % (ref 40–73)
NRBC # BLD: 0 K/UL (ref 0–0.01)
NRBC BLD-RTO: 0 PER 100 WBC
O2/TOTAL GAS SETTING VFR VENT: 40 %
PCO2 BLD: 45.1 MMHG (ref 35–45)
PEEP RESPIRATORY: 5 CMH2O
PH BLD: 7.48 [PH] (ref 7.35–7.45)
PLATELET # BLD AUTO: 178 K/UL (ref 135–420)
PMV BLD AUTO: 10.5 FL (ref 9.2–11.8)
PO2 BLD: 108 MMHG (ref 80–100)
POTASSIUM SERPL-SCNC: 3.7 MMOL/L (ref 3.5–5.5)
PROT SERPL-MCNC: 6 G/DL (ref 6.4–8.2)
RBC # BLD AUTO: 2.96 M/UL (ref 4.2–5.3)
SAO2 % BLD: 98.5 % (ref 92–97)
SERVICE CMNT-IMP: ABNORMAL
SERVICE CMNT-IMP: ABNORMAL
SODIUM SERPL-SCNC: 144 MMOL/L (ref 136–145)
SPECIMEN TYPE: ABNORMAL
VENTILATION MODE VENT: ABNORMAL
VT SETTING VENT: 375 ML
WBC # BLD AUTO: 8.8 K/UL (ref 4.6–13.2)

## 2022-03-13 PROCEDURE — 74011250637 HC RX REV CODE- 250/637: Performed by: INTERNAL MEDICINE

## 2022-03-13 PROCEDURE — 74011000250 HC RX REV CODE- 250: Performed by: INTERNAL MEDICINE

## 2022-03-13 PROCEDURE — P9047 ALBUMIN (HUMAN), 25%, 50ML: HCPCS | Performed by: FAMILY MEDICINE

## 2022-03-13 PROCEDURE — 80053 COMPREHEN METABOLIC PANEL: CPT

## 2022-03-13 PROCEDURE — 36415 COLL VENOUS BLD VENIPUNCTURE: CPT

## 2022-03-13 PROCEDURE — 74011000250 HC RX REV CODE- 250: Performed by: STUDENT IN AN ORGANIZED HEALTH CARE EDUCATION/TRAINING PROGRAM

## 2022-03-13 PROCEDURE — 74011250636 HC RX REV CODE- 250/636: Performed by: STUDENT IN AN ORGANIZED HEALTH CARE EDUCATION/TRAINING PROGRAM

## 2022-03-13 PROCEDURE — 99232 SBSQ HOSP IP/OBS MODERATE 35: CPT | Performed by: INTERNAL MEDICINE

## 2022-03-13 PROCEDURE — 71045 X-RAY EXAM CHEST 1 VIEW: CPT

## 2022-03-13 PROCEDURE — 74011250636 HC RX REV CODE- 250/636: Performed by: INTERNAL MEDICINE

## 2022-03-13 PROCEDURE — 82803 BLOOD GASES ANY COMBINATION: CPT

## 2022-03-13 PROCEDURE — 74011250637 HC RX REV CODE- 250/637: Performed by: PHYSICIAN ASSISTANT

## 2022-03-13 PROCEDURE — 65610000006 HC RM INTENSIVE CARE

## 2022-03-13 PROCEDURE — 85025 COMPLETE CBC W/AUTO DIFF WBC: CPT

## 2022-03-13 PROCEDURE — 83735 ASSAY OF MAGNESIUM: CPT

## 2022-03-13 PROCEDURE — 74011250637 HC RX REV CODE- 250/637: Performed by: FAMILY MEDICINE

## 2022-03-13 PROCEDURE — 94003 VENT MGMT INPAT SUBQ DAY: CPT

## 2022-03-13 PROCEDURE — 74011250636 HC RX REV CODE- 250/636: Performed by: FAMILY MEDICINE

## 2022-03-13 PROCEDURE — 36600 WITHDRAWAL OF ARTERIAL BLOOD: CPT

## 2022-03-13 PROCEDURE — 97163 PT EVAL HIGH COMPLEX 45 MIN: CPT

## 2022-03-13 RX ORDER — LEVOFLOXACIN 5 MG/ML
750 INJECTION, SOLUTION INTRAVENOUS
Status: DISCONTINUED | OUTPATIENT
Start: 2022-03-14 | End: 2022-03-14

## 2022-03-13 RX ORDER — LEVOFLOXACIN 5 MG/ML
500 INJECTION, SOLUTION INTRAVENOUS
Status: DISCONTINUED | OUTPATIENT
Start: 2022-03-13 | End: 2022-03-13 | Stop reason: DRUGHIGH

## 2022-03-13 RX ORDER — LEVOFLOXACIN 5 MG/ML
500 INJECTION, SOLUTION INTRAVENOUS ONCE
Status: COMPLETED | OUTPATIENT
Start: 2022-03-13 | End: 2022-03-14

## 2022-03-13 RX ORDER — LEVOFLOXACIN 5 MG/ML
250 INJECTION, SOLUTION INTRAVENOUS EVERY 24 HOURS
Status: DISCONTINUED | OUTPATIENT
Start: 2022-03-14 | End: 2022-03-13

## 2022-03-13 RX ADMIN — SODIUM CHLORIDE, PRESERVATIVE FREE 10 ML: 5 INJECTION INTRAVENOUS at 06:51

## 2022-03-13 RX ADMIN — CARVEDILOL 3.12 MG: 3.12 TABLET, FILM COATED ORAL at 17:39

## 2022-03-13 RX ADMIN — FERROUS SULFATE TAB 325 MG (65 MG ELEMENTAL FE) 325 MG: 325 (65 FE) TAB at 21:50

## 2022-03-13 RX ADMIN — APIXABAN 2.5 MG: 2.5 TABLET, FILM COATED ORAL at 21:35

## 2022-03-13 RX ADMIN — ACETAZOLAMIDE SODIUM 500 MG: 500 INJECTION, POWDER, LYOPHILIZED, FOR SOLUTION INTRAVENOUS at 21:35

## 2022-03-13 RX ADMIN — SODIUM CHLORIDE, PRESERVATIVE FREE 10 ML: 5 INJECTION INTRAVENOUS at 13:28

## 2022-03-13 RX ADMIN — ACETAZOLAMIDE SODIUM 500 MG: 500 INJECTION, POWDER, LYOPHILIZED, FOR SOLUTION INTRAVENOUS at 13:27

## 2022-03-13 RX ADMIN — ASPIRIN 81 MG: 81 TABLET, COATED ORAL at 08:07

## 2022-03-13 RX ADMIN — NYSTATIN 500000 UNITS: 100000 SUSPENSION ORAL at 08:07

## 2022-03-13 RX ADMIN — ALBUMIN (HUMAN) 25 G: 0.25 INJECTION, SOLUTION INTRAVENOUS at 00:45

## 2022-03-13 RX ADMIN — ACETAMINOPHEN 650 MG: 325 TABLET ORAL at 13:27

## 2022-03-13 RX ADMIN — LEVOFLOXACIN 500 MG: 5 INJECTION, SOLUTION INTRAVENOUS at 09:55

## 2022-03-13 RX ADMIN — DEXTROSE AND SODIUM CHLORIDE 50 ML/HR: 5; 450 INJECTION, SOLUTION INTRAVENOUS at 06:54

## 2022-03-13 RX ADMIN — CARVEDILOL 3.12 MG: 3.12 TABLET, FILM COATED ORAL at 08:07

## 2022-03-13 RX ADMIN — FAMOTIDINE 20 MG: 20 TABLET ORAL at 08:07

## 2022-03-13 RX ADMIN — ACETAMINOPHEN 650 MG: 325 TABLET ORAL at 00:46

## 2022-03-13 RX ADMIN — NYSTATIN 500000 UNITS: 100000 SUSPENSION ORAL at 17:39

## 2022-03-13 RX ADMIN — ACETAMINOPHEN 650 MG: 325 TABLET ORAL at 17:39

## 2022-03-13 RX ADMIN — NYSTATIN 500000 UNITS: 100000 SUSPENSION ORAL at 13:27

## 2022-03-13 RX ADMIN — FAMOTIDINE 20 MG: 10 INJECTION, SOLUTION INTRAVENOUS at 08:07

## 2022-03-13 RX ADMIN — NYSTATIN 500000 UNITS: 100000 SUSPENSION ORAL at 21:35

## 2022-03-13 RX ADMIN — CHLORHEXIDINE GLUCONATE 10 ML: 1.2 RINSE ORAL at 21:33

## 2022-03-13 RX ADMIN — FERROUS SULFATE TAB 325 MG (65 MG ELEMENTAL FE) 325 MG: 325 (65 FE) TAB at 08:07

## 2022-03-13 RX ADMIN — APIXABAN 2.5 MG: 2.5 TABLET, FILM COATED ORAL at 08:07

## 2022-03-13 RX ADMIN — ACETAMINOPHEN 650 MG: 325 TABLET ORAL at 06:50

## 2022-03-13 RX ADMIN — POTASSIUM BICARBONATE 20 MEQ: 782 TABLET, EFFERVESCENT ORAL at 06:50

## 2022-03-13 RX ADMIN — CHLORHEXIDINE GLUCONATE 10 ML: 1.2 RINSE ORAL at 08:07

## 2022-03-13 RX ADMIN — SODIUM CHLORIDE, PRESERVATIVE FREE 10 ML: 5 INJECTION INTRAVENOUS at 21:50

## 2022-03-13 RX ADMIN — FERROUS SULFATE TAB 325 MG (65 MG ELEMENTAL FE) 325 MG: 325 (65 FE) TAB at 17:39

## 2022-03-13 NOTE — PROGRESS NOTES
P[facility] Specialists  Pulmonary, Critical Care, and Sleep Medicine    Name: Sachi Guzman MRN: [id number]   : 1940 Hospital: [facility] [place]    Date: 3/13/2022  Room: [address] [place] [address] Note                                              Consult requesting physician: Dr. Dang Vaughan  Reason for Consult: Respiratory failure    IMPRESSION:   Acute respiratory failure with hypoxemia. Acute on chronic diastolic congestive heart failure. Fracture of neck of right femur. SERENA. Active Hospital Problems    Diagnosis Date Noted    Fracture of neck of right femur ([facility] 2022    Stage 3 chronic kidney disease ([facility] 2022    SERENA (obstructive sleep apnea) 2022    Adult BMI 39.0-39.9 kg/sq m 2022    Acute respiratory failure with hypoxemia ([facility]) 2022    Atrial fibrillation ([facility] 2022    Acute on chronic diastolic congestive heart failure ([facility] 2021    Elevated troponin 2021    HTN (hypertension) 2021   ·      Patient Active Problem List   Diagnosis Code    Fatigue R53.83    Elevated troponin R77.8    Obesity (BMI 30-39. 9) E66.9    HTN (hypertension) I10    SERENA treated with BiPAP G47.33    Acute on chronic diastolic congestive heart failure (HCC) I50.33    Acute respiratory failure with hypoxemia ([facility]) J96.01    Atrial fibrillation ([facility]) I48.91    Stage 3 chronic kidney disease (HCC) N18.30    SERENA (obstructive sleep apnea) G47.33    Adult BMI 39.0-39.9 kg/sq m Z68.39    Fracture of neck of right femur ([facility]) S72.001A         · Code status: Full Code       RECOMMENDATIONS:     Respiratory: History of obesity, SERENA on BiPAP. Postoperative respiratory failure, reintubated in PACU 3/10/2022. CXR 3/13/2022: ETT and OGT in good position. Mild interval increase in right basilar atelectasis and small right pleural effusion. Cardiomegaly. ABG: Improved metabolic alkalosis with respiratory compensation. pH is 7.48;   But improved. Currently on [facility] 10/375/35/5. See renal section for metabolic alkalosis management. Sedation: Remains off propofol since 3/11/2022 morning. Use fentanyl/Versed as needed boluses. Failed SBT in 5 minutes today with tachypnea, tachycardia, low tidal volume. She is more awake than yesterday. ABG is improving. Continue daily SBT attempt. Ventilator bundle & Sedation protocol followed. Daily sedation holiday, assessment for readiness for SBT and then re-titrate if required. Chlorhexidine mouth washes. Keep SPO2 >=92%. HOB 30 degree elevation all the time. Aggressive pulmonary toileting. Aspiration precautions. CVS: New onset A. fib on admission, chronic diastolic CHF. Continue aspirin. Continue Coreg but hold for SBP less than 100. Continue Eliquis; monitor for any external bleeding. Lasix held due to metabolic alkalosis since 2/57/3276; and received Diamox 250 mg every 12 hours x2 doses on 3/12/2022; with improved ABG. Cardiologist on board. Echo 3/3/2022: LVEF 55 to 60%. RV mildly dilated. RA mildly dilated. PVL LE 3/3/2022: No DVT. VQ scan 3/3/2022: No PE.    ID:   No fever or leukocytosis. Rapid influenza and COVID19 3/2/2022: Negative. Urine culture 3/2/2022: Klebsiella pneumonia. Blood culture 3/2/2022: Negative. Endotracheal aspirate culture: Light Enterobacter cloacae complex. Heavy normal forrest. Antibiotics: Rocephin was discontinued on 3/12/2022 (Enterobacter is resistant to Rocephin). Start Levaquin 500 mg every 48 hours for 5 days for above-mentioned sputum culture; renally adjusted dose; pharmacy consult placed for renal dosing of antibiotics. Hematology/Oncology:   Anemia; but no external bleeding. Normal platelets. Renal:   Mild TK; likely due to diuretics use. S/p mild hypernatremia; resolved.   Lasix held due to metabolic alkalosis since 3/41/7177; and received Diamox 250 mg every 12 hours x2 doses on 3/12/2022 and on D5 0.45 NS at 50 mill per hour; with improved ABG. Dr. Kendell Grimm on board. GI/: No acute issues. Endocrine: Monitor BS. SSI. Neurology:   Patient was lethargic and confused before going for hip surgery on 3/10/2022 morning. Patient remains off propofol since 3/11/2022; mental status is improving since improvement in ABG. Patient is more alert and awake today. CT head 3/7/2022: Nil acute. Repeat CT head 3/11/2022: Nil acute. Psychiatry: No acute issues. Toxicology: No acute issues. Pain/Sedation: Sedation protocol as above    Skin/Wound: Right hip wound VAC in place after surgery; local care per nursing protocol. Electrolytes: Replace electrolytes per ICU electrolyte replacement protocol. IVF: D5 0.45 NS at 50 mill per hour. Nutrition: Tolerating OGT feed at 40 mL/h. Prophylaxis: DVT Prophylaxis: Eliquis. GI Prophylaxis: Protonix. Restraints: wrist soft restraints for patient interfering with medical therapy/management and patient safety. Lines/Tubes: PIV  ETT: 3/10/2022. OGT: 3/11/2022. [place]: 3/10/2022 (Medically necessary for strict input/output monitoring in critically ill patient, will remove it when not needed. [place] bundle followed). Advance Directive/Palliative Care: Consulted. Will defer respective systems problem management to primary and other respective consultant and follow patient in ICU with primary and other medical team.  Further recommendations will be based on the patient's response to recommended treatment and results of the investigation ordered. Quality Care: PPI, DVT prophylaxis, HOB elevated, Infection control all reviewed and addressed. Care of plan d/w RN, RT, hospitalist team.    High complexity decision making was performed during the evaluation of this patient at high risk for decompensation with multiple organ involvement.     Total critical care time spent rendering care exclusive of procedures/family discussion/coordination of care: 36 minutes. Subjective/History of Present Illness:     Patient is a 80 y.o. female with PMHx significant for morbid obesity, SERENA, right leg weakness, HTN, A. fib, CHF; admitted to medical floor on 3/3/2022 for dyspnea. Patient had new onset of A. fib on admission. Patient was admitted on medical floor and A. fib/CHF was being treated medically; and was using home BiPAP nightly. Found to have right femoral fracture; underwent right press-fit direct anterior total hip arthroplasty for femoral neck fracture. Postoperatively patient was extubated in PACU; but due to respiratory distress, reintubated and transferred to ICU.      3/13/2022 :     Remains in . Remains off propofol since 3/11/2022 morning; more awake. Remains on ventilator. ABG improving. Failed SBT again today. Hemodynamically stable. No significant ETT secretions at present. No fever or leukocytosis. No vomiting or diarrhea. Tolerating tube feed at 40 mL/h. No other overnight issues reported. [facility] was not called for any issues overnight. I/O last 24 hrs: Intake/Output Summary (Last 24 hours) at 3/13/2022 0924  Last data filed at 3/13/2022 0530  Gross per 24 hour   Intake 1675.83 ml   Output 1130 ml   Net 545.83 ml         The patient is critically ill and can not provide additional history due to Ventilated    History taken from Dr. Ankur Harrington, EMR     Review of Systems:  ROS not obtained due to patient factor. Allergies   Allergen Reactions    Seafood Hives     crabs    Statins-Hmg-Coa Reductase Inhibitors Unknown (comments)    Sulfa (Sulfonamide Antibiotics) Hives      Past Medical History:   Diagnosis Date    Hypertension     Sleep disorder       Past Surgical History:   Procedure Laterality Date    HX ORTHOPAEDIC      broken right ankle, left femur 30 yrs ago      Social History     Tobacco Use    Smoking status: Never Smoker    Smokeless tobacco: Never Used   Substance Use Topics    Alcohol use:  Yes Alcohol/week: 1.0 standard drink     Types: 1 Glasses of wine per week     Comment: soc      Family History   Problem Relation Age of Onset    Diabetes Mother     Heart Disease Mother     Heart Disease Father       Prior to Admission medications    Medication Sig Start Date End Date Taking? Authorizing Provider   allopurinoL (ZYLOPRIM) 100 mg tablet Take 100 mg by mouth daily. Yes Provider, Historical   acetaminophen (TYLENOL) 325 mg tablet Take 650 mg by mouth every six (6) hours as needed for Pain. Yes Provider, Historical   niacin (NIASPAN) 1,000 mg Tb24 tab Take 1,000 mg by mouth daily. Yes Provider, Historical   trolamine salicylate-aloe vera 45% (ASPERCREME) topical cream Apply 2 g to affected area as needed for Pain. Yes Provider, Historical   multivitamin, tx-iron-ca-min (THERA-M w/ IRON) 9 mg iron-400 mcg tab tablet Take 1 Tab by mouth daily. Yes Provider, Historical   aspirin delayed-release 81 mg tablet Take 81 mg by mouth daily. Yes Provider, Historical   potassium chloride SR (KLOR-CON 10) 10 mEq tablet Take 10 mEq by mouth daily. Yes Provider, Historical   carvediloL (COREG) 3.125 mg tablet Take 3.125 mg by mouth daily.    Yes Provider, Historical     Current Facility-Administered Medications   Medication Dose Route Frequency    dextrose 5 % - 0.45% NaCl infusion  50 mL/hr IntraVENous CONTINUOUS    famotidine (PF) (PEPCID) injection 20 mg  20 mg IntraVENous DAILY    acetaminophen (TYLENOL) tablet 650 mg  650 mg Oral Q6H    ferrous sulfate tablet 325 mg  1 Tablet Oral TID    famotidine (PEPCID) tablet 20 mg  20 mg Oral DAILY    chlorhexidine (PERIDEX) 0.12 % mouthwash 10 mL  10 mL Oral Q12H    nystatin (MYCOSTATIN) 100,000 unit/mL oral suspension 500,000 Units  500,000 Units Oral QID    apixaban (ELIQUIS) tablet 2.5 mg  2.5 mg Oral BID    sodium chloride (NS) flush 5-40 mL  5-40 mL IntraVENous Q8H    carvediloL (COREG) tablet 3.125 mg  3.125 mg Oral BID WITH MEALS    aspirin delayed-release tablet 81 mg  81 mg Oral DAILY         Objective:   Vital Signs:    Visit Vitals  BP (!) 126/108   Pulse 95   Temp 98.4 °F (36.9 °C)   Resp 17   Ht 5' 5\" (1.651 m)   Wt 111.1 kg (244 lb 14.9 oz)   SpO2 100%   Breastfeeding No   BMI 40.76 kg/m²       O2 Device: Endotracheal tube,Ventilator   O2 Flow Rate (L/min): 2 l/min   Temp (24hrs), Av.1 °F (36.7 °C), Min:97.5 °F (36.4 °C), Max:98.5 °F (36.9 °C)       Intake/Output:   Last shift:      No intake/output data recorded. Last 3 shifts:  1901 -  0700  In: 3222.1 [I.V.:1787.1]  Out: 1555 [Urine:1555]      Intake/Output Summary (Last 24 hours) at 3/13/2022 0924  Last data filed at 3/13/2022 0530  Gross per 24 hour   Intake 1675.83 ml   Output 1130 ml   Net 545.83 ml       Last 3 Recorded Weights in this Encounter    22 0454 22 0459 22 0750   Weight: 105.1 kg (231 lb 9.6 oz) 104.2 kg (229 lb 12.8 oz) 111.1 kg (244 lb 14.9 oz)         Ventilator Settings:  Mode Rate Tidal Volume Pressure FiO2 PEEP   Assist control,VC+   375 ml    35 % 5 cm H20     Peak airway pressure: 19 cm H2O    Plateau pressure:     Tidal volume:    Minute ventilation: 5.81 l/min     Recent Labs     22  0440 22  0513 22  0454   PHI 7.48* 7.56* 7.69*   PCO2I 45.1* 46.2* 32.9*   PO2I 108* 75* 101*   HCO3I 34.0* 41.5* 39.3*   FIO2I 40 40 50       Physical Exam:       General/Neurology: Alert, more awake today; moving all 4 extremities. NAD. On ventilator. Not sedated. Head:   NCAT. Eye:   EOM intact. No icterus/pallor/cyanosis. Nose:   No nasal drainage/discharge. Neck:   Trachea midline. Lung: Moderate air entry bilateral equal.  No rales, rhonchi. No wheezing or stridor. No prolonged expiration or accessory muscle use. Heart:   S1 S2 present. No murmur or JVD. Abdomen:  Soft. NT. ND. No palpable masses. Extremities:  No edema. No cyanosis or clubbing. Right hip surgical site wound VAC dressing intact.   Pulses: 2+ and symmetric in DP. Data:       Recent Results (from the past 24 hour(s))   MAGNESIUM    Collection Time: 03/13/22  4:30 AM   Result Value Ref Range    Magnesium 2.1 1.6 - 2.6 mg/dL   CBC WITH AUTOMATED DIFF    Collection Time: 03/13/22  4:30 AM   Result Value Ref Range    WBC 8.8 4.6 - 13.2 K/uL    RBC 2.96 (L) 4.20 - 5.30 M/uL    HGB 9.2 (L) 12.0 - 16.0 g/dL    HCT 28.7 (L) 35.0 - 45.0 %    MCV 97.0 78.0 - 100.0 FL    MCH 31.1 24.0 - 34.0 PG    MCHC 32.1 31.0 - 37.0 g/dL    RDW 14.8 (H) 11.6 - 14.5 %    PLATELET 644 397 - 419 K/uL    MPV 10.5 9.2 - 11.8 FL    NRBC 0.0 0  WBC    ABSOLUTE NRBC 0.00 0.00 - 0.01 K/uL    NEUTROPHILS 75 (H) 40 - 73 %    LYMPHOCYTES 12 (L) 21 - 52 %    MONOCYTES 12 (H) 3 - 10 %    EOSINOPHILS 1 0 - 5 %    BASOPHILS 0 0 - 2 %    IMMATURE GRANULOCYTES 1 (H) 0.0 - 0.5 %    ABS. NEUTROPHILS 6.6 1.8 - 8.0 K/UL    ABS. LYMPHOCYTES 1.1 0.9 - 3.6 K/UL    ABS. MONOCYTES 1.0 0.05 - 1.2 K/UL    ABS. EOSINOPHILS 0.1 0.0 - 0.4 K/UL    ABS. BASOPHILS 0.0 0.0 - 0.1 K/UL    ABS. IMM. GRANS. 0.1 (H) 0.00 - 0.04 K/UL    DF AUTOMATED     METABOLIC PANEL, COMPREHENSIVE    Collection Time: 03/13/22  4:30 AM   Result Value Ref Range    Sodium 144 136 - 145 mmol/L    Potassium 3.7 3.5 - 5.5 mmol/L    Chloride 103 100 - 111 mmol/L    CO2 37 (H) 21 - 32 mmol/L    Anion gap 4 3.0 - 18 mmol/L    Glucose 137 (H) 74 - 99 mg/dL    BUN 98 (H) 7.0 - 18 MG/DL    Creatinine 2.17 (H) 0.6 - 1.3 MG/DL    BUN/Creatinine ratio 45 (H) 12 - 20      GFR est AA 26 (L) >60 ml/min/1.73m2    GFR est non-AA 22 (L) >60 ml/min/1.73m2    Calcium 10.4 (H) 8.5 - 10.1 MG/DL    Bilirubin, total 1.4 (H) 0.2 - 1.0 MG/DL    ALT (SGPT) 39 13 - 56 U/L    AST (SGOT) 54 (H) 10 - 38 U/L    Alk.  phosphatase 128 (H) 45 - 117 U/L    Protein, total 6.0 (L) 6.4 - 8.2 g/dL    Albumin 3.3 (L) 3.4 - 5.0 g/dL    Globulin 2.7 2.0 - 4.0 g/dL    A-G Ratio 1.2 0.8 - 1.7     BLOOD GAS, ARTERIAL POC    Collection Time: 03/13/22  4:40 AM   Result Value Ref Range    Device: ADULT VENT      FIO2 (POC) 40 %    pH (POC) 7.48 (H) 7.35 - 7.45      pCO2 (POC) 45.1 (H) 35.0 - 45.0 MMHG    pO2 (POC) 108 (H) 80 - 100 MMHG    HCO3 (POC) 34.0 (H) 22 - 26 MMOL/L    sO2 (POC) 98.5 (H) 92 - 97 %    Base excess (POC) 9.5 mmol/L    Mode ASSIST CONTROL      Tidal volume 375 ml    Set Rate 10 bpm    PEEP/CPAP (POC) 5 cmH2O    Allens test (POC) Positive      Site LEFT RADIAL      Specimen type (POC) ARTERIAL      Performed by Bubba Cruz          Chemistry Recent Labs     03/13/22  0430 03/12/22  0506 03/11/22  0310   * 109* 94    146* 145   K 3.7 4.1 5.0    105 103   CO2 37* 38* 36*   BUN 98* 97* 74*   CREA 2.17* 2.05* 1.58*   CA 10.4* 10.4* 10.5*   MG 2.1 2.0 1.9   AGAP 4 3 6   BUCR 45* 47* 47*   * 94 103   TP 6.0* 5.5* 6.0*   ALB 3.3* 2.2* 2.8*   GLOB 2.7 3.3 3.2   AGRAT 1.2 0.7* 0.9        Lactic Acid No results found for: LAC  No results for input(s): LAC in the last 72 hours. Liver Enzymes Protein, total   Date Value Ref Range Status   03/13/2022 6.0 (L) 6.4 - 8.2 g/dL Final     Albumin   Date Value Ref Range Status   03/13/2022 3.3 (L) 3.4 - 5.0 g/dL Final     Globulin   Date Value Ref Range Status   03/13/2022 2.7 2.0 - 4.0 g/dL Final     A-G Ratio   Date Value Ref Range Status   03/13/2022 1.2 0.8 - 1.7   Final     Alk.  phosphatase   Date Value Ref Range Status   03/13/2022 128 (H) 45 - 117 U/L Final     Recent Labs     03/13/22  0430 03/12/22  0506 03/11/22  0310   TP 6.0* 5.5* 6.0*   ALB 3.3* 2.2* 2.8*   GLOB 2.7 3.3 3.2   AGRAT 1.2 0.7* 0.9   * 94 103        CBC w/Diff Recent Labs     03/13/22  0430 03/12/22  0506 03/11/22  0310   WBC 8.8 9.6 10.7   RBC 2.96* 2.96* 4.11*   HGB 9.2* 9.5* 12.9   HCT 28.7* 29.1* 41.1    203 205   GRANS 75* 74* 71   LYMPH 12* 14* 12*   EOS 1 0 0        Cardiac Enzymes No results found for: CPK, CK, CKMMB, CKMB, RCK3, CKMBT, CKNDX, CKND1, FARA, TROPT, TROIQ, ADOLFO, TROPT, TNIPOC, BNP, BNPP     BNP No results found for: BNP, BNPP, XBNPT     Coagulation No results for input(s): PTP, INR, APTT, INREXT, INREXT in the last 72 hours. Thyroid  Lab Results   Component Value Date/Time    TSH 0.58 03/03/2022 02:21 AM       No results found for: T4     Urinalysis Lab Results   Component Value Date/Time    Color YELLOW 03/02/2022 09:04 PM    Appearance CLOUDY 03/02/2022 09:04 PM    Specific gravity 1.021 03/02/2022 09:04 PM    pH (UA) 5.0 03/02/2022 09:04 PM    Protein 300 (A) 03/02/2022 09:04 PM    Glucose Negative 03/02/2022 09:04 PM    Ketone TRACE (A) 03/02/2022 09:04 PM    Bilirubin Negative 03/02/2022 09:04 PM    Urobilinogen 1.0 03/02/2022 09:04 PM    Nitrites Negative 03/02/2022 09:04 PM    Leukocyte Esterase Negative 03/02/2022 09:04 PM    Epithelial cells 2+ 03/02/2022 09:04 PM    Bacteria FEW (A) 03/02/2022 09:04 PM    WBC 0 to 3 03/02/2022 09:04 PM    RBC 0 to 3 03/02/2022 09:04 PM        Micro  Recent Labs     03/10/22  1630   CULT LIGHT ENTEROBACTER CLOACAE COMPLEX*  HEAVY NORMAL RESPIRATORY ELSIE     Recent Labs     03/10/22  1630   CULT LIGHT ENTEROBACTER CLOACAE COMPLEX*  HEAVY NORMAL RESPIRATORY ELSIE          Culture data during this hospitalization.    All Micro Results     Procedure Component Value Units Date/Time    CULTURE, RESPIRATORY/SPUTUM/BRONCH [place] Sharif STAIN [[id number]]  (Abnormal)  (Susceptibility) Collected: 03/10/22 1630    Order Status: Completed Specimen: Sputum from Endotracheal aspirate Updated: 03/13/22 0905     Special Requests: NO SPECIAL REQUESTS        GRAM STAIN NO WBC'S SEEN         NO EPITHELIAL CELLS SEEN         NO ORGANISMS SEEN        Culture result:       LIGHT ENTEROBACTER CLOACAE COMPLEX                  HEAVY NORMAL RESPIRATORY ELSIE          CULTURE, RESPIRATORY/SPUTUM/BRONCH Madelyn Bis [[id number]] Collected: 03/10/22 1915    Order Status: Canceled Specimen: Sputum from Endotracheal aspirate     CULTURE, BLOOD [[id number]] Collected: 03/02/22 2048    Order Status: Completed Specimen: Blood Updated: 03/08/22 0655     Special Requests: NO SPECIAL REQUESTS        Culture result: NO GROWTH 6 DAYS       CULTURE, BLOOD [[id number]] Collected: 03/02/22 2048    Order Status: Completed Specimen: Blood Updated: 03/08/22 0655     Special Requests: NO SPECIAL REQUESTS        Culture result: NO GROWTH 6 DAYS       CULTURE, URINE [[id number]]  (Abnormal)  (Susceptibility) Collected: 03/02/22 2104    Order Status: Completed Specimen: Cath Urine Updated: 03/05/22 1226     Special Requests: NO SPECIAL REQUESTS        Wilson Count --        >100,000  COLONIES/mL       Culture result: KLEBSIELLA PNEUMONIAE       COVID-19 RAPID TEST [[id number]] Collected: 03/02/22 2025    Order Status: Completed Specimen: Nasopharyngeal Updated: 03/02/22 2052     Specimen source Nasopharyngeal        COVID-19 rapid test Not detected        Comment: Rapid Abbott ID Now       Rapid NAAT:  The specimen is NEGATIVE for SARS-CoV-2, the novel coronavirus associated with COVID-19. Negative results should be treated as presumptive and, if inconsistent with clinical signs and symptoms or necessary for patient management, should be tested with an alternative molecular assay. Negative results do not preclude SARS-CoV-2 infection and should not be used as the sole basis for patient management decisions. This test has been authorized by the FDA under an Emergency Use Authorization (EUA) for use by authorized laboratories.    Fact sheet for Healthcare Providers: ConventionUpdate.co.nz  Fact sheet for Patients: ConventionUpdate.co.nz       Methodology: Isothermal Nucleic Acid Amplification         INFLUENZA A & B AG (RAPID TEST) [[id number]] Collected: 03/02/22 2025    Order Status: Completed Specimen: Nasopharyngeal from Nasal washing Updated: 03/02/22 2047     Influenza A Antigen Negative        Comment: A negative result does not exclude influenza virus infection, seasonal or H1N1 due to suboptimal sensitivity. If influenza is circulating in your community, a diagnosis of influenza should be considered based on a patients clinical presentation and empiric antiviral treatment should be considered, if indicated. Influenza B Antigen Negative                NM LUNG SCAN PERF  Result Date: 3/3/2022  EXAM: NM LUNG SCAN PERF. CLINICAL INDICATION/HISTORY: d dimer increase dyspnea. -Additional: Clinical concern for pulmonary embolus. COMPARISON: Correlation is made with the radiographic study performed one day prior. TECHNIQUE: 7.2 mCi Tc-99m MAA was injected intravenously and the 8 standard views of the chest were obtained. This perfusion only examination is interpreted using the PISAPED criteria. _______________ FINDINGS: Perfusion images show no segmental perfusion defects to suggest the presence of pulmonary embolism. _______________     o scintigraphic findings to suggest the presence of acute pulmonary embolism. _______________       XR HIP RT W OR WO PELV 2-3 VWS  Result Date: 3/7/2022  EXAM: RIGHT HIP RADIOGRAPHS CLINICAL INDICATION/HISTORY: right hip pain -Additional: None COMPARISON: May March 3, 2022. TECHNIQUE: AP pelvis and frog-leg lateral view of right hip. _______________ FINDINGS: BONES: Intact appearing ilioischial and iliopectineal lines. There is a displaced and impacted subcapital right femoral neck fracture, with mild progressive displacement on follow-up imaging. Mild-moderate bilateral hip joint arthrosis. SOFT TISSUES: Unremarkable. _______________     1. Displaced and impacted subcapital right femoral neck fracture, increased in displacement from CT performed 4 days prior. CT HEAD WO CONT  Result Date: 3/7/2022  EXAM: CT head INDICATION: Altered mental status. COMPARISON: 4/23/2021.  TECHNIQUE: Axial CT imaging of the head was performed without intravenous contrast. Standard multiplanar coronal and sagittal reformatted images were obtained and are included in interpretation. One or more dose reduction techniques were used on this CT: automated exposure control, adjustment of the mAs and/or kVp according to patient size, and iterative reconstruction techniques. The specific techniques used on this CT exam have been documented in the patient's electronic medical record. Digital Imaging and Communications in Medicine (DICOM) format image data are available to nonaffiliated external healthcare facilities or entities on a secure, media free, reciprocally searchable basis with patient authorization for at least a 12-month period after this study. _______________ FINDINGS: BRAIN AND POSTERIOR FOSSA: Periventricular white matter hypoattenuation which is nonspecific but likely represents chronic ischemic changes. No evidence of acute large vessel transcortical infarct or acute parenchymal hemorrhage. No midline shift or hydrocephalus. EXTRA-AXIAL SPACES AND MENINGES: There are no abnormal extra-axial fluid collections. CALVARIUM: Intact. SINUSES: Clear. OTHER: None. _______________     No acute intracranial abnormality. CT CHEST ABD PELV WO CONT  Result Date: 3/3/2022  EXAM: CT CHEST, ABDOMEN AND PELVIS CLINICAL INDICATION/HISTORY: Hypoxemia, retrocardiac density, diarrhea, weakness and shortness of breath COMPARISON: 3/2/2022 TECHNIQUE: Axial CT imaging of the chest, abdomen, and pelvis was performed without intravenous contrast. Multiplanar reformats were generated. One or more dose reduction techniques were used on this CT: automated exposure control, adjustment of the mAs and/or kVp according to patient size, and iterative reconstruction techniques. The specific techniques used on this CT exam have been documented in the patient's electronic medical record.  Digital Imaging and Communications in Medicine (DICOM) format image data are available to nonaffiliated external healthcare facilities or entities on a secure, media free, reciprocally searchable basis with patient authorization for at least a 12-month period after this study. ________________ FINDINGS: LIMITATIONS:   > Suboptimal evaluation given lack of intravenous contrast.   > Motion artifact is present which limits evaluation.   > Suboptimal exam due to patient body habitus and arms by the side. CHEST: LUNGS: Respiratory motion significantly limits evaluation. Interlobar septal thickening in the upper lobes. Mild bilateral dependent atelectasis. No large consolidation or suspicious pulmonary mass. PLEURA: Very small volume bilateral pleural effusions. AIRWAY: Normal. MEDIASTINUM: Four-chamber cardiomegaly with asymmetric biatrial enlargement, mitral and aortic annular calcifications. The main pulmonary artery is enlarged suggesting pulmonary arterial hypertension. Normal caliber aorta with scattered calcified atherosclerotic plaque. No pericardial effusion. LYMPH NODES: No enlarged lymph nodes. CHEST WALL: Diffuse anasarca. Heterogeneous thyroid. _______________ ABDOMEN/PELVIS: LIVER: No enhancing hepatic mass. The portal and hepatic veins are patent. BILIARY: Gallstones are present in the nondistended gallbladder lumen. No biliary dilation. SPLEEN: Normal. PANCREAS: Normal. ADRENALS: Left adrenal gland measures 9 HU (axial image 76), most consistent with lipid rich adenoma. Normal right adrenal gland. KIDNEYS: Asymmetrically small left kidney. No rate of a stone. No hydronephrosis. GI TRACT:  A small hiatal hernia is present. Normal caliber small and large bowel loops. No morphology of bowel obstruction. Normal appendix. BLADDER: Diffuse wall thickening in the largely decompressed bladder. PELVIC ORGANS: No acute abnormality. VASCULATURE: No arterial aneurysm. 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 LYMPH NODES: No mesenteric or retroperitoneal lymphadenopathy. OTHER: No free intraperitoneal air. No ascites. Diffuse anasarca. OSSEOUS STRUCTURES: No acute osseous abnormality. Multilevel degenerative changes most pronounced in the lumbar spine. Degenerative changes in both shoulders (right greater than left). Please note the included portions of the upper extremities are not well evaluated on this study _______________     1. Findings of congestive heart failure with cardiomegaly, interstitial edema, very small bilateral pleural effusions, and diffuse anasarca. 2.  Bladder wall thickening may be due to underdistention though superimposed infection cannot be excluded. Recommend correlation for cystitis. 3.  Osseous degenerative changes and additional findings, as detailed in the body of the report. Hip x-ray 3/11/2022:  Status post total right hip arthroplasty, without complication. ECHO ADULT COMPLETE  Result Date: 3/3/2022    Left Ventricle: Left ventricle size is normal. Moderately increased wall thickness. Findings consistent with moderate concentric hypertrophy. Normal wall motion. Normal left ventricular systolic function with a visually estimated EF of 55 - 60%.   Left Atrium: Left atrium is mildly dilated.   Right Ventricle: Right ventricle is mildly dilated.   Right Atrium: Right atrium is mildly dilated.   Pulmonary artery :  Estimated pulmonary arterial systolic pressure is 51 mmhg. Pulmonary hypertension found to be moderate   Mitral Valve: Moderately thickened leaflet. Mildly calcified leaflet. Moderate annular calcification of the mitral valve.   IVC/SVC : IVC diameter is greater than 21 mm and decreases less than 50% during inspiration; therefore the estimated right atrial pressure is elevated (~15 mmHg). IVC is dilated. Consider LORRAINE for better evaluation of mitral valve if clinically indicated. DUPLEX LOWER EXT VENOUS BILAT  Result Date: 3/4/2022  · No evidence of deep vein thrombosis in the right lower extremity. · No evidence of deep vein thrombosis in the left lower extremity. USG retroperitoneum 3/12/2022: No hydronephrosis.       ECHO 3/3/2022: Result status: Final result       Left Ventricle: Left ventricle size is normal. Moderately increased wall thickness. Findings consistent with moderate concentric hypertrophy. Normal wall motion. Normal left ventricular systolic function with a visually estimated EF of 55 - 60%.   Left Atrium: Left atrium is mildly dilated.   Right Ventricle: Right ventricle is mildly dilated.   Right Atrium: Right atrium is mildly dilated.   Pulmonary artery :  Estimated pulmonary arterial systolic pressure is 51 mmhg. Pulmonary hypertension found to be moderate    Mitral Valve: Moderately thickened leaflet. Mildly calcified leaflet. Moderate annular calcification of the mitral valve.   IVC/SVC : IVC diameter is greater than 21 mm and decreases less than 50% during inspiration; therefore the estimated right atrial pressure is elevated (~15 mmHg). IVC is dilated.     Consider LORRAINE for better evaluation of mitral valve if clinically indicated. Images report reviewed by me:  CT (Most Recent) (CT chest reviewed by me) Results from Hospital Encounter encounter on 03/02/22    CT HEAD WO CONT    Narrative  EXAM: CT head    INDICATION: Headache. COMPARISON: 3/7/2022    TECHNIQUE: Axial CT imaging of the head was performed without intravenous  contrast. Standard multiplanar coronal and sagittal reformatted images were  obtained and are included in interpretation. One or more dose reduction techniques were used on this CT: automated exposure  control, adjustment of the mAs and/or kVp according to patient size, and  iterative reconstruction techniques. The specific techniques used on this CT  exam have been documented in the patient's electronic medical record.   Digital  Imaging and Communications in Medicine (DICOM) format image data are available  to nonaffiliated external healthcare facilities or entities on a secure, media  free, reciprocally searchable basis with patient authorization for at least a  12-month period after this study. _______________    FINDINGS:    BRAIN AND POSTERIOR FOSSA: No evidence of acute large vessel transcortical  infarct or acute parenchymal hemorrhage. No midline shift or hydrocephalus. EXTRA-AXIAL SPACES AND MENINGES: There are no abnormal extra-axial fluid  collections. CALVARIUM: Intact. SINUSES: Clear. OTHER: None.    _______________    Impression  No acute intracranial abnormality. CXR reviewed by me:  XR (Most Recent). CXR  reviewed by me and compared with previous CXR Results from Hospital Encounter encounter on 03/02/22    XR ABD (KUB)    Narrative  EXAM: SUPINE ABDOMINAL RADIOGRAPH    CLINICAL INDICATION/HISTORY: og tube placement  -Additional: None    COMPARISON: None    TECHNIQUE: Single supine view of the abdomen.    _______________    FINDINGS:    BOWEL GAS PATTERN: Enteric tube tip within the distal stomach. No evidence of  obstruction. No visible free air, given limitations of supine view. BONES: Scoliosis and multilevel degenerative changes. Right hip prosthesis. OTHER: None.    _______________    Impression  No significant abnormality. CXR 3/13/2022:  FINDINGS:  SUPPORT DEVICES: Endotracheal tube and visualized gastric tube both project in  stable position from prior exam.     HEART AND MEDIASTINUM: Enlarged appearing cardiac silhouette with stable  mediastinal contours.     LUNGS AND PLEURAL SPACES: Faint/hazy right lower lung zone opacity. No  pneumothorax or pleural effusion demonstrated.     BONY THORAX AND SOFT TISSUES: Unremarkable.  ______________     IMPRESSION  1. Support devices in stable position. 2. Mild interval increase in right basilar atelectasis and likely small right  pleural effusion. 3. Cardiomegaly, as previously. ·Please note: Voice-recognition software may have been used to generate this report, which may have resulted in some phonetic-based errors in grammar and contents.  Even though attempts were made to correct all the mistakes, some may have been missed, and remained in the body of the document.       Cadence Licea MD  3/13/2022

## 2022-03-13 NOTE — PROGRESS NOTES
RESPIRATORY NOTE:       Pt awake and comfortable with family at bedside, Initiated SBT with acceptable RSBI, RN aware, will monitor.

## 2022-03-13 NOTE — PROGRESS NOTES
RENAL CONSULT  3/13/2022    Patient:  Enma Morgan  :  1940  Gender:  female  MRN #:  [id number]    Reason for Consult: Metabolic Alkalosis       Assessment:    Enma Morgan is a 80y.o. year old female  With h/o  morbid obesity, SERENA, right leg weakness, HTN, A. fib, CHF admitted  on 3/3/2022 for shortness of breath    she developed  onset of A. fib on admission. HF was treated with diuretics and was using home BiPAP nightly.   Still on mechanical vent and is hypotensive    She developed acute renal failure with creatinine slowly rising most likely due to hypotension and over diuresis    # Acute renal failure  # Metabolic alkalosis - due to diuretics , upon review of serum bicarbonate she had baseline elevated bicarbonate which must be metabolic compensation for chronic respiratory acidosis   #Hypernatremia     Subjective/Relevants events: -  Still intubated but responsive  Decent urine    Blood pH is improving       Plan:      - Hold diuretics for now ,   - Increased Diamox 500 mg IV BID for alkalosis   - continue  D5W and half NS which should correct hypernatremia and will provide chloride/volume expansion to help correct alkalosis   - intake and output charting, Willoughby's cath, ensure she is not retaining urine   -  CT scan on  3/3 showed asymmetrical small kidney , no hydronephrosis in USG    - avoid nsaids , contrast and nephrotoxin   - No clinical indication of dialysis for now   - Dose all meds for current eGFR  - keep MAP 65 mmhg        Intake/Output Summary (Last 24 hours) at 3/13/2022 1209  Last data filed at 3/13/2022 0530  Gross per 24 hour   Intake 1675.83 ml   Output 1080 ml   Net 595.83 ml         Review of Symptoms:   Unable to obtained review of systems due to patient condition     Objective:    Visit Vitals  BP (!) 126/108   Pulse 88   Temp 98.4 °F (36.9 °C)   Resp 21   Ht 5' 5\" (1.651 m)   Wt 111.1 kg (244 lb 14.9 oz)   SpO2 100%   Breastfeeding No   BMI 40.76 kg/m²       Physical Exam:    Sedated on mechanical vent   HEENT:ET tube ,  no neck swelling  Lung: clear to auscultation   Heart: s1s2 irregular   Abdomen: soft, normal bowel sounds. Ext: no edema  Skin: No rash and erythema  CNS- unconscious    Laboratory Data:    Lab Results   Component Value Date    BUN 98 (H) 03/13/2022    BUN 97 (H) 03/12/2022    BUN 74 (H) 03/11/2022     03/13/2022     (H) 03/12/2022     03/11/2022    CO2 37 (H) 03/13/2022    CO2 38 (H) 03/12/2022    CO2 36 (H) 03/11/2022     Lab Results   Component Value Date    WBC 8.8 03/13/2022    HGB 9.2 (L) 03/13/2022    HCT 28.7 (L) 03/13/2022     No components found for: CALCIUM, PHOSPHORUS, MAGNESIUM  Lab Results   Component Value Date     (H) 04/25/2021     No results found for: SPECIMENTYP, TURBIDITY, UGLU    Imaging Reveiwed:       Approx 31 minutes of critical care time spent in the direct evaluation and formulation of treatment plan of this high risk patient. The reason for providing this level of medical care was due a critical illness that impaired one or more vital organ systems such that there was a high probability of imminent or life threatening deterioration in the patients condition. This care involved high complexity decision making to assess, manipulate, and support vital system functions, to treat this degreee vital organ system failure and to prevent further life threatening deterioration of the patients condition.          Mele Cervantes MD

## 2022-03-13 NOTE — PROGRESS NOTES
RESPIRATORY NOTE:      Pt awake and responding to verbal stimuli, initiated SBT, failed after 5 minutes, increased RR with low VT, agitated periodically and reaching for tube, RN aware, will continue to monitor.

## 2022-03-13 NOTE — PROGRESS NOTES
Cardiology Progress Note      3/13/2022 12:45 PM    Admit Date: 3/2/2022    Admit Diagnosis: Acute exacerbation of CHF (congestive heart failure) ([facility] [address] [I50.9]      Subjective:     Chen Wild has Multiple problems and issues. .    Visit Vitals  BP (!) 126/108   Pulse 90   Temp 98.4 °F (36.9 °C)   Resp 12   Ht 5' 5\" (1.651 m)   Wt 111.1 kg (244 lb 14.9 oz)   SpO2 98%   Breastfeeding No   BMI 40.76 kg/m²     Current Facility-Administered Medications   Medication Dose Route Frequency    [START ON 3/14/2022] levoFLOXacin (LEVAQUIN) 250 mg in D5W IVPB  250 mg IntraVENous Q24H    acetaZOLAMIDE (DIAMOX) 500 mg in sterile water (preservative free) 5 mL injection  500 mg IntraVENous BID    dextrose 5 % - 0.45% NaCl infusion  50 mL/hr IntraVENous CONTINUOUS    famotidine (PF) (PEPCID) injection 20 mg  20 mg IntraVENous DAILY    sodium chloride (NS) flush 5-40 mL  5-40 mL IntraVENous PRN    acetaminophen (TYLENOL) tablet 650 mg  650 mg Oral Q6H    naloxone (NARCAN) injection 0.4 mg  0.4 mg IntraVENous PRN    ferrous sulfate tablet 325 mg  1 Tablet Oral TID    diphenhydrAMINE (BENADRYL) capsule 25 mg  25 mg Oral Q4H PRN    bisacodyL (DULCOLAX) suppository 10 mg  10 mg Rectal DAILY PRN    ELECTROLYTE REPLACEMENT PROTOCOL - Magnesium   1 Each Other PRN    ELECTROLYTE REPLACEMENT PROTOCOL - Calcium   1 Each Other PRN    ELECTROLYTE REPLACEMENT PROTOCOL  - Phosphorus Renal Dosing  1 Each Other PRN    ELECTROLYTE REPLACEMENT PROTOCOL - Potassium Renal Dosing  1 Each Other PRN    famotidine (PEPCID) tablet 20 mg  20 mg Oral DAILY    midazolam (VERSED) injection 1 mg  1 mg IntraVENous Q2H PRN    fentaNYL citrate (PF) injection 25 mcg  25 mcg IntraVENous Q4H PRN    chlorhexidine (PERIDEX) 0.12 % mouthwash 10 mL  10 mL Oral Q12H    nystatin (MYCOSTATIN) 100,000 unit/mL oral suspension 500,000 Units  500,000 Units Oral QID    apixaban (ELIQUIS) tablet 2.5 mg  2.5 mg Oral BID    sodium chloride (NS) flush 5-40 mL 5-40 mL IntraVENous Q8H    sodium chloride (NS) flush 5-40 mL  5-40 mL IntraVENous PRN    acetaminophen (TYLENOL) tablet 650 mg  650 mg Oral Q6H PRN    Or    acetaminophen (TYLENOL) suppository 650 mg  650 mg Rectal Q6H PRN    polyethylene glycol (MIRALAX) packet 17 g  17 g Oral DAILY PRN    ondansetron (ZOFRAN ODT) tablet 4 mg  4 mg Oral Q8H PRN    Or    ondansetron (ZOFRAN) injection 4 mg  4 mg IntraVENous Q6H PRN    carvediloL (COREG) tablet 3.125 mg  3.125 mg Oral BID WITH MEALS    aspirin delayed-release tablet 81 mg  81 mg Oral DAILY         Objective:      Physical Exam:  General Appearance: Comfortable, intubated  NECK: No JVD, no thyroidomeglay. LUNGS: Clear bilaterally. HEART: S1 irregular +S2 audible,                   ABD: Non-tender, BS Audible                          EXT: No edema, and no cysnosis. VASCULAR EXAM: Pulses are intact. PSYCHIATRIC EXAM: Mood is appropriate    Data Review:   Labs:    Recent Results (from the past 24 hour(s))   MAGNESIUM    Collection Time: 03/13/22  4:30 AM   Result Value Ref Range    Magnesium 2.1 1.6 - 2.6 mg/dL   CBC WITH AUTOMATED DIFF    Collection Time: 03/13/22  4:30 AM   Result Value Ref Range    WBC 8.8 4.6 - 13.2 K/uL    RBC 2.96 (L) 4.20 - 5.30 M/uL    HGB 9.2 (L) 12.0 - 16.0 g/dL    HCT 28.7 (L) 35.0 - 45.0 %    MCV 97.0 78.0 - 100.0 FL    MCH 31.1 24.0 - 34.0 PG    MCHC 32.1 31.0 - 37.0 g/dL    RDW 14.8 (H) 11.6 - 14.5 %    PLATELET 898 537 - 123 K/uL    MPV 10.5 9.2 - 11.8 FL    NRBC 0.0 0  WBC    ABSOLUTE NRBC 0.00 0.00 - 0.01 K/uL    NEUTROPHILS 75 (H) 40 - 73 %    LYMPHOCYTES 12 (L) 21 - 52 %    MONOCYTES 12 (H) 3 - 10 %    EOSINOPHILS 1 0 - 5 %    BASOPHILS 0 0 - 2 %    IMMATURE GRANULOCYTES 1 (H) 0.0 - 0.5 %    ABS. NEUTROPHILS 6.6 1.8 - 8.0 K/UL    ABS. LYMPHOCYTES 1.1 0.9 - 3.6 K/UL    ABS. MONOCYTES 1.0 0.05 - 1.2 K/UL    ABS. EOSINOPHILS 0.1 0.0 - 0.4 K/UL    ABS. BASOPHILS 0.0 0.0 - 0.1 K/UL    ABS. IMM. Lavelle Stakes. 0.1 (H) 0.00 - 0.04 K/UL    DF AUTOMATED     METABOLIC PANEL, COMPREHENSIVE    Collection Time: 03/13/22  4:30 AM   Result Value Ref Range    Sodium 144 136 - 145 mmol/L    Potassium 3.7 3.5 - 5.5 mmol/L    Chloride 103 100 - 111 mmol/L    CO2 37 (H) 21 - 32 mmol/L    Anion gap 4 3.0 - 18 mmol/L    Glucose 137 (H) 74 - 99 mg/dL    BUN 98 (H) 7.0 - 18 MG/DL    Creatinine 2.17 (H) 0.6 - 1.3 MG/DL    BUN/Creatinine ratio 45 (H) 12 - 20      GFR est AA 26 (L) >60 ml/min/1.73m2    GFR est non-AA 22 (L) >60 ml/min/1.73m2    Calcium 10.4 (H) 8.5 - 10.1 MG/DL    Bilirubin, total 1.4 (H) 0.2 - 1.0 MG/DL    ALT (SGPT) 39 13 - 56 U/L    AST (SGOT) 54 (H) 10 - 38 U/L    Alk.  phosphatase 128 (H) 45 - 117 U/L    Protein, total 6.0 (L) 6.4 - 8.2 g/dL    Albumin 3.3 (L) 3.4 - 5.0 g/dL    Globulin 2.7 2.0 - 4.0 g/dL    A-G Ratio 1.2 0.8 - 1.7     BLOOD GAS, ARTERIAL POC    Collection Time: 03/13/22  4:40 AM   Result Value Ref Range    Device: ADULT VENT      FIO2 (POC) 40 %    pH (POC) 7.48 (H) 7.35 - 7.45      pCO2 (POC) 45.1 (H) 35.0 - 45.0 MMHG    pO2 (POC) 108 (H) 80 - 100 MMHG    HCO3 (POC) 34.0 (H) 22 - 26 MMOL/L    sO2 (POC) 98.5 (H) 92 - 97 %    Base excess (POC) 9.5 mmol/L    Mode ASSIST CONTROL      Tidal volume 375 ml    Set Rate 10 bpm    PEEP/CPAP (POC) 5 cmH2O    Allens test (POC) Positive      Site LEFT RADIAL      Specimen type (POC) ARTERIAL      Performed by Bubba Cruz        Telemetry: Sinus      Assessment:     Principal Problem:    Acute respiratory failure with hypoxemia (HCC) (3/2/2022)    Active Problems:    Elevated troponin (4/23/2021)      HTN (hypertension) (4/23/2021)      Acute on chronic diastolic congestive heart failure (HCC) (4/23/2021)      Atrial fibrillation ([facility] [address] (3/2/2022)      Stage 3 chronic kidney disease ([facility] [address] (3/3/2022)      SERENA (obstructive sleep apnea) (3/3/2022)      Adult BMI 39.0-39.9 kg/sq m (3/3/2022)      Fracture of neck of right femur ([facility] [address] (3/8/2022)        Plan: Patient remains intubated. Currently she is hemodynamically stable. A. fib rate is controlled. Continue with current medical regimen. 3/13/2022  Remains on the ventilator. She is more awake. Apparently there are plans for weaning and perhaps extubation today or maybe tomorrow. Dr. Jose Mario returns tomorrow.         Rafael Santiago MD

## 2022-03-13 NOTE — PROGRESS NOTES
Progress Note      Patient: Clau French               Sex: female          DOA: 3/2/2022     YOB: 1940      Age:  80 y.o.        LOS:  LOS: 11 days     Status Post: Procedure(s):  RIGHT TOTAL HIP ARTHROPLASTY WITH C-ARM  (PT IN ROOM # 514)  Surgery Date: 3/10/2022            Subjective:     Clau French is a 80 y.o. female S/P RTHA 3/11/22. Patient remains intubated. She is alert today and much more responsive. She is intact to light touch sensation throughout    Objective:      Visit Vitals  BP (!) 126/108   Pulse 88   Temp 98.4 °F (36.9 °C)   Resp 21   Ht 5' 5\" (1.651 m)   Wt 111.1 kg (244 lb 14.9 oz)   SpO2 100%   Breastfeeding No   BMI 40.76 kg/m²       Physical Exam:   Dressing:  clean, dry, intact, Suction dressing. Neuro exam unable to be performed do to patient being intubated. She does have edema,  +1 Posterior Tibial Pulse  Swelling around the soft tissues of the hip is expected. No bleeding is noted. Xray - Looks good from yesterday. Intake and Output:  Current Shift:  No intake/output data recorded. Last three shifts:  03/11 1901 - 03/13 0700  In: 3222.1 [I.V.:1787.1]  Out: 1893 [Urine:1555]  Voiding Status:  Catheter in place.       Lab/Data Reviewed:  Recent Labs     03/13/22  0430   HGB 9.2*   HCT 28.7*      K 3.7   BUN 98*   CREA 2.17*   *         Medications Reviewed    Assessment/Plan     Principal Problem:    Acute respiratory failure with hypoxemia (HCC) (3/2/2022)    Active Problems:    Elevated troponin (4/23/2021)      HTN (hypertension) (4/23/2021)      Acute on chronic diastolic congestive heart failure (HCC) (4/23/2021)      Atrial fibrillation ([address] (3/2/2022)      Stage 3 chronic kidney disease ([facility] [address] (3/3/2022)      SERENA (obstructive sleep apnea) (3/3/2022)      Adult BMI 39.0-39.9 kg/sq m (3/3/2022)      Fracture of neck of right femur ([facility] [address] (3/8/2022)        · Discharge Planning as per primary team.  · Patient may continue Eliquis. · Plan is for PT WBAT when medically stable. · Maintain the dressing at all times. If the dressing looses suction, then can be replaced with Mepilex. Please try to keep the incision clean and dry. · Will continue to follow. · Discussed with the patient's niece as well as the patient's daughter via phone. · Patient remains orthopedically stable. The plan was discussed with Dr. Rowena Haley today as well and he is in agreement with above.

## 2022-03-13 NOTE — RT PROTOCOL NOTE
Pt remains orally intubated with size 7.0 mm et tube secured with tube long @ 22 cm shannon at the lip; breath sounds were decreased throughout & clear BUL with pt suctioned for moderate amount  pink-tinged, thin secretions; all alarms on & functioning with ambu bag @ hob

## 2022-03-13 NOTE — PROGRESS NOTES
Pharmacy Dosing Services: Renal Dosing    The following medication: Levaquin was automatically dose-adjusted per THE [facility] P&T Committee Protocol, with respect to renal function. Consult provided for this   80 y.o. , female , for the indication of CAP. Pt Weight:   Wt Readings from Last 1 Encounters:   03/11/22 111.1 kg (244 lb 14.9 oz)         Previous Regimen Levaquin 500 mg IV q48h   Serum Creatinine Lab Results   Component Value Date/Time    Creatinine 2.17 (H) 03/13/2022 04:30 AM       Creatinine Clearance Estimated Creatinine Clearance: 25.2 mL/min (A) (based on SCr of 2.17 mg/dL (H)). BUN Lab Results   Component Value Date/Time    BUN 98 (H) 03/13/2022 04:30 AM       Dosage changed to:  Levaquin 500 mg IV once then 250 mg IV q24h    Additional notes:Spoke with Dr. Grazyna Keita to continue to monitor patient daily. Will make dosage adjustments based upon changing renal function.   Signed Madison Finnegan, [address]

## 2022-03-13 NOTE — PROGRESS NOTES
Hospitalist Progress Note-critical care note     Patient: Sachi Guzman MRN: [id number]  CSN: [id number]    YOB: 1940  Age: 80 y.o. Sex: female    DOA: 3/2/2022 LOS:  LOS: 11 days            Chief complaint: hip fracture m ckd3, afib chf ,     Assessment/Plan   ITNUBATED   FOLLOWS COMMNds failed sbt yesterday but better today  Low grade fever   Needs coverage from HCAP  Started on Levaquin check cultures  [facility] Problems  Date Reviewed: 3/9/2022          Codes Class Noted POA    Fracture of neck of right femur ([facility] ICD-10-CM: S72.001A  ICD-9-CM: 820.8  3/8/2022 Unknown        Stage 3 chronic kidney disease ([facility] ICD-10-CM: N18.30  ICD-9-CM: 585.3  3/3/2022 Unknown        SERENA (obstructive sleep apnea) ICD-10-CM: G47.33  ICD-9-CM: 327.23  3/3/2022 Unknown        Adult BMI 39.0-39.9 kg/sq m ICD-10-CM: Z68.39  ICD-9-CM: V85.39  3/3/2022 Unknown        * (Principal) Acute respiratory failure with hypoxemia ([facility] ICD-10-CM: J96.01  ICD-9-CM: 518.81  3/2/2022 Unknown        Atrial fibrillation ([facility] ICD-10-CM: I48.91  ICD-9-CM: 427.31  3/2/2022 Unknown        Elevated troponin ICD-10-CM: R77.8  ICD-9-CM: 790.6  4/23/2021 Yes        HTN (hypertension) ICD-10-CM: I10  ICD-9-CM: 401.9  4/23/2021 Yes        Acute on chronic diastolic congestive heart failure ([facility] ICD-10-CM: I50.33  ICD-9-CM: 428.33, 428.0  4/23/2021 Yes             pt was admitted for afib and chf, found rt hip fracture. She has serena on cpap at night, she received rt hip replacement, she was noted decreased tide volume and hypoxia while extubation post procedure, intubated with oxygen 65%, peep 6.      Respiratory   Acute on chronic respiratory failure with hypoxia and hypercapnia   Will continue vent , was on Fio2 45 %    Continue wean per protocol  More awake doing better  V/q scan :Perfusion images show no segmental perfusion defects to suggest the presence of  pulmonary embolism, pvl negative for dvt  on 3/3  Now on Demadex to help metabolic alk  Failed sbt    Pulmonary hypertension   Echo on 3/3 pulmonary arterial systolic pressure is 51 mmhg  LORRAINE is deferred   Has Mitral stenosis      cv   Congestive heart failure , acute on chronic diastolic   Echo done Mitral stenosis,  Pulmonary hypertension found to be moderate-LORRAINE is deferred  Dr. Gracie Wick and On lasix      Htn : on coreg   afib new   On eliquis before , hold for surgery , on coreg    restart eliquis 3/11    Elevated trop   No acs cardiologist on board      ID   UTI  Urine pos for klebsiella  Completed IV Rocephin  Now with Fever started on Levaquin HCap  Check cultures post op     msk :  Post surgery : Right press fit direct anterior total hip arthroplasty   Continue post surgery care        Renal   ckd3 -mild worsening cr   Continue watch for renal function ,watch for renal function and urine-out-  appreciae renal input now on d 5 for hypernatremiat     Heme   Anemia   Continue monitoring h/h     FEN  icu electrolytes replacement protocol       Neuro   Ct head on 3/7 no acute issue, on propofol     rn : , not follow commands SBT failed hypotension overnight   30 minutes of critical care time spent in the direct evaluation and treatment of this high risk patient. The reason for providing this level of medical care for this critically ill patient was due a critical illness that impaired one or more vital organ systems such that there was a high probability of imminent or life threatening deterioration in the patients condition. This care involved high complexity decision making to assess, manipulate, and support vital system functions, to treat this degreee vital organ system failure and to prevent further life threatening deterioration of the patients condition.     Palliative care team f/u   Disposition :tbd,   Review of systems:  Limited due to on vent   Vital signs/Intake and Output:  Visit Vitals  BP (!) 126/108   Pulse (!) 102   Temp (!) 100.5 °F (38.1 °C)   Resp 18   Ht 5' 5\" (1.651 m)   Wt 111.1 kg (244 lb 14.9 oz)   SpO2 98%   Breastfeeding No   BMI 40.76 kg/m²     Current Shift:  No intake/output data recorded. Last three shifts:  03/11 1901 - 03/13 0700  In: 3222.1 [I.V.:1787.1]  Out: 1535 [Urine:1555]    Physical Exam:  General: Intubated ,  HEENT: NC, Atraumatic. anicteric sclerae. ET tube noted   Lungs: CTA Bilaterally. No Wheezing/Rhonchi/Rales. Heart:  Regular  rhythm,  No murmur, No Rubs, No Gallops  Abdomen: Soft, Non distended, Non tender. +Bowel sounds,   Extremities: No c/c, rt hip surgical site covered with gauze and wound vac noted   Psych:   Not anxious or agitated. Neurologic:  Intubated off propofol  respond to pain stimuli  And follows all commands           Labs: Results:       Chemistry Recent Labs     03/13/22  0430 03/12/22  0506 03/11/22  0310   * 109* 94    146* 145   K 3.7 4.1 5.0    105 103   CO2 37* 38* 36*   BUN 98* 97* 74*   CREA 2.17* 2.05* 1.58*   CA 10.4* 10.4* 10.5*   AGAP 4 3 6   BUCR 45* 47* 47*   * 94 103   TP 6.0* 5.5* 6.0*   ALB 3.3* 2.2* 2.8*   GLOB 2.7 3.3 3.2   AGRAT 1.2 0.7* 0.9      CBC w/Diff Recent Labs     03/13/22 0430 03/12/22  0506 03/11/22  0310   WBC 8.8 9.6 10.7   RBC 2.96* 2.96* 4.11*   HGB 9.2* 9.5* 12.9   HCT 28.7* 29.1* 41.1    203 205   GRANS 75* 74* 71   LYMPH 12* 14* 12*   EOS 1 0 0      Cardiac Enzymes No results for input(s): CPK, CKND1, FARA in the last 72 hours. No lab exists for component: CKRMB, TROIP   Coagulation No results for input(s): PTP, INR, APTT, INREXT, INREXT in the last 72 hours. Lipid Panel Lab Results   Component Value Date/Time    Cholesterol, total 183 04/25/2021 04:00 PM    HDL Cholesterol 101 (H) 04/25/2021 04:00 PM    LDL, calculated 69.8 04/25/2021 04:00 PM    VLDL, calculated 12.2 04/25/2021 04:00 PM    Triglyceride 61 04/25/2021 04:00 PM    CHOL/HDL Ratio 1.8 04/25/2021 04:00 PM      BNP No results for input(s): BNPP in the last 72 hours.    Liver Enzymes Recent Labs     03/13/22  0430   TP 6.0*   ALB 3.3*   *      Thyroid Studies Lab Results   Component Value Date/Time    TSH 0.58 03/03/2022 02:21 AM        Procedures/imaging: see electronic medical records for all procedures/Xrays and details which were not copied into this note but were reviewed prior to creation of Plan    NM LUNG SCAN PERF    Result Date: 3/3/2022  EXAM: NM LUNG SCAN PERF. CLINICAL INDICATION/HISTORY: d dimer increase dyspnea. -Additional: Clinical concern for pulmonary embolus. COMPARISON: Correlation is made with the radiographic study performed one day prior. TECHNIQUE: 7.2 mCi Tc-99m MAA was injected intravenously and the 8 standard views of the chest were obtained. This perfusion only examination is interpreted using the PISAPED criteria. _______________ FINDINGS: Perfusion images show no segmental perfusion defects to suggest the presence of pulmonary embolism. _______________     o scintigraphic findings to suggest the presence of acute pulmonary embolism. _______________     XR HIP RT W OR WO PELV 2-3 VWS    Result Date: 3/7/2022  EXAM: RIGHT HIP RADIOGRAPHS CLINICAL INDICATION/HISTORY: right hip pain -Additional: None COMPARISON: May March 3, 2022. TECHNIQUE: AP pelvis and frog-leg lateral view of right hip. _______________ FINDINGS: BONES: Intact appearing ilioischial and iliopectineal lines. There is a displaced and impacted subcapital right femoral neck fracture, with mild progressive displacement on follow-up imaging. Mild-moderate bilateral hip joint arthrosis. SOFT TISSUES: Unremarkable. _______________     1. Displaced and impacted subcapital right femoral neck fracture, increased in displacement from CT performed 4 days prior. CT HEAD WO CONT    Result Date: 3/7/2022  EXAM: CT head INDICATION: Altered mental status. COMPARISON: 4/23/2021.  TECHNIQUE: Axial CT imaging of the head was performed without intravenous contrast. Standard multiplanar coronal and sagittal reformatted images were obtained and are included in interpretation. One or more dose reduction techniques were used on this CT: automated exposure control, adjustment of the mAs and/or kVp according to patient size, and iterative reconstruction techniques. The specific techniques used on this CT exam have been documented in the patient's electronic medical record. Digital Imaging and Communications in Medicine (DICOM) format image data are available to nonaffiliated external healthcare facilities or entities on a secure, media free, reciprocally searchable basis with patient authorization for at least a 12-month period after this study. _______________ FINDINGS: BRAIN AND POSTERIOR FOSSA: Periventricular white matter hypoattenuation which is nonspecific but likely represents chronic ischemic changes. No evidence of acute large vessel transcortical infarct or acute parenchymal hemorrhage. No midline shift or hydrocephalus. EXTRA-AXIAL SPACES AND MENINGES: There are no abnormal extra-axial fluid collections. CALVARIUM: Intact. SINUSES: Clear. OTHER: None. _______________     No acute intracranial abnormality. CT CHEST ABD PELV WO CONT    Result Date: 3/3/2022  EXAM: CT CHEST, ABDOMEN AND PELVIS CLINICAL INDICATION/HISTORY: Hypoxemia, retrocardiac density, diarrhea, weakness and shortness of breath COMPARISON: 3/2/2022 TECHNIQUE: Axial CT imaging of the chest, abdomen, and pelvis was performed without intravenous contrast. Multiplanar reformats were generated. One or more dose reduction techniques were used on this CT: automated exposure control, adjustment of the mAs and/or kVp according to patient size, and iterative reconstruction techniques. The specific techniques used on this CT exam have been documented in the patient's electronic medical record.  Digital Imaging and Communications in Medicine (DICOM) format image data are available to nonaffiliated external healthcare facilities or entities on a secure, media free, reciprocally searchable basis with patient authorization for at least a 12-month period after this study. ________________ FINDINGS: LIMITATIONS:   > Suboptimal evaluation given lack of intravenous contrast.   > Motion artifact is present which limits evaluation.   > Suboptimal exam due to patient body habitus and arms by the side. CHEST: LUNGS: Respiratory motion significantly limits evaluation. Interlobar septal thickening in the upper lobes. Mild bilateral dependent atelectasis. No large consolidation or suspicious pulmonary mass. PLEURA: Very small volume bilateral pleural effusions. AIRWAY: Normal. MEDIASTINUM: Four-chamber cardiomegaly with asymmetric biatrial enlargement, mitral and aortic annular calcifications. The main pulmonary artery is enlarged suggesting pulmonary arterial hypertension. Normal caliber aorta with scattered calcified atherosclerotic plaque. No pericardial effusion. LYMPH NODES: No enlarged lymph nodes. CHEST WALL: Diffuse anasarca. Heterogeneous thyroid. _______________ ABDOMEN/PELVIS: LIVER: No enhancing hepatic mass. The portal and hepatic veins are patent. BILIARY: Gallstones are present in the nondistended gallbladder lumen. No biliary dilation. SPLEEN: Normal. PANCREAS: Normal. ADRENALS: Left adrenal gland measures 9 HU (axial image 76), most consistent with lipid rich adenoma. Normal right adrenal gland. KIDNEYS: Asymmetrically small left kidney. No rate of a stone. No hydronephrosis. GI TRACT:  A small hiatal hernia is present. Normal caliber small and large bowel loops. No morphology of bowel obstruction. Normal appendix. BLADDER: Diffuse wall thickening in the largely decompressed bladder. PELVIC ORGANS: No acute abnormality. VASCULATURE: No arterial aneurysm. 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 LYMPH NODES: No mesenteric or retroperitoneal lymphadenopathy. OTHER: No free intraperitoneal air. No ascites. Diffuse anasarca. OSSEOUS STRUCTURES: No acute osseous abnormality. Multilevel degenerative changes most pronounced in the lumbar spine. Degenerative changes in both shoulders (right greater than left). Please note the included portions of the upper extremities are not well evaluated on this study _______________     1. Findings of congestive heart failure with cardiomegaly, interstitial edema, very small bilateral pleural effusions, and diffuse anasarca. 2.  Bladder wall thickening may be due to underdistention though superimposed infection cannot be excluded. Recommend correlation for cystitis. 3.  Osseous degenerative changes and additional findings, as detailed in the body of the report. XR CHEST PORT    Result Date: 3/6/2022  EXAM: PORTABLE CHEST HISTORY: Shortness of breath. COMPARISON: 3/2/2022 TECHNIQUE: Single portable view. _______________ FINDINGS: SUPPORT DEVICES: None HEART AND MEDIASTINUM: Moderate cardiomegaly. LUNGS AND PLEURAL SPACES: Subsegmental atelectasis left base. Possible small effusions. BONES AND SOFT TISSUES: Dextroscoliosis. _______________     Subsegmental atelectasis left base. Possible small effusions. Moderate cardiomegaly without change. XR CHEST PORT    Result Date: 3/2/2022  EXAM:  AP Portable Chest X-ray 1 view INDICATION: Shortness of breath COMPARISON: April 23, 2021 _______________ FINDINGS: Cardiomegaly and mediastinal contours are within normal limits for portable radiograph. There is increased right retrocardiac/right paraspinal density. There are no pleural effusions. No acute osseous findings. ________________      Increased right retrocardiac density which may represent airspace disease or underlying pulmonary nodule. Recommend CT chest for further evaluation. ECHO ADULT COMPLETE    Result Date: 3/3/2022    Left Ventricle: Left ventricle size is normal. Moderately increased wall thickness. Findings consistent with moderate concentric hypertrophy. Normal wall motion.  Normal left ventricular systolic function with a visually estimated EF of 55 - 60%.   Left Atrium: Left atrium is mildly dilated.   Right Ventricle: Right ventricle is mildly dilated.   Right Atrium: Right atrium is mildly dilated.   Pulmonary artery :  Estimated pulmonary arterial systolic pressure is 51 mmhg. Pulmonary hypertension found to be moderate   Mitral Valve: Moderately thickened leaflet. Mildly calcified leaflet. Moderate annular calcification of the mitral valve.   IVC/SVC : IVC diameter is greater than 21 mm and decreases less than 50% during inspiration; therefore the estimated right atrial pressure is elevated (~15 mmHg). IVC is dilated. Consider LORRAINE for better evaluation of mitral valve if clinically indicated. DUPLEX LOWER EXT VENOUS BILAT    Result Date: 3/4/2022  · No evidence of deep vein thrombosis in the right lower extremity. · No evidence of deep vein thrombosis in the left lower extremity.

## 2022-03-13 NOTE — PROGRESS NOTES
Problem: Mobility Impaired (Adult and Pediatric)  Goal: *Acute Goals and Plan of Care (Insert Text)  Description: Physical TherapyGoals   Initiated 3/13/2022 to be met within 7 days  1. Supine <> sit with max A with use of HR for positioning. 2. Tolerate EOB sitting 5-10 minutes for ADL/balance activities. 3. Therapeutic Exercises: Participate with LE strengthening exercise program to facilitate achievement of above. Outcome: Progressing Towards Goal    PHYSICAL THERAPY EVALUATION    Patient: Enma Morgan (61 y.o. female)  Date: 3/13/2022  Primary Diagnosis: Acute exacerbation of CHF (congestive heart failure) ([facility]) [I50.9]  Procedure(s) (LRB):  RIGHT TOTAL HIP ARTHROPLASTY WITH C-ARM  (PT IN ROOM # 358) (Right) 3 Days Post-Op   Precautions:  Fall,WBAT (R LE)  PLOF: per conversation on phone with pt duarte Begum, pt was ambulatory with rollator w/o assistance and used R AFO prior to admission. Pt lives with [facility] in [place] [facility] with lift from outside into home. ASSESSMENT :  Based on the objective data described below, the patient seen in ICU and okayed to see per nurse Roseanne Hobbs who reports pt more alert/responsive today. Pt presents s/p surgery as noted above with subsequent transfer to ICU post operatively due to Acute respiratory failure with hypoxemia. Pt niece present on arrival.   Pt found on ventilator, eyes open spontaneously and when name called. Appears in NAD and nods head appropriately or elevates shoulders to simple questions ~75% of the time. Pt with limitations in ROM and strength t/o extremities (<2/5 and with fair/fair+  UE's/L LE). Pt R LE with wound vac present, R ankle/foot 0/5 motor performance with h/o AFO use per daughter (spoken with post session on phone). Bed mobility tasks deferred at this time; fair tolerance tolerance for gentle ROM ex as noted below. Will continue PT for goals as noted. Suspect pt will require extended recovery time, pending progress with vent SBT.   Will continue for 1 wk as tolerated and re-assess as appropriate. Pt Education: Role of physical therapy in acute care setting, fall prevention and safety/technique during functional mobility tasks      Patient will benefit from skilled intervention to address the above impairments. Patients rehabilitation potential is considered to be Guarded  Factors which may influence rehabilitation potential include:   []         None noted  []         Mental ability/status  [x]         Medical condition  []         Home/family situation and support systems  []         Safety awareness  []         Pain tolerance/management  []         Other:      PLAN :  Recommendations and Planned Interventions:  [x]           Bed Mobility Training             [x]    Neuromuscular Re-Education  [x]           Transfer Training                   []    Orthotic/Prosthetic Training  [x]           Gait Training                          []    Modalities  [x]           Therapeutic Exercises          []    Edema Management/Control  [x]           Therapeutic Activities            [x]    Patient and Family Training/Education  []           Other (comment):    Frequency/Duration: Patient will be followed by physical therapy 1 time per day to address goals. Discharge Recommendations: Home Health and [place]  Further Equipment Recommendations for Discharge: N/A     SUBJECTIVE:   Patient stated: n/a due to pt on ventilator.     OBJECTIVE DATA SUMMARY:     Past Medical History:   Diagnosis Date    Hypertension     Sleep disorder      Past Surgical History:   Procedure Laterality Date    HX ORTHOPAEDIC      broken right ankle, left femur 30 yrs ago     Barriers to Learning/Limitations: yes;  physical  Compensate with: Visual Cues, Verbal Cues, and Tactile Cues  Prior Level of Function/Home Situation: see above  Home Situation  Home Environment: Private residence  # Steps to Enter: 0 (Lift used to enter home)  One/Two Story Residence: One story  Living Alone: No  Support Systems: Child(doc)  Patient Expects to be Discharged to[de-identified] Other: (TBD)  Current DME Used/Available at Home: [employer] Hawks, rollator,Raised toilet seat,Grab bars  Tub or Shower Type: Shower  Critical Behavior:  Neurologic State: Eyes open spontaneously  Orientation Level: Unable to verbalize (on mechanical ventilator)  Cognition: Follows commands (with tc/vc)  Safety/Judgement: Awareness of environment  Psychosocial  Patient Behaviors: Calm; Cooperative (as tolerated)  Family  Behaviors: Calm; Cooperative  Purposeful Interaction: Yes  Pt Identified Daily Priority: Clinical issues (comment)  Caritas Process: Attend basic human needs  Caring Interventions: Reassure  Reassure: Quiet presence  Therapeutic Modalities: Music  Skin Condition/Temp: Warm;Dry  Family  Behaviors: Calm; Cooperative  Skin Integrity: Tear  Skin Integumentary  Skin Color: Appropriate for ethnicity  Skin Condition/Temp: Warm;Dry  Skin Integrity: Tear  Turgor: Epidermis thin w/ loss of subcut tissue  Hair Growth: Present  Nails: Within Defined Limits  Strength:    Strength: Generally decreased, functional (R foot/ankle non functional-AFO used PTA)  Tone & Sensation:   Tone: Normal  Sensation: Intact  Range Of Motion:  AROM: Generally decreased, functional  Functional Mobility:  Bed Mobility:  Rolling:  (deferred)  Therapeutic Exercises:   Gentle PROM as tolerated UE's/LE's with occasional AA movement except R LE  Pain:  Pain Scale 1: Adult Nonverbal Pain Scale  Pain Intensity 1: 0  Activity Tolerance:   Fair   Please refer to the flowsheet for vital signs taken during this treatment.   After treatment:   [x]         Patient left in no apparent distress sitting up in chair  []         Patient left in no apparent distress in bed  [x]         Call bell left within reach  [x]         Nursing notified  [x]         Family member present  []         Bed alarm activated  []         SCDs applied    COMMUNICATION/EDUCATION:   [x] Role of Physical Therapy in the acute care setting. [x]         Fall prevention education was provided and the patient/caregiver indicated understanding. [x]         Patient/family have participated as able in goal setting and plan of care. [x]         Patient/family agree to work toward stated goals and plan of care. []         Patient understands intent and goals of therapy, but is neutral about his/her participation.   []         Patient is unable to participate in goal setting/plan of care: ongoing with therapy staff.  []         Other:    Eval Complexity: History: MEDIUM  Complexity : 1-2 comorbidities / personal factors will impact the outcome/ POC Exam:HIGH Complexity : 4+ Standardized tests and measures addressing body structure, function, activity limitation and / or participation in recreation  Presentation: MEDIUM Complexity : Evolving with changing characteristics  Clinical Decision Making:High Complexity    Overall Complexity:HIGH     Thank you for this referral.  Nicole Lyles, PT   Time Calculation: 20 mins

## 2022-03-13 NOTE — PROGRESS NOTES
RESPIRATORY NOTE:       Pt back on full ventilatory support, tolerated SBT X approximately 1.5 hours, will continue to monitor.

## 2022-03-14 ENCOUNTER — APPOINTMENT (OUTPATIENT)
Dept: GENERAL RADIOLOGY | Age: 82
DRG: 981 | End: 2022-03-14
Attending: INTERNAL MEDICINE
Payer: MEDICARE

## 2022-03-14 PROBLEM — E87.6 HYPOKALEMIA: Status: ACTIVE | Noted: 2022-03-14

## 2022-03-14 LAB
ALBUMIN SERPL-MCNC: 2.7 G/DL (ref 3.4–5)
ALBUMIN/GLOB SERPL: 1 {RATIO} (ref 0.8–1.7)
ALP SERPL-CCNC: 140 U/L (ref 45–117)
ALT SERPL-CCNC: 42 U/L (ref 13–56)
ANION GAP SERPL CALC-SCNC: 5 MMOL/L (ref 3–18)
ARTERIAL PATENCY WRIST A: POSITIVE
ARTERIAL PATENCY WRIST A: POSITIVE
AST SERPL-CCNC: 59 U/L (ref 10–38)
BASE EXCESS BLD CALC-SCNC: 8.2 MMOL/L
BASE EXCESS BLD CALC-SCNC: 8.7 MMOL/L
BASOPHILS # BLD: 0 K/UL (ref 0–0.1)
BASOPHILS NFR BLD: 0 % (ref 0–2)
BDY SITE: ABNORMAL
BILIRUB SERPL-MCNC: 1.2 MG/DL (ref 0.2–1)
BODY TEMPERATURE: 98.4
BUN SERPL-MCNC: 96 MG/DL (ref 7–18)
BUN/CREAT SERPL: 49 (ref 12–20)
CALCIUM SERPL-MCNC: 9.7 MG/DL (ref 8.5–10.1)
CHLORIDE SERPL-SCNC: 105 MMOL/L (ref 100–111)
CO2 SERPL-SCNC: 35 MMOL/L (ref 21–32)
CREAT SERPL-MCNC: 1.97 MG/DL (ref 0.6–1.3)
DIFFERENTIAL METHOD BLD: ABNORMAL
EOSINOPHIL # BLD: 0.1 K/UL (ref 0–0.4)
EOSINOPHIL NFR BLD: 1 % (ref 0–5)
ERYTHROCYTE [DISTWIDTH] IN BLOOD BY AUTOMATED COUNT: 14.9 % (ref 11.6–14.5)
GAS FLOW.O2 O2 DELIVERY SYS: ABNORMAL L/MIN
GAS FLOW.O2 O2 DELIVERY SYS: ABNORMAL L/MIN
GAS FLOW.O2 SETTING OXYMISER: 10 BPM
GLOBULIN SER CALC-MCNC: 2.7 G/DL (ref 2–4)
GLUCOSE BLD STRIP.AUTO-MCNC: 114 MG/DL (ref 70–110)
GLUCOSE BLD STRIP.AUTO-MCNC: 126 MG/DL (ref 70–110)
GLUCOSE SERPL-MCNC: 133 MG/DL (ref 74–99)
HCO3 BLD-SCNC: 34.2 MMOL/L (ref 22–26)
HCO3 BLD-SCNC: 34.6 MMOL/L (ref 22–26)
HCT VFR BLD AUTO: 27 % (ref 35–45)
HGB BLD-MCNC: 8.4 G/DL (ref 12–16)
IMM GRANULOCYTES # BLD AUTO: 0.1 K/UL (ref 0–0.04)
IMM GRANULOCYTES NFR BLD AUTO: 1 % (ref 0–0.5)
LYMPHOCYTES # BLD: 1.1 K/UL (ref 0.9–3.6)
LYMPHOCYTES NFR BLD: 12 % (ref 21–52)
MAGNESIUM SERPL-MCNC: 2.2 MG/DL (ref 1.6–2.6)
MCH RBC QN AUTO: 31.5 PG (ref 24–34)
MCHC RBC AUTO-ENTMCNC: 31.1 G/DL (ref 31–37)
MCV RBC AUTO: 101.1 FL (ref 78–100)
MONOCYTES # BLD: 1.2 K/UL (ref 0.05–1.2)
MONOCYTES NFR BLD: 13 % (ref 3–10)
NEUTS SEG # BLD: 6.6 K/UL (ref 1.8–8)
NEUTS SEG NFR BLD: 73 % (ref 40–73)
NRBC # BLD: 0 K/UL (ref 0–0.01)
NRBC BLD-RTO: 0 PER 100 WBC
O2/TOTAL GAS SETTING VFR VENT: 30 %
O2/TOTAL GAS SETTING VFR VENT: 30 %
PAW @ MEAN EXP FLOW ON VENT: 8 CMH2O
PCO2 BLD: 51.1 MMHG (ref 35–45)
PCO2 BLD: 57.3 MMHG (ref 35–45)
PEEP RESPIRATORY: 5 CMH2O
PEEP RESPIRATORY: 5 CMH2O
PH BLD: 7.39 [PH] (ref 7.35–7.45)
PH BLD: 7.43 [PH] (ref 7.35–7.45)
PIP ISTAT,IPIP: 18
PLATELET # BLD AUTO: 173 K/UL (ref 135–420)
PMV BLD AUTO: 10.9 FL (ref 9.2–11.8)
PO2 BLD: 54 MMHG (ref 80–100)
PO2 BLD: 79 MMHG (ref 80–100)
POTASSIUM SERPL-SCNC: 3.3 MMOL/L (ref 3.5–5.5)
POTASSIUM SERPL-SCNC: 3.3 MMOL/L (ref 3.5–5.5)
PROCALCITONIN SERPL-MCNC: 1.15 NG/ML
PROT SERPL-MCNC: 5.4 G/DL (ref 6.4–8.2)
RBC # BLD AUTO: 2.67 M/UL (ref 4.2–5.3)
SAO2 % BLD: 86.4 % (ref 92–97)
SAO2 % BLD: 95.8 % (ref 92–97)
SERVICE CMNT-IMP: ABNORMAL
SERVICE CMNT-IMP: ABNORMAL
SODIUM SERPL-SCNC: 145 MMOL/L (ref 136–145)
SPECIMEN TYPE: ABNORMAL
SPECIMEN TYPE: ABNORMAL
VENTILATION MODE VENT: ABNORMAL
VENTILATION MODE VENT: ABNORMAL
VT SETTING VENT: 375 ML
WBC # BLD AUTO: 9.1 K/UL (ref 4.6–13.2)

## 2022-03-14 PROCEDURE — 74011250637 HC RX REV CODE- 250/637: Performed by: INTERNAL MEDICINE

## 2022-03-14 PROCEDURE — 84145 PROCALCITONIN (PCT): CPT

## 2022-03-14 PROCEDURE — 74011000250 HC RX REV CODE- 250: Performed by: INTERNAL MEDICINE

## 2022-03-14 PROCEDURE — 74011250636 HC RX REV CODE- 250/636: Performed by: INTERNAL MEDICINE

## 2022-03-14 PROCEDURE — 94640 AIRWAY INHALATION TREATMENT: CPT

## 2022-03-14 PROCEDURE — 87040 BLOOD CULTURE FOR BACTERIA: CPT

## 2022-03-14 PROCEDURE — 85025 COMPLETE CBC W/AUTO DIFF WBC: CPT

## 2022-03-14 PROCEDURE — 83735 ASSAY OF MAGNESIUM: CPT

## 2022-03-14 PROCEDURE — 77010033678 HC OXYGEN DAILY

## 2022-03-14 PROCEDURE — 84132 ASSAY OF SERUM POTASSIUM: CPT

## 2022-03-14 PROCEDURE — 36415 COLL VENOUS BLD VENIPUNCTURE: CPT

## 2022-03-14 PROCEDURE — 65610000006 HC RM INTENSIVE CARE

## 2022-03-14 PROCEDURE — 74011000250 HC RX REV CODE- 250: Performed by: STUDENT IN AN ORGANIZED HEALTH CARE EDUCATION/TRAINING PROGRAM

## 2022-03-14 PROCEDURE — 94003 VENT MGMT INPAT SUBQ DAY: CPT

## 2022-03-14 PROCEDURE — 97530 THERAPEUTIC ACTIVITIES: CPT

## 2022-03-14 PROCEDURE — P9047 ALBUMIN (HUMAN), 25%, 50ML: HCPCS | Performed by: INTERNAL MEDICINE

## 2022-03-14 PROCEDURE — 36600 WITHDRAWAL OF ARTERIAL BLOOD: CPT

## 2022-03-14 PROCEDURE — 71045 X-RAY EXAM CHEST 1 VIEW: CPT

## 2022-03-14 PROCEDURE — 74011250637 HC RX REV CODE- 250/637: Performed by: PHYSICIAN ASSISTANT

## 2022-03-14 PROCEDURE — 82803 BLOOD GASES ANY COMBINATION: CPT

## 2022-03-14 PROCEDURE — 82962 GLUCOSE BLOOD TEST: CPT

## 2022-03-14 RX ORDER — ARFORMOTEROL TARTRATE 15 UG/2ML
15 SOLUTION RESPIRATORY (INHALATION)
Status: DISCONTINUED | OUTPATIENT
Start: 2022-03-14 | End: 2022-03-24 | Stop reason: HOSPADM

## 2022-03-14 RX ORDER — FUROSEMIDE 10 MG/ML
20 INJECTION INTRAMUSCULAR; INTRAVENOUS ONCE
Status: DISCONTINUED | OUTPATIENT
Start: 2022-03-14 | End: 2022-03-14

## 2022-03-14 RX ORDER — ALBUMIN HUMAN 250 G/1000ML
25 SOLUTION INTRAVENOUS EVERY 6 HOURS
Status: COMPLETED | OUTPATIENT
Start: 2022-03-14 | End: 2022-03-15

## 2022-03-14 RX ADMIN — APIXABAN 2.5 MG: 2.5 TABLET, FILM COATED ORAL at 09:22

## 2022-03-14 RX ADMIN — APIXABAN 2.5 MG: 2.5 TABLET, FILM COATED ORAL at 21:07

## 2022-03-14 RX ADMIN — CHLORHEXIDINE GLUCONATE 10 ML: 1.2 RINSE ORAL at 21:07

## 2022-03-14 RX ADMIN — DEXTROSE AND SODIUM CHLORIDE 50 ML/HR: 5; 450 INJECTION, SOLUTION INTRAVENOUS at 04:05

## 2022-03-14 RX ADMIN — NYSTATIN 500000 UNITS: 100000 SUSPENSION ORAL at 12:57

## 2022-03-14 RX ADMIN — FERROUS SULFATE TAB 325 MG (65 MG ELEMENTAL FE) 325 MG: 325 (65 FE) TAB at 17:13

## 2022-03-14 RX ADMIN — SODIUM CHLORIDE, PRESERVATIVE FREE 10 ML: 5 INJECTION INTRAVENOUS at 17:14

## 2022-03-14 RX ADMIN — ALBUMIN (HUMAN) 25 G: 0.25 INJECTION, SOLUTION INTRAVENOUS at 23:52

## 2022-03-14 RX ADMIN — CHLORHEXIDINE GLUCONATE 10 ML: 1.2 RINSE ORAL at 09:21

## 2022-03-14 RX ADMIN — SODIUM CHLORIDE, PRESERVATIVE FREE 10 ML: 5 INJECTION INTRAVENOUS at 06:46

## 2022-03-14 RX ADMIN — ASPIRIN 81 MG: 81 TABLET, COATED ORAL at 09:22

## 2022-03-14 RX ADMIN — FERROUS SULFATE TAB 325 MG (65 MG ELEMENTAL FE) 325 MG: 325 (65 FE) TAB at 09:22

## 2022-03-14 RX ADMIN — ALBUMIN (HUMAN) 25 G: 0.25 INJECTION, SOLUTION INTRAVENOUS at 17:12

## 2022-03-14 RX ADMIN — ARFORMOTEROL TARTRATE 15 MCG: 15 SOLUTION RESPIRATORY (INHALATION) at 20:15

## 2022-03-14 RX ADMIN — ACETAMINOPHEN 650 MG: 325 TABLET ORAL at 06:37

## 2022-03-14 RX ADMIN — ACETAZOLAMIDE SODIUM 250 MG: 500 INJECTION, POWDER, LYOPHILIZED, FOR SOLUTION INTRAVENOUS at 09:21

## 2022-03-14 RX ADMIN — ACETAMINOPHEN 650 MG: 325 TABLET ORAL at 01:24

## 2022-03-14 RX ADMIN — ACETAMINOPHEN 650 MG: 325 TABLET ORAL at 12:57

## 2022-03-14 RX ADMIN — ARFORMOTEROL TARTRATE 15 MCG: 15 SOLUTION RESPIRATORY (INHALATION) at 11:47

## 2022-03-14 RX ADMIN — FAMOTIDINE 20 MG: 10 INJECTION, SOLUTION INTRAVENOUS at 09:22

## 2022-03-14 RX ADMIN — DEXTROSE AND SODIUM CHLORIDE 25 ML/HR: 5; 450 INJECTION, SOLUTION INTRAVENOUS at 23:56

## 2022-03-14 RX ADMIN — NYSTATIN 500000 UNITS: 100000 SUSPENSION ORAL at 21:07

## 2022-03-14 RX ADMIN — WATER 1 G: 1 INJECTION INTRAMUSCULAR; INTRAVENOUS; SUBCUTANEOUS at 17:13

## 2022-03-14 RX ADMIN — CARVEDILOL 3.12 MG: 3.12 TABLET, FILM COATED ORAL at 09:22

## 2022-03-14 RX ADMIN — ACETAMINOPHEN 650 MG: 325 TABLET ORAL at 17:13

## 2022-03-14 RX ADMIN — NYSTATIN 500000 UNITS: 100000 SUSPENSION ORAL at 17:13

## 2022-03-14 RX ADMIN — POTASSIUM CHLORIDE 30 MEQ: 20 TABLET, EXTENDED RELEASE ORAL at 06:37

## 2022-03-14 RX ADMIN — ALBUMIN (HUMAN) 25 G: 0.25 INJECTION, SOLUTION INTRAVENOUS at 12:57

## 2022-03-14 RX ADMIN — ACETAMINOPHEN 650 MG: 325 TABLET ORAL at 23:52

## 2022-03-14 RX ADMIN — MEROPENEM 500 MG: 500 INJECTION, POWDER, FOR SOLUTION INTRAVENOUS at 09:21

## 2022-03-14 RX ADMIN — NYSTATIN 500000 UNITS: 100000 SUSPENSION ORAL at 09:21

## 2022-03-14 NOTE — PROGRESS NOTES
conducted a follow up consultation and spiritual assessment for Boby Alfaro, who is a 80 y.o.,female. Patient remains on vent. She did open her eyes when I spoke to her. Later her niece was at the bedside. Niece was very engaged. Continued the relationship of care and support. Listened empathically. Offered prayer and assurance of continued prayer on patients behalf. Chart reviewed. Family expressed gratitude for Spiritual Care visit. Patient was reviewed in ICU Interdisciplinary Rounds. Chaplains will continue to follow and will provide pastoral care as needed or requested.  recommends bedside caregivers page the  on duty if patient shows signs of acute spiritual or emotional distress. [address] [id number].    Board Certified   [phone] - Office

## 2022-03-14 NOTE — PROGRESS NOTES
Speech-Language Pathology      Chart reviewed. Attempted Speech-Language Pathology Evaluation/Treatment, however, patient unable to be seen due to:  []  Nausea/vomiting  []  Eating  []  Pain  []  Patient too lethargic  []  Off Unit for testing/procedure  []  Dialysis treatment in progress   []  Telemetry Results  [x]  Other: Patient remains intubated. Will discharge from caseload at this time. Please re-consult when patient extubated and able to complete swallow assessment.      Thank you for this referral.  SAÚL Penn.Ed, [id number] [facility]

## 2022-03-14 NOTE — PROGRESS NOTES
Hospitalist Progress Note-critical care note     Patient: Boby Alfaro MRN: [id number]  CSN: [id number]    YOB: 1940  Age: 80 y.o. Sex: female    DOA: 3/2/2022 LOS:  LOS: 12 days            Chief complaint: hip fracture m ckd3, afib chf ,     Assessment/Plan         Hospital Problems  Date Reviewed: 3/9/2022          Codes Class Noted POA    Fract[facility] of neck of right [facility] ICD-10-CM: S72.001A  ICD-9-CM: 820.8  3/8/2022 Unknown        Stage 3 chronic kidney disease ([facility] ICD-10-CM: N18.30  ICD-9-CM: 585.3  3/3/2022 Unknown        SERENA (obstructive sleep apnea) ICD-10-CM: G47.33  ICD-9-CM: 327.23  3/3/2022 Unknown        Adult BMI 39.0-39.9 kg/sq m ICD-10-CM: Z68.39  ICD-9-CM: V85.39  3/3/2022 Unknown        * (Principal) Acute respiratory failure with hypoxemia (HCC) ICD-10-CM: J96.01  ICD-9-CM: 518.81  3/2/2022 Unknown        Atrial fibrillation (HCC) ICD-10-CM: I48.91  ICD-9-CM: 427.31  3/2/2022 Unknown        Elevated troponin ICD-10-CM: R77.8  ICD-9-CM: 790.6  4/23/2021 Yes        HTN (hypertension) ICD-10-CM: I10  ICD-9-CM: 401.9  4/23/2021 Yes        Acute on chronic diastolic congestive heart failure (HCC) ICD-10-CM: I50.33  ICD-9-CM: 428.33, 428.0  4/23/2021 Yes             pt was admitted for afib and chf, found rt hip fracture. She has serena on cpap at night, she received rt hip replacement, she was noted decreased tide volume and hypoxia while extubation post procedure, intubated with oxygen 65%, peep 6.  Case discussed with Dr. Usha Martinez and Dr. Brandyn Johnsno       Respiratory   Acute on chronic respiratory failure with hypoxia and hypercapnia   Continue wean per protocol , breathing trail today   V/q scan :Perfusion images show no segmental perfusion defects to suggest the presence of  pulmonary embolism, pvl negative for dvt  on 3/3    Pulmonary hypertension   Echo on 3/3 pulmonary arterial systolic pressure is 51 mmhg  LORRAINE is deferred       cv   Congestive heart failure , acute on chronic diastolic   Echo done Mitral stenosis,  Pulmonary hypertension found to be moderate-LORRAINE is deferred  Dr. Roger Lopez   On coreg     Htn : on coreg   afib new   On coreg and  eliquis   Keep electrolytes good -replace K and mg as needed     Elevated trop   No acs cardiologist on board      ID   UTI  Urine pos for klebsiella  Completed IV Rocephin     msk :  Hip fracture  Right press fit direct anterior total hip arthroplasty   Continue post surgery care        Renal   ckd3 -cr 1.97, mild improving from yesterday   Continue watch for renal function ,    Heme   Anemia   Continue monitoring h/h     FEN  Hypokalemia K replacement   icu electrolytes replacement protocol       Neuro   Ct head on 3/7 no acute issue, on propofol     rn : doing well, sbt now   30 minutes of critical care time spent in the direct evaluation and treatment of this high risk patient. The reason for providing this level of medical care for this critically ill patient was due a critical illness that impaired one or more vital organ systems such that there was a high probability of imminent or life threatening deterioration in the patients condition. This care involved high complexity decision making to assess, manipulate, and support vital system functions, to treat this degreee vital organ system failure and to prevent further life threatening deterioration of the patients condition. Palliative care team f/u   Disposition :tbd,   Review of systems:  Limited due to on vent   Vital signs/Intake and Output:  Visit Vitals  BP (!) 95/57   Pulse 64   Temp 98.4 °F (36.9 °C)   Resp 10   Ht 5' 5\" (1.651 m)   Wt 115.3 kg (254 lb 3.1 oz)   SpO2 98%   Breastfeeding No   BMI 42.30 kg/m²     Current Shift:  No intake/output data recorded. Last three shifts:  03/12 1901 - 03/14 0700  In: 4960.8 [I.V.:2230.8]  Out: 3177 [Urine:1725]    Physical Exam:  General: Intubated , open eyes per voice, eye tracing   HEENT: NC, Atraumatic. anicteric sclerae.  ET tube noted   Lungs: CTA Bilaterally. No Wheezing/Rhonchi/Rales. Heart:  Regular  rhythm,  No murmur, No Rubs, No Gallops  Abdomen: Soft, Non distended, Non tender. +Bowel sounds,   Extremities: No c/c, rt hip surgical site covered with gauze and wound vac noted   Psych:   Not anxious or agitated. Neurologic:  Alert on vent, not follow the command           Labs: Results:       Chemistry Recent Labs     03/14/22  1015 03/14/22  0420 03/13/22  0430 03/12/22  0506 03/12/22  0506   GLU  --  133* 137*  --  109*   NA  --  145 144  --  146*   K 3.3* 3.3* 3.7   < > 4.1   CL  --  105 103  --  105   CO2  --  35* 37*  --  38*   BUN  --  96* 98*  --  97*   CREA  --  1.97* 2.17*  --  2.05*   CA  --  9.7 10.4*  --  10.4*   AGAP  --  5 4  --  3   BUCR  --  49* 45*  --  47*   AP  --  140* 128*  --  94   TP  --  5.4* 6.0*  --  5.5*   ALB  --  2.7* 3.3*  --  2.2*   GLOB  --  2.7 2.7  --  3.3   AGRAT  --  1.0 1.2  --  0.7*    < > = values in this interval not displayed. CBC w/Diff Recent Labs     03/14/22  0420 03/13/22  0430 03/12/22  0506   WBC 9.1 8.8 9.6   RBC 2.67* 2.96* 2.96*   HGB 8.4* 9.2* 9.5*   HCT 27.0* 28.7* 29.1*    178 203   GRANS 73 75* 74*   LYMPH 12* 12* 14*   EOS 1 1 0      Cardiac Enzymes No results for input(s): CPK, CKND1, FARA in the last 72 hours. No lab exists for component: CKRMB, TROIP   Coagulation No results for input(s): PTP, INR, APTT, INREXT, INREXT in the last 72 hours. Lipid Panel Lab Results   Component Value Date/Time    Cholesterol, total 183 04/25/2021 04:00 PM    HDL Cholesterol 101 (H) 04/25/2021 04:00 PM    LDL, calculated 69.8 04/25/2021 04:00 PM    VLDL, calculated 12.2 04/25/2021 04:00 PM    Triglyceride 61 04/25/2021 04:00 PM    CHOL/HDL Ratio 1.8 04/25/2021 04:00 PM      BNP No results for input(s): BNPP in the last 72 hours.    Liver Enzymes Recent Labs     03/14/22  0420   TP 5.4*   ALB 2.7*   *      Thyroid Studies Lab Results   Component Value Date/Time    TSH 0.58 03/03/2022 02:21 AM        Procedures/imaging: see electronic medical records for all procedures/Xrays and details which were not copied into this note but were reviewed prior to creation of Plan    NM LUNG SCAN PERF    Result Date: 3/3/2022  EXAM: NM LUNG SCAN PERF. CLINICAL INDICATION/HISTORY: d dimer increase dyspnea. -Additional: Clinical concern for pulmonary embolus. COMPARISON: Correlation is made with the radiographic study performed one day prior. TECHNIQUE: 7.2 mCi Tc-99m MAA was injected intravenously and the 8 standard views of the chest were obtained. This perfusion only examination is interpreted using the PISAPED criteria. _______________ FINDINGS: Perfusion images show no segmental perfusion defects to suggest the presence of pulmonary embolism. _______________     o scintigraphic findings to suggest the presence of acute pulmonary embolism. _______________     XR HIP RT W OR WO PELV 2-3 VWS    Result Date: 3/7/2022  EXAM: RIGHT HIP RADIOGRAPHS CLINICAL INDICATION/HISTORY: right hip pain -Additional: None COMPARISON: May March 3, 2022. TECHNIQUE: AP pelvis and frog-leg lateral view of right hip. _______________ FINDINGS: BONES: Intact appearing ilioischial and iliopectineal lines. There is a displaced and impacted subcapital right femoral neck fracture, with mild progressive displacement on follow-up imaging. Mild-moderate bilateral hip joint arthrosis. SOFT TISSUES: Unremarkable. _______________     1. Displaced and impacted subcapital right femoral neck fracture, increased in displacement from CT performed 4 days prior. CT HEAD WO CONT    Result Date: 3/7/2022  EXAM: CT head INDICATION: Altered mental status. COMPARISON: 4/23/2021. TECHNIQUE: Axial CT imaging of the head was performed without intravenous contrast. Standard multiplanar coronal and sagittal reformatted images were obtained and are included in interpretation.  One or more dose reduction techniques were used on this CT: automated exposure control, adjustment of the mAs and/or kVp according to patient size, and iterative reconstruction techniques. The specific techniques used on this CT exam have been documented in the patient's electronic medical record. Digital Imaging and Communications in Medicine (DICOM) format image data are available to nonaffiliated external healthcare facilities or entities on a secure, media free, reciprocally searchable basis with patient authorization for at least a 12-month period after this study. _______________ FINDINGS: BRAIN AND POSTERIOR FOSSA: Periventricular white matter hypoattenuation which is nonspecific but likely represents chronic ischemic changes. No evidence of acute large vessel transcortical infarct or acute parenchymal hemorrhage. No midline shift or hydrocephalus. EXTRA-AXIAL SPACES AND MENINGES: There are no abnormal extra-axial fluid collections. CALVARIUM: Intact. SINUSES: Clear. OTHER: None. _______________     No acute intracranial abnormality. CT CHEST ABD PELV WO CONT    Result Date: 3/3/2022  EXAM: CT CHEST, ABDOMEN AND PELVIS CLINICAL INDICATION/HISTORY: Hypoxemia, retrocardiac density, diarrhea, weakness and shortness of breath COMPARISON: 3/2/2022 TECHNIQUE: Axial CT imaging of the chest, abdomen, and pelvis was performed without intravenous contrast. Multiplanar reformats were generated. One or more dose reduction techniques were used on this CT: automated exposure control, adjustment of the mAs and/or kVp according to patient size, and iterative reconstruction techniques. The specific techniques used on this CT exam have been documented in the patient's electronic medical record. Digital Imaging and Communications in Medicine (DICOM) format image data are available to nonaffiliated external healthcare facilities or entities on a secure, media free, reciprocally searchable basis with patient authorization for at least a 12-month period after this study. ________________ FINDINGS: LIMITATIONS:   > Suboptimal evaluation given lack of intravenous contrast.   > Motion artifact is present which limits evaluation.   > Suboptimal exam due to patient body habitus and arms by the side. CHEST: LUNGS: Respiratory motion significantly limits evaluation. Interlobar septal thickening in the upper lobes. Mild bilateral dependent atelectasis. No large consolidation or suspicious pulmonary mass. PLEURA: Very small volume bilateral pleural effusions. AIRWAY: Normal. MEDIASTINUM: Four-chamber cardiomegaly with asymmetric biatrial enlargement, mitral and aortic annular calcifications. The main pulmonary artery is enlarged suggesting pulmonary arterial hypertension. Normal caliber aorta with scattered calcified atherosclerotic plaque. No pericardial effusion. LYMPH NODES: No enlarged lymph nodes. CHEST WALL: Diffuse anasarca. Heterogeneous thyroid. _______________ ABDOMEN/PELVIS: LIVER: No enhancing hepatic mass. The portal and hepatic veins are patent. BILIARY: Gallstones are present in the nondistended gallbladder lumen. No biliary dilation. SPLEEN: Normal. PANCREAS: Normal. ADRENALS: Left adrenal gland measures 9 HU (axial image 76), most consistent with lipid rich adenoma. Normal right adrenal gland. KIDNEYS: Asymmetrically small left kidney. No rate of a stone. No hydronephrosis. GI TRACT:  A small hiatal hernia is present. Normal caliber small and large bowel loops. No morphology of bowel obstruction. Normal appendix. BLADDER: Diffuse wall thickening in the largely decompressed bladder. PELVIC ORGANS: No acute abnormality. VASCULATURE: No arterial aneurysm. 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 LYMPH NODES: No mesenteric or retroperitoneal lymphadenopathy. OTHER: No free intraperitoneal air. No ascites. Diffuse anasarca. OSSEOUS STRUCTURES: No acute osseous abnormality. Multilevel degenerative changes most pronounced in the lumbar spine. Degenerative changes in both shoulders (right greater than left). Please note the included portions of the upper extremities are not well evaluated on this study _______________     1. Findings of congestive heart failure with cardiomegaly, interstitial edema, very small bilateral pleural effusions, and diffuse anasarca. 2.  Bladder wall thickening may be due to underdistention though superimposed infection cannot be excluded. Recommend correlation for cystitis. 3.  Osseous degenerative changes and additional findings, as detailed in the body of the report. XR CHEST PORT    Result Date: 3/6/2022  EXAM: PORTABLE CHEST HISTORY: Shortness of breath. COMPARISON: 3/2/2022 TECHNIQUE: Single portable view. _______________ FINDINGS: SUPPORT DEVICES: None HEART AND MEDIASTINUM: Moderate cardiomegaly. LUNGS AND PLEURAL SPACES: Subsegmental atelectasis left base. Possible small effusions. BONES AND SOFT TISSUES: Dextroscoliosis. _______________     Subsegmental atelectasis left base. Possible small effusions. Moderate cardiomegaly without change. XR CHEST PORT    Result Date: 3/2/2022  EXAM:  AP Portable Chest X-ray 1 view INDICATION: Shortness of breath COMPARISON: April 23, 2021 _______________ FINDINGS: Cardiomegaly and mediastinal contours are within normal limits for portable radiograph. There is increased right retrocardiac/right paraspinal density. There are no pleural effusions. No acute osseous findings. ________________      Increased right retrocardiac density which may represent airspace disease or underlying pulmonary nodule. Recommend CT chest for further evaluation. ECHO ADULT COMPLETE    Result Date: 3/3/2022    Left Ventricle: Left ventricle size is normal. Moderately increased wall thickness. Findings consistent with moderate concentric hypertrophy. Normal wall motion. Normal left ventricular systolic function with a visually estimated EF of 55 - 60%.   Left Atrium: Left atrium is mildly dilated.   Right Ventricle: Right ventricle is mildly dilated.   Right Atrium: Right atrium is mildly dilated.   Pulmonary artery :  Estimated pulmonary arterial systolic pressure is 51 mmhg. Pulmonary hypertension found to be moderate   Mitral Valve: Moderately thickened leaflet. Mildly calcified leaflet. Moderate annular calcification of the mitral valve.   IVC/SVC : IVC diameter is greater than 21 mm and decreases less than 50% during inspiration; therefore the estimated right atrial pressure is elevated (~15 mmHg). IVC is dilated. Consider LORRAINE for better evaluation of mitral valve if clinically indicated. DUPLEX LOWER EXT VENOUS BILAT    Result Date: 3/4/2022  · No evidence of deep vein thrombosis in the right lower extremity. · No evidence of deep vein thrombosis in the left lower extremity.         Myriam Arredondo MD

## 2022-03-14 NOTE — CONSULTS
[facility]  (A Division of [address] [place] [address])                                                                                                                      Karie Daily MD  Office #: - Option [phone]  Fax #: [phone]     Date of Admission: 3/2/2022Date of Note: 3/14/2022      Reason for Consult: Evaluation and antibiotic management of for positive resp/urine cultures requested by Dr Winston Galaviz. Thank you for involving me in the care of this patient. Please do not hesitate to contact me on the above number if question or concern. Current Antimicrobials:    Prior Antimicrobials:    Levofloxacin 500 mg X1- 3/13  Meropenem 500 mg 3/14     Zithromax 3/2- 3/4  cTX 3/2 to 3/4  CTC 3/7-- X2 dose       Assessment- ID related:  --------------------------------------------------------------------------  Acutely sick and complicated patient in ICU with:    · Enterobacter Clacae complex in resp cutlure 3/10/22  · Acute resp failure- intubated since OR day 3/10/22  --CXR on 3/14-- infiltrate with atelectasis and pleural effusion  · S/P R hip anterior arthroplasty- wound vac in place  · Klebsiella bacteruria 3/2/22  --UA clean and Urine culture Kleb    Other Medical Issues- Mx per respective team:  · CKD  · Morbidly obese  · Hx of SERENA  · A.fib  · Acute on chronic  CHF     Recommendation for ID issues I am following:  ------------------------------------------------------------------------------  JIE Garza/RN/dtr on phone    Relatively RR strain of Enterobacter. Isolated from intubation day of 3/10/22. Dont think Klebsiella in urine needs coverage-- but whatever we use for Enterobacter will cover it anyway    --DC meropenem( spare it to avoid CRE development)  --cefepime 1 gm IV BID for next 6 days to complete rx on March 19- pharmacy can adjust dose if crCl changes    Monitor wbc/ bmp       HPI:  Natalia Falk is a 80 y.o.  BLACK/ with PMH of congestive heart failure, sleep apnea, right leg weakness, admitted on 3/2 for acute resp failure with hypoxia due to CHF and a.fib with RVR. No fever or leucocytosis. UA done on admission was clean, urine culture grew Klebsiella. She had right hip fracture and was taken to OR on 3/10 for BLAIR. She had a wound vac in place. She didn't extubate after procedure and was transferred to ICU where she is getting care. resp cutture from 3/10-- has Enterobacter that was final on 3/13-- she was given Levofloxacin. Today it was changed to meropenem. DW with RN- little resp secretion, she has low grade fever today, no leucocytosis. She isnot on pressors. Per dtr, she doesn't recall when she got treated with ABX as OP. No prior history of UTI diagnosis.              Active Hospital Problems    Diagnosis Date Noted    Fracture of neck of right femur ([facility] [address] 03/08/2022    Stage 3 chronic kidney disease ([address] 03/03/2022    SERENA (obstructive sleep apnea) 03/03/2022    Adult BMI 39.0-39.9 kg/sq m 03/03/2022    Acute respiratory failure with hypoxemia (HCC) 03/02/2022    Atrial fibrillation (HCC) 03/02/2022    Acute on chronic diastolic congestive heart failure ([address] 04/23/2021    Elevated troponin 04/23/2021    HTN (hypertension) 04/23/2021     Past Medical History:   Diagnosis Date    Hypertension     Sleep disorder      Past Surgical History:   Procedure Laterality Date    HX ORTHOPAEDIC      broken right ankle, left femur 30 yrs ago     Family History   Problem Relation Age of Onset    Diabetes Mother     Heart Disease Mother     Heart Disease Father      Social History     Socioeconomic History    Marital status: SINGLE     Spouse name: Not on file    Number of children: Not on file    Years of education: Not on file    Highest education level: Not on file   Occupational History    Not on file   Tobacco Use    Smoking status: Never Smoker    Smokeless tobacco: Never Used   Vaping Use    Vaping Use: Never used   Substance and Sexual Activity    Alcohol use: Yes     Alcohol/week: 1.0 standard drink     Types: 1 Glasses of wine per week     Comment: soc    Drug use: No    Sexual activity: Not on file   Other Topics Concern     Service Not Asked    Blood Transfusions Not Asked    Caffeine Concern Not Asked    Occupational Exposure Not Asked    Hobby Hazards Not Asked    Sleep Concern Not Asked    Stress Concern Not Asked    Weight Concern Not Asked    Special Diet Not Asked    Back Care Not Asked    Exercise Not Asked    Bike Helmet Not Asked   [address] Not Asked    Self-Exams Not Asked   Social History Narrative    Not on file     Social Determinants of Health     Financial Resource Strain:     Difficulty of Paying Living Expenses: Not on file   Food Insecurity:     Worried About Running Out of Food in the Last Year: Not on file    Aquiles of Food in the Last Year: Not on file   Transportation Needs:     Lack of Transportation (Medical): Not on file    Lack of Transportation (Non-Medical):  Not on file   Physical Activity:     Days of Exercise per Week: Not on file    Minutes of Exercise per Session: Not on file   Stress:     Feeling of Stress : Not on file   Social Connections:     Frequency of Communication with Friends and Family: Not on file    Frequency of Social Gatherings with Friends and Family: Not on file    Attends [employer] Services: Not on file    Active Member of [address] [place] [address] or Organizations: Not on file    Attends Club or Organization Meetings: Not on file    Marital Status: Not on file   Intimate Partner Violence:     Fear of Current or Ex-Partner: Not on file    Emotionally Abused: Not on file    Physically Abused: Not on file    Sexually Abused: Not on file   Housing Stability:     Unable to Pay for Housing in the Last Year: Not on file    Number of [place] in the Last Year: Not on file    Unstable Housing in the Last Year: Not on file Allergies:  Seafood, Statins-hmg-coa reductase inhibitors, and Sulfa (sulfonamide antibiotics)     Medications:  Current Facility-Administered Medications   Medication Dose Route Frequency    acetaZOLAMIDE (DIAMOX) 250 mg in sterile water (preservative free) 2.5 mL injection  250 mg IntraVENous DAILY    arformoteroL (BROVANA) neb solution 15 mcg  15 mcg Nebulization BID RT    albumin human 25% (BUMINATE) solution 25 g  25 g IntraVENous Q6H    dextrose 5 % - 0.45% NaCl infusion  25 mL/hr IntraVENous CONTINUOUS    famotidine (PF) (PEPCID) injection 20 mg  20 mg IntraVENous DAILY    sodium chloride (NS) flush 5-40 mL  5-40 mL IntraVENous PRN    acetaminophen (TYLENOL) tablet 650 mg  650 mg Oral Q6H    naloxone (NARCAN) injection 0.4 mg  0.4 mg IntraVENous PRN    ferrous sulfate tablet 325 mg  1 Tablet Oral TID    diphenhydrAMINE (BENADRYL) capsule 25 mg  25 mg Oral Q4H PRN    bisacodyL (DULCOLAX) suppository 10 mg  10 mg Rectal DAILY PRN    ELECTROLYTE REPLACEMENT PROTOCOL - Magnesium   1 Each Other PRN    ELECTROLYTE REPLACEMENT PROTOCOL - Calcium   1 Each Other PRN    ELECTROLYTE REPLACEMENT PROTOCOL  - Phosphorus Renal Dosing  1 Each Other PRN    ELECTROLYTE REPLACEMENT PROTOCOL - Potassium Renal Dosing  1 Each Other PRN    midazolam (VERSED) injection 1 mg  1 mg IntraVENous Q2H PRN    fentaNYL citrate (PF) injection 25 mcg  25 mcg IntraVENous Q4H PRN    chlorhexidine (PERIDEX) 0.12 % mouthwash 10 mL  10 mL Oral Q12H    nystatin (MYCOSTATIN) 100,000 unit/mL oral suspension 500,000 Units  500,000 Units Oral QID    apixaban (ELIQUIS) tablet 2.5 mg  2.5 mg Oral BID    sodium chloride (NS) flush 5-40 mL  5-40 mL IntraVENous Q8H    sodium chloride (NS) flush 5-40 mL  5-40 mL IntraVENous PRN    acetaminophen (TYLENOL) tablet 650 mg  650 mg Oral Q6H PRN    Or    acetaminophen (TYLENOL) suppository 650 mg  650 mg Rectal Q6H PRN    polyethylene glycol (MIRALAX) packet 17 g  17 g Oral DAILY PRN  ondansetron (ZOFRAN ODT) tablet 4 mg  4 mg Oral Q8H PRN    Or    ondansetron (ZOFRAN) injection 4 mg  4 mg IntraVENous Q6H PRN    carvediloL (COREG) tablet 3.125 mg  3.125 mg Oral BID WITH MEALS    aspirin delayed-release tablet 81 mg  81 mg Oral DAILY        ROS:  Review of systems not obtained due to patient factors. Physical Exam:    Temp (24hrs), Av.2 °F (37.3 °C), Min:97.9 °F (36.6 °C), Max:100.5 °F (38.1 °C)    Visit Vitals  /67   Pulse 68   Temp 98.4 °F (36.9 °C)   Resp 18   Ht 5' 5\" (1.651 m)   Wt 115.3 kg (254 lb 3.1 oz)   SpO2 98%   Breastfeeding No   BMI 42.30 kg/m²        GEN: WD obese, Intubated, oral tube in place for feeding. HEENT: Unicteric. EOMI intact  CHEST: Non laboured breathing. CTA  CVS:RRR, no mur/gallop  ABD: Obese/soft. Non tender. Non distended abd  KAREN:box in place  EXT: No apparent swelling or redness on UE/LE joints. --right groin vac in place  Skin: Dry and intact. No rash, no redness.   CNS:Intubated, but awake, follows with eyes       Microbiology  All Micro Results     Procedure Component Value Units Date/Time    CULTURE, BLOOD [[id number]] Collected: 22    Order Status: Completed Specimen: Blood Updated: 22     Special Requests: NO SPECIAL REQUESTS        Culture result: NO GROWTH AFTER 2 HOURS       CULTURE, BLOOD [[id number]] Collected: 22    Order Status: Completed Specimen: Blood Updated: 22     Special Requests: NO SPECIAL REQUESTS        Culture result: NO GROWTH AFTER 2 HOURS       CULTURE, RESPIRATORY/SPUTUM/BRONCH Harrie Webb STAIN [[id number]]  (Abnormal)  (Susceptibility) Collected: 03/10/22 1630    Order Status: Completed Specimen: Sputum from Endotracheal aspirate Updated: 22     Special Requests: NO SPECIAL REQUESTS        GRAM STAIN NO WBC'S SEEN         NO EPITHELIAL CELLS SEEN         NO ORGANISMS SEEN        Culture result:       LIGHT ENTEROBACTER CLOACAE COMPLEX HEAVY NORMAL RESPIRATORY ELSIE          CULTURE, RESPIRATORY/SPUTUM/BRONCH Gail Melendez [[id number]] Collected: 03/10/22 1915    Order Status: Canceled Specimen: Sputum from Endotracheal aspirate     CULTURE, BLOOD [[id number]] Collected: 03/02/22 2048    Order Status: Completed Specimen: Blood Updated: 03/08/22 0655     Special Requests: NO SPECIAL REQUESTS        Culture result: NO GROWTH 6 DAYS       CULTURE, BLOOD [[id number]] Collected: 03/02/22 2048    Order Status: Completed Specimen: Blood Updated: 03/08/22 0655     Special Requests: NO SPECIAL REQUESTS        Culture result: NO GROWTH 6 DAYS       CULTURE, URINE [[id number]]  (Abnormal)  (Susceptibility) Collected: 03/02/22 2104    Order Status: Completed Specimen: Cath Urine Updated: 03/05/22 1226     Special Requests: NO SPECIAL REQUESTS        [place] Count --        >100,000  COLONIES/mL       Culture result: KLEBSIELLA PNEUMONIAE       COVID-19 RAPID TEST [[id number]] Collected: 03/02/22 2025    Order Status: Completed Specimen: Nasopharyngeal Updated: 03/02/22 2052     Specimen source Nasopharyngeal        COVID-19 rapid test Not detected        Comment: Rapid Abbott ID Now       Rapid NAAT:  The specimen is NEGATIVE for SARS-CoV-2, the novel coronavirus associated with COVID-19. Negative results should be treated as presumptive and, if inconsistent with clinical signs and symptoms or necessary for patient management, should be tested with an alternative molecular assay. Negative results do not preclude SARS-CoV-2 infection and should not be used as the sole basis for patient management decisions. This test has been authorized by the FDA under an Emergency Use Authorization (EUA) for use by authorized laboratories.    Fact sheet for Healthcare Providers: ConventionUpdate.co.nz  Fact sheet for Patients: ConventionUpdate.co.nz       Methodology: Isothermal Nucleic Acid Amplification         INFLUENZA A & B AG (RAPID TEST) [[id number]] Collected: 03/02/22 2025    Order Status: Completed Specimen: Nasopharyngeal from Nasal washing Updated: 03/02/22 2047     Influenza A Antigen Negative        Comment: A negative result does not exclude influenza virus infection, seasonal or H1N1 due to suboptimal sensitivity. If influenza is circulating in your community, a diagnosis of influenza should be considered based on a patients clinical presentation and empiric antiviral treatment should be considered, if indicated. Influenza B Antigen Negative              Lab results:    Chemistry  Recent Labs     03/14/22  1015 03/14/22 0420 03/13/22  0430 03/12/22  0506 03/12/22  0506   GLU  --  133* 137*  --  109*   NA  --  145 144  --  146*   K 3.3* 3.3* 3.7   < > 4.1   CL  --  105 103  --  105   CO2  --  35* 37*  --  38*   BUN  --  96* 98*  --  97*   CREA  --  1.97* 2.17*  --  2.05*   CA  --  9.7 10.4*  --  10.4*   AGAP  --  5 4  --  3   BUCR  --  49* 45*  --  47*   AP  --  140* 128*  --  94   TP  --  5.4* 6.0*  --  5.5*   ALB  --  2.7* 3.3*  --  2.2*   GLOB  --  2.7 2.7  --  3.3   AGRAT  --  1.0 1.2  --  0.7*    < > = values in this interval not displayed. CBC w/ Diff  Recent Labs     03/14/22 0420 03/13/22 0430 03/12/22  0506   WBC 9.1 8.8 9.6   RBC 2.67* 2.96* 2.96*   HGB 8.4* 9.2* 9.5*   HCT 27.0* 28.7* 29.1*    178 203   GRANS 73 75* 74*   LYMPH 12* 12* 14*   EOS 1 1 0       Imaging: report reviewed and as posted by radiologist       1. Support devices in stable/appropriate position as visualized. 2. Mild cardiac enlargement, unchanged. 3. Hazy right lung base opacity, favored a combination of atelectasis and  posteriorly layering pleural effusion.

## 2022-03-14 NOTE — PROGRESS NOTES
RESPIRATORY NOTE:       Discussed with MD, back on SBT, PS7/5PEEP, tolerating well, lethargic but acceptable RSBI will continue to monitor.

## 2022-03-14 NOTE — PROGRESS NOTES
Nutrition Assessment     Type and Reason for Visit: Reassess    Nutrition Recommendations/Plan: If pt remains intubated, recc restart tube feeding to meet needs with. Goal: Nepro @ 30ml/hr + 4 prosource daily. 03/14/22 0851   Enteral Nutrition   Feeding Route Orogastric   EN Formula Renal  (Nepro)   Schedule Continuous   Feeding Regimen Goal: Nepro @ 30ml/hr + 4 prosource daily. Initiate @ 10ml/hr. Increase by 10ml Q8 until until goal rate. Additives/Modulars Protein  (4 prosource (60g PRO 240kcal))   Water Flushes 150ml q6   Goal EN & Flush Order Provides Nepro @ 30ml/hr + 4 prosource daily will provide:     1428kcal, 113g PRO, 110g CHO, and 479ml free water + 600ml flushes (1079ml)     Nutrition Assessment:  PMHx: htn, sleep disorder, right side weakness. Admitted for Congestive heart failure, acute on chronic diastolic, recent fall- x ray shows hip fracture, new a fib. CKD 3. Pt s/p right total hip arthroplasty on 3/10. Pt was transfered to ICU and remains on vent. SBT this AM.    Estimated Daily Nutrient Needs:  Energy (kcal):  3294-5643  Protein (g):  114-143       Fluid (ml/day):  6585-7626    Nutrition Related Findings:  Labs: K 3.3, GFR est AA 29, BUN 96, creatinine 1.97. Med: ferrous sulfate, pepcid. BM 3/11. Start on tube feeding 3/11; infusing @ 40ml/hr? Noted tube feeding order of Nepro @ 30ml/hr. However per MD this AM- tube feeding on hold for possible extubation. Current Nutrition Therapies:  DIET NPO  ADULT TUBE FEEDING Orogastric; Renal; Delivery Method: Continuous; Continuous Initial Rate (mL/hr): 10; Continuous Advance Tube Feeding: Yes; Advancement Volume (mL/hr): 10; Advancement Frequency: Q 8 hours; Continuous Goal Rate (mL/hr): 30; Water. ..     Anthropometric Measures:  · Height:  5' 5\" (165.1 cm)  · Current Body Wt:  115.3 kg (254 lb 3.1 oz)  · BMI: 42.3    Nutrition Diagnosis:   · Inadequate oral intake related to acute injury/trauma,impaired respiratory function as evidenced by intubation,nutrition support-enteral nutrition    Nutrition Intervention:  Food and/or Nutrient Delivery: Continue tube feeding  Nutrition Education and Counseling: No recommendations at this time  Coordination of Nutrition Care: Continue to monitor while inpatient    Goals:  Continue with tube feeding to meet nutritional needs throughout the next 2-4 days. Nutrition Monitoring and Evaluation:   Behavioral-Environmental Outcomes: None identified  Food/Nutrient Intake Outcomes: Diet advancement/tolerance,Enteral nutrition intake/tolerance  Physical Signs/Symptoms Outcomes: Biochemical data,Skin,Weight,GI status,Hemodynamic status    Discharge Planning:     Too soon to determine     Electronically signed by Gabriel Cortes RD on 3/14/2022 at 8:54 AM

## 2022-03-14 NOTE — PROGRESS NOTES
[facility]  Pulmonary, Critical Care, and Sleep Medicine    Name: Sam Pandey MRN: [id number]   : 1940 Hospital: [facility] [place]    Date: 3/14/2022  Room: [address] [place] [address] Note                                              Consult requesting physician: Dr. Maya Cisneros  Reason for Consult: Respiratory failure    IMPRESSION:   Acute respiratory failure with hypoxemia. Acute on chronic diastolic congestive heart failure. Fracture of neck of right femur. SERENA. Active Hospital Problems    Diagnosis Date Noted    Fracture of neck of right femur ([facility] 2022    Stage 3 chronic kidney disease ([facility] 2022    SERENA (obstructive sleep apnea) 2022    Adult BMI 39.0-39.9 kg/sq m 2022    Acute respiratory failure with hypoxemia (HCC) 2022    Atrial fibrillation ([facility] 2022    Acute on chronic diastolic congestive heart failure [facility] 2021    Elevated troponin 2021    HTN (hypertension) 2021   ·      Patient Active Problem List   Diagnosis Code    Fatigue R53.83    Elevated troponin R77.8    Obesity (BMI 30-39. 9) E66.9    HTN (hypertension) I10    SERENA treated with BiPAP G47.33    Acute on chronic diastolic congestive heart failure (HCC) I50.33    Acute respiratory failure with hypoxemia (HCC) J96.01    Atrial fibrillation ([facility]) I48.91    Stage 3 chronic kidney disease (HCC) N18.30    SERENA (obstructive sleep apnea) G47.33    Adult BMI 39.0-39.9 kg/sq m Z68.39    Fracture of neck of right femur ([facility]) S72.001A         · Code status: Full Code       RECOMMENDATIONS:     Respiratory: History of obesity, SERENA on BiPAP. Postoperative respiratory failure, reintubated in PACU 3/10/2022. Failed SBT on 3/14/2022 periods of lethargus but later woke up   Oxygen low on SBt  Try again tomorrow   Diuresis add bronchodilators and add albumins   CXR revised   IMPRESSION  1.  Support devices in stable/appropriate position as visualized. 2. Mild cardiac enlargement, unchanged. 3. Hazy right lung base opacity, favored a combination of atelectasis and  posteriorly layering pleural effusion. ABG: Improved metabolic alkalosis but hypoxemia  Currently on AC 10/375/35/5. See renal section for metabolic alkalosis management. Sedation: Remains off propofol since 3/11/2022 morning. Use fentanyl/Versed as needed boluses. Failed SBT in 5 minutes today with tachypnea, tachycardia, low tidal volume. She is more awake than yesterday. ABG is improving. Continue daily SBT attempt. Ventilator bundle & Sedation protocol followed. Daily sedation holiday, assessment for readiness for SBT and then re-titrate if required. Chlorhexidine mouth washes. Keep SPO2 >=92%. HOB 30 degree elevation all the time. Aggressive pulmonary toileting. Aspiration precautions. CVS: New onset A. fib on admission, chronic diastolic CHF. Continue aspirin. Continue Coreg but hold for SBP less than 100. Continue Eliquis; monitor for any external bleeding. Lasix held due to metabolic alkalosis since 7/80/8189; and received Diamox I adj used the dose today   Cardiologist on board. Echo 3/3/2022: LVEF 55 to 60%. RV mildly dilated. RA mildly dilated. PVL LE 3/3/2022: No DVT. VQ scan 3/3/2022: No PE.    ID: WBC 9.1  No fever or leukocytosis. Rapid influenza and COVID19 3/2/2022: Negative. Urine culture 3/2/2022: Klebsiella pneumonia. Blood culture 3/2/2022: Negative. Endotracheal aspirate culture: Light Enterobacter cloacae complex. Heavy normal forrest. Antibiotics: Rocephin was discontinued on 3/12/2022 (Enterobacter is resistant to Rocephin). Was on Levaquin but I change to meropenem unclear lethargus . Hematology/Oncology:   Anemia; but no external bleeding. Normal platelets. Renal:   Mild TK; likely due to diuretics use. S/p mild hypernatremia; resolved.   Lasix held due to metabolic alkalosis since 2/98/5832; and received Diamox 250 mg every 12 hours x2 doses on 3/12/2022 and on D5 0.45 NS at 50 mill per hour; with improved ABG. Dr. Dariel Barone on board. GI/: No acute issues. Endocrine: Monitor BS. SSI. Neurology:   Patient was lethargic and confused before going for hip surgery on 3/10/2022 morning. Patient remains off propofol since 3/11/2022; mental status is improving since improvement in ABG. Patient is more alert and awake today. CT head 3/7/2022: Nil acute. Repeat CT head 3/11/2022: Nil acute. Psychiatry: No acute issues. Toxicology: No acute issues. Pain/Sedation: Sedation protocol as above    Skin/Wound: Right hip wound VAC in place after surgery; local care per nursing protocol. Electrolytes: Replace electrolytes per ICU electrolyte replacement protocol. IVF: D5 0.45 NS at 50 mill per hour. Nutrition: Tolerating OGT feed at 40 mL/h. Prophylaxis: DVT Prophylaxis: Eliquis. GI Prophylaxis: Protonix. Restraints: wrist soft restraints for patient interfering with medical therapy/management and patient safety. Lines/Tubes: PIV  ETT: 3/10/2022. OGT: 3/11/2022. [place]: 3/10/2022 (Medically necessary for strict input/output monitoring in critically ill patient, will remove it when not needed. [place] bundle followed). Advance Directive/Palliative Care: Consulted. Will defer respective systems problem management to primary and other respective consultant and follow patient in ICU with primary and other medical team.  Further recommendations will be based on the patient's response to recommended treatment and results of the investigation ordered. Quality Care: PPI, DVT prophylaxis, HOB elevated, Infection control all reviewed and addressed. Care of plan d/w RN, RT, hospitalist team.    High complexity decision making was performed during the evaluation of this patient at high risk for decompensation with multiple organ involvement.     Total critical care time spent rendering care exclusive of procedures/family discussion/coordination of care: 39 minutes. I updated daughter today . Palliative care on board     Subjective/History of Present Illness:     Patient is a 80 y.o. female with PMHx significant for morbid obesity, SERENA, right leg weakness, HTN, A. fib, CHF; admitted to medical floor on 3/3/2022 for dyspnea. Patient had new onset of A. fib on admission. Patient was admitted on medical floor and A. fib/CHF was being treated medically; and was using home BiPAP nightly. Found to have right femoral fracture; underwent right press-fit direct anterior total hip arthroplasty for femoral neck fracture. Postoperatively patient was extubated in PACU; but due to respiratory distress, reintubated and transferred to ICU.      3/14/2022 :     Remains in . Off sedation   If agitate will give precedex  SBT today oxygen dropped we will place back on vent  Add bronchodilators and albumins  continue diamox but wooer dose   I/O last 24 hrs: Intake/Output Summary (Last 24 hours) at 3/14/2022 1138  Last data filed at 3/14/2022 6796  Gross per 24 hour   Intake 3085 ml   Output 825 ml   Net 2260 ml         The patient is critically ill and can not provide additional history due to Ventilated    History taken from Dr. Hand Him, EMR     Review of Systems:  ROS not obtained due to patient factor. Allergies   Allergen Reactions    Seafood Hives     crabs    Statins-Hmg-Coa Reductase Inhibitors Unknown (comments)    Sulfa (Sulfonamide Antibiotics) Hives      Past Medical History:   Diagnosis Date    Hypertension     Sleep disorder       Past Surgical History:   Procedure Laterality Date    HX ORTHOPAEDIC      broken right ankle, left femur 30 yrs ago      Social History     Tobacco Use    Smoking status: Never Smoker    Smokeless tobacco: Never Used   Substance Use Topics    Alcohol use:  Yes     Alcohol/week: 1.0 standard drink     Types: 1 Glasses of wine per week     Comment: soc      Family History   Problem Relation Age of Onset    Diabetes Mother     Heart Disease Mother     Heart Disease Father       Prior to Admission medications    Medication Sig Start Date End Date Taking? Authorizing Provider   allopurinoL (ZYLOPRIM) 100 mg tablet Take 100 mg by mouth daily. Yes Provider, Historical   acetaminophen (TYLENOL) 325 mg tablet Take 650 mg by mouth every six (6) hours as needed for Pain. Yes Provider, Historical   niacin (NIASPAN) 1,000 mg Tb24 tab Take 1,000 mg by mouth daily. Yes Provider, Historical   trolamine salicylate-aloe vera 90% (ASPERCREME) topical cream Apply 2 g to affected area as needed for Pain. Yes Provider, Historical   multivitamin, tx-iron-ca-min (THERA-M w/ IRON) 9 mg iron-400 mcg tab tablet Take 1 Tab by mouth daily. Yes Provider, Historical   aspirin delayed-release 81 mg tablet Take 81 mg by mouth daily. Yes Provider, Historical   potassium chloride SR (KLOR-CON 10) 10 mEq tablet Take 10 mEq by mouth daily. Yes Provider, Historical   carvediloL (COREG) 3.125 mg tablet Take 3.125 mg by mouth daily.    Yes Provider, Historical     Current Facility-Administered Medications   Medication Dose Route Frequency    meropenem (MERREM) 500 mg in sterile water (preservative free) 10 mL IV syringe  0.5 g IntraVENous Q12H    acetaZOLAMIDE (DIAMOX) 250 mg in sterile water (preservative free) 2.5 mL injection  250 mg IntraVENous DAILY    arformoteroL (BROVANA) neb solution 15 mcg  15 mcg Nebulization BID RT    albumin human 25% (BUMINATE) solution 25 g  25 g IntraVENous Q6H    dextrose 5 % - 0.45% NaCl infusion  25 mL/hr IntraVENous CONTINUOUS    famotidine (PF) (PEPCID) injection 20 mg  20 mg IntraVENous DAILY    acetaminophen (TYLENOL) tablet 650 mg  650 mg Oral Q6H    ferrous sulfate tablet 325 mg  1 Tablet Oral TID    chlorhexidine (PERIDEX) 0.12 % mouthwash 10 mL  10 mL Oral Q12H    nystatin (MYCOSTATIN) 100,000 unit/mL oral suspension 500,000 Units  500,000 Units Oral QID    apixaban (ELIQUIS) tablet 2.5 mg  2.5 mg Oral BID    sodium chloride (NS) flush 5-40 mL  5-40 mL IntraVENous Q8H    carvediloL (COREG) tablet 3.125 mg  3.125 mg Oral BID WITH MEALS    aspirin delayed-release tablet 81 mg  81 mg Oral DAILY         Objective:   Vital Signs:    Visit Vitals  /67   Pulse 72   Temp 98.4 °F (36.9 °C)   Resp 22   Ht 5' 5\" (1.651 m)   Wt 115.3 kg (254 lb 3.1 oz)   SpO2 98%   Breastfeeding No   BMI 42.30 kg/m²       O2 Device: Endotracheal tube,Ventilator   O2 Flow Rate (L/min): 2 l/min   Temp (24hrs), Av.2 °F (37.3 °C), Min:97.9 °F (36.6 °C), Max:100.5 °F (38.1 °C)       Intake/Output:   Last shift:      No intake/output data recorded. Last 3 shifts:  1901 -  0700  In: 4960.8 [I.V.:2230.8]  Out: 1725 [Urine:1725]      Intake/Output Summary (Last 24 hours) at 3/14/2022 1138  Last data filed at 3/14/2022 0648  Gross per 24 hour   Intake 3085 ml   Output 825 ml   Net 2260 ml       Last 3 Recorded Weights in this Encounter    22 0459 22 0750 22 0000   Weight: 104.2 kg (229 lb 12.8 oz) 111.1 kg (244 lb 14.9 oz) 115.3 kg (254 lb 3.1 oz)         Ventilator Settings:  Mode Rate Tidal Volume Pressure FiO2 PEEP   Spontaneous,Pressure support   375 ml  7 cm H2O 40 % (per ABG/MD) 5 cm H20     Peak airway pressure: 18 cm H2O    Plateau pressure:     Tidal volume:    Minute ventilation: 4.72 l/min     Recent Labs     22  0939 22  0442 22  0440   PHI 7.39 7.43 7.48*   PCO2I 57.3* 51.1* 45.1*   PO2I 54* 79* 108*   HCO3I 34.6* 34.2* 34.0*   FIO2I 30 30 40       Physical Exam:       General/Neurology: Alert, more awake now in AM was lethrgic ; moving all 4 extremities. NAD. On ventilator. Not sedated. Head:   NCAT. Eye:   EOM intact. No icterus/pallor/cyanosis. Nose:   Normal no discharge   Neck:   Trachea midline. Lung: Moderate air entry bilateral equal. At the base sed decreased breath sounds   No rales, rhonchi. No wheezing or stridor. No prolonged expiration or accessory muscle use. Heart:   S1 S2 present. irregularly irregular  No murmur or JVD. Abdomen:  Soft. NT. ND. No palpable masses. Extremities:  No edema. No cyanosis or clubbing. Right hip surgical site wound VAC dressing intact. Pulses: 2+ and symmetric in DP. Data:       Recent Results (from the past 24 hour(s))   CULTURE, BLOOD    Collection Time: 03/14/22  4:19 AM    Specimen: Blood   Result Value Ref Range    Special Requests: NO SPECIAL REQUESTS      Culture result: NO GROWTH AFTER 2 HOURS     CULTURE, BLOOD    Collection Time: 03/14/22  4:20 AM    Specimen: Blood   Result Value Ref Range    Special Requests: NO SPECIAL REQUESTS      Culture result: NO GROWTH AFTER 2 HOURS     MAGNESIUM    Collection Time: 03/14/22  4:20 AM   Result Value Ref Range    Magnesium 2.2 1.6 - 2.6 mg/dL   CBC WITH AUTOMATED DIFF    Collection Time: 03/14/22  4:20 AM   Result Value Ref Range    WBC 9.1 4.6 - 13.2 K/uL    RBC 2.67 (L) 4.20 - 5.30 M/uL    HGB 8.4 (L) 12.0 - 16.0 g/dL    HCT 27.0 (L) 35.0 - 45.0 %    .1 (H) 78.0 - 100.0 FL    MCH 31.5 24.0 - 34.0 PG    MCHC 31.1 31.0 - 37.0 g/dL    RDW 14.9 (H) 11.6 - 14.5 %    PLATELET 785 683 - 134 K/uL    MPV 10.9 9.2 - 11.8 FL    NRBC 0.0 0  WBC    ABSOLUTE NRBC 0.00 0.00 - 0.01 K/uL    NEUTROPHILS 73 40 - 73 %    LYMPHOCYTES 12 (L) 21 - 52 %    MONOCYTES 13 (H) 3 - 10 %    EOSINOPHILS 1 0 - 5 %    BASOPHILS 0 0 - 2 %    IMMATURE GRANULOCYTES 1 (H) 0.0 - 0.5 %    ABS. NEUTROPHILS 6.6 1.8 - 8.0 K/UL    ABS. LYMPHOCYTES 1.1 0.9 - 3.6 K/UL    ABS. MONOCYTES 1.2 0.05 - 1.2 K/UL    ABS. EOSINOPHILS 0.1 0.0 - 0.4 K/UL    ABS. BASOPHILS 0.0 0.0 - 0.1 K/UL    ABS. IMM.  GRANS. 0.1 (H) 0.00 - 0.04 K/UL    DF AUTOMATED     METABOLIC PANEL, COMPREHENSIVE    Collection Time: 03/14/22  4:20 AM   Result Value Ref Range    Sodium 145 136 - 145 mmol/L    Potassium 3.3 (L) 3.5 - 5.5 mmol/L    Chloride 105 100 - 111 mmol/L CO2 35 (H) 21 - 32 mmol/L    Anion gap 5 3.0 - 18 mmol/L    Glucose 133 (H) 74 - 99 mg/dL    BUN 96 (H) 7.0 - 18 MG/DL    Creatinine 1.97 (H) 0.6 - 1.3 MG/DL    BUN/Creatinine ratio 49 (H) 12 - 20      GFR est AA 29 (L) >60 ml/min/1.73m2    GFR est non-AA 24 (L) >60 ml/min/1.73m2    Calcium 9.7 8.5 - 10.1 MG/DL    Bilirubin, total 1.2 (H) 0.2 - 1.0 MG/DL    ALT (SGPT) 42 13 - 56 U/L    AST (SGOT) 59 (H) 10 - 38 U/L    Alk.  phosphatase 140 (H) 45 - 117 U/L    Protein, total 5.4 (L) 6.4 - 8.2 g/dL    Albumin 2.7 (L) 3.4 - 5.0 g/dL    Globulin 2.7 2.0 - 4.0 g/dL    A-G Ratio 1.0 0.8 - 1.7     BLOOD GAS, ARTERIAL POC    Collection Time: 03/14/22  4:42 AM   Result Value Ref Range    Device: ADULT VENT      FIO2 (POC) 30 %    pH (POC) 7.43 7.35 - 7.45      pCO2 (POC) 51.1 (H) 35.0 - 45.0 MMHG    pO2 (POC) 79 (L) 80 - 100 MMHG    HCO3 (POC) 34.2 (H) 22 - 26 MMOL/L    sO2 (POC) 95.8 92 - 97 %    Base excess (POC) 8.7 mmol/L    Mode Volume Control      Tidal volume 375 ml    Set Rate 10 bpm    PEEP/CPAP (POC) 5 cmH2O    Mean Airway Pressure 8 cmH2O    PIP (POC) 18      Allens test (POC) Positive      Site LEFT RADIAL      Patient temp. 98.4      Specimen type (POC) ARTERIAL      Performed by Rain Johnston    GLUCOSE, POC    Collection Time: 03/14/22  7:11 AM   Result Value Ref Range    Glucose (POC) 126 (H) 70 - 110 mg/dL   BLOOD GAS, ARTERIAL POC    Collection Time: 03/14/22  9:39 AM   Result Value Ref Range    Device: ADULT VENT      FIO2 (POC) 30 %    pH (POC) 7.39 7.35 - 7.45      pCO2 (POC) 57.3 (H) 35.0 - 45.0 MMHG    pO2 (POC) 54 (L) 80 - 100 MMHG    HCO3 (POC) 34.6 (H) 22 - 26 MMOL/L    sO2 (POC) 86.4 (L) 92 - 97 %    Base excess (POC) 8.2 mmol/L    Mode CPAP/PS      PEEP/CPAP (POC) 5 cmH2O    Allens test (POC) Positive      Specimen type (POC) ARTERIAL      Performed by Shanti Sen    POTASSIUM    Collection Time: 03/14/22 10:15 AM   Result Value Ref Range    Potassium 3.3 (L) 3.5 - 5.5 mmol/L         Chemistry Recent Labs     03/14/22  1015 03/14/22 0420 03/13/22  0430 03/12/22  0506 03/12/22  0506   GLU  --  133* 137*  --  109*   NA  --  145 144  --  146*   K 3.3* 3.3* 3.7   < > 4.1   CL  --  105 103  --  105   CO2  --  35* 37*  --  38*   BUN  --  96* 98*  --  97*   CREA  --  1.97* 2.17*  --  2.05*   CA  --  9.7 10.4*  --  10.4*   MG  --  2.2 2.1  --  2.0   AGAP  --  5 4  --  3   BUCR  --  49* 45*  --  47*   AP  --  140* 128*  --  94   TP  --  5.4* 6.0*  --  5.5*   ALB  --  2.7* 3.3*  --  2.2*   GLOB  --  2.7 2.7  --  3.3   AGRAT  --  1.0 1.2  --  0.7*    < > = values in this interval not displayed. Lactic Acid No results found for: LAC  No results for input(s): LAC in the last 72 hours. Liver Enzymes Protein, total   Date Value Ref Range Status   03/14/2022 5.4 (L) 6.4 - 8.2 g/dL Final     Albumin   Date Value Ref Range Status   03/14/2022 2.7 (L) 3.4 - 5.0 g/dL Final     Globulin   Date Value Ref Range Status   03/14/2022 2.7 2.0 - 4.0 g/dL Final     A-G Ratio   Date Value Ref Range Status   03/14/2022 1.0 0.8 - 1.7   Final     Alk. phosphatase   Date Value Ref Range Status   03/14/2022 140 (H) 45 - 117 U/L Final     Recent Labs     03/14/22  0420 03/13/22  0430 03/12/22  0506   TP 5.4* 6.0* 5.5*   ALB 2.7* 3.3* 2.2*   GLOB 2.7 2.7 3.3   AGRAT 1.0 1.2 0.7*   * 128* 94        CBC w/Diff Recent Labs     03/14/22  0420 03/13/22  0430 03/12/22  0506   WBC 9.1 8.8 9.6   RBC 2.67* 2.96* 2.96*   HGB 8.4* 9.2* 9.5*   HCT 27.0* 28.7* 29.1*    178 203   GRANS 73 75* 74*   LYMPH 12* 12* 14*   EOS 1 1 0        Cardiac Enzymes No results found for: CPK, CK, CKMMB, CKMB, RCK3, CKMBT, CKNDX, CKND1, FARA, TROPT, TROIQ, ADOLFO, TROPT, TNIPOC, BNP, BNPP     BNP No results found for: BNP, BNPP, XBNPT     Coagulation No results for input(s): PTP, INR, APTT, INREXT, INREXT in the last 72 hours.       Thyroid  Lab Results   Component Value Date/Time    TSH 0.58 03/03/2022 02:21 AM       No results found for: T4 Urinalysis Lab Results   Component Value Date/Time    Color YELLOW 03/02/2022 09:04 PM    Appearance CLOUDY 03/02/2022 09:04 PM    Specific gravity 1.021 03/02/2022 09:04 PM    pH (UA) 5.0 03/02/2022 09:04 PM    Protein 300 (A) 03/02/2022 09:04 PM    Glucose Negative 03/02/2022 09:04 PM    Ketone TRACE (A) 03/02/2022 09:04 PM    Bilirubin Negative 03/02/2022 09:04 PM    Urobilinogen 1.0 03/02/2022 09:04 PM    Nitrites Negative 03/02/2022 09:04 PM    Leukocyte Esterase Negative 03/02/2022 09:04 PM    Epithelial cells 2+ 03/02/2022 09:04 PM    Bacteria FEW (A) 03/02/2022 09:04 PM    WBC 0 to 3 03/02/2022 09:04 PM    RBC 0 to 3 03/02/2022 09:04 PM        Micro  Recent Labs     03/14/22 0420 03/14/22 0419   CULT NO GROWTH AFTER 2 HOURS NO GROWTH AFTER 2 HOURS     Recent Labs     03/14/22 0420 03/14/22 0419   CULT NO GROWTH AFTER 2 HOURS NO GROWTH AFTER 2 HOURS          Culture data during this hospitalization.    All Micro Results     Procedure Component Value Units Date/Time    CULTURE, BLOOD [[id number]] Collected: 03/14/22 0419    Order Status: Completed Specimen: Blood Updated: 03/14/22 0736     Special Requests: NO SPECIAL REQUESTS        Culture result: NO GROWTH AFTER 2 HOURS       CULTURE, BLOOD [[id number]] Collected: 03/14/22 0420    Order Status: Completed Specimen: Blood Updated: 03/14/22 0736     Special Requests: NO SPECIAL REQUESTS        Culture result: NO GROWTH AFTER 2 HOURS       CULTURE, RESPIRATORY/SPUTUM/BRONCH [place] STAIN [[id number]]  (Abnormal)  (Susceptibility) Collected: 03/10/22 1630    Order Status: Completed Specimen: Sputum from Endotracheal aspirate Updated: 03/13/22 0905     Special Requests: NO SPECIAL REQUESTS        GRAM STAIN NO WBC'S SEEN         NO EPITHELIAL CELLS SEEN         NO ORGANISMS SEEN        Culture result:       LIGHT ENTEROBACTER CLOACAE COMPLEX                  HEAVY NORMAL RESPIRATORY ELSIE          CULTURE, RESPIRATORY/SPUTUM/BRONCH [place] STAIN [[id number]] Collected: 03/10/22 1915    Order Status: Canceled Specimen: Sputum from Endotracheal aspirate     CULTURE, BLOOD [[id number]] Collected: 03/02/22 2048    Order Status: Completed Specimen: Blood Updated: 03/08/22 0655     Special Requests: NO SPECIAL REQUESTS        Culture result: NO GROWTH 6 DAYS       CULTURE, BLOOD [[id number]] Collected: 03/02/22 2048    Order Status: Completed Specimen: Blood Updated: 03/08/22 0655     Special Requests: NO SPECIAL REQUESTS        Culture result: NO GROWTH 6 DAYS       CULTURE, URINE [[id number]]  (Abnormal)  (Susceptibility) Collected: 03/02/22 2104    Order Status: Completed Specimen: Cath Urine Updated: 03/05/22 1226     Special Requests: NO SPECIAL REQUESTS        [place] Count --        >100,000  COLONIES/mL       Culture result: KLEBSIELLA PNEUMONIAE       COVID-19 RAPID TEST [[id number]] Collected: 03/02/22 2025    Order Status: Completed Specimen: Nasopharyngeal Updated: 03/02/22 2052     Specimen source Nasopharyngeal        COVID-19 rapid test Not detected        Comment: Rapid Abbott ID Now       Rapid NAAT:  The specimen is NEGATIVE for SARS-CoV-2, the novel coronavirus associated with COVID-19. Negative results should be treated as presumptive and, if inconsistent with clinical signs and symptoms or necessary for patient management, should be tested with an alternative molecular assay. Negative results do not preclude SARS-CoV-2 infection and should not be used as the sole basis for patient management decisions. This test has been authorized by the FDA under an Emergency Use Authorization (EUA) for use by authorized laboratories.    Fact sheet for Healthcare Providers: ConventionUpdate.co.nz  Fact sheet for Patients: ConventionUpdate.co.nz       Methodology: Isothermal Nucleic Acid Amplification         INFLUENZA A & B AG (RAPID TEST) [[id number]] Collected: 03/02/22 2025    Order Status: Completed Specimen: Nasopharyngeal from Nasal washing Updated: 03/02/22 2047     Influenza A Antigen Negative        Comment: A negative result does not exclude influenza virus infection, seasonal or H1N1 due to suboptimal sensitivity. If influenza is circulating in your community, a diagnosis of influenza should be considered based on a patients clinical presentation and empiric antiviral treatment should be considered, if indicated. Influenza B Antigen Negative                NM LUNG SCAN PERF  Result Date: 3/3/2022  EXAM: NM LUNG SCAN PERF. CLINICAL INDICATION/HISTORY: d dimer increase dyspnea. -Additional: Clinical concern for pulmonary embolus. COMPARISON: Correlation is made with the radiographic study performed one day prior. TECHNIQUE: 7.2 mCi Tc-99m MAA was injected intravenously and the 8 standard views of the chest were obtained. This perfusion only examination is interpreted using the PISAPED criteria. _______________ FINDINGS: Perfusion images show no segmental perfusion defects to suggest the presence of pulmonary embolism. _______________     o scintigraphic findings to suggest the presence of acute pulmonary embolism. _______________       XR HIP RT W OR WO PELV 2-3 VWS  Result Date: 3/7/2022  EXAM: RIGHT HIP RADIOGRAPHS CLINICAL INDICATION/HISTORY: right hip pain -Additional: None COMPARISON: May March 3, 2022. TECHNIQUE: AP pelvis and frog-leg lateral view of right hip. _______________ FINDINGS: BONES: Intact appearing ilioischial and iliopectineal lines. There is a displaced and impacted subcapital right femoral neck fracture, with mild progressive displacement on follow-up imaging. Mild-moderate bilateral hip joint arthrosis. SOFT TISSUES: Unremarkable. _______________     1. Displaced and impacted subcapital right femoral neck fracture, increased in displacement from CT performed 4 days prior. CT HEAD WO CONT  Result Date: 3/7/2022  EXAM: CT head INDICATION: Altered mental status.  COMPARISON: 4/23/2021. TECHNIQUE: Axial CT imaging of the head was performed without intravenous contrast. Standard multiplanar coronal and sagittal reformatted images were obtained and are included in interpretation. One or more dose reduction techniques were used on this CT: automated exposure control, adjustment of the mAs and/or kVp according to patient size, and iterative reconstruction techniques. The specific techniques used on this CT exam have been documented in the patient's electronic medical record. Digital Imaging and Communications in Medicine (DICOM) format image data are available to nonaffiliated external healthcare facilities or entities on a secure, media free, reciprocally searchable basis with patient authorization for at least a 12-month period after this study. _______________ FINDINGS: BRAIN AND POSTERIOR FOSSA: Periventricular white matter hypoattenuation which is nonspecific but likely represents chronic ischemic changes. No evidence of acute large vessel transcortical infarct or acute parenchymal hemorrhage. No midline shift or hydrocephalus. EXTRA-AXIAL SPACES AND MENINGES: There are no abnormal extra-axial fluid collections. CALVARIUM: Intact. SINUSES: Clear. OTHER: None. _______________     No acute intracranial abnormality. CT CHEST ABD PELV WO CONT  Result Date: 3/3/2022  EXAM: CT CHEST, ABDOMEN AND PELVIS CLINICAL INDICATION/HISTORY: Hypoxemia, retrocardiac density, diarrhea, weakness and shortness of breath COMPARISON: 3/2/2022 TECHNIQUE: Axial CT imaging of the chest, abdomen, and pelvis was performed without intravenous contrast. Multiplanar reformats were generated. One or more dose reduction techniques were used on this CT: automated exposure control, adjustment of the mAs and/or kVp according to patient size, and iterative reconstruction techniques. The specific techniques used on this CT exam have been documented in the patient's electronic medical record.  Digital Imaging and Communications in Medicine (DICOM) format image data are available to nonaffiliated external healthcare facilities or entities on a secure, media free, reciprocally searchable basis with patient authorization for at least a 12-month period after this study. ________________ FINDINGS: LIMITATIONS:   > Suboptimal evaluation given lack of intravenous contrast.   > Motion artifact is present which limits evaluation.   > Suboptimal exam due to patient body habitus and arms by the side. CHEST: LUNGS: Respiratory motion significantly limits evaluation. Interlobar septal thickening in the upper lobes. Mild bilateral dependent atelectasis. No large consolidation or suspicious pulmonary mass. PLEURA: Very small volume bilateral pleural effusions. AIRWAY: Normal. MEDIASTINUM: Four-chamber cardiomegaly with asymmetric biatrial enlargement, mitral and aortic annular calcifications. The main pulmonary artery is enlarged suggesting pulmonary arterial hypertension. Normal caliber aorta with scattered calcified atherosclerotic plaque. No pericardial effusion. LYMPH NODES: No enlarged lymph nodes. CHEST WALL: Diffuse anasarca. Heterogeneous thyroid. _______________ ABDOMEN/PELVIS: LIVER: No enhancing hepatic mass. The portal and hepatic veins are patent. BILIARY: Gallstones are present in the nondistended gallbladder lumen. No biliary dilation. SPLEEN: Normal. PANCREAS: Normal. ADRENALS: Left adrenal gland measures 9 HU (axial image 76), most consistent with lipid rich adenoma. Normal right adrenal gland. KIDNEYS: Asymmetrically small left kidney. No rate of a stone. No hydronephrosis. GI TRACT:  A small hiatal hernia is present. Normal caliber small and large bowel loops. No morphology of bowel obstruction. Normal appendix. BLADDER: Diffuse wall thickening in the largely decompressed bladder. PELVIC ORGANS: No acute abnormality. VASCULATURE: No arterial aneurysm.  Fusiform dilation of the infrarenal abdominal aorta measures up to 2. 6 cm. LYMPH NODES: No mesenteric or retroperitoneal lymphadenopathy. OTHER: No free intraperitoneal air. No ascites. Diffuse anasarca. OSSEOUS STRUCTURES: No acute osseous abnormality. Multilevel degenerative changes most pronounced in the lumbar spine. Degenerative changes in both shoulders (right greater than left). Please note the included portions of the upper extremities are not well evaluated on this study _______________     1. Findings of congestive heart failure with cardiomegaly, interstitial edema, very small bilateral pleural effusions, and diffuse anasarca. 2.  Bladder wall thickening may be due to underdistention though superimposed infection cannot be excluded. Recommend correlation for cystitis. 3.  Osseous degenerative changes and additional findings, as detailed in the body of the report. Hip x-ray 3/11/2022:  Status post total right hip arthroplasty, without complication. ECHO ADULT COMPLETE  Result Date: 3/3/2022    Left Ventricle: Left ventricle size is normal. Moderately increased wall thickness. Findings consistent with moderate concentric hypertrophy. Normal wall motion. Normal left ventricular systolic function with a visually estimated EF of 55 - 60%.   Left Atrium: Left atrium is mildly dilated.   Right Ventricle: Right ventricle is mildly dilated.   Right Atrium: Right atrium is mildly dilated.   Pulmonary artery :  Estimated pulmonary arterial systolic pressure is 51 mmhg. Pulmonary hypertension found to be moderate   Mitral Valve: Moderately thickened leaflet. Mildly calcified leaflet. Moderate annular calcification of the mitral valve.   IVC/SVC : IVC diameter is greater than 21 mm and decreases less than 50% during inspiration; therefore the estimated right atrial pressure is elevated (~15 mmHg). IVC is dilated. Consider LORRAINE for better evaluation of mitral valve if clinically indicated.      DUPLEX LOWER EXT VENOUS BILAT  Result Date: 3/4/2022  · No evidence of deep vein thrombosis in the right lower extremity. · No evidence of deep vein thrombosis in the left lower extremity. USG retroperitoneum 3/12/2022: No hydronephrosis. ECHO 3/3/2022: Result status: Final result       Left Ventricle: Left ventricle size is normal. Moderately increased wall thickness. Findings consistent with moderate concentric hypertrophy. Normal wall motion. Normal left ventricular systolic function with a visually estimated EF of 55 - 60%.   Left Atrium: Left atrium is mildly dilated.   Right Ventricle: Right ventricle is mildly dilated.   Right Atrium: Right atrium is mildly dilated.   Pulmonary artery :  Estimated pulmonary arterial systolic pressure is 51 mmhg. Pulmonary hypertension found to be moderate    Mitral Valve: Moderately thickened leaflet. Mildly calcified leaflet. Moderate annular calcification of the mitral valve.   IVC/SVC : IVC diameter is greater than 21 mm and decreases less than 50% during inspiration; therefore the estimated right atrial pressure is elevated (~15 mmHg). IVC is dilated.     Consider LORRAINE for better evaluation of mitral valve if clinically indicated. Images report reviewed by me:  CT (Most Recent) (CT chest reviewed by me) Results from Hospital Encounter encounter on 03/02/22    CT HEAD WO CONT    Narrative  EXAM: CT head    INDICATION: Headache. COMPARISON: 3/7/2022    TECHNIQUE: Axial CT imaging of the head was performed without intravenous  contrast. Standard multiplanar coronal and sagittal reformatted images were  obtained and are included in interpretation. One or more dose reduction techniques were used on this CT: automated exposure  control, adjustment of the mAs and/or kVp according to patient size, and  iterative reconstruction techniques. The specific techniques used on this CT  exam have been documented in the patient's electronic medical record.   Digital  Imaging and Communications in Medicine (DICOM) format image data are available  to nonaffiliated external healthcare facilities or entities on a secure, media  free, reciprocally searchable basis with patient authorization for at least a  12-month period after this study. _______________    FINDINGS:    BRAIN AND POSTERIOR FOSSA: No evidence of acute large vessel transcortical  infarct or acute parenchymal hemorrhage. No midline shift or hydrocephalus. EXTRA-AXIAL SPACES AND MENINGES: There are no abnormal extra-axial fluid  collections. CALVARIUM: Intact. SINUSES: Clear. OTHER: None.    _______________    Impression  No acute intracranial abnormality. CXR reviewed by me:  XR (Most Recent). CXR  reviewed by me and compared with previous CXR Results from Hospital Encounter encounter on 03/02/22    XR ABD (KUB)    Narrative  EXAM: SUPINE ABDOMINAL RADIOGRAPH    CLINICAL INDICATION/HISTORY: og tube placement  -Additional: None    COMPARISON: None    TECHNIQUE: Single supine view of the abdomen.    _______________    FINDINGS:    BOWEL GAS PATTERN: Enteric tube tip within the distal stomach. No evidence of  obstruction. No visible free air, given limitations of supine view. BONES: Scoliosis and multilevel degenerative changes. Right hip prosthesis. OTHER: None.    _______________    Impression  No significant abnormality. CXR 3/13/2022:  FINDINGS:  SUPPORT DEVICES: Endotracheal tube and visualized gastric tube both project in  stable position from prior exam.     HEART AND MEDIASTINUM: Enlarged appearing cardiac silhouette with stable  mediastinal contours.     LUNGS AND PLEURAL SPACES: Faint/hazy right lower lung zone opacity. No  pneumothorax or pleural effusion demonstrated.     BONY THORAX AND SOFT TISSUES: Unremarkable.  ______________     IMPRESSION  1. Support devices in stable position. 2. Mild interval increase in right basilar atelectasis and likely small right  pleural effusion. 3. Cardiomegaly, as previously.        ·Please note: Voice-recognition software may have been used to generate this report, which may have resulted in some phonetic-based errors in grammar and contents. Even though attempts were made to correct all the mistakes, some may have been missed, and remained in the body of the document.       Manjinder Mendoza MD  3/14/2022

## 2022-03-14 NOTE — PROGRESS NOTES
RENAL CONSULT  3/14/2022    Patient:  Vidal Tierney  :  1940  Gender:  female  MRN #:  [id number]    Reason for Consult: Metabolic Alkalosis       Assessment:    Vidal Tierney is a 80y.o. year old female  With h/o  morbid obesity, SERENA, right leg weakness, HTN, A. fib, CHF admitted  on 3/3/2022 for shortness of breath    she developed  onset of A. fib on admission. HF was treated with diuretics and was using home BiPAP nightly.   Still on mechanical vent and is hypotensive    She developed acute renal failure with creatinine slowly rising most likely due to hypotension and over diuresis    # Acute renal failure  # Metabolic alkalosis - due to diuretics , upon review of serum bicarbonate she had baseline elevated bicarbonate which must be metabolic compensation for chronic respiratory acidosis   #Hypernatremia     Subjective/Relevants events: -  Still intubated but responsive  Decent urine    Blood pH is improving       Plan:      - Hold diuretics for now , renal function is improving   - continue Diamox 500 mg IV BID for alkalosis   - continue  D5W and half NS which should correct hypernatremia and will provide chloride/volume expansion to help correct alkalosis   - intake and output charting, Willoughby's cath, ensure she is not retaining urine   -  CT scan on  3/3 showed asymmetrical small kidney , no hydronephrosis in USG    - avoid nsaids , contrast and nephrotoxin   - No clinical indication of dialysis for now   - Dose all meds for current eGFR  - keep MAP 65 mmhg        Intake/Output Summary (Last 24 hours) at 3/14/2022 1117  Last data filed at 3/14/2022 0648  Gross per 24 hour   Intake 3085 ml   Output 825 ml   Net 2260 ml         Review of Symptoms:   Unable to obtained review of systems due to patient condition     Objective:    Visit Vitals  /67   Pulse 76   Temp 98.4 °F (36.9 °C)   Resp 12   Ht 5' 5\" (1.651 m)   Wt 115.3 kg (254 lb 3.1 oz)   SpO2 99%   Breastfeeding No   BMI 42.30 kg/m² Physical Exam:    Sedated   HEENT:ET tube ,  no neck swelling  Lung: clear to auscultation   Abdomen: soft, normal bowel sounds. Ext: no edema  Skin: No rash  CNS- unconscious    Laboratory Data:    Lab Results   Component Value Date    BUN 96 (H) 03/14/2022    BUN 98 (H) 03/13/2022    BUN 97 (H) 03/12/2022     03/14/2022     03/13/2022     (H) 03/12/2022    CO2 35 (H) 03/14/2022    CO2 37 (H) 03/13/2022    CO2 38 (H) 03/12/2022     Lab Results   Component Value Date    WBC 9.1 03/14/2022    HGB 8.4 (L) 03/14/2022    HCT 27.0 (L) 03/14/2022     No components found for: CALCIUM, PHOSPHORUS, MAGNESIUM  Lab Results   Component Value Date     (H) 04/25/2021     No results found for: SPECIMENTYP, TURBIDITY, UGLU    Imaging Reveiwed:       Approx 30 minutes of critical care time spent in the direct evaluation and formulation of treatment plan of this high risk patient. The reason for providing this level of medical care was due a critical illness that impaired one or more vital organ systems such that there was a high probability of imminent or life threatening deterioration in the patients condition. This care involved high complexity decision making to assess, manipulate, and support vital system functions, to treat this degreee vital organ system failure and to prevent further life threatening deterioration of the patients condition.          Brenna Smith MD

## 2022-03-14 NOTE — PROGRESS NOTES
Problem: Mobility Impaired (Adult and Pediatric)  Goal: *Acute Goals and Plan of Care (Insert Text)  Description: In one week:  1. Pt will transfer supine <> sitting EOB min assist for increased ability to sit upright during meal times. 2. Pt will transfer sit <> stand min assist for improved lower extremity strengthening. 3. Pt will roll in bed in both directions for increased independence with pressure relief. 4. Pt will sit EOB unsupported for > 5 minutes without LOB for increased safety and improved sitting balance. Outcome: Progressing Towards Goal  Note: []  Patient has met MD gunnar burdick for d/c home   []  Recommend HH with 24 hour adult care   [x]  Benefit from additional acute PT session to address:  bed mobility, transfers    PHYSICAL THERAPY TREATMENT    Patient: Roshan Cruz (57 y.o. female)  Date: 3/14/2022  Diagnosis: Acute exacerbation of CHF (congestive heart failure) ([facility]) [I50.9] Acute respiratory failure with hypoxemia ([address]  Procedure(s) (LRB):  RIGHT TOTAL HIP ARTHROPLASTY WITH C-ARM  (PT IN ROOM # 358) (Right) 4 Days Post-Op  Precautions: Fall,WBAT (R LE)      ASSESSMENT:  Pt supine in bed, on mechanical ventilator, eyes closed, and soft restraints to B UE noted. Pt opens eyes spontaneously and w/ vc but is inconsistent w/ following commands (25%) during session. Pt did not illicit any B LE even w/ vc, tc and initiation of movement requiring PROM for B. Eyes open wide w/ B LE movement probably due to pain/discomfort/stiffness so used facial expressions for monitoring of movement limitations. B UE PROM progressing to [facility] performed. Pt repositioned to obtain more neutral cervical position since pt has tendency to rotate and laterally flex to R. Deferred rolling as pt will be total Ax2 due to limited command following and no spontaneous movement. Pt left supine in bed w/ B UE soft restraints, B SCD and pillow support. Nurse Nancie Roman aware of session.   Recommend LTCF unless pt mobility/medical status improves. Progression toward goals:   []      Improving appropriately and progressing toward goals  [x]      Improving slowly and progressing toward goals  []      Not making progress toward goals and plan of care will be adjusted     PLAN:  Patient continues to benefit from skilled intervention to address the above impairments. Continue treatment per established plan of care. Discharge Recommendations: To Be Determined  Further Equipment Recommendations for Discharge:  N/A     SUBJECTIVE:   Patient on mechanical ventilator. OBJECTIVE DATA SUMMARY:   Critical Behavior:  Neurologic State: Eyes open spontaneously  Orientation Level: Unable to verbalize (mechanical ventilator)  Cognition: Decreased command following,Decreased attention/concentration  Safety/Judgement: Awareness of environment  Functional Mobility Training:  Bed Mobility:  Rolling:  (deferred)  Transfers:  Balance:   Range Of Motion:   Posture:   Wheelchair Mobility:   Ambulation/Gait Training:  Neuro Re-Education:  General PROM/AAROM B UE/LE in major muscle groups to improve mobility and positioning. Therapeutic Exercises:   Pain:  Pain level pre-treatment: 0/10  Pain level post-treatment: 0/10   Pain Intervention(s): Medication (see MAR); Rest, Ice, Repositioning   Response to intervention: Nurse notified, See doc flow    Activity Tolerance:   fair  Please refer to the flowsheet for vital signs taken during this treatment. After treatment:   [] Patient left in no apparent distress sitting up in chair  [x] Patient left in no apparent distress in bed, UE soft restraints  [x] Call bell left within reach  [x] Nursing notified  [x] Caregiver present  [] Bed alarm activated  [] SCDs applied      COMMUNICATION/EDUCATION:   [x]         Role of Physical Therapy in the acute care setting. [x]         Fall prevention education was provided and the patient/caregiver indicated understanding.   [x] Patient/family have participated as able in working toward goals and plan of care. [x]         Patient/family agree to work toward stated goals and plan of care. []         Patient understands intent and goals of therapy, but is neutral about his/her participation.   []         Patient is unable to participate in stated goals/plan of care: ongoing with therapy staff.  []         Other:        Bonnie Keller, PT   Time Calculation: 15 mins

## 2022-03-14 NOTE — PROGRESS NOTES
Cardiology Progress Note        Patient: Clau French        Sex: female          DOA: 3/2/2022  YOB: 1940      Age:  80 y.o.        LOS:  LOS: 12 days   Assessment/Plan     Principal Problem:    Acute respiratory failure with hypoxemia ([address] (3/2/2022)    Active Problems:    Elevated troponin (4/23/2021)      HTN (hypertension) (4/23/2021)      Acute on chronic diastolic congestive heart failure ([address] (4/23/2021)      Atrial fibrillation ([address] (3/2/2022)      Stage 3 chronic kidney disease ([address] (3/3/2022)      SERENA (obstructive sleep apnea) (3/3/2022)      Adult BMI 39.0-39.9 kg/sq m (3/3/2022)      Fracture of neck of right femur ([address] (3/8/2022)        Plan:  Patient remained intubated  Cardiac telemetry rate controlled A. fib with occasional PVCs, saturating around 100%  Tolerating carvedilol 3.125 mg twice daily and Eliquis 2.5 mg twice daily  Ventilatory management as per pulmonary/intensivist  Discussed with patient's daughter in the room  Continue with current cardiac medications. Patient remained intubated  Hemodynamically stable  A. fib rate controlled  Discussed with patient's daughter Guero Walker on phone    Patient is status post hip surgery  Patient is intubated due to difficulty in breathing and hypoxemia  A. fib rate controlled  Discussed with Dr. Stephane Galarza with the current medical treatment        Patient denied chest pain or shortness of breath   Atrial fibrillation rate controlled   Patient is scheduled for hip surgery tomorrow  Discussed with patient's daughter  Resume anticoagulation after surgery.           Patient is diagnosed with hip fracture and being scheduled for surgery  I have long lengthy discussion with patient and daughter Guero Walker in the room  Patient have negative stress test in April 2021  EF is stable  Patient is with elevated but acceptable risk for hip surgery  Eliquis can be hold  Consider DVT prophylaxis anticoagulation from tomorrow patient have taken morning dose of Eliquis      Patient denied any chest pain   Mitral calcification without stenosis  Continue with current medical treatment    Patient continues to do well. H&H stable  Patient will need diuretic on discharge probably Lasix 40 mg twice daily  Discussed with patient and family in the room      A. fib rate controlled  Patient will need p.o. Lasix on discharge  Continue Eliquis 2.5 mg twice daily  Discussed with patient and family      Continue with Lasix 40 mg twice daily  I have long lengthy discussion with the patient and daughter about anticoagulation both of them agreed with low-dose Eliquis 2.5 mg twice daily  DC Lovenox   start Eliquis 2.5 mg twice daily from a.m. Discussed with patient and daughter findings of echocardiogram including mitral valve disease, prefer not to do transesophageal echocardiogram  Continue with current medical treatment                        Subjective:    cc:      Shortness of breath  A. fib        REVIEW OF SYSTEMS:     Intubated      Objective:      Visit Vitals  /67   Pulse 72   Temp 98.4 °F (36.9 °C)   Resp 18   Ht 5' 5\" (1.651 m)   Wt 115.3 kg (254 lb 3.1 oz)   SpO2 98%   Breastfeeding No   BMI 42.30 kg/m²     Body mass index is 42.3 kg/m². Physical Exam:  General Appearance: Comfortable, intubated  NECK: No JVD, no thyroidomeglay. LUNGS: Clear bilaterally. HEART: S1 irregular +S2 audible,    ABD: Non-tender, BS Audible    EXT: No edema, and no cysnosis. VASCULAR EXAM: Pulses are intact. PSYCHIATRIC EXAM: Mood is appropriate.     Medication:  Current Facility-Administered Medications   Medication Dose Route Frequency    acetaZOLAMIDE (DIAMOX) 250 mg in sterile water (preservative free) 2.5 mL injection  250 mg IntraVENous DAILY    arformoteroL (BROVANA) neb solution 15 mcg  15 mcg Nebulization BID RT    albumin human 25% (BUMINATE) solution 25 g  25 g IntraVENous Q6H    cefepime (MAXIPIME) 1 g in sterile water (preservative free) 10 mL IV syringe  1 g IntraVENous Q12H    [START ON 3/15/2022] famotidine (PF) (PEPCID) 20 mg in 0.9% sodium chloride 10 mL injection  20 mg IntraVENous DAILY    dextrose 5 % - 0.45% NaCl infusion  25 mL/hr IntraVENous CONTINUOUS    sodium chloride (NS) flush 5-40 mL  5-40 mL IntraVENous PRN    acetaminophen (TYLENOL) tablet 650 mg  650 mg Oral Q6H    naloxone (NARCAN) injection 0.4 mg  0.4 mg IntraVENous PRN    ferrous sulfate tablet 325 mg  1 Tablet Oral TID    diphenhydrAMINE (BENADRYL) capsule 25 mg  25 mg Oral Q4H PRN    bisacodyL (DULCOLAX) suppository 10 mg  10 mg Rectal DAILY PRN    ELECTROLYTE REPLACEMENT PROTOCOL - Magnesium   1 Each Other PRN    ELECTROLYTE REPLACEMENT PROTOCOL - Calcium   1 Each Other PRN    ELECTROLYTE REPLACEMENT PROTOCOL  - Phosphorus Renal Dosing  1 Each Other PRN    ELECTROLYTE REPLACEMENT PROTOCOL - Potassium Renal Dosing  1 Each Other PRN    midazolam (VERSED) injection 1 mg  1 mg IntraVENous Q2H PRN    fentaNYL citrate (PF) injection 25 mcg  25 mcg IntraVENous Q4H PRN    chlorhexidine (PERIDEX) 0.12 % mouthwash 10 mL  10 mL Oral Q12H    nystatin (MYCOSTATIN) 100,000 unit/mL oral suspension 500,000 Units  500,000 Units Oral QID    apixaban (ELIQUIS) tablet 2.5 mg  2.5 mg Oral BID    sodium chloride (NS) flush 5-40 mL  5-40 mL IntraVENous Q8H    sodium chloride (NS) flush 5-40 mL  5-40 mL IntraVENous PRN    acetaminophen (TYLENOL) tablet 650 mg  650 mg Oral Q6H PRN    Or    acetaminophen (TYLENOL) suppository 650 mg  650 mg Rectal Q6H PRN    polyethylene glycol (MIRALAX) packet 17 g  17 g Oral DAILY PRN    ondansetron (ZOFRAN ODT) tablet 4 mg  4 mg Oral Q8H PRN    Or    ondansetron (ZOFRAN) injection 4 mg  4 mg IntraVENous Q6H PRN    carvediloL (COREG) tablet 3.125 mg  3.125 mg Oral BID WITH MEALS    aspirin delayed-release tablet 81 mg  81 mg Oral DAILY               Lab/Data Reviewed:  Procedures/imaging: see electronic medical records for all procedures/Xrays   and details which were not copied into this note but were reviewed prior to creation of Plan       All lab results for the last 24 hours reviewed. Recent Labs     03/14/22  0420 03/13/22  0430 03/12/22  0506   WBC 9.1 8.8 9.6   HGB 8.4* 9.2* 9.5*   HCT 27.0* 28.7* 29.1*    178 203     Recent Labs     03/14/22  1015 03/14/22  0420 03/13/22  0430 03/12/22  0506 03/12/22  0506   NA  --  145 144  --  146*   K 3.3* 3.3* 3.7   < > 4.1   CL  --  105 103  --  105   CO2  --  35* 37*  --  38*   GLU  --  133* 137*  --  109*   BUN  --  96* 98*  --  97*   CREA  --  1.97* 2.17*  --  2.05*   CA  --  9.7 10.4*  --  10.4*    < > = values in this interval not displayed. RADIOLOGY:  CT Results  (Last 48 hours)    None        CXR Results  (Last 48 hours)               03/14/22 0635  XR CHEST PORT Final result    Impression:          1. Support devices in stable/appropriate position as visualized. 2. Mild cardiac enlargement, unchanged. 3. Hazy right lung base opacity, favored a combination of atelectasis and   posteriorly layering pleural effusion. Narrative:  EXAM: XR CHEST PORT       CLINICAL INDICATION/HISTORY: intubated   -Additional: None       COMPARISON: 1 day prior       TECHNIQUE: Portable frontal view of the chest       _______________       FINDINGS:       SUPPORT DEVICES: ET tube and visualized gastric tube both project in stable   position. HEART AND MEDIASTINUM: Enlarged appearing cardiac silhouette with stable   mediastinal contours. LUNGS AND PLEURAL SPACES: No pneumothorax. Hazy right lung base opacity. Minor   left basilar atelectasis. No dense consolidation. BONY THORAX AND SOFT TISSUES: Unremarkable.       _______________           03/13/22 0410  XR CHEST PORT Final result    Impression:          1. Support devices in stable position. 2. Mild interval increase in right basilar atelectasis and likely small right   pleural effusion.    3. Cardiomegaly, as previously. Narrative:  EXAM: XR CHEST PORT       CLINICAL INDICATION/HISTORY: intubated   -Additional: None       COMPARISON: March 12, 2022       TECHNIQUE: Portable frontal view of the chest       _______________       FINDINGS:       SUPPORT DEVICES: Endotracheal tube and visualized gastric tube both project in   stable position from prior exam.       HEART AND MEDIASTINUM: Enlarged appearing cardiac silhouette with stable   mediastinal contours. LUNGS AND PLEURAL SPACES: Faint/hazy right lower lung zone opacity. No   pneumothorax or pleural effusion demonstrated.        BONY THORAX AND SOFT TISSUES: Unremarkable.       _______________                   Cardiology Procedures:   Results for orders placed or performed during the hospital encounter of 03/02/22   EKG, 12 LEAD, INITIAL   Result Value Ref Range    Ventricular Rate 102 BPM    QRS Duration 72 ms    Q-T Interval 336 ms    QTC Calculation (Bezet) 437 ms    Calculated R Axis -81 degrees    Calculated T Axis -20 degrees    Diagnosis       Atrial fibrillation with rapid ventricular response with premature   ventricular or aberrantly conducted complexes  Left axis deviation  Low voltage QRS  Abnormal ECG  When compared with ECG of 23-APR-2021 17:11,  Atrial fibrillation has replaced Sinus rhythm  Confirmed by Carlene Lefort, MD. ([id number]) on 3/2/2022 10:26:52 PM        Echo Results  (Last 48 hours)    None       Cardiolite (Tc-99m Sestamibi) stress test    Signed By: Donta Goff MD     March 14, 2022

## 2022-03-14 NOTE — PROGRESS NOTES
RESPIRATORY NOTE:       Pt back on original vent settings, tolerated SBT well for approximately 1.5 hours, RN aware, will discuss with MD.

## 2022-03-14 NOTE — PROGRESS NOTES
RESPIRATORY NOTE:         Initiated SBT awake, semi-alert, acceptable RSBI tolerating well at this time, will monitor.

## 2022-03-14 NOTE — PROGRESS NOTES
CM notes patient remains in ICU on vent with tube feeding. Patient is not stable enough to transfer out of an acute care environment at this time. CM to continue to monitor and remain available. Care Management Interventions  PCP Verified by CM:  (Dr. Anam Ramírez (female) )  Mode of Transport at Discharge: BLS (.)  Transition of Care Consult (CM Consult): SNF [facility] has stated that according to the current therapy notes, she is not Acute inpt rehab appropriate. SNF choices obtained from the pt's dtr;  [facility]. )  Partner SNF: No  Discharge Durable Medical Equipment:  (TBD)  Physical Therapy Consult: Yes  Occupational Therapy Consult: Yes  Support Systems: Child(doc)  Confirm Follow Up Transport: Family  The Plan for Transition of Care is Related to the Following Treatment Goals : skilled nursing facility  The Patient and/or Patient Representative was Provided with a Choice of Provider and Agrees with the Discharge Plan?: Yes (The pt and her dtr: Marlena)  Freedom of Choice List was Provided with Basic Dialogue that Supports the Patient's Individualized Plan of Care/Goals, Treatment Preferences and Shares the Quality Data Associated with the Providers?: Yes ([facility])  Discharge Location  Patient Expects to be Discharged to[de-identified] Other: (TBD)

## 2022-03-14 NOTE — PROGRESS NOTES
.Pharmacy Dosing Services:      Pharmacist Renal Dosing Progress Note for Alexis Sampson  Physician : Dr Jorge Rosa    The following medication: Meropenem  was automatically dose-adjusted per [facility] P&T Committee Protocol, with respect to renal function. Consult provided for this   80 y.o. , female , for the indication of Aspiration Pneumonia. Pt Weight:   Wt Readings from Last 1 Encounters:   03/14/22 115.3 kg (254 lb 3.1 oz)         Previous Regimen Meropenem 500 mg Q8H   Serum Creatinine Lab Results   Component Value Date/Time    Creatinine 1.97 (H) 03/14/2022 04:20 AM       Creatinine Clearance Estimated Creatinine Clearance: 28.4 mL/min (A) (based on SCr of 1.97 mg/dL (H)). BUN Lab Results   Component Value Date/Time    BUN 96 (H) 03/14/2022 04:20 AM       Dosage changed to:  Meropenem 500 mg Q12H    Additional notes:      Pharmacy to continue to monitor patient daily. Will make dosage adjustments based upon changing renal function. Signed Kandy Fuentes RPH.  Contact information: [phone]

## 2022-03-15 ENCOUNTER — APPOINTMENT (OUTPATIENT)
Dept: NON INVASIVE DIAGNOSTICS | Age: 82
DRG: 981 | End: 2022-03-15
Attending: INTERNAL MEDICINE
Payer: MEDICARE

## 2022-03-15 ENCOUNTER — APPOINTMENT (OUTPATIENT)
Dept: GENERAL RADIOLOGY | Age: 82
DRG: 981 | End: 2022-03-15
Attending: INTERNAL MEDICINE
Payer: MEDICARE

## 2022-03-15 LAB
ALBUMIN SERPL-MCNC: 3.5 G/DL (ref 3.4–5)
ALBUMIN/GLOB SERPL: 1 {RATIO} (ref 0.8–1.7)
ALP SERPL-CCNC: 133 U/L (ref 45–117)
ALT SERPL-CCNC: 43 U/L (ref 13–56)
ANION GAP SERPL CALC-SCNC: 3 MMOL/L (ref 3–18)
ARTERIAL PATENCY WRIST A: POSITIVE
AST SERPL-CCNC: 56 U/L (ref 10–38)
BASE EXCESS BLD CALC-SCNC: 7.1 MMOL/L
BASE EXCESS BLD CALC-SCNC: 7.8 MMOL/L
BASOPHILS # BLD: 0 K/UL (ref 0–0.1)
BASOPHILS NFR BLD: 0 % (ref 0–2)
BDY SITE: ABNORMAL
BDY SITE: ABNORMAL
BILIRUB SERPL-MCNC: 1.8 MG/DL (ref 0.2–1)
BNP SERPL-MCNC: 8306 PG/ML (ref 0–1800)
BODY TEMPERATURE: 97.9
BUN SERPL-MCNC: 93 MG/DL (ref 7–18)
BUN/CREAT SERPL: 49 (ref 12–20)
CALCIUM SERPL-MCNC: 10.8 MG/DL (ref 8.5–10.1)
CHLORIDE SERPL-SCNC: 104 MMOL/L (ref 100–111)
CO2 SERPL-SCNC: 35 MMOL/L (ref 21–32)
CREAT SERPL-MCNC: 1.89 MG/DL (ref 0.6–1.3)
DIFFERENTIAL METHOD BLD: ABNORMAL
ECHO AO ROOT DIAM: 3.1 CM
ECHO AO ROOT INDEX: 1.44 CM/M2
ECHO EST RA PRESSURE: 15 MMHG
ECHO LA DIAMETER INDEX: 1.99 CM/M2
ECHO LA DIAMETER: 4.3 CM
ECHO LA TO AORTIC ROOT RATIO: 1.39
ECHO LA VOL 4C: 97 ML (ref 22–52)
ECHO LA VOLUME INDEX A4C: 45 ML/M2 (ref 16–34)
ECHO LV E' LATERAL VELOCITY: 9 CM/S
ECHO LV E' SEPTAL VELOCITY: 6 CM/S
ECHO LV EDV A4C: 77 ML
ECHO LV EDV INDEX A4C: 36 ML/M2
ECHO LV EJECTION FRACTION A4C: 44 %
ECHO LV ESV A4C: 43 ML
ECHO LV ESV INDEX A4C: 20 ML/M2
ECHO LV FRACTIONAL SHORTENING: 28 % (ref 28–44)
ECHO LV INTERNAL DIMENSION DIASTOLE INDEX: 2.13 CM/M2
ECHO LV INTERNAL DIMENSION DIASTOLIC: 4.6 CM (ref 3.9–5.3)
ECHO LV INTERNAL DIMENSION SYSTOLIC INDEX: 1.53 CM/M2
ECHO LV INTERNAL DIMENSION SYSTOLIC: 3.3 CM
ECHO LV IVSD: 1.4 CM (ref 0.6–0.9)
ECHO LV MASS 2D: 230.2 G (ref 67–162)
ECHO LV MASS INDEX 2D: 106.6 G/M2 (ref 43–95)
ECHO LV POSTERIOR WALL DIASTOLIC: 1.2 CM (ref 0.6–0.9)
ECHO LV RELATIVE WALL THICKNESS RATIO: 0.52
ECHO LVOT AREA: 3.8 CM2
ECHO LVOT DIAM: 2.2 CM
ECHO LVOT MEAN GRADIENT: 1 MMHG
ECHO LVOT PEAK GRADIENT: 3 MMHG
ECHO LVOT PEAK VELOCITY: 0.8 M/S
ECHO LVOT STROKE VOLUME INDEX: 27.3 ML/M2
ECHO LVOT SV: 58.9 ML
ECHO LVOT VTI: 15.5 CM
ECHO MV E VELOCITY: 1.11 M/S
ECHO MV E/E' LATERAL: 12.33
ECHO MV E/E' RATIO (AVERAGED): 15.42
ECHO MV E/E' SEPTAL: 18.5
ECHO PULMONARY ARTERY END DIASTOLIC PRESSURE: 8 MMHG
ECHO PV REGURGITANT MAX VELOCITY: 1.4 M/S
ECHO RIGHT VENTRICULAR SYSTOLIC PRESSURE (RVSP): 57 MMHG
ECHO RV FREE WALL PEAK S': 9 CM/S
ECHO RV INTERNAL DIMENSION: 3.8 CM
ECHO RV TAPSE: 1.4 CM (ref 1.5–2)
ECHO TV REGURGITANT MAX VELOCITY: 3.24 M/S
ECHO TV REGURGITANT PEAK GRADIENT: 42 MMHG
EOSINOPHIL # BLD: 0.1 K/UL (ref 0–0.4)
EOSINOPHIL NFR BLD: 1 % (ref 0–5)
ERYTHROCYTE [DISTWIDTH] IN BLOOD BY AUTOMATED COUNT: 15.5 % (ref 11.6–14.5)
GAS FLOW.O2 O2 DELIVERY SYS: ABNORMAL L/MIN
GAS FLOW.O2 O2 DELIVERY SYS: ABNORMAL L/MIN
GAS FLOW.O2 SETTING OXYMISER: 10 BPM
GLOBULIN SER CALC-MCNC: 3.4 G/DL (ref 2–4)
GLUCOSE BLD STRIP.AUTO-MCNC: 25 MG/DL (ref 70–110)
GLUCOSE BLD STRIP.AUTO-MCNC: 66 MG/DL (ref 70–110)
GLUCOSE BLD STRIP.AUTO-MCNC: 94 MG/DL (ref 70–110)
GLUCOSE BLD STRIP.AUTO-MCNC: <20 MG/DL (ref 70–110)
GLUCOSE SERPL-MCNC: 107 MG/DL (ref 74–99)
HCO3 BLD-SCNC: 32.1 MMOL/L (ref 22–26)
HCO3 BLD-SCNC: 33.2 MMOL/L (ref 22–26)
HCT VFR BLD AUTO: 29.2 % (ref 35–45)
HGB BLD-MCNC: 9.2 G/DL (ref 12–16)
IMM GRANULOCYTES # BLD AUTO: 0.1 K/UL (ref 0–0.04)
IMM GRANULOCYTES NFR BLD AUTO: 1 % (ref 0–0.5)
LYMPHOCYTES # BLD: 1 K/UL (ref 0.9–3.6)
LYMPHOCYTES NFR BLD: 10 % (ref 21–52)
MAGNESIUM SERPL-MCNC: 2.3 MG/DL (ref 1.6–2.6)
MCH RBC QN AUTO: 31.6 PG (ref 24–34)
MCHC RBC AUTO-ENTMCNC: 31.5 G/DL (ref 31–37)
MCV RBC AUTO: 100.3 FL (ref 78–100)
MONOCYTES # BLD: 1.2 K/UL (ref 0.05–1.2)
MONOCYTES NFR BLD: 12 % (ref 3–10)
NEUTS SEG # BLD: 7.8 K/UL (ref 1.8–8)
NEUTS SEG NFR BLD: 77 % (ref 40–73)
NRBC # BLD: 0 K/UL (ref 0–0.01)
NRBC BLD-RTO: 0 PER 100 WBC
O2/TOTAL GAS SETTING VFR VENT: 40 %
O2/TOTAL GAS SETTING VFR VENT: 40 %
PCO2 BLD: 46.7 MMHG (ref 35–45)
PCO2 BLD: 48.9 MMHG (ref 35–45)
PEEP RESPIRATORY: 5 CMH2O
PEEP RESPIRATORY: 5 CMH2O
PH BLD: 7.44 [PH] (ref 7.35–7.45)
PH BLD: 7.45 [PH] (ref 7.35–7.45)
PIP ISTAT,IPIP: 18
PLATELET # BLD AUTO: 209 K/UL (ref 135–420)
PMV BLD AUTO: 12 FL (ref 9.2–11.8)
PO2 BLD: 141 MMHG (ref 80–100)
PO2 BLD: 89 MMHG (ref 80–100)
POTASSIUM SERPL-SCNC: 4.8 MMOL/L (ref 3.5–5.5)
PRESSURE SUPPORT SETTING VENT: 7 CMH2O
PROT SERPL-MCNC: 6.9 G/DL (ref 6.4–8.2)
RBC # BLD AUTO: 2.91 M/UL (ref 4.2–5.3)
SAO2 % BLD: 97.1 % (ref 92–97)
SAO2 % BLD: 99.2 % (ref 92–97)
SERVICE CMNT-IMP: ABNORMAL
SERVICE CMNT-IMP: ABNORMAL
SODIUM SERPL-SCNC: 142 MMOL/L (ref 136–145)
SPECIMEN TYPE: ABNORMAL
SPECIMEN TYPE: ABNORMAL
VENTILATION MODE VENT: ABNORMAL
VENTILATION MODE VENT: ABNORMAL
VT SETTING VENT: 375 ML
WBC # BLD AUTO: 10.1 K/UL (ref 4.6–13.2)

## 2022-03-15 PROCEDURE — 74011000250 HC RX REV CODE- 250: Performed by: INTERNAL MEDICINE

## 2022-03-15 PROCEDURE — 85025 COMPLETE CBC W/AUTO DIFF WBC: CPT

## 2022-03-15 PROCEDURE — 83880 ASSAY OF NATRIURETIC PEPTIDE: CPT

## 2022-03-15 PROCEDURE — 99232 SBSQ HOSP IP/OBS MODERATE 35: CPT | Performed by: NURSE PRACTITIONER

## 2022-03-15 PROCEDURE — 94640 AIRWAY INHALATION TREATMENT: CPT

## 2022-03-15 PROCEDURE — 83735 ASSAY OF MAGNESIUM: CPT

## 2022-03-15 PROCEDURE — 97110 THERAPEUTIC EXERCISES: CPT

## 2022-03-15 PROCEDURE — 74011250636 HC RX REV CODE- 250/636: Performed by: INTERNAL MEDICINE

## 2022-03-15 PROCEDURE — 77010033678 HC OXYGEN DAILY

## 2022-03-15 PROCEDURE — 82803 BLOOD GASES ANY COMBINATION: CPT

## 2022-03-15 PROCEDURE — 71045 X-RAY EXAM CHEST 1 VIEW: CPT

## 2022-03-15 PROCEDURE — 74011250637 HC RX REV CODE- 250/637: Performed by: INTERNAL MEDICINE

## 2022-03-15 PROCEDURE — 80053 COMPREHEN METABOLIC PANEL: CPT

## 2022-03-15 PROCEDURE — 74011000250 HC RX REV CODE- 250

## 2022-03-15 PROCEDURE — 65610000006 HC RM INTENSIVE CARE

## 2022-03-15 PROCEDURE — 82962 GLUCOSE BLOOD TEST: CPT

## 2022-03-15 PROCEDURE — 36415 COLL VENOUS BLD VENIPUNCTURE: CPT

## 2022-03-15 PROCEDURE — P9047 ALBUMIN (HUMAN), 25%, 50ML: HCPCS | Performed by: INTERNAL MEDICINE

## 2022-03-15 PROCEDURE — 74011250637 HC RX REV CODE- 250/637: Performed by: PHYSICIAN ASSISTANT

## 2022-03-15 PROCEDURE — 94003 VENT MGMT INPAT SUBQ DAY: CPT

## 2022-03-15 PROCEDURE — 36600 WITHDRAWAL OF ARTERIAL BLOOD: CPT

## 2022-03-15 PROCEDURE — 93005 ELECTROCARDIOGRAM TRACING: CPT

## 2022-03-15 PROCEDURE — 93308 TTE F-UP OR LMTD: CPT

## 2022-03-15 RX ORDER — DEXTROSE MONOHYDRATE 100 MG/ML
INJECTION, SOLUTION INTRAVENOUS
Status: COMPLETED
Start: 2022-03-15 | End: 2022-03-16

## 2022-03-15 RX ORDER — FUROSEMIDE 10 MG/ML
20 INJECTION INTRAMUSCULAR; INTRAVENOUS ONCE
Status: COMPLETED | OUTPATIENT
Start: 2022-03-15 | End: 2022-03-15

## 2022-03-15 RX ORDER — FUROSEMIDE 10 MG/ML
20 INJECTION INTRAMUSCULAR; INTRAVENOUS DAILY
Status: DISCONTINUED | OUTPATIENT
Start: 2022-03-16 | End: 2022-03-16

## 2022-03-15 RX ADMIN — WATER 1 G: 1 INJECTION INTRAMUSCULAR; INTRAVENOUS; SUBCUTANEOUS at 05:25

## 2022-03-15 RX ADMIN — ALBUMIN (HUMAN) 25 G: 0.25 INJECTION, SOLUTION INTRAVENOUS at 17:22

## 2022-03-15 RX ADMIN — ARFORMOTEROL TARTRATE 15 MCG: 15 SOLUTION RESPIRATORY (INHALATION) at 07:19

## 2022-03-15 RX ADMIN — FERROUS SULFATE TAB 325 MG (65 MG ELEMENTAL FE) 325 MG: 325 (65 FE) TAB at 08:26

## 2022-03-15 RX ADMIN — NYSTATIN 500000 UNITS: 100000 SUSPENSION ORAL at 21:42

## 2022-03-15 RX ADMIN — ACETAZOLAMIDE SODIUM 250 MG: 500 INJECTION, POWDER, LYOPHILIZED, FOR SOLUTION INTRAVENOUS at 08:26

## 2022-03-15 RX ADMIN — ALBUMIN (HUMAN) 25 G: 0.25 INJECTION, SOLUTION INTRAVENOUS at 05:25

## 2022-03-15 RX ADMIN — SODIUM CHLORIDE, PRESERVATIVE FREE 10 ML: 5 INJECTION INTRAVENOUS at 21:42

## 2022-03-15 RX ADMIN — ASPIRIN 81 MG: 81 TABLET, COATED ORAL at 08:26

## 2022-03-15 RX ADMIN — ARFORMOTEROL TARTRATE 15 MCG: 15 SOLUTION RESPIRATORY (INHALATION) at 21:02

## 2022-03-15 RX ADMIN — SODIUM CHLORIDE, PRESERVATIVE FREE 10 ML: 5 INJECTION INTRAVENOUS at 15:17

## 2022-03-15 RX ADMIN — SODIUM CHLORIDE, PRESERVATIVE FREE 20 MG: 5 INJECTION INTRAVENOUS at 08:26

## 2022-03-15 RX ADMIN — FUROSEMIDE 20 MG: 10 INJECTION, SOLUTION INTRAMUSCULAR; INTRAVENOUS at 08:43

## 2022-03-15 RX ADMIN — CHLORHEXIDINE GLUCONATE 10 ML: 1.2 RINSE ORAL at 08:26

## 2022-03-15 RX ADMIN — ALBUMIN (HUMAN) 25 G: 0.25 INJECTION, SOLUTION INTRAVENOUS at 11:57

## 2022-03-15 RX ADMIN — APIXABAN 2.5 MG: 2.5 TABLET, FILM COATED ORAL at 08:31

## 2022-03-15 RX ADMIN — DEXTROSE MONOHYDRATE 250 ML: 10 INJECTION, SOLUTION INTRAVENOUS at 23:54

## 2022-03-15 RX ADMIN — WATER 1 G: 1 INJECTION INTRAMUSCULAR; INTRAVENOUS; SUBCUTANEOUS at 17:22

## 2022-03-15 RX ADMIN — NYSTATIN 500000 UNITS: 100000 SUSPENSION ORAL at 08:26

## 2022-03-15 RX ADMIN — ACETAMINOPHEN 650 MG: 325 TABLET ORAL at 05:25

## 2022-03-15 NOTE — PROGRESS NOTES
Cardiology Progress Note        Patient: Charo Single        Sex: female          DOA: 3/2/2022  YOB: 1940      Age:  80 y.o.        LOS:  LOS: 13 days   Assessment/Plan     Principal Problem:    Acute respiratory failure with hypoxemia ([address] (3/2/2022)    Active Problems:    Elevated troponin (4/23/2021)      HTN (hypertension) (4/23/2021)      Acute on chronic diastolic congestive heart failure (H[facility]) (4/23/2021)      Atrial fibrillation ([address] (3/2/2022)      Stage 3 chronic kidney disease ([address] (3/3/2022)      SERENA (obstructive sleep apnea) (3/3/2022)      Adult BMI 39.0-39.9 kg/sq m (3/3/2022)      Fracture of neck of right femur ([address] (3/8/2022)      Hypokalemia (3/14/2022)        Plan:  Extubated  Denied any symptoms  Blood pressure is low normal  Hold carvedilol  Echocardiogram done with preserved LV function, severe biatrial enlargement moderate MR, TR, pulmonary hypertension  Continue with Lasix  Discussed with patient and daughter        Patient remained intubated  Cardiac telemetry rate controlled A. fib with occasional PVCs, saturating around 100%  Tolerating carvedilol 3.125 mg twice daily and Eliquis 2.5 mg twice daily  Ventilatory management as per pulmonary/intensivist  Discussed with patient's daughter in the room  Continue with current cardiac medications. Patient remained intubated  Hemodynamically stable  A. fib rate controlled  Discussed with patient's daughter Barbara Greenfield on phone    Patient is status post hip surgery  Patient is intubated due to difficulty in breathing and hypoxemia  A. fib rate controlled  Discussed with Dr. Tito Rice with the current medical treatment        Patient denied chest pain or shortness of breath   Atrial fibrillation rate controlled   Patient is scheduled for hip surgery tomorrow  Discussed with patient's daughter  Resume anticoagulation after surgery.           Patient is diagnosed with hip fracture and being scheduled for surgery  I have long lengthy discussion with patient and daughter Elaina Stephens in the room  Patient have negative stress test in April 2021  EF is stable  Patient is with elevated but acceptable risk for hip surgery  Eliquis can be hold  Consider DVT prophylaxis anticoagulation from tomorrow patient have taken morning dose of Eliquis      Patient denied any chest pain   Mitral calcification without stenosis  Continue with current medical treatment    Patient continues to do well. H&H stable  Patient will need diuretic on discharge probably Lasix 40 mg twice daily  Discussed with patient and family in the room      A. fib rate controlled  Patient will need p.o. Lasix on discharge  Continue Eliquis 2.5 mg twice daily  Discussed with patient and family      Continue with Lasix 40 mg twice daily  I have long lengthy discussion with the patient and daughter about anticoagulation both of them agreed with low-dose Eliquis 2.5 mg twice daily  DC Lovenox   start Eliquis 2.5 mg twice daily from a.m. Discussed with patient and daughter findings of echocardiogram including mitral valve disease, prefer not to do transesophageal echocardiogram  Continue with current medical treatment                        Subjective:    cc:      Shortness of breath  A. fib        REVIEW OF SYSTEMS:     Extubated and no complaints    Objective:      Visit Vitals  /79   Pulse 76   Temp 98.4 °F (36.9 °C)   Resp 13   Ht 5' 5\" (1.651 m)   Wt 111.1 kg (245 lb)   SpO2 97%   Breastfeeding No   BMI 40.77 kg/m²     Body mass index is 40.77 kg/m². Physical Exam:  General Appearance: Comfortable, extubated  NECK: No JVD, no thyroidomeglay. LUNGS: Clear bilaterally. HEART: S1 irregular +S2 audible,    ABD: Non-tender, BS Audible    EXT: No edema, and no cysnosis. VASCULAR EXAM: Pulses are intact. PSYCHIATRIC EXAM: Mood is appropriate.     Medication:  Current Facility-Administered Medications   Medication Dose Route Frequency    [START ON 3/16/2022] furosemide (LASIX) injection 20 mg  20 mg IntraVENous DAILY    arformoteroL (BROVANA) neb solution 15 mcg  15 mcg Nebulization BID RT    albumin human 25% (BUMINATE) solution 25 g  25 g IntraVENous Q6H    cefepime (MAXIPIME) 1 g in sterile water (preservative free) 10 mL IV syringe  1 g IntraVENous Q12H    famotidine (PF) (PEPCID) 20 mg in 0.9% sodium chloride 10 mL injection  20 mg IntraVENous DAILY    sodium chloride (NS) flush 5-40 mL  5-40 mL IntraVENous PRN    acetaminophen (TYLENOL) tablet 650 mg  650 mg Oral Q6H    naloxone (NARCAN) injection 0.4 mg  0.4 mg IntraVENous PRN    ferrous sulfate tablet 325 mg  1 Tablet Oral TID    diphenhydrAMINE (BENADRYL) capsule 25 mg  25 mg Oral Q4H PRN    bisacodyL (DULCOLAX) suppository 10 mg  10 mg Rectal DAILY PRN    ELECTROLYTE REPLACEMENT PROTOCOL - Magnesium   1 Each Other PRN    ELECTROLYTE REPLACEMENT PROTOCOL - Calcium   1 Each Other PRN    ELECTROLYTE REPLACEMENT PROTOCOL  - Phosphorus Renal Dosing  1 Each Other PRN    ELECTROLYTE REPLACEMENT PROTOCOL - Potassium Renal Dosing  1 Each Other PRN    midazolam (VERSED) injection 1 mg  1 mg IntraVENous Q2H PRN    fentaNYL citrate (PF) injection 25 mcg  25 mcg IntraVENous Q4H PRN    chlorhexidine (PERIDEX) 0.12 % mouthwash 10 mL  10 mL Oral Q12H    nystatin (MYCOSTATIN) 100,000 unit/mL oral suspension 500,000 Units  500,000 Units Oral QID    apixaban (ELIQUIS) tablet 2.5 mg  2.5 mg Oral BID    sodium chloride (NS) flush 5-40 mL  5-40 mL IntraVENous Q8H    sodium chloride (NS) flush 5-40 mL  5-40 mL IntraVENous PRN    acetaminophen (TYLENOL) tablet 650 mg  650 mg Oral Q6H PRN    Or    acetaminophen (TYLENOL) suppository 650 mg  650 mg Rectal Q6H PRN    polyethylene glycol (MIRALAX) packet 17 g  17 g Oral DAILY PRN    ondansetron (ZOFRAN ODT) tablet 4 mg  4 mg Oral Q8H PRN    Or    ondansetron (ZOFRAN) injection 4 mg  4 mg IntraVENous Q6H PRN    [Held by provider] carvediloL (COREG) tablet 3.125 mg  3.125 mg Oral BID WITH MEALS    aspirin delayed-release tablet 81 mg  81 mg Oral DAILY               Lab/Data Reviewed:  Procedures/imaging: see electronic medical records for all procedures/Xrays   and details which were not copied into this note but were reviewed prior to creation of Plan       All lab results for the last 24 hours reviewed. Recent Labs     03/15/22  0437 03/14/22  0420 03/13/22  0430   WBC 10.1 9.1 8.8   HGB 9.2* 8.4* 9.2*   HCT 29.2* 27.0* 28.7*    173 178     Recent Labs     03/15/22  0437 03/14/22  1015 03/14/22  0420 03/13/22  0430 03/13/22  0430     --  145  --  144   K 4.8 3.3* 3.3*   < > 3.7     --  105  --  103   CO2 35*  --  35*  --  37*   *  --  133*  --  137*   BUN 93*  --  96*  --  98*   CREA 1.89*  --  1.97*  --  2.17*   CA 10.8*  --  9.7  --  10.4*    < > = values in this interval not displayed. RADIOLOGY:  CT Results  (Last 48 hours)    None        CXR Results  (Last 48 hours)               03/15/22 0613  XR CHEST PORT Final result    Impression:      1. Support devices in stable/appropriate position as visualized. 2. Mild cardiac enlargement, unchanged. 3. Hazy right lung base opacity, favored a combination of atelectasis and   posteriorly layering pleural effusion, stable to minimally increased. Narrative:  EXAM: XR CHEST PORT       CLINICAL INDICATION/HISTORY: intubated   -Additional: None       COMPARISON: 1 day prior       TECHNIQUE: Portable frontal view of the chest       _______________       FINDINGS:       SUPPORT DEVICES: ET tube and visualized gastric tube both project in stable   position. HEART AND MEDIASTINUM: Enlarged appearing cardiac silhouette with stable   mediastinal contours. Mild prominence of the central pulmonary vessels without   gross evidence for vascular congestion. LUNGS AND PLEURAL SPACES: No pneumothorax.  Hazy right lung base opacity, stable   to minimally increased. Minor left basilar atelectasis. No dense consolidation. BONY THORAX AND SOFT TISSUES: Unremarkable.       _______________           03/14/22 7288  XR CHEST PORT Final result    Impression:          1. Support devices in stable/appropriate position as visualized. 2. Mild cardiac enlargement, unchanged. 3. Hazy right lung base opacity, favored a combination of atelectasis and   posteriorly layering pleural effusion. Narrative:  EXAM: XR CHEST PORT       CLINICAL INDICATION/HISTORY: intubated   -Additional: None       COMPARISON: 1 day prior       TECHNIQUE: Portable frontal view of the chest       _______________       FINDINGS:       SUPPORT DEVICES: ET tube and visualized gastric tube both project in stable   position. HEART AND MEDIASTINUM: Enlarged appearing cardiac silhouette with stable   mediastinal contours. LUNGS AND PLEURAL SPACES: No pneumothorax. Hazy right lung base opacity. Minor   left basilar atelectasis. No dense consolidation.        BONY THORAX AND SOFT TISSUES: Unremarkable.       _______________                   Cardiology Procedures:   Results for orders placed or performed during the hospital encounter of 03/02/22   EKG, 12 LEAD, INITIAL   Result Value Ref Range    Ventricular Rate 102 BPM    QRS Duration 72 ms    Q-T Interval 336 ms    QTC Calculation (Bezet) 437 ms    Calculated R Axis -81 degrees    Calculated T Axis -20 degrees    Diagnosis       Atrial fibrillation with rapid ventricular response with premature   ventricular or aberrantly conducted complexes  Left axis deviation  Low voltage QRS  Abnormal ECG  When compared with ECG of 23-APR-2021 17:11,  Atrial fibrillation has replaced Sinus rhythm  Confirmed by Carlene Lefort, MD. ([id number]) on 3/2/2022 10:26:52 PM        Echo Results  (Last 48 hours)    None       Cardiolite (Tc-99m Sestamibi) stress test    Signed By: Donta Goff MD     March 15, 2022

## 2022-03-15 NOTE — PROGRESS NOTES
Passed SBT  Extubated  BIPAP at the bedside. If fails DNR/DNI  Appreciate palliative care help  ENEDINA Sena

## 2022-03-15 NOTE — PROGRESS NOTES
[id number]- assumed care - Placed on wean on vent for possible extubation today - Still remains slightly drowsy - Wound vac intact on rt groin area - Daughter at [facility] - Vitals stable   [id number] pt extubated & placed on NC 4L - Sats 95% - pt having no signs of SOB - lungs remain diminished - IV fluids stopped as ordered - pt more awake & answering some questions appropriately in very soft voice -Echo done at [facility] & 12 lead EKG obtained   [id number]-ir remains doing well extubated on NC4L Daughter at [facility]  [id number]- swallow evaluation order  placed - pt able to take water without any signs of aspiration-  [id number]-Qucae well extubated - No complaints of pain - much more awake this PM - BS obtained of 66 - pt is asymptomatic - pt given applejuice & applesauce -BS after juice was 94

## 2022-03-15 NOTE — PROGRESS NOTES
CM notes patient passed SBT today, was extubated and has DNR/do not extubate order. CM to continue to follow and remain available. Patient not stable enough to transfer out of an acute care environment at this time. Care Management Interventions  PCP Verified by CM:  (Dr. Vilma Blackmon (female) )  Mode of Transport at Discharge: BLS (.)  Transition of Care Consult (CM Consult): SNF [facility] has stated that according to the current therapy notes, she is not Acute inpt rehab appropriate. SNF choices obtained from the pt's dtr;  [facility]. )  Partner SNF: No  Discharge Durable Medical Equipment:  (TBD)  Physical Therapy Consult: Yes  Occupational Therapy Consult: Yes  Support Systems: Child(doc)  Confirm Follow Up Transport: Family  The Plan for Transition of Care is Related to the Following Treatment Goals : skilled nursing facility  The Patient and/or Patient Representative was Provided with a Choice of Provider and Agrees with the Discharge Plan?: Yes (The pt and her dtr: Marlena)  Freedom of Choice List was Provided with Basic Dialogue that Supports the Patient's Individualized Plan of Care/Goals, Treatment Preferences and Shares the Quality Data Associated with the Providers?: Yes ([facility])  Discharge Location  Patient Expects to be Discharged to[de-identified] Other: (TBD)

## 2022-03-15 NOTE — PROGRESS NOTES
conducted a follow up consultation and spiritual assessment for Shaquille Sanders, who is a 80 y.o.,female. Family member at the bedside. Patient had recently been extubated. Another family member on Face Time praying with her. After prayer patient began to sing hymns and had a huge smile. Patient nodded appropriately with me and family. Continued the relationship of care and support. Listened empathically. Offered prayer and assurance of continued prayer on patients behalf. Chart reviewed. Family expressed gratitude for Spiritual Care visit. Patient was reviewed in ICU Interdisciplinary Rounds. Chaplains will continue to follow and will provide pastoral care as needed or requested.  recommends bedside caregivers page the  on duty if patient shows signs of acute spiritual or emotional distress. [id number] [id number].    Board Certified   [phone] - Office

## 2022-03-15 NOTE — PROGRESS NOTES
Problem: Mobility Impaired (Adult and Pediatric)  Goal: *Acute Goals and Plan of Care (Insert Text)  Description: In one week:  1. Pt will transfer supine <> sitting EOB min assist for increased ability to sit upright during meal times. 2. Pt will transfer sit <> stand min assist for improved lower extremity strengthening. 3. Pt will roll in bed in both directions for increased independence with pressure relief. 4. Pt will sit EOB unsupported for > 5 minutes without LOB for increased safety and improved sitting balance. Outcome: Progressing Towards Goal   []  Patient has met MD gunnar burdick for d/c home   []  Recommend HH with 24 hour adult care   [x]  Benefit from additional acute PT session to address:  rehab placement    PHYSICAL THERAPY TREATMENT    Patient: Shaquille Sanders (32 y.o. female)  Date: 3/15/2022  Diagnosis: Acute exacerbation of CHF (congestive heart failure) (HCC) [I50.9] Acute respiratory failure with hypoxemia ([facility])  Procedure(s) (LRB):  RIGHT TOTAL HIP ARTHROPLASTY WITH C-ARM  (PT IN ROOM # 358) (Right) 5 Days Post-Op  Precautions: Fall,WBAT (R LE)  PLOF: ambulatory with a rollator    ASSESSMENT:  Pt on SBT, SpO2 ranging from 94-97% during session. Pt initially resistant to AA/PROM of LEs. Pt makes little effort to perform exercises. PROM on the RLE, AAROM on the L with constant VC to keep going with repetitions. Utilized [place] bed controls to pull pt up to [facility]. Progression toward goals:   []      Improving appropriately and progressing toward goals  [x]      Improving slowly and progressing toward goals  []      Not making progress toward goals and plan of care will be adjusted     PLAN:  Patient continues to benefit from skilled intervention to address the above impairments. Continue treatment per established plan of care.   Discharge Recommendations:  [facility]  Further Equipment Recommendations for Discharge:  rolling walker     SUBJECTIVE:   Patient stated . (shaking head no)    OBJECTIVE DATA SUMMARY:   Critical Behavior:  Neurologic State: Eyes open spontaneously  Orientation Level: Unable to verbalize  Cognition: No command following,Poor safety awareness  Safety/Judgement: Awareness of environment  Functional Mobility Training:  Bed Mobility:  Scooting: Total assistance  Therapeutic Exercises:   (B)LEs      EXERCISE   Sets   Reps   Active Active Assist   Passive Self ROM   Comments   Ankle Pumps  10  [x] [] [x]R []    Quad Sets/Glut Sets  10ea  [x] [] [] [] Hold for 5 secs   Hamstring Sets   [] [] [] []    Short Arc Quads   [] [] [] []    Heel Slides  10 [] [x] [x]R []    Straight Leg Raises   [] [] [] []    Hip Add  10 [] [x] [x]R []    Long Arc Quads   [] [] [] []    Seated Marching   [] [] [] []    Standing Marching   [] [] [] []       [] [] [] []        Pain:  Pain level pre-treatment: 0/10  Pain level post-treatment: 0/10   Pain Intervention(s): Medication (see MAR); Rest, Ice, Repositioning   Response to intervention: Nurse notified, See doc flow    Activity Tolerance:   Poor  Please refer to the flowsheet for vital signs taken during this treatment. After treatment:   [] Patient left in no apparent distress sitting up in chair  [x] Patient left in no apparent distress in bed  [x] Call bell left within reach  [] Nursing notified  [x] Caregiver present  [] Bed alarm activated  [x] wrist restraints fastened      COMMUNICATION/EDUCATION:   [x]         Role of Physical Therapy in the acute care setting. []         Fall prevention education was provided and the patient/caregiver indicated understanding. []         Patient/family have participated as able in working toward goals and plan of care. []         Patient/family agree to work toward stated goals and plan of care. []         Patient understands intent and goals of therapy, but is neutral about his/her participation.   []         Patient is unable to participate in stated goals/plan of care: ongoing with therapy staff.  []         Other:        Shara Kate, PTA   Time Calculation: 24 mins

## 2022-03-15 NOTE — PROGRESS NOTES
Hospitalist Progress Note-critical care note     Patient: Madie Barriga MRN: [id number]  CSN: [id number]    YOB: 1940  Age: 80 y.o. Sex: female    DOA: 3/2/2022 LOS:  LOS: 13 days            Chief complaint: hip fracture m ckd3, afib chf ,     Assessment/Plan         Hospital Problems  Date Reviewed: 3/9/2022          Codes Class Noted POA    Hypokalemia ICD-10-CM: E87.6  ICD-9-CM: 276.8  3/14/2022 Unknown        [facility] neck of right femur [facility] ICD-10-CM: S72.001A  ICD-9-CM: 820.8  3/8/2022 Unknown        Stage 3 chronic kidney disease ([facility] ICD-10-CM: N18.30  ICD-9-CM: 585.3  3/3/2022 Unknown        SERENA (obstructive sleep apnea) ICD-10-CM: G47.33  ICD-9-CM: 327.23  3/3/2022 Unknown        Adult BMI 39.0-39.9 kg/sq m ICD-10-CM: Z68.39  ICD-9-CM: V85.39  3/3/2022 Unknown        * (Principal) Acute respiratory failure with hypoxemia (HCC) ICD-10-CM: J96.01  ICD-9-CM: 518.81  3/2/2022 Unknown        Atrial fibrillation (HCC) ICD-10-CM: I48.91  ICD-9-CM: 427.31  3/2/2022 Unknown        Elevated troponin ICD-10-CM: R77.8  ICD-9-CM: 790.6  4/23/2021 Yes        HTN (hypertension) ICD-10-CM: I10  ICD-9-CM: 401.9  4/23/2021 Yes        Acute on chronic diastolic congestive heart failure (HCC) ICD-10-CM: I50.33  ICD-9-CM: 428.33, 428.0  4/23/2021 Yes             pt was admitted for afib and chf, found rt hip fracture. She has serena on cpap at night, she received rt hip replacement, she was noted decreased tide volume and hypoxia while extubation post procedure, intubated with oxygen 65%, peep 6.  Case discussed with Dr. Charlie Aldana and Dr. Mikaela Hargrove         Acute on chronic respiratory failure with hypoxia and hypercapnia   Planning Extubate today   V/q scan :Perfusion images show no segmental perfusion defects to suggest the presence of  pulmonary embolism, pvl negative for dvt  on 3/3    Pulmonary hypertension   Echo on 3/3 pulmonary arterial systolic pressure is 51 mmhg  LORRAINE is deferred         Congestive heart failure , acute on chronic diastolic   Echo done Mitral stenosis,  Pulmonary hypertension found to be moderate-LORRAINE is deferred  Dr. Rowena Caicedo and On coreg     Htn : on coreg   afib new   On coreg and  eliquis     Elevated trop   No acs cardiologist on board        UTI  Urine pos for klebsiella  Completed IV Rocephin     Hip fracture:  Right press fit direct anterior total hip arthroplasty   Continue post surgery care          ckd3 -cr 1.89 mild improving   Continue watch for renal function ,      Anemia   Continue monitoring h/h     Hypokalemia resolved   icu electrolytes replacement protocol       Palliative care f/u . dnr /I   Disposition :tbd,   Review of systems:  Limited due to on vent this morning   Vital signs/Intake and Output:  Visit Vitals  BP (!) 95/57 (BP 1 Location: Left arm, BP Patient Position: At rest)   Pulse 78   Temp 98.4 °F (36.9 °C)   Resp 13   Ht 5' 5\" (1.651 m)   Wt 111.1 kg (245 lb)   SpO2 96%   Breastfeeding No   BMI 40.77 kg/m²     Current Shift:  03/15 0701 - 03/15 1900  In: 600 [I.V.:600]  Out: 250 [Urine:250]  Last three shifts:  03/13 1901 - 03/15 0700  In: 2535 [I.V.:740]  Out: 2525 [Urine:2525]    Physical Exam:  General: Intubated , open eyes per voice and follow up command   HEENT: NC, Atraumatic. anicteric sclerae. ET tube noted   Lungs: CTA Bilaterally. No Wheezing/Rhonchi/Rales. Heart:  Regular  rhythm,  No murmur, No Rubs, No Gallops  Abdomen: Soft, Non distended, Non tender. +Bowel sounds,   Extremities: No c/c, rt hip surgical site covered with gauze and wound vac noted   Psych:   Not anxious or agitated.   Neurologic:  Alert and follow the command           Labs: Results:       Chemistry Recent Labs     03/15/22  0437 03/14/22  1015 03/14/22  0420 03/13/22  0430 03/13/22  0430   *  --  133*  --  137*     --  145  --  144   K 4.8 3.3* 3.3*   < > 3.7     --  105  --  103   CO2 35*  --  35*  --  37*   BUN 93*  --  96*  --  98*   CREA 1.89*  --  1.97* --  2.17*   CA 10.8*  --  9.7  --  10.4*   AGAP 3  --  5  --  4   BUCR 49*  --  49*  --  45*   *  --  140*  --  128*   TP 6.9  --  5.4*  --  6.0*   ALB 3.5  --  2.7*  --  3.3*   GLOB 3.4  --  2.7  --  2.7   AGRAT 1.0  --  1.0  --  1.2    < > = values in this interval not displayed. CBC w/Diff Recent Labs     03/15/22  0437 03/14/22  0420 03/13/22  0430   WBC 10.1 9.1 8.8   RBC 2.91* 2.67* 2.96*   HGB 9.2* 8.4* 9.2*   HCT 29.2* 27.0* 28.7*    173 178   GRANS 77* 73 75*   LYMPH 10* 12* 12*   EOS 1 1 1      Cardiac Enzymes No results for input(s): CPK, CKND1, FARA in the last 72 hours. No lab exists for component: CKRMB, TROIP   Coagulation No results for input(s): PTP, INR, APTT, INREXT, INREXT in the last 72 hours. Lipid Panel Lab Results   Component Value Date/Time    Cholesterol, total 183 04/25/2021 04:00 PM    HDL Cholesterol 101 (H) 04/25/2021 04:00 PM    LDL, calculated 69.8 04/25/2021 04:00 PM    VLDL, calculated 12.2 04/25/2021 04:00 PM    Triglyceride 61 04/25/2021 04:00 PM    CHOL/HDL Ratio 1.8 04/25/2021 04:00 PM      BNP No results for input(s): BNPP in the last 72 hours. Liver Enzymes Recent Labs     03/15/22  0437   TP 6.9   ALB 3.5   *      Thyroid Studies Lab Results   Component Value Date/Time    TSH 0.58 03/03/2022 02:21 AM        Procedures/imaging: see electronic medical records for all procedures/Xrays and details which were not copied into this note but were reviewed prior to creation of Plan    NM LUNG SCAN PERF    Result Date: 3/3/2022  EXAM: NM LUNG SCAN PERF. CLINICAL INDICATION/HISTORY: d dimer increase dyspnea. -Additional: Clinical concern for pulmonary embolus. COMPARISON: Correlation is made with the radiographic study performed one day prior. TECHNIQUE: 7.2 mCi Tc-99m MAA was injected intravenously and the 8 standard views of the chest were obtained.  This perfusion only examination is interpreted using the PISAPED criteria. _______________ FINDINGS: Perfusion images show no segmental perfusion defects to suggest the presence of pulmonary embolism. _______________     o scintigraphic findings to suggest the presence of acute pulmonary embolism. _______________     XR HIP RT W OR WO PELV 2-3 VWS    Result Date: 3/7/2022  EXAM: RIGHT HIP RADIOGRAPHS CLINICAL INDICATION/HISTORY: right hip pain -Additional: None COMPARISON: May March 3, 2022. TECHNIQUE: AP pelvis and frog-leg lateral view of right hip. _______________ FINDINGS: BONES: Intact appearing ilioischial and iliopectineal lines. There is a displaced and impacted subcapital right femoral neck fracture, with mild progressive displacement on follow-up imaging. Mild-moderate bilateral hip joint arthrosis. SOFT TISSUES: Unremarkable. _______________     1. Displaced and impacted subcapital right femoral neck fracture, increased in displacement from CT performed 4 days prior. CT HEAD WO CONT    Result Date: 3/7/2022  EXAM: CT head INDICATION: Altered mental status. COMPARISON: 4/23/2021. TECHNIQUE: Axial CT imaging of the head was performed without intravenous contrast. Standard multiplanar coronal and sagittal reformatted images were obtained and are included in interpretation. One or more dose reduction techniques were used on this CT: automated exposure control, adjustment of the mAs and/or kVp according to patient size, and iterative reconstruction techniques. The specific techniques used on this CT exam have been documented in the patient's electronic medical record. Digital Imaging and Communications in Medicine (DICOM) format image data are available to nonaffiliated external healthcare facilities or entities on a secure, media free, reciprocally searchable basis with patient authorization for at least a 12-month period after this study. _______________ FINDINGS: BRAIN AND POSTERIOR FOSSA: Periventricular white matter hypoattenuation which is nonspecific but likely represents chronic ischemic changes. No evidence of acute large vessel transcortical infarct or acute parenchymal hemorrhage. No midline shift or hydrocephalus. EXTRA-AXIAL SPACES AND MENINGES: There are no abnormal extra-axial fluid collections. CALVARIUM: Intact. SINUSES: Clear. OTHER: None. _______________     No acute intracranial abnormality. CT CHEST ABD PELV WO CONT    Result Date: 3/3/2022  EXAM: CT CHEST, ABDOMEN AND PELVIS CLINICAL INDICATION/HISTORY: Hypoxemia, retrocardiac density, diarrhea, weakness and shortness of breath COMPARISON: 3/2/2022 TECHNIQUE: Axial CT imaging of the chest, abdomen, and pelvis was performed without intravenous contrast. Multiplanar reformats were generated. One or more dose reduction techniques were used on this CT: automated exposure control, adjustment of the mAs and/or kVp according to patient size, and iterative reconstruction techniques. The specific techniques used on this CT exam have been documented in the patient's electronic medical record. Digital Imaging and Communications in Medicine (DICOM) format image data are available to nonaffiliated external healthcare facilities or entities on a secure, media free, reciprocally searchable basis with patient authorization for at least a 12-month period after this study. ________________ FINDINGS: LIMITATIONS:   > Suboptimal evaluation given lack of intravenous contrast.   > Motion artifact is present which limits evaluation.   > Suboptimal exam due to patient body habitus and arms by the side. CHEST: LUNGS: Respiratory motion significantly limits evaluation. Interlobar septal thickening in the upper lobes. Mild bilateral dependent atelectasis. No large consolidation or suspicious pulmonary mass. PLEURA: Very small volume bilateral pleural effusions. AIRWAY: Normal. MEDIASTINUM: Four-chamber cardiomegaly with asymmetric biatrial enlargement, mitral and aortic annular calcifications.  The main pulmonary artery is enlarged suggesting pulmonary arterial hypertension. Normal caliber aorta with scattered calcified atherosclerotic plaque. No pericardial effusion. LYMPH NODES: No enlarged lymph nodes. CHEST WALL: Diffuse anasarca. Heterogeneous thyroid. _______________ ABDOMEN/PELVIS: LIVER: No enhancing hepatic mass. The portal and hepatic veins are patent. BILIARY: Gallstones are present in the nondistended gallbladder lumen. No biliary dilation. SPLEEN: Normal. PANCREAS: Normal. ADRENALS: Left adrenal gland measures 9 HU (axial image 76), most consistent with lipid rich adenoma. Normal right adrenal gland. KIDNEYS: Asymmetrically small left kidney. No rate of a stone. No hydronephrosis. GI TRACT:  A small hiatal hernia is present. Normal caliber small and large bowel loops. No morphology of bowel obstruction. Normal appendix. BLADDER: Diffuse wall thickening in the largely decompressed bladder. PELVIC ORGANS: No acute abnormality. VASCULATURE: No arterial aneurysm. 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 LYMPH NODES: No mesenteric or retroperitoneal lymphadenopathy. OTHER: No free intraperitoneal air. No ascites. Diffuse anasarca. OSSEOUS STRUCTURES: No acute osseous abnormality. Multilevel degenerative changes most pronounced in the lumbar spine. Degenerative changes in both shoulders (right greater than left). Please note the included portions of the upper extremities are not well evaluated on this study _______________     1. Findings of congestive heart failure with cardiomegaly, interstitial edema, very small bilateral pleural effusions, and diffuse anasarca. 2.  Bladder wall thickening may be due to underdistention though superimposed infection cannot be excluded. Recommend correlation for cystitis. 3.  Osseous degenerative changes and additional findings, as detailed in the body of the report. XR CHEST PORT    Result Date: 3/6/2022  EXAM: PORTABLE CHEST HISTORY: Shortness of breath.  COMPARISON: 3/2/2022 TECHNIQUE: Single portable view. _______________ FINDINGS: SUPPORT DEVICES: None HEART AND MEDIASTINUM: Moderate cardiomegaly. LUNGS AND PLEURAL SPACES: Subsegmental atelectasis left base. Possible small effusions. BONES AND SOFT TISSUES: Dextroscoliosis. _______________     Subsegmental atelectasis left base. Possible small effusions. Moderate cardiomegaly without change. XR CHEST PORT    Result Date: 3/2/2022  EXAM:  AP Portable Chest X-ray 1 view INDICATION: Shortness of breath COMPARISON: April 23, 2021 _______________ FINDINGS: Cardiomegaly and mediastinal contours are within normal limits for portable radiograph. There is increased right retrocardiac/right paraspinal density. There are no pleural effusions. No acute osseous findings. ________________      Increased right retrocardiac density which may represent airspace disease or underlying pulmonary nodule. Recommend CT chest for further evaluation. ECHO ADULT COMPLETE    Result Date: 3/3/2022    Left Ventricle: Left ventricle size is normal. Moderately increased wall thickness. Findings consistent with moderate concentric hypertrophy. Normal wall motion. Normal left ventricular systolic function with a visually estimated EF of 55 - 60%.   Left Atrium: Left atrium is mildly dilated.   Right Ventricle: Right ventricle is mildly dilated.   Right Atrium: Right atrium is mildly dilated.   Pulmonary artery :  Estimated pulmonary arterial systolic pressure is 51 mmhg. Pulmonary hypertension found to be moderate   Mitral Valve: Moderately thickened leaflet. Mildly calcified leaflet. Moderate annular calcification of the mitral valve.   IVC/SVC : IVC diameter is greater than 21 mm and decreases less than 50% during inspiration; therefore the estimated right atrial pressure is elevated (~15 mmHg). IVC is dilated. Consider LORRAINE for better evaluation of mitral valve if clinically indicated.      DUPLEX LOWER EXT VENOUS BILAT    Result Date: 3/4/2022  · No evidence of deep vein thrombosis in the right lower extremity. · No evidence of deep vein thrombosis in the left lower extremity.         Elizabeth Winslow MD

## 2022-03-15 NOTE — PROGRESS NOTES
Speech Therapy Note:    Acknowledging speech order. Looked in on patient at 15:10. Family present at bedside. Patient asleep. Will f/u at later time to conduct bedside swallow evaluation.      Abran Lombardo, SLP

## 2022-03-15 NOTE — PROGRESS NOTES
1930- Report and care received, assessment completed per flow sheet. Intubated, opens eyes spontaneously, does not make eye contact or follow commands. Soft bilateral wrist restraints in place to protect integrity of lines/tubes, flow sheet in progress. 0000- Reassessment without change. 0300- Reassessment without change.

## 2022-03-15 NOTE — PROGRESS NOTES
RENAL CONSULT  3/15/2022    Patient:  Joni Welch  :  1940  Gender:  female  MRN #:  [id number]    Reason for Consult: Metabolic Alkalosis  And renal failure       Assessment:    Joni Welch is a 80y.o. year old female  With h/o  morbid obesity, SERENA, right leg weakness, HTN, A. fib, CHF admitted  on 3/3/2022 for shortness of breath    she developed  onset of A. fib on admission. HF was treated with diuretics and was using home BiPAP nightly.   Still on mechanical vent , BP stable without vasopressors     She developed acute renal failure with creatinine slowly rising most likely due to hypotension and over diuresis    # Acute renal failure- Improving  # Metabolic alkalosis - due to diuretics , upon review of serum bicarbonate she had baseline elevated bicarbonate which must be metabolic compensation for chronic respiratory acidosis   #Hypernatremia - Improved  #respiratroy failure     Subjective/Relevants events: -  Still intubated but responsive on vent   Decent urine    Blood pH is improving   Blood pressure is normal , planning to extubate today per ICU team if passed SBT       Plan:      - Hold diuretics for now , renal function is improving slowly with decent urine overnight   - will stop diamox and D5W and half NS today , as alkalosis improved and pH is better and observe closely   - intake and output charting, Willoughby's cath, ensure she is not retaining urine   -  CT scan on  3/3 showed asymmetrical small kidney , no hydronephrosis in USG    - avoid nsaids , contrast and nephrotoxin   - No clinical indication of dialysis for now   - Dose all meds for current eGFR  - keep MAP 65 mmhg      Rest of the management per primary and ICU team       Intake/Output Summary (Last 24 hours) at 3/15/2022 1123  Last data filed at 3/15/2022 0714  Gross per 24 hour   Intake 505.83 ml   Output 1700 ml   Net -1194.17 ml         Review of Symptoms:   Unable to obtained review of systems due to patient condition Objective:    Visit Vitals  /64   Pulse 97   Temp 97.9 °F (36.6 °C)   Resp 13   Ht 5' 5\" (1.651 m)   Wt 111.1 kg (245 lb)   SpO2 99%   Breastfeeding No   BMI 40.77 kg/m²       Physical Exam:    On vent but responsive   HEENT:ET tube ,  no neck swelling  Lung: clear to auscultation   Abdomen: soft, normal bowel sounds. Ext: no edema  Skin: No rash      Laboratory Data:    Lab Results   Component Value Date    BUN 93 (H) 03/15/2022    BUN 96 (H) 03/14/2022    BUN 98 (H) 03/13/2022     03/15/2022     03/14/2022     03/13/2022    CO2 35 (H) 03/15/2022    CO2 35 (H) 03/14/2022    CO2 37 (H) 03/13/2022     Lab Results   Component Value Date    WBC 10.1 03/15/2022    HGB 9.2 (L) 03/15/2022    HCT 29.2 (L) 03/15/2022     No components found for: CALCIUM, PHOSPHORUS, MAGNESIUM  Lab Results   Component Value Date     (H) 04/25/2021     No results found for: SPECIMENTYP, TURBIDITY, UGLU    Imaging Reveiwed:       Approx 31 minutes of critical care time spent in the direct evaluation and formulation of treatment plan of this high risk patient. The reason for providing this level of medical care was due a critical illness that impaired one or more vital organ systems such that there was a high probability of imminent or life threatening deterioration in the patients condition. This care involved high complexity decision making to assess, manipulate, and support vital system functions, to treat this degreee vital organ system failure and to prevent further life threatening deterioration of the patients condition.          Claudene Drivers, MD

## 2022-03-15 NOTE — PROGRESS NOTES
Palliative Medicine Consult    Patient Name: Jeane Quintanilla  YOB: 1940    Date of Initial Consult: 3/3/2022  Date of follow-up: 3/15/2022  Reason for Consult: Goals of care discussions  Requesting Provider: Dr. Federica Cabrera  Primary Care Physician: Samantha Crawford NP      SUMMARY:   Jeane Quintanilla is a 80 y.o. with a past history of congestive heart failure, sleep apnea, right leg weakness, and CKD stage 3, who was admitted on 3/2/2022 from home with a diagnosis of New onset A-fib, and acute on chronic diastolic congestive heart failure. Current medical issues leading to Palliative Medicine involvement include: Support and goals of care discussions. 3/4/2022: Patient is awake, alert, and oriented x4. No family present. States she did not sleep well last night due to the hospital CPAP machine being louder than when she is used to at home. 3/7/2022: Patient was drowsy and not engaged in goals of care conversations today. 3/8/2022: Patient is awake, alert, talking to her brother on the phone. Patient and daughter agreeable to POST form completion to protect patient's healthcare wishes. 3/11/2022: Reconsulted for complications postoperatively, she required reintubation and mechanical ventilator support due to impending respiratory failure. At this time daughter/MPOA would like to continue full code with full interventions. 3/15/2022: Patient undergoing SBT to determine medical appropriateness to medically extubate. Patient is awake, nodding to questions, and following some commands. PALLIATIVE DIAGNOSES:   1. Goals of care discussions  2. New onset A-fib  3. Acute on chronic diastolic congestive heart failure  4. Advanced age/debility       PLAN:   3/15/2022:  Palliative medicine team including Mandy Gupta RN and I met with patient at patient's bedside. Patient undergoing SBT to determine medical appropriateness to medically extubate.  Patient is awake, nodding to questions, and following some commands. Called patient's daughter/MPOSTANLEY Piedad Rabago to readdress goals of care. Shelley Pineda would like to reinstate patient's original wishes for DNR/Do not reintubate. Once medically extubated, do not reintubate for any reason. POST form on file. See previous discussions below:    3/11/2022: Palliative medicine team including Zay Lanier RN and I met with patient at patient's bedside. Patient is now in the ICU, orally intubated on a mechanical ventilator since having impending respiratory failure postoperatively requiring reintubation. Patient underwent SBT today, and experienced episodes of apnea. We were reconsulted to address goals of care. Spoke to patient's daughter/AUBREY Pineda to readdress goals of care. Shelley Pineda states that she did agree to rescind the DNR/DNI for surgery. Asked daughter about CODE STATUS while continuing intubation and evaluations for possibly liberating the ventilator. Shelley Pineda would like patient to remain a full code with full interventions at this time. Shelley Pineda wants to see how patient responds to aggressive measures prior to making any further changes in goals of care. While Shelley Pineda understands the patient's original wishes for DNR, she feels this is a unique situation postoperatively that she thinks patient would want all aggressive efforts at this time. Support offered during this difficult time. We will continue to follow closely with you. Our team appreciates the re-consult. See previous discussions below    3/8/2022: Palliative medicine team including Zay Lanier RN and I met with patient at patient's bedside. Daughter/AUBREY Pineda also present. Patient and daughter agreeable to POST form completion to protect patient's healthcare wishes. Patient deferred signing to daughter/MPOA Piedad Rabago. Marlena signed POST Form with the following measures: DNR/DNI, limited interventions, no feeding tubes.  Will sign off as goals of care have been established. Please re-consult should it be warranted. Thank you for the opportunity to provide care to this patient. See previous discussions below:    3/7/2022: Palliative medicine team including Mike Boyle RN and I met with patient at patient's bedside. Also present was patient's daughter/MPOA/legal next of kin Arpit. Reviewed previous conversations had (see below). Marlena did bring AMD naming herself Conor Scott) as patient's medical power of . Copy placed on paper chart. Reviewed patient's previous wish for DNR/DNI. Daughter is supportive, and states that this has been patient's consistent wish. Patient was drowsy and not engaged in goals of care conversations today. POST form reviewed daughter, she will review with her mother once she is less drowsy, and we will follow up tomorrow for POST Form completion. At this time continue DNR/DNI. See previous discussions below per palliative team    3/4/2022: Palliative medicine team including Mike Boyle RN and I met with patient at patient's bedside. Patient is awake, alert, and oriented x4. No family present. Followed up on goals of care discussions from yesterday (see below). Family is still looking at current documentation regarding AMD and DO NOT RESUSCITATE paperwork. Patient is not comfortable signing any paperwork until she is able to review her current paperwork. Explained that we will honor the DNR/DNI order while she is an inpatient, however when she discharges she needs paperwork to protect her. She did not wish to complete new documents at this time. We will follow up next week if she remains inpatient. Advised patient that if she gets discharged prior to document completion, she can complete this paperwork to protect her wishes with her primary care provider. At this time continue DNR/DNI.     See previous discussions below    3/3/2022: Goals of care: Palliative medicine team including  CECILIO Palumbo and I met with patient at patient's bedside. Also present was patient's sister Alexis Garcia. Patient is awake, alert, oriented x4. She states that her breathing has improved since admission, overall feels well. Introduced the role of palliative medicine. Patient states she has completed an AMD before naming her daughter Estuardo Dean as medical power of . Patient is not , and her daughter Shade Bridges is legal NOK. We do not have a copy of this AMD on file, she will ask her daughter if she has a copy. Reconfirmed goals of care for DNR/DNI. She is unsure if she has ever completed a DDNR. She states her daughter is aware and supportive of her healthcare wishes. Patient and patient's sister state that they will review patient's paperwork with daughter today, and patient is willing to complete any necessary forms tomorrow to protect her wishes for DNR/DNI. We will follow up tomorrow once she has opportunity to review her current paperwork. 1. New onset A. fib: Echo pending, cardiology consulted by primary team.  She denies pain, shortness of breath has improved since admission. 2. Acute on chronic diastolic congestive heart failure: Echo results pending, cardiology has been consulted. Had a VQ scan which showed no presence of a pulmonary embolism. She is on Lasix, which has been reduced due to worsening renal function. Monitoring renal function per primary team.  3. Advanced age/debility: 51-year-old female, lives with her daughter Shade Bridges. Ambulatory short distances, needs assist with functional ADLs. 4. Initial consult note routed to primary continuity provider  5.  Communicated plan of care with: Palliative IDT       GOALS OF CARE / TREATMENT PREFERENCES:   [====Goals of Care====]  GOALS OF CARE: DNR/Do not reintubate    Patient/Health Care Proxy Stated Goals: Prolong life      TREATMENT PREFERENCES:   Code Status: DNR    Advance Care Planning:  Advance Care Planning 3/3/2022   Patient's Healthcare Decision Maker is: -   Confirm Advance Directive Yes, on file   Patient Would Like to Complete Advance Directive No   Does the patient have other document types Do Not Resuscitate       Medical Interventions: Full interventions     Artificially Administered Nutrition: No feeding tube      The palliative care team has discussed with patient / health care proxy about goals of care / treatment preferences for patient.  [====Goals of Care====]         HISTORY:     History obtained from: patient, family, chart    CHIEF COMPLAINT: SBTs, plan for medical extubation when medically appropriate    HPI/SUBJECTIVE:    The patient is:   [] Verbal and participatory  [x] Non-participatory due to:   Orally intubated on mechanical ventilator    Clinical Pain Assessment (nonverbal scale for severity on nonverbal patients):   Clinical Pain Assessment  Severity: 0    Adult Nonverbal Pain Scale  Face: Occasional grimace, tearing, frowning, wrinkled forehead  Activity (Movement): Lying quietly, normal position  Guarding: Lying quietly, no positioning of hands over areas of body  Physiology (Vital Signs): Stable vital signs  Respiratory: Baseline RR/SpO2 compliant with ventilator  Total Score: 1       FUNCTIONAL ASSESSMENT:     Palliative Performance Scale (PPS):  PPS: 30        PSYCHOSOCIAL/SPIRITUAL SCREENING:     Advance Care Planning:  Advance Care Planning 3/3/2022   Patient's Healthcare Decision Maker is: -   Confirm Advance Directive Yes, on file   Patient Would Like to Complete Advance Directive No   Does the patient have other document types Do Not Resuscitate        Any spiritual / Christian concerns:  [] Yes /  [x] No    Caregiver Burnout:  [] Yes /  [] No /  [x] No Caregiver Present      Anticipatory grief assessment:   [x] Normal  / [] Maladaptive            REVIEW OF SYSTEMS:     Positive and pertinent negative findings in ROS are noted above in HPI.   The following systems were [x] reviewed / [] unable to be reviewed as noted in HPI  Other findings are noted below. Systems: constitutional, ears/nose/mouth/throat, respiratory, gastrointestinal, genitourinary, musculoskeletal, integumentary, neurologic, psychiatric, endocrine. Positive findings noted below. Modified ESAS Completed by: provider   Fatigue: 5       Pain: 0   Anxiety: 0 Nausea: 0     Dyspnea: 0     Constipation: No              PHYSICAL EXAM:     From RN flowsheet:  Wt Readings from Last 3 Encounters:   03/15/22 111.1 kg (245 lb)   04/25/21 101.6 kg (224 lb)   10/01/17 125.6 kg (277 lb)     Blood pressure 100/64, pulse 97, temperature 97.9 °F (36.6 °C), resp. rate 13, height 5' 5\" (1.651 m), weight 111.1 kg (245 lb), SpO2 99 %, not currently breastfeeding.     Pain Scale 1: FLACC  Pain Intensity 1: 3     Pain Location 1: Hip  Pain Orientation 1: Right  Pain Description 1: Aching  Pain Intervention(s) 1: Medication (see MAR)      Constitutional: Orally intubated, awake, following commands, nodding to questions  Eyes: pupils equal, anicteric, opens eyes, tracking  ENMT: Orally intubated  Cardiovascular: regular rhythm, distal pulses intact  Respiratory: Orally intubated on mechanical ventilator  Gastrointestinal: soft non-tender   Musculoskeletal: no deformity, no tenderness to palpation  Skin: warm, dry  Neurologic: following commands, on no sedation, will open eyes and move all extremities         HISTORY:     Principal Problem:    Acute respiratory failure with hypoxemia (HCC) (3/2/2022)    Active Problems:    Elevated troponin (4/23/2021)      HTN (hypertension) (4/23/2021)      Acute on chronic diastolic congestive heart failure (HCC) (4/23/2021)      Atrial fibrillation ([address] (3/2/2022)      Stage 3 chronic kidney disease ([address] (3/3/2022)      SERENA (obstructive sleep apnea) (3/3/2022)      Adult BMI 39.0-39.9 kg/sq m (3/3/2022)      Fracture of neck of right femur (HCC) (3/8/2022)      Hypokalemia (3/14/2022)      Past Medical History:   Diagnosis Date  Hypertension     Sleep disorder       Past Surgical History:   Procedure Laterality Date    HX ORTHOPAEDIC      broken right ankle, left femur 30 yrs ago      Family History   Problem Relation Age of Onset    Diabetes Mother     Heart Disease Mother     Heart Disease Father       History reviewed, no pertinent family history. Social History     Tobacco Use    Smoking status: Never Smoker    Smokeless tobacco: Never Used   Substance Use Topics    Alcohol use:  Yes     Alcohol/week: 1.0 standard drink     Types: 1 Glasses of wine per week     Comment: soc     Allergies   Allergen Reactions    Seafood Hives     crabs    Statins-Hmg-Coa Reductase Inhibitors Unknown (comments)    Sulfa (Sulfonamide Antibiotics) Hives      Current Facility-Administered Medications   Medication Dose Route Frequency    acetaZOLAMIDE (DIAMOX) 250 mg in sterile water (preservative free) 2.5 mL injection  250 mg IntraVENous DAILY    arformoteroL (BROVANA) neb solution 15 mcg  15 mcg Nebulization BID RT    albumin human 25% (BUMINATE) solution 25 g  25 g IntraVENous Q6H    cefepime (MAXIPIME) 1 g in sterile water (preservative free) 10 mL IV syringe  1 g IntraVENous Q12H    famotidine (PF) (PEPCID) 20 mg in 0.9% sodium chloride 10 mL injection  20 mg IntraVENous DAILY    dextrose 5 % - 0.45% NaCl infusion  25 mL/hr IntraVENous CONTINUOUS    sodium chloride (NS) flush 5-40 mL  5-40 mL IntraVENous PRN    acetaminophen (TYLENOL) tablet 650 mg  650 mg Oral Q6H    naloxone (NARCAN) injection 0.4 mg  0.4 mg IntraVENous PRN    ferrous sulfate tablet 325 mg  1 Tablet Oral TID    diphenhydrAMINE (BENADRYL) capsule 25 mg  25 mg Oral Q4H PRN    bisacodyL (DULCOLAX) suppository 10 mg  10 mg Rectal DAILY PRN    ELECTROLYTE REPLACEMENT PROTOCOL - Magnesium   1 Each Other PRN    ELECTROLYTE REPLACEMENT PROTOCOL - Calcium   1 Each Other PRN    ELECTROLYTE REPLACEMENT PROTOCOL  - Phosphorus Renal Dosing  1 Each Other PRN    ELECTROLYTE REPLACEMENT PROTOCOL - Potassium Renal Dosing  1 Each Other PRN    midazolam (VERSED) injection 1 mg  1 mg IntraVENous Q2H PRN    fentaNYL citrate (PF) injection 25 mcg  25 mcg IntraVENous Q4H PRN    chlorhexidine (PERIDEX) 0.12 % mouthwash 10 mL  10 mL Oral Q12H    nystatin (MYCOSTATIN) 100,000 unit/mL oral suspension 500,000 Units  500,000 Units Oral QID    apixaban (ELIQUIS) tablet 2.5 mg  2.5 mg Oral BID    sodium chloride (NS) flush 5-40 mL  5-40 mL IntraVENous Q8H    sodium chloride (NS) flush 5-40 mL  5-40 mL IntraVENous PRN    acetaminophen (TYLENOL) tablet 650 mg  650 mg Oral Q6H PRN    Or    acetaminophen (TYLENOL) suppository 650 mg  650 mg Rectal Q6H PRN    polyethylene glycol (MIRALAX) packet 17 g  17 g Oral DAILY PRN    ondansetron (ZOFRAN ODT) tablet 4 mg  4 mg Oral Q8H PRN    Or    ondansetron (ZOFRAN) injection 4 mg  4 mg IntraVENous Q6H PRN    carvediloL (COREG) tablet 3.125 mg  3.125 mg Oral BID WITH MEALS    aspirin delayed-release tablet 81 mg  81 mg Oral DAILY          LAB AND IMAGING FINDINGS:     Lab Results   Component Value Date/Time    WBC 10.1 03/15/2022 04:37 AM    HGB 9.2 (L) 03/15/2022 04:37 AM    PLATELET 493 30/98/2214 04:37 AM     Lab Results   Component Value Date/Time    Sodium 142 03/15/2022 04:37 AM    Potassium 4.8 03/15/2022 04:37 AM    Chloride 104 03/15/2022 04:37 AM    CO2 35 (H) 03/15/2022 04:37 AM    BUN 93 (H) 03/15/2022 04:37 AM    Creatinine 1.89 (H) 03/15/2022 04:37 AM    Calcium 10.8 (H) 03/15/2022 04:37 AM    Magnesium 2.3 03/15/2022 04:37 AM    Phosphorus 3.7 03/07/2022 03:45 AM      Lab Results   Component Value Date/Time    Alk.  phosphatase 133 (H) 03/15/2022 04:37 AM    Protein, total 6.9 03/15/2022 04:37 AM    Albumin 3.5 03/15/2022 04:37 AM    Globulin 3.4 03/15/2022 04:37 AM     Lab Results   Component Value Date/Time    INR 1.2 03/02/2022 08:48 PM    Prothrombin time 14.1 03/02/2022 08:48 PM    aPTT 25.3 03/02/2022 08:48 PM      No results found for: IRON, FE, TIBC, IBCT, PSAT, FERR   No results found for: PH, PCO2, PO2  No components found for: [place]   Lab Results   Component Value Date/Time     04/24/2021 01:53 PM    CK - MB 1.6 04/24/2021 01:53 PM                Total time: 25 minutes  Counseling / coordination time, spent as noted above:   > 50% counseling / coordination?: yes, patient and family, medical team     Prolonged service was provided for  []30 min   []75 min in face to face time in the presence of the patient, spent as noted above. Time Start:   Time End:   Note: this can only be billed with 97524 (initial) or 49965 (follow up). If multiple start / stop times, list each separately.

## 2022-03-15 NOTE — PROGRESS NOTES
Palliative Medicine    CODE STATUS: DNR/DNI; limited interventions; no feeding tube     AMD Status: on file naming her daughter, Elmer Abraham, as her MPOA     3/15/2022  Seen today in room ICU 7 along with Hanna Cisneros NP. Lying in bed. Intubated/ventilated--on SBT. Opens eyes. Plans for extubation today. 36: Ms Babatunde Klein had telephone conversation with Elmer Abraham, daughter (this was on speaker and I was able to listen). Discussed reintubation if necessary. Shy was quite clear that she would want the original preoperative DNR/DNI orders re-instituted after extubation. Orders placed. Intensivist and hospitalist aware of orders. After meeting with patient and her daughter, Elmer Abraham, the goals of care have been defined. Code status remains: DNR/DNI; limited interventions; no feeding tube  AMD status: on file naming Shy as her MPOA Will sign off but remain available for reconsult as needed/if appropriate.      Thank you for the Palliative Medicine consult and allowing us to participate in the care of Xin Burrell RN, MSN  Palliative Medicine     [phone]

## 2022-03-15 NOTE — PROGRESS NOTES
[facility]  (A Division of [address] [place] [address])                                                                                                                      Minerva Nuñez MD  Office #: - Option # [phone]  Fax #: [phone]     Date of Admission: 3/2/2022Date of Note: 3/15/2022  Reason for FU: Antibiotic management of for positive resp/urine cultures requested by Dr Todd Gonzalez. Current Antimicrobials:    Prior Antimicrobials:    Cefepime 3/14 to date   Zithromax 3/2- 3/4  cTX 3/2 to 3/4  CTC 3/7-- X2 dose  Levofloxacin 500 mg X1- 3/13 X 1 dose  Meropenem 500 mg 3/14 X1 dose           Assessment- ID related:  --------------------------------------------------------------------------  Acutely sick and complicated patient in ICU with:    · Enterobacter Clacae complex + resp cutlure 3/10/22  · Acute resp failure- intubated since OR day 3/10/22  --CXR on 3/14-- infiltrate with atelectasis and pleural effusion  · S/P R hip anterior arthroplasty- wound vac in place  · Klebsiella bacteruria 3/2/22  --UA clean and Urine culture +  Kleb: Colonized    Other Medical Issues- Mx per respective team:  · CKD  · Morbidly obese  · Hx of SERENA  · A.fib  · Acute on chronic  CHF     Recommendation for ID issues I am following:  ------------------------------------------------------------------------------  JIE MANZANARES    Cont cefepime 1 gm Q12 hour until March 19  Monitor CBC, VMP                      -> diuresis per respective team. Opacity with layering of pleural  fluid on CXR       Subjective: Intubated, on SBT-- FIO2 35%  Non verbal  Afebrile  Last T-max 100.5-- on 3/13/22  DW RN  No acute issue overnight, expected extubation today    Notes and labs reviewed. Imaging reports reviewed-NL WBC, Procal up to 1.15, BNP elevated to 8306    CXR this am:  Levofloxacin 500 mg X1- 3/13    1.  Support devices in stable/appropriate position as visualized.   2. Mild cardiac enlargement, unchanged.   3. Hazy right lung base opacity, favored a combination of atelectasis and  posteriorly layering pleural effusion, stable to minimally increased. HPI:  Marily Hartley is a 80 y.o. BLACK/ with PMH of congestive heart failure, sleep apnea, right leg weakness, admitted on 3/2 for acute resp failure with hypoxia due to CHF and a.fib with RVR. No fever or leucocytosis. UA done on admission was clean, urine culture grew Klebsiella. She had right hip fracture and was taken to OR on 3/10 for BLAIR. She had a wound vac in place. She didn't extubate after procedure and was transferred to ICU where she is getting care. resp cutture from 3/10-- has Enterobacter that was final on 3/13-- she was given Levofloxacin. Today it was changed to meropenem. DW with RN- little resp secretion, she has low grade fever today, no leucocytosis. She isnot on pressors. Per dtr, she doesn't recall when she got treated with ABX as OP. No prior history of UTI diagnosis.              Active Hospital Problems    Diagnosis Date Noted    Hypokalemia 03/14/2022    Fracture of neck of right femur ([address] 03/08/2022    Stage 3 chronic kidney disease ([address] 03/03/2022    SERENA (obstructive sleep apnea) 03/03/2022    Adult BMI 39.0-39.9 kg/sq m 03/03/2022    Acute respiratory failure with hypoxemia (HCC) 03/02/2022    Atrial fibrillation (HCC) 03/02/2022    Acute on chronic diastolic congestive heart failure ([address] 04/23/2021    Elevated troponin 04/23/2021    HTN (hypertension) 04/23/2021     Past Medical History:   Diagnosis Date    Hypertension     Sleep disorder      Past Surgical History:   Procedure Laterality Date    HX ORTHOPAEDIC      broken right ankle, left femur 30 yrs ago     Family History   Problem Relation Age of Onset    Diabetes Mother     Heart Disease Mother     Heart Disease Father      Social History     Socioeconomic History    Marital status: SINGLE     Spouse name: Not on file    Number of children: Not on file    Years of education: Not on file    Highest education level: Not on file   Occupational History    Not on file   Tobacco Use    Smoking status: Never Smoker    Smokeless tobacco: Never Used   Vaping Use    Vaping Use: Never used   Substance and Sexual Activity    Alcohol use: Yes     Alcohol/week: 1.0 standard drink     Types: 1 Glasses of wine per week     Comment: soc    Drug use: No    Sexual activity: Not on file   Other Topics Concern     Service Not Asked    Blood Transfusions Not Asked    Caffeine Concern Not Asked    Occupational Exposure Not Asked    Hobby Hazards Not Asked    Sleep Concern Not Asked    Stress Concern Not Asked    Weight Concern Not Asked    Special Diet Not Asked    Back Care Not Asked    Exercise Not Asked    Bike Helmet Not Asked   [address] Not Asked    Self-Exams Not Asked   Social History Narrative    Not on file     Social Determinants of Health     Financial Resource Strain:     Difficulty of Paying Living Expenses: Not on file   Food Insecurity:     Worried About Running Out of Food in the Last Year: Not on file    Aquiles of Food in the Last Year: Not on file   Transportation Needs:     Lack of Transportation (Medical): Not on file    Lack of Transportation (Non-Medical):  Not on file   Physical Activity:     Days of Exercise per Week: Not on file    Minutes of Exercise per Session: Not on file   Stress:     Feeling of Stress : Not on file   Social Connections:     Frequency of Communication with Friends and Family: Not on file    Frequency of Social Gatherings with Friends and Family: Not on file    Attends Adventism Services: Not on file    Active Member of Clubs or Organizations: Not on file    Attends Club or Organization Meetings: Not on file    Marital Status: Not on file   Intimate Partner Violence:     Fear of Current or Ex-Partner: Not on file    Emotionally Abused: Not on file   Mckenna Coleman Physically Abused: Not on file    Sexually Abused: Not on file   Housing Stability:     Unable to Pay for Housing in the Last Year: Not on file    Number of Places Lived in the Last Year: Not on file    Unstable Housing in the Last Year: Not on file       Allergies:  Seafood, Statins-hmg-coa reductase inhibitors, and Sulfa (sulfonamide antibiotics)     Medications:  Current Facility-Administered Medications   Medication Dose Route Frequency    acetaZOLAMIDE (DIAMOX) 250 mg in sterile water (preservative free) 2.5 mL injection  250 mg IntraVENous DAILY    arformoteroL (BROVANA) neb solution 15 mcg  15 mcg Nebulization BID RT    albumin human 25% (BUMINATE) solution 25 g  25 g IntraVENous Q6H    cefepime (MAXIPIME) 1 g in sterile water (preservative free) 10 mL IV syringe  1 g IntraVENous Q12H    famotidine (PF) (PEPCID) 20 mg in 0.9% sodium chloride 10 mL injection  20 mg IntraVENous DAILY    dextrose 5 % - 0.45% NaCl infusion  25 mL/hr IntraVENous CONTINUOUS    sodium chloride (NS) flush 5-40 mL  5-40 mL IntraVENous PRN    acetaminophen (TYLENOL) tablet 650 mg  650 mg Oral Q6H    naloxone (NARCAN) injection 0.4 mg  0.4 mg IntraVENous PRN    ferrous sulfate tablet 325 mg  1 Tablet Oral TID    diphenhydrAMINE (BENADRYL) capsule 25 mg  25 mg Oral Q4H PRN    bisacodyL (DULCOLAX) suppository 10 mg  10 mg Rectal DAILY PRN    ELECTROLYTE REPLACEMENT PROTOCOL - Magnesium   1 Each Other PRN    ELECTROLYTE REPLACEMENT PROTOCOL - Calcium   1 Each Other PRN    ELECTROLYTE REPLACEMENT PROTOCOL  - Phosphorus Renal Dosing  1 Each Other PRN    ELECTROLYTE REPLACEMENT PROTOCOL - Potassium Renal Dosing  1 Each Other PRN    midazolam (VERSED) injection 1 mg  1 mg IntraVENous Q2H PRN    fentaNYL citrate (PF) injection 25 mcg  25 mcg IntraVENous Q4H PRN    chlorhexidine (PERIDEX) 0.12 % mouthwash 10 mL  10 mL Oral Q12H    nystatin (MYCOSTATIN) 100,000 unit/mL oral suspension 500,000 Units  500,000 Units Oral QID  apixaban (ELIQUIS) tablet 2.5 mg  2.5 mg Oral BID    sodium chloride (NS) flush 5-40 mL  5-40 mL IntraVENous Q8H    sodium chloride (NS) flush 5-40 mL  5-40 mL IntraVENous PRN    acetaminophen (TYLENOL) tablet 650 mg  650 mg Oral Q6H PRN    Or    acetaminophen (TYLENOL) suppository 650 mg  650 mg Rectal Q6H PRN    polyethylene glycol (MIRALAX) packet 17 g  17 g Oral DAILY PRN    ondansetron (ZOFRAN ODT) tablet 4 mg  4 mg Oral Q8H PRN    Or    ondansetron (ZOFRAN) injection 4 mg  4 mg IntraVENous Q6H PRN    carvediloL (COREG) tablet 3.125 mg  3.125 mg Oral BID WITH MEALS    aspirin delayed-release tablet 81 mg  81 mg Oral DAILY        ROS:  Review of systems not obtained due to patient factors. Physical Exam:    Temp (24hrs), Av.1 °F (36.7 °C), Min:97.4 °F (36.3 °C), Max:98.8 °F (37.1 °C)    Visit Vitals  /64   Pulse 97   Temp 97.9 °F (36.6 °C)   Resp 13   Ht 5' 5\" (1.651 m)   Wt 111.1 kg (245 lb)   SpO2 99%   Breastfeeding No   BMI 40.77 kg/m²        GEN: WD obese, Intubated, oral tube in place for feeding. HEENT: Unicteric. EOMI intact  CHEST: Non laboured breathing. CTA  CVS:RRR, no mur/gallop  ABD: Obese/soft. Non tender. Non distended abd  KAREN:box in place  EXT:--right groin vac in place  Skin: Dry and intact. No rash, no redness.   CNS:Intubated       Microbiology  All Micro Results     Procedure Component Value Units Date/Time    CULTURE, BLOOD [[id number]] Collected: 22    Order Status: Completed Specimen: Blood Updated: 03/15/22 0707     Special Requests: NO SPECIAL REQUESTS        Culture result: NO GROWTH 1 DAY       CULTURE, BLOOD [[id number]] Collected: 22    Order Status: Completed Specimen: Blood Updated: 03/15/22 0707     Special Requests: NO SPECIAL REQUESTS        Culture result: NO GROWTH 1 DAY       CULTURE, RESPIRATORY/SP[facility] [[id number]]  (Abnormal)  (Susceptibility) Collected: 03/10/22 1630    Order Status: Completed Specimen: Sputum from Endotracheal aspirate Updated: 03/13/22 0905     Special Requests: NO SPECIAL REQUESTS        GRAM STAIN NO WBC'S SEEN         NO EPITHELIAL CELLS SEEN         NO ORGANISMS SEEN        Culture result:       LIGHT ENTEROBACTER CLOACAE COMPLEX                  HEAVY NORMAL RESPIRATORY ELSIE          CULTURE, RESPIRATORY/SPUTUM/BRONCH W Douglas Abrams [[id number]] Collected: 03/10/22 1915    Order Status: Canceled Specimen: Sputum from Endotracheal aspirate     CULTURE, BLOOD [[id number]] Collected: 03/02/22 2048    Order Status: Completed Specimen: Blood Updated: 03/08/22 0655     Special Requests: NO SPECIAL REQUESTS        Culture result: NO GROWTH 6 DAYS       CULTURE, BLOOD [[id number]] Collected: 03/02/22 2048    Order Status: Completed Specimen: Blood Updated: 03/08/22 0655     Special Requests: NO SPECIAL REQUESTS        Culture result: NO GROWTH 6 DAYS       CULTURE, URINE [[id number]]  (Abnormal)  (Susceptibility) Collected: 03/02/22 2104    Order Status: Completed Specimen: Cath Urine Updated: 03/05/22 1226     Special Requests: NO SPECIAL REQUESTS        [place] Count --        >100,000  COLONIES/mL       Culture result: KLEBSIELLA PNEUMONIAE       COVID-19 RAPID TEST [[id number]] Collected: 03/02/22 2025    Order Status: Completed Specimen: Nasopharyngeal Updated: 03/02/22 2052     Specimen source Nasopharyngeal        COVID-19 rapid test Not detected        Comment: Rapid Abbott ID Now       Rapid NAAT:  The specimen is NEGATIVE for SARS-CoV-2, the novel coronavirus associated with COVID-19. Negative results should be treated as presumptive and, if inconsistent with clinical signs and symptoms or necessary for patient management, should be tested with an alternative molecular assay. Negative results do not preclude SARS-CoV-2 infection and should not be used as the sole basis for patient management decisions.        This test has been authorized by the FDA under an Emergency Use Authorization (EUA) for use by authorized laboratories. Fact sheet for Healthcare Providers: ConventionUpdate.co.nz  Fact sheet for Patients: ConventionUpdate.co.nz       Methodology: Isothermal Nucleic Acid Amplification         INFLUENZA A & B AG (RAPID TEST) [[id number]] Collected: 03/02/22 2025    Order Status: Completed Specimen: Nasopharyngeal from Nasal washing Updated: 03/02/22 2047     Influenza A Antigen Negative        Comment: A negative result does not exclude influenza virus infection, seasonal or H1N1 due to suboptimal sensitivity. If influenza is circulating in your community, a diagnosis of influenza should be considered based on a patients clinical presentation and empiric antiviral treatment should be considered, if indicated. Influenza B Antigen Negative              Lab results:    Chemistry  Recent Labs     03/15/22  0437 03/14/22  1015 03/14/22 0420 03/13/22 0430 03/13/22 0430   *  --  133*  --  137*     --  145  --  144   K 4.8 3.3* 3.3*   < > 3.7     --  105  --  103   CO2 35*  --  35*  --  37*   BUN 93*  --  96*  --  98*   CREA 1.89*  --  1.97*  --  2.17*   CA 10.8*  --  9.7  --  10.4*   AGAP 3  --  5  --  4   BUCR 49*  --  49*  --  45*   *  --  140*  --  128*   TP 6.9  --  5.4*  --  6.0*   ALB 3.5  --  2.7*  --  3.3*   GLOB 3.4  --  2.7  --  2.7   AGRAT 1.0  --  1.0  --  1.2    < > = values in this interval not displayed. CBC w/ Diff  Recent Labs     03/15/22  0437 03/14/22  0420 03/13/22  0430   WBC 10.1 9.1 8.8   RBC 2.91* 2.67* 2.96*   HGB 9.2* 8.4* 9.2*   HCT 29.2* 27.0* 28.7*    173 178   GRANS 77* 73 75*   LYMPH 10* 12* 12*   EOS 1 1 1       Imaging: report reviewed and as posted by radiologist       1. Support devices in stable/appropriate position as visualized. 2. Mild cardiac enlargement, unchanged.   3. Hazy right lung base opacity, favored a combination of atelectasis and  posteriorly layering pleural effusion.

## 2022-03-15 NOTE — PROGRESS NOTES
Physician Progress Note      PATIENT:               Roge Rios  CSN #:                  [id number]  :                       1940  ADMIT DATE:       3/2/2022 8:02 PM  DISCH DATE:  RESPONDING  PROVIDER #:        Talha ZIMMERMAN PA-C          QUERY TEXT:    Pt admitted with  SOB / CHF. Pt noted to have Femoral Neck Fracture  . Documented in H&P [address] progress note patient states \" that the last few days she is not been able to move or lift her right leg\". Cardiology documents on 3/3 progress note , not on any anticoagulation due to recurrent fall,   If possible, please document in progress notes and discharge summary the present on admission status of femur fracture : The medical record reflects the following:  Risk Factors: 79yo female with recurrent falls  Clinical Indicators:H&P: she notes that the last few days she has not been able to move or lift her right leg. Patient lives with her daughter and has had decreased mobility over the last few months. 3/7 PT note:  Bruising noted on right LE just below the knee joint and patient's daughter thinks she hit her leg when she fell at home prior to admission. Initiated supine to sit transfer however patient began moaning when right LE was moved during attempted transfer    3/7 Hospitalist: Recent fall  Check xray of hip  Xray results: Displaced and impacted subcapital right femoral neck fracture, increased in displacement from CT performed 4 days prior    3/7 Ortho: Displaced right subcaptial fracture    3/10/22 OR note:  INDICATIONS:  A 80 y.o. BLACK/ female with a displaced femoral neck fracture documented by clinical exam and x-ray.   The patient fell recently and was admitted to the hospital for stabilization and hip replacement for fracture    Treatment: R THR, PT/OT    Thank You  Waldemar Price RN CDI CRCR  Options provided:  -- Yes,  Right hip fracture  was likely present at the time of the order to admit to the hospital  -- No, Right hip fracture  was not present on admission and developed during the inpatient stay  -- Clinically undetermined if R femur fracture was present on admission  -- Other - I will add my own diagnosis  -- Disagree - Not applicable / Not valid  -- Disagree - Clinically unable to determine / Unknown  -- Refer to Clinical Documentation Reviewer    PROVIDER RESPONSE TEXT:    Yes,Right hip fracture was likely present at the time of the order to admit to the hospital.    Query created by: Chino Jimenez on 3/14/2022 9:36 AM      Electronically signed by:  Brii Pierson PA-C 3/15/2022 12:53 PM

## 2022-03-16 ENCOUNTER — APPOINTMENT (OUTPATIENT)
Dept: GENERAL RADIOLOGY | Age: 82
DRG: 981 | End: 2022-03-16
Attending: INTERNAL MEDICINE
Payer: MEDICARE

## 2022-03-16 ENCOUNTER — APPOINTMENT (OUTPATIENT)
Dept: CT IMAGING | Age: 82
DRG: 981 | End: 2022-03-16
Attending: INTERNAL MEDICINE
Payer: MEDICARE

## 2022-03-16 LAB
ALBUMIN SERPL-MCNC: 3.7 G/DL (ref 3.4–5)
ALBUMIN/GLOB SERPL: 1.1 {RATIO} (ref 0.8–1.7)
ALP SERPL-CCNC: 190 U/L (ref 45–117)
ALT SERPL-CCNC: 64 U/L (ref 13–56)
AMMONIA PLAS-SCNC: 20 UMOL/L (ref 11–32)
ANION GAP SERPL CALC-SCNC: 7 MMOL/L (ref 3–18)
ARTERIAL PATENCY WRIST A: ABNORMAL
ARTERIAL PATENCY WRIST A: POSITIVE
AST SERPL-CCNC: 77 U/L (ref 10–38)
BASE EXCESS BLD CALC-SCNC: 5.4 MMOL/L
BASE EXCESS BLD CALC-SCNC: 7 MMOL/L
BDY SITE: ABNORMAL
BDY SITE: ABNORMAL
BILIRUB SERPL-MCNC: 1.6 MG/DL (ref 0.2–1)
BNP SERPL-MCNC: ABNORMAL PG/ML (ref 0–1800)
BODY TEMPERATURE: 97.2
BUN SERPL-MCNC: 91 MG/DL (ref 7–18)
BUN/CREAT SERPL: 51 (ref 12–20)
CALCIUM SERPL-MCNC: 10.9 MG/DL (ref 8.5–10.1)
CHLORIDE SERPL-SCNC: 104 MMOL/L (ref 100–111)
CO2 SERPL-SCNC: 31 MMOL/L (ref 21–32)
CREAT SERPL-MCNC: 1.78 MG/DL (ref 0.6–1.3)
ERYTHROCYTE [DISTWIDTH] IN BLOOD BY AUTOMATED COUNT: 14.7 % (ref 11.6–14.5)
GAS FLOW.O2 O2 DELIVERY SYS: ABNORMAL L/MIN
GAS FLOW.O2 O2 DELIVERY SYS: ABNORMAL L/MIN
GAS FLOW.O2 SETTING OXYMISER: 12 BPM
GLOBULIN SER CALC-MCNC: 3.3 G/DL (ref 2–4)
GLUCOSE BLD STRIP.AUTO-MCNC: 135 MG/DL (ref 70–110)
GLUCOSE BLD STRIP.AUTO-MCNC: 136 MG/DL (ref 70–110)
GLUCOSE BLD STRIP.AUTO-MCNC: 145 MG/DL (ref 70–110)
GLUCOSE BLD STRIP.AUTO-MCNC: 148 MG/DL (ref 70–110)
GLUCOSE BLD STRIP.AUTO-MCNC: 148 MG/DL (ref 70–110)
GLUCOSE BLD STRIP.AUTO-MCNC: 165 MG/DL (ref 70–110)
GLUCOSE BLD STRIP.AUTO-MCNC: 183 MG/DL (ref 70–110)
GLUCOSE BLD STRIP.AUTO-MCNC: 219 MG/DL (ref 70–110)
GLUCOSE BLD STRIP.AUTO-MCNC: 47 MG/DL (ref 70–110)
GLUCOSE BLD STRIP.AUTO-MCNC: 51 MG/DL (ref 70–110)
GLUCOSE BLD STRIP.AUTO-MCNC: 67 MG/DL (ref 70–110)
GLUCOSE SERPL-MCNC: 133 MG/DL (ref 74–99)
HCO3 BLD-SCNC: 31.7 MMOL/L (ref 22–26)
HCO3 BLD-SCNC: 33.2 MMOL/L (ref 22–26)
HCT VFR BLD AUTO: 29 % (ref 35–45)
HGB BLD-MCNC: 9 G/DL (ref 12–16)
INSPIRATION.DURATION SETTING TIME VENT: 1 SEC
LACTATE BLD-SCNC: 0.74 MMOL/L (ref 0.4–2)
MAGNESIUM SERPL-MCNC: 2.4 MG/DL (ref 1.6–2.6)
MCH RBC QN AUTO: 29.9 PG (ref 24–34)
MCHC RBC AUTO-ENTMCNC: 31 G/DL (ref 31–37)
MCV RBC AUTO: 96.3 FL (ref 78–100)
NRBC # BLD: 0 K/UL (ref 0–0.01)
NRBC BLD-RTO: 0 PER 100 WBC
O2/TOTAL GAS SETTING VFR VENT: 32 %
O2/TOTAL GAS SETTING VFR VENT: 40 %
PCO2 BLD: 53.5 MMHG (ref 35–45)
PCO2 BLD: 55.3 MMHG (ref 35–45)
PEEP RESPIRATORY: 6 CMH2O
PH BLD: 7.38 [PH] (ref 7.35–7.45)
PH BLD: 7.39 [PH] (ref 7.35–7.45)
PIP ISTAT,IPIP: 16
PLATELET # BLD AUTO: 221 K/UL (ref 135–420)
PMV BLD AUTO: 11.4 FL (ref 9.2–11.8)
PO2 BLD: 123 MMHG (ref 80–100)
PO2 BLD: 97 MMHG (ref 80–100)
POTASSIUM SERPL-SCNC: 3.5 MMOL/L (ref 3.5–5.5)
PRESSURE SUPPORT SETTING VENT: 10 CMH2O
PROCALCITONIN SERPL-MCNC: 2.02 NG/ML
PROT SERPL-MCNC: 7 G/DL (ref 6.4–8.2)
RBC # BLD AUTO: 3.01 M/UL (ref 4.2–5.3)
SAO2 % BLD: 97.5 % (ref 92–97)
SAO2 % BLD: 98.6 % (ref 92–97)
SERVICE CMNT-IMP: ABNORMAL
SERVICE CMNT-IMP: ABNORMAL
SODIUM SERPL-SCNC: 142 MMOL/L (ref 136–145)
SPECIMEN TYPE: ABNORMAL
SPECIMEN TYPE: ABNORMAL
TOTAL RESP. RATE, ITRR: 21
TSH SERPL DL<=0.05 MIU/L-ACNC: 0.38 UIU/ML (ref 0.36–3.74)
WBC # BLD AUTO: 11.1 K/UL (ref 4.6–13.2)

## 2022-03-16 PROCEDURE — 74011250636 HC RX REV CODE- 250/636: Performed by: INTERNAL MEDICINE

## 2022-03-16 PROCEDURE — 82803 BLOOD GASES ANY COMBINATION: CPT

## 2022-03-16 PROCEDURE — 83880 ASSAY OF NATRIURETIC PEPTIDE: CPT

## 2022-03-16 PROCEDURE — 74011000250 HC RX REV CODE- 250: Performed by: INTERNAL MEDICINE

## 2022-03-16 PROCEDURE — 74011250637 HC RX REV CODE- 250/637: Performed by: INTERNAL MEDICINE

## 2022-03-16 PROCEDURE — 83605 ASSAY OF LACTIC ACID: CPT

## 2022-03-16 PROCEDURE — 82140 ASSAY OF AMMONIA: CPT

## 2022-03-16 PROCEDURE — 85027 COMPLETE CBC AUTOMATED: CPT

## 2022-03-16 PROCEDURE — 74011250637 HC RX REV CODE- 250/637: Performed by: PHYSICIAN ASSISTANT

## 2022-03-16 PROCEDURE — 36600 WITHDRAWAL OF ARTERIAL BLOOD: CPT

## 2022-03-16 PROCEDURE — 74011000250 HC RX REV CODE- 250

## 2022-03-16 PROCEDURE — 74011000250 HC RX REV CODE- 250: Performed by: FAMILY MEDICINE

## 2022-03-16 PROCEDURE — 77010033678 HC OXYGEN DAILY

## 2022-03-16 PROCEDURE — 84443 ASSAY THYROID STIM HORMONE: CPT

## 2022-03-16 PROCEDURE — 97530 THERAPEUTIC ACTIVITIES: CPT

## 2022-03-16 PROCEDURE — 65610000006 HC RM INTENSIVE CARE

## 2022-03-16 PROCEDURE — 71045 X-RAY EXAM CHEST 1 VIEW: CPT

## 2022-03-16 PROCEDURE — 97167 OT EVAL HIGH COMPLEX 60 MIN: CPT

## 2022-03-16 PROCEDURE — 84145 PROCALCITONIN (PCT): CPT

## 2022-03-16 PROCEDURE — 94660 CPAP INITIATION&MGMT: CPT

## 2022-03-16 PROCEDURE — 97110 THERAPEUTIC EXERCISES: CPT

## 2022-03-16 PROCEDURE — 83735 ASSAY OF MAGNESIUM: CPT

## 2022-03-16 PROCEDURE — 80053 COMPREHEN METABOLIC PANEL: CPT

## 2022-03-16 PROCEDURE — 74011250636 HC RX REV CODE- 250/636: Performed by: FAMILY MEDICINE

## 2022-03-16 PROCEDURE — 36415 COLL VENOUS BLD VENIPUNCTURE: CPT

## 2022-03-16 PROCEDURE — 70450 CT HEAD/BRAIN W/O DYE: CPT

## 2022-03-16 PROCEDURE — 94640 AIRWAY INHALATION TREATMENT: CPT

## 2022-03-16 PROCEDURE — 74011000258 HC RX REV CODE- 258: Performed by: INTERNAL MEDICINE

## 2022-03-16 PROCEDURE — 82962 GLUCOSE BLOOD TEST: CPT

## 2022-03-16 RX ORDER — DEXTROSE, SODIUM CHLORIDE, SODIUM LACTATE, POTASSIUM CHLORIDE, AND CALCIUM CHLORIDE 5; .6; .31; .03; .02 G/100ML; G/100ML; G/100ML; G/100ML; G/100ML
50 INJECTION, SOLUTION INTRAVENOUS CONTINUOUS
Status: DISCONTINUED | OUTPATIENT
Start: 2022-03-16 | End: 2022-03-16

## 2022-03-16 RX ORDER — MIDODRINE HYDROCHLORIDE 2.5 MG/1
5 TABLET ORAL 2 TIMES DAILY WITH MEALS
Status: DISCONTINUED | OUTPATIENT
Start: 2022-03-16 | End: 2022-03-16

## 2022-03-16 RX ORDER — DEXTROSE MONOHYDRATE 100 MG/ML
75 INJECTION, SOLUTION INTRAVENOUS CONTINUOUS
Status: DISCONTINUED | OUTPATIENT
Start: 2022-03-16 | End: 2022-03-16

## 2022-03-16 RX ORDER — MIDODRINE HYDROCHLORIDE 2.5 MG/1
5 TABLET ORAL
Status: DISCONTINUED | OUTPATIENT
Start: 2022-03-17 | End: 2022-03-19

## 2022-03-16 RX ORDER — FUROSEMIDE 10 MG/ML
20 INJECTION INTRAMUSCULAR; INTRAVENOUS 2 TIMES DAILY
Status: DISCONTINUED | OUTPATIENT
Start: 2022-03-16 | End: 2022-03-24 | Stop reason: HOSPADM

## 2022-03-16 RX ORDER — VANCOMYCIN 2 GRAM/500 ML IN 0.9 % SODIUM CHLORIDE INTRAVENOUS
2000 ONCE
Status: COMPLETED | OUTPATIENT
Start: 2022-03-16 | End: 2022-03-16

## 2022-03-16 RX ADMIN — SODIUM CHLORIDE, PRESERVATIVE FREE 20 MG: 5 INJECTION INTRAVENOUS at 10:39

## 2022-03-16 RX ADMIN — ARFORMOTEROL TARTRATE 15 MCG: 15 SOLUTION RESPIRATORY (INHALATION) at 07:16

## 2022-03-16 RX ADMIN — SODIUM CHLORIDE, PRESERVATIVE FREE 10 ML: 5 INJECTION INTRAVENOUS at 21:35

## 2022-03-16 RX ADMIN — MEROPENEM 1 G: 1 INJECTION, POWDER, FOR SOLUTION INTRAVENOUS at 20:52

## 2022-03-16 RX ADMIN — ACETAMINOPHEN 650 MG: 325 TABLET ORAL at 18:01

## 2022-03-16 RX ADMIN — FUROSEMIDE 20 MG: 10 INJECTION, SOLUTION INTRAMUSCULAR; INTRAVENOUS at 10:40

## 2022-03-16 RX ADMIN — SODIUM CHLORIDE, PRESERVATIVE FREE 10 ML: 5 INJECTION INTRAVENOUS at 13:06

## 2022-03-16 RX ADMIN — NYSTATIN 500000 UNITS: 100000 SUSPENSION ORAL at 18:03

## 2022-03-16 RX ADMIN — SODIUM CHLORIDE, SODIUM LACTATE, POTASSIUM CHLORIDE, CALCIUM CHLORIDE, AND DEXTROSE MONOHYDRATE 50 ML/HR: 600; 310; 30; 20; 5 INJECTION, SOLUTION INTRAVENOUS at 00:19

## 2022-03-16 RX ADMIN — FERROUS SULFATE TAB 325 MG (65 MG ELEMENTAL FE) 325 MG: 325 (65 FE) TAB at 10:40

## 2022-03-16 RX ADMIN — DEXTROSE MONOHYDRATE 75 ML/HR: 100 INJECTION, SOLUTION INTRAVENOUS at 03:01

## 2022-03-16 RX ADMIN — ACETAMINOPHEN 650 MG: 325 TABLET ORAL at 12:43

## 2022-03-16 RX ADMIN — DEXTROSE MONOHYDRATE 250 ML: 10 INJECTION, SOLUTION INTRAVENOUS at 01:59

## 2022-03-16 RX ADMIN — ASPIRIN 81 MG: 81 TABLET, COATED ORAL at 10:40

## 2022-03-16 RX ADMIN — FERROUS SULFATE TAB 325 MG (65 MG ELEMENTAL FE) 325 MG: 325 (65 FE) TAB at 15:40

## 2022-03-16 RX ADMIN — APIXABAN 2.5 MG: 2.5 TABLET, FILM COATED ORAL at 10:40

## 2022-03-16 RX ADMIN — WATER 1 G: 1 INJECTION INTRAMUSCULAR; INTRAVENOUS; SUBCUTANEOUS at 18:02

## 2022-03-16 RX ADMIN — MIDODRINE HYDROCHLORIDE 5 MG: 2.5 TABLET ORAL at 17:15

## 2022-03-16 RX ADMIN — NYSTATIN 500000 UNITS: 100000 SUSPENSION ORAL at 12:43

## 2022-03-16 RX ADMIN — VANCOMYCIN HYDROCHLORIDE 2000 MG: 10 INJECTION, POWDER, LYOPHILIZED, FOR SOLUTION INTRAVENOUS at 21:34

## 2022-03-16 RX ADMIN — NYSTATIN 500000 UNITS: 100000 SUSPENSION ORAL at 10:40

## 2022-03-16 RX ADMIN — ARFORMOTEROL TARTRATE 15 MCG: 15 SOLUTION RESPIRATORY (INHALATION) at 19:43

## 2022-03-16 RX ADMIN — PHENYLEPHRINE HYDROCHLORIDE 30 MCG/MIN: 10 INJECTION INTRAVENOUS at 19:21

## 2022-03-16 NOTE — PROGRESS NOTES
Progress Note      Patient: Jeane Quintanilla               Sex: female          DOA: 3/2/2022     YOB: 1940      Age:  80 y.o.        LOS:  LOS: 14 days     Status Post: Procedure(s):  RIGHT TOTAL HIP ARTHROPLASTY WITH C-ARM  (PT IN ROOM # 882)  Surgery Date: 3/10/2022            Subjective:     Jeane Quintanilla is a 80 y.o. female S/P RTHA 3/11/22. Patient is extubated now. She is drowsy as she is just waking up. She is responsive. She is intact to light touch sensation throughout. Objective:      Visit Vitals  /66   Pulse 82   Temp 98.2 °F (36.8 °C)   Resp 18   Ht 5' 5\" (1.651 m)   Wt 111.1 kg (245 lb)   SpO2 100%   Breastfeeding No   BMI 40.77 kg/m²       Physical Exam:   Dressing: Prevena suction dressing removed today. Prenio skin closure did come off with the removal of dresssing. Slight bleeding to the midportion of the incision. The incision is healed well. This appears to be some skin sloughing from the removal of the dressing. There is no evidence of infection. Neuro exam unable to be performed do to patient being sedated. She does have edema,  +1 Posterior Tibial Pulse  Swelling around the soft tissues of the hip is expected. Xray - Looks good from yesterday. Intake and Output:  Current Shift:  No intake/output data recorded. Last three shifts:  03/14 1901 - 03/16 0700  In: 1852.9 [I.V.:1852.9]  Out: 3350 [Urine:3350]  Voiding Status:  Catheter in place.       Lab/Data Reviewed:  Recent Labs     03/16/22  0435   HGB 9.0*   HCT 29.0*      K 3.5   BUN 91*   CREA 1.78*   *         Medications Reviewed    Assessment/Plan     Principal Problem:    Acute respiratory failure with hypoxemia (HCC) (3/2/2022)    Active Problems:    Elevated troponin (4/23/2021)      HTN (hypertension) (4/23/2021)      Acute on chronic diastolic congestive heart failure (HCC) (4/23/2021)      Atrial fibrillation ([facility] (3/2/2022)      Stage 3 chronic kidney disease ([facility] (3/3/2022)      SERENA (obstructive sleep apnea) (3/3/2022)      Adult BMI 39.0-39.9 kg/sq m (3/3/2022)      Fracture of neck of right femur ([address] (3/8/2022)      Hypokalemia (3/14/2022)        · Discharge Planning as per primary team.  · Patient may continue Eliquis. · Plan is for PT WBAT when medically stable. · Maintain the dressing at all times. May do Mepilex or Optifoam dressing changes as needed  · Patient remains orthopedically stable. · Patient to follow-up in the office in 1 week with Dr. He Sena if medically stable. · Patient is orthopedically stable and we are able to sign off. Please feel free to call if any issues. The plan was discussed with Dr. He Sena today as well and he is in agreement with above.

## 2022-03-16 NOTE — PROGRESS NOTES
Hospitalist Progress Note-critical care note     Patient: Chen Wild MRN: [id number]  CSN: [id number]    YOB: 1940  Age: 80 y.o. Sex: female    DOA: 3/2/2022 LOS:  LOS: 14 days            Chief complaint: hip fracture m ckd3, afib chf , acute respiratory failure     Assessment/Plan         Hospital Problems  Date Reviewed: 3/9/2022          Codes Class Noted POA    Hypokalemia ICD-10-CM: E87.6  ICD-9-CM: 276.8  3/14/2022 Unknown        Fracture of neck of right femur ([facility] ICD-10-CM: S72.001A  ICD-9-CM: 820.8  3/8/2022 Unknown        Stage 3 chronic kidney disease ([facility] ICD-10-CM: N18.30  ICD-9-CM: 585.3  3/3/2022 Unknown        SERENA (obstructive sleep apnea) ICD-10-CM: G47.33  ICD-9-CM: 327.23  3/3/2022 Unknown        Adult BMI 39.0-39.9 kg/sq m ICD-10-CM: Z68.39  ICD-9-CM: V85.39  3/3/2022 Unknown        * (Principal) Acute respiratory failure with hypoxemia (HCC) ICD-10-CM: J96.01  ICD-9-CM: 518.81  3/2/2022 Unknown        Atrial fibrillation (HCC) ICD-10-CM: I48.91  ICD-9-CM: 427.31  3/2/2022 Unknown        Elevated troponin ICD-10-CM: R77.8  ICD-9-CM: 790.6  4/23/2021 Yes        HTN (hypertension) ICD-10-CM: I10  ICD-9-CM: 401.9  4/23/2021 Yes        Acute on chronic diastolic congestive heart failure (HCC) ICD-10-CM: I50.33  ICD-9-CM: 428.33, 428.0  4/23/2021 Yes             pt was admitted for afib and chf, found rt hip fracture. She has serena on cpap at night, she received rt hip replacement, she was noted decreased tide volume and hypoxia while extubation post procedure, intubated with oxygen 65%, peep 6.  Case discussed with Dr. Miguel Alvares and Dr. Sherita Prince         Acute on chronic respiratory failure with hypoxia and hypercapnia   Planning Extubate on 3/15, now on bipap at night and nc oxygen at daytime   V/q scan :Perfusion images show no segmental perfusion defects to suggest the presence of  pulmonary embolism, pvl negative for dvt  on 3/3  Enterobacter Clacae complex from resp cutlure 3/10/22: id on board and on cefepime till 19th     Pulmonary hypertension   Echo on 3/3 pulmonary arterial systolic pressure is 51 mmhg  LORRAINE is deferred         Congestive heart failure , acute on chronic diastolic ,  Pro-bnp 14H -on lasix   Echo done Mitral stenosis,  Pulmonary hypertension found to be moderate-LORRAINE is deferred  Dr. Elfego Guerrier and On-hold  Coreg due to hypotension      htn meds hold due to hypotension   afib new   On eliquis     Elevated trop   No acs cardiologist on board        UTI  Urine pos for klebsiella  Completed IV Rocephin     Hip fracture:  Right press fit direct anterior total hip arthroplasty   Continue post surgery care          ckd3 -cr 1.89 mild improving   Continue watch for renal function ,  Renal f/u       Anemia   Continue monitoring h/h     Hypokalemia resolved   icu electrolytes replacement protocol     Hypoglycemia   Received d10, resolved now, need speech evaluation   Npo now   Subjective : OK   Pt is alert, but looks lethargic , bp still low side , received albumin for low bp     Prognosis is poor, palliative care f/u , dnr/ I     Palliative care f/u . dnr /I   Disposition :tbd,   Review of systems:  rom limited due to lerthargic   Vital signs/Intake and Output:  Visit Vitals  /66   Pulse 82   Temp 98 °F (36.7 °C)   Resp 18   Ht 5' 5\" (1.651 m)   Wt 113.5 kg (250 lb 3.6 oz)   SpO2 100%   Breastfeeding No   BMI 41.64 kg/m²     Current Shift:  03/16 0701 - 03/16 1900  In: 418.8 [I.V.:418.8]  Out: 600 [Urine:600]  Last three shifts:  03/14 1901 - 03/16 0700  In: 1852.9 [I.V.:1852.9]  Out: 3350 [Urine:3350]    Physical Exam:  General: Alert, no acute distress   HEENT: NC, Atraumatic. anicteric sclerae. Lungs: CTA Bilaterally. No Wheezing/Rhonchi/Rales. Heart:  Regular  rhythm,  No murmur, No Rubs, No Gallops  Abdomen: Soft, Non distended, Non tender.   +Bowel sounds,   Extremities: No c/c, rt hip surgical site covered with gauze   Psych:   Not anxious or agitated. Neurologic:  Alert and follow the command , but lethargic           Labs: Results:       Chemistry Recent Labs     03/16/22  0435 03/15/22  0437 03/14/22  1015 03/14/22 0420 03/14/22 0420   * 107*  --   --  133*    142  --   --  145   K 3.5 4.8 3.3*   < > 3.3*    104  --   --  105   CO2 31 35*  --   --  35*   BUN 91* 93*  --   --  96*   CREA 1.78* 1.89*  --   --  1.97*   CA 10.9* 10.8*  --   --  9.7   AGAP 7 3  --   --  5   BUCR 51* 49*  --   --  49*   * 133*  --   --  140*   TP 7.0 6.9  --   --  5.4*   ALB 3.7 3.5  --   --  2.7*   GLOB 3.3 3.4  --   --  2.7   AGRAT 1.1 1.0  --   --  1.0    < > = values in this interval not displayed. CBC w/Diff Recent Labs     03/16/22  0435 03/15/22  0437 03/14/22  0420   WBC 11.1 10.1 9.1   RBC 3.01* 2.91* 2.67*   HGB 9.0* 9.2* 8.4*   HCT 29.0* 29.2* 27.0*    209 173   GRANS  --  77* 73   LYMPH  --  10* 12*   EOS  --  1 1      Cardiac Enzymes No results for input(s): CPK, CKND1, FARA in the last 72 hours. No lab exists for component: CKRMB, TROIP   Coagulation No results for input(s): PTP, INR, APTT, INREXT, INREXT in the last 72 hours. Lipid Panel Lab Results   Component Value Date/Time    Cholesterol, total 183 04/25/2021 04:00 PM    HDL Cholesterol 101 (H) 04/25/2021 04:00 PM    LDL, calculated 69.8 04/25/2021 04:00 PM    VLDL, calculated 12.2 04/25/2021 04:00 PM    Triglyceride 61 04/25/2021 04:00 PM    CHOL/HDL Ratio 1.8 04/25/2021 04:00 PM      BNP No results for input(s): BNPP in the last 72 hours. Liver Enzymes Recent Labs     03/16/22  0435   TP 7.0   ALB 3.7   *      Thyroid Studies Lab Results   Component Value Date/Time    TSH 0.38 03/16/2022 04:35 AM        Procedures/imaging: see electronic medical records for all procedures/Xrays and details which were not copied into this note but were reviewed prior to creation of Plan    NM LUNG SCAN PERF    Result Date: 3/3/2022  EXAM: NM LUNG SCAN PERF.  CLINICAL INDICATION/HISTORY: d dimer increase dyspnea. -Additional: Clinical concern for pulmonary embolus. COMPARISON: Correlation is made with the radiographic study performed one day prior. TECHNIQUE: 7.2 mCi Tc-99m MAA was injected intravenously and the 8 standard views of the chest were obtained. This perfusion only examination is interpreted using the PISAPED criteria. _______________ FINDINGS: Perfusion images show no segmental perfusion defects to suggest the presence of pulmonary embolism. _______________     o scintigraphic findings to suggest the presence of acute pulmonary embolism. _______________     XR HIP RT W OR WO PELV 2-3 VWS    Result Date: 3/7/2022  EXAM: RIGHT HIP RADIOGRAPHS CLINICAL INDICATION/HISTORY: right hip pain -Additional: None COMPARISON: May March 3, 2022. TECHNIQUE: AP pelvis and frog-leg lateral view of right hip. _______________ FINDINGS: BONES: Intact appearing ilioischial and iliopectineal lines. There is a displaced and impacted subcapital right femoral neck fracture, with mild progressive displacement on follow-up imaging. Mild-moderate bilateral hip joint arthrosis. SOFT TISSUES: Unremarkable. _______________     1. Displaced and impacted subcapital right femoral neck fracture, increased in displacement from CT performed 4 days prior. CT HEAD WO CONT    Result Date: 3/7/2022  EXAM: CT head INDICATION: Altered mental status. COMPARISON: 4/23/2021. TECHNIQUE: Axial CT imaging of the head was performed without intravenous contrast. Standard multiplanar coronal and sagittal reformatted images were obtained and are included in interpretation. One or more dose reduction techniques were used on this CT: automated exposure control, adjustment of the mAs and/or kVp according to patient size, and iterative reconstruction techniques. The specific techniques used on this CT exam have been documented in the patient's electronic medical record.   Digital Imaging and Communications in Medicine (DICOM) format image data are available to nonaffiliated external healthcare facilities or entities on a secure, media free, reciprocally searchable basis with patient authorization for at least a 12-month period after this study. _______________ FINDINGS: BRAIN AND POSTERIOR FOSSA: Periventricular white matter hypoattenuation which is nonspecific but likely represents chronic ischemic changes. No evidence of acute large vessel transcortical infarct or acute parenchymal hemorrhage. No midline shift or hydrocephalus. EXTRA-AXIAL SPACES AND MENINGES: There are no abnormal extra-axial fluid collections. CALVARIUM: Intact. SINUSES: Clear. OTHER: None. _______________     No acute intracranial abnormality. CT CHEST ABD PELV WO CONT    Result Date: 3/3/2022  EXAM: CT CHEST, ABDOMEN AND PELVIS CLINICAL INDICATION/HISTORY: Hypoxemia, retrocardiac density, diarrhea, weakness and shortness of breath COMPARISON: 3/2/2022 TECHNIQUE: Axial CT imaging of the chest, abdomen, and pelvis was performed without intravenous contrast. Multiplanar reformats were generated. One or more dose reduction techniques were used on this CT: automated exposure control, adjustment of the mAs and/or kVp according to patient size, and iterative reconstruction techniques. The specific techniques used on this CT exam have been documented in the patient's electronic medical record. Digital Imaging and Communications in Medicine (DICOM) format image data are available to nonaffiliated external healthcare facilities or entities on a secure, media free, reciprocally searchable basis with patient authorization for at least a 12-month period after this study. ________________ FINDINGS: LIMITATIONS:   > Suboptimal evaluation given lack of intravenous contrast.   > Motion artifact is present which limits evaluation.   > Suboptimal exam due to patient body habitus and arms by the side. CHEST: LUNGS: Respiratory motion significantly limits evaluation. Interlobar septal thickening in the upper lobes. Mild bilateral dependent atelectasis. No large consolidation or suspicious pulmonary mass. PLEURA: Very small volume bilateral pleural effusions. AIRWAY: Normal. MEDIASTINUM: Four-chamber cardiomegaly with asymmetric biatrial enlargement, mitral and aortic annular calcifications. The main pulmonary artery is enlarged suggesting pulmonary arterial hypertension. Normal caliber aorta with scattered calcified atherosclerotic plaque. No pericardial effusion. LYMPH NODES: No enlarged lymph nodes. CHEST WALL: Diffuse anasarca. Heterogeneous thyroid. _______________ ABDOMEN/PELVIS: LIVER: No enhancing hepatic mass. The portal and hepatic veins are patent. BILIARY: Gallstones are present in the nondistended gallbladder lumen. No biliary dilation. SPLEEN: Normal. PANCREAS: Normal. ADRENALS: Left adrenal gland measures 9 HU (axial image 76), most consistent with lipid rich adenoma. Normal right adrenal gland. KIDNEYS: Asymmetrically small left kidney. No rate of a stone. No hydronephrosis. GI TRACT:  A small hiatal hernia is present. Normal caliber small and large bowel loops. No morphology of bowel obstruction. Normal appendix. BLADDER: Diffuse wall thickening in the largely decompressed bladder. PELVIC ORGANS: No acute abnormality. VASCULATURE: No arterial aneurysm. 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 LYMPH NODES: No mesenteric or retroperitoneal lymphadenopathy. OTHER: No free intraperitoneal air. No ascites. Diffuse anasarca. OSSEOUS STRUCTURES: No acute osseous abnormality. Multilevel degenerative changes most pronounced in the lumbar spine. Degenerative changes in both shoulders (right greater than left). Please note the included portions of the upper extremities are not well evaluated on this study _______________     1.   Findings of congestive heart failure with cardiomegaly, interstitial edema, very small bilateral pleural effusions, and diffuse anasarca. 2.  Bladder wall thickening may be due to underdistention though superimposed infection cannot be excluded. Recommend correlation for cystitis. 3.  Osseous degenerative changes and additional findings, as detailed in the body of the report. XR CHEST PORT    Result Date: 3/6/2022  EXAM: PORTABLE CHEST HISTORY: Shortness of breath. COMPARISON: 3/2/2022 TECHNIQUE: Single portable view. _______________ FINDINGS: SUPPORT DEVICES: None HEART AND MEDIASTINUM: Moderate cardiomegaly. LUNGS AND PLEURAL SPACES: Subsegmental atelectasis left base. Possible small effusions. BONES AND SOFT TISSUES: Dextroscoliosis. _______________     Subsegmental atelectasis left base. Possible small effusions. Moderate cardiomegaly without change. XR CHEST PORT    Result Date: 3/2/2022  EXAM:  AP Portable Chest X-ray 1 view INDICATION: Shortness of breath COMPARISON: April 23, 2021 _______________ FINDINGS: Cardiomegaly and mediastinal contours are within normal limits for portable radiograph. There is increased right retrocardiac/right paraspinal density. There are no pleural effusions. No acute osseous findings. ________________      Increased right retrocardiac density which may represent airspace disease or underlying pulmonary nodule. Recommend CT chest for further evaluation. ECHO ADULT COMPLETE    Result Date: 3/3/2022    Left Ventricle: Left ventricle size is normal. Moderately increased wall thickness. Findings consistent with moderate concentric hypertrophy. Normal wall motion. Normal left ventricular systolic function with a visually estimated EF of 55 - 60%.   Left Atrium: Left atrium is mildly dilated.   Right Ventricle: Right ventricle is mildly dilated.   Right Atrium: Right atrium is mildly dilated.   Pulmonary artery :  Estimated pulmonary arterial systolic pressure is 51 mmhg. Pulmonary hypertension found to be moderate   Mitral Valve: Moderately thickened leaflet.  Mildly calcified leaflet. Moderate annular calcification of the mitral valve.   IVC/SVC : IVC diameter is greater than 21 mm and decreases less than 50% during inspiration; therefore the estimated right atrial pressure is elevated (~15 mmHg). IVC is dilated. Consider LORRAINE for better evaluation of mitral valve if clinically indicated. DUPLEX LOWER EXT VENOUS BILAT    Result Date: 3/4/2022  · No evidence of deep vein thrombosis in the right lower extremity. · No evidence of deep vein thrombosis in the left lower extremity.         Jessy Lew MD

## 2022-03-16 NOTE — PROGRESS NOTES
PULM/CC on call    Paged by RN earlier this evening. Patient not responsive. ABG - mild hypercapnea on BIPAP  Stable oxygenation  CXR shows mild cardiomegaly and small bibasal effusions  Pressor - neosynephrine    Code status DNR; has POST order. Resp cx- enterobacter  Urine cx- Klebsiella    Plan  CT head stat. Stop cefepime due to altered mentation  Ab - merrem and iv vanc  Recent TSH low normal  Check ammonia    Dw/ icu RN again.       John Arias MD

## 2022-03-16 NOTE — PROGRESS NOTES
03/16/22 0047   CPAP/BIPAP   CPAP/BIPAP Start/Stop On   Device Mode S/T;BIPAP   Mask Type and Size Nasal mask   IPAP (cm H2O)   (9-14)   EPAP (cm H2O) 5 cm H2O   Pt's Home Machine Yes (type/vendor)   Biomedical Check Performed Yes   Settings Verified Yes   Patient was observed to desaturate on home unit, Patient was placed on V 60

## 2022-03-16 NOTE — PROGRESS NOTES
RENAL CONSULT  3/16/2022    Patient:  Dinesh Mayo  :  1940  Gender:  female  MRN #:  [id number]    Reason for Consult: Metabolic Alkalosis  And renal failure       Assessment:    Dinesh Mayo is a 80y.o. year old female  With h/o  morbid obesity, SERENA, right leg weakness, HTN, A. fib, CHF admitted  on 3/3/2022 for shortness of breath    she developed  onset of A. fib on admission. HF was treated with diuretics , s/p extubation now on nasal canula. BP stable without vasopressors   She developed acute renal failure with creatinine slowly rising most likely due to hypotension and over diuresis    # Acute renal failure- Improving  # Metabolic alkalosis - due to diuretics , upon review of serum bicarbonate she had baseline elevated bicarbonate which must be metabolic compensation for chronic respiratory acidosis   #Hypernatremia - Improved  #respiratroy failure- Improved      Subjective/Relevants events: -  She was extubated yesterday   On nasal canula, awake and alert  Hypoglycemic overnight , now on D10 % drip   Decent urine output     Plan:      - Clinically not in fluid overload. Breathing fine on nasal canula .  Decent urine output overnight with slowly down trending serum creatinine   - metabolic alkalosis is improving, no need of  diamox for now   - Intake and output charting, Willoughby's cath, ensure she is not retaining urine   -  CT scan on  3/3 showed asymmetrical small kidney , no hydronephrosis in USG    - avoid nsaids , contrast and nephrotoxin   - Dose all meds for current eGFR  - keep MAP 65 mmhg      Rest of the management per primary team       Intake/Output Summary (Last 24 hours) at 3/16/2022 0901  Last data filed at 3/16/2022 0612  Gross per 24 hour   Intake 1347.09 ml   Output 1650 ml   Net -302.91 ml         Objective:    Visit Vitals  /66   Pulse 82   Temp 100 °F (37.8 °C)   Resp 18   Ht 5' 5\" (1.651 m)   Wt 113.5 kg (250 lb 3.6 oz)   SpO2 100%   Breastfeeding No   BMI 41.64 kg/m² Physical Exam:    Awake and alert   HEENT:No neck swelling  Lung: clear to auscultation   Abdomen: soft, normal bowel sounds.   Ext: no edema  Skin: No rash      Laboratory Data:    Lab Results   Component Value Date    BUN 91 (H) 03/16/2022    BUN 93 (H) 03/15/2022    BUN 96 (H) 03/14/2022     03/16/2022     03/15/2022     03/14/2022    CO2 31 03/16/2022    CO2 35 (H) 03/15/2022    CO2 35 (H) 03/14/2022     Lab Results   Component Value Date    WBC 11.1 03/16/2022    HGB 9.0 (L) 03/16/2022    HCT 29.0 (L) 03/16/2022     No components found for: CALCIUM, PHOSPHORUS, MAGNESIUM  Lab Results   Component Value Date     (H) 04/25/2021     No results found for: Mary Carmenheather Wild, UGLU    Imaging Reveiwed:             Santa Hobson MD

## 2022-03-16 NOTE — PROGRESS NOTES
Speech Therapy Note:     Acknowleging evaluation orders. Pt sleep on BIPAP and unable to complete swallow evaluation. Family at bedside. SLP to follow up on next scheduled date.      Thank you,  Ann-Marie Shi MA CCC-SLP

## 2022-03-16 NOTE — PROGRESS NOTES
[facility]  Pulmonary, Critical Care, and Sleep Medicine    Name: Alexis Boland MRN: [id number]   : 1940 Hospital: [facility] [place]    Date: 3/16/2022  Room: [address] [place] [address] Note                                              Consult requesting physician: Dr. Aldair Loo  Reason for Consult: Respiratory failure    IMPRESSION:   Acute respiratory failure with hypoxemia. Acute on chronic diastolic congestive heart failure. Fracture of neck of right femur. SERENA. Active Hospital Problems    Diagnosis Date Noted    Hypokalemia 2022    Fracture of neck of right femur ([facility] 2022    Stage 3 chronic kidney disease ([facility] 2022    SERENA (obstructive sleep apnea) 2022    Adult BMI 39.0-39.9 kg/sq m 2022    Acute respiratory failure with hypoxemia (HCC) 2022    Atrial fibrillation ([facility] 2022    Acute on chronic diastolic congestive heart failure ([facility] 2021    Elevated troponin 2021    HTN (hypertension) 2021   ·      Patient Active Problem List   Diagnosis Code    Fatigue R53.83    Elevated troponin R77.8    Obesity (BMI 30-39. 9) E66.9    HTN (hypertension) I10    SERENA treated with BiPAP G47.33    Acute on chronic diastolic congestive heart failure (HCC) I50.33    Acute respiratory failure with hypoxemia (HCC) J96.01    Atrial fibrillation ([facility]) I48.91    Stage 3 chronic kidney disease ([facility]) N18.30    SERENA (obstructive sleep apnea) G47.33    Adult BMI 39.0-39.9 kg/sq m Z68.39    Fracture of neck of right femur ([facility]) S72.001A    Hypokalemia E87.6         · Code status: DNR       RECOMMENDATIONS:     Respiratory: History of obesity, SERENA on BiPAP. Postoperative respiratory failure, reintubated in PACU 3/10/2022. Failed SBT on 3/14/2022 periods of lethargus but later woke up   Passed SBt  Extubate   BIPAP at night   Diuresis add bronchodilators and add albumins   CXR revised   IMPRESSION  1.  Support devices in stable/appropriate position as visualized. 2. Mild cardiac enlargement, unchanged. 3. Hazy right lung base opacity, favored a combination of atelectasis and  posteriorly layering pleural effusion. ABG: Improved metabolic alkalosis but hypoxemia  Currently on AC 10/375/35/5. See renal section for metabolic alkalosis management. Sedation: Remains off propofol since 3/11/2022 morning. Use fentanyl/Versed as needed boluses. Failed SBT in 5 minutes today with tachypnea, tachycardia, low tidal volume. She is more awake than yesterday. ABG is improving. Continue daily SBT attempt. Ventilator bundle & Sedation protocol followed. Daily sedation holiday, assessment for readiness for SBT and then re-titrate if required. Chlorhexidine mouth washes. Keep SPO2 >=92%. HOB 30 degree elevation all the time. Aggressive pulmonary toileting. Aspiration precautions. CVS: New onset A. fib on admission, chronic diastolic CHF. Continue aspirin. Continue Coreg but hold for SBP less than 100. Continue Eliquis; monitor for any external bleeding. Lasix held due to metabolic alkalosis since 5/46/0273; and received Diamox I adj used the dose today   Cardiologist on board. Echo 3/3/2022: LVEF 55 to 60%. RV mildly dilated. RA mildly dilated. PVL LE 3/3/2022: No DVT. VQ scan 3/3/2022: No PE.    ID: WBC 9.1  No fever or leukocytosis. Rapid influenza and COVID19 3/2/2022: Negative. Urine culture 3/2/2022: Klebsiella pneumonia. Blood culture 3/2/2022: Negative. Endotracheal aspirate culture: Light Enterobacter cloacae complex. Heavy normal forrest. Antibiotics: Rocephin was discontinued on 3/12/2022 (Enterobacter is resistant to Rocephin). Was on Levaquin but I change to meropenem unclear lethargus . Hematology/Oncology:   Anemia; but no external bleeding. Normal platelets. Renal:   Mild TK; likely due to diuretics use. S/p mild hypernatremia; resolved.   Lasix held due to metabolic alkalosis since 3/12/2022; and received Diamox 250 mg every 12 hours x2 doses on 3/12/2022 and on D5 0.45 NS at 50 mill per hour; with improved ABG. Dr. Tara Bingham on board. GI/: No acute issues. Endocrine: Monitor BS. SSI. Neurology:   Patient was lethargic and confused before going for hip surgery on 3/10/2022 morning. Patient remains off propofol since 3/11/2022; mental status is improving since improvement in ABG. Patient is more alert and awake today. CT head 3/7/2022: Nil acute. Repeat CT head 3/11/2022: Nil acute. Psychiatry: No acute issues. Toxicology: No acute issues. Pain/Sedation: Sedation protocol as above    Skin/Wound: Right hip wound VAC in place after surgery; local care per nursing protocol. Electrolytes: Replace electrolytes per ICU electrolyte replacement protocol. IVF: D5 0.45 NS at 50 mill per hour. Nutrition: Tolerating OGT feed at 40 mL/h. Prophylaxis: DVT Prophylaxis: Eliquis. GI Prophylaxis: Protonix. Restraints: wrist soft restraints for patient interfering with medical therapy/management and patient safety. Lines/Tubes: PIV  ETT: 3/10/2022. OGT: 3/11/2022. [place]: 3/10/2022 (Medically necessary for strict input/output monitoring in critically ill patient, will remove it when not needed. [place] bundle followed). Advance Directive/Palliative Care: Consulted. Will defer respective systems problem management to primary and other respective consultant and follow patient in ICU with primary and other medical team.  Further recommendations will be based on the patient's response to recommended treatment and results of the investigation ordered. Quality Care: PPI, DVT prophylaxis, HOB elevated, Infection control all reviewed and addressed. Care of plan d/w RN, RT, hospitalist team.    High complexity decision making was performed during the evaluation of this patient at high risk for decompensation with multiple organ involvement.     Total critical care time spent rendering care exclusive of procedures/family discussion/coordination of care: 39 minutes. I updated daughter today . Palliative care on board     Subjective/History of Present Illness:     Patient is a 80 y.o. female with PMHx significant for morbid obesity, SERENA, right leg weakness, HTN, A. fib, CHF; admitted to medical floor on 3/3/2022 for dyspnea. Patient had new onset of A. fib on admission. Patient was admitted on medical floor and A. fib/CHF was being treated medically; and was using home BiPAP nightly. Found to have right femoral fracture; underwent right press-fit direct anterior total hip arthroplasty for femoral neck fracture. Postoperatively patient was extubated in PACU; but due to respiratory distress, reintubated and transferred to ICU.      3/16/2022 :     Remains in . Off sedation   If agitate will give precedex  Passed /sbt extubate  I/O last 24 hrs: Intake/Output Summary (Last 24 hours) at 3/16/2022 1411  Last data filed at 3/16/2022 1254  Gross per 24 hour   Intake 1671.67 ml   Output 2000 ml   Net -328.33 ml         The patient is critically ill and can not provide additional history due to Ventilated    History taken from Dr. Viola Pozo, EMR     Review of Systems:  ROS not obtained due to patient factor. Allergies   Allergen Reactions    Seafood Hives     crabs    Statins-Hmg-Coa Reductase Inhibitors Unknown (comments)    Sulfa (Sulfonamide Antibiotics) Hives      Past Medical History:   Diagnosis Date    Hypertension     Sleep disorder       Past Surgical History:   Procedure Laterality Date    HX ORTHOPAEDIC      broken right ankle, left femur 30 yrs ago      Social History     Tobacco Use    Smoking status: Never Smoker    Smokeless tobacco: Never Used   Substance Use Topics    Alcohol use:  Yes     Alcohol/week: 1.0 standard drink     Types: 1 Glasses of wine per week     Comment: soc      Family History   Problem Relation Age of Onset    Diabetes Mother     Heart Disease Mother     Heart Disease Father       Prior to Admission medications    Medication Sig Start Date End Date Taking? Authorizing Provider   allopurinoL (ZYLOPRIM) 100 mg tablet Take 100 mg by mouth daily. Yes Provider, Historical   acetaminophen (TYLENOL) 325 mg tablet Take 650 mg by mouth every six (6) hours as needed for Pain. Yes Provider, Historical   niacin (NIASPAN) 1,000 mg Tb24 tab Take 1,000 mg by mouth daily. Yes Provider, Historical   trolamine salicylate-aloe vera 77% (ASPERCREME) topical cream Apply 2 g to affected area as needed for Pain. Yes Provider, Historical   multivitamin, tx-iron-ca-min (THERA-M w/ IRON) 9 mg iron-400 mcg tab tablet Take 1 Tab by mouth daily. Yes Provider, Historical   aspirin delayed-release 81 mg tablet Take 81 mg by mouth daily. Yes Provider, Historical   potassium chloride SR (KLOR-CON 10) 10 mEq tablet Take 10 mEq by mouth daily. Yes Provider, Historical   carvediloL (COREG) 3.125 mg tablet Take 3.125 mg by mouth daily.    Yes Provider, Historical     Current Facility-Administered Medications   Medication Dose Route Frequency    furosemide (LASIX) injection 20 mg  20 mg IntraVENous BID    arformoteroL (BROVANA) neb solution 15 mcg  15 mcg Nebulization BID RT    cefepime (MAXIPIME) 1 g in sterile water (preservative free) 10 mL IV syringe  1 g IntraVENous Q12H    famotidine (PF) (PEPCID) 20 mg in 0.9% sodium chloride 10 mL injection  20 mg IntraVENous DAILY    acetaminophen (TYLENOL) tablet 650 mg  650 mg Oral Q6H    ferrous sulfate tablet 325 mg  1 Tablet Oral TID    nystatin (MYCOSTATIN) 100,000 unit/mL oral suspension 500,000 Units  500,000 Units Oral QID    apixaban (ELIQUIS) tablet 2.5 mg  2.5 mg Oral BID    sodium chloride (NS) flush 5-40 mL  5-40 mL IntraVENous Q8H    [Held by provider] carvediloL (COREG) tablet 3.125 mg  3.125 mg Oral BID WITH MEALS    aspirin delayed-release tablet 81 mg  81 mg Oral DAILY Objective:   Vital Signs:    Visit Vitals  /66   Pulse 82   Temp 98 °F (36.7 °C)   Resp 18   Ht 5' 5\" (1.651 m)   Wt 113.5 kg (250 lb 3.6 oz)   SpO2 100%   Breastfeeding No   BMI 41.64 kg/m²       O2 Device: Nasal cannula   O2 Flow Rate (L/min): 3 l/min   Temp (24hrs), Av.6 °F (37 °C), Min:98 °F (36.7 °C), Max:100 °F (37.8 °C)       Intake/Output:   Last shift:       07 - 1900  In: 418.8 [I.V.:418.8]  Out: 600 [Urine:600]    Last 3 shifts: 1901 -  0700  In: 1852.9 [I.V.:1852.9]  Out: 3350 [Urine:3350]      Intake/Output Summary (Last 24 hours) at 3/16/2022 1411  Last data filed at 3/16/2022 1254  Gross per 24 hour   Intake 1671.67 ml   Output 2000 ml   Net -328.33 ml       Last 3 Recorded Weights in this Encounter    22 0000 03/15/22 0840 22 0735   Weight: 115.3 kg (254 lb 3.1 oz) 111.1 kg (245 lb) 113.5 kg (250 lb 3.6 oz)         Ventilator Settings:  Mode Rate Tidal Volume Pressure FiO2 PEEP   Spontaneous   375 ml  7 cm H2O 40 % 5 cm H20     Peak airway pressure: 19 cm H2O    Plateau pressure:     Tidal volume:    Minute ventilation: 12.2 l/min     Recent Labs     22  1030 03/15/22  0841 03/15/22  0527   PHI 7.39 7.45 7.44   PCO2I 55.3* 46.7* 48.9*   PO2I 123* 141* 89   HCO3I 33.2* 32.1* 33.2*   FIO2I 32 40 40       Physical Exam:       General/Neurology: Alert, more awake now in AM was lethrgic ; moving all 4 extremities. NAD. On ventilator. Not sedated. Head:   NCAT. Eye:   EOM intact. No icterus/pallor/cyanosis. Nose:   Normal no discharge   Neck:   Trachea midline. Lung: Moderate air entry bilateral equal. At the base sed decreased breath sounds   No rales, rhonchi. No wheezing or stridor. No prolonged expiration or accessory muscle use. Heart:   S1 S2 present. irregularly irregular  No murmur or JVD. Abdomen:  Soft. NT. ND. No palpable masses. Extremities:  No edema. No cyanosis or clubbing.   Right hip surgical site wound VAC dressing intact. Pulses: 2+ and symmetric in DP.     Data:       Recent Results (from the past 24 hour(s))   GLUCOSE, POC    Collection Time: 03/15/22  6:14 PM   Result Value Ref Range    Glucose (POC) 66 (L) 70 - 110 mg/dL   GLUCOSE, POC    Collection Time: 03/15/22  6:39 PM   Result Value Ref Range    Glucose (POC) 94 70 - 110 mg/dL   GLUCOSE, POC    Collection Time: 03/15/22 11:51 PM   Result Value Ref Range    Glucose (POC) 25 (LL) 70 - 110 mg/dL   GLUCOSE, POC    Collection Time: 03/15/22 11:53 PM   Result Value Ref Range    Glucose (POC) <20.0 (LL) 70 - 110 mg/dL   GLUCOSE, POC    Collection Time: 03/16/22 12:05 AM   Result Value Ref Range    Glucose (POC) 183 (H) 70 - 110 mg/dL   GLUCOSE, POC    Collection Time: 03/16/22 12:21 AM   Result Value Ref Range    Glucose (POC) 145 (H) 70 - 110 mg/dL   GLUCOSE, POC    Collection Time: 03/16/22  1:55 AM   Result Value Ref Range    Glucose (POC) 51 (LL) 70 - 110 mg/dL   GLUCOSE, POC    Collection Time: 03/16/22  1:56 AM   Result Value Ref Range    Glucose (POC) 47 (LL) 70 - 110 mg/dL   GLUCOSE, POC    Collection Time: 03/16/22  2:13 AM   Result Value Ref Range    Glucose (POC) 219 (H) 70 - 110 mg/dL   GLUCOSE, POC    Collection Time: 03/16/22  2:59 AM   Result Value Ref Range    Glucose (POC) 165 (H) 70 - 110 mg/dL   MAGNESIUM    Collection Time: 03/16/22  4:35 AM   Result Value Ref Range    Magnesium 2.4 1.6 - 2.6 mg/dL   METABOLIC PANEL, COMPREHENSIVE    Collection Time: 03/16/22  4:35 AM   Result Value Ref Range    Sodium 142 136 - 145 mmol/L    Potassium 3.5 3.5 - 5.5 mmol/L    Chloride 104 100 - 111 mmol/L    CO2 31 21 - 32 mmol/L    Anion gap 7 3.0 - 18 mmol/L    Glucose 133 (H) 74 - 99 mg/dL    BUN 91 (H) 7.0 - 18 MG/DL    Creatinine 1.78 (H) 0.6 - 1.3 MG/DL    BUN/Creatinine ratio 51 (H) 12 - 20      GFR est AA 33 (L) >60 ml/min/1.73m2    GFR est non-AA 27 (L) >60 ml/min/1.73m2    Calcium 10.9 (H) 8.5 - 10.1 MG/DL    Bilirubin, total 1.6 (H) 0.2 - 1.0 MG/DL ALT (SGPT) 64 (H) 13 - 56 U/L    AST (SGOT) 77 (H) 10 - 38 U/L    Alk.  phosphatase 190 (H) 45 - 117 U/L    Protein, total 7.0 6.4 - 8.2 g/dL    Albumin 3.7 3.4 - 5.0 g/dL    Globulin 3.3 2.0 - 4.0 g/dL    A-G Ratio 1.1 0.8 - 1.7     PROCALCITONIN    Collection Time: 03/16/22  4:35 AM   Result Value Ref Range    Procalcitonin 2.02 ng/mL   CBC W/O DIFF    Collection Time: 03/16/22  4:35 AM   Result Value Ref Range    WBC 11.1 4.6 - 13.2 K/uL    RBC 3.01 (L) 4.20 - 5.30 M/uL    HGB 9.0 (L) 12.0 - 16.0 g/dL    HCT 29.0 (L) 35.0 - 45.0 %    MCV 96.3 78.0 - 100.0 FL    MCH 29.9 24.0 - 34.0 PG    MCHC 31.0 31.0 - 37.0 g/dL    RDW 14.7 (H) 11.6 - 14.5 %    PLATELET 188 794 - 184 K/uL    MPV 11.4 9.2 - 11.8 FL    NRBC 0.0 0  WBC    ABSOLUTE NRBC 0.00 0.00 - 0.01 K/uL   TSH 3RD GENERATION    Collection Time: 03/16/22  4:35 AM   Result Value Ref Range    TSH 0.38 0.36 - 3.74 uIU/mL   NT-PRO BNP    Collection Time: 03/16/22  4:35 AM   Result Value Ref Range    NT pro-BNP 12,435 (H) 0 - 1,800 PG/ML   GLUCOSE, POC    Collection Time: 03/16/22  5:10 AM   Result Value Ref Range    Glucose (POC) 67 (L) 70 - 110 mg/dL   GLUCOSE, POC    Collection Time: 03/16/22  6:19 AM   Result Value Ref Range    Glucose (POC) 135 (H) 70 - 110 mg/dL   GLUCOSE, POC    Collection Time: 03/16/22 10:27 AM   Result Value Ref Range    Glucose (POC) 148 (H) 70 - 110 mg/dL   BLOOD GAS,LACTIC ACID, POC    Collection Time: 03/16/22 10:30 AM   Result Value Ref Range    Device: NASAL CANNULA      FIO2 (POC) 32 %    pH (POC) 7.39 7.35 - 7.45      pCO2 (POC) 55.3 (H) 35.0 - 45.0 MMHG    pO2 (POC) 123 (H) 80 - 100 MMHG    HCO3 (POC) 33.2 (H) 22 - 26 MMOL/L    sO2 (POC) 98.6 (H) 92 - 97 %    Base excess (POC) 7.0 mmol/L    Allens test (POC) Positive      Site RIGHT RADIAL      Specimen type (POC) ARTERIAL      Performed by Kamla Dc     Lactic Acid (POC) 0.74 0.40 - 2.00 mmol/L         Chemistry Recent Labs     03/16/22  0435 03/15/22  0433 03/14/22  1015 03/14/22 0420 03/14/22 0420   * 107*  --   --  133*    142  --   --  145   K 3.5 4.8 3.3*   < > 3.3*    104  --   --  105   CO2 31 35*  --   --  35*   BUN 91* 93*  --   --  96*   CREA 1.78* 1.89*  --   --  1.97*   CA 10.9* 10.8*  --   --  9.7   MG 2.4 2.3  --   --  2.2   AGAP 7 3  --   --  5   BUCR 51* 49*  --   --  49*   * 133*  --   --  140*   TP 7.0 6.9  --   --  5.4*   ALB 3.7 3.5  --   --  2.7*   GLOB 3.3 3.4  --   --  2.7   AGRAT 1.1 1.0  --   --  1.0    < > = values in this interval not displayed. Lactic Acid No results found for: LAC  No results for input(s): LAC in the last 72 hours. Liver Enzymes Protein, total   Date Value Ref Range Status   03/16/2022 7.0 6.4 - 8.2 g/dL Final     Albumin   Date Value Ref Range Status   03/16/2022 3.7 3.4 - 5.0 g/dL Final     Globulin   Date Value Ref Range Status   03/16/2022 3.3 2.0 - 4.0 g/dL Final     A-G Ratio   Date Value Ref Range Status   03/16/2022 1.1 0.8 - 1.7   Final     Alk. phosphatase   Date Value Ref Range Status   03/16/2022 190 (H) 45 - 117 U/L Final     Recent Labs     03/16/22  0435 03/15/22  0437 03/14/22 0420   TP 7.0 6.9 5.4*   ALB 3.7 3.5 2.7*   GLOB 3.3 3.4 2.7   AGRAT 1.1 1.0 1.0   * 133* 140*        CBC w/Diff Recent Labs     03/16/22  0435 03/15/22  0437 03/14/22 0420   WBC 11.1 10.1 9.1   RBC 3.01* 2.91* 2.67*   HGB 9.0* 9.2* 8.4*   HCT 29.0* 29.2* 27.0*    209 173   GRANS  --  77* 73   LYMPH  --  10* 12*   EOS  --  1 1        Cardiac Enzymes No results found for: CPK, CK, CKMMB, CKMB, RCK3, CKMBT, CKNDX, CKND1, FARA, TROPT, TROIQ, ADOLFO, TROPT, TNIPOC, BNP, BNPP     BNP No results found for: BNP, BNPP, XBNPT     Coagulation No results for input(s): PTP, INR, APTT, INREXT, INREXT in the last 72 hours.       Thyroid  Lab Results   Component Value Date/Time    TSH 0.38 03/16/2022 04:35 AM       No results found for: T4     Urinalysis Lab Results   Component Value Date/Time    Color YELLOW 03/02/2022 09:04 PM    Appearance CLOUDY 03/02/2022 09:04 PM    Specific gravity 1.021 03/02/2022 09:04 PM    pH (UA) 5.0 03/02/2022 09:04 PM    Protein 300 (A) 03/02/2022 09:04 PM    Glucose Negative 03/02/2022 09:04 PM    Ketone TRACE (A) 03/02/2022 09:04 PM    Bilirubin Negative 03/02/2022 09:04 PM    Urobilinogen 1.0 03/02/2022 09:04 PM    Nitrites Negative 03/02/2022 09:04 PM    Leukocyte Esterase Negative 03/02/2022 09:04 PM    Epithelial cells 2+ 03/02/2022 09:04 PM    Bacteria FEW (A) 03/02/2022 09:04 PM    WBC 0 to 3 03/02/2022 09:04 PM    RBC 0 to 3 03/02/2022 09:04 PM        Micro  Recent Labs     03/14/22 0420 03/14/22 0419   CULT NO GROWTH 2 DAYS NO GROWTH 2 DAYS     Recent Labs     03/14/22 0420 03/14/22 0419   CULT NO GROWTH 2 DAYS NO GROWTH 2 DAYS          Culture data during this hospitalization.    All Micro Results     Procedure Component Value Units Date/Time    CULTURE, BLOOD [[id number]] Collected: 03/14/22 0419    Order Status: Completed Specimen: Blood Updated: 03/16/22 0721     Special Requests: NO SPECIAL REQUESTS        Culture result: NO GROWTH 2 DAYS       CULTURE, BLOOD [[id number]] Collected: 03/14/22 0420    Order Status: Completed Specimen: Blood Updated: 03/16/22 0721     Special Requests: NO SPECIAL REQUESTS        Culture result: NO GROWTH 2 DAYS       CULTURE, RESPIRATORY/SPUTUM/BRONCH Buzz Patch STAIN [[id number]]  (Abnormal)  (Susceptibility) Collected: 03/10/22 1630    Order Status: Completed Specimen: Sputum from Endotracheal aspirate Updated: 03/13/22 0905     Special Requests: NO SPECIAL REQUESTS        GRAM STAIN NO WBC'S SEEN         NO EPITHELIAL CELLS SEEN         NO ORGANISMS SEEN        Culture result:       LIGHT ENTEROBACTER CLOACAE COMPLEX                  HEAVY NORMAL RESPIRATORY ELSIE          CULTURE, RESPIRATORY/SPUTUM/BRONCH Tomma Daron [[id number]] Collected: 03/10/22 1915    Order Status: Canceled Specimen: Sputum from Endotracheal aspirate     CULTURE, BLOOD [[id number]] Collected: 03/02/22 2048    Order Status: Completed Specimen: Blood Updated: 03/08/22 0655     Special Requests: NO SPECIAL REQUESTS        Culture result: NO GROWTH 6 DAYS       CULTURE, BLOOD [[id number]] Collected: 03/02/22 2048    Order Status: Completed Specimen: Blood Updated: 03/08/22 0655     Special Requests: NO SPECIAL REQUESTS        Culture result: NO GROWTH 6 DAYS       CULTURE, URINE [[id number]]  (Abnormal)  (Susceptibility) Collected: 03/02/22 2104    Order Status: Completed Specimen: Cath Urine Updated: 03/05/22 1226     Special Requests: NO SPECIAL REQUESTS        [place] Count --        >100,000  COLONIES/mL       Culture result: KLEBSIELLA PNEUMONIAE       COVID-19 RAPID TEST [[id number]] Collected: 03/02/22 2025    Order Status: Completed Specimen: Nasopharyngeal Updated: 03/02/22 2052     Specimen source Nasopharyngeal        COVID-19 rapid test Not detected        Comment: Rapid Abbott ID Now       Rapid NAAT:  The specimen is NEGATIVE for SARS-CoV-2, the novel coronavirus associated with COVID-19. Negative results should be treated as presumptive and, if inconsistent with clinical signs and symptoms or necessary for patient management, should be tested with an alternative molecular assay. Negative results do not preclude SARS-CoV-2 infection and should not be used as the sole basis for patient management decisions. This test has been authorized by the FDA under an Emergency Use Authorization (EUA) for use by authorized laboratories.    Fact sheet for Healthcare Providers: ConventionUpdate.co.nz  Fact sheet for Patients: ConventionUpdate.co.nz       Methodology: Isothermal Nucleic Acid Amplification         INFLUENZA A & B AG (RAPID TEST) [[id number]] Collected: 03/02/22 2025    Order Status: Completed Specimen: Nasopharyngeal from Nasal washing Updated: 03/02/22 2047     Influenza A Antigen Negative        Comment: A negative result does not exclude influenza virus infection, seasonal or H1N1 due to suboptimal sensitivity. If influenza is circulating in your community, a diagnosis of influenza should be considered based on a patients clinical presentation and empiric antiviral treatment should be considered, if indicated. Influenza B Antigen Negative                NM LUNG SCAN PERF  Result Date: 3/3/2022  EXAM: NM LUNG SCAN PERF. CLINICAL INDICATION/HISTORY: d dimer increase dyspnea. -Additional: Clinical concern for pulmonary embolus. COMPARISON: Correlation is made with the radiographic study performed one day prior. TECHNIQUE: 7.2 mCi Tc-99m MAA was injected intravenously and the 8 standard views of the chest were obtained. This perfusion only examination is interpreted using the PISAPED criteria. _______________ FINDINGS: Perfusion images show no segmental perfusion defects to suggest the presence of pulmonary embolism. _______________     o scintigraphic findings to suggest the presence of acute pulmonary embolism. _______________       XR HIP RT W OR WO PELV 2-3 VWS  Result Date: 3/7/2022  EXAM: RIGHT HIP RADIOGRAPHS CLINICAL INDICATION/HISTORY: right hip pain -Additional: None COMPARISON: May March 3, 2022. TECHNIQUE: AP pelvis and frog-leg lateral view of right hip. _______________ FINDINGS: BONES: Intact appearing ilioischial and iliopectineal lines. There is a displaced and impacted subcapital right femoral neck fracture, with mild progressive displacement on follow-up imaging. Mild-moderate bilateral hip joint arthrosis. SOFT TISSUES: Unremarkable. _______________     1. Displaced and impacted subcapital right femoral neck fracture, increased in displacement from CT performed 4 days prior. CT HEAD WO CONT  Result Date: 3/7/2022  EXAM: CT head INDICATION: Altered mental status. COMPARISON: 4/23/2021.  TECHNIQUE: Axial CT imaging of the head was performed without intravenous contrast. Standard multiplanar coronal and sagittal reformatted images were obtained and are included in interpretation. One or more dose reduction techniques were used on this CT: automated exposure control, adjustment of the mAs and/or kVp according to patient size, and iterative reconstruction techniques. The specific techniques used on this CT exam have been documented in the patient's electronic medical record. Digital Imaging and Communications in Medicine (DICOM) format image data are available to nonaffiliated external healthcare facilities or entities on a secure, media free, reciprocally searchable basis with patient authorization for at least a 12-month period after this study. _______________ FINDINGS: BRAIN AND POSTERIOR FOSSA: Periventricular white matter hypoattenuation which is nonspecific but likely represents chronic ischemic changes. No evidence of acute large vessel transcortical infarct or acute parenchymal hemorrhage. No midline shift or hydrocephalus. EXTRA-AXIAL SPACES AND MENINGES: There are no abnormal extra-axial fluid collections. CALVARIUM: Intact. SINUSES: Clear. OTHER: None. _______________     No acute intracranial abnormality. CT CHEST ABD PELV WO CONT  Result Date: 3/3/2022  EXAM: CT CHEST, ABDOMEN AND PELVIS CLINICAL INDICATION/HISTORY: Hypoxemia, retrocardiac density, diarrhea, weakness and shortness of breath COMPARISON: 3/2/2022 TECHNIQUE: Axial CT imaging of the chest, abdomen, and pelvis was performed without intravenous contrast. Multiplanar reformats were generated. One or more dose reduction techniques were used on this CT: automated exposure control, adjustment of the mAs and/or kVp according to patient size, and iterative reconstruction techniques. The specific techniques used on this CT exam have been documented in the patient's electronic medical record.  Digital Imaging and Communications in Medicine (DICOM) format image data are available to nonaffiliated external healthcare facilities or entities on a secure, media free, reciprocally searchable basis with patient authorization for at least a 12-month period after this study. ________________ FINDINGS: LIMITATIONS:   > Suboptimal evaluation given lack of intravenous contrast.   > Motion artifact is present which limits evaluation.   > Suboptimal exam due to patient body habitus and arms by the side. CHEST: LUNGS: Respiratory motion significantly limits evaluation. Interlobar septal thickening in the upper lobes. Mild bilateral dependent atelectasis. No large consolidation or suspicious pulmonary mass. PLEURA: Very small volume bilateral pleural effusions. AIRWAY: Normal. MEDIASTINUM: Four-chamber cardiomegaly with asymmetric biatrial enlargement, mitral and aortic annular calcifications. The main pulmonary artery is enlarged suggesting pulmonary arterial hypertension. Normal caliber aorta with scattered calcified atherosclerotic plaque. No pericardial effusion. LYMPH NODES: No enlarged lymph nodes. CHEST WALL: Diffuse anasarca. Heterogeneous thyroid. _______________ ABDOMEN/PELVIS: LIVER: No enhancing hepatic mass. The portal and hepatic veins are patent. BILIARY: Gallstones are present in the nondistended gallbladder lumen. No biliary dilation. SPLEEN: Normal. PANCREAS: Normal. ADRENALS: Left adrenal gland measures 9 HU (axial image 76), most consistent with lipid rich adenoma. Normal right adrenal gland. KIDNEYS: Asymmetrically small left kidney. No rate of a stone. No hydronephrosis. GI TRACT:  A small hiatal hernia is present. Normal caliber small and large bowel loops. No morphology of bowel obstruction. Normal appendix. BLADDER: Diffuse wall thickening in the largely decompressed bladder. PELVIC ORGANS: No acute abnormality. VASCULATURE: No arterial aneurysm. 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 LYMPH NODES: No mesenteric or retroperitoneal lymphadenopathy. OTHER: No free intraperitoneal air. No ascites. Diffuse anasarca. OSSEOUS STRUCTURES: No acute osseous abnormality. Multilevel degenerative changes most pronounced in the lumbar spine. Degenerative changes in both shoulders (right greater than left). Please note the included portions of the upper extremities are not well evaluated on this study _______________     1. Findings of congestive heart failure with cardiomegaly, interstitial edema, very small bilateral pleural effusions, and diffuse anasarca. 2.  Bladder wall thickening may be due to underdistention though superimposed infection cannot be excluded. Recommend correlation for cystitis. 3.  Osseous degenerative changes and additional findings, as detailed in the body of the report. Hip x-ray 3/11/2022:  Status post total right hip arthroplasty, without complication. ECHO ADULT COMPLETE  Result Date: 3/3/2022    Left Ventricle: Left ventricle size is normal. Moderately increased wall thickness. Findings consistent with moderate concentric hypertrophy. Normal wall motion. Normal left ventricular systolic function with a visually estimated EF of 55 - 60%.   Left Atrium: Left atrium is mildly dilated.   Right Ventricle: Right ventricle is mildly dilated.   Right Atrium: Right atrium is mildly dilated.   Pulmonary artery :  Estimated pulmonary arterial systolic pressure is 51 mmhg. Pulmonary hypertension found to be moderate   Mitral Valve: Moderately thickened leaflet. Mildly calcified leaflet. Moderate annular calcification of the mitral valve.   IVC/SVC : IVC diameter is greater than 21 mm and decreases less than 50% during inspiration; therefore the estimated right atrial pressure is elevated (~15 mmHg). IVC is dilated. Consider LORRAINE for better evaluation of mitral valve if clinically indicated. DUPLEX LOWER EXT VENOUS BILAT  Result Date: 3/4/2022  · No evidence of deep vein thrombosis in the right lower extremity. · No evidence of deep vein thrombosis in the left lower extremity. USG retroperitoneum 3/12/2022: No hydronephrosis. ECHO 3/3/2022: Result status: Final result       Left Ventricle: Left ventricle size is normal. Moderately increased wall thickness. Findings consistent with moderate concentric hypertrophy. Normal wall motion. Normal left ventricular systolic function with a visually estimated EF of 55 - 60%.   Left Atrium: Left atrium is mildly dilated.   Right Ventricle: Right ventricle is mildly dilated.   Right Atrium: Right atrium is mildly dilated.   Pulmonary artery :  Estimated pulmonary arterial systolic pressure is 51 mmhg. Pulmonary hypertension found to be moderate    Mitral Valve: Moderately thickened leaflet. Mildly calcified leaflet. Moderate annular calcification of the mitral valve.   IVC/SVC : IVC diameter is greater than 21 mm and decreases less than 50% during inspiration; therefore the estimated right atrial pressure is elevated (~15 mmHg). IVC is dilated.     Consider LORRAINE for better evaluation of mitral valve if clinically indicated. Images report reviewed by me:  CT (Most Recent) (CT chest reviewed by me) Results from Hospital Encounter encounter on 03/02/22    CT HEAD WO CONT    Narrative  EXAM: CT head    INDICATION: Headache. COMPARISON: 3/7/2022    TECHNIQUE: Axial CT imaging of the head was performed without intravenous  contrast. Standard multiplanar coronal and sagittal reformatted images were  obtained and are included in interpretation. One or more dose reduction techniques were used on this CT: automated exposure  control, adjustment of the mAs and/or kVp according to patient size, and  iterative reconstruction techniques. The specific techniques used on this CT  exam have been documented in the patient's electronic medical record.   Digital  Imaging and Communications in Medicine (DICOM) format image data are available  to nonaffiliated external healthcare facilities or entities on a secure, media  free, reciprocally searchable basis with patient authorization for at least a  12-month period after this study. _______________    FINDINGS:    BRAIN AND POSTERIOR FOSSA: No evidence of acute large vessel transcortical  infarct or acute parenchymal hemorrhage. No midline shift or hydrocephalus. EXTRA-AXIAL SPACES AND MENINGES: There are no abnormal extra-axial fluid  collections. CALVARIUM: Intact. SINUSES: Clear. OTHER: None.    _______________    Impression  No acute intracranial abnormality. CXR reviewed by me:  XR (Most Recent). CXR  reviewed by me and compared with previous CXR Results from Hospital Encounter encounter on 03/02/22    XR CHEST PORT    Narrative  EXAM: XR CHEST PORT    CLINICAL INDICATION/HISTORY: hypoxia  -Additional: None    COMPARISON: One day prior    TECHNIQUE: Frontal view of the chest    _______________    FINDINGS:    HEART AND MEDIASTINUM: Cardiomegaly. LUNGS AND PLEURAL SPACES: Small layering right effusion/atelectasis. Prominent  central vasculature. No pneumothorax. BONY THORAX AND SOFT TISSUES: No acute osseous abnormality    _______________    Impression  Cardiomegaly. Small layering right effusion/atelectasis. Prominent central vasculature. CXR 3/13/2022:  FINDINGS:  SUPPORT DEVICES: Endotracheal tube and visualized gastric tube both project in  stable position from prior exam.     HEART AND MEDIASTINUM: Enlarged appearing cardiac silhouette with stable  mediastinal contours.     LUNGS AND PLEURAL SPACES: Faint/hazy right lower lung zone opacity. No  pneumothorax or pleural effusion demonstrated.     BONY THORAX AND SOFT TISSUES: Unremarkable.  ______________     IMPRESSION  1. Support devices in stable position. 2. Mild interval increase in right basilar atelectasis and likely small right  pleural effusion. 3. Cardiomegaly, as previously.        ·Please note: Voice-recognition software may have been used to generate this report, which may have resulted in some phonetic-based errors in grammar and contents. Even though attempts were made to correct all the mistakes, some may have been missed, and remained in the body of the document.       Heather Scott MD  3/16/2022

## 2022-03-16 NOTE — PROGRESS NOTES
[facility]  (A Division of [address] [place] [address])                                                                                                                      Sharon Melvin MD  Office #: - Option # [phone]  Fax #: [phone]     Date of Admission: 3/2/2022Date of Note: 3/16/2022  Reason for FU: Antibiotic management of for positive resp/urine cultures requested by Dr Nataly Rosales. Current Antimicrobials:    Prior Antimicrobials:    Cefepime 3/14 to date   Zithromax 3/2- 3/4  cTX 3/2 to 3/4  CTC 3/7-- X2 dose  Levofloxacin 500 mg X1- 3/13 X 1 dose  Meropenem 500 mg 3/14 X1 dose           Assessment- ID related:  --------------------------------------------------------------------------  Acutely sick and complicated patient in ICU with:    · Enterobacter Clacae complex + resp cutlure 3/10/22  · Acute resp failure- intubated since OR day 3/10/22  --CXR on 3/14-- infiltrate with atelectasis and pleural effusion  · S/P R hip anterior arthroplasty- wound vac in place  · Klebsiella bacteruria 3/2/22  --UA clean and Urine culture +  Kleb: Colonized    Other Medical Issues- Mx per respective team:  · CKD  · Morbidly obese  · Hx of SERENA  · A.fib  · Acute on chronic  CHF     Recommendation for ID issues I am following:  ------------------------------------------------------------------------------  JIE Cabrales cefepime 1 gm Q12 hour until March 19  Monitor CBC, VMP                      -> diuresis per respective team. Opacity with layering of pleural  fluid on CXR       Subjective:  Extubated, on NC. States her name. Afebrile- temp 100  No acute issue overnight  Slow progression    Notes and labs reviewed. Imaging reports reviewed-NL WBC, Procal up to 1.15, BNP elevated to 8306    CXR this am:      Cardiomegaly.     Small layering right effusion/atelectasis. Prominent central vasculature. HPI:  Danette Woodard is a 80 y.o.  BLACK/ with PMH of congestive heart failure, sleep apnea, right leg weakness, admitted on 3/2 for acute resp failure with hypoxia due to CHF and a.fib with RVR. No fever or leucocytosis. UA done on admission was clean, urine culture grew Klebsiella. She had right hip fracture and was taken to OR on 3/10 for BLAIR. She had a wound vac in place. She didn't extubate after procedure and was transferred to ICU where she is getting care. resp cutture from 3/10-- has Enterobacter that was final on 3/13-- she was given Levofloxacin. Today it was changed to meropenem. DW with RN- little resp secretion, she has low grade fever today, no leucocytosis. She isnot on pressors. Per dtr, she doesn't recall when she got treated with ABX as OP. No prior history of UTI diagnosis.              Active Hospital Problems    Diagnosis Date Noted    Hypokalemia 03/14/2022    Fracture of neck of right femur ([facility] [address] 03/08/2022    Stage 3 chronic kidney disease ([address] 03/03/2022    SERENA (obstructive sleep apnea) 03/03/2022    Adult BMI 39.0-39.9 kg/sq m 03/03/2022    Acute respiratory failure with hypoxemia (HCC) 03/02/2022    Atrial fibrillation (HCC) 03/02/2022    Acute on chronic diastolic congestive heart failure ([address] 04/23/2021    Elevated troponin 04/23/2021    HTN (hypertension) 04/23/2021     Past Medical History:   Diagnosis Date    Hypertension     Sleep disorder      Past Surgical History:   Procedure Laterality Date    HX ORTHOPAEDIC      broken right ankle, left femur 30 yrs ago     Family History   Problem Relation Age of Onset    Diabetes Mother     Heart Disease Mother     Heart Disease Father      Social History     Socioeconomic History    Marital status: SINGLE     Spouse name: Not on file    Number of children: Not on file    Years of education: Not on file    Highest education level: Not on file   Occupational History    Not on file   Tobacco Use    Smoking status: Never Smoker    Smokeless tobacco: Never Used Vaping Use    Vaping Use: Never used   Substance and Sexual Activity    Alcohol use: Yes     Alcohol/week: 1.0 standard drink     Types: 1 Glasses of wine per week     Comment: soc    Drug use: No    Sexual activity: Not on file   Other Topics Concern     Service Not Asked    Blood Transfusions Not Asked    Caffeine Concern Not Asked    Occupational Exposure Not Asked    Hobby Hazards Not Asked    Sleep Concern Not Asked    Stress Concern Not Asked    Weight Concern Not Asked    Special Diet Not Asked    Back Care Not Asked    Exercise Not Asked    Bike Helmet Not Asked   [address] Not Asked    Self-Exams Not Asked   Social History Narrative    Not on file     Social Determinants of Health     Financial Resource Strain:     Difficulty of Paying Living Expenses: Not on file   Food Insecurity:     Worried About Running Out of Food in the Last Year: Not on file    Aquiles of Food in the Last Year: Not on file   Transportation Needs:     Lack of Transportation (Medical): Not on file    Lack of Transportation (Non-Medical):  Not on file   Physical Activity:     Days of Exercise per Week: Not on file    Minutes of Exercise per Session: Not on file   Stress:     Feeling of Stress : Not on file   Social Connections:     Frequency of Communication with Friends and Family: Not on file    Frequency of Social Gatherings with Friends and Family: Not on file    Attends [employer] Services: Not on file    Active Member of Clubs or Organizations: Not on file    Attends Club or Organization Meetings: Not on file    Marital Status: Not on file   Intimate Partner Violence:     Fear of Current or Ex-Partner: Not on file    Emotionally Abused: Not on file    Physically Abused: Not on file    Sexually Abused: Not on file   Housing Stability:     Unable to Pay for Housing in the Last Year: Not on file    Number of [place] in the Last Year: Not on file    Unstable Housing in the Last Year: Not on file       Allergies:  Seafood, Statins-hmg-coa reductase inhibitors, and Sulfa (sulfonamide antibiotics)     Medications:  Current Facility-Administered Medications   Medication Dose Route Frequency    furosemide (LASIX) injection 20 mg  20 mg IntraVENous BID    arformoteroL (BROVANA) neb solution 15 mcg  15 mcg Nebulization BID RT    cefepime (MAXIPIME) 1 g in sterile water (preservative free) 10 mL IV syringe  1 g IntraVENous Q12H    famotidine (PF) (PEPCID) 20 mg in 0.9% sodium chloride 10 mL injection  20 mg IntraVENous DAILY    sodium chloride (NS) flush 5-40 mL  5-40 mL IntraVENous PRN    acetaminophen (TYLENOL) tablet 650 mg  650 mg Oral Q6H    naloxone (NARCAN) injection 0.4 mg  0.4 mg IntraVENous PRN    ferrous sulfate tablet 325 mg  1 Tablet Oral TID    diphenhydrAMINE (BENADRYL) capsule 25 mg  25 mg Oral Q4H PRN    bisacodyL (DULCOLAX) suppository 10 mg  10 mg Rectal DAILY PRN    ELECTROLYTE REPLACEMENT PROTOCOL - Magnesium   1 Each Other PRN    ELECTROLYTE REPLACEMENT PROTOCOL - Calcium   1 Each Other PRN    ELECTROLYTE REPLACEMENT PROTOCOL  - Phosphorus Renal Dosing  1 Each Other PRN    ELECTROLYTE REPLACEMENT PROTOCOL - Potassium Renal Dosing  1 Each Other PRN    midazolam (VERSED) injection 1 mg  1 mg IntraVENous Q2H PRN    fentaNYL citrate (PF) injection 25 mcg  25 mcg IntraVENous Q4H PRN    nystatin (MYCOSTATIN) 100,000 unit/mL oral suspension 500,000 Units  500,000 Units Oral QID    apixaban (ELIQUIS) tablet 2.5 mg  2.5 mg Oral BID    sodium chloride (NS) flush 5-40 mL  5-40 mL IntraVENous Q8H    sodium chloride (NS) flush 5-40 mL  5-40 mL IntraVENous PRN    acetaminophen (TYLENOL) tablet 650 mg  650 mg Oral Q6H PRN    Or    acetaminophen (TYLENOL) suppository 650 mg  650 mg Rectal Q6H PRN    polyethylene glycol (MIRALAX) packet 17 g  17 g Oral DAILY PRN    ondansetron (ZOFRAN ODT) tablet 4 mg  4 mg Oral Q8H PRN    Or    ondansetron (ZOFRAN) injection 4 mg  4 mg IntraVENous Q6H PRN    [Held by provider] carvediloL (COREG) tablet 3.125 mg  3.125 mg Oral BID WITH MEALS    aspirin delayed-release tablet 81 mg  81 mg Oral DAILY        ROS:  Review of systems not obtained due to patient factors. Physical Exam:    Temp (24hrs), Av.6 °F (37 °C), Min:98.2 °F (36.8 °C), Max:100 °F (37.8 °C)    Visit Vitals  /66   Pulse 82   Temp 100 °F (37.8 °C)   Resp 18   Ht 5' 5\" (1.651 m)   Wt 113.5 kg (250 lb 3.6 oz)   SpO2 100%   Breastfeeding No   BMI 41.64 kg/m²        GEN: WD obese, Extubated, NC in place. HEENT: Unicteric. EOMI intact  CHEST: Non laboured breathing. CTA anteriorly  CVS:RRR, no mur/gallop  ABD: Obese/soft. Non tender. Non distended abd  KAREN:box in place  EXT:--right groin vac is removed-- dressing over groin  Skin: Dry and intact. No rash, no redness. CNS:Awake, alert and oriented X2- name/place.  CN grossly normal.        Microbiology  All Micro Results     Procedure Component Value Units Date/Time    CULTURE, BLOOD [[id number]] Collected: 22    Order Status: Completed Specimen: Blood Updated: 22     Special Requests: NO SPECIAL REQUESTS        Culture result: NO GROWTH 2 DAYS       CULTURE, BLOOD [[id number]] Collected: 22    Order Status: Completed Specimen: Blood Updated: 22     Special Requests: NO SPECIAL REQUESTS        Culture result: NO GROWTH 2 DAYS       CULTURE, RESPIRATORY/SPUTUM/BRONCH Ballesteros Mech STAIN [[id number]]  (Abnormal)  (Susceptibility) Collected: 03/10/22 1630    Order Status: Completed Specimen: Sputum from Endotracheal aspirate Updated: 22     Special Requests: NO SPECIAL REQUESTS        GRAM STAIN NO WBC'S SEEN         NO EPITHELIAL CELLS SEEN         NO ORGANISMS SEEN        Culture result:       LIGHT ENTEROBACTER CLOACAE COMPLEX                  HEAVY NORMAL RESPIRATORY ELSIE          CULTURE, RESPIRATORY/SPUTUM/BRONCH Amye Chroman [[id number]] Collected: 03/10/22 191 Order Status: Canceled Specimen: Sputum from Endotracheal aspirate     CULTURE, BLOOD [[id number]] Collected: 03/02/22 2048    Order Status: Completed Specimen: Blood Updated: 03/08/22 0655     Special Requests: NO SPECIAL REQUESTS        Culture result: NO GROWTH 6 DAYS       CULTURE, BLOOD [[id number]] Collected: 03/02/22 2048    Order Status: Completed Specimen: Blood Updated: 03/08/22 0655     Special Requests: NO SPECIAL REQUESTS        Culture result: NO GROWTH 6 DAYS       CULTURE, URINE [[id number]]  (Abnormal)  (Susceptibility) Collected: 03/02/22 2104    Order Status: Completed Specimen: Cath Urine Updated: 03/05/22 1226     Special Requests: NO SPECIAL REQUESTS        Paul Count --        >100,000  COLONIES/mL       Culture result: KLEBSIELLA PNEUMONIAE       COVID-19 RAPID TEST [[id number]] Collected: 03/02/22 2025    Order Status: Completed Specimen: Nasopharyngeal Updated: 03/02/22 2052     Specimen source Nasopharyngeal        COVID-19 rapid test Not detected        Comment: Rapid Abbott ID Now       Rapid NAAT:  The specimen is NEGATIVE for SARS-CoV-2, the novel coronavirus associated with COVID-19. Negative results should be treated as presumptive and, if inconsistent with clinical signs and symptoms or necessary for patient management, should be tested with an alternative molecular assay. Negative results do not preclude SARS-CoV-2 infection and should not be used as the sole basis for patient management decisions. This test has been authorized by the FDA under an Emergency Use Authorization (EUA) for use by authorized laboratories.    Fact sheet for Healthcare Providers: ConventionUpdate.co.nz  Fact sheet for Patients: ConventionUpdate.co.nz       Methodology: Isothermal Nucleic Acid Amplification         INFLUENZA A & B AG (RAPID TEST) [[id number]] Collected: 03/02/22 2025    Order Status: Completed Specimen: Nasopharyngeal from Nasal washing Updated: 03/02/22 2047     Influenza A Antigen Negative        Comment: A negative result does not exclude influenza virus infection, seasonal or H1N1 due to suboptimal sensitivity. If influenza is circulating in your community, a diagnosis of influenza should be considered based on a patients clinical presentation and empiric antiviral treatment should be considered, if indicated. Influenza B Antigen Negative              Lab results:    Chemistry  Recent Labs     03/16/22  0435 03/15/22  0437 03/14/22  1015 03/14/22  0420 03/14/22 0420   * 107*  --   --  133*    142  --   --  145   K 3.5 4.8 3.3*   < > 3.3*    104  --   --  105   CO2 31 35*  --   --  35*   BUN 91* 93*  --   --  96*   CREA 1.78* 1.89*  --   --  1.97*   CA 10.9* 10.8*  --   --  9.7   AGAP 7 3  --   --  5   BUCR 51* 49*  --   --  49*   * 133*  --   --  140*   TP 7.0 6.9  --   --  5.4*   ALB 3.7 3.5  --   --  2.7*   GLOB 3.3 3.4  --   --  2.7   AGRAT 1.1 1.0  --   --  1.0    < > = values in this interval not displayed. CBC w/ Diff  Recent Labs     03/16/22  0435 03/15/22  0437 03/14/22  0420   WBC 11.1 10.1 9.1   RBC 3.01* 2.91* 2.67*   HGB 9.0* 9.2* 8.4*   HCT 29.0* 29.2* 27.0*    209 173   GRANS  --  77* 73   LYMPH  --  10* 12*   EOS  --  1 1       Imaging: report reviewed and as posted by radiologist       1. Support devices in stable/appropriate position as visualized. 2. Mild cardiac enlargement, unchanged. 3. Hazy right lung base opacity, favored a combination of atelectasis and  posteriorly layering pleural effusion.

## 2022-03-16 NOTE — PROGRESS NOTES
Assumed Pt care at AM change of shift. Full assessment completed and documented. Pt oriented to self through shift. Pt in AFIB on the monitor. Blood sugars required no coverage. Pt became soft. Advised DR Ariela Mueller. Administered Midodrine . Bps continued soft and Pt unarrousable. Placed on BiPAP. Called DR Adalberto Black told to order stat CXR, stat ABG, Tomás. [place] [facility]

## 2022-03-16 NOTE — PROGRESS NOTES
[facility]  Pulmonary, Critical Care, and Sleep Medicine    Name: Sachi Guzman MRN: [id number]   : 1940 Hospital: [facility] [place]    Date: 3/16/2022  Room: [address] [place] [address] Note                                              Consult requesting physician: Dr. Dang Vaughan  Reason for Consult: Respiratory failure    IMPRESSION:   Acute respiratory failure with hypoxemia. Acute on chronic diastolic congestive heart failure. Fracture of neck of right femur. SERENA. Active Hospital Problems    Diagnosis Date Noted    Hypokalemia 2022    [facility]ract[facility] of [facility] ([facility] 2022    Stage 3 chronic kidney disease ([facility] 2022    SERENA (obstructive sleep apnea) 2022    Adult BMI 39.0-39.9 kg/sq m 2022    Acute respiratory failure with hypoxemia (HCC) 2022    Atrial fibrillation ([facility] 2022    Acute on chronic diastolic congestive heart failure ([facility] 2021    Elevated troponin 2021    HTN (hypertension) 2021   ·      Patient Active Problem List   Diagnosis Code    Fatigue R53.83    Elevated troponin R77.8    Obesity (BMI 30-39. 9) E66.9    HTN (hypertension) I10    SERENA treated with BiPAP G47.33    Acute on chronic diastolic congestive heart failure (HCC) I50.33    Acute respiratory failure with hypoxemia (HCC) J96.01    Atrial fibrillation ([facility]) I48.91    Stage 3 chronic kidney disease (HCC) N18.30    SERENA (obstructive sleep apnea) G47.33    Adult BMI 39.0-39.9 kg/sq m Z68.39    Fracture of neck of right femur ([facility]) S72.001A    Hypokalemia E87.6         · Code status: DNR       RECOMMENDATIONS:     Respiratory: History of obesity, SERENA on BiPAP. Postoperative respiratory failure, reintubated in PACU 3/10/2022.     Extubated on 3/15/2022  fialed home CPAP with oxygen desaturations changed to BIPAP 14-9 /5 2L oxygen   BIPAP at night   Diuresis add bronchodilators and add albumins   CXR revised IMPRESSION     Cardiomegaly.     Small layering right effusion/atelectasis. Prominent central vasculature. ABG: on 3/16/2022 after bipap last night   7.39/55/123/98    Keep SPO2 >=92%. HOB 30 degree elevation all the time. Aggressive pulmonary toileting. Aspiration precautions. CVS:increase diuresis   worsening BNP   New onset A. fib on admission, chronic diastolic CHF. Continue aspirin. Continue Coreg but hold for SBP less than 100. Continue Eliquis; monitor for any external bleeding. Lasix held due to metabolic alkalosis since 2/03/7047; and received Diamox I adj used the dose today   Cardiologist on board. Echo 3/3/2022: LVEF 55 to 60%. RV mildly dilated. RA mildly dilated. PVL LE 3/3/2022: No DVT. VQ scan 3/3/2022: No PE.    ID: WBC 1.1 on cefepime   No fever or leukocytosis. Rapid influenza and COVID19 3/2/2022: Negative. Urine culture 3/2/2022: Klebsiella pneumonia. Blood culture 3/2/2022: Negative. Endotracheal aspirate culture: Light Enterobacter cloacae complex. Heavy normal forrest. Antibiotics: Rocephin was discontinued on 3/12/2022 (Enterobacter is resistant to Rocephin). Was on Levaquin but I change to meropenem unclear lethargus . Hematology/Oncology:   Anemia; but no external bleeding. Normal platelets. Renal:   Mild TK; likely due to diuretics use. S/p mild hypernatremia; resolved. Lasix held due to metabolic alkalosis since 3/15/9456; and received Diamox 250 mg every 12 hours x2 doses on 3/12/2022 and on D5 0.45 NS at 50 mill per hour; with improved ABG. Dr. Sylwia Suarez on board. GI/: No acute issues. Endocrine: Monitor BS. SSI. Neurology:   Patient was lethargic and confused before going for hip surgery on 3/10/2022 morning. Patient remains off propofol since 3/11/2022; mental status is improving since improvement in ABG. Patient is more alert and awake today. CT head 3/7/2022: Nil acute. Repeat CT head 3/11/2022: Nil acute.     Psychiatry: No acute issues. Toxicology: No acute issues. Pain/Sedation: Sedation protocol as above    Skin/Wound: Right hip wound VAC in place after surgery; local care per nursing protocol. Electrolytes: Replace electrolytes per ICU electrolyte replacement protocol. IVF: D5 0.45 NS at 50 mill per hour. Nutrition: Tolerating OGT feed at 40 mL/h. Prophylaxis: DVT Prophylaxis: Eliquis. GI Prophylaxis: Protonix. Restraints: wrist soft restraints for patient interfering with medical therapy/management and patient safety. Lines/Tubes: PIV  ETT: 3/10/2022. OGT: 3/11/2022. [place]: 3/10/2022 (Medically necessary for strict input/output monitoring in critically ill patient, will remove it when not needed. [place] bundle followed). Advance Directive/Palliative Care: Consulted. Will defer respective systems problem management to primary and other respective consultant and follow patient in ICU with primary and other medical team.  Further recommendations will be based on the patient's response to recommended treatment and results of the investigation ordered. Quality Care: PPI, DVT prophylaxis, HOB elevated, Infection control all reviewed and addressed. Care of plan d/w RN, RT, hospitalist team.    High complexity decision making was performed during the evaluation of this patient at high risk for decompensation with multiple organ involvement. I updated daughter today . Palliative care on board     Subjective/History of Present Illness:     Patient is a 80 y.o. female with PMHx significant for morbid obesity, SERENA, right leg weakness, HTN, A. fib, CHF; admitted to medical floor on 3/3/2022 for dyspnea. Patient had new onset of A. fib on admission. Patient was admitted on medical floor and A. fib/CHF was being treated medically; and was using home BiPAP nightly.   Found to have right femoral fracture; underwent right press-fit direct anterior total hip arthroplasty for femoral neck fracture. Postoperatively patient was extubated in PACU; but due to respiratory distress, reintubated and transferred to ICU.      3/16/2022 :     Remains in . Extubated last night failed CPAP now every night on BIPAP with oxygen  She will need BIPAP for home  Increase diuresis  I/O last 24 hrs: Intake/Output Summary (Last 24 hours) at 3/16/2022 1412  Last data filed at 3/16/2022 1254  Gross per 24 hour   Intake 1671.67 ml   Output 2000 ml   Net -328.33 ml         The patient is critically ill and can not provide additional history due to Ventilated    History taken from Dr. Catarino Sanchez, EMR     Review of Systems:  ROS not obtained due to patient factor. Allergies   Allergen Reactions    Seafood Hives     crabs    Statins-Hmg-Coa Reductase Inhibitors Unknown (comments)    Sulfa (Sulfonamide Antibiotics) Hives      Past Medical History:   Diagnosis Date    Hypertension     Sleep disorder       Past Surgical History:   Procedure Laterality Date    HX ORTHOPAEDIC      broken right ankle, left femur 30 yrs ago      Social History     Tobacco Use    Smoking status: Never Smoker    Smokeless tobacco: Never Used   Substance Use Topics    Alcohol use: Yes     Alcohol/week: 1.0 standard drink     Types: 1 Glasses of wine per week     Comment: soc      Family History   Problem Relation Age of Onset    Diabetes Mother     Heart Disease Mother     Heart Disease Father       Prior to Admission medications    Medication Sig Start Date End Date Taking? Authorizing Provider   allopurinoL (ZYLOPRIM) 100 mg tablet Take 100 mg by mouth daily. Yes Provider, Historical   acetaminophen (TYLENOL) 325 mg tablet Take 650 mg by mouth every six (6) hours as needed for Pain. Yes Provider, Historical   niacin (NIASPAN) 1,000 mg Tb24 tab Take 1,000 mg by mouth daily. Yes Provider, Historical   trolamine salicylate-aloe vera 72% (ASPERCREME) topical cream Apply 2 g to affected area as needed for Pain.    Yes Provider, Historical   multivitamin, tx-iron-ca-min (THERA-M w/ IRON) 9 mg iron-400 mcg tab tablet Take 1 Tab by mouth daily. Yes Provider, Historical   aspirin delayed-release 81 mg tablet Take 81 mg by mouth daily. Yes Provider, Historical   potassium chloride SR (KLOR-CON 10) 10 mEq tablet Take 10 mEq by mouth daily. Yes Provider, Historical   carvediloL (COREG) 3.125 mg tablet Take 3.125 mg by mouth daily.    Yes Provider, Historical     Current Facility-Administered Medications   Medication Dose Route Frequency    furosemide (LASIX) injection 20 mg  20 mg IntraVENous BID    arformoteroL (BROVANA) neb solution 15 mcg  15 mcg Nebulization BID RT    cefepime (MAXIPIME) 1 g in sterile water (preservative free) 10 mL IV syringe  1 g IntraVENous Q12H    famotidine (PF) (PEPCID) 20 mg in 0.9% sodium chloride 10 mL injection  20 mg IntraVENous DAILY    acetaminophen (TYLENOL) tablet 650 mg  650 mg Oral Q6H    ferrous sulfate tablet 325 mg  1 Tablet Oral TID    nystatin (MYCOSTATIN) 100,000 unit/mL oral suspension 500,000 Units  500,000 Units Oral QID    apixaban (ELIQUIS) tablet 2.5 mg  2.5 mg Oral BID    sodium chloride (NS) flush 5-40 mL  5-40 mL IntraVENous Q8H    [Held by provider] carvediloL (COREG) tablet 3.125 mg  3.125 mg Oral BID WITH MEALS    aspirin delayed-release tablet 81 mg  81 mg Oral DAILY         Objective:   Vital Signs:    Visit Vitals  /66   Pulse 82   Temp 98 °F (36.7 °C)   Resp 18   Ht 5' 5\" (1.651 m)   Wt 113.5 kg (250 lb 3.6 oz)   SpO2 100%   Breastfeeding No   BMI 41.64 kg/m²       O2 Device: Nasal cannula   O2 Flow Rate (L/min): 3 l/min   Temp (24hrs), Av.6 °F (37 °C), Min:98 °F (36.7 °C), Max:100 °F (37.8 °C)       Intake/Output:   Last shift:       07 -  1900  In: 418.8 [I.V.:418.8]  Out: 600 [Urine:600]    Last 3 shifts: 1901 -  0700  In: 1852.9 [I.V.:1852.9]  Out: 3350 [Urine:3350]      Intake/Output Summary (Last 24 hours) at 3/16/2022 1412  Last data filed at 3/16/2022 1254  Gross per 24 hour   Intake 1671.67 ml   Output 2000 ml   Net -328.33 ml       Last 3 Recorded Weights in this Encounter    03/14/22 0000 03/15/22 0840 03/16/22 0735   Weight: 115.3 kg (254 lb 3.1 oz) 111.1 kg (245 lb) 113.5 kg (250 lb 3.6 oz)         Ventilator Settings:  Mode Rate Tidal Volume Pressure FiO2 PEEP   Spontaneous   375 ml  7 cm H2O 40 % 5 cm H20     Peak airway pressure: 19 cm H2O    Plateau pressure:     Tidal volume:    Minute ventilation: 12.2 l/min     Recent Labs     03/16/22  1030 03/15/22  0841 03/15/22  0527   PHI 7.39 7.45 7.44   PCO2I 55.3* 46.7* 48.9*   PO2I 123* 141* 89   HCO3I 33.2* 32.1* 33.2*   FIO2I 32 40 40       Physical Exam:       General/Neurology: Alert, more awake now in AM was lethrgic  Following commands very weak   Head:   NCAT. Eye:   EOM intact. No icterus/pallor/cyanosis. Nose:   Normal no discharge   Neck:   Trachea midline. Lung: Moderate air entry bilateral equal. At the base sed decreased breath sounds   No rales, rhonchi. No wheezing or stridor. No prolonged expiration or accessory muscle use. Heart:   S1 S2 present. irregularly irregular  No murmur or JVD. Abdomen:  Soft. NT. ND. No palpable masses. Extremities:  No edema. No cyanosis or clubbing. Right hip surgical site wound VAC dressing intact. Pulses: 2+ and symmetric in DP.     Data:       Recent Results (from the past 24 hour(s))   GLUCOSE, POC    Collection Time: 03/15/22  6:14 PM   Result Value Ref Range    Glucose (POC) 66 (L) 70 - 110 mg/dL   GLUCOSE, POC    Collection Time: 03/15/22  6:39 PM   Result Value Ref Range    Glucose (POC) 94 70 - 110 mg/dL   GLUCOSE, POC    Collection Time: 03/15/22 11:51 PM   Result Value Ref Range    Glucose (POC) 25 (LL) 70 - 110 mg/dL   GLUCOSE, POC    Collection Time: 03/15/22 11:53 PM   Result Value Ref Range    Glucose (POC) <20.0 (LL) 70 - 110 mg/dL   GLUCOSE, POC    Collection Time: 03/16/22 12:05 AM   Result Value Ref Range    Glucose (POC) 183 (H) 70 - 110 mg/dL   GLUCOSE, POC    Collection Time: 03/16/22 12:21 AM   Result Value Ref Range    Glucose (POC) 145 (H) 70 - 110 mg/dL   GLUCOSE, POC    Collection Time: 03/16/22  1:55 AM   Result Value Ref Range    Glucose (POC) 51 (LL) 70 - 110 mg/dL   GLUCOSE, POC    Collection Time: 03/16/22  1:56 AM   Result Value Ref Range    Glucose (POC) 47 (LL) 70 - 110 mg/dL   GLUCOSE, POC    Collection Time: 03/16/22  2:13 AM   Result Value Ref Range    Glucose (POC) 219 (H) 70 - 110 mg/dL   GLUCOSE, POC    Collection Time: 03/16/22  2:59 AM   Result Value Ref Range    Glucose (POC) 165 (H) 70 - 110 mg/dL   MAGNESIUM    Collection Time: 03/16/22  4:35 AM   Result Value Ref Range    Magnesium 2.4 1.6 - 2.6 mg/dL   METABOLIC PANEL, COMPREHENSIVE    Collection Time: 03/16/22  4:35 AM   Result Value Ref Range    Sodium 142 136 - 145 mmol/L    Potassium 3.5 3.5 - 5.5 mmol/L    Chloride 104 100 - 111 mmol/L    CO2 31 21 - 32 mmol/L    Anion gap 7 3.0 - 18 mmol/L    Glucose 133 (H) 74 - 99 mg/dL    BUN 91 (H) 7.0 - 18 MG/DL    Creatinine 1.78 (H) 0.6 - 1.3 MG/DL    BUN/Creatinine ratio 51 (H) 12 - 20      GFR est AA 33 (L) >60 ml/min/1.73m2    GFR est non-AA 27 (L) >60 ml/min/1.73m2    Calcium 10.9 (H) 8.5 - 10.1 MG/DL    Bilirubin, total 1.6 (H) 0.2 - 1.0 MG/DL    ALT (SGPT) 64 (H) 13 - 56 U/L    AST (SGOT) 77 (H) 10 - 38 U/L    Alk.  phosphatase 190 (H) 45 - 117 U/L    Protein, total 7.0 6.4 - 8.2 g/dL    Albumin 3.7 3.4 - 5.0 g/dL    Globulin 3.3 2.0 - 4.0 g/dL    A-G Ratio 1.1 0.8 - 1.7     PROCALCITONIN    Collection Time: 03/16/22  4:35 AM   Result Value Ref Range    Procalcitonin 2.02 ng/mL   CBC W/O DIFF    Collection Time: 03/16/22  4:35 AM   Result Value Ref Range    WBC 11.1 4.6 - 13.2 K/uL    RBC 3.01 (L) 4.20 - 5.30 M/uL    HGB 9.0 (L) 12.0 - 16.0 g/dL    HCT 29.0 (L) 35.0 - 45.0 %    MCV 96.3 78.0 - 100.0 FL    MCH 29.9 24.0 - 34.0 PG    MCHC 31.0 31.0 - 37.0 g/dL    RDW 14.7 (H) 11.6 - 14.5 %    PLATELET 896 247 - 870 K/uL    MPV 11.4 9.2 - 11.8 FL    NRBC 0.0 0  WBC    ABSOLUTE NRBC 0.00 0.00 - 0.01 K/uL   TSH 3RD GENERATION    Collection Time: 03/16/22  4:35 AM   Result Value Ref Range    TSH 0.38 0.36 - 3.74 uIU/mL   NT-PRO BNP    Collection Time: 03/16/22  4:35 AM   Result Value Ref Range    NT pro-BNP 12,435 (H) 0 - 1,800 PG/ML   GLUCOSE, POC    Collection Time: 03/16/22  5:10 AM   Result Value Ref Range    Glucose (POC) 67 (L) 70 - 110 mg/dL   GLUCOSE, POC    Collection Time: 03/16/22  6:19 AM   Result Value Ref Range    Glucose (POC) 135 (H) 70 - 110 mg/dL   GLUCOSE, POC    Collection Time: 03/16/22 10:27 AM   Result Value Ref Range    Glucose (POC) 148 (H) 70 - 110 mg/dL   BLOOD GAS,LACTIC ACID, POC    Collection Time: 03/16/22 10:30 AM   Result Value Ref Range    Device: NASAL CANNULA      FIO2 (POC) 32 %    pH (POC) 7.39 7.35 - 7.45      pCO2 (POC) 55.3 (H) 35.0 - 45.0 MMHG    pO2 (POC) 123 (H) 80 - 100 MMHG    HCO3 (POC) 33.2 (H) 22 - 26 MMOL/L    sO2 (POC) 98.6 (H) 92 - 97 %    Base excess (POC) 7.0 mmol/L    Allens test (POC) Positive      Site RIGHT RADIAL      Specimen type (POC) ARTERIAL      Performed by Benigno Gonzalez     Lactic Acid (POC) 0.74 0.40 - 2.00 mmol/L         Chemistry Recent Labs     03/16/22  0435 03/15/22  0437 03/14/22  1015 03/14/22  0420 03/14/22  0420   * 107*  --   --  133*    142  --   --  145   K 3.5 4.8 3.3*   < > 3.3*    104  --   --  105   CO2 31 35*  --   --  35*   BUN 91* 93*  --   --  96*   CREA 1.78* 1.89*  --   --  1.97*   CA 10.9* 10.8*  --   --  9.7   MG 2.4 2.3  --   --  2.2   AGAP 7 3  --   --  5   BUCR 51* 49*  --   --  49*   * 133*  --   --  140*   TP 7.0 6.9  --   --  5.4*   ALB 3.7 3.5  --   --  2.7*   GLOB 3.3 3.4  --   --  2.7   AGRAT 1.1 1.0  --   --  1.0    < > = values in this interval not displayed. Lactic Acid No results found for: LAC  No results for input(s): LAC in the last 72 hours. Liver Enzymes Protein, total   Date Value Ref Range Status   03/16/2022 7.0 6.4 - 8.2 g/dL Final     Albumin   Date Value Ref Range Status   03/16/2022 3.7 3.4 - 5.0 g/dL Final     Globulin   Date Value Ref Range Status   03/16/2022 3.3 2.0 - 4.0 g/dL Final     A-G Ratio   Date Value Ref Range Status   03/16/2022 1.1 0.8 - 1.7   Final     Alk. phosphatase   Date Value Ref Range Status   03/16/2022 190 (H) 45 - 117 U/L Final     Recent Labs     03/16/22  0435 03/15/22  0437 03/14/22  0420   TP 7.0 6.9 5.4*   ALB 3.7 3.5 2.7*   GLOB 3.3 3.4 2.7   AGRAT 1.1 1.0 1.0   * 133* 140*        CBC w/Diff Recent Labs     03/16/22  0435 03/15/22  0437 03/14/22  0420   WBC 11.1 10.1 9.1   RBC 3.01* 2.91* 2.67*   HGB 9.0* 9.2* 8.4*   HCT 29.0* 29.2* 27.0*    209 173   GRANS  --  77* 73   LYMPH  --  10* 12*   EOS  --  1 1        Cardiac Enzymes No results found for: CPK, CK, CKMMB, CKMB, RCK3, CKMBT, CKNDX, CKND1, FARA, TROPT, TROIQ, ADOLFO, TROPT, TNIPOC, BNP, BNPP     BNP No results found for: BNP, BNPP, XBNPT     Coagulation No results for input(s): PTP, INR, APTT, INREXT, INREXT in the last 72 hours.       Thyroid  Lab Results   Component Value Date/Time    TSH 0.38 03/16/2022 04:35 AM       No results found for: T4     Urinalysis Lab Results   Component Value Date/Time    Color YELLOW 03/02/2022 09:04 PM    Appearance CLOUDY 03/02/2022 09:04 PM    Specific gravity 1.021 03/02/2022 09:04 PM    pH (UA) 5.0 03/02/2022 09:04 PM    Protein 300 (A) 03/02/2022 09:04 PM    Glucose Negative 03/02/2022 09:04 PM    Ketone TRACE (A) 03/02/2022 09:04 PM    Bilirubin Negative 03/02/2022 09:04 PM    Urobilinogen 1.0 03/02/2022 09:04 PM    Nitrites Negative 03/02/2022 09:04 PM    Leukocyte Esterase Negative 03/02/2022 09:04 PM    Epithelial cells 2+ 03/02/2022 09:04 PM    Bacteria FEW (A) 03/02/2022 09:04 PM    WBC 0 to 3 03/02/2022 09:04 PM    RBC 0 to 3 03/02/2022 09:04 PM        Micro  Recent Labs     03/14/22  0424 03/14/22 0419   CULT NO GROWTH 2 DAYS NO GROWTH 2 DAYS     Recent Labs     03/14/22 0420 03/14/22 0419   CULT NO GROWTH 2 DAYS NO GROWTH 2 DAYS          Culture data during this hospitalization.    All Micro Results     Procedure Component Value Units Date/Time    CULTURE, BLOOD [[id number]] Collected: 03/14/22 0419    Order Status: Completed Specimen: Blood Updated: 03/16/22 0721     Special Requests: NO SPECIAL REQUESTS        Culture result: NO GROWTH 2 DAYS       CULTURE, BLOOD [[id number]] Collected: 03/14/22 0420    Order Status: Completed Specimen: Blood Updated: 03/16/22 0721     Special Requests: NO SPECIAL REQUESTS        Culture result: NO GROWTH 2 DAYS       CULTURE, RESPIRATORY/SPUTUM/BRONCH Mgadaleno Moran STAIN [[id number]]  (Abnormal)  (Susceptibility) Collected: 03/10/22 1630    Order Status: Completed Specimen: Sputum from Endotracheal aspirate Updated: 03/13/22 0905     Special Requests: NO SPECIAL REQUESTS        GRAM STAIN NO WBC'S SEEN         NO EPITHELIAL CELLS SEEN         NO ORGANISMS SEEN        Culture result:       LIGHT ENTEROBACTER CLOACAE COMPLEX                  HEAVY NORMAL RESPIRATORY ELSIE          CULTURE, RESPIRATORY/SPUTUM/BRONCH Arnold Whalen [[id number]] Collected: 03/10/22 1915    Order Status: Canceled Specimen: Sputum from Endotracheal aspirate     CULTURE, BLOOD [[id number]] Collected: 03/02/22 2048    Order Status: Completed Specimen: Blood Updated: 03/08/22 0655     Special Requests: NO SPECIAL REQUESTS        Culture result: NO GROWTH 6 DAYS       CULTURE, BLOOD [[id number]] Collected: 03/02/22 2048    Order Status: Completed Specimen: Blood Updated: 03/08/22 0655     Special Requests: NO SPECIAL REQUESTS        Culture result: NO GROWTH 6 DAYS       CULTURE, URINE [[id number]]  (Abnormal)  (Susceptibility) Collected: 03/02/22 2104    Order Status: Completed Specimen: Cath Urine Updated: 03/05/22 1226     Special Requests: NO SPECIAL REQUESTS        Thayer Count -- >100,000  COLONIES/mL       Culture result: KLEBSIELLA PNEUMONIAE       COVID-19 RAPID TEST [[id number]] Collected: 03/02/22 2025    Order Status: Completed Specimen: Nasopharyngeal Updated: 03/02/22 2052     Specimen source Nasopharyngeal        COVID-19 rapid test Not detected        Comment: Rapid Abbott ID Now       Rapid NAAT:  The specimen is NEGATIVE for SARS-CoV-2, the novel coronavirus associated with COVID-19. Negative results should be treated as presumptive and, if inconsistent with clinical signs and symptoms or necessary for patient management, should be tested with an alternative molecular assay. Negative results do not preclude SARS-CoV-2 infection and should not be used as the sole basis for patient management decisions. This test has been authorized by the FDA under an Emergency Use Authorization (EUA) for use by authorized laboratories. Fact sheet for Healthcare Providers: iTendency.uy  Fact sheet for Patients: iTendency.uy       Methodology: Isothermal Nucleic Acid Amplification         INFLUENZA A & B AG (RAPID TEST) [[id number]] Collected: 03/02/22 2025    Order Status: Completed Specimen: Nasopharyngeal from Nasal washing Updated: 03/02/22 2047     Influenza A Antigen Negative        Comment: A negative result does not exclude influenza virus infection, seasonal or H1N1 due to suboptimal sensitivity. If influenza is circulating in your community, a diagnosis of influenza should be considered based on a patients clinical presentation and empiric antiviral treatment should be considered, if indicated. Influenza B Antigen Negative                NM LUNG SCAN PERF  Result Date: 3/3/2022  EXAM: NM LUNG SCAN PERF. CLINICAL INDICATION/HISTORY: d dimer increase dyspnea. -Additional: Clinical concern for pulmonary embolus. COMPARISON: Correlation is made with the radiographic study performed one day prior.  TECHNIQUE: 7.2 mCi Tc-99m MAA was injected intravenously and the 8 standard views of the chest were obtained. This perfusion only examination is interpreted using the PISAPED criteria. _______________ FINDINGS: Perfusion images show no segmental perfusion defects to suggest the presence of pulmonary embolism. _______________     o scintigraphic findings to suggest the presence of acute pulmonary embolism. _______________       XR HIP RT W OR WO PELV 2-3 VWS  Result Date: 3/7/2022  EXAM: RIGHT HIP RADIOGRAPHS CLINICAL INDICATION/HISTORY: right hip pain -Additional: None COMPARISON: May March 3, 2022. TECHNIQUE: AP pelvis and frog-leg lateral view of right hip. _______________ FINDINGS: BONES: Intact appearing ilioischial and iliopectineal lines. There is a displaced and impacted subcapital right femoral neck fracture, with mild progressive displacement on follow-up imaging. Mild-moderate bilateral hip joint arthrosis. SOFT TISSUES: Unremarkable. _______________     1. Displaced and impacted subcapital right femoral neck fracture, increased in displacement from CT performed 4 days prior. CT HEAD WO CONT  Result Date: 3/7/2022  EXAM: CT head INDICATION: Altered mental status. COMPARISON: 4/23/2021. TECHNIQUE: Axial CT imaging of the head was performed without intravenous contrast. Standard multiplanar coronal and sagittal reformatted images were obtained and are included in interpretation. One or more dose reduction techniques were used on this CT: automated exposure control, adjustment of the mAs and/or kVp according to patient size, and iterative reconstruction techniques. The specific techniques used on this CT exam have been documented in the patient's electronic medical record.   Digital Imaging and Communications in Medicine (DICOM) format image data are available to nonaffiliated external healthcare facilities or entities on a secure, media free, reciprocally searchable basis with patient authorization for at least a 12-month period after this study. _______________ FINDINGS: BRAIN AND POSTERIOR FOSSA: Periventricular white matter hypoattenuation which is nonspecific but likely represents chronic ischemic changes. No evidence of acute large vessel transcortical infarct or acute parenchymal hemorrhage. No midline shift or hydrocephalus. EXTRA-AXIAL SPACES AND MENINGES: There are no abnormal extra-axial fluid collections. CALVARIUM: Intact. SINUSES: Clear. OTHER: None. _______________     No acute intracranial abnormality. CT CHEST ABD PELV WO CONT  Result Date: 3/3/2022  EXAM: CT CHEST, ABDOMEN AND PELVIS CLINICAL INDICATION/HISTORY: Hypoxemia, retrocardiac density, diarrhea, weakness and shortness of breath COMPARISON: 3/2/2022 TECHNIQUE: Axial CT imaging of the chest, abdomen, and pelvis was performed without intravenous contrast. Multiplanar reformats were generated. One or more dose reduction techniques were used on this CT: automated exposure control, adjustment of the mAs and/or kVp according to patient size, and iterative reconstruction techniques. The specific techniques used on this CT exam have been documented in the patient's electronic medical record. Digital Imaging and Communications in Medicine (DICOM) format image data are available to nonaffiliated external healthcare facilities or entities on a secure, media free, reciprocally searchable basis with patient authorization for at least a 12-month period after this study. ________________ FINDINGS: LIMITATIONS:   > Suboptimal evaluation given lack of intravenous contrast.   > Motion artifact is present which limits evaluation.   > Suboptimal exam due to patient body habitus and arms by the side. CHEST: LUNGS: Respiratory motion significantly limits evaluation. Interlobar septal thickening in the upper lobes. Mild bilateral dependent atelectasis. No large consolidation or suspicious pulmonary mass. PLEURA: Very small volume bilateral pleural effusions. AIRWAY: Normal. MEDIASTINUM: Four-chamber cardiomegaly with asymmetric biatrial enlargement, mitral and aortic annular calcifications. The main pulmonary artery is enlarged suggesting pulmonary arterial hypertension. Normal caliber aorta with scattered calcified atherosclerotic plaque. No pericardial effusion. LYMPH NODES: No enlarged lymph nodes. CHEST WALL: Diffuse anasarca. Heterogeneous thyroid. _______________ ABDOMEN/PELVIS: LIVER: No enhancing hepatic mass. The portal and hepatic veins are patent. BILIARY: Gallstones are present in the nondistended gallbladder lumen. No biliary dilation. SPLEEN: Normal. PANCREAS: Normal. ADRENALS: Left adrenal gland measures 9 HU (axial image 76), most consistent with lipid rich adenoma. Normal right adrenal gland. KIDNEYS: Asymmetrically small left kidney. No rate of a stone. No hydronephrosis. GI TRACT:  A small hiatal hernia is present. Normal caliber small and large bowel loops. No morphology of bowel obstruction. Normal appendix. BLADDER: Diffuse wall thickening in the largely decompressed bladder. PELVIC ORGANS: No acute abnormality. VASCULATURE: No arterial aneurysm. 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 LYMPH NODES: No mesenteric or retroperitoneal lymphadenopathy. OTHER: No free intraperitoneal air. No ascites. Diffuse anasarca. OSSEOUS STRUCTURES: No acute osseous abnormality. Multilevel degenerative changes most pronounced in the lumbar spine. Degenerative changes in both shoulders (right greater than left). Please note the included portions of the upper extremities are not well evaluated on this study _______________     1. Findings of congestive heart failure with cardiomegaly, interstitial edema, very small bilateral pleural effusions, and diffuse anasarca. 2.  Bladder wall thickening may be due to underdistention though superimposed infection cannot be excluded. Recommend correlation for cystitis.  3.  Osseous degenerative changes and additional findings, as detailed in the body of the report. Hip x-ray 3/11/2022:  Status post total right hip arthroplasty, without complication. ECHO ADULT COMPLETE  Result Date: 3/3/2022    Left Ventricle: Left ventricle size is normal. Moderately increased wall thickness. Findings consistent with moderate concentric hypertrophy. Normal wall motion. Normal left ventricular systolic function with a visually estimated EF of 55 - 60%.   Left Atrium: Left atrium is mildly dilated.   Right Ventricle: Right ventricle is mildly dilated.   Right Atrium: Right atrium is mildly dilated.   Pulmonary artery :  Estimated pulmonary arterial systolic pressure is 51 mmhg. Pulmonary hypertension found to be moderate   Mitral Valve: Moderately thickened leaflet. Mildly calcified leaflet. Moderate annular calcification of the mitral valve.   IVC/SVC : IVC diameter is greater than 21 mm and decreases less than 50% during inspiration; therefore the estimated right atrial pressure is elevated (~15 mmHg). IVC is dilated. Consider LORRAINE for better evaluation of mitral valve if clinically indicated. DUPLEX LOWER EXT VENOUS BILAT  Result Date: 3/4/2022  · No evidence of deep vein thrombosis in the right lower extremity. · No evidence of deep vein thrombosis in the left lower extremity. USG retroperitoneum 3/12/2022: No hydronephrosis. ECHO 3/3/2022: Result status: Final result       Left Ventricle: Left ventricle size is normal. Moderately increased wall thickness. Findings consistent with moderate concentric hypertrophy. Normal wall motion. Normal left ventricular systolic function with a visually estimated EF of 55 - 60%.   Left Atrium: Left atrium is mildly dilated.   Right Ventricle: Right ventricle is mildly dilated.   Right Atrium: Right atrium is mildly dilated.   Pulmonary artery :  Estimated pulmonary arterial systolic pressure is 51 mmhg.   Pulmonary hypertension found to be moderate    Mitral Valve: Moderately thickened leaflet. Mildly calcified leaflet. Moderate annular calcification of the mitral valve.   IVC/SVC : IVC diameter is greater than 21 mm and decreases less than 50% during inspiration; therefore the estimated right atrial pressure is elevated (~15 mmHg). IVC is dilated.     Consider LORRAINE for better evaluation of mitral valve if clinically indicated. Images report reviewed by me:  CT (Most Recent) (CT chest reviewed by me) Results from Hospital Encounter encounter on 03/02/22    CT HEAD WO CONT    Narrative  EXAM: CT head    INDICATION: Headache. COMPARISON: 3/7/2022    TECHNIQUE: Axial CT imaging of the head was performed without intravenous  contrast. Standard multiplanar coronal and sagittal reformatted images were  obtained and are included in interpretation. One or more dose reduction techniques were used on this CT: automated exposure  control, adjustment of the mAs and/or kVp according to patient size, and  iterative reconstruction techniques. The specific techniques used on this CT  exam have been documented in the patient's electronic medical record. Digital  Imaging and Communications in Medicine (DICOM) format image data are available  to nonaffiliated external healthcare facilities or entities on a secure, media  free, reciprocally searchable basis with patient authorization for at least a  12-month period after this study. _______________    FINDINGS:    BRAIN AND POSTERIOR FOSSA: No evidence of acute large vessel transcortical  infarct or acute parenchymal hemorrhage. No midline shift or hydrocephalus. EXTRA-AXIAL SPACES AND MENINGES: There are no abnormal extra-axial fluid  collections. CALVARIUM: Intact. SINUSES: Clear. OTHER: None.    _______________    Impression  No acute intracranial abnormality. CXR reviewed by me:  XR (Most Recent).  CXR  reviewed by me and compared with previous CXR Results from [facility] Encounter encounter on 03/02/22    XR CHEST PORT    Narrative  EXAM: XR CHEST PORT    CLINICAL INDICATION/HISTORY: hypoxia  -Additional: None    COMPARISON: One day prior    TECHNIQUE: Frontal view of the chest    _______________    FINDINGS:    HEART AND MEDIASTINUM: Cardiomegaly. LUNGS AND PLEURAL SPACES: Small layering right effusion/atelectasis. Prominent  central vasculature. No pneumothorax. BONY THORAX AND SOFT TISSUES: No acute osseous abnormality    _______________    Impression  Cardiomegaly. Small layering right effusion/atelectasis. Prominent central vasculature. CXR 3/13/2022:  FINDINGS:  SUPPORT DEVICES: Endotracheal tube and visualized gastric tube both project in  stable position from prior exam.     HEART AND MEDIASTINUM: Enlarged appearing cardiac silhouette with stable  mediastinal contours.     LUNGS AND PLEURAL SPACES: Faint/hazy right lower lung zone opacity. No  pneumothorax or pleural effusion demonstrated.     BONY THORAX AND SOFT TISSUES: Unremarkable.  ______________     IMPRESSION  1. Support devices in stable position. 2. Mild interval increase in right basilar atelectasis and likely small right  pleural effusion. 3. Cardiomegaly, as previously. ·Please note: Voice-recognition software may have been used to generate this report, which may have resulted in some phonetic-based errors in grammar and contents. Even though attempts were made to correct all the mistakes, some may have been missed, and remained in the body of the document.       Leatha Byrne MD  3/16/2022

## 2022-03-16 NOTE — PROGRESS NOTES
Problem: Mobility Impaired (Adult and Pediatric)  Goal: *Acute Goals and Plan of Care (Insert Text)  Description: In one week:  1. Pt will transfer supine <> sitting EOB min assist for increased ability to sit upright during meal times. 2. Pt will transfer sit <> stand min assist for improved lower extremity strengthening. 3. Pt will roll in bed in both directions for increased independence with pressure relief. 4. Pt will sit EOB unsupported for > 5 minutes without LOB for increased safety and improved sitting balance. Outcome: Progressing Towards Goal   PHYSICAL THERAPY TREATMENT    Patient: Marilee Wiley (27 y.o. female)  Date: 3/16/2022  Diagnosis: Acute exacerbation of CHF (congestive heart failure) ([facility]) [I50.9] Acute respiratory failure with hypoxemia ([facility])  Procedure(s) (LRB):  RIGHT TOTAL HIP ARTHROPLASTY WITH C-ARM  (PT IN ROOM # 358) (Right) 6 Days Post-Op  Precautions: Fall,WBAT (R LE)  PLOF: ambulatory with a rollator    ASSESSMENT:  Pt has been extubated. Minimal talking by pt, mostly head nodding \"no. \" daughter present throughout session. Max/totalA to sit up EOB, total to scoot hips around. Pt makes very little effort to move. Max verbal and manual cueing for UE placement to support self in sitting, little to no carryover. PROM LAQs performed at EOB. SpO2 decreased to 84%, with nasal cannula. Nurse assisted with returning pt to supine. SpO2 quickly increased to the 90s. Attempted to get pt to move the unaffected LE in bed without luck. Progression toward goals:   []      Improving appropriately and progressing toward goals  [x]      Improving slowly and progressing toward goals  []      Not making progress toward goals and plan of care will be adjusted     PLAN:  Patient continues to benefit from skilled intervention to address the above impairments. Continue treatment per established plan of care.   Discharge Recommendations:  Skilled Nursing Facility/LTC  Further Equipment Recommendations for Discharge:  TBD     SUBJECTIVE:   Patient stated .    OBJECTIVE DATA SUMMARY:   Critical Behavior:  Neurologic State: Confused,Eyes open spontaneously,Drowsy  Orientation Level: Oriented to person,Disoriented to place,Disoriented to time  Cognition: Follows commands  Safety/Judgement: Awareness of environment  Functional Mobility Training:  Bed Mobility:    Supine to Sit: Maximum assistance; Total assistance  Sit to Supine: Total assistance;Assist x2  Scooting: Total assistance  Balance:  Sitting: Impaired; With support; Without support  Sitting - Static: Poor (constant support)  Sitting - Dynamic: Poor (constant support)  Therapeutic Exercises:         EXERCISE   Sets   Reps   Active Active Assist   Passive Self ROM   Comments   Ankle Pumps    [] [] [x] []    Quad Sets/Glut Sets    [] [] [] [] Hold for 5 secs   Hamstring Sets   [] [] [] []    Short Arc Quads   [] [] [] []    Heel Slides   [] [] [] []    Straight Leg Raises   [] [] [] []    Hip Add   [] [] [] [] Hold for 5 secs, w/ pillow squeeze   Long Arc Quads   [] [] [x] []    Seated Marching   [] [] [] []    Standing Marching   [] [] [] []       [] [] [] []        Pain:  Pain level pre-treatment: 0/10  Pain level post-treatment: 0/10   Pain Intervention(s): Medication (see MAR); Rest, Ice, Repositioning   Response to intervention: Nurse notified, See doc flow    Activity Tolerance:   poor  Please refer to the flowsheet for vital signs taken during this treatment. After treatment:   [] Patient left in no apparent distress sitting up in chair  [x] Patient left in no apparent distress in bed  [x] Call bell left within reach  [x] Nursing notified  [x] Caregiver present  [] Bed alarm activated  [x] SCDs applied      COMMUNICATION/EDUCATION:   [x]         Role of Physical Therapy in the acute care setting. []         Fall prevention education was provided and the patient/caregiver indicated understanding.   []         Patient/family have participated as able in working toward goals and plan of care. []         Patient/family agree to work toward stated goals and plan of care. []         Patient understands intent and goals of therapy, but is neutral about his/her participation.   []         Patient is unable to participate in stated goals/plan of care: ongoing with therapy staff.  []         Other:        Demetria Jones, PTA   Time Calculation: 24 mins

## 2022-03-16 NOTE — PROGRESS NOTES
1930- Report and care received, assessment completed per flow sheet. Alert, oriented to self and being in the hospital, however, states she is at [place]. Attempted to reorient. NAD.    2354- FSBS= 25, D10 administered per protocol. Reassessment completed and without change. Remains alert, oriented to self, denies pain. 0005- Rechecked glucose when D10 still had approximately 50 ml to infuse. FSBS= 183. Buck figueroa MD to place IVF orders. 0021- FSBS= 145    0155- FSBS= 51 and 47 upon repeat, NAD. D10 administered per protocol. 0211- Dr.Caldwell marvin MD to place order. 0214- FSBS= 219    0400- Reassessment without change.

## 2022-03-16 NOTE — PROGRESS NOTES
Cardiology Progress Note        Patient: Kendall Dia        Sex: female          DOA: 3/2/2022  YOB: 1940      Age:  80 y.o.        LOS:  LOS: 14 days   Assessment/Plan     Principal Problem:    Acute respiratory failure with hypoxemia ([address] (3/2/2022)    Active Problems:    Elevated troponin (4/23/2021)      HTN (hypertension) (4/23/2021)      Acute on chronic diastolic congestive heart failure (HCC) (4/23/2021)      Atrial fibrillation ([address] (3/2/2022)      Stage 3 chronic kidney disease ([address] (3/3/2022)      SERENA (obstructive sleep apnea) (3/3/2022)      Adult BMI 39.0-39.9 kg/sq m (3/3/2022)      Fracture of neck of right femur ([address] (3/8/2022)      Hypokalemia (3/14/2022)        Plan:    Blood pressure is intermittently low normal  Coreg is on hold  Increase midodrine from 5 mg twice daily to 3 times daily  Discussed with patient and daughter    Extubated  Denied any symptoms  Blood pressure is low normal  Hold carvedilol  Echocardiogram done with preserved LV function, severe biatrial enlargement moderate MR, TR, pulmonary hypertension  Continue with Lasix  Discussed with patient and daughter        Patient remained intubated  Cardiac telemetry rate controlled A. fib with occasional PVCs, saturating around 100%  Tolerating carvedilol 3.125 mg twice daily and Eliquis 2.5 mg twice daily  Ventilatory management as per pulmonary/intensivist  Discussed with patient's daughter in the room  Continue with current cardiac medications.       Patient remained intubated  Hemodynamically stable  A. fib rate controlled  Discussed with patient's daughter Va Dacosta on phone    Patient is status post hip surgery  Patient is intubated due to difficulty in breathing and hypoxemia  A. fib rate controlled  Discussed with Dr. Gwen Lee with the current medical treatment        Patient denied chest pain or shortness of breath   Atrial fibrillation rate controlled   Patient is scheduled for hip surgery tomorrow  Discussed with patient's daughter  Resume anticoagulation after surgery. Patient is diagnosed with hip fracture and being scheduled for surgery  I have long lengthy discussion with patient and daughter Yovany Quispe in the room  Patient have negative stress test in April 2021  EF is stable  Patient is with elevated but acceptable risk for hip surgery  Eliquis can be hold  Consider DVT prophylaxis anticoagulation from tomorrow patient have taken morning dose of Eliquis      Patient denied any chest pain   Mitral calcification without stenosis  Continue with current medical treatment    Patient continues to do well. H&H stable  Patient will need diuretic on discharge probably Lasix 40 mg twice daily  Discussed with patient and family in the room      A. fib rate controlled  Patient will need p.o. Lasix on discharge  Continue Eliquis 2.5 mg twice daily  Discussed with patient and family      Continue with Lasix 40 mg twice daily  I have long lengthy discussion with the patient and daughter about anticoagulation both of them agreed with low-dose Eliquis 2.5 mg twice daily  DC Lovenox   start Eliquis 2.5 mg twice daily from a.m. Discussed with patient and daughter findings of echocardiogram including mitral valve disease, prefer not to do transesophageal echocardiogram  Continue with current medical treatment                        Subjective:    cc:      Shortness of breath  A. fib        REVIEW OF SYSTEMS:     Extubated and no complaints    Objective:      Visit Vitals  BP (!) 96/59   Pulse 78   Temp 98.2 °F (36.8 °C)   Resp 14   Ht 5' 5\" (1.651 m)   Wt 113.5 kg (250 lb 3.6 oz)   SpO2 99%   Breastfeeding No   BMI 41.64 kg/m²     Body mass index is 41.64 kg/m². Physical Exam:  General Appearance: Comfortable, extubated  NECK: No JVD, no thyroidomeglay. LUNGS: Clear bilaterally.    HEART: S1 irregular +S2 audible,    ABD: Non-tender, BS Audible    EXT: No edema, and no cysnosis. VASCULAR EXAM: Pulses are intact. PSYCHIATRIC EXAM: Mood is appropriate.     Medication:  Current Facility-Administered Medications   Medication Dose Route Frequency    furosemide (LASIX) injection 20 mg  20 mg IntraVENous BID    [START ON 3/17/2022] midodrine (PROAMATINE) tablet 5 mg  5 mg Oral TID WITH MEALS    arformoteroL (BROVANA) neb solution 15 mcg  15 mcg Nebulization BID RT    cefepime (MAXIPIME) 1 g in sterile water (preservative free) 10 mL IV syringe  1 g IntraVENous Q12H    famotidine (PF) (PEPCID) 20 mg in 0.9% sodium chloride 10 mL injection  20 mg IntraVENous DAILY    sodium chloride (NS) flush 5-40 mL  5-40 mL IntraVENous PRN    acetaminophen (TYLENOL) tablet 650 mg  650 mg Oral Q6H    naloxone (NARCAN) injection 0.4 mg  0.4 mg IntraVENous PRN    ferrous sulfate tablet 325 mg  1 Tablet Oral TID    diphenhydrAMINE (BENADRYL) capsule 25 mg  25 mg Oral Q4H PRN    bisacodyL (DULCOLAX) suppository 10 mg  10 mg Rectal DAILY PRN    ELECTROLYTE REPLACEMENT PROTOCOL - Magnesium   1 Each Other PRN    ELECTROLYTE REPLACEMENT PROTOCOL - Calcium   1 Each Other PRN    ELECTROLYTE REPLACEMENT PROTOCOL  - Phosphorus Renal Dosing  1 Each Other PRN    ELECTROLYTE REPLACEMENT PROTOCOL - Potassium Renal Dosing  1 Each Other PRN    midazolam (VERSED) injection 1 mg  1 mg IntraVENous Q2H PRN    fentaNYL citrate (PF) injection 25 mcg  25 mcg IntraVENous Q4H PRN    nystatin (MYCOSTATIN) 100,000 unit/mL oral suspension 500,000 Units  500,000 Units Oral QID    apixaban (ELIQUIS) tablet 2.5 mg  2.5 mg Oral BID    sodium chloride (NS) flush 5-40 mL  5-40 mL IntraVENous Q8H    sodium chloride (NS) flush 5-40 mL  5-40 mL IntraVENous PRN    acetaminophen (TYLENOL) tablet 650 mg  650 mg Oral Q6H PRN    Or    acetaminophen (TYLENOL) suppository 650 mg  650 mg Rectal Q6H PRN    polyethylene glycol (MIRALAX) packet 17 g  17 g Oral DAILY PRN    ondansetron (ZOFRAN ODT) tablet 4 mg  4 mg Oral Q8H PRN    Or    ondansetron (ZOFRAN) injection 4 mg  4 mg IntraVENous Q6H PRN    [Held by provider] carvediloL (COREG) tablet 3.125 mg  3.125 mg Oral BID WITH MEALS    aspirin delayed-release tablet 81 mg  81 mg Oral DAILY               Lab/Data Reviewed:  Procedures/imaging: see electronic medical records for all procedures/Xrays   and details which were not copied into this note but were reviewed prior to creation of Plan       All lab results for the last 24 hours reviewed. Recent Labs     03/16/22  0435 03/15/22  0437 03/14/22  0420   WBC 11.1 10.1 9.1   HGB 9.0* 9.2* 8.4*   HCT 29.0* 29.2* 27.0*    209 173     Recent Labs     03/16/22  0435 03/15/22  0437 03/14/22  1015 03/14/22  0420 03/14/22 0420    142  --   --  145   K 3.5 4.8 3.3*   < > 3.3*    104  --   --  105   CO2 31 35*  --   --  35*   * 107*  --   --  133*   BUN 91* 93*  --   --  96*   CREA 1.78* 1.89*  --   --  1.97*   CA 10.9* 10.8*  --   --  9.7    < > = values in this interval not displayed. RADIOLOGY:  CT Results  (Last 48 hours)    None        CXR Results  (Last 48 hours)               03/16/22 0900  XR CHEST PORT Final result    Impression:      Cardiomegaly. Small layering right effusion/atelectasis. Prominent central vasculature. Narrative:  EXAM: XR CHEST PORT       CLINICAL INDICATION/HISTORY: hypoxia   -Additional: None       COMPARISON: One day prior       TECHNIQUE: Frontal view of the chest       _______________       FINDINGS:       HEART AND MEDIASTINUM: Cardiomegaly. LUNGS AND PLEURAL SPACES: Small layering right effusion/atelectasis. Prominent   central vasculature. No pneumothorax. BONY THORAX AND SOFT TISSUES: No acute osseous abnormality       _______________           03/15/22 8240  XR CHEST PORT Final result    Impression:      1. Support devices in stable/appropriate position as visualized. 2. Mild cardiac enlargement, unchanged.        3. Hazy right lung base opacity, favored a combination of atelectasis and   posteriorly layering pleural effusion, stable to minimally increased. Narrative:  EXAM: XR CHEST PORT       CLINICAL INDICATION/HISTORY: intubated   -Additional: None       COMPARISON: 1 day prior       TECHNIQUE: Portable frontal view of the chest       _______________       FINDINGS:       SUPPORT DEVICES: ET tube and visualized gastric tube both project in stable   position. HEART AND MEDIASTINUM: Enlarged appearing cardiac silhouette with stable   mediastinal contours. Mild prominence of the central pulmonary vessels without   gross evidence for vascular congestion. LUNGS AND PLEURAL SPACES: No pneumothorax. Hazy right lung base opacity, stable   to minimally increased. Minor left basilar atelectasis. No dense consolidation.        BONY THORAX AND SOFT TISSUES: Unremarkable.       _______________                   Cardiology Procedures:   Results for orders placed or performed during the hospital encounter of 03/02/22   EKG, 12 LEAD, INITIAL   Result Value Ref Range    Ventricular Rate 72 BPM    Atrial Rate 340 BPM    QRS Duration 86 ms    Q-T Interval 374 ms    QTC Calculation (Bezet) 409 ms    Calculated R Axis -2 degrees    Calculated T Axis -3 degrees    Diagnosis       Atrial fibrillation with a competing junctional pacemaker  Abnormal ECG  When compared with ECG of 02-MAR-2022 20:12,  No significant change was found        Echo Results  (Last 48 hours)    None       Cardiolite (Tc-99m Sestamibi) stress test    Signed By: Isaak Ramírez MD     March 16, 2022

## 2022-03-16 NOTE — PROGRESS NOTES
Problem: Self Care Deficits Care Plan (Adult)  Goal: *Acute Goals and Plan of Care (Insert Text)  Description: Occupational Therapy Goals  Initiated 3/16/2022 within 7 day(s). 1.  Patient will perform grooming with maximal assistance seated EOB. 2.  Patient will perform upper body dressing with maximal assistance. 3.  Patient will perform self-feeding with moderate assistance . 4. Patient will perform toilet transfers with maximal assistance. 5.  Patient will perform all aspects of toileting with maximal assistance. 6.  Patient will participate in upper extremity therapeutic exercise/activities with maximal assistance for 10 minutes. 7.  Patient will utilize energy conservation techniques during functional activities with verbal, visual, and tactile cues. OCCUPATIONAL THERAPY EVALUATION    Patient: Connie Andres (56 y.o. female)  Date: 3/16/2022  Primary Diagnosis: Acute exacerbation of CHF (congestive heart failure) ([facility]) [I50.9]  Procedure(s) (LRB):  RIGHT TOTAL HIP ARTHROPLASTY WITH C-ARM  (PT IN ROOM # 358) (Right) 6 Days Post-Op   Precautions:   Fall,WBAT  PLOF:     ASSESSMENT :  Based on the objective data described below, the patient presents with limited participation with therapy following above mentioned surgical procedure. Pt was intubated and on vent until 3/15 s/p surgery. Pt today was presented in long sitting in bed. Pt was drowsy, required constant verbal and tactile cues to participate with UE AA/PROM exercises. Pt was left in long sitting in bed at the end of session in NAD. Patient will benefit from skilled intervention to address the above impairments.   Patient's rehabilitation potential is considered to be Fair  Factors which may influence rehabilitation potential include:   []             None noted  [x]             Mental ability/status  [x]             Medical condition  []             Home/family situation and support systems  []             Safety awareness  [] Pain tolerance/management  []             Other:      PLAN :  Recommendations and Planned Interventions:   [x]               Self Care Training                  [x]      Therapeutic Activities  [x]               Functional Mobility Training   []      Cognitive Retraining  [x]               Therapeutic Exercises           [x]      Endurance Activities  [x]               Balance Training                    []      Neuromuscular Re-Education  []               Visual/Perceptual Training     []      Home Safety Training  [x]               Patient Education                   []      Family Training/Education  []               Other (comment):    Frequency/Duration: Patient will be followed by occupational therapy 1-2 times per day/4-7 days per week to address goals. Discharge Recommendations: [facility]  Further Equipment Recommendations for Discharge: N/A     SUBJECTIVE:   Patient stated  .    OBJECTIVE DATA SUMMARY:     Past Medical History:   Diagnosis Date    Hypertension     Sleep disorder      Past Surgical History:   Procedure Laterality Date    HX ORTHOPAEDIC      broken right ankle, left femur 30 yrs ago     Barriers to Learning/Limitations: yes;  altered mental status (i.e.Sedation, Confusion)  Compensate with: visual, verbal, tactile, kinesthetic cues/model    Home Situation:   Home Situation  Home Environment: Private residence  # Steps to Enter: 0 (Lift used to enter home)  One/Two Story Residence: One story  Living Alone: No  Support Systems: Child(doc)  Patient Expects to be Discharged to[de-identified] Other: (TBD)  Current DME Used/Available at Home: [employer] [place], rollator,Raised toilet seat,Grab bars  Tub or Shower Type: Shower  []  Right hand dominant   []  Left hand dominant    Cognitive/Behavioral Status:  Neurologic State: Drowsy; Lethargic;Eyes open spontaneously; Eyes open to stimulus  Orientation Level: Unable to verbalize  Cognition: Decreased command following;Decreased attention/concentration  Safety/Judgement: Fall prevention;Decreased awareness of environment;Decreased awareness of need for assistance;Decreased awareness of need for safety;Decreased insight into deficits    Skin: intact  Edema: moderate at LUE     Vision/Perceptual:    Coordination: BUE  Coordination: Grossly decreased, non-functional  Fine Motor Skills-Upper: Left Impaired;Right Impaired    Gross Motor Skills-Upper: Left Impaired;Right Impaired  Balance:  Sitting: Impaired; With support; Without support  Sitting - Static: Poor (constant support)  Sitting - Dynamic: Poor (constant support)  Strength: BUE  Strength: Grossly decreased, non-functional  Tone & Sensation: BUE  Tone: Normal  Sensation: Intact  Range of Motion: BUE  AROM: Grossly decreased, non-functional  PROM: Generally decreased, functional  Functional Mobility and Transfers for ADLs:  Bed Mobility:  Supine to Sit: Maximum assistance; Total assistance  Sit to Supine: Total assistance;Assist x2  Scooting: Total assistance  Transfers:  ADL Assessment:   Feeding: Total assistance    Oral Facial Hygiene/Grooming: Total assistance;Maximum assistance    Upper Body Dressing: Maximum assistance    Lower Body Dressing: Total assistance    Toileting: Total assistance  ADL Intervention:  Cognitive Retraining  Safety/Judgement: Fall prevention;Decreased awareness of environment;Decreased awareness of need for assistance;Decreased awareness of need for safety;Decreased insight into deficits  Therapeutic Exercise:  AA/PROM exercises for BUEs, x5 in all planes    Pain:  Pain level pre-treatment: 0/10   Pain level post-treatment: 0/10   Pain Intervention(s): Medication (see MAR); Rest, Ice, Repositioning   Response to intervention: Nurse notified, See doc flow    Activity Tolerance:   Poor     Please refer to the flowsheet for vital signs taken during this treatment.   After treatment:   [] Patient left in no apparent distress sitting up in chair  [x] Patient left in no apparent distress in bed  [x] Call bell left within reach  [x] Nursing notified  [x] Caregiver present  [] Bed alarm activated    COMMUNICATION/EDUCATION:   [x] Role of Occupational Therapy in the acute care setting  [] Home safety education was provided and the patient/caregiver indicated understanding. [x] Patient/family have participated as able in goal setting and plan of care. [] Patient/family agree to work toward stated goals and plan of care. [] Patient understands intent and goals of therapy, but is neutral about his/her participation. [x] Patient is unable to participate in goal setting and plan of care. Thank you for this referral.  Loren Bobby, OTR/L  Time Calculation: 18 mins    Eval Complexity: History: HIGH Complexity : Extensive review of history including physical, cognitive and psychosocial history ; Examination: HIGH Complexity : 5 or more performance deficits relating to physical, cognitive , or psychosocial skils that result in activity limitations and / or participation restrictions; Decision Making:HIGH Complexity : Patient presents with comorbidities that affect occupational performance.  Signifigant modification of tasks or assistance (eg, physical or verbal) with assessment (s) is necessary to enable patient to complete evaluation

## 2022-03-16 NOTE — PROGRESS NOTES
Pt with refractory hypoglycemia to D10 bolus and D5LR infusion. Will start D10NS and awaiting morning labs. She is on cefepime and without other signs of sepsis.

## 2022-03-16 NOTE — PROGRESS NOTES
CM notes patient remains in the ICU, O2 needs have improved from Bipap overnight to NC today. Patient's daughter at bedside, CM reviewed choices for rehab at discharge as family states that is still the plan. [address] acute rehab, [facility], and [address] are the family's preferences for rehab. Please note accepting rehab must be able to accept CPAP/Bipap patients. Care Management Interventions  PCP Verified by CM:  (Dr. Paola Stephens (female) )  Mode of Transport at Discharge: BLS (.)  Transition of Care Consult (CM Consult): SNF [facility] has stated that according to the current therapy notes, she is not Acute inpt rehab appropriate. SNF choices obtained from the pt's dtr;  [facility]. )  Partner SNF: No  Discharge Durable Medical Equipment:  (TBD)  Physical Therapy Consult: Yes  Occupational Therapy Consult: Yes  Support Systems: Child(doc)  Confirm Follow Up Transport: Family  The Plan for Transition of Care is Related to the Following Treatment Goals : skilled nursing facility  The Patient and/or Patient Representative was Provided with a Choice of Provider and Agrees with the Discharge Plan?: Yes (The pt and her dtr: Marlena)  Freedom of Choice List was Provided with Basic Dialogue that Supports the Patient's Individualized Plan of Care/Goals, Treatment Preferences and Shares the Quality Data Associated with the Providers?: Yes ([facility])  Discharge Location  Patient Expects to be Discharged to[de-identified] Other: (TBD)

## 2022-03-17 ENCOUNTER — APPOINTMENT (OUTPATIENT)
Dept: ULTRASOUND IMAGING | Age: 82
DRG: 981 | End: 2022-03-17
Attending: INTERNAL MEDICINE
Payer: MEDICARE

## 2022-03-17 PROBLEM — I95.9 HYPOTENSION: Status: ACTIVE | Noted: 2022-03-17

## 2022-03-17 LAB
ALBUMIN SERPL-MCNC: 3.3 G/DL (ref 3.4–5)
ALBUMIN/GLOB SERPL: 1.2 {RATIO} (ref 0.8–1.7)
ALP SERPL-CCNC: 187 U/L (ref 45–117)
ALT SERPL-CCNC: 60 U/L (ref 13–56)
ANION GAP SERPL CALC-SCNC: 8 MMOL/L (ref 3–18)
AST SERPL-CCNC: 53 U/L (ref 10–38)
ATRIAL RATE: 340 BPM
BILIRUB SERPL-MCNC: 1.7 MG/DL (ref 0.2–1)
BUN SERPL-MCNC: 90 MG/DL (ref 7–18)
BUN/CREAT SERPL: 55 (ref 12–20)
CA-I SERPL-SCNC: 1.36 MMOL/L (ref 1.12–1.32)
CALCIUM SERPL-MCNC: 10.4 MG/DL (ref 8.5–10.1)
CALCULATED R AXIS, ECG10: -2 DEGREES
CALCULATED T AXIS, ECG11: -3 DEGREES
CHLORIDE SERPL-SCNC: 105 MMOL/L (ref 100–111)
CO2 SERPL-SCNC: 31 MMOL/L (ref 21–32)
CREAT SERPL-MCNC: 1.63 MG/DL (ref 0.6–1.3)
DIAGNOSIS, 93000: NORMAL
GLOBULIN SER CALC-MCNC: 2.8 G/DL (ref 2–4)
GLUCOSE BLD STRIP.AUTO-MCNC: 129 MG/DL (ref 70–110)
GLUCOSE BLD STRIP.AUTO-MCNC: 143 MG/DL (ref 70–110)
GLUCOSE BLD STRIP.AUTO-MCNC: 87 MG/DL (ref 70–110)
GLUCOSE BLD STRIP.AUTO-MCNC: 95 MG/DL (ref 70–110)
GLUCOSE BLD STRIP.AUTO-MCNC: 97 MG/DL (ref 70–110)
GLUCOSE SERPL-MCNC: 101 MG/DL (ref 74–99)
LACTATE SERPL-SCNC: 1.3 MMOL/L (ref 0.4–2)
MAGNESIUM SERPL-MCNC: 2.3 MG/DL (ref 1.6–2.6)
PHOSPHATE SERPL-MCNC: 3.3 MG/DL (ref 2.5–4.9)
POTASSIUM SERPL-SCNC: 3.3 MMOL/L (ref 3.5–5.5)
POTASSIUM SERPL-SCNC: 3.6 MMOL/L (ref 3.5–5.5)
POTASSIUM SERPL-SCNC: 4.9 MMOL/L (ref 3.5–5.5)
PROCALCITONIN SERPL-MCNC: 2.31 NG/ML
PROT SERPL-MCNC: 6.1 G/DL (ref 6.4–8.2)
Q-T INTERVAL, ECG07: 374 MS
QRS DURATION, ECG06: 86 MS
QTC CALCULATION (BEZET), ECG08: 409 MS
SODIUM SERPL-SCNC: 144 MMOL/L (ref 136–145)
VENTRICULAR RATE, ECG03: 72 BPM

## 2022-03-17 PROCEDURE — 74011250637 HC RX REV CODE- 250/637: Performed by: INTERNAL MEDICINE

## 2022-03-17 PROCEDURE — 76700 US EXAM ABDOM COMPLETE: CPT

## 2022-03-17 PROCEDURE — 82962 GLUCOSE BLOOD TEST: CPT

## 2022-03-17 PROCEDURE — 97530 THERAPEUTIC ACTIVITIES: CPT

## 2022-03-17 PROCEDURE — 92610 EVALUATE SWALLOWING FUNCTION: CPT

## 2022-03-17 PROCEDURE — 97164 PT RE-EVAL EST PLAN CARE: CPT

## 2022-03-17 PROCEDURE — 84132 ASSAY OF SERUM POTASSIUM: CPT

## 2022-03-17 PROCEDURE — 74011000250 HC RX REV CODE- 250: Performed by: INTERNAL MEDICINE

## 2022-03-17 PROCEDURE — 83735 ASSAY OF MAGNESIUM: CPT

## 2022-03-17 PROCEDURE — 84100 ASSAY OF PHOSPHORUS: CPT

## 2022-03-17 PROCEDURE — 36415 COLL VENOUS BLD VENIPUNCTURE: CPT

## 2022-03-17 PROCEDURE — 74011250636 HC RX REV CODE- 250/636: Performed by: INTERNAL MEDICINE

## 2022-03-17 PROCEDURE — 77010033678 HC OXYGEN DAILY

## 2022-03-17 PROCEDURE — 74011250636 HC RX REV CODE- 250/636: Performed by: FAMILY MEDICINE

## 2022-03-17 PROCEDURE — 65610000006 HC RM INTENSIVE CARE

## 2022-03-17 PROCEDURE — 94660 CPAP INITIATION&MGMT: CPT

## 2022-03-17 PROCEDURE — 83605 ASSAY OF LACTIC ACID: CPT

## 2022-03-17 PROCEDURE — 74011250637 HC RX REV CODE- 250/637: Performed by: PHYSICIAN ASSISTANT

## 2022-03-17 PROCEDURE — 82330 ASSAY OF CALCIUM: CPT

## 2022-03-17 PROCEDURE — 94640 AIRWAY INHALATION TREATMENT: CPT

## 2022-03-17 PROCEDURE — 84145 PROCALCITONIN (PCT): CPT

## 2022-03-17 PROCEDURE — 74011000258 HC RX REV CODE- 258: Performed by: INTERNAL MEDICINE

## 2022-03-17 PROCEDURE — 92526 ORAL FUNCTION THERAPY: CPT

## 2022-03-17 RX ORDER — POTASSIUM CHLORIDE 7.45 MG/ML
10 INJECTION INTRAVENOUS
Status: COMPLETED | OUTPATIENT
Start: 2022-03-17 | End: 2022-03-17

## 2022-03-17 RX ADMIN — SODIUM CHLORIDE, PRESERVATIVE FREE 10 ML: 5 INJECTION INTRAVENOUS at 14:00

## 2022-03-17 RX ADMIN — FERROUS SULFATE TAB 325 MG (65 MG ELEMENTAL FE) 325 MG: 325 (65 FE) TAB at 21:31

## 2022-03-17 RX ADMIN — POTASSIUM CHLORIDE 10 MEQ: 7.46 INJECTION, SOLUTION INTRAVENOUS at 08:44

## 2022-03-17 RX ADMIN — APIXABAN 2.5 MG: 2.5 TABLET, FILM COATED ORAL at 20:07

## 2022-03-17 RX ADMIN — ACETAMINOPHEN 650 MG: 325 TABLET ORAL at 12:53

## 2022-03-17 RX ADMIN — ASPIRIN 81 MG: 81 TABLET, COATED ORAL at 08:57

## 2022-03-17 RX ADMIN — POTASSIUM CHLORIDE 10 MEQ: 7.46 INJECTION, SOLUTION INTRAVENOUS at 15:58

## 2022-03-17 RX ADMIN — NYSTATIN 500000 UNITS: 100000 SUSPENSION ORAL at 21:31

## 2022-03-17 RX ADMIN — MIDODRINE HYDROCHLORIDE 5 MG: 2.5 TABLET ORAL at 08:44

## 2022-03-17 RX ADMIN — ACETAMINOPHEN 650 MG: 325 TABLET ORAL at 23:28

## 2022-03-17 RX ADMIN — MIDODRINE HYDROCHLORIDE 5 MG: 2.5 TABLET ORAL at 12:53

## 2022-03-17 RX ADMIN — MEROPENEM 1 G: 1 INJECTION, POWDER, FOR SOLUTION INTRAVENOUS at 20:06

## 2022-03-17 RX ADMIN — SODIUM CHLORIDE, PRESERVATIVE FREE 10 ML: 5 INJECTION INTRAVENOUS at 21:31

## 2022-03-17 RX ADMIN — SODIUM CHLORIDE, PRESERVATIVE FREE 10 ML: 5 INJECTION INTRAVENOUS at 06:34

## 2022-03-17 RX ADMIN — FUROSEMIDE 20 MG: 10 INJECTION, SOLUTION INTRAMUSCULAR; INTRAVENOUS at 08:39

## 2022-03-17 RX ADMIN — POTASSIUM CHLORIDE 10 MEQ: 7.46 INJECTION, SOLUTION INTRAVENOUS at 10:09

## 2022-03-17 RX ADMIN — NYSTATIN 500000 UNITS: 100000 SUSPENSION ORAL at 12:54

## 2022-03-17 RX ADMIN — POTASSIUM CHLORIDE 10 MEQ: 7.46 INJECTION, SOLUTION INTRAVENOUS at 06:34

## 2022-03-17 RX ADMIN — POTASSIUM CHLORIDE 10 MEQ: 7.46 INJECTION, SOLUTION INTRAVENOUS at 17:22

## 2022-03-17 RX ADMIN — MIDODRINE HYDROCHLORIDE 5 MG: 2.5 TABLET ORAL at 16:01

## 2022-03-17 RX ADMIN — ARFORMOTEROL TARTRATE 15 MCG: 15 SOLUTION RESPIRATORY (INHALATION) at 08:03

## 2022-03-17 RX ADMIN — MEROPENEM 1 G: 1 INJECTION, POWDER, FOR SOLUTION INTRAVENOUS at 08:38

## 2022-03-17 RX ADMIN — APIXABAN 2.5 MG: 2.5 TABLET, FILM COATED ORAL at 08:57

## 2022-03-17 RX ADMIN — SODIUM CHLORIDE, PRESERVATIVE FREE 20 MG: 5 INJECTION INTRAVENOUS at 08:39

## 2022-03-17 RX ADMIN — FUROSEMIDE 20 MG: 10 INJECTION, SOLUTION INTRAMUSCULAR; INTRAVENOUS at 20:07

## 2022-03-17 RX ADMIN — PHENYLEPHRINE HYDROCHLORIDE 50 MCG/MIN: 10 INJECTION INTRAVENOUS at 06:36

## 2022-03-17 RX ADMIN — ARFORMOTEROL TARTRATE 15 MCG: 15 SOLUTION RESPIRATORY (INHALATION) at 20:22

## 2022-03-17 RX ADMIN — NYSTATIN 500000 UNITS: 100000 SUSPENSION ORAL at 17:22

## 2022-03-17 NOTE — PROGRESS NOTES
Hospitalist Progress Note-critical care note     Patient: Marilee Wiley MRN: [id number]  CSN: [id number]    YOB: 1940  Age: 80 y.o. Sex: female    DOA: 3/2/2022 LOS:  LOS: 15 days            Chief complaint: hip fracture m ckd3, afib chf , acute respiratory failure     Assessment/Plan         Hospital Problems  Date Reviewed: 3/9/2022          Codes Class Noted POA    Hypokalemia ICD-10-CM: E87.6  ICD-9-CM: 276.8  3/14/2022 Unknown        Fracture of neck of right femur ([facility] ICD-10-CM: S72.001A  ICD-9-CM: 820.8  3/8/2022 Unknown        Stage 3 chronic kidney disease ([facility] ICD-10-CM: N18.30  ICD-9-CM: 585.3  3/3/2022 Unknown        SERENA (obstructive sleep apnea) ICD-10-CM: G47.33  ICD-9-CM: 327.23  3/3/2022 Unknown        Adult BMI 39.0-39.9 kg/sq m ICD-10-CM: Z68.39  ICD-9-CM: V85.39  3/3/2022 Unknown        * (Principal) Acute respiratory failure with hypoxemia (HCC) ICD-10-CM: J96.01  ICD-9-CM: 518.81  3/2/2022 Unknown        Atrial fibrillation (HCC) ICD-10-CM: I48.91  ICD-9-CM: 427.31  3/2/2022 Unknown        Elevated troponin ICD-10-CM: R77.8  ICD-9-CM: 790.6  4/23/2021 Yes        HTN (hypertension) ICD-10-CM: I10  ICD-9-CM: 401.9  4/23/2021 Yes        Acute on chronic diastolic congestive heart failure (HCC) ICD-10-CM: I50.33  ICD-9-CM: 428.33, 428.0  4/23/2021 Yes             pt was admitted for afib and chf, found rt hip fracture. She has serena on cpap at night, she received rt hip replacement, she was noted decreased tide volume and hypoxia while extubation post procedure, intubated with oxygen 65%, peep 6. Case discussed with Dr. Gautam Mcgovern and Dr. Vipul Terry     Last night, she responded to pain only, ct head no acute findings, abg repeated. This am, she is more alert.      Acute on chronic respiratory failure with hypoxia and hypercapnia   Planning Extubate on 3/15, now on bipap last night   V/q scan :Perfusion images show no segmental perfusion defects to suggest the presence of  pulmonary embolism, pvl negative for dvt  on 3/3  Enterobacter Clacae complex from resp cutlure 3/10/22: id on board and on cefepime till 19th -switch to merrem        Pulmonary hypertension   Echo on 3/3 pulmonary arterial systolic pressure is 51 mmhg  LORRAINE is deferred     Congestive heart failure , acute on chronic diastolic ,  Pro-bnp 42I -on lasix   Echo done Mitral stenosis,  Pulmonary hypertension found to be moderate-LORRAINE is deferred  Dr. Remy Rosenberg and On-hold  Coreg due to hypotension      htn meds hold due to hypotension  afib new   On eliquis      Elevated trop   No acs cardiologist on board        UTI  Urine pos for klebsiella  Completed IV Rocephin     Hip fracture:  Right press fit direct anterior total hip arthroplasty   Continue post surgery care          ckd3 -cr 1.89 mild improving   Continue watch for renal function ,  Renal f/u     Hypokalemia   k replacement     Anemia   Continue monitoring h/h     Hypokalemia resolved and calcium low   K and nikita replacement     Hypoglycemia   Resolved and passed speech     Subjective :fine     She is more alert than yesterday. rn : follow the command, passed the speech,   Prognosis is poor, palliative care f/u , dnr/ I     Palliative care f/u . dnr /I   Disposition :tbd,   Review of systems:  Limited due to pt condition   Vital signs/Intake and Output:  Visit Vitals  /81   Pulse 80   Temp 98.8 °F (37.1 °C)   Resp 19   Ht 5' 5\" (1.651 m)   Wt 113.5 kg (250 lb 3.6 oz)   SpO2 93%   Breastfeeding No   BMI 41.64 kg/m²     Current Shift:  03/17 0701 - 03/17 1900  In: 501.8 [P.O.:25; I.V.:476.8]  Out: 400 [Urine:400]  Last three shifts:  03/15 1901 - 03/17 0700  In: 1451.7 [I.V.:1451.7]  Out: 2150 [Urine:2150]    Physical Exam:  General: Alert, no acute distress   HEENT: NC, Atraumatic. anicteric sclerae. Lungs: CTA Bilaterally. No Wheezing/Rhonchi/Rales. Heart:  Regular  rhythm,  No murmur, No Rubs, No Gallops  Abdomen: Soft, Non distended, Non tender.   +Bowel sounds,   Extremities: No c/c, rt hip surgical site covered with gauze   Psych:   Not anxious or agitated. Neurologic:  Alert and follow the command , but still  lethargic           Labs: Results:       Chemistry Recent Labs     03/17/22  0512 03/16/22  0435 03/15/22  0437   * 133* 107*    142 142   K 3.3* 3.5 4.8    104 104   CO2 31 31 35*   BUN 90* 91* 93*   CREA 1.63* 1.78* 1.89*   CA 10.4* 10.9* 10.8*   AGAP 8 7 3   BUCR 55* 51* 49*   * 190* 133*   TP 6.1* 7.0 6.9   ALB 3.3* 3.7 3.5   GLOB 2.8 3.3 3.4   AGRAT 1.2 1.1 1.0      CBC w/Diff Recent Labs     03/16/22  0435 03/15/22  0437   WBC 11.1 10.1   RBC 3.01* 2.91*   HGB 9.0* 9.2*   HCT 29.0* 29.2*    209   GRANS  --  77*   LYMPH  --  10*   EOS  --  1      Cardiac Enzymes No results for input(s): CPK, CKND1, FARA in the last 72 hours. No lab exists for component: CKRMB, TROIP   Coagulation No results for input(s): PTP, INR, APTT, INREXT, INREXT in the last 72 hours. Lipid Panel Lab Results   Component Value Date/Time    Cholesterol, total 183 04/25/2021 04:00 PM    HDL Cholesterol 101 (H) 04/25/2021 04:00 PM    LDL, calculated 69.8 04/25/2021 04:00 PM    VLDL, calculated 12.2 04/25/2021 04:00 PM    Triglyceride 61 04/25/2021 04:00 PM    CHOL/HDL Ratio 1.8 04/25/2021 04:00 PM      BNP No results for input(s): BNPP in the last 72 hours. Liver Enzymes Recent Labs     03/17/22 0512   TP 6.1*   ALB 3.3*   *      Thyroid Studies Lab Results   Component Value Date/Time    TSH 0.38 03/16/2022 04:35 AM        Procedures/imaging: see electronic medical records for all procedures/Xrays and details which were not copied into this note but were reviewed prior to creation of Plan    NM LUNG SCAN PERF    Result Date: 3/3/2022  EXAM: NM LUNG SCAN PERF. CLINICAL INDICATION/HISTORY: d dimer increase dyspnea. -Additional: Clinical concern for pulmonary embolus.  COMPARISON: Correlation is made with the radiographic study performed one day prior. TECHNIQUE: 7.2 mCi Tc-99m MAA was injected intravenously and the 8 standard views of the chest were obtained. This perfusion only examination is interpreted using the PISAPED criteria. _______________ FINDINGS: Perfusion images show no segmental perfusion defects to suggest the presence of pulmonary embolism. _______________     o scintigraphic findings to suggest the presence of acute pulmonary embolism. _______________     XR HIP RT W OR WO PELV 2-3 VWS    Result Date: 3/7/2022  EXAM: RIGHT HIP RADIOGRAPHS CLINICAL INDICATION/HISTORY: right hip pain -Additional: None COMPARISON: May March 3, 2022. TECHNIQUE: AP pelvis and frog-leg lateral view of right hip. _______________ FINDINGS: BONES: Intact appearing ilioischial and iliopectineal lines. There is a displaced and impacted subcapital right femoral neck fracture, with mild progressive displacement on follow-up imaging. Mild-moderate bilateral hip joint arthrosis. SOFT TISSUES: Unremarkable. _______________     1. Displaced and impacted subcapital right femoral neck fracture, increased in displacement from CT performed 4 days prior. CT HEAD WO CONT    Result Date: 3/7/2022  EXAM: CT head INDICATION: Altered mental status. COMPARISON: 4/23/2021. TECHNIQUE: Axial CT imaging of the head was performed without intravenous contrast. Standard multiplanar coronal and sagittal reformatted images were obtained and are included in interpretation. One or more dose reduction techniques were used on this CT: automated exposure control, adjustment of the mAs and/or kVp according to patient size, and iterative reconstruction techniques. The specific techniques used on this CT exam have been documented in the patient's electronic medical record.   Digital Imaging and Communications in Medicine (DICOM) format image data are available to nonaffiliated external healthcare facilities or entities on a secure, media free, reciprocally searchable basis with patient authorization for at least a 12-month period after this study. _______________ FINDINGS: BRAIN AND POSTERIOR FOSSA: Periventricular white matter hypoattenuation which is nonspecific but likely represents chronic ischemic changes. No evidence of acute large vessel transcortical infarct or acute parenchymal hemorrhage. No midline shift or hydrocephalus. EXTRA-AXIAL SPACES AND MENINGES: There are no abnormal extra-axial fluid collections. CALVARIUM: Intact. SINUSES: Clear. OTHER: None. _______________     No acute intracranial abnormality. CT CHEST ABD PELV WO CONT    Result Date: 3/3/2022  EXAM: CT CHEST, ABDOMEN AND PELVIS CLINICAL INDICATION/HISTORY: Hypoxemia, retrocardiac density, diarrhea, weakness and shortness of breath COMPARISON: 3/2/2022 TECHNIQUE: Axial CT imaging of the chest, abdomen, and pelvis was performed without intravenous contrast. Multiplanar reformats were generated. One or more dose reduction techniques were used on this CT: automated exposure control, adjustment of the mAs and/or kVp according to patient size, and iterative reconstruction techniques. The specific techniques used on this CT exam have been documented in the patient's electronic medical record. Digital Imaging and Communications in Medicine (DICOM) format image data are available to nonaffiliated external healthcare facilities or entities on a secure, media free, reciprocally searchable basis with patient authorization for at least a 12-month period after this study. ________________ FINDINGS: LIMITATIONS:   > Suboptimal evaluation given lack of intravenous contrast.   > Motion artifact is present which limits evaluation.   > Suboptimal exam due to patient body habitus and arms by the side. CHEST: LUNGS: Respiratory motion significantly limits evaluation. Interlobar septal thickening in the upper lobes. Mild bilateral dependent atelectasis. No large consolidation or suspicious pulmonary mass.  PLEURA: Very small volume bilateral pleural effusions. AIRWAY: Normal. MEDIASTINUM: Four-chamber cardiomegaly with asymmetric biatrial enlargement, mitral and aortic annular calcifications. The main pulmonary artery is enlarged suggesting pulmonary arterial hypertension. Normal caliber aorta with scattered calcified atherosclerotic plaque. No pericardial effusion. LYMPH NODES: No enlarged lymph nodes. CHEST WALL: Diffuse anasarca. Heterogeneous thyroid. _______________ ABDOMEN/PELVIS: LIVER: No enhancing hepatic mass. The portal and hepatic veins are patent. BILIARY: Gallstones are present in the nondistended gallbladder lumen. No biliary dilation. SPLEEN: Normal. PANCREAS: Normal. ADRENALS: Left adrenal gland measures 9 HU (axial image 76), most consistent with lipid rich adenoma. Normal right adrenal gland. KIDNEYS: Asymmetrically small left kidney. No rate of a stone. No hydronephrosis. GI TRACT:  A small hiatal hernia is present. Normal caliber small and large bowel loops. No morphology of bowel obstruction. Normal appendix. BLADDER: Diffuse wall thickening in the largely decompressed bladder. PELVIC ORGANS: No acute abnormality. VASCULATURE: No arterial aneurysm. 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 LYMPH NODES: No mesenteric or retroperitoneal lymphadenopathy. OTHER: No free intraperitoneal air. No ascites. Diffuse anasarca. OSSEOUS STRUCTURES: No acute osseous abnormality. Multilevel degenerative changes most pronounced in the lumbar spine. Degenerative changes in both shoulders (right greater than left). Please note the included portions of the upper extremities are not well evaluated on this study _______________     1. Findings of congestive heart failure with cardiomegaly, interstitial edema, very small bilateral pleural effusions, and diffuse anasarca. 2.  Bladder wall thickening may be due to underdistention though superimposed infection cannot be excluded.  Recommend correlation for cystitis. 3.  Osseous degenerative changes and additional findings, as detailed in the body of the report. XR CHEST PORT    Result Date: 3/6/2022  EXAM: PORTABLE CHEST HISTORY: Shortness of breath. COMPARISON: 3/2/2022 TECHNIQUE: Single portable view. _______________ FINDINGS: SUPPORT DEVICES: None HEART AND MEDIASTINUM: Moderate cardiomegaly. LUNGS AND PLEURAL SPACES: Subsegmental atelectasis left base. Possible small effusions. BONES AND SOFT TISSUES: Dextroscoliosis. _______________     Subsegmental atelectasis left base. Possible small effusions. Moderate cardiomegaly without change. XR CHEST PORT    Result Date: 3/2/2022  EXAM:  AP Portable Chest X-ray 1 view INDICATION: Shortness of breath COMPARISON: April 23, 2021 _______________ FINDINGS: Cardiomegaly and mediastinal contours are within normal limits for portable radiograph. There is increased right retrocardiac/right paraspinal density. There are no pleural effusions. No acute osseous findings. ________________      Increased right retrocardiac density which may represent airspace disease or underlying pulmonary nodule. Recommend CT chest for further evaluation. ECHO ADULT COMPLETE    Result Date: 3/3/2022    Left Ventricle: Left ventricle size is normal. Moderately increased wall thickness. Findings consistent with moderate concentric hypertrophy. Normal wall motion. Normal left ventricular systolic function with a visually estimated EF of 55 - 60%.   Left Atrium: Left atrium is mildly dilated.   Right Ventricle: Right ventricle is mildly dilated.   Right Atrium: Right atrium is mildly dilated.   Pulmonary artery :  Estimated pulmonary arterial systolic pressure is 51 mmhg. Pulmonary hypertension found to be moderate   Mitral Valve: Moderately thickened leaflet. Mildly calcified leaflet. Moderate annular calcification of the mitral valve.    IVC/SVC : IVC diameter is greater than 21 mm and decreases less than 50% during inspiration; therefore the estimated right atrial pressure is elevated (~15 mmHg). IVC is dilated. Consider LORRAINE for better evaluation of mitral valve if clinically indicated. DUPLEX LOWER EXT VENOUS BILAT    Result Date: 3/4/2022  · No evidence of deep vein thrombosis in the right lower extremity. · No evidence of deep vein thrombosis in the left lower extremity.         Myriam Arredondo MD

## 2022-03-17 NOTE — PROGRESS NOTES
Nutrition Assessment     Type and Reason for Visit: Reassess    Nutrition Recommendations/Plan: Supplement: Will order ensure enlive TID. Nutrition Assessment:  PMHx: htn, sleep disorder, right side weakness. Admitted for Congestive heart failure acute on chronic diastolic, recent fall- x ray shows hip fracture. CKD 3. Pt s/p right total hip arthroplasty on 3/10. Patient extubated 3/15. Remains in ICU. Estimated Daily Nutrient Needs:  Energy (kcal):  1921  Protein (g):         Fluid (ml/day):  1921    Nutrition Related Findings:  Labs: K 3.3, GFR est AA37, creatinine 1.63. med: KCl, lasix, ferrous sulfate. Last BM 3/11. OG tube removed. Diet advanced to pureed this AM. Spoke with nurse- will order breakfast for pt.      Current Nutrition Therapies:  ADULT DIET Dysphagia - Pureed    Anthropometric Measures:  · Height:  5' 5\" (165.1 cm)  · Current Body Wt:  113.5 kg (250 lb 3.6 oz) (3/16/2022)  · BMI: 41.6    Nutrition Diagnosis:   · Inadequate energy intake related to acute injury/trauma as evidenced by other (specify) (diet advanced from NPO to pureed this AM)    Nutrition Intervention:  Food and/or Nutrient Delivery: Continue current diet,Start oral nutrition supplement  Nutrition Education and Counseling: No recommendations at this time  Coordination of Nutrition Care: Continue to monitor while inpatient    Goals:  PO intake >50% of needs throughout the next 4-6 days       Nutrition Monitoring and Evaluation:   Behavioral-Environmental Outcomes: None identified  Food/Nutrient Intake Outcomes: Diet advancement/tolerance  Physical Signs/Symptoms Outcomes: Biochemical data,Skin,Weight,GI status,Meal time behavior    Discharge Planning:    Continue current diet     Electronically signed by Heath Henley RD on 3/17/2022 at 8:47 AM

## 2022-03-17 NOTE — PROGRESS NOTES
[facility]  (A Division of [address] [place] [address])                                                                                                                      Alex Collins MD  Office #: - Option [phone]  Fax #: [phone]     Date of Admission: 3/2/2022Date of Note: 3/17/2022  Reason for FU: Antibiotic management of for positive resp/urine cultures requested by Dr Nate Quezada. Current Antimicrobials:    Prior Antimicrobials:    Cefepime 3/14 to 3/16  Meropenem/Vanco 3/16 to date o   Zithromax 3/2- 3/4  cTX 3/2 to 3/4  CTC 3/7-- X2 dose  Levofloxacin 500 mg X1- 3/13 X 1 dose  Meropenem 500 mg 3/14 X1 dose           Assessment- ID related:  --------------------------------------------------------------------------  Acutely sick and complicated patient in ICU with:    · Enterobacter Clacae complex + resp cutlure 3/10/22  · Acute resp failure- intubated since OR day 3/10/22  --CXR on 3/14-- infiltrate with atelectasis and pleural effusion  · Encephalopathy- unresponsiveness overnight->   --CO2 high on ABG, pt with SERENA  · S/P R hip anterior arthroplasty- wound vac in place  · Klebsiella bacteruria 3/2/22  --UA clean and Urine culture +  Kleb: Colonized    Other Medical Issues- Mx per respective team:  · CKD  · Morbidly obese  · Hx of SERENA  · A.fib  · Acute on chronic  CHF     Recommendation for ID issues I am following:  ------------------------------------------------------------------------------  DW Dr Nate Quezada, RN    DC Vanco  Cont meropenem for now  --ABX for possible resp infection can end on march 19  FU daily Cbc w/diff  Check LFT  US of abd in plans by ICU    Lethargy likely driven by non-infectious etiology--but can keep meropenem until another look at [facility] for etiology.                       -> diuresis per respective team. Opacity with layering of pleural  fluid on CXR       Subjective:  Remains extubated, events overnight noted  Was unresponsive. No fever or leucocytosis. On call MD changed Cefepime to meropnem and Vancomcyin-- for concern of cefepime effect on CNS. No cultures are taken  --CT brain overnight didn't show acute changes  She is awake, but not conversant. She isnot on pressors    Notes and labs reviewed. Imaging reports reviewed-No WBC today, Cr remains at 1.63, Procal up to 2.02, BNP increasing, NH3 NL, TSH NL    CXR this am:      Unchanged basal effusions         HPI:  Rio Roth is a 80 y.o. BLACK/ with PMH of congestive heart failure, sleep apnea, right leg weakness, admitted on 3/2 for acute resp failure with hypoxia due to CHF and a.fib with RVR. No fever or leucocytosis. UA done on admission was clean, urine culture grew Klebsiella. She had right hip fracture and was taken to OR on 3/10 for BLAIR. She had a wound vac in place. She didn't extubate after procedure and was transferred to ICU where she is getting care. resp cutture from 3/10-- has Enterobacter that was final on 3/13-- she was given Levofloxacin. Today it was changed to meropenem. DW with RN- little resp secretion, she has low grade fever today, no leucocytosis. She isnot on pressors. Per dtr, she doesn't recall when she got treated with ABX as OP. No prior history of UTI diagnosis.              Active Hospital Problems    Diagnosis Date Noted    Hypokalemia 03/14/2022    Fracture of neck of right femur ([address] 03/08/2022    Stage 3 chronic kidney disease ([address] 03/03/2022    SERENA (obstructive sleep apnea) 03/03/2022    Adult BMI 39.0-39.9 kg/sq m 03/03/2022    Acute respiratory failure with hypoxemia (HCC) 03/02/2022    Atrial fibrillation (HCC) 03/02/2022    Acute on chronic diastolic congestive heart failure ([address] 04/23/2021    Elevated troponin 04/23/2021    HTN (hypertension) 04/23/2021     Past Medical History:   Diagnosis Date    Hypertension     Sleep disorder      Past Surgical History:   Procedure Laterality Date    HX ORTHOPAEDIC broken right ankle, left femur 30 yrs ago     Family History   Problem Relation Age of Onset    Diabetes Mother     Heart Disease Mother     Heart Disease Father      Social History     Socioeconomic History    Marital status: SINGLE     Spouse name: Not on file    Number of children: Not on file    Years of education: Not on file    Highest education level: Not on file   Occupational History    Not on file   Tobacco Use    Smoking status: Never Smoker    Smokeless tobacco: Never Used   Vaping Use    Vaping Use: Never used   Substance and Sexual Activity    Alcohol use: Yes     Alcohol/week: 1.0 standard drink     Types: 1 Glasses of wine per week     Comment: soc    Drug use: No    Sexual activity: Not on file   Other Topics Concern     Service Not Asked    Blood Transfusions Not Asked    Caffeine Concern Not Asked    Occupational Exposure Not Asked    Hobby Hazards Not Asked    Sleep Concern Not Asked    Stress Concern Not Asked    Weight Concern Not Asked    Special Diet Not Asked    Back Care Not Asked    Exercise Not Asked    Bike Helmet Not Asked   [address] Not Asked    Self-Exams Not Asked   Social History Narrative    Not on file     Social Determinants of Health     Financial Resource Strain:     Difficulty of Paying Living Expenses: Not on file   Food Insecurity:     Worried About Running Out of Food in the Last Year: Not on file    Aquiles of Food in the Last Year: Not on file   Transportation Needs:     Lack of Transportation (Medical): Not on file    Lack of Transportation (Non-Medical):  Not on file   Physical Activity:     Days of Exercise per Week: Not on file    Minutes of Exercise per Session: Not on file   Stress:     Feeling of Stress : Not on file   Social Connections:     Frequency of Communication with Friends and Family: Not on file    Frequency of Social Gatherings with Friends and Family: Not on file    Attends [employer] Services: Not on file   [facility] [employer] or Organizations: Not on file    Attends Club or Organization Meetings: Not on file    Marital Status: Not on file   Intimate Partner Violence:     Fear of Current or Ex-Partner: Not on file    Emotionally Abused: Not on file    Physically Abused: Not on file    Sexually Abused: Not on file   Housing Stability:     Unable to Pay for Housing in the Last Year: Not on file    Number of Places Lived in the Last Year: Not on file    Unstable Housing in the Last Year: Not on file       Allergies:  Seafood, Statins-hmg-coa reductase inhibitors, and Sulfa (sulfonamide antibiotics)     Medications:  Current Facility-Administered Medications   Medication Dose Route Frequency    potassium chloride 10 mEq in 100 ml IVPB  10 mEq IntraVENous Q1H    furosemide (LASIX) injection 20 mg  20 mg IntraVENous BID    midodrine (PROAMATINE) tablet 5 mg  5 mg Oral TID WITH MEALS    meropenem (MERREM) 1 g in 0.9% sodium chloride (MBP/ADV) 50 mL MBP  1 g IntraVENous Q12H    arformoteroL (BROVANA) neb solution 15 mcg  15 mcg Nebulization BID RT    famotidine (PF) (PEPCID) 20 mg in 0.9% sodium chloride 10 mL injection  20 mg IntraVENous DAILY    sodium chloride (NS) flush 5-40 mL  5-40 mL IntraVENous PRN    acetaminophen (TYLENOL) tablet 650 mg  650 mg Oral Q6H    naloxone (NARCAN) injection 0.4 mg  0.4 mg IntraVENous PRN    ferrous sulfate tablet 325 mg  1 Tablet Oral TID    diphenhydrAMINE (BENADRYL) capsule 25 mg  25 mg Oral Q4H PRN    bisacodyL (DULCOLAX) suppository 10 mg  10 mg Rectal DAILY PRN    ELECTROLYTE REPLACEMENT PROTOCOL - Magnesium   1 Each Other PRN    ELECTROLYTE REPLACEMENT PROTOCOL - Calcium   1 Each Other PRN    ELECTROLYTE REPLACEMENT PROTOCOL  - Phosphorus Renal Dosing  1 Each Other PRN    ELECTROLYTE REPLACEMENT PROTOCOL - Potassium Renal Dosing  1 Each Other PRN    midazolam (VERSED) injection 1 mg  1 mg IntraVENous Q2H PRN    fentaNYL citrate (PF) injection 25 mcg  25 mcg IntraVENous Q4H PRN    nystatin (MYCOSTATIN) 100,000 unit/mL oral suspension 500,000 Units  500,000 Units Oral QID    apixaban (ELIQUIS) tablet 2.5 mg  2.5 mg Oral BID    sodium chloride (NS) flush 5-40 mL  5-40 mL IntraVENous Q8H    sodium chloride (NS) flush 5-40 mL  5-40 mL IntraVENous PRN    acetaminophen (TYLENOL) tablet 650 mg  650 mg Oral Q6H PRN    Or    acetaminophen (TYLENOL) suppository 650 mg  650 mg Rectal Q6H PRN    polyethylene glycol (MIRALAX) packet 17 g  17 g Oral DAILY PRN    ondansetron (ZOFRAN ODT) tablet 4 mg  4 mg Oral Q8H PRN    Or    ondansetron (ZOFRAN) injection 4 mg  4 mg IntraVENous Q6H PRN    [Held by provider] carvediloL (COREG) tablet 3.125 mg  3.125 mg Oral BID WITH MEALS    aspirin delayed-release tablet 81 mg  81 mg Oral DAILY        ROS:  Review of systems not obtained due to patient factors. Physical Exam:    Temp (24hrs), Av.3 °F (36.8 °C), Min:97.2 °F (36.2 °C), Max:99.6 °F (37.6 °C)    Visit Vitals  BP (!) 104/52   Pulse 93   Temp 99.3 °F (37.4 °C)   Resp 16   Ht 5' 5\" (1.651 m)   Wt 113.5 kg (250 lb 3.6 oz)   SpO2 95%   Breastfeeding No   BMI 41.64 kg/m²        GEN: WD obese, Extubated, NC in place. HEENT: Unicteric. EOMI intact  CHEST: Non laboured breathing. CTA anteriorly  CVS:RRR, no mur/gallop  ABD: Obese/soft. Non tender. Non distended abd  KAREN: box in place  EXT:--right groin vac is removed-- dressing over groin  Skin: Dry and intact. No rash, no redness. CNS:Awake, alert but not conversing. Follows with eyes.   CN grossly normal.        Microbiology  All Micro Results     Procedure Component Value Units Date/Time    CULTURE, BLOOD [[id number]] Collected: 22 4579    Order Status: Completed Specimen: Blood Updated: 2275     Special Requests: NO SPECIAL REQUESTS        Culture result: NO GROWTH 3 DAYS       CULTURE, BLOOD [[id number]] Collected: 22 0420    Order Status: Completed Specimen: Blood Updated: 03/17/22 0742     Special Requests: NO SPECIAL REQUESTS        Culture result: NO GROWTH 3 DAYS       CULTURE, RESPIRATORY/SPUTUM/BRONCH W GRAM STAIN [[id number]]  (Abnormal)  (Susceptibility) Collected: 03/10/22 1630    Order Status: Completed Specimen: Sputum from Endotracheal aspirate Updated: 03/13/22 0905     Special Requests: NO SPECIAL REQUESTS        GRAM STAIN NO WBC'S SEEN         NO EPITHELIAL CELLS SEEN         NO ORGANISMS SEEN        Culture result:       LIGHT ENTEROBACTER CLOACAE COMPLEX                  HEAVY NORMAL RESPIRATORY ELSIE          CULTURE, RESPIRATORY/SPUTUM/BRONCH Sally Batista [[id number]] Collected: 03/10/22 1915    Order Status: Canceled Specimen: Sputum from Endotracheal aspirate     CULTURE, BLOOD [[id number]] Collected: 03/02/22 2048    Order Status: Completed Specimen: Blood Updated: 03/08/22 0655     Special Requests: NO SPECIAL REQUESTS        Culture result: NO GROWTH 6 DAYS       CULTURE, BLOOD [[id number]] Collected: 03/02/22 2048    Order Status: Completed Specimen: Blood Updated: 03/08/22 0655     Special Requests: NO SPECIAL REQUESTS        Culture result: NO GROWTH 6 DAYS       CULTURE, URINE [[id number]]  (Abnormal)  (Susceptibility) Collected: 03/02/22 2104    Order Status: Completed Specimen: Cath Urine Updated: 03/05/22 1226     Special Requests: NO SPECIAL REQUESTS        [place] Count --        >100,000  COLONIES/mL       Culture result: KLEBSIELLA PNEUMONIAE       COVID-19 RAPID TEST [[id number]] Collected: 03/02/22 2025    Order Status: Completed Specimen: Nasopharyngeal Updated: 03/02/22 2052     Specimen source Nasopharyngeal        COVID-19 rapid test Not detected        Comment: Rapid Abbott ID Now       Rapid NAAT:  The specimen is NEGATIVE for SARS-CoV-2, the novel coronavirus associated with COVID-19.        Negative results should be treated as presumptive and, if inconsistent with clinical signs and symptoms or necessary for patient management, should be tested with an alternative molecular assay. Negative results do not preclude SARS-CoV-2 infection and should not be used as the sole basis for patient management decisions. This test has been authorized by the FDA under an Emergency Use Authorization (EUA) for use by authorized laboratories. Fact sheet for Healthcare Providers: ConventionUpdate.co.nz  Fact sheet for Patients: ConventionUpdate.co.nz       Methodology: Isothermal Nucleic Acid Amplification         INFLUENZA A & B AG (RAPID TEST) [[id number]] Collected: 03/02/22 2025    Order Status: Completed Specimen: Nasopharyngeal from Nasal washing Updated: 03/02/22 2047     Influenza A Antigen Negative        Comment: A negative result does not exclude influenza virus infection, seasonal or H1N1 due to suboptimal sensitivity. If influenza is circulating in your community, a diagnosis of influenza should be considered based on a patients clinical presentation and empiric antiviral treatment should be considered, if indicated. Influenza B Antigen Negative              Lab results:    Chemistry  Recent Labs     03/17/22  0512 03/16/22  0435 03/15/22  0437   * 133* 107*    142 142   K 3.3* 3.5 4.8    104 104   CO2 31 31 35*   BUN 90* 91* 93*   CREA 1.63* 1.78* 1.89*   CA 10.4* 10.9* 10.8*   AGAP 8 7 3   BUCR 55* 51* 49*   * 190* 133*   TP 6.1* 7.0 6.9   ALB 3.3* 3.7 3.5   GLOB 2.8 3.3 3.4   AGRAT 1.2 1.1 1.0       CBC w/ Diff  Recent Labs     03/16/22  0435 03/15/22  0437   WBC 11.1 10.1   RBC 3.01* 2.91*   HGB 9.0* 9.2*   HCT 29.0* 29.2*    209   GRANS  --  77*   LYMPH  --  10*   EOS  --  1       Imaging: report reviewed and as posted by radiologist       1. Support devices in stable/appropriate position as visualized. 2. Mild cardiac enlargement, unchanged. 3. Hazy right lung base opacity, favored a combination of atelectasis and  posteriorly layering pleural effusion.

## 2022-03-17 NOTE — PROGRESS NOTES
Pt has remained alert throughout shift oriented to self, no periods of unresponsiveness or lethargy throughout shift, family currently visiting at bedside, pt continues to hold conversation with family members, daughter of pt updated on pt condition and all questions and concerns addressed to her liking, no additional concerns at this time, pt no longer on vasopressor gtt, VSS on 4LNC, box catheter removed, purewick placed to suction, WC assessed pt at bedside, mepilex applied to sheering area on bilateral buttocks, R hip incision dressing CDI, left abdominal dressing CDI, pt continues to deny pain, remains afebrile, assessment documented in relevant flow sheet, critical care continues

## 2022-03-17 NOTE — PROGRESS NOTES
@2000 pt taken over responsive to pain only,on BIPAP. BP is low in 70's and Tomás-synephrine drip started, Dr Sumit Vega called and ABG results communicated, chest x ray completed. Pt taken to CT accompanied by RT and nurses on BIPAP. Relatives remains in room. Dr. Frances Lunsford came to bedside and assessed pt and spoke with relatives. @2015 pt returned to room reattached to monitors medication continues to be titrated per protocol . Dr Frances Lunsford called and updated relatives on  CTresults care continues. @0000 pt more aroused Nods appropriately , no other changes care continues. @0400 reassessment  Completed no new developments observation continues. @0127 Bedside and Verbal shift change report given to Candi Davis (oncoming nurse) by Osmel Hayes (offgoing nurse). Report included the following information SBAR, Kardex, Intake/Output, MAR, Recent Results, Med Rec Status and Alarm Parameters .

## 2022-03-17 NOTE — PROGRESS NOTES
Last night again unresponsive blood work ok Ct of the head ok  Placed on BIPAP now better  baseline BP is 90 hope can take po meds   I consulted neurology I suspect severe dementia and obesity hypoventilation syndrome with SERENA but will appreciate EEG consultation  TSH was normal and ammonia was normal      ENEDINA Rosa MD

## 2022-03-17 NOTE — PROGRESS NOTES
Problem: Dysphagia (Adult)  Description: Patient will:  1. Tolerate PO trials with 0 s/s overt distress in 4/5 trials. 2. Utilize compensatory swallow strategies/maneuvers (decrease bite/sip, size/rate, alt. liq/sol) with min cues in 4/5 trials. Patient will:  3. When more alert, perform oral-motor/laryngeal exercises to increase oropharyngeal swallow function with min cues. Rec:     Puree diet with thin liquids via spoon  Pt is a feeder  Aspiration precautions  HOB >45 during po intake, remain >30 for 30-45 minutes after po   Small bites/sips; alternate liquid/solid with slow feeding rate   Oral care TID  Meds crushed in applesauce  Outcome: Progressing Towards Goal       SPEECH LANGUAGE PATHOLOGY BEDSIDE SWALLOW   EVALUATION & TREATMENT     Patient: Alfredito Melchor (67 y.o. female)  Date: 3/17/2022  Primary Diagnosis: Acute exacerbation of CHF (congestive heart failure) ([facility]) [I50.9]  Procedure(s) (LRB):  RIGHT TOTAL HIP ARTHROPLASTY WITH C-ARM  (PT IN ROOM # 358) (Right) 7 Days Post-Op   Precautions: Aspiration,  Fall,WBAT  PLOF: Regular diet    ASSESSMENT :  Patient seen by ST for clinical bedside swallow evaluation per MD orders following extubation on 3/15/22. Based on the objective data described below, the patient presents with mild oral phase dysphagia and suspected pharyngeal phase dysphagia. Patient previously assessed by ST on 3/9/22 prior to surgery and was placed on regular diet, with assistance. Patient awake and upright in bed upon entry. Nursing stated that patient stated name and . Patient restated name to ST; vocal quality breathy, low volume, and, at times, aphonic. Patient followed some commands. OM examination significant for reduced labial seal, reduced lingual strength and ROM, and limited dentition. PO trials of ice chips, water, and puree administered. Patient demo multiple swallows per bolus (2) and presents with poor endurance.   No overt s/sx of aspiration or distress noted across consistencies; however, PO trials limited due to reduced alertness and endurance. Recommend cautious upgrade to puree diet and thin liquids with strict aspiration precautions. Patient is a feeder. Place spoon on lips and wait for patient to create seal around spoon. Only feed when alert. Patient unable to create seal to suck from straw at this time; will need fluids via spoon sips. TREATMENT :  Skilled therapy initiated; Educated pt and nursing on aspiration precautions and importance of compensatory swallow techniques to decrease aspiration risk (decrease rate of intake & sip/bite size, upright @HOB for all po intake and ~30 minutes after po); verbalized comprehension. SLP to follow as indicated. Patient will benefit from skilled intervention to address the above impairments. Patient's rehabilitation potential is considered to be Guarded  Factors which may influence rehabilitation potential include:   []            None noted  [x]            Mental ability/status  [x]            Medical condition  []            Home/family situation and support systems  []            Safety awareness  []            Pain tolerance/management  []            Other:      PLAN :  Recommendations and Planned Interventions:  See above  Frequency/Duration: Patient will be followed by speech-language pathology 3 times a week to address goals. Discharge Recommendations: To Be Determined     SUBJECTIVE:   Patient stated Lilli.     OBJECTIVE:     Past Medical History:   Diagnosis Date    Hypertension     Sleep disorder      Past Surgical History:   Procedure Laterality Date    HX ORTHOPAEDIC      broken right ankle, left femur 30 yrs ago     Home Situation:   Home Situation  Home Environment: Private residence  # Steps to Enter: 0 (Lift used to enter home)  One/Two Story Residence: One story  Living Alone: No  Support Systems: Child(doc)  Patient Expects to be Discharged to[de-identified] Other: (TBD)  Current DME Used/Available at Home: Walker, rollator,Raised toilet seat,Grab bars  Tub or Shower Type: Shower    Diet prior to admission: Regular  Current Diet:  NPO - post-op    Cognitive and Communication Status:  Neurologic State: Confused,Eyes open to stimulus  Orientation Level: Disoriented to place,Disoriented to situation,Disoriented to time,Oriented to person  Cognition: Decreased attention/concentration,Follows commands  Perception: Appears intact  Perseveration: No perseveration noted  Safety/Judgement: Decreased awareness of environment  Oral Assessment:  Oral Assessment  Labial: Decreased seal  Dentition: Limited  Oral Hygiene:  (fair)  Lingual: Decreased rate;Decreased strength; Incoordinated  Velum: No impairment  Mandible: Other (comment) (unable to assess)  Gag Reflex:  (did not assess)  P.O. Trials:  Patient Position:  (upright in bed)  Vocal quality prior to P.O.: Breathy; Fatigue; Low volume  Consistency Presented: Ice chips;Puree; Thin liquid  How Presented: SLP-fed/presented;Spoon;Straw     Bolus Acceptance: No impairment  Bolus Formation/Control: Impaired  Type of Impairment: Delayed;Lip closure  Propulsion: Delayed (# of seconds); Discoordination;Lingual tremors  Oral Residue: Less than 10% of bolus  Initiation of Swallow: Delayed (# of seconds)  Laryngeal Elevation: Weak  Aspiration Signs/Symptoms: Clear throat  Pharyngeal Phase Characteristics: Double swallow;Easily fatigued ; Poor endurance  Effective Modifications: Alternate liquids/solids; Spoon;Small sips and bites  Cues for Modifications: Maximal       Oral Phase Severity: Mild  Pharyngeal Phase Severity : Mild-moderate    PAIN:  Pain level pre-treatment: 0/10   Pain level post-treatment: 0/10     After treatment:   [x]            Patient left in no apparent distress sitting up in chair  []            Patient left in no apparent distress in bed  []            Call bell left within reach  [x]            Nursing notified  []            Family present  []            Caregiver present  []            Bed alarm activated    COMMUNICATION/EDUCATION:   [x]            Aspiration precautions; swallow safety; compensatory techniques. []            Patient/family have participated as able in goal setting and plan of care. []            Patient/family agree to work toward stated goals and plan of care. []            Patient understands intent and goals of therapy; neutral about participation. [x]            Patient unable to participate in goal setting/plan of care; educ ongoing with interdisciplinary staff  []         Posted safety precautions in patient's room.     Thank you for this referral.  BETO Chaparro  Time Calculation: 25 mins  Evaluation Time: 20 minutes   Treatment Time: 5 minutes

## 2022-03-17 NOTE — PROGRESS NOTES
CM notes that physician has discussed the option comfort care measures with the patient's daughter, family will discuss the option amongst themselves. CM to continue to monitor and watch for results of conversations to assist in identifying any JARED needs. CM notes PT recommends SNF versus LTC. Care Management Interventions  PCP Verified by CM:  (Dr. Carolyn Piek (female) )  Mode of Transport at Discharge: BLS (.)  Transition of Care Consult (CM Consult): SNF [facility] has stated that according to the current therapy notes, she is not Acute inpt rehab appropriate. SNF choices obtained from the pt's dtr;  [facility] [place] )  Partner SNF: No  Discharge Durable Medical Equipment:  (TBD)  Physical Therapy Consult: Yes  Occupational Therapy Consult: Yes  Support Systems: Child(doc)  Confirm Follow Up Transport: Family  The Plan for Transition of Care is Related to the Following Treatment Goals : skilled nursing facility  The Patient and/or Patient Representative was Provided with a Choice of Provider and Agrees with the Discharge Plan?: Yes (The pt and her dtr: Marlena)  Freedom of Choice List was Provided with Basic Dialogue that Supports the Patient's Individualized Plan of Care/Goals, Treatment Preferences and Shares the Quality Data Associated with the Providers?: Yes ([facility] worth)  Discharge Location  Patient Expects to be Discharged to[de-identified] Other: (TBD)

## 2022-03-17 NOTE — WOUND CARE
IP WOUND CONSULT    [id number] [facility] RECORD NUMBER:  [id number]  AGE: 80 y.o. GENDER: female  : 1940  TODAY'S DATE:  3/17/2022    GENERAL     [x] Prevalence     Heather Burleson is a 80 y.o. female referred by:   [] Physician  [x] Nursing  [] Other:         PAST MEDICAL HISTORY    Past Medical History:   Diagnosis Date    Hypertension     Sleep disorder         PAST SURGICAL HISTORY    Past Surgical History:   Procedure Laterality Date    HX ORTHOPAEDIC      broken right ankle, left femur 30 yrs ago       FAMILY HISTORY    Family History   Problem Relation Age of Onset    Diabetes Mother     Heart Disease Mother     Heart Disease Father        SOCIAL HISTORY    Social History     Tobacco Use    Smoking status: Never Smoker    Smokeless tobacco: Never Used   Vaping Use    Vaping Use: Never used   Substance Use Topics    Alcohol use: Yes     Alcohol/week: 1.0 standard drink     Types: 1 Glasses of wine per week     Comment: soc    Drug use: No       ALLERGIES    Allergies   Allergen Reactions    Seafood Hives     crabs    Statins-Hmg-Coa Reductase Inhibitors Unknown (comments)    Sulfa (Sulfonamide Antibiotics) Hives       MEDICATIONS    No current facility-administered medications on file prior to encounter. Current Outpatient Medications on File Prior to Encounter   Medication Sig Dispense Refill    allopurinoL (ZYLOPRIM) 100 mg tablet Take 100 mg by mouth daily.  acetaminophen (TYLENOL) 325 mg tablet Take 650 mg by mouth every six (6) hours as needed for Pain.  niacin (NIASPAN) 1,000 mg Tb24 tab Take 1,000 mg by mouth daily.  trolamine salicylate-aloe vera 36% (ASPERCREME) topical cream Apply 2 g to affected area as needed for Pain.  multivitamin, tx-iron-ca-min (THERA-M w/ IRON) 9 mg iron-400 mcg tab tablet Take 1 Tab by mouth daily.  aspirin delayed-release 81 mg tablet Take 81 mg by mouth daily.       potassium chloride SR (KLOR-CON 10) 10 mEq tablet Take 10 mEq by mouth daily.  carvediloL (COREG) 3.125 mg tablet Take 3.125 mg by mouth daily. Wt Readings from Last 3 Encounters:   03/16/22 113.5 kg (250 lb 3.6 oz)   04/25/21 101.6 kg (224 lb)   10/01/17 125.6 kg (277 lb)       [unfilled]  Visit Vitals  /66   Pulse 81   Temp 98.8 °F (37.1 °C)   Resp 18   Ht 5' 5\" (1.651 m)   Wt 113.5 kg (250 lb 3.6 oz)   SpO2 98%   Breastfeeding No   BMI 41.64 kg/m²       ASSESSMENT     Skin impairment Identification:  Type: shear injury    Contributing Factors: decreased mobility, shear force and obesity    Wound Abdomen Left open area in abdominal fold (Active)   Wound Etiology Skin Tear 03/16/22 2000   Dressing Status Clean;Dry; Intact 03/17/22 0800   Dressing/Treatment Open to air 03/14/22 0400   Drainage Amount None 03/14/22 0400   Wound Odor None 03/14/22 0400   Mayela-Wound/Incision Assessment Intact;Fragile 03/14/22 0400   Number of days: 8       Wound Buttocks (Active)   Wound Image    03/17/22 1443   Wound Etiology Other (Comment) 03/17/22 1443   Dressing Status Intact 03/17/22 1443   Cleansed Wound cleanser 03/17/22 1443   Dressing/Treatment Foam 03/17/22 1443   Dressing Change Due 03/24/22 03/17/22 1443   Wound Assessment Pink/red 03/17/22 1443   Drainage Amount Scant 03/17/22 1443   Drainage Description Serosanguinous 03/17/22 1443   Wound Odor None 03/17/22 1443   Mayela-Wound/Incision Assessment Excoriated 03/17/22 1443   Edges Defined edges 03/17/22 1443   Wound Thickness Description Partial thickness 03/17/22 1443   Number of days: 0       Incision 03/10/22 Hip Right (Active)   Dressing Status Clean;Dry; Intact 03/17/22 0800   Dressing/Treatment Negative Pressure Wound Therapy 03/14/22 0400   Margins Approximated 03/15/22 0800   Drainage Amount Small 03/12/22 2000   Drainage Description Thin;Serosanguinous 03/12/22 2000   Wound Odor None 03/15/22 0800   Number of days: 7          PLAN     Skin Care & Pressure Relief Recommendations  Other: lift patient, avoid pulling/dragging skin     Foam dressings, change as needed/weekly     Teaching completed with:   [] Patient           [] Family member       [] Caregiver          [x] Nursing  [] Other    Patient/Caregiver Teaching:  Level of patient/caregiver understanding able to:   [x] Indicates understanding       [] Needs reinforcement  [] Unsuccessful      [] Verbal Understanding  [] Demonstrated understanding       [] No evidence of learning  [] Refused teaching         [] N/A       Electronically signed by Yoni Berrios RN on 3/17/2022 at 2:49 PM

## 2022-03-17 NOTE — PROGRESS NOTES
Pt seem and assessed, pt is on bipap and getting breathing treatment. BBS are diminished. No distress noted.  Will take bipap off today and put on 3L NC.

## 2022-03-17 NOTE — CONSULTS
NEUROLOGY CONSULTATION NOTE    Patient: Charo Owens MRN: [id number]  CSN: [id number]    YOB: 1940  Age: 80 y.o. Sex: female    DOA: 3/2/2022 LOS:  LOS: 15 days        Requesting Physician: Dr. Yahir Huerta  Reason for Consultation: AMS            HISTORY OF PRESENT ILLNESS:   Charo Owens is a 80 y.o. female with history of Atrial fibrillation, congestive heart failure, dementia, sleep apnea, recent right hip fracture, status post hip replacement, episodes of hypoxia, hypotension and hypoglycemia, who presents with intermittent confusional states. The patient is not able to provide history. Currently, she is awake, alert but remarkably confused. Per the nursing, who talked to the family members, the patient had episodes of confusional status and unresponsiveness intermittently over the last 6 weeks or so. There are no reported seizure-like activities. Her head CT was unremarkable for an acute event. Her laboratory studies with TSH and ammonia were normal.  However, her BUN/creatinine show prerenal failure. PAST MEDICAL HISTORY:  Past Medical History:   Diagnosis Date    Hypertension     Sleep disorder      PAST SURGICAL HISTORY:  Past Surgical History:   Procedure Laterality Date    HX ORTHOPAEDIC      broken right ankle, left femur 30 yrs ago     FAMILY HISTORY:  Family History   Problem Relation Age of Onset    Diabetes Mother     Heart Disease Mother     Heart Disease Father      SOCIAL HISTORY:  Social History     Socioeconomic History    Marital status: SINGLE   Tobacco Use    Smoking status: Never Smoker    Smokeless tobacco: Never Used   Vaping Use    Vaping Use: Never used   Substance and Sexual Activity    Alcohol use:  Yes     Alcohol/week: 1.0 standard drink     Types: 1 Glasses of wine per week     Comment: soc    Drug use: No     MEDICATIONS:  Current Facility-Administered Medications   Medication Dose Route Frequency    furosemide (LASIX) injection 20 mg  20 mg IntraVENous BID    midodrine (PROAMATINE) tablet 5 mg  5 mg Oral TID WITH MEALS    meropenem (MERREM) 1 g in 0.9% sodium chloride (MBP/ADV) 50 mL MBP  1 g IntraVENous Q12H    arformoteroL (BROVANA) neb solution 15 mcg  15 mcg Nebulization BID RT    famotidine (PF) (PEPCID) 20 mg in 0.9% sodium chloride 10 mL injection  20 mg IntraVENous DAILY    sodium chloride (NS) flush 5-40 mL  5-40 mL IntraVENous PRN    acetaminophen (TYLENOL) tablet 650 mg  650 mg Oral Q6H    naloxone (NARCAN) injection 0.4 mg  0.4 mg IntraVENous PRN    ferrous sulfate tablet 325 mg  1 Tablet Oral TID    diphenhydrAMINE (BENADRYL) capsule 25 mg  25 mg Oral Q4H PRN    bisacodyL (DULCOLAX) suppository 10 mg  10 mg Rectal DAILY PRN    ELECTROLYTE REPLACEMENT PROTOCOL - Magnesium   1 Each Other PRN    ELECTROLYTE REPLACEMENT PROTOCOL - Calcium   1 Each Other PRN    ELECTROLYTE REPLACEMENT PROTOCOL  - Phosphorus Renal Dosing  1 Each Other PRN    ELECTROLYTE REPLACEMENT PROTOCOL - Potassium Renal Dosing  1 Each Other PRN    midazolam (VERSED) injection 1 mg  1 mg IntraVENous Q2H PRN    fentaNYL citrate (PF) injection 25 mcg  25 mcg IntraVENous Q4H PRN    nystatin (MYCOSTATIN) 100,000 unit/mL oral suspension 500,000 Units  500,000 Units Oral QID    apixaban (ELIQUIS) tablet 2.5 mg  2.5 mg Oral BID    sodium chloride (NS) flush 5-40 mL  5-40 mL IntraVENous Q8H    sodium chloride (NS) flush 5-40 mL  5-40 mL IntraVENous PRN    acetaminophen (TYLENOL) tablet 650 mg  650 mg Oral Q6H PRN    Or    acetaminophen (TYLENOL) suppository 650 mg  650 mg Rectal Q6H PRN    polyethylene glycol (MIRALAX) packet 17 g  17 g Oral DAILY PRN    ondansetron (ZOFRAN ODT) tablet 4 mg  4 mg Oral Q8H PRN    Or    ondansetron (ZOFRAN) injection 4 mg  4 mg IntraVENous Q6H PRN    [Held by provider] carvediloL (COREG) tablet 3.125 mg  3.125 mg Oral BID WITH MEALS    aspirin delayed-release tablet 81 mg  81 mg Oral DAILY     Prior to Admission medications Medication Sig Start Date End Date Taking? Authorizing Provider   allopurinoL (ZYLOPRIM) 100 mg tablet Take 100 mg by mouth daily. Yes Provider, Historical   acetaminophen (TYLENOL) 325 mg tablet Take 650 mg by mouth every six (6) hours as needed for Pain. Yes Provider, Historical   niacin (NIASPAN) 1,000 mg Tb24 tab Take 1,000 mg by mouth daily. Yes Provider, Historical   trolamine salicylate-aloe vera 86% (ASPERCREME) topical cream Apply 2 g to affected area as needed for Pain. Yes Provider, Historical   multivitamin, tx-iron-ca-min (THERA-M w/ IRON) 9 mg iron-400 mcg tab tablet Take 1 Tab by mouth daily. Yes Provider, Historical   aspirin delayed-release 81 mg tablet Take 81 mg by mouth daily. Yes Provider, Historical   potassium chloride SR (KLOR-CON 10) 10 mEq tablet Take 10 mEq by mouth daily. Yes Provider, Historical   carvediloL (COREG) 3.125 mg tablet Take 3.125 mg by mouth daily. Yes Provider, Historical       ALLERGIES:  Allergies   Allergen Reactions    Seafood Hives     crabs    Statins-Hmg-Coa Reductase Inhibitors Unknown (comments)    Sulfa (Sulfonamide Antibiotics) Hives       Review of Systems  I am unable to review the systems due to confusional state. PHYSICAL EXAMINATION:     Visit Vitals  /81   Pulse 80   Temp 98.8 °F (37.1 °C)   Resp 19   Ht 5' 5\" (1.651 m)   Wt 113.5 kg (250 lb 3.6 oz)   SpO2 93%   Breastfeeding No   BMI 41.64 kg/m²    O2 Flow Rate (L/min): 3 l/min O2 Device: Nasal cannula  GENERAL: Pleasant, in no apparent distress. HEENT: Moist mucous membranes, sclerae anicteric, scalp is atraumatic. Poor dentition. CVS: Regular rate and rhythm, no murmurs or gallops. No carotid bruits. PULMONARY: Clear to auscultation bilaterally. No rales or rhonchi. No wheezing. EXTREMITIES: Normal range of motion at all sites. No deformities. ABDOMEN: Soft, nontender. SKIN: No rashes or ecchymoses. Warm and dry. NEUROLOGIC: Alert.   She follows simple commands but it is not easy to understand her language. She states her name. She is extremely lethargic. Her face looks symmetric. PERRL. She moves upper extremities on command. She withdraws to noxious stimuli on both upper and lower extremities. She is able to wiggle the toes but she is not moving the lower extremities on command. She is remarkably weak. Labs: Results:       Chemistry Recent Labs     03/17/22  0512 03/16/22  0435 03/15/22  0437   * 133* 107*    142 142   K 3.3* 3.5 4.8    104 104   CO2 31 31 35*   BUN 90* 91* 93*   CREA 1.63* 1.78* 1.89*   CA 10.4* 10.9* 10.8*   AGAP 8 7 3   BUCR 55* 51* 49*   * 190* 133*   TP 6.1* 7.0 6.9   ALB 3.3* 3.7 3.5   GLOB 2.8 3.3 3.4   AGRAT 1.2 1.1 1.0      CBC w/Diff Recent Labs     03/16/22  0435 03/15/22  0437   WBC 11.1 10.1   RBC 3.01* 2.91*   HGB 9.0* 9.2*   HCT 29.0* 29.2*    209   GRANS  --  77*   LYMPH  --  10*   EOS  --  1      Cardiac Enzymes No results for input(s): CPK, CKND1, FARA in the last 72 hours. No lab exists for component: CKRMB, TROIP   Coagulation No results for input(s): PTP, INR, APTT, INREXT in the last 72 hours. Lipid Panel Lab Results   Component Value Date/Time    Cholesterol, total 183 04/25/2021 04:00 PM    HDL Cholesterol 101 (H) 04/25/2021 04:00 PM    LDL, calculated 69.8 04/25/2021 04:00 PM    VLDL, calculated 12.2 04/25/2021 04:00 PM    Triglyceride 61 04/25/2021 04:00 PM    CHOL/HDL Ratio 1.8 04/25/2021 04:00 PM      BNP No results for input(s): BNPP in the last 72 hours. Liver Enzymes Recent Labs     03/17/22 0512   TP 6.1*   ALB 3.3*   *      Thyroid Studies Lab Results   Component Value Date/Time    TSH 0.38 03/16/2022 04:35 AM          Radiology:  NM LUNG SCAN PERF    Result Date: 3/3/2022  EXAM: NM LUNG SCAN PERF. CLINICAL INDICATION/HISTORY: d dimer increase dyspnea. -Additional: Clinical concern for pulmonary embolus.  COMPARISON: Correlation is made with the radiographic study performed one day prior. TECHNIQUE: 7.2 mCi Tc-99m MAA was injected intravenously and the 8 standard views of the chest were obtained. This perfusion only examination is interpreted using the PISAPED criteria. _______________ FINDINGS: Perfusion images show no segmental perfusion defects to suggest the presence of pulmonary embolism. _______________     o scintigraphic findings to suggest the presence of acute pulmonary embolism. _______________     XR HIP RT W OR WO PELV 2-3 VWS    Result Date: 3/7/2022  EXAM: RIGHT HIP RADIOGRAPHS CLINICAL INDICATION/HISTORY: right hip pain -Additional: None COMPARISON: May March 3, 2022. TECHNIQUE: AP pelvis and frog-leg lateral view of right hip. _______________ FINDINGS: BONES: Intact appearing ilioischial and iliopectineal lines. There is a displaced and impacted subcapital right femoral neck fracture, with mild progressive displacement on follow-up imaging. Mild-moderate bilateral hip joint arthrosis. SOFT TISSUES: Unremarkable. _______________     1. Displaced and impacted subcapital right femoral neck fracture, increased in displacement from CT performed 4 days prior. XR ABD (KUB)    Result Date: 3/11/2022  EXAM: SUPINE ABDOMINAL RADIOGRAPH CLINICAL INDICATION/HISTORY: og tube placement -Additional: None COMPARISON: None TECHNIQUE: Single supine view of the abdomen. _______________ FINDINGS: BOWEL GAS PATTERN: Enteric tube tip within the distal stomach. No evidence of obstruction. No visible free air, given limitations of supine view. BONES: Scoliosis and multilevel degenerative changes. Right hip prosthesis. OTHER: None. _______________     No significant abnormality. CT HEAD WO CONT    Result Date: 3/16/2022  EXAM: CT of the brain without intravenous contrast. INDICATION:  \"AMS. \" COMPARISON:  CT March 11, 2022.  DOSE REDUCTION AND DICOM INFORMATION: One or more dose reduction techniques were used on this CT: automated exposure control, adjustment of the mAs and/or kVp according to patient size, and iterative reconstruction techniques. The specific techniques used on this CT exam have been documented in the patient's electronic medical record. Digital Imaging and Communications in Medicine (DICOM) format image data are available to nonaffiliated external healthcare facilities or entities on a secure, media free, reciprocally searchable basis with patient authorization for at least a 12-month period after this study. _______________ FINDINGS:      IMAGE QUALITY:  Diagnostic. BRAIN AND EXTRA-AXIAL SPACE:            SUBACUTE TERRITORIAL TRANSCORTICAL INFARCT:  None detected. MASS:  None detected. HEMORRHAGE:  None. SUBDURAL FLUID COLLECTION:  None detected. HYDROCEPHALUS:  None. REMOTE  TERRITORIAL CEREBRAL INFARCT:  None detected. REMOTE CEREBELLAR INFARCT:  None detected. STRIVE (STandards for Reporting Vascular changes on nEuroimaging):                            --[place] of white matter hypodensity (\"leukoaraiosis\") of presumed vascular origin:  Low-grade. --[place] of lacunes of presumed vascular origin (often undetectable on CT):  None definite. --Degree of brain atrophy:  Moderate. BURDEN OF CALCIFIC INTRACRANIAL ATHEROSCLEROSIS:   Moderate. HEENT:            INCLUDED UPPER ORBITS:  There are bilateral lens replacements. INCLUDED UPPER PARANASAL SINUSES:  Predominantly clear. MASTOID AIR CELLS:  Predominantly clear. BONES:  Unremarkable. SUPERFICIAL SOFT TISSUES: Unremarkable. _______________     Generalized chronic changes, however, no mass or acute findings. _______________     CT HEAD WO CONT    Result Date: 3/11/2022  EXAM: CT head INDICATION: Headache.  COMPARISON: 3/7/2022 TECHNIQUE: Axial CT imaging of the head was performed without intravenous contrast. Standard multiplanar coronal and sagittal reformatted images were obtained and are included in interpretation. One or more dose reduction techniques were used on this CT: automated exposure control, adjustment of the mAs and/or kVp according to patient size, and iterative reconstruction techniques. The specific techniques used on this CT exam have been documented in the patient's electronic medical record. Digital Imaging and Communications in Medicine (DICOM) format image data are available to nonaffiliated external healthcare facilities or entities on a secure, media free, reciprocally searchable basis with patient authorization for at least a 12-month period after this study. _______________ FINDINGS: BRAIN AND POSTERIOR FOSSA: No evidence of acute large vessel transcortical infarct or acute parenchymal hemorrhage. No midline shift or hydrocephalus. EXTRA-AXIAL SPACES AND MENINGES: There are no abnormal extra-axial fluid collections. CALVARIUM: Intact. SINUSES: Clear. OTHER: None. _______________     No acute intracranial abnormality. CT HEAD WO CONT    Result Date: 3/7/2022  EXAM: CT head INDICATION: Altered mental status. COMPARISON: 4/23/2021. TECHNIQUE: Axial CT imaging of the head was performed without intravenous contrast. Standard multiplanar coronal and sagittal reformatted images were obtained and are included in interpretation. One or more dose reduction techniques were used on this CT: automated exposure control, adjustment of the mAs and/or kVp according to patient size, and iterative reconstruction techniques. The specific techniques used on this CT exam have been documented in the patient's electronic medical record. Digital Imaging and Communications in Medicine (DICOM) format image data are available to nonaffiliated external healthcare facilities or entities on a secure, media free, reciprocally searchable basis with patient authorization for at least a 12-month period after this study.  _______________ FINDINGS: BRAIN AND POSTERIOR FOSSA: Periventricular white matter hypoattenuation which is nonspecific but likely represents chronic ischemic changes. No evidence of acute large vessel transcortical infarct or acute parenchymal hemorrhage. No midline shift or hydrocephalus. EXTRA-AXIAL SPACES AND MENINGES: There are no abnormal extra-axial fluid collections. CALVARIUM: Intact. SINUSES: Clear. OTHER: None. _______________     No acute intracranial abnormality. CT CHEST ABD PELV WO CONT    Result Date: 3/3/2022  EXAM: CT CHEST, ABDOMEN AND PELVIS CLINICAL INDICATION/HISTORY: Hypoxemia, retrocardiac density, diarrhea, weakness and shortness of breath COMPARISON: 3/2/2022 TECHNIQUE: Axial CT imaging of the chest, abdomen, and pelvis was performed without intravenous contrast. Multiplanar reformats were generated. One or more dose reduction techniques were used on this CT: automated exposure control, adjustment of the mAs and/or kVp according to patient size, and iterative reconstruction techniques. The specific techniques used on this CT exam have been documented in the patient's electronic medical record. Digital Imaging and Communications in Medicine (DICOM) format image data are available to nonaffiliated external healthcare facilities or entities on a secure, media free, reciprocally searchable basis with patient authorization for at least a 12-month period after this study. ________________ FINDINGS: LIMITATIONS:   > Suboptimal evaluation given lack of intravenous contrast.   > Motion artifact is present which limits evaluation.   > Suboptimal exam due to patient body habitus and arms by the side. CHEST: LUNGS: Respiratory motion significantly limits evaluation. Interlobar septal thickening in the upper lobes. Mild bilateral dependent atelectasis. No large consolidation or suspicious pulmonary mass. PLEURA: Very small volume bilateral pleural effusions.  AIRWAY: Normal. MEDIASTINUM: Four-chamber cardiomegaly with asymmetric biatrial enlargement, mitral and aortic annular calcifications. The main pulmonary artery is enlarged suggesting pulmonary arterial hypertension. Normal caliber aorta with scattered calcified atherosclerotic plaque. No pericardial effusion. LYMPH NODES: No enlarged lymph nodes. CHEST WALL: Diffuse anasarca. Heterogeneous thyroid. _______________ ABDOMEN/PELVIS: LIVER: No enhancing hepatic mass. The portal and hepatic veins are patent. BILIARY: Gallstones are present in the nondistended gallbladder lumen. No biliary dilation. SPLEEN: Normal. PANCREAS: Normal. ADRENALS: Left adrenal gland measures 9 HU (axial image 76), most consistent with lipid rich adenoma. Normal right adrenal gland. KIDNEYS: Asymmetrically small left kidney. No rate of a stone. No hydronephrosis. GI TRACT:  A small hiatal hernia is present. Normal caliber small and large bowel loops. No morphology of bowel obstruction. Normal appendix. BLADDER: Diffuse wall thickening in the largely decompressed bladder. PELVIC ORGANS: No acute abnormality. VASCULATURE: No arterial aneurysm. 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 LYMPH NODES: No mesenteric or retroperitoneal lymphadenopathy. OTHER: No free intraperitoneal air. No ascites. Diffuse anasarca. OSSEOUS STRUCTURES: No acute osseous abnormality. Multilevel degenerative changes most pronounced in the lumbar spine. Degenerative changes in both shoulders (right greater than left). Please note the included portions of the upper extremities are not well evaluated on this study _______________     1. Findings of congestive heart failure with cardiomegaly, interstitial edema, very small bilateral pleural effusions, and diffuse anasarca. 2.  Bladder wall thickening may be due to underdistention though superimposed infection cannot be excluded. Recommend correlation for cystitis.  3.  Osseous degenerative changes and additional findings, as detailed in the body of the report. US RETROPERITONEUM COMP    Result Date: 3/12/2022  EXAM: Complete retroperitoneal ultrasound INDICATION: Renal failure. COMPARISON: CT 3/3/2022 TECHNIQUE:Multiple gray scale sonographic images of the retroperitoneum were obtained. Additional color Doppler images were also acquired. _______________ FINDINGS: RIGHT KIDNEY: 9.2 cm in length. A 0.7 cm cyst is present in the right kidney. No hydronephrosis or solid renal mass. No visible calculi. Normal renal echotexture and cortical thickness. LEFT KIDNEY: 8.3 cm in length. A 1.3 cm cyst is present in the lower pole. No hydronephrosis or solid renal mass. No visible calculi. Normal renal echotexture and cortical thickness. BLADDER: Bladder is decompressed with a Willoughby catheter in place. OTHER: Incidentally noted is cholelithiasis. The gallbladder is contracted. Trace perihepatic ascites is present. _______________     No hydronephrosis. XR CHEST PORT    Result Date: 3/16/2022  EXAM: Portable upright chest radiograph. INDICATION: \"Respiratory failure. \" COMPARISON: March 16, 2022. _______________ FINDINGS:    DEVICES:  None. LUNGS:           --Expansion:  Adequate. --Focal opacity:  None detected. --Diffuse abnormality:  None detected. PLEURAL SPACE:          --Pleural effusion:  Unchanged small left and equivocal right basilar effusions. --Pneumothorax:  None detected. MISCELLANEOUS:  There is cardiac silhouette enlargement versus artifact of portable/AP technique. _______________     Unchanged basal effusions. _______________     XR CHEST PORT    Result Date: 3/16/2022  EXAM: XR CHEST PORT CLINICAL INDICATION/HISTORY: hypoxia -Additional: None COMPARISON: One day prior TECHNIQUE: Frontal view of the chest _______________ FINDINGS: HEART AND MEDIASTINUM: Cardiomegaly. LUNGS AND PLEURAL SPACES: Small layering right effusion/atelectasis. Prominent central vasculature. No pneumothorax.  BONY THORAX AND SOFT TISSUES: No acute osseous abnormality _______________     Cardiomegaly. Small layering right effusion/atelectasis. Prominent central vasculature. XR CHEST PORT    Result Date: 3/15/2022  EXAM: XR CHEST PORT CLINICAL INDICATION/HISTORY: intubated -Additional: None COMPARISON: 1 day prior TECHNIQUE: Portable frontal view of the chest _______________ FINDINGS: SUPPORT DEVICES: ET tube and visualized gastric tube both project in stable position. HEART AND MEDIASTINUM: Enlarged appearing cardiac silhouette with stable mediastinal contours. Mild prominence of the central pulmonary vessels without gross evidence for vascular congestion. LUNGS AND PLEURAL SPACES: No pneumothorax. Hazy right lung base opacity, stable to minimally increased. Minor left basilar atelectasis. No dense consolidation. BONY THORAX AND SOFT TISSUES: Unremarkable. _______________     1. Support devices in stable/appropriate position as visualized. 2. Mild cardiac enlargement, unchanged. 3. Hazy right lung base opacity, favored a combination of atelectasis and posteriorly layering pleural effusion, stable to minimally increased. XR CHEST PORT    Result Date: 3/14/2022  EXAM: XR CHEST PORT CLINICAL INDICATION/HISTORY: intubated -Additional: None COMPARISON: 1 day prior TECHNIQUE: Portable frontal view of the chest _______________ FINDINGS: SUPPORT DEVICES: ET tube and visualized gastric tube both project in stable position. HEART AND MEDIASTINUM: Enlarged appearing cardiac silhouette with stable mediastinal contours. LUNGS AND PLEURAL SPACES: No pneumothorax. Hazy right lung base opacity. Minor left basilar atelectasis. No dense consolidation. BONY THORAX AND SOFT TISSUES: Unremarkable. _______________     1. Support devices in stable/appropriate position as visualized. 2. Mild cardiac enlargement, unchanged. 3. Hazy right lung base opacity, favored a combination of atelectasis and posteriorly layering pleural effusion.     XR CHEST PORT    Result Date: 3/13/2022  EXAM: XR CHEST PORT CLINICAL INDICATION/HISTORY: intubated -Additional: None COMPARISON: March 12, 2022 TECHNIQUE: Portable frontal view of the chest _______________ FINDINGS: SUPPORT DEVICES: Endotracheal tube and visualized gastric tube both project in stable position from prior exam. HEART AND MEDIASTINUM: Enlarged appearing cardiac silhouette with stable mediastinal contours. LUNGS AND PLEURAL SPACES: Faint/hazy right lower lung zone opacity. No pneumothorax or pleural effusion demonstrated. BONY THORAX AND SOFT TISSUES: Unremarkable. _______________     1. Support devices in stable position. 2. Mild interval increase in right basilar atelectasis and likely small right pleural effusion. 3. Cardiomegaly, as previously. XR CHEST PORT    Result Date: 3/12/2022  EXAM: XR CHEST PORT CLINICAL INDICATION/HISTORY: intubated -Additional: None COMPARISON: One hour prior TECHNIQUE: Portable frontal view of the chest _______________ FINDINGS: SUPPORT DEVICES: Endotracheal tube and visualized gastric tube both project in stable position. HEART AND MEDIASTINUM: Enlarged cardiac silhouette. Stable mediastinal contours. LUNGS AND PLEURAL SPACES: No focal pneumonic opacity. No pneumothorax or significant pleural effusion. BONY THORAX AND SOFT TISSUES: Unremarkable. _______________     Cardiomegaly without superimposed acute radiographic cardiopulmonary abnormality. Support devices in stable/appropriate position. XR CHEST PORT    Result Date: 3/11/2022  HISTORY: -Provided with order: intubated -Additional: Respiratory failure Technique : AP PORTABLE CHEST Comparison : 03/10/22 FINDINGS: HEART AND MEDIASTINUM: Enlarged cardiopericardial silhouette. Aortic vascular calcification. Endotracheal tube tip estimated at 3.5 cm above bella. LUNGS AND PLEURAL SPACES: No consolidation, congestive heart failure, pleural effusion or pneumothorax. BONY THORAX AND SOFT TISSUES: Degenerative scoliosis. No plain film evidence of acute cardiopulmonary disease, allowing for technique. XR CHEST PORT    Result Date: 3/10/2022  HISTORY: Respiratory failure. Intubation. . COMPARISON: 3/5/2022. FINDINGS: A portable view of the chest: Patient is intubated, endotracheal tube tip approximately 26 cm over bella. Mediastinal contour stable. Cardiomegaly. Atherosclerosis. No focal consolidation, pleural effusion or pneumothorax. Lungs are overall clear. Patient is intubated, endotracheal tube tip approximately 26 cm over bella. XR CHEST PORT    Result Date: 3/6/2022  EXAM: PORTABLE CHEST HISTORY: Shortness of breath. COMPARISON: 3/2/2022 TECHNIQUE: Single portable view. _______________ FINDINGS: SUPPORT DEVICES: None HEART AND MEDIASTINUM: Moderate cardiomegaly. LUNGS AND PLEURAL SPACES: Subsegmental atelectasis left base. Possible small effusions. BONES AND SOFT TISSUES: Dextroscoliosis. _______________     Subsegmental atelectasis left base. Possible small effusions. Moderate cardiomegaly without change. XR CHEST PORT    Result Date: 3/2/2022  EXAM:  AP Portable Chest X-ray 1 view INDICATION: Shortness of breath COMPARISON: April 23, 2021 _______________ FINDINGS: Cardiomegaly and mediastinal contours are within normal limits for portable radiograph. There is increased right retrocardiac/right paraspinal density. There are no pleural effusions. No acute osseous findings. ________________      Increased right retrocardiac density which may represent airspace disease or underlying pulmonary nodule. Recommend CT chest for further evaluation. [facility] XR TECHNOLOGIST SERVICE    Result Date: 3/16/2022  See impression. Fluoroscopy was provided for this procedure under the supervision and/or direction of the attending provider. ? For further information regarding this procedure, see patient medical record. XR HIP RT W OR WO PELV  1 VW    Result Date: 3/11/2022  EXAM: Right hip, single view COMPARISON: None. INDICATION: Right hip pain, status post right hip arthroplasty. _______________ FINDINGS: Single portable AP view of the right hip was obtained. Surgical changes and hardware of total right hip arthroplasty. No lucency adjacent to the hardware or other findings to suggest complication. No acute fracture. Expected adjacent soft tissue swelling and emphysema. _______________    Status post total right hip arthroplasty, without complication. ECHO ADULT COMPLETE    Result Date: 3/3/2022    Left Ventricle: Left ventricle size is normal. Moderately increased wall thickness. Findings consistent with moderate concentric hypertrophy. Normal wall motion. Normal left ventricular systolic function with a visually estimated EF of 55 - 60%.   Left Atrium: Left atrium is mildly dilated.   Right Ventricle: Right ventricle is mildly dilated.   Right Atrium: Right atrium is mildly dilated.   Pulmonary artery :  Estimated pulmonary arterial systolic pressure is 51 mmhg. Pulmonary hypertension found to be moderate   Mitral Valve: Moderately thickened leaflet. Mildly calcified leaflet. Moderate annular calcification of the mitral valve.   IVC/SVC : IVC diameter is greater than 21 mm and decreases less than 50% during inspiration; therefore the estimated right atrial pressure is elevated (~15 mmHg). IVC is dilated. Consider LORRAINE for better evaluation of mitral valve if clinically indicated. ECHO ADULT FOLLOW-UP OR LIMITED    Result Date: 3/15/2022    Left Ventricle: Left ventricle size is normal. Moderately increased wall thickness. Findings consistent with moderate concentric hypertrophy. Normal wall motion. Low normal left ventricular systolic function with a visually estimated EF of 55%. Abnormal diastolic function.   Left Atrium: Left atrium is severely dilated.   Right Ventricle: Mildly reduced systolic function.   Right Atrium: Right atrium is severely dilated.   Mitral Valve: Moderately thickened leaflet.  Mildly calcified leaflet. Moderate annular calcification of the mitral valve. Moderate transvalvular regurgitation.   Tricuspid Valve: Mildly thickened leaflets.   Pulmonary artery : Estimated pulmonary arterial systolic pressure is 57 mmhg. Pulmonary hypertension found to be moderate.   IVC /SVC : Patient is ventilated, cannot use IVC diameter to estimate right atrial pressure. IVC is dilated. DUPLEX LOWER EXT VENOUS BILAT    Result Date: 3/4/2022  · No evidence of deep vein thrombosis in the right lower extremity. · No evidence of deep vein thrombosis in the left lower extremity. ASSESSMENT/IMPRESSION:  The clinical presentation is consistent with toxic/metabolic encephalopathy in the setting of dementia, possibly due to  hypotension, hypoglycemia and acute renal failure. I do not get the impression of a seizure disorder or stroke. RECOMMENDATIONS:  1. Treatment of underlying toxic/metabolic abnormalities including renal failure. 2.  EEG would be recommended but we are not able to EEG this week until Monday. We do not have EEG technologist in our hospital for 4 days. I do not think EEG would change the management. Possibly, it will show some slowing. 3.  Serial neuro examinations. 4.  No further recommendations from neurology. Please contact neurology for further questions or concerns.     ------------------------------------  Karis Denson MD  3/17/2022  12:20 PM

## 2022-03-17 NOTE — PROGRESS NOTES
RENAL CONSULT  3/17/2022    Patient:  Kishore Moya  :  1940  Gender:  female  MRN #:  [id number]    Reason for Consult: Metabolic Alkalosis  And renal failure       Assessment:    Kishore Moya is a 80y.o. year old female  With h/o  morbid obesity, SERENA, right leg weakness, HTN, A. fib, CHF admitted  on 3/3/2022 for shortness of breath    she developed  onset of A. fib on admission. HF was treated with diuretics , s/p extubation now on nasal canula. BP stable without vasopressors   She developed acute renal failure with creatinine slowly rising most likely due to hypotension and over diuresis    # Acute renal failure- Improving  # Metabolic alkalosis - due to diuretics , upon review of serum bicarbonate she had baseline elevated bicarbonate which must be metabolic compensation for chronic respiratory acidosis   #Hypernatremia - Improved  #respiratroy failure- Improved      Subjective/Relevants events: -  She is awake but confused  On nasal canula, awake and alert  Decent urine output   Bp stable  Does not complaints anything     Plan:      - Decent urine output overnight with slowly down trending serum creatinine every day.  Breathing looks comfortable on nasal canula  - she has edema , she is off lasix for last few days , as alkalosis has improved , consider lasix iv prn for sign/symptoms of CHF/fluid overload   - serum bicarb is still high most likely metabolic compensation for respiratory acidosis   - Intake and output charting, Willoughby's cath, ensure she is not retaining urine   -  CT scan on  3/3 showed asymmetrical small kidney , no hydronephrosis in USG    - avoid nsaids , contrast and nephrotoxin   - Dose all meds for current eGFR  - keep MAP 65 mmhg      Rest of the management per primary team       Intake/Output Summary (Last 24 hours) at 3/17/2022 1313  Last data filed at 3/17/2022 1115  Gross per 24 hour   Intake 501.75 ml   Output 1250 ml   Net -748.25 ml         Objective:    Visit Vitals  BP 116/70   Pulse 80   Temp 98.8 °F (37.1 °C)   Resp 19   Ht 5' 5\" (1.651 m)   Wt 113.5 kg (250 lb 3.6 oz)   SpO2 93%   Breastfeeding No   BMI 41.64 kg/m²       Physical Exam:    Awake, confused   HEENT:No neck swelling  Lung: fine rales    Abdomen: soft, normal bowel sounds.   Ext: pitting  edema  Skin: No rash      Laboratory Data:    Lab Results   Component Value Date    BUN 90 (H) 03/17/2022    BUN 91 (H) 03/16/2022    BUN 93 (H) 03/15/2022     03/17/2022     03/16/2022     03/15/2022    CO2 31 03/17/2022    CO2 31 03/16/2022    CO2 35 (H) 03/15/2022     Lab Results   Component Value Date    WBC 11.1 03/16/2022    HGB 9.0 (L) 03/16/2022    HCT 29.0 (L) 03/16/2022     No components found for: CALCIUM, PHOSPHORUS, MAGNESIUM  Lab Results   Component Value Date     (H) 04/25/2021     No results found for: ROBERTO Schmidt    Imaging Reveiwed:             Neha Cordero MD

## 2022-03-17 NOTE — PROGRESS NOTES
Family discussion    I spoke with her 2 daughters at bedside. She is only responsive to pain on BiPAP with hypoglycemia and hypotension requiring pressors. Abx have been broadened. Head CT obtained. I discussed the possibility of comfort care if she continues to decline or requires increased pressor support. They will discuss with family and consider.

## 2022-03-17 NOTE — PROGRESS NOTES
CM spoke with physician, patient will need Bipap when she discharges. PT recommends SNF vs. LTC, please note that morales patients are discharged to SNF/LTC, the SNF/LTC places the Bipap order. CM spoke with patient's daughter who states that her mother would come home to her house after rehab, and if there are no accepting facilities that are acceptable, she will take her home with her. Patient will need a Bipap and a hospital bed. If patient goes to rehab, they order the medical equipment. Care Management Interventions  PCP Verified by CM:  (Dr. Jeffery Kent (female) )  Mode of Transport at Discharge: BLS (.)  Transition of Care Consult (CM Consult): SNF [facility] has stated that according to the current therapy notes, she is not Acute inpt rehab appropriate. SNF choices obtained from the pt's dtr;  [facility]. )  Partner SNF: No  Discharge Durable Medical Equipment:  (TBD)  Physical Therapy Consult: Yes  Occupational Therapy Consult: Yes  Support Systems: Child(doc)  Confirm Follow Up Transport: Family  The Plan for Transition of Care is Related to the Following Treatment Goals : skilled nursing facility  The Patient and/or Patient Representative was Provided with a Choice of Provider and Agrees with the Discharge Plan?: Yes (The pt and her dtr: Marlena)  Freedom of Choice List was Provided with Basic Dialogue that Supports the Patient's Individualized Plan of Care/Goals, Treatment Preferences and Shares the Quality Data Associated with the Providers?: Yes ([facility])  Discharge Location  Patient Expects to be Discharged to[de-identified] Other: (TBD)

## 2022-03-17 NOTE — PROGRESS NOTES
[facility]ists  Pulmonary, Critical Care, and Sleep Medicine    Name: Kendall Dia MRN: [id number]   : 1940 Hospital: [facility] [place]    Date: 3/17/2022  Room: [address] [place] [address] Note                                              Consult requesting physician: Dr. Dee Dee Scott  Reason for Consult: Respiratory failure    IMPRESSION:   Acute respiratory failure with hypoxemia. Acute on chronic diastolic congestive heart failure. Fracture of neck of right femur. SERENA. Active Hospital Problems    Diagnosis Date Noted    Hypokalemia 2022    [facility]ract[facility] of neck of right femur ([facility] 2022    Stage 3 chronic kidney disease ([facility] 2022    SERENA (obstructive sleep apnea) 2022    Adult BMI 39.0-39.9 kg/sq m 2022    Acute respiratory failure with hypoxemia (HCC) 2022    Atrial fibrillation ([facility] 2022    Acute on chronic diastolic congestive heart failure ([facility] 2021    Elevated troponin 2021    HTN (hypertension) 2021   ·      Patient Active Problem List   Diagnosis Code    Fatigue R53.83    Elevated troponin R77.8    Obesity (BMI 30-39. 9) E66.9    HTN (hypertension) I10    SERENA treated with BiPAP G47.33    Acute on chronic diastolic congestive heart failure (HCC) I50.33    Acute respiratory failure with hypoxemia (HCC) J96.01    Atrial fibrillation ([facility]) I48.91    Stage 3 chronic kidney disease (HCC) N18.30    SERENA (obstructive sleep apnea) G47.33    Adult BMI 39.0-39.9 kg/sq m Z68.39    Fracture of neck of right femur ([facility]) S72.001A    Hypokalemia E87.6         · Code status: DNR       RECOMMENDATIONS:     Respiratory: History of obesity, SERENA on BiPAP. Postoperative respiratory failure, reintubated in PACU 3/10/2022.     Extubated on 3/15/2022  fialed home CPAP with oxygen desaturations changed to BIPAP 16-9 /5 2L oxygen   BIPAP at night   Diuresis add bronchodilators and add albumins   Last night again lethargic CT negative  ABG on 3/16 7.38/53/ back on BIPAP  All night off in AM  CXR revised   IMPRESSION  Cardiomegaly.     Small layering right effusion/atelectasis. Prominent central vasculature. Keep SPO2 >=92%. HOB 30 degree elevation all the time. Aggressive pulmonary toileting. Aspiration precautions. CVS:  At night was briefly on pressors now off    Severe cardiomyopathy on max tx   BP borderline so limited   worsening BNP   New onset A. fib on admission, chronic diastolic CHF. Continue aspirin. Continue Coreg but hold for SBP less than 100. Continue Eliquis; monitor for any external bleeding. Lasix held due to metabolic alkalosis since 0/97/7351; and received Diamox I adj used the dose today   Cardiologist on board. Echo 3/3/2022: LVEF 55 to 60%. RV mildly dilated. RA mildly dilated. PVL LE 3/3/2022: No DVT. VQ scan 3/3/2022: No PE.    ID: WBC normal  Dc vanc  Cefepime change to eugene  Dc box    No fever or leukocytosis. Rapid influenza and COVID19 3/2/2022: Negative. Urine culture 3/2/2022: Klebsiella pneumonia. Blood culture 3/2/2022: Negative. Endotracheal aspirate culture: Light Enterobacter cloacae complex. Heavy normal forrest. Antibiotics: Rocephin was discontinued on 3/12/2022 (Enterobacter is resistant to Rocephin). Was on Levaquin but I change to meropenem unclear     Hematology/Oncology:   Anemia; but no external bleeding. Normal platelets. Renal:   Mild TK; likely due to diuretics use. S/p mild hypernatremia; resolved. Lasix held due to metabolic alkalosis since 8/39/2422; and received Diamox 250 mg every 12 hours x2 doses on 3/12/2022 and on D5 0.45 NS at 50 mill per hour; with improved ABG. Dr. Denzel Beatty on board. GI/: No acute issues. Elevated LFt and juliann I suspect due to chronic illness and CHF but will check GB usg  No abdominal pain nausea    Endocrine: Monitor BS. SSI.     Neurology:   Last night lethargy again  Ct normal ammonia TSH normal  Improved with BIPAP  I consulted neurology   Patient was lethargic and confused before going for hip surgery on 3/10/2022 morning. Patient remains off propofol since 3/11/2022; mental status is improving since improvement in ABG. Patient is more alert and awake today. CT head 3/7/2022: Nil acute. Repeat CT head 3/11/2022: Nil acute. Psychiatry: No acute issues. Toxicology: No acute issues. Pain/Sedation: Sedation protocol as above    Skin/Wound: Right hip wound VAC in place after surgery; local care per nursing protocol. Electrolytes: Replace electrolytes per ICU electrolyte replacement protocol. IVF: D5 0.45 NS at 50 mill per hour. Nutrition: Tolerating OGT feed at 40 mL/h. Prophylaxis: DVT Prophylaxis: Eliquis. GI Prophylaxis: Protonix. Restraints: wrist soft restraints for patient interfering with medical therapy/management and patient safety. Lines/Tubes: PIV  ETT: 3/10/2022. OGT: 3/11/2022. [place]: 3/10/2022 (Medically necessary for strict input/output monitoring in critically ill patient, will remove it when not needed. [place] bundle followed). Advance Directive/Palliative Care: Consulted. Will defer respective systems problem management to primary and other respective consultant and follow patient in ICU with primary and other medical team.  Further recommendations will be based on the patient's response to recommended treatment and results of the investigation ordered. Quality Care: PPI, DVT prophylaxis, HOB elevated, Infection control all reviewed and addressed. Care of plan d/w RN, RT, hospitalist team.    High complexity decision making was performed during the evaluation of this patient at high risk for decompensation with multiple organ involvement. I updated daughter today .  Palliative care on board     Subjective/History of Present Illness:     Patient is a 80 y.o. female with PMHx significant for morbid obesity, SERENA, right leg weakness, HTN, A. fib, CHF; admitted to medical floor on 3/3/2022 for dyspnea. Patient had new onset of A. fib on admission. Patient was admitted on medical floor and A. fib/CHF was being treated medically; and was using home BiPAP nightly. Found to have right femoral fracture; underwent right press-fit direct anterior total hip arthroplasty for femoral neck fracture. Postoperatively patient was extubated in PACU; but due to respiratory distress, reintubated and transferred to ICU.      3/17/2022 :     Remains in . Periods of lethargy but responsive to BIPAP  No signs of sepsis weaning abx to off  CHF severe  LFT elevated ? CHF ? Check GB  DNR/DNI  I/O last 24 hrs: Intake/Output Summary (Last 24 hours) at 3/17/2022 1011  Last data filed at 3/17/2022 0800  Gross per 24 hour   Intake 418.75 ml   Output 1600 ml   Net -1181.25 ml         The patient is critically ill and can not provide additional history due to confusion        Review of Systems:  ROS not obtained due to patient factor. Allergies   Allergen Reactions    Seafood Hives     crabs    Statins-Hmg-Coa Reductase Inhibitors Unknown (comments)    Sulfa (Sulfonamide Antibiotics) Hives      Past Medical History:   Diagnosis Date    Hypertension     Sleep disorder       Past Surgical History:   Procedure Laterality Date    HX ORTHOPAEDIC      broken right ankle, left femur 30 yrs ago      Social History     Tobacco Use    Smoking status: Never Smoker    Smokeless tobacco: Never Used   Substance Use Topics    Alcohol use: Yes     Alcohol/week: 1.0 standard drink     Types: 1 Glasses of wine per week     Comment: soc      Family History   Problem Relation Age of Onset    Diabetes Mother     Heart Disease Mother     Heart Disease Father       Prior to Admission medications    Medication Sig Start Date End Date Taking? Authorizing Provider   allopurinoL (ZYLOPRIM) 100 mg tablet Take 100 mg by mouth daily.    Yes Provider, Historical   acetaminophen (TYLENOL) 325 mg tablet Take 650 mg by mouth every six (6) hours as needed for Pain. Yes Provider, Historical   niacin (NIASPAN) 1,000 mg Tb24 tab Take 1,000 mg by mouth daily. Yes Provider, Historical   trolamine salicylate-aloe vera 38% (ASPERCREME) topical cream Apply 2 g to affected area as needed for Pain. Yes Provider, Historical   multivitamin, tx-iron-ca-min (THERA-M w/ IRON) 9 mg iron-400 mcg tab tablet Take 1 Tab by mouth daily. Yes Provider, Historical   aspirin delayed-release 81 mg tablet Take 81 mg by mouth daily. Yes Provider, Historical   potassium chloride SR (KLOR-CON 10) 10 mEq tablet Take 10 mEq by mouth daily. Yes Provider, Historical   carvediloL (COREG) 3.125 mg tablet Take 3.125 mg by mouth daily.    Yes Provider, Historical     Current Facility-Administered Medications   Medication Dose Route Frequency    potassium chloride 10 mEq in 100 ml IVPB  10 mEq IntraVENous Q1H    furosemide (LASIX) injection 20 mg  20 mg IntraVENous BID    midodrine (PROAMATINE) tablet 5 mg  5 mg Oral TID WITH MEALS    meropenem (MERREM) 1 g in 0.9% sodium chloride (MBP/ADV) 50 mL MBP  1 g IntraVENous Q12H    arformoteroL (BROVANA) neb solution 15 mcg  15 mcg Nebulization BID RT    famotidine (PF) (PEPCID) 20 mg in 0.9% sodium chloride 10 mL injection  20 mg IntraVENous DAILY    acetaminophen (TYLENOL) tablet 650 mg  650 mg Oral Q6H    ferrous sulfate tablet 325 mg  1 Tablet Oral TID    nystatin (MYCOSTATIN) 100,000 unit/mL oral suspension 500,000 Units  500,000 Units Oral QID    apixaban (ELIQUIS) tablet 2.5 mg  2.5 mg Oral BID    sodium chloride (NS) flush 5-40 mL  5-40 mL IntraVENous Q8H    [Held by provider] carvediloL (COREG) tablet 3.125 mg  3.125 mg Oral BID WITH MEALS    aspirin delayed-release tablet 81 mg  81 mg Oral DAILY         Objective:   Vital Signs:    Visit Vitals  BP (!) 104/52   Pulse 93   Temp 99.3 °F (37.4 °C)   Resp 16   Ht 5' 5\" (1.651 m)   Wt 113.5 kg (250 lb 3.6 oz)   SpO2 95% Breastfeeding No   BMI 41.64 kg/m²       O2 Device: BIPAP   O2 Flow Rate (L/min): 3 l/min   Temp (24hrs), Av.3 °F (36.8 °C), Min:97.2 °F (36.2 °C), Max:99.6 °F (37.6 °C)       Intake/Output:   Last shift:       07 - 1900  In: -   Out: 150 [Urine:150]    Last 3 shifts: 03/15 1901 -  0700  In: 1451.7 [I.V.:1451.7]  Out: 2150 [Urine:2150]      Intake/Output Summary (Last 24 hours) at 3/17/2022 0952  Last data filed at 3/17/2022 0800  Gross per 24 hour   Intake 418.75 ml   Output 1600 ml   Net -1181.25 ml       Last 3 Recorded Weights in this Encounter    22 0000 03/15/22 0840 22 0735   Weight: 115.3 kg (254 lb 3.1 oz) 111.1 kg (245 lb) 113.5 kg (250 lb 3.6 oz)         Ventilator Settings:  Mode Rate Tidal Volume Pressure FiO2 PEEP   Spontaneous   375 ml  7 cm H2O 30 % 5 cm H20     Peak airway pressure: 19 cm H2O    Plateau pressure:     Tidal volume:    Minute ventilation: 11.2 l/min     Recent Labs     22  1925 22  1030 03/15/22  0841   PHI 7.38 7.39 7.45   PCO2I 53.5* 55.3* 46.7*   PO2I 97 123* 141*   HCO3I 31.7* 33.2* 32.1*   FIO2I 40 32 40       Physical Exam:       General/Neurology: Alert, more awake now in AM was lethrgic  Following commands very weak , pupils reactive non focal   Head:   NCAT. Eye:   EOM intact. No icterus/pallor/cyanosis. Nose:   Normal no discharge   Neck:   Trachea midline. Lung: Moderate air entry bilateral equal. At the base sed decreased breath sounds R>L   No rales, rhonchi. No wheezing or stridor. No prolonged expiration or accessory muscle use. Heart:   S1 S2 present. irregularly irregular  No murmur or JVD. Abdomen:  Soft. NT. ND. No palpable masses. Extremities:  No edema. No cyanosis or clubbing. Right hip surgical site wound VAC dressing intact. Pulses: 2+ and symmetric in DP.     Data:       Recent Results (from the past 24 hour(s))   GLUCOSE, POC    Collection Time: 22 10:27 AM   Result Value Ref Range    Glucose (POC) 148 (H) 70 - 110 mg/dL   BLOOD GAS,LACTIC ACID, POC    Collection Time: 03/16/22 10:30 AM   Result Value Ref Range    Device: NASAL CANNULA      FIO2 (POC) 32 %    pH (POC) 7.39 7.35 - 7.45      pCO2 (POC) 55.3 (H) 35.0 - 45.0 MMHG    pO2 (POC) 123 (H) 80 - 100 MMHG    HCO3 (POC) 33.2 (H) 22 - 26 MMOL/L    sO2 (POC) 98.6 (H) 92 - 97 %    Base excess (POC) 7.0 mmol/L    Allens test (POC) Positive      Site RIGHT RADIAL      Specimen type (POC) ARTERIAL      Performed by Chadwick Akhtar     Lactic Acid (POC) 0.74 0.40 - 2.00 mmol/L   GLUCOSE, POC    Collection Time: 03/16/22  4:58 PM   Result Value Ref Range    Glucose (POC) 136 (H) 70 - 110 mg/dL   BLOOD GAS, ARTERIAL POC    Collection Time: 03/16/22  7:25 PM   Result Value Ref Range    Device: BIPAP MASK      FIO2 (POC) 40 %    pH (POC) 7.38 7.35 - 7.45      pCO2 (POC) 53.5 (H) 35.0 - 45.0 MMHG    pO2 (POC) 97 80 - 100 MMHG    HCO3 (POC) 31.7 (H) 22 - 26 MMOL/L    sO2 (POC) 97.5 (H) 92 - 97 %    Base excess (POC) 5.4 mmol/L    Set Rate 12 bpm    PEEP/CPAP (POC) 6 cmH2O    PIP (POC) 16      Pressure support 10 cmH2O    Allens test (POC) NOT APPLICABLE      Inspiratory Time 1 sec    Total resp.  rate 21      Site RIGHT RADIAL      Patient temp. 97.2      Specimen type (POC) ARTERIAL      Performed by Marc MARCUM, POC    Collection Time: 03/16/22  7:27 PM   Result Value Ref Range    Glucose (POC) 148 (H) 70 - 110 mg/dL   AMMONIA    Collection Time: 03/16/22  7:48 PM   Result Value Ref Range    Ammonia, plasma 20 11 - 32 UMOL/L   GLUCOSE, POC    Collection Time: 03/17/22 12:13 AM   Result Value Ref Range    Glucose (POC) 97 70 - 110 mg/dL   MAGNESIUM    Collection Time: 03/17/22  5:12 AM   Result Value Ref Range    Magnesium 2.3 1.6 - 2.6 mg/dL   METABOLIC PANEL, COMPREHENSIVE    Collection Time: 03/17/22  5:12 AM   Result Value Ref Range    Sodium 144 136 - 145 mmol/L    Potassium 3.3 (L) 3.5 - 5.5 mmol/L    Chloride 105 100 - 111 mmol/L    CO2 31 21 - 32 mmol/L    Anion gap 8 3.0 - 18 mmol/L    Glucose 101 (H) 74 - 99 mg/dL    BUN 90 (H) 7.0 - 18 MG/DL    Creatinine 1.63 (H) 0.6 - 1.3 MG/DL    BUN/Creatinine ratio 55 (H) 12 - 20      GFR est AA 37 (L) >60 ml/min/1.73m2    GFR est non-AA 30 (L) >60 ml/min/1.73m2    Calcium 10.4 (H) 8.5 - 10.1 MG/DL    Bilirubin, total 1.7 (H) 0.2 - 1.0 MG/DL    ALT (SGPT) 60 (H) 13 - 56 U/L    AST (SGOT) 53 (H) 10 - 38 U/L    Alk. phosphatase 187 (H) 45 - 117 U/L    Protein, total 6.1 (L) 6.4 - 8.2 g/dL    Albumin 3.3 (L) 3.4 - 5.0 g/dL    Globulin 2.8 2.0 - 4.0 g/dL    A-G Ratio 1.2 0.8 - 1.7     PHOSPHORUS    Collection Time: 03/17/22  5:12 AM   Result Value Ref Range    Phosphorus 3.3 2.5 - 4.9 MG/DL   CALCIUM, IONIZED    Collection Time: 03/17/22  5:18 AM   Result Value Ref Range    Ionized Calcium 1.36 (H) 1.12 - 1.32 MMOL/L   GLUCOSE, POC    Collection Time: 03/17/22  6:20 AM   Result Value Ref Range    Glucose (POC) 95 70 - 110 mg/dL         Chemistry Recent Labs     03/17/22  0512 03/16/22  0435 03/15/22  0437   * 133* 107*    142 142   K 3.3* 3.5 4.8    104 104   CO2 31 31 35*   BUN 90* 91* 93*   CREA 1.63* 1.78* 1.89*   CA 10.4* 10.9* 10.8*   MG 2.3 2.4 2.3   PHOS 3.3  --   --    AGAP 8 7 3   BUCR 55* 51* 49*   * 190* 133*   TP 6.1* 7.0 6.9   ALB 3.3* 3.7 3.5   GLOB 2.8 3.3 3.4   AGRAT 1.2 1.1 1.0        Lactic Acid No results found for: LAC  No results for input(s): LAC in the last 72 hours. Liver Enzymes Protein, total   Date Value Ref Range Status   03/17/2022 6.1 (L) 6.4 - 8.2 g/dL Final     Albumin   Date Value Ref Range Status   03/17/2022 3.3 (L) 3.4 - 5.0 g/dL Final     Globulin   Date Value Ref Range Status   03/17/2022 2.8 2.0 - 4.0 g/dL Final     A-G Ratio   Date Value Ref Range Status   03/17/2022 1.2 0.8 - 1.7   Final     Alk.  phosphatase   Date Value Ref Range Status   03/17/2022 187 (H) 45 - 117 U/L Final     Recent Labs     03/17/22  0512 03/16/22  0435 03/15/22  0437 TP 6.1* 7.0 6.9   ALB 3.3* 3.7 3.5   GLOB 2.8 3.3 3.4   AGRAT 1.2 1.1 1.0   * 190* 133*        CBC w/Diff Recent Labs     03/16/22  0435 03/15/22  0437   WBC 11.1 10.1   RBC 3.01* 2.91*   HGB 9.0* 9.2*   HCT 29.0* 29.2*    209   GRANS  --  77*   LYMPH  --  10*   EOS  --  1        Cardiac Enzymes No results found for: CPK, CK, CKMMB, CKMB, RCK3, CKMBT, CKNDX, CKND1, FARA, TROPT, TROIQ, ADOLFO, TROPT, TNIPOC, BNP, BNPP     BNP No results found for: BNP, BNPP, XBNPT     Coagulation No results for input(s): PTP, INR, APTT, INREXT, INREXT in the last 72 hours. Thyroid  Lab Results   Component Value Date/Time    TSH 0.38 03/16/2022 04:35 AM       No results found for: T4     Urinalysis Lab Results   Component Value Date/Time    Color YELLOW 03/02/2022 09:04 PM    Appearance CLOUDY 03/02/2022 09:04 PM    Specific gravity 1.021 03/02/2022 09:04 PM    pH (UA) 5.0 03/02/2022 09:04 PM    Protein 300 (A) 03/02/2022 09:04 PM    Glucose Negative 03/02/2022 09:04 PM    Ketone TRACE (A) 03/02/2022 09:04 PM    Bilirubin Negative 03/02/2022 09:04 PM    Urobilinogen 1.0 03/02/2022 09:04 PM    Nitrites Negative 03/02/2022 09:04 PM    Leukocyte Esterase Negative 03/02/2022 09:04 PM    Epithelial cells 2+ 03/02/2022 09:04 PM    Bacteria FEW (A) 03/02/2022 09:04 PM    WBC 0 to 3 03/02/2022 09:04 PM    RBC 0 to 3 03/02/2022 09:04 PM        Micro  No results for input(s): SDES, CULT in the last 72 hours. No results for input(s): CULT in the last 72 hours. Culture data during this hospitalization.    All Micro Results     Procedure Component Value Units Date/Time    CULTURE, BLOOD [[id number]] Collected: 03/14/22 0419    Order Status: Completed Specimen: Blood Updated: 03/17/22 0742     Special Requests: NO SPECIAL REQUESTS        Culture result: NO GROWTH 3 DAYS       CULTURE, BLOOD [[id number]] Collected: 03/14/22 0420    Order Status: Completed Specimen: Blood Updated: 03/17/22 0742     Special Requests: NO SPECIAL REQUESTS        Culture result: NO GROWTH 3 DAYS       CULTURE, RESPIRATORY/SPUTUM/BRONCH Jn Banter STAIN [[id number]]  (Abnormal)  (Susceptibility) Collected: 03/10/22 1630    Order Status: Completed Specimen: Sputum from Endotracheal aspirate Updated: 03/13/22 0905     Special Requests: NO SPECIAL REQUESTS        GRAM STAIN NO WBC'S SEEN         NO EPITHELIAL CELLS SEEN         NO ORGANISMS SEEN        Culture result:       LIGHT ENTEROBACTER CLOACAE COMPLEX                  HEAVY NORMAL RESPIRATORY ELSIE          CULTURE, RESPIRATORY/SPUTUM/BRONCH Silvina Floyd [[id number]] Collected: 03/10/22 1915    Order Status: Canceled Specimen: Sputum from Endotracheal aspirate     CULTURE, BLOOD [[id number]] Collected: 03/02/22 2048    Order Status: Completed Specimen: Blood Updated: 03/08/22 0655     Special Requests: NO SPECIAL REQUESTS        Culture result: NO GROWTH 6 DAYS       CULTURE, BLOOD [[id number]] Collected: 03/02/22 2048    Order Status: Completed Specimen: Blood Updated: 03/08/22 0655     Special Requests: NO SPECIAL REQUESTS        Culture result: NO GROWTH 6 DAYS       CULTURE, URINE [[id number]]  (Abnormal)  (Susceptibility) Collected: 03/02/22 2104    Order Status: Completed Specimen: Cath Urine Updated: 03/05/22 1226     Special Requests: NO SPECIAL REQUESTS        [place] Count --        >100,000  COLONIES/mL       Culture result: KLEBSIELLA PNEUMONIAE       COVID-19 RAPID TEST [[id number]] Collected: 03/02/22 2025    Order Status: Completed Specimen: Nasopharyngeal Updated: 03/02/22 2052     Specimen source Nasopharyngeal        COVID-19 rapid test Not detected        Comment: Rapid Abbott ID Now       Rapid NAAT:  The specimen is NEGATIVE for SARS-CoV-2, the novel coronavirus associated with COVID-19. Negative results should be treated as presumptive and, if inconsistent with clinical signs and symptoms or necessary for patient management, should be tested with an alternative molecular assay.   Negative results do not preclude SARS-CoV-2 infection and should not be used as the sole basis for patient management decisions. This test has been authorized by the FDA under an Emergency Use Authorization (EUA) for use by authorized laboratories. Fact sheet for Healthcare Providers: ConventionUpdate.co.nz  Fact sheet for Patients: ConventionUpdate.co.nz       Methodology: Isothermal Nucleic Acid Amplification         INFLUENZA A & B AG (RAPID TEST) [[id number]] Collected: 03/02/22 2025    Order Status: Completed Specimen: Nasopharyngeal from Nasal washing Updated: 03/02/22 2047     Influenza A Antigen Negative        Comment: A negative result does not exclude influenza virus infection, seasonal or H1N1 due to suboptimal sensitivity. If influenza is circulating in your community, a diagnosis of influenza should be considered based on a patients clinical presentation and empiric antiviral treatment should be considered, if indicated. Influenza B Antigen Negative                NM LUNG SCAN PERF  Result Date: 3/3/2022  EXAM: NM LUNG SCAN PERF. CLINICAL INDICATION/HISTORY: d dimer increase dyspnea. -Additional: Clinical concern for pulmonary embolus. COMPARISON: Correlation is made with the radiographic study performed one day prior. TECHNIQUE: 7.2 mCi Tc-99m MAA was injected intravenously and the 8 standard views of the chest were obtained. This perfusion only examination is interpreted using the PISAPED criteria. _______________ FINDINGS: Perfusion images show no segmental perfusion defects to suggest the presence of pulmonary embolism. _______________     o scintigraphic findings to suggest the presence of acute pulmonary embolism. _______________       XR HIP RT W OR WO PELV 2-3 VWS  Result Date: 3/7/2022  EXAM: RIGHT HIP RADIOGRAPHS CLINICAL INDICATION/HISTORY: right hip pain -Additional: None COMPARISON: May March 3, 2022.  TECHNIQUE: AP pelvis and frog-leg lateral view of right hip. _______________ FINDINGS: BONES: Intact appearing ilioischial and iliopectineal lines. There is a displaced and impacted subcapital right femoral neck fracture, with mild progressive displacement on follow-up imaging. Mild-moderate bilateral hip joint arthrosis. SOFT TISSUES: Unremarkable. _______________     1. Displaced and impacted subcapital right femoral neck fracture, increased in displacement from CT performed 4 days prior. CT HEAD WO CONT  Result Date: 3/7/2022  EXAM: CT head INDICATION: Altered mental status. COMPARISON: 4/23/2021. TECHNIQUE: Axial CT imaging of the head was performed without intravenous contrast. Standard multiplanar coronal and sagittal reformatted images were obtained and are included in interpretation. One or more dose reduction techniques were used on this CT: automated exposure control, adjustment of the mAs and/or kVp according to patient size, and iterative reconstruction techniques. The specific techniques used on this CT exam have been documented in the patient's electronic medical record. Digital Imaging and Communications in Medicine (DICOM) format image data are available to nonaffiliated external healthcare facilities or entities on a secure, media free, reciprocally searchable basis with patient authorization for at least a 12-month period after this study. _______________ FINDINGS: BRAIN AND POSTERIOR FOSSA: Periventricular white matter hypoattenuation which is nonspecific but likely represents chronic ischemic changes. No evidence of acute large vessel transcortical infarct or acute parenchymal hemorrhage. No midline shift or hydrocephalus. EXTRA-AXIAL SPACES AND MENINGES: There are no abnormal extra-axial fluid collections. CALVARIUM: Intact. SINUSES: Clear. OTHER: None. _______________     No acute intracranial abnormality.          CT CHEST ABD PELV WO CONT  Result Date: 3/3/2022  EXAM: CT CHEST, ABDOMEN AND PELVIS CLINICAL INDICATION/HISTORY: Hypoxemia, retrocardiac density, diarrhea, weakness and shortness of breath COMPARISON: 3/2/2022 TECHNIQUE: Axial CT imaging of the chest, abdomen, and pelvis was performed without intravenous contrast. Multiplanar reformats were generated. One or more dose reduction techniques were used on this CT: automated exposure control, adjustment of the mAs and/or kVp according to patient size, and iterative reconstruction techniques. The specific techniques used on this CT exam have been documented in the patient's electronic medical record. Digital Imaging and Communications in Medicine (DICOM) format image data are available to nonaffiliated external healthcare facilities or entities on a secure, media free, reciprocally searchable basis with patient authorization for at least a 12-month period after this study. ________________ FINDINGS: LIMITATIONS:   > Suboptimal evaluation given lack of intravenous contrast.   > Motion artifact is present which limits evaluation.   > Suboptimal exam due to patient body habitus and arms by the side. CHEST: LUNGS: Respiratory motion significantly limits evaluation. Interlobar septal thickening in the upper lobes. Mild bilateral dependent atelectasis. No large consolidation or suspicious pulmonary mass. PLEURA: Very small volume bilateral pleural effusions. AIRWAY: Normal. MEDIASTINUM: Four-chamber cardiomegaly with asymmetric biatrial enlargement, mitral and aortic annular calcifications. The main pulmonary artery is enlarged suggesting pulmonary arterial hypertension. Normal caliber aorta with scattered calcified atherosclerotic plaque. No pericardial effusion. LYMPH NODES: No enlarged lymph nodes. CHEST WALL: Diffuse anasarca. Heterogeneous thyroid. _______________ ABDOMEN/PELVIS: LIVER: No enhancing hepatic mass. The portal and hepatic veins are patent. BILIARY: Gallstones are present in the nondistended gallbladder lumen. No biliary dilation.   SPLEEN: Normal. PANCREAS: Normal. ADRENALS: Left adrenal gland measures 9 HU (axial image 76), most consistent with lipid rich adenoma. Normal right adrenal gland. KIDNEYS: Asymmetrically small left kidney. No rate of a stone. No hydronephrosis. GI TRACT:  A small hiatal hernia is present. Normal caliber small and large bowel loops. No morphology of bowel obstruction. Normal appendix. BLADDER: Diffuse wall thickening in the largely decompressed bladder. PELVIC ORGANS: No acute abnormality. VASCULATURE: No arterial aneurysm. 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 LYMPH NODES: No mesenteric or retroperitoneal lymphadenopathy. OTHER: No free intraperitoneal air. No ascites. Diffuse anasarca. OSSEOUS STRUCTURES: No acute osseous abnormality. Multilevel degenerative changes most pronounced in the lumbar spine. Degenerative changes in both shoulders (right greater than left). Please note the included portions of the upper extremities are not well evaluated on this study _______________     1. Findings of congestive heart failure with cardiomegaly, interstitial edema, very small bilateral pleural effusions, and diffuse anasarca. 2.  Bladder wall thickening may be due to underdistention though superimposed infection cannot be excluded. Recommend correlation for cystitis. 3.  Osseous degenerative changes and additional findings, as detailed in the body of the report. Hip x-ray 3/11/2022:  Status post total right hip arthroplasty, without complication. ECHO ADULT COMPLETE  Result Date: 3/3/2022    Left Ventricle: Left ventricle size is normal. Moderately increased wall thickness. Findings consistent with moderate concentric hypertrophy. Normal wall motion. Normal left ventricular systolic function with a visually estimated EF of 55 - 60%.   Left Atrium: Left atrium is mildly dilated.   Right Ventricle: Right ventricle is mildly dilated.   Right Atrium: Right atrium is mildly dilated.    Pulmonary artery : Estimated pulmonary arterial systolic pressure is 51 mmhg. Pulmonary hypertension found to be moderate   Mitral Valve: Moderately thickened leaflet. Mildly calcified leaflet. Moderate annular calcification of the mitral valve.   IVC/SVC : IVC diameter is greater than 21 mm and decreases less than 50% during inspiration; therefore the estimated right atrial pressure is elevated (~15 mmHg). IVC is dilated. Consider LORRAINE for better evaluation of mitral valve if clinically indicated. DUPLEX LOWER EXT VENOUS BILAT  Result Date: 3/4/2022  · No evidence of deep vein thrombosis in the right lower extremity. · No evidence of deep vein thrombosis in the left lower extremity. USG retroperitoneum 3/12/2022: No hydronephrosis. ECHO 3/3/2022: Result status: Final result       Left Ventricle: Left ventricle size is normal. Moderately increased wall thickness. Findings consistent with moderate concentric hypertrophy. Normal wall motion. Normal left ventricular systolic function with a visually estimated EF of 55 - 60%.   Left Atrium: Left atrium is mildly dilated.   Right Ventricle: Right ventricle is mildly dilated.   Right Atrium: Right atrium is mildly dilated.   Pulmonary artery :  Estimated pulmonary arterial systolic pressure is 51 mmhg. Pulmonary hypertension found to be moderate    Mitral Valve: Moderately thickened leaflet. Mildly calcified leaflet. Moderate annular calcification of the mitral valve.   IVC/SVC : IVC diameter is greater than 21 mm and decreases less than 50% during inspiration; therefore the estimated right atrial pressure is elevated (~15 mmHg). IVC is dilated.     Consider LORRAINE for better evaluation of mitral valve if clinically indicated.        Images report reviewed by me:  CT (Most Recent) (CT chest reviewed by me) Results from Hospital Encounter encounter on 03/02/22    CT HEAD WO CONT    Narrative  EXAM: CT of the brain without intravenous contrast.    INDICATION: \"AMS. \"    COMPARISON:  CT March 11, 2022. DOSE REDUCTION AND DICOM INFORMATION: One or more dose reduction techniques were  used on this CT: automated exposure control, adjustment of the mAs and/or kVp  according to patient size, and iterative reconstruction techniques. The  specific techniques used on this CT exam have been documented in the patient's  electronic medical record. Digital Imaging and Communications in Medicine  (DICOM) format image data are available to nonaffiliated external healthcare  facilities or entities on a secure, media free, reciprocally searchable basis  with patient authorization for at least a 12-month period after this study. _______________    FINDINGS:    IMAGE QUALITY:  Diagnostic. BRAIN AND EXTRA-AXIAL SPACE:    SUBACUTE TERRITORIAL TRANSCORTICAL INFARCT:  None detected. MASS:  None detected. HEMORRHAGE:  None. SUBDURAL FLUID COLLECTION:  None detected. HYDROCEPHALUS:  None. REMOTE  TERRITORIAL CEREBRAL INFARCT:  None detected. REMOTE CEREBELLAR INFARCT:  None detected. STRIVE (STandards for Reporting Vascular changes on nEuroimaging):  --Waldron of white matter hypodensity  (\"leukoaraiosis\") of presumed vascular origin:  Low-grade. --Waldron of lacunes of presumed vascular origin  (often undetectable on CT):  None definite. --Degree of brain atrophy:  Moderate. BURDEN OF CALCIFIC INTRACRANIAL ATHEROSCLEROSIS:   Moderate. HEENT:    INCLUDED UPPER ORBITS:  There are bilateral lens replacements. INCLUDED UPPER PARANASAL SINUSES:  Predominantly clear. MASTOID AIR CELLS:  Predominantly clear. BONES:  Unremarkable. SUPERFICIAL SOFT TISSUES: Unremarkable.    _______________    Impression  Generalized chronic changes, however, no mass or acute findings. _______________         CXR reviewed by me:  XR (Most Recent).  CXR  reviewed by me and compared with previous CXR Results from Hospital Encounter encounter on 03/02/22    XR CHEST PORT    Narrative  EXAM: Portable upright chest radiograph. INDICATION: \"Respiratory failure. \"    COMPARISON: March 16, 2022.    _______________    FINDINGS:    DEVICES:  None. LUNGS:  --Expansion:  Adequate. --Focal opacity:  None detected. --Diffuse abnormality:  None detected. PLEURAL SPACE:  --Pleural effusion:  Unchanged small left and equivocal right basilar  effusions. --Pneumothorax:  None detected. MISCELLANEOUS:  There is cardiac silhouette enlargement versus artifact of  portable/AP technique. _______________    Impression  Unchanged basal effusions. _______________         CXR 3/13/2022:  FINDINGS:  SUPPORT DEVICES: Endotracheal tube and visualized gastric tube both project in  stable position from prior exam.     HEART AND MEDIASTINUM: Enlarged appearing cardiac silhouette with stable  mediastinal contours.     LUNGS AND PLEURAL SPACES: Faint/hazy right lower lung zone opacity. No  pneumothorax or pleural effusion demonstrated.     BONY THORAX AND SOFT TISSUES: Unremarkable.  ______________     IMPRESSION  1. Support devices in stable position. 2. Mild interval increase in right basilar atelectasis and likely small right  pleural effusion. 3. Cardiomegaly, as previously. ·Please note: Voice-recognition software may have been used to generate this report, which may have resulted in some phonetic-based errors in grammar and contents. Even though attempts were made to correct all the mistakes, some may have been missed, and remained in the body of the document.       Anoop Chaudhry MD  3/17/2022

## 2022-03-17 NOTE — PROGRESS NOTES
[address] [facility] Pharmacokinetic Monitoring Service - Vancomycin     Charo Owens is a 80 y.o. female starting on vancomycin therapy for sepsis. Pharmacy consulted by [facility] for monitoring and adjustment. Target Concentration: Goal AUC/AIDE 400-600 mg*hr/L    Additional Antimicrobials: Merrem    Pertinent Laboratory Values: Wt Readings from Last 1 Encounters:   03/16/22 113.5 kg (250 lb 3.6 oz)     Temp Readings from Last 1 Encounters:   03/16/22 97.2 °F (36.2 °C)     No components found for: PROCAL  Estimated Creatinine Clearance: 31.1 mL/min (A) (based on SCr of 1.78 mg/dL (H)).   Recent Labs     03/16/22  0435 03/15/22  0437   WBC 11.1 10.1       Plan:  Dosing recommendations based on Bayesian software  Start vancomycin 2000 mg IV once, then 750 mg IV q24h  Anticipated AUC of 584 mg*h/L and trough concentration of 19.2 mcg/ml at steady state  Renal labs as indicated   Vancomycin concentration ordered for 3/17/22 @ 2000   Pharmacy will continue to monitor patient and adjust therapy as indicated    Thank you for the consult,  ARAMIS Jacob  3/16/2022 8:10 PM

## 2022-03-17 NOTE — PROGRESS NOTES
Problem: Mobility Impaired (Adult and Pediatric)  Goal: *Acute Goals and Plan of Care (Insert Text)  Description: Physical TherapyGoals   Initiated 3/13/2022 to be met within 7 days  1. Supine <> sit with max A with use of HR for positioning. (met)  2. Tolerate EOB sitting 5-10 minutes for ADL/balance activities. (progressing)  3. Therapeutic Exercises: Participate with LE strengthening exercise program to facilitate achievement of above. (progressing)    Initiated 3/17/2022 to be met within 7 days  1. Supine <>sit with mod A with use of HR for positioning  2. Tolerate EOB sitting 5-10 min for ADL/balance activities. 3. Therapeutic Exercises: Participate with LE strengthening exercise program to facilitate achievement of above. Outcome: Progressing Towards Goal   physical Therapy Re-EVALUATION    Patient: Natalia Falk (36 y.o. female)  Date: 3/17/2022  Primary Diagnosis: Acute exacerbation of CHF (congestive heart failure) ([facility]) [I50.9]  Procedure(s) (LRB):  RIGHT TOTAL HIP ARTHROPLASTY WITH C-ARM  (PT IN ROOM # 358) (Right) 7 Days Post-Op   Precautions:  Fall,WBAT    ASSESSMENT :  Pt. Received upright in be and fairly lethargic upon arrival. Pt. With inconsistent responses to verbal/tactile stimuli. Pt. Max A for supine <>sit transfer with tactile cues needed throughout. Pt. With mod/max A for static sitting balance at the edge of the bed. Pt. Able to sit EOB for 4 minutes and perform lateral trunk leans R/L with mod/max A to return to upright position. Pt. Sp02 decreased to 81% after 4 minutes of sitting and Pt. Assisted back into supine with total A x 2. Pt. Left with all needs met Sp02 98% in supine and nursing hand off made. Patient will benefit from skilled intervention to address the above impairments.   Patients rehabilitation potential is considered to be Fair  Factors which may influence rehabilitation potential include:   []         None noted  [x]         Mental ability/status  [] Medical condition  []         Home/family situation and support systems  []         Safety awareness  []         Pain tolerance/management  []         Other:      PLAN :  Recommendations and Planned Interventions:  [x]           Bed Mobility Training             [x]    Neuromuscular Re-Education  [x]           Transfer Training                   []    Orthotic/Prosthetic Training  [x]           Gait Training                          []    Modalities  [x]           Therapeutic Exercises          []    Edema Management/Control  [x]           Therapeutic Activities            [x]    Patient and Family Training/Education  []           Other (comment):    Frequency/Duration: Patient will be followed by physical therapy 1-2 times per day to address goals. Discharge Recommendations: SNF vs LTC  Further Equipment Recommendations for Discharge: N/A     SUBJECTIVE:   Patient stated yes.     OBJECTIVE DATA SUMMARY:     Past Medical History:   Diagnosis Date    Hypertension     Sleep disorder      Past Surgical History:   Procedure Laterality Date    HX ORTHOPAEDIC      broken right ankle, left femur 30 yrs ago     Barriers to Learning/Limitations: None  Compensate with: Verbal Cues and Tactile Cues  Prior Level of Function/Home Situation:  Home Situation  Home Environment: Private residence  # Steps to Enter: 0 (Lift used to enter home)  One/Two Story Residence: One story  Living Alone: No  Support Systems: Child(doc)  Patient Expects to be Discharged to[de-identified] Other: (TBD)  Current DME Used/Available at Home: Lynnda Leventhal, rollator,Raised toilet seat,Grab bars  Tub or Shower Type: Shower  Critical Behavior:  Neurologic State: Confused; Eyes open to stimulus  Orientation Level: Disoriented to place; Disoriented to situation;Disoriented to time;Oriented to person  Cognition: Decreased attention/concentration; Follows commands  Safety/Judgement: Decreased awareness of environment  Psychosocial  Patient Behaviors: Calm; Cooperative; Altered mental status  Purposeful Interaction: Yes  Pt Identified Daily Priority: Clinical issues (comment)  Caritas Process: Nurture loving kindness;Establish trust;Teaching/learning; Attend basic human needs;Create healing environment;Supportive expression  Caring Interventions: Reassure; Therapeutic modalities  Reassure: Therapeutic listening; Informing; Acceptance;Quiet presence;Caring rounds  Therapeutic Modalities: Intentional therapeutic touch; Deep breathing  Skin Condition/Temp: Warm;Dry        Skin Integumentary  Skin Color: Appropriate for ethnicity  Skin Condition/Temp: Warm;Dry    Functional Mobility:  Bed Mobility:     Supine to Sit: Maximum assistance;Assist x1;Additional time  Sit to Supine: Total assistance;Assist x2; Additional time  Scooting: Total assistance;Assist x1    Balance:   Sitting: Impaired; With support  Sitting - Static: Poor (constant support)  Sitting - Dynamic: Poor (constant support)    Pain:  Pain Scale 1: FLACC  Pain Intensity 1: 0    Activity Tolerance:   Fair    Please refer to the flowsheet for vital signs taken during this treatment. After treatment:   []         Patient left in no apparent distress sitting up in chair  [x]         Patient left in no apparent distress in bed  [x]         Call bell left within reach  [x]         Nursing notified  []         Caregiver present  [x]         Bed alarm activated    COMMUNICATION/EDUCATION:   []         Fall prevention education was provided and the patient/caregiver indicated understanding. []         Patient/family have participated as able in goal setting and plan of care. []         Patient/family agree to work toward stated goals and plan of care. []         Patient understands intent and goals of therapy, but is neutral about his/her participation. [x]         Patient is unable to participate in goal setting and plan of care.     Thank you for this referral.  Carrol Sy, PT, DPT   Time Calculation: 27 mins

## 2022-03-17 NOTE — PROGRESS NOTES
Cardiology Progress Note        Patient: Chirag Hoang        Sex: female          DOA: 3/2/2022  YOB: 1940      Age:  80 y.o.        LOS:  LOS: 15 days   Assessment/Plan     Principal Problem:    Acute respiratory failure with hypoxemia ([address] (3/2/2022)    Active Problems:    Elevated troponin (4/23/2021)      HTN (hypertension) (4/23/2021)      Acute on chronic diastolic congestive heart failure ([address] (4/23/2021)      Atrial fibrillation ([address] (3/2/2022)      Stage 3 chronic kidney disease ([address] (3/3/2022)      SERENA (obstructive sleep apnea) (3/3/2022)      Adult BMI 39.0-39.9 kg/sq m (3/3/2022)      Fracture of neck of right femur ([address] (3/8/2022)      Hypokalemia (3/14/2022)      Hypotension (3/17/2022)        Plan:  A. fib rate controlled  Blood pressure stable  Continue with current midodrine and Lasix  Altered mental status, seen by neurology  Discussed with family in the room. Blood pressure is intermittently low normal  Coreg is on hold  Increase midodrine from 5 mg twice daily to 3 times daily  Discussed with patient and daughter    Extubated  Denied any symptoms  Blood pressure is low normal  Hold carvedilol  Echocardiogram done with preserved LV function, severe biatrial enlargement moderate MR, TR, pulmonary hypertension  Continue with Lasix  Discussed with patient and daughter        Patient remained intubated  Cardiac telemetry rate controlled A. fib with occasional PVCs, saturating around 100%  Tolerating carvedilol 3.125 mg twice daily and Eliquis 2.5 mg twice daily  Ventilatory management as per pulmonary/intensivist  Discussed with patient's daughter in the room  Continue with current cardiac medications.       Patient remained intubated  Hemodynamically stable  A. fib rate controlled  Discussed with patient's daughter Eula Ramsay on phone    Patient is status post hip surgery  Patient is intubated due to difficulty in breathing and hypoxemia  A. fib rate controlled  Discussed with Dr. Jennifer Ashton with the current medical treatment        Patient denied chest pain or shortness of breath   Atrial fibrillation rate controlled   Patient is scheduled for hip surgery tomorrow  Discussed with patient's daughter  Resume anticoagulation after surgery. Patient is diagnosed with hip fracture and being scheduled for surgery  I have long lengthy discussion with patient and daughter Ari Teixeira in the room  Patient have negative stress test in April 2021  EF is stable  Patient is with elevated but acceptable risk for hip surgery  Eliquis can be hold  Consider DVT prophylaxis anticoagulation from tomorrow patient have taken morning dose of Eliquis      Patient denied any chest pain   Mitral calcification without stenosis  Continue with current medical treatment    Patient continues to do well. H&H stable  Patient will need diuretic on discharge probably Lasix 40 mg twice daily  Discussed with patient and family in the room      A. fib rate controlled  Patient will need p.o. Lasix on discharge  Continue Eliquis 2.5 mg twice daily  Discussed with patient and family      Continue with Lasix 40 mg twice daily  I have long lengthy discussion with the patient and daughter about anticoagulation both of them agreed with low-dose Eliquis 2.5 mg twice daily  DC Lovenox   start Eliquis 2.5 mg twice daily from a.m. Discussed with patient and daughter findings of echocardiogram including mitral valve disease, prefer not to do transesophageal echocardiogram  Continue with current medical treatment                        Subjective:    cc:      Shortness of breath  A. fib        REVIEW OF SYSTEMS:     Extubated and no complaints    Objective:      Visit Vitals  /66   Pulse 81   Temp 98.8 °F (37.1 °C)   Resp 18   Ht 5' 5\" (1.651 m)   Wt 113.5 kg (250 lb 3.6 oz)   SpO2 98%   Breastfeeding No   BMI 41.64 kg/m²     Body mass index is 41.64 kg/m².     Physical Exam:  General Appearance: Comfortable, extubated  NECK: No JVD, no thyroidomeglay. LUNGS: Clear bilaterally. HEART: S1 irregular +S2 audible,    ABD: Non-tender, BS Audible    EXT: No edema, and no cysnosis. VASCULAR EXAM: Pulses are intact. PSYCHIATRIC EXAM: Mood is appropriate.     Medication:  Current Facility-Administered Medications   Medication Dose Route Frequency    potassium chloride 10 mEq in 100 ml IVPB  10 mEq IntraVENous Q1H    furosemide (LASIX) injection 20 mg  20 mg IntraVENous BID    midodrine (PROAMATINE) tablet 5 mg  5 mg Oral TID WITH MEALS    meropenem (MERREM) 1 g in 0.9% sodium chloride (MBP/ADV) 50 mL MBP  1 g IntraVENous Q12H    arformoteroL (BROVANA) neb solution 15 mcg  15 mcg Nebulization BID RT    famotidine (PF) (PEPCID) 20 mg in 0.9% sodium chloride 10 mL injection  20 mg IntraVENous DAILY    sodium chloride (NS) flush 5-40 mL  5-40 mL IntraVENous PRN    acetaminophen (TYLENOL) tablet 650 mg  650 mg Oral Q6H    naloxone (NARCAN) injection 0.4 mg  0.4 mg IntraVENous PRN    ferrous sulfate tablet 325 mg  1 Tablet Oral TID    diphenhydrAMINE (BENADRYL) capsule 25 mg  25 mg Oral Q4H PRN    bisacodyL (DULCOLAX) suppository 10 mg  10 mg Rectal DAILY PRN    ELECTROLYTE REPLACEMENT PROTOCOL - Magnesium   1 Each Other PRN    ELECTROLYTE REPLACEMENT PROTOCOL - Calcium   1 Each Other PRN    ELECTROLYTE REPLACEMENT PROTOCOL  - Phosphorus Renal Dosing  1 Each Other PRN    ELECTROLYTE REPLACEMENT PROTOCOL - Potassium Renal Dosing  1 Each Other PRN    midazolam (VERSED) injection 1 mg  1 mg IntraVENous Q2H PRN    fentaNYL citrate (PF) injection 25 mcg  25 mcg IntraVENous Q4H PRN    nystatin (MYCOSTATIN) 100,000 unit/mL oral suspension 500,000 Units  500,000 Units Oral QID    apixaban (ELIQUIS) tablet 2.5 mg  2.5 mg Oral BID    sodium chloride (NS) flush 5-40 mL  5-40 mL IntraVENous Q8H    sodium chloride (NS) flush 5-40 mL  5-40 mL IntraVENous PRN    acetaminophen (TYLENOL) tablet 650 mg  650 mg Oral Q6H PRN    Or    acetaminophen (TYLENOL) suppository 650 mg  650 mg Rectal Q6H PRN    polyethylene glycol (MIRALAX) packet 17 g  17 g Oral DAILY PRN    ondansetron (ZOFRAN ODT) tablet 4 mg  4 mg Oral Q8H PRN    Or    ondansetron (ZOFRAN) injection 4 mg  4 mg IntraVENous Q6H PRN    [Held by provider] carvediloL (COREG) tablet 3.125 mg  3.125 mg Oral BID WITH MEALS    aspirin delayed-release tablet 81 mg  81 mg Oral DAILY               Lab/Data Reviewed:  Procedures/imaging: see electronic medical records for all procedures/Xrays   and details which were not copied into this note but were reviewed prior to creation of Plan       All lab results for the last 24 hours reviewed. Recent Labs     03/16/22  0435 03/15/22  0437   WBC 11.1 10.1   HGB 9.0* 9.2*   HCT 29.0* 29.2*    209     Recent Labs     03/17/22  1357 03/17/22  0512 03/16/22 0435 03/15/22  0437 03/15/22  0437   NA  --  144 142  --  142   K 3.6 3.3* 3.5   < > 4.8   CL  --  105 104  --  104   CO2  --  31 31  --  35*   GLU  --  101* 133*  --  107*   BUN  --  90* 91*  --  93*   CREA  --  1.63* 1.78*  --  1.89*   CA  --  10.4* 10.9*  --  10.8*    < > = values in this interval not displayed. RADIOLOGY:  CT Results  (Last 48 hours)               03/16/22 2010  CT HEAD WO CONT Final result    Impression:      Generalized chronic changes, however, no mass or acute findings. _______________                               Narrative:  EXAM: CT of the brain without intravenous contrast.       INDICATION:  \"AMS. \"       COMPARISON:  CT March 11, 2022. DOSE REDUCTION AND DICOM INFORMATION: One or more dose reduction techniques were   used on this CT: automated exposure control, adjustment of the mAs and/or kVp   according to patient size, and iterative reconstruction techniques. The   specific techniques used on this CT exam have been documented in the patient's   electronic medical record.   Digital Imaging and Communications in Medicine   (DICOM) format image data are available to nonaffiliated external healthcare   facilities or entities on a secure, media free, reciprocally searchable basis   with patient authorization for at least a 12-month period after this study. _______________       FINDINGS:            IMAGE QUALITY:  Diagnostic. BRAIN AND EXTRA-AXIAL SPACE:                   SUBACUTE TERRITORIAL TRANSCORTICAL INFARCT:  None detected. MASS:  None detected. HEMORRHAGE:  None. SUBDURAL FLUID COLLECTION:  None detected. HYDROCEPHALUS:  None. REMOTE  TERRITORIAL CEREBRAL INFARCT:  None detected. REMOTE CEREBELLAR INFARCT:  None detected. STRIVE (STandards for Reporting Vascular changes on nEuroimaging):                              --[place] of white matter hypodensity   (\"leukoaraiosis\") of presumed vascular origin:  Low-grade. --[place] of lacunes of presumed vascular origin   (often undetectable on CT):  None definite. --Degree of brain atrophy:  Moderate. BURDEN OF CALCIFIC INTRACRANIAL ATHEROSCLEROSIS:   Moderate. HEENT:                   INCLUDED UPPER ORBITS:  There are bilateral lens replacements. INCLUDED UPPER PARANASAL SINUSES:  Predominantly clear. MASTOID AIR CELLS:  Predominantly clear. BONES:  Unremarkable. SUPERFICIAL SOFT TISSUES: Unremarkable.       _______________               CXR Results  (Last 48 hours)               03/16/22 1919  XR CHEST PORT Final result    Impression:      Unchanged basal effusions. _______________                               Narrative:  EXAM: Portable upright chest radiograph. INDICATION: \"Respiratory failure. \"       COMPARISON: March 16, 2022.       _______________       FINDINGS: DEVICES:  None. LUNGS:              --Expansion:  Adequate. --Focal opacity:  None detected. --Diffuse abnormality:  None detected. PLEURAL SPACE:            --Pleural effusion:  Unchanged small left and equivocal right basilar   effusions. --Pneumothorax:  None detected. MISCELLANEOUS:  There is cardiac silhouette enlargement versus artifact of   portable/AP technique. _______________           03/16/22 0900  XR CHEST PORT Final result    Impression:      Cardiomegaly. Small layering right effusion/atelectasis. Prominent central vasculature. Narrative:  EXAM: XR CHEST PORT       CLINICAL INDICATION/HISTORY: hypoxia   -Additional: None       COMPARISON: One day prior       TECHNIQUE: Frontal view of the chest       _______________       FINDINGS:       HEART AND MEDIASTINUM: Cardiomegaly. LUNGS AND PLEURAL SPACES: Small layering right effusion/atelectasis. Prominent   central vasculature. No pneumothorax.        BONY THORAX AND SOFT TISSUES: No acute osseous abnormality       _______________                   Cardiology Procedures:   Results for orders placed or performed during the hospital encounter of 03/02/22   EKG, 12 LEAD, INITIAL   Result Value Ref Range    Ventricular Rate 72 BPM    Atrial Rate 340 BPM    QRS Duration 86 ms    Q-T Interval 374 ms    QTC Calculation (Bezet) 409 ms    Calculated R Axis -2 degrees    Calculated T Axis -3 degrees    Diagnosis       Atrial fibrillation with a competing junctional pacemaker  Abnormal ECG  When compared with ECG of 02-MAR-2022 20:12,  No significant change was found        Echo Results  (Last 48 hours)    None       Cardiolite (Tc-99m Sestamibi) stress test    Signed By: Kvng Tyler MD     March 17, 2022

## 2022-03-18 LAB
ALBUMIN SERPL-MCNC: 2.9 G/DL (ref 3.4–5)
ALBUMIN/GLOB SERPL: 0.8 {RATIO} (ref 0.8–1.7)
ALP SERPL-CCNC: 200 U/L (ref 45–117)
ALT SERPL-CCNC: 59 U/L (ref 13–56)
ANION GAP SERPL CALC-SCNC: 7 MMOL/L (ref 3–18)
AST SERPL-CCNC: 58 U/L (ref 10–38)
BASOPHILS # BLD: 0 K/UL (ref 0–0.1)
BASOPHILS NFR BLD: 0 % (ref 0–2)
BILIRUB SERPL-MCNC: 1.2 MG/DL (ref 0.2–1)
BNP SERPL-MCNC: ABNORMAL PG/ML (ref 0–1800)
BUN SERPL-MCNC: 103 MG/DL (ref 7–18)
BUN/CREAT SERPL: 54 (ref 12–20)
CA-I SERPL-SCNC: 1.33 MMOL/L (ref 1.12–1.32)
CALCIUM SERPL-MCNC: 10.3 MG/DL (ref 8.5–10.1)
CHLORIDE SERPL-SCNC: 107 MMOL/L (ref 100–111)
CO2 SERPL-SCNC: 31 MMOL/L (ref 21–32)
CREAT SERPL-MCNC: 1.92 MG/DL (ref 0.6–1.3)
DIFFERENTIAL METHOD BLD: ABNORMAL
EOSINOPHIL # BLD: 0 K/UL (ref 0–0.4)
EOSINOPHIL NFR BLD: 0 % (ref 0–5)
ERYTHROCYTE [DISTWIDTH] IN BLOOD BY AUTOMATED COUNT: 14.7 % (ref 11.6–14.5)
GLOBULIN SER CALC-MCNC: 3.8 G/DL (ref 2–4)
GLUCOSE BLD STRIP.AUTO-MCNC: 125 MG/DL (ref 70–110)
GLUCOSE BLD STRIP.AUTO-MCNC: 127 MG/DL (ref 70–110)
GLUCOSE BLD STRIP.AUTO-MCNC: 137 MG/DL (ref 70–110)
GLUCOSE BLD STRIP.AUTO-MCNC: 138 MG/DL (ref 70–110)
GLUCOSE SERPL-MCNC: 120 MG/DL (ref 74–99)
HCT VFR BLD AUTO: 26.7 % (ref 35–45)
HGB BLD-MCNC: 8.4 G/DL (ref 12–16)
IMM GRANULOCYTES # BLD AUTO: 0.3 K/UL (ref 0–0.04)
IMM GRANULOCYTES NFR BLD AUTO: 2 % (ref 0–0.5)
LYMPHOCYTES # BLD: 1.1 K/UL (ref 0.9–3.6)
LYMPHOCYTES NFR BLD: 8 % (ref 21–52)
MAGNESIUM SERPL-MCNC: 2.3 MG/DL (ref 1.6–2.6)
MCH RBC QN AUTO: 30 PG (ref 24–34)
MCHC RBC AUTO-ENTMCNC: 31.5 G/DL (ref 31–37)
MCV RBC AUTO: 95.4 FL (ref 78–100)
MONOCYTES # BLD: 1.1 K/UL (ref 0.05–1.2)
MONOCYTES NFR BLD: 8 % (ref 3–10)
NEUTS SEG # BLD: 10.5 K/UL (ref 1.8–8)
NEUTS SEG NFR BLD: 81 % (ref 40–73)
NRBC # BLD: 0.02 K/UL (ref 0–0.01)
NRBC BLD-RTO: 0.2 PER 100 WBC
PHOSPHATE SERPL-MCNC: 2.6 MG/DL (ref 2.5–4.9)
PHOSPHATE SERPL-MCNC: 3.4 MG/DL (ref 2.5–4.9)
PLATELET # BLD AUTO: 287 K/UL (ref 135–420)
PMV BLD AUTO: 11.1 FL (ref 9.2–11.8)
POTASSIUM SERPL-SCNC: 3.7 MMOL/L (ref 3.5–5.5)
POTASSIUM SERPL-SCNC: 3.8 MMOL/L (ref 3.5–5.5)
PROT SERPL-MCNC: 6.7 G/DL (ref 6.4–8.2)
RBC # BLD AUTO: 2.8 M/UL (ref 4.2–5.3)
SODIUM SERPL-SCNC: 145 MMOL/L (ref 136–145)
WBC # BLD AUTO: 13 K/UL (ref 4.6–13.2)

## 2022-03-18 PROCEDURE — 74011250636 HC RX REV CODE- 250/636: Performed by: INTERNAL MEDICINE

## 2022-03-18 PROCEDURE — 74011250637 HC RX REV CODE- 250/637: Performed by: PHYSICIAN ASSISTANT

## 2022-03-18 PROCEDURE — 74011250637 HC RX REV CODE- 250/637: Performed by: INTERNAL MEDICINE

## 2022-03-18 PROCEDURE — 74011000250 HC RX REV CODE- 250: Performed by: INTERNAL MEDICINE

## 2022-03-18 PROCEDURE — 83735 ASSAY OF MAGNESIUM: CPT

## 2022-03-18 PROCEDURE — 85025 COMPLETE CBC W/AUTO DIFF WBC: CPT

## 2022-03-18 PROCEDURE — 84132 ASSAY OF SERUM POTASSIUM: CPT

## 2022-03-18 PROCEDURE — 97110 THERAPEUTIC EXERCISES: CPT

## 2022-03-18 PROCEDURE — 94660 CPAP INITIATION&MGMT: CPT

## 2022-03-18 PROCEDURE — 82330 ASSAY OF CALCIUM: CPT

## 2022-03-18 PROCEDURE — 36415 COLL VENOUS BLD VENIPUNCTURE: CPT

## 2022-03-18 PROCEDURE — 74011000258 HC RX REV CODE- 258: Performed by: INTERNAL MEDICINE

## 2022-03-18 PROCEDURE — 77010033678 HC OXYGEN DAILY

## 2022-03-18 PROCEDURE — 92526 ORAL FUNCTION THERAPY: CPT

## 2022-03-18 PROCEDURE — 97530 THERAPEUTIC ACTIVITIES: CPT

## 2022-03-18 PROCEDURE — 65610000006 HC RM INTENSIVE CARE

## 2022-03-18 PROCEDURE — 94640 AIRWAY INHALATION TREATMENT: CPT

## 2022-03-18 PROCEDURE — 84100 ASSAY OF PHOSPHORUS: CPT

## 2022-03-18 PROCEDURE — 82962 GLUCOSE BLOOD TEST: CPT

## 2022-03-18 PROCEDURE — 83880 ASSAY OF NATRIURETIC PEPTIDE: CPT

## 2022-03-18 RX ORDER — SODIUM,POTASSIUM PHOSPHATES 280-250MG
1 POWDER IN PACKET (EA) ORAL
Status: COMPLETED | OUTPATIENT
Start: 2022-03-18 | End: 2022-03-18

## 2022-03-18 RX ORDER — POTASSIUM CHLORIDE 7.45 MG/ML
10 INJECTION INTRAVENOUS
Status: COMPLETED | OUTPATIENT
Start: 2022-03-18 | End: 2022-03-18

## 2022-03-18 RX ADMIN — MIDODRINE HYDROCHLORIDE 5 MG: 2.5 TABLET ORAL at 07:18

## 2022-03-18 RX ADMIN — ACETAMINOPHEN 650 MG: 325 TABLET ORAL at 17:21

## 2022-03-18 RX ADMIN — POTASSIUM & SODIUM PHOSPHATES POWDER PACK 280-160-250 MG 1 PACKET: 280-160-250 PACK at 04:30

## 2022-03-18 RX ADMIN — SODIUM CHLORIDE, PRESERVATIVE FREE 10 ML: 5 INJECTION INTRAVENOUS at 15:55

## 2022-03-18 RX ADMIN — NYSTATIN 500000 UNITS: 100000 SUSPENSION ORAL at 17:21

## 2022-03-18 RX ADMIN — MEROPENEM 1 G: 1 INJECTION, POWDER, FOR SOLUTION INTRAVENOUS at 20:00

## 2022-03-18 RX ADMIN — FERROUS SULFATE TAB 325 MG (65 MG ELEMENTAL FE) 325 MG: 325 (65 FE) TAB at 21:05

## 2022-03-18 RX ADMIN — POTASSIUM CHLORIDE 10 MEQ: 7.46 INJECTION, SOLUTION INTRAVENOUS at 19:36

## 2022-03-18 RX ADMIN — NYSTATIN 500000 UNITS: 100000 SUSPENSION ORAL at 12:58

## 2022-03-18 RX ADMIN — SODIUM CHLORIDE, PRESERVATIVE FREE 10 ML: 5 INJECTION INTRAVENOUS at 05:13

## 2022-03-18 RX ADMIN — NYSTATIN 500000 UNITS: 100000 SUSPENSION ORAL at 08:37

## 2022-03-18 RX ADMIN — FUROSEMIDE 20 MG: 10 INJECTION, SOLUTION INTRAMUSCULAR; INTRAVENOUS at 08:38

## 2022-03-18 RX ADMIN — MIDODRINE HYDROCHLORIDE 5 MG: 2.5 TABLET ORAL at 12:57

## 2022-03-18 RX ADMIN — ARFORMOTEROL TARTRATE 15 MCG: 15 SOLUTION RESPIRATORY (INHALATION) at 08:07

## 2022-03-18 RX ADMIN — NYSTATIN 500000 UNITS: 100000 SUSPENSION ORAL at 21:05

## 2022-03-18 RX ADMIN — SODIUM CHLORIDE, PRESERVATIVE FREE 20 MG: 5 INJECTION INTRAVENOUS at 08:37

## 2022-03-18 RX ADMIN — MEROPENEM 1 G: 1 INJECTION, POWDER, FOR SOLUTION INTRAVENOUS at 07:18

## 2022-03-18 RX ADMIN — ACETAMINOPHEN 650 MG: 325 TABLET ORAL at 12:57

## 2022-03-18 RX ADMIN — ACETAMINOPHEN 650 MG: 325 TABLET ORAL at 05:13

## 2022-03-18 RX ADMIN — FERROUS SULFATE TAB 325 MG (65 MG ELEMENTAL FE) 325 MG: 325 (65 FE) TAB at 16:00

## 2022-03-18 RX ADMIN — ARFORMOTEROL TARTRATE 15 MCG: 15 SOLUTION RESPIRATORY (INHALATION) at 20:46

## 2022-03-18 RX ADMIN — FUROSEMIDE 20 MG: 10 INJECTION, SOLUTION INTRAMUSCULAR; INTRAVENOUS at 20:57

## 2022-03-18 RX ADMIN — FERROUS SULFATE TAB 325 MG (65 MG ELEMENTAL FE) 325 MG: 325 (65 FE) TAB at 08:38

## 2022-03-18 RX ADMIN — APIXABAN 2.5 MG: 2.5 TABLET, FILM COATED ORAL at 20:56

## 2022-03-18 RX ADMIN — POTASSIUM CHLORIDE 10 MEQ: 7.46 INJECTION, SOLUTION INTRAVENOUS at 04:29

## 2022-03-18 RX ADMIN — MIDODRINE HYDROCHLORIDE 5 MG: 2.5 TABLET ORAL at 16:00

## 2022-03-18 RX ADMIN — APIXABAN 2.5 MG: 2.5 TABLET, FILM COATED ORAL at 08:38

## 2022-03-18 RX ADMIN — SODIUM CHLORIDE, PRESERVATIVE FREE 10 ML: 5 INJECTION INTRAVENOUS at 21:05

## 2022-03-18 RX ADMIN — ASPIRIN 81 MG: 81 TABLET, COATED ORAL at 08:38

## 2022-03-18 NOTE — PROGRESS NOTES
Cardiology Progress Note        Patient: Sam Pandey        Sex: female          DOA: 3/2/2022  YOB: 1940      Age:  80 y.o.        LOS:  LOS: 16 days   Assessment/Plan     Principal Problem:    Acute respiratory failure with hypoxemia ([address] (3/2/2022)    Active Problems:    Elevated troponin (4/23/2021)      HTN (hypertension) (4/23/2021)      Acute on chronic diastolic congestive heart failure ([address] (4/23/2021)      Atrial fibrillation ([address] (3/2/2022)      Stage 3 chronic kidney disease ([address] (3/3/2022)      SERENA (obstructive sleep apnea) (3/3/2022)      Adult BMI 39.0-39.9 kg/sq m (3/3/2022)      Fracture of neck of right femur ([address] (3/8/2022)      Hypokalemia (3/14/2022)      Hypotension (3/17/2022)        Plan:    No cardiac complaints  Continue with Lasix  Continue with apixaban 2.5 mg twice daily  Discussed with patient/family  Dr. Monika Taveras will be covering during the weekend. A. fib rate controlled  Blood pressure stable  Continue with current midodrine and Lasix  Altered mental status, seen by neurology  Discussed with family in the room. Blood pressure is intermittently low normal  Coreg is on hold  Increase midodrine from 5 mg twice daily to 3 times daily  Discussed with patient and daughter    Extubated  Denied any symptoms  Blood pressure is low normal  Hold carvedilol  Echocardiogram done with preserved LV function, severe biatrial enlargement moderate MR, TR, pulmonary hypertension  Continue with Lasix  Discussed with patient and daughter        Patient remained intubated  Cardiac telemetry rate controlled A. fib with occasional PVCs, saturating around 100%  Tolerating carvedilol 3.125 mg twice daily and Eliquis 2.5 mg twice daily  Ventilatory management as per pulmonary/intensivist  Discussed with patient's daughter in the room  Continue with current cardiac medications.       Patient remained intubated  Hemodynamically stable  A. fib rate controlled  Discussed with patient's daughter Manuel Sandhu on phone    Patient is status post hip surgery  Patient is intubated due to difficulty in breathing and hypoxemia  A. fib rate controlled  Discussed with Dr. Sarahi Maravilla with the current medical treatment        Patient denied chest pain or shortness of breath   Atrial fibrillation rate controlled   Patient is scheduled for hip surgery tomorrow  Discussed with patient's daughter  Resume anticoagulation after surgery. Patient is diagnosed with hip fracture and being scheduled for surgery  I have long lengthy discussion with patient and daughter Manuel Sandhu in the room  Patient have negative stress test in April 2021  EF is stable  Patient is with elevated but acceptable risk for hip surgery  Eliquis can be hold  Consider DVT prophylaxis anticoagulation from tomorrow patient have taken morning dose of Eliquis      Patient denied any chest pain   Mitral calcification without stenosis  Continue with current medical treatment    Patient continues to do well. H&H stable  Patient will need diuretic on discharge probably Lasix 40 mg twice daily  Discussed with patient and family in the room      A. fib rate controlled  Patient will need p.o. Lasix on discharge  Continue Eliquis 2.5 mg twice daily  Discussed with patient and family      Continue with Lasix 40 mg twice daily  I have long lengthy discussion with the patient and daughter about anticoagulation both of them agreed with low-dose Eliquis 2.5 mg twice daily  DC Lovenox   start Eliquis 2.5 mg twice daily from a.m.     Discussed with patient and daughter findings of echocardiogram including mitral valve disease, prefer not to do transesophageal echocardiogram  Continue with current medical treatment                        Subjective:    cc:      Shortness of breath  A. fib        REVIEW OF SYSTEMS:     Extubated and no complaints    Objective:      Visit Vitals  BP (!) 103/59   Pulse 92   Temp 98.3 °F (36.8 °C)   Resp 23   Ht 5' 5\" (1.651 m)   Wt 116.7 kg (257 lb 4.4 oz)   SpO2 100%   Breastfeeding No   BMI 42.81 kg/m²     Body mass index is 42.81 kg/m². Physical Exam:  General Appearance: Comfortable, extubated  NECK: No JVD, no thyroidomeglay. LUNGS: Clear bilaterally. HEART: S1 irregular +S2 audible,    ABD: Non-tender, BS Audible    EXT: No edema, and no cysnosis. VASCULAR EXAM: Pulses are intact. PSYCHIATRIC EXAM: Mood is appropriate.     Medication:  Current Facility-Administered Medications   Medication Dose Route Frequency    furosemide (LASIX) injection 20 mg  20 mg IntraVENous BID    midodrine (PROAMATINE) tablet 5 mg  5 mg Oral TID WITH MEALS    meropenem (MERREM) 1 g in 0.9% sodium chloride (MBP/ADV) 50 mL MBP  1 g IntraVENous Q12H    arformoteroL (BROVANA) neb solution 15 mcg  15 mcg Nebulization BID RT    famotidine (PF) (PEPCID) 20 mg in 0.9% sodium chloride 10 mL injection  20 mg IntraVENous DAILY    sodium chloride (NS) flush 5-40 mL  5-40 mL IntraVENous PRN    acetaminophen (TYLENOL) tablet 650 mg  650 mg Oral Q6H    naloxone (NARCAN) injection 0.4 mg  0.4 mg IntraVENous PRN    ferrous sulfate tablet 325 mg  1 Tablet Oral TID    diphenhydrAMINE (BENADRYL) capsule 25 mg  25 mg Oral Q4H PRN    bisacodyL (DULCOLAX) suppository 10 mg  10 mg Rectal DAILY PRN    ELECTROLYTE REPLACEMENT PROTOCOL - Magnesium   1 Each Other PRN    ELECTROLYTE REPLACEMENT PROTOCOL - Calcium   1 Each Other PRN    ELECTROLYTE REPLACEMENT PROTOCOL  - Phosphorus Renal Dosing  1 Each Other PRN    ELECTROLYTE REPLACEMENT PROTOCOL - Potassium Renal Dosing  1 Each Other PRN    midazolam (VERSED) injection 1 mg  1 mg IntraVENous Q2H PRN    fentaNYL citrate (PF) injection 25 mcg  25 mcg IntraVENous Q4H PRN    nystatin (MYCOSTATIN) 100,000 unit/mL oral suspension 500,000 Units  500,000 Units Oral QID    apixaban (ELIQUIS) tablet 2.5 mg  2.5 mg Oral BID    sodium chloride (NS) flush 5-40 mL  5-40 mL IntraVENous Q8H    sodium chloride (NS) flush 5-40 mL  5-40 mL IntraVENous PRN    acetaminophen (TYLENOL) tablet 650 mg  650 mg Oral Q6H PRN    Or    acetaminophen (TYLENOL) suppository 650 mg  650 mg Rectal Q6H PRN    polyethylene glycol (MIRALAX) packet 17 g  17 g Oral DAILY PRN    ondansetron (ZOFRAN ODT) tablet 4 mg  4 mg Oral Q8H PRN    Or    ondansetron (ZOFRAN) injection 4 mg  4 mg IntraVENous Q6H PRN    [Held by provider] carvediloL (COREG) tablet 3.125 mg  3.125 mg Oral BID WITH MEALS    aspirin delayed-release tablet 81 mg  81 mg Oral DAILY               Lab/Data Reviewed:  Procedures/imaging: see electronic medical records for all procedures/Xrays   and details which were not copied into this note but were reviewed prior to creation of Plan       All lab results for the last 24 hours reviewed. Recent Labs     03/18/22 0214 03/16/22 0435   WBC 13.0 11.1   HGB 8.4* 9.0*   HCT 26.7* 29.0*    221     Recent Labs     03/18/22  1115 03/18/22  0214 03/17/22  2115 03/17/22  1357 03/17/22  0512 03/16/22  0435 03/16/22  0435   NA  --  145  --   --  144  --  142   K 3.8 3.7 4.9   < > 3.3*   < > 3.5   CL  --  107  --   --  105  --  104   CO2  --  31  --   --  31  --  31   GLU  --  120*  --   --  101*  --  133*   BUN  --  103*  --   --  90*  --  91*   CREA  --  1.92*  --   --  1.63*  --  1.78*   CA  --  10.3*  --   --  10.4*  --  10.9*    < > = values in this interval not displayed. RADIOLOGY:  CT Results  (Last 48 hours)               03/16/22 2010  CT HEAD WO CONT Final result    Impression:      Generalized chronic changes, however, no mass or acute findings. _______________                               Narrative:  EXAM: CT of the brain without intravenous contrast.       INDICATION:  \"AMS. \"       COMPARISON:  CT March 11, 2022.        DOSE REDUCTION AND DICOM INFORMATION: One or more dose reduction techniques were   used on this CT: automated exposure control, adjustment of the mAs and/or kVp   according to patient size, and iterative reconstruction techniques. The   specific techniques used on this CT exam have been documented in the patient's   electronic medical record. Digital Imaging and Communications in Medicine   (DICOM) format image data are available to nonaffiliated external healthcare   facilities or entities on a secure, media free, reciprocally searchable basis   with patient authorization for at least a 12-month period after this study. _______________       FINDINGS:            IMAGE QUALITY:  Diagnostic. BRAIN AND EXTRA-AXIAL SPACE:                   SUBACUTE TERRITORIAL TRANSCORTICAL INFARCT:  None detected. MASS:  None detected. HEMORRHAGE:  None. SUBDURAL FLUID COLLECTION:  None detected. HYDROCEPHALUS:  None. REMOTE  TERRITORIAL CEREBRAL INFARCT:  None detected. REMOTE CEREBELLAR INFARCT:  None detected. STRIVE (STandards for Reporting Vascular changes on nEuroimaging):                              --[place] of white matter hypodensity   (\"leukoaraiosis\") of presumed vascular origin:  Low-grade. --[place] of lacunes of presumed vascular origin   (often undetectable on CT):  None definite. --Degree of brain atrophy:  Moderate. BURDEN OF CALCIFIC INTRACRANIAL ATHEROSCLEROSIS:   Moderate. HEENT:                   INCLUDED UPPER ORBITS:  There are bilateral lens replacements. INCLUDED UPPER PARANASAL SINUSES:  Predominantly clear. MASTOID AIR CELLS:  Predominantly clear. BONES:  Unremarkable.             SUPERFICIAL SOFT TISSUES: Unremarkable.       _______________               CXR Results  (Last 48 hours)               03/16/22 1919  XR CHEST PORT Final result    Impression:      Unchanged basal effusions. _______________                               Narrative:  EXAM: Portable upright chest radiograph. INDICATION: \"Respiratory failure. \"       COMPARISON: March 16, 2022.       _______________       FINDINGS:          DEVICES:  None. LUNGS:              --Expansion:  Adequate. --Focal opacity:  None detected. --Diffuse abnormality:  None detected. PLEURAL SPACE:            --Pleural effusion:  Unchanged small left and equivocal right basilar   effusions. --Pneumothorax:  None detected. MISCELLANEOUS:  There is cardiac silhouette enlargement versus artifact of   portable/AP technique.        _______________                   Cardiology Procedures:   Results for orders placed or performed during the hospital encounter of 03/02/22   EKG, 12 LEAD, INITIAL   Result Value Ref Range    Ventricular Rate 72 BPM    Atrial Rate 340 BPM    QRS Duration 86 ms    Q-T Interval 374 ms    QTC Calculation (Bezet) 409 ms    Calculated R Axis -2 degrees    Calculated T Axis -3 degrees    Diagnosis       Atrial fibrillation  Abnormal ECG  When compared with ECG of 02-MAR-2022 20:12,  No significant change was found  Confirmed by Jovana Frances MD. ([id number]) on 3/17/2022 10:28:04 PM        Echo Results  (Last 48 hours)    None       Cardiolite (Tc-99m Sestamibi) stress test    Signed By: Patricia Chris MD     March 18, 2022

## 2022-03-18 NOTE — PROGRESS NOTES
@ 2000 pt taken over awake alert oriented to self on 2L/nc. Denies pain at present . Pure wick in place suctioning urine. SCD's remains on bilateral feet well tolerated. Assessment done and documented in relevant flow sheets. Daughter at bedside and volunteered to feed pt was educated on slow feed , head remains elevated . Pt ate 100% of dinner. Nursing management continues. @0000 pt reassessed no new changes is on BIPAP tolerating well . Nursing observation continues. @0400 reassessment carried out no changes continues to be monitored. @0710 Bedside and Verbal shift change report given to Manjinder Brandt (oncoming nurse) by Alberta Merritt (offgoing nurse). Report included the following information SBAR, Kardex, Intake/Output, MAR, Recent Results, Med Rec Status and Alarm Parameters .

## 2022-03-18 NOTE — PROGRESS NOTES
RENAL CONSULT PROGRESS NOTE   3/18/2022    Patient:  Jamar Herman  :  1940  Gender:  female  MRN #:  [id number]    Reason for Consult: Metabolic Alkalosis  And renal failure       Assessment:    Jamar Herman is a 80y.o. year old female  With h/o  morbid obesity, SERENA, right leg weakness, HTN, A. fib, CHF admitted  on 3/3/2022 for shortness of breath    she developed  onset of A. fib on admission. HF was treated with diuretics , s/p extubation now on nasal canula. BP stable without vasopressors   She developed acute renal failure with creatinine slowly rising most likely due to hypotension and over diuresis    # Acute renal failure- Improving  # Metabolic alkalosis - due to diuretics , upon review of serum bicarbonate she had baseline elevated bicarbonate which must be metabolic compensation for chronic respiratory acidosis   #Hypernatremia - Improved  #respiratroy failure- Improved      Subjective/Relevants events: -  She is awake but confused, On nasal canula oxygen  Decent urine output   Bp stable  Does not complaints anything     Plan:      - Decent urine output overnight with slowly down trending serum creatinine every day. Breathing looks comfortable on nasal canula  - she has edema , she is off lasix for last few days , as alkalosis has improved , consider lasix  prn for sign/symptoms of CHF/fluid overload   - serum bicarb is still high but improved, partly due to metabolic  compensation for respiratory acidosis   - Intake and output charting, Willoughby's cath, ensure she is not retaining urine   -  CT scan on  3/3 showed asymmetrical small kidney , no hydronephrosis in USG    - avoid nsaids , contrast and nephrotoxin   - Dose all meds for current eGFR  - keep MAP 65 mmhg      Rest of the management per primary team     As renal function and alkalosis is stable will sing off from nephrology service . Please call us if you have any questions.        Intake/Output Summary (Last 24 hours) at 3/18/2022 1411  Last data filed at 3/18/2022 0501  Gross per 24 hour   Intake 150 ml   Output 600 ml   Net -450 ml         Objective:    Visit Vitals  BP (!) 103/59   Pulse 92   Temp 98.3 °F (36.8 °C)   Resp 23   Ht 5' 5\" (1.651 m)   Wt 116.7 kg (257 lb 4.4 oz)   SpO2 100%   Breastfeeding No   BMI 42.81 kg/m²       Physical Exam:    Awake, confused   Lung: fine rales    Abdomen: soft, normal bowel sounds.   Ext: trace  pitting  edema  Skin: No rash      Laboratory Data:    Lab Results   Component Value Date     (H) 03/18/2022    BUN 90 (H) 03/17/2022    BUN 91 (H) 03/16/2022     03/18/2022     03/17/2022     03/16/2022    CO2 31 03/18/2022    CO2 31 03/17/2022    CO2 31 03/16/2022     Lab Results   Component Value Date    WBC 13.0 03/18/2022    HGB 8.4 (L) 03/18/2022    HCT 26.7 (L) 03/18/2022     No components found for: CALCIUM, PHOSPHORUS, MAGNESIUM  Lab Results   Component Value Date     (H) 04/25/2021     No results found for: ROBERTO Willard    Imaging Reveiwed:             Sascha Duke MD

## 2022-03-18 NOTE — PROGRESS NOTES
[facility]ists  Pulmonary, Critical Care, and Sleep Medicine    Name: Sam Pandey MRN: [id number]   : 1940 Hospital: [facility] [place]    Date: 3/18/2022  Room: [address] [place] [address] Note                                              Consult requesting physician: Dr. Maya Cisneros  Reason for Consult: Respiratory failure    IMPRESSION:   Acute respiratory failure with hypoxemia. Acute on chronic hypercarbic respiratory failure   Acute on chronic diastolic congestive heart failure. Fracture of neck of right femur. SERENA. OHS      Active Hospital Problems    Diagnosis Date Noted    Hypotension 2022    Hypokalemia 2022    [facility]racture of neck of right femur ([facility] 2022    Stage 3 chronic kidney disease ([facility] 2022    SERENA (obstructive sleep apnea) 2022    Adult BMI 39.0-39.9 kg/sq m 2022    Acute respiratory failure with hypoxemia (HCC) 2022    Atrial fibrillation ([facility]) 2022    Acute on chronic diastolic congestive heart failure ([facility] 2021    Elevated troponin 2021    HTN (hypertension) 2021   ·      Patient Active Problem List   Diagnosis Code    Fatigue R53.83    Elevated troponin R77.8    Obesity (BMI 30-39. 9) E66.9    HTN (hypertension) I10    SERENA treated with BiPAP G47.33    Acute on chronic diastolic congestive heart failure (HCC) I50.33    Acute respiratory failure with hypoxemia ([facility]) J96.01    Atrial fibrillation ([facility]) I48.91    Stage 3 chronic kidney disease ([facility]) N18.30    SERENA (obstructive sleep apnea) G47.33    Adult BMI 39.0-39.9 kg/sq m Z68.39    Fracture of neck of right femur ([facility]) S72.001A    Hypokalemia E87.6    Hypotension I95.9         · Code status: DNR       RECOMMENDATIONS:     Respiratory: History of obesity, SERENA on BiPAP.   BIPAP 16/6 RR12 30 % fio2 every night tolerating well  I recommend to have BIPAP at [facility] night ( I spoke to )      Postoperative respiratory failure, reintubated in PACU 3/10/2022. Extubated on 3/15/2022  fialed home CPAP with oxygen desaturations changed to BIPAP 16-9 /5 2L oxygen   BIPAP at night   Diuresis add bronchodilators and add albumins   Last night again lethargic CT negative  ABG on 3/16 7.38/53/ back on BIPAP  All night off in AM  CXR revised   IMPRESSION  Cardiomegaly.     Small layering right effusion/atelectasis. Prominent central vasculature. Keep SPO2 >=92%. HOB 30 degree elevation all the time. Aggressive pulmonary toileting. Aspiration precautions. CVS:  CHF/cardiomyopatrhy on diuretics and low dose midodrine     At night was briefly on pressors now off    Severe cardiomyopathy on max tx   BP borderline so limited   worsening BNP   New onset A. fib on admission, chronic diastolic CHF. Continue aspirin. Continue Coreg but hold for SBP less than 100. Continue Eliquis; monitor for any external bleeding. Lasix held due to metabolic alkalosis since 6/45/6559; and received Diamox I adj used the dose today   Cardiologist on board. Echo 3/3/2022: LVEF 55 to 60%. RV mildly dilated. RA mildly dilated. PVL LE 3/3/2022: No DVT. VQ scan 3/3/2022: No PE.    ID: WBC normal  Dc vanc  ABX adjusted by ID if able to swallow ok to change to oral     No fever or leukocytosis. Rapid influenza and COVID19 3/2/2022: Negative. Urine culture 3/2/2022: Klebsiella pneumonia. Blood culture 3/2/2022: Negative. Endotracheal aspirate culture: Light Enterobacter cloacae complex. Heavy normal forrest. Antibiotics: Rocephin was discontinued on 3/12/2022 (Enterobacter is resistant to Rocephin). Was on Levaquin but I change to meropenem unclear     Hematology/Oncology:   Anemia; but no external bleeding. Normal platelets. Renal:   Mild TK; likely due to diuretics use. S/p mild hypernatremia; resolved.   Lasix held due to metabolic alkalosis since 1/53/2094; and received Diamox 250 mg every 12 hours x2 doses on 3/12/2022 and on D5 0.45 NS at 50 mill per hour; with improved ABG. Dr. Sylwia Suarez on board. GI/: No acute issues. USG stable no acute issues   IMPRESSION  1. Limited examination due to technical factors as above. 2. Heterogeneously hyperechoic liver parenchyma compatible with steatosis or  other hepatic parenchymal disease. 3. No acute findings or suspicious mass. 4. Cholelithiasis. 5. Mild ascites. Elevated LFt and juliann I suspect due to chronic illness and CHF but will check GB usg  No abdominal pain nausea    Endocrine: Monitor BS. SSI. Neurology:   On/off periods of lethargy   Ct normal ammonia TSH normal  Improved with BIPAP  I consulted neurology   Patient was lethargic and confused before going for hip surgery on 3/10/2022 morning. Patient remains off propofol since 3/11/2022; mental status is improving since improvement in ABG. Patient is more alert and awake today. CT head 3/7/2022: Nil acute. Repeat CT head 3/11/2022: Nil acute. Psychiatry: No acute issues. Toxicology: No acute issues. Pain/Sedation: Sedation protocol as above    Skin/Wound: Right hip wound VAC in place after surgery; local care per nursing protocol. Electrolytes: Replace electrolytes per ICU electrolyte replacement protocol. IVF: kvo albumins     Nutrition: Tolerating OGT feed at 40 mL/h. Prophylaxis: DVT Prophylaxis: Eliquis. GI Prophylaxis: Protonix. Restraints: wrist soft restraints for patient interfering with medical therapy/management and patient safety. Lines/Tubes: PIV  ETT: 3/10/2022. OGT: 3/11/2022. [place]: 3/10/2022 (Medically necessary for strict input/output monitoring in critically ill patient, will remove it when not needed. [place] bundle followed). Advance Directive/Palliative Care: Consulted.     Will defer respective systems problem management to primary and other respective consultant and follow patient in ICU with primary and other medical team.  Further recommendations will be based on the patient's response to recommended treatment and results of the investigation ordered. Quality Care: PPI, DVT prophylaxis, HOB elevated, Infection control all reviewed and addressed. Care of plan d/w RN, RT, hospitalist team.    High complexity decision making was performed during the evaluation of this patient at high risk for decompensation with multiple organ involvement. I updated daughter today . Palliative care on board     Subjective/History of Present Illness:     Patient is a 80 y.o. female with PMHx significant for morbid obesity, SERENA, right leg weakness, HTN, A. fib, CHF; admitted to medical floor on 3/3/2022 for dyspnea. Patient had new onset of A. fib on admission. Patient was admitted on medical floor and A. fib/CHF was being treated medically; and was using home BiPAP nightly. Found to have right femoral fracture; underwent right press-fit direct anterior total hip arthroplasty for femoral neck fracture. Postoperatively patient was extubated in PACU; but due to respiratory distress, reintubated and transferred to ICU.      3/18/2022 :     Remains in . Periods of lethargy but responsive to BIPAP  DNR/DNI  I/O last 24 hrs: Intake/Output Summary (Last 24 hours) at 3/18/2022 1212  Last data filed at 3/18/2022 0501  Gross per 24 hour   Intake 150 ml   Output 600 ml   Net -450 ml         The patient is critically ill and can not provide additional history due to confusion        Review of Systems:  ROS not obtained due to patient factor.            Allergies   Allergen Reactions    Seafood Hives     crabs    Statins-Hmg-Coa Reductase Inhibitors Unknown (comments)    Sulfa (Sulfonamide Antibiotics) Hives      Past Medical History:   Diagnosis Date    Hypertension     Sleep disorder       Past Surgical History:   Procedure Laterality Date    HX ORTHOPAEDIC      broken right ankle, left femur 30 yrs ago      Social History     Tobacco Use    Smoking status: Never Smoker    Smokeless tobacco: Never Used   Substance Use Topics    Alcohol use: Yes     Alcohol/week: 1.0 standard drink     Types: 1 Glasses of wine per week     Comment: soc      Family History   Problem Relation Age of Onset    Diabetes Mother     Heart Disease Mother     Heart Disease Father       Prior to Admission medications    Medication Sig Start Date End Date Taking? Authorizing Provider   allopurinoL (ZYLOPRIM) 100 mg tablet Take 100 mg by mouth daily. Yes Provider, Historical   acetaminophen (TYLENOL) 325 mg tablet Take 650 mg by mouth every six (6) hours as needed for Pain. Yes Provider, Historical   niacin (NIASPAN) 1,000 mg Tb24 tab Take 1,000 mg by mouth daily. Yes Provider, Historical   trolamine salicylate-aloe vera 58% (ASPERCREME) topical cream Apply 2 g to affected area as needed for Pain. Yes Provider, Historical   multivitamin, tx-iron-ca-min (THERA-M w/ IRON) 9 mg iron-400 mcg tab tablet Take 1 Tab by mouth daily. Yes Provider, Historical   aspirin delayed-release 81 mg tablet Take 81 mg by mouth daily. Yes Provider, Historical   potassium chloride SR (KLOR-CON 10) 10 mEq tablet Take 10 mEq by mouth daily. Yes Provider, Historical   carvediloL (COREG) 3.125 mg tablet Take 3.125 mg by mouth daily.    Yes Provider, Historical     Current Facility-Administered Medications   Medication Dose Route Frequency    furosemide (LASIX) injection 20 mg  20 mg IntraVENous BID    midodrine (PROAMATINE) tablet 5 mg  5 mg Oral TID WITH MEALS    meropenem (MERREM) 1 g in 0.9% sodium chloride (MBP/ADV) 50 mL MBP  1 g IntraVENous Q12H    arformoteroL (BROVANA) neb solution 15 mcg  15 mcg Nebulization BID RT    famotidine (PF) (PEPCID) 20 mg in 0.9% sodium chloride 10 mL injection  20 mg IntraVENous DAILY    acetaminophen (TYLENOL) tablet 650 mg  650 mg Oral Q6H    ferrous sulfate tablet 325 mg  1 Tablet Oral TID    nystatin (MYCOSTATIN) 100,000 unit/mL oral suspension 500,000 Units  500,000 Units Oral QID  apixaban (ELIQUIS) tablet 2.5 mg  2.5 mg Oral BID    sodium chloride (NS) flush 5-40 mL  5-40 mL IntraVENous Q8H    [Held by provider] carvediloL (COREG) tablet 3.125 mg  3.125 mg Oral BID WITH MEALS    aspirin delayed-release tablet 81 mg  81 mg Oral DAILY         Objective:   Vital Signs:    Visit Vitals  BP (!) 103/59   Pulse 92   Temp 99 °F (37.2 °C)   Resp 23   Ht 5' 5\" (1.651 m)   Wt 116.7 kg (257 lb 4.4 oz)   SpO2 100%   Breastfeeding No   BMI 42.81 kg/m²       O2 Device: Nasal cannula   O2 Flow Rate (L/min): 3 l/min   Temp (24hrs), Av.2 °F (37.3 °C), Min:98.5 °F (36.9 °C), Max:100.5 °F (38.1 °C)       Intake/Output:   Last shift:      No intake/output data recorded. Last 3 shifts:  190 -  0700  In: 1001.8 [P.O.:75; I.V.:926.8]  Out: 1550 [Urine:1550]      Intake/Output Summary (Last 24 hours) at 3/18/2022 1212  Last data filed at 3/18/2022 0501  Gross per 24 hour   Intake 150 ml   Output 600 ml   Net -450 ml       Last 3 Recorded Weights in this Encounter    03/15/22 0840 22 0735 22 0807   Weight: 111.1 kg (245 lb) 113.5 kg (250 lb 3.6 oz) 116.7 kg (257 lb 4.4 oz)         Ventilator Settings:  Mode Rate Tidal Volume Pressure FiO2 PEEP   Spontaneous   375 ml  7 cm H2O 30 % 5 cm H20     Peak airway pressure: 19 cm H2O    Plateau pressure:     Tidal volume:    Minute ventilation: 10 l/min     Recent Labs     22  1925 22  1030   PHI 7.38 7.39   PCO2I 53.5* 55.3*   PO2I 97 123*   HCO3I 31.7* 33.2*   FIO2I 40 32       Physical Exam:       General/Neurology: Alert, more awake now in AM was lethrgic  Following commands very weak , pupils reactive non focal   Head:   NCAT. Eye:   EOM intact. No icterus/pallor/cyanosis. Nose:   Normal no discharge   Neck:   Trachea midline. Lung: Moderate air entry bilateral equal. At the base sed decreased breath sounds R>L   No rales, rhonchi. No wheezing or stridor. No prolonged expiration or accessory muscle use.   Heart: S1 S2 present. irregularly irregular  No murmur or JVD. Abdomen:  Soft. NT. ND. No palpable masses. Extremities:  No edema. No cyanosis or clubbing. Right hip surgical site wound VAC dressing intact. Pulses: 2+ and symmetric in DP. Data:       Recent Results (from the past 24 hour(s))   POTASSIUM    Collection Time: 03/17/22  1:57 PM   Result Value Ref Range    Potassium 3.6 3.5 - 5.5 mmol/L   GLUCOSE, POC    Collection Time: 03/17/22  4:51 PM   Result Value Ref Range    Glucose (POC) 129 (H) 70 - 110 mg/dL   POTASSIUM    Collection Time: 03/17/22  9:15 PM   Result Value Ref Range    Potassium 4.9 3.5 - 5.5 mmol/L   GLUCOSE, POC    Collection Time: 03/17/22 10:01 PM   Result Value Ref Range    Glucose (POC) 143 (H) 70 - 110 mg/dL   MAGNESIUM    Collection Time: 03/18/22  2:14 AM   Result Value Ref Range    Magnesium 2.3 1.6 - 2.6 mg/dL   METABOLIC PANEL, COMPREHENSIVE    Collection Time: 03/18/22  2:14 AM   Result Value Ref Range    Sodium 145 136 - 145 mmol/L    Potassium 3.7 3.5 - 5.5 mmol/L    Chloride 107 100 - 111 mmol/L    CO2 31 21 - 32 mmol/L    Anion gap 7 3.0 - 18 mmol/L    Glucose 120 (H) 74 - 99 mg/dL     (H) 7.0 - 18 MG/DL    Creatinine 1.92 (H) 0.6 - 1.3 MG/DL    BUN/Creatinine ratio 54 (H) 12 - 20      GFR est AA 30 (L) >60 ml/min/1.73m2    GFR est non-AA 25 (L) >60 ml/min/1.73m2    Calcium 10.3 (H) 8.5 - 10.1 MG/DL    Bilirubin, total 1.2 (H) 0.2 - 1.0 MG/DL    ALT (SGPT) 59 (H) 13 - 56 U/L    AST (SGOT) 58 (H) 10 - 38 U/L    Alk.  phosphatase 200 (H) 45 - 117 U/L    Protein, total 6.7 6.4 - 8.2 g/dL    Albumin 2.9 (L) 3.4 - 5.0 g/dL    Globulin 3.8 2.0 - 4.0 g/dL    A-G Ratio 0.8 0.8 - 1.7     NT-PRO BNP    Collection Time: 03/18/22  2:14 AM   Result Value Ref Range    NT pro-BNP 25,283 (H) 0 - 1,800 PG/ML   CBC WITH AUTOMATED DIFF    Collection Time: 03/18/22  2:14 AM   Result Value Ref Range    WBC 13.0 4.6 - 13.2 K/uL    RBC 2.80 (L) 4.20 - 5.30 M/uL    HGB 8.4 (L) 12.0 - 16.0 g/dL    HCT 26.7 (L) 35.0 - 45.0 %    MCV 95.4 78.0 - 100.0 FL    MCH 30.0 24.0 - 34.0 PG    MCHC 31.5 31.0 - 37.0 g/dL    RDW 14.7 (H) 11.6 - 14.5 %    PLATELET 442 582 - 972 K/uL    MPV 11.1 9.2 - 11.8 FL    NRBC 0.2 (H) 0  WBC    ABSOLUTE NRBC 0.02 (H) 0.00 - 0.01 K/uL    NEUTROPHILS 81 (H) 40 - 73 %    LYMPHOCYTES 8 (L) 21 - 52 %    MONOCYTES 8 3 - 10 %    EOSINOPHILS 0 0 - 5 %    BASOPHILS 0 0 - 2 %    IMMATURE GRANULOCYTES 2 (H) 0.0 - 0.5 %    ABS. NEUTROPHILS 10.5 (H) 1.8 - 8.0 K/UL    ABS. LYMPHOCYTES 1.1 0.9 - 3.6 K/UL    ABS. MONOCYTES 1.1 0.05 - 1.2 K/UL    ABS. EOSINOPHILS 0.0 0.0 - 0.4 K/UL    ABS. BASOPHILS 0.0 0.0 - 0.1 K/UL    ABS. IMM.  GRANS. 0.3 (H) 0.00 - 0.04 K/UL    DF AUTOMATED     PHOSPHORUS    Collection Time: 03/18/22  2:14 AM   Result Value Ref Range    Phosphorus 2.6 2.5 - 4.9 MG/DL   CALCIUM, IONIZED    Collection Time: 03/18/22  2:15 AM   Result Value Ref Range    Ionized Calcium 1.33 (H) 1.12 - 1.32 MMOL/L   GLUCOSE, POC    Collection Time: 03/18/22  7:36 AM   Result Value Ref Range    Glucose (POC) 125 (H) 70 - 110 mg/dL   GLUCOSE, POC    Collection Time: 03/18/22 11:45 AM   Result Value Ref Range    Glucose (POC) 127 (H) 70 - 110 mg/dL         Chemistry Recent Labs     03/18/22  0214 03/17/22  2115 03/17/22  1357 03/17/22  0512 03/17/22  0512 03/16/22  0435 03/16/22  0435   *  --   --   --  101*  --  133*     --   --   --  144  --  142   K 3.7 4.9 3.6   < > 3.3*   < > 3.5     --   --   --  105  --  104   CO2 31  --   --   --  31  --  31   *  --   --   --  90*  --  91*   CREA 1.92*  --   --   --  1.63*  --  1.78*   CA 10.3*  --   --   --  10.4*  --  10.9*   MG 2.3  --   --   --  2.3  --  2.4   PHOS 2.6  --   --   --  3.3  --   --    AGAP 7  --   --   --  8  --  7   BUCR 54*  --   --   --  55*  --  51*   *  --   --   --  187*  --  190*   TP 6.7  --   --   --  6.1*  --  7.0   ALB 2.9*  --   --   --  3.3*  --  3.7   GLOB 3.8  --   --   --  2.8  -- 3. 3   AGRAT 0.8  --   --   --  1.2  --  1.1    < > = values in this interval not displayed. Lactic Acid Lactic acid   Date Value Ref Range Status   03/17/2022 1.3 0.4 - 2.0 MMOL/L Final     Recent Labs     03/17/22  1025   LAC 1.3        Liver Enzymes Protein, total   Date Value Ref Range Status   03/18/2022 6.7 6.4 - 8.2 g/dL Final     Albumin   Date Value Ref Range Status   03/18/2022 2.9 (L) 3.4 - 5.0 g/dL Final     Globulin   Date Value Ref Range Status   03/18/2022 3.8 2.0 - 4.0 g/dL Final     A-G Ratio   Date Value Ref Range Status   03/18/2022 0.8 0.8 - 1.7   Final     Alk. phosphatase   Date Value Ref Range Status   03/18/2022 200 (H) 45 - 117 U/L Final     Recent Labs     03/18/22  0214 03/17/22  0512 03/16/22  0435   TP 6.7 6.1* 7.0   ALB 2.9* 3.3* 3.7   GLOB 3.8 2.8 3.3   AGRAT 0.8 1.2 1.1   * 187* 190*        CBC w/Diff Recent Labs     03/18/22  0214 03/16/22  0435   WBC 13.0 11.1   RBC 2.80* 3.01*   HGB 8.4* 9.0*   HCT 26.7* 29.0*    221   GRANS 81*  --    LYMPH 8*  --    EOS 0  --         Cardiac Enzymes No results found for: CPK, CK, CKMMB, CKMB, RCK3, CKMBT, CKNDX, CKND1, FARA, TROPT, TROIQ, ADOLFO, TROPT, TNIPOC, BNP, BNPP     BNP No results found for: BNP, BNPP, XBNPT     Coagulation No results for input(s): PTP, INR, APTT, INREXT, INREXT in the last 72 hours.       Thyroid  Lab Results   Component Value Date/Time    TSH 0.38 03/16/2022 04:35 AM       No results found for: T4     Urinalysis Lab Results   Component Value Date/Time    Color YELLOW 03/02/2022 09:04 PM    Appearance CLOUDY 03/02/2022 09:04 PM    Specific gravity 1.021 03/02/2022 09:04 PM    pH (UA) 5.0 03/02/2022 09:04 PM    Protein 300 (A) 03/02/2022 09:04 PM    Glucose Negative 03/02/2022 09:04 PM    Ketone TRACE (A) 03/02/2022 09:04 PM    Bilirubin Negative 03/02/2022 09:04 PM    Urobilinogen 1.0 03/02/2022 09:04 PM    Nitrites Negative 03/02/2022 09:04 PM    Leukocyte Esterase Negative 03/02/2022 09:04 PM Epithelial cells 2+ 03/02/2022 09:04 PM    Bacteria FEW (A) 03/02/2022 09:04 PM    WBC 0 to 3 03/02/2022 09:04 PM    RBC 0 to 3 03/02/2022 09:04 PM        Micro  No results for input(s): SDES, CULT in the last 72 hours. No results for input(s): CULT in the last 72 hours. Culture data during this hospitalization.    All Micro Results     Procedure Component Value Units Date/Time    CULTURE, BLOOD [[id number]] Collected: 03/14/22 0419    Order Status: Completed Specimen: Blood Updated: 03/18/22 0827     Special Requests: NO SPECIAL REQUESTS        Culture result: NO GROWTH 4 DAYS       CULTURE, BLOOD [[id number]] Collected: 03/14/22 0420    Order Status: Completed Specimen: Blood Updated: 03/18/22 0827     Special Requests: NO SPECIAL REQUESTS        Culture result: NO GROWTH 4 DAYS       CULTURE, RESPIRATORY/SPUTUM/BRONCH W GRAM STAIN [[id number]]  (Abnormal)  (Susceptibility) Collected: 03/10/22 1630    Order Status: Completed Specimen: Sputum from Endotracheal aspirate Updated: 03/13/22 0905     Special Requests: NO SPECIAL REQUESTS        GRAM STAIN NO WBC'S SEEN         NO EPITHELIAL CELLS SEEN         NO ORGANISMS SEEN        Culture result:       LIGHT ENTEROBACTER CLOACAE COMPLEX                  HEAVY NORMAL RESPIRATORY ELSIE          CULTURE, RESPIRATORY/SPUTUM/BRONCH Dorothea Payment [[id number]] Collected: 03/10/22 1915    Order Status: Canceled Specimen: Sputum from Endotracheal aspirate     CULTURE, BLOOD [[id number]] Collected: 03/02/22 2048    Order Status: Completed Specimen: Blood Updated: 03/08/22 0655     Special Requests: NO SPECIAL REQUESTS        Culture result: NO GROWTH 6 DAYS       CULTURE, BLOOD [[id number]] Collected: 03/02/22 2048    Order Status: Completed Specimen: Blood Updated: 03/08/22 0655     Special Requests: NO SPECIAL REQUESTS        Culture result: NO GROWTH 6 DAYS       CULTURE, URINE [[id number]]  (Abnormal)  (Susceptibility) Collected: 03/02/22 2104    Order Status: Completed Specimen: Cath Urine Updated: 03/05/22 1226     Special Requests: NO SPECIAL REQUESTS        Bussey Count --        >100,000  COLONIES/mL       Culture result: KLEBSIELLA PNEUMONIAE       COVID-19 RAPID TEST [[id number]] Collected: 03/02/22 2025    Order Status: Completed Specimen: Nasopharyngeal Updated: 03/02/22 2052     Specimen source Nasopharyngeal        COVID-19 rapid test Not detected        Comment: Rapid Abbott ID Now       Rapid NAAT:  The specimen is NEGATIVE for SARS-CoV-2, the novel coronavirus associated with COVID-19. Negative results should be treated as presumptive and, if inconsistent with clinical signs and symptoms or necessary for patient management, should be tested with an alternative molecular assay. Negative results do not preclude SARS-CoV-2 infection and should not be used as the sole basis for patient management decisions. This test has been authorized by the FDA under an Emergency Use Authorization (EUA) for use by authorized laboratories. Fact sheet for Healthcare Providers: ConventionUpdate.co.nz  Fact sheet for Patients: ConventionUpdate.co.nz       Methodology: Isothermal Nucleic Acid Amplification         INFLUENZA A & B AG (RAPID TEST) [[id number]] Collected: 03/02/22 2025    Order Status: Completed Specimen: Nasopharyngeal from Nasal washing Updated: 03/02/22 2047     Influenza A Antigen Negative        Comment: A negative result does not exclude influenza virus infection, seasonal or H1N1 due to suboptimal sensitivity. If influenza is circulating in your community, a diagnosis of influenza should be considered based on a patients clinical presentation and empiric antiviral treatment should be considered, if indicated. Influenza B Antigen Negative                NM LUNG SCAN PERF  Result Date: 3/3/2022  EXAM: NM LUNG SCAN PERF.  CLINICAL INDICATION/HISTORY: d dimer increase dyspnea. -Additional: Clinical concern for pulmonary embolus. COMPARISON: Correlation is made with the radiographic study performed one day prior. TECHNIQUE: 7.2 mCi Tc-99m MAA was injected intravenously and the 8 standard views of the chest were obtained. This perfusion only examination is interpreted using the PISAPED criteria. _______________ FINDINGS: Perfusion images show no segmental perfusion defects to suggest the presence of pulmonary embolism. _______________     o scintigraphic findings to suggest the presence of acute pulmonary embolism. _______________       XR HIP RT W OR WO PELV 2-3 VWS  Result Date: 3/7/2022  EXAM: RIGHT HIP RADIOGRAPHS CLINICAL INDICATION/HISTORY: right hip pain -Additional: None COMPARISON: May March 3, 2022. TECHNIQUE: AP pelvis and frog-leg lateral view of right hip. _______________ FINDINGS: BONES: Intact appearing ilioischial and iliopectineal lines. There is a displaced and impacted subcapital right femoral neck fracture, with mild progressive displacement on follow-up imaging. Mild-moderate bilateral hip joint arthrosis. SOFT TISSUES: Unremarkable. _______________     1. Displaced and impacted subcapital right femoral neck fracture, increased in displacement from CT performed 4 days prior. CT HEAD WO CONT  Result Date: 3/7/2022  EXAM: CT head INDICATION: Altered mental status. COMPARISON: 4/23/2021. TECHNIQUE: Axial CT imaging of the head was performed without intravenous contrast. Standard multiplanar coronal and sagittal reformatted images were obtained and are included in interpretation. One or more dose reduction techniques were used on this CT: automated exposure control, adjustment of the mAs and/or kVp according to patient size, and iterative reconstruction techniques. The specific techniques used on this CT exam have been documented in the patient's electronic medical record.   Digital Imaging and Communications in Medicine (DICOM) format image data are available to nonaffiliated external healthcare facilities or entities on a secure, media free, reciprocally searchable basis with patient authorization for at least a 12-month period after this study. _______________ FINDINGS: BRAIN AND POSTERIOR FOSSA: Periventricular white matter hypoattenuation which is nonspecific but likely represents chronic ischemic changes. No evidence of acute large vessel transcortical infarct or acute parenchymal hemorrhage. No midline shift or hydrocephalus. EXTRA-AXIAL SPACES AND MENINGES: There are no abnormal extra-axial fluid collections. CALVARIUM: Intact. SINUSES: Clear. OTHER: None. _______________     No acute intracranial abnormality. CT CHEST ABD PELV WO CONT  Result Date: 3/3/2022  EXAM: CT CHEST, ABDOMEN AND PELVIS CLINICAL INDICATION/HISTORY: Hypoxemia, retrocardiac density, diarrhea, weakness and shortness of breath COMPARISON: 3/2/2022 TECHNIQUE: Axial CT imaging of the chest, abdomen, and pelvis was performed without intravenous contrast. Multiplanar reformats were generated. One or more dose reduction techniques were used on this CT: automated exposure control, adjustment of the mAs and/or kVp according to patient size, and iterative reconstruction techniques. The specific techniques used on this CT exam have been documented in the patient's electronic medical record. Digital Imaging and Communications in Medicine (DICOM) format image data are available to nonaffiliated external healthcare facilities or entities on a secure, media free, reciprocally searchable basis with patient authorization for at least a 12-month period after this study. ________________ FINDINGS: LIMITATIONS:   > Suboptimal evaluation given lack of intravenous contrast.   > Motion artifact is present which limits evaluation.   > Suboptimal exam due to patient body habitus and arms by the side. CHEST: LUNGS: Respiratory motion significantly limits evaluation. Interlobar septal thickening in the upper lobes.  Mild bilateral dependent atelectasis. No large consolidation or suspicious pulmonary mass. PLEURA: Very small volume bilateral pleural effusions. AIRWAY: Normal. MEDIASTINUM: Four-chamber cardiomegaly with asymmetric biatrial enlargement, mitral and aortic annular calcifications. The main pulmonary artery is enlarged suggesting pulmonary arterial hypertension. Normal caliber aorta with scattered calcified atherosclerotic plaque. No pericardial effusion. LYMPH NODES: No enlarged lymph nodes. CHEST WALL: Diffuse anasarca. Heterogeneous thyroid. _______________ ABDOMEN/PELVIS: LIVER: No enhancing hepatic mass. The portal and hepatic veins are patent. BILIARY: Gallstones are present in the nondistended gallbladder lumen. No biliary dilation. SPLEEN: Normal. PANCREAS: Normal. ADRENALS: Left adrenal gland measures 9 HU (axial image 76), most consistent with lipid rich adenoma. Normal right adrenal gland. KIDNEYS: Asymmetrically small left kidney. No rate of a stone. No hydronephrosis. GI TRACT:  A small hiatal hernia is present. Normal caliber small and large bowel loops. No morphology of bowel obstruction. Normal appendix. BLADDER: Diffuse wall thickening in the largely decompressed bladder. PELVIC ORGANS: No acute abnormality. VASCULATURE: No arterial aneurysm. 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 LYMPH NODES: No mesenteric or retroperitoneal lymphadenopathy. OTHER: No free intraperitoneal air. No ascites. Diffuse anasarca. OSSEOUS STRUCTURES: No acute osseous abnormality. Multilevel degenerative changes most pronounced in the lumbar spine. Degenerative changes in both shoulders (right greater than left). Please note the included portions of the upper extremities are not well evaluated on this study _______________     1. Findings of congestive heart failure with cardiomegaly, interstitial edema, very small bilateral pleural effusions, and diffuse anasarca.  2.  Bladder wall thickening may be due to underdistention though superimposed infection cannot be excluded. Recommend correlation for cystitis. 3.  Osseous degenerative changes and additional findings, as detailed in the body of the report. Hip x-ray 3/11/2022:  Status post total right hip arthroplasty, without complication. ECHO ADULT COMPLETE  Result Date: 3/3/2022    Left Ventricle: Left ventricle size is normal. Moderately increased wall thickness. Findings consistent with moderate concentric hypertrophy. Normal wall motion. Normal left ventricular systolic function with a visually estimated EF of 55 - 60%.   Left Atrium: Left atrium is mildly dilated.   Right Ventricle: Right ventricle is mildly dilated.   Right Atrium: Right atrium is mildly dilated.   Pulmonary artery :  Estimated pulmonary arterial systolic pressure is 51 mmhg. Pulmonary hypertension found to be moderate   Mitral Valve: Moderately thickened leaflet. Mildly calcified leaflet. Moderate annular calcification of the mitral valve.   IVC/SVC : IVC diameter is greater than 21 mm and decreases less than 50% during inspiration; therefore the estimated right atrial pressure is elevated (~15 mmHg). IVC is dilated. Consider LORRAINE for better evaluation of mitral valve if clinically indicated. DUPLEX LOWER EXT VENOUS BILAT  Result Date: 3/4/2022  · No evidence of deep vein thrombosis in the right lower extremity. · No evidence of deep vein thrombosis in the left lower extremity. USG retroperitoneum 3/12/2022: No hydronephrosis. ECHO 3/3/2022: Result status: Final result       Left Ventricle: Left ventricle size is normal. Moderately increased wall thickness. Findings consistent with moderate concentric hypertrophy. Normal wall motion. Normal left ventricular systolic function with a visually estimated EF of 55 - 60%.   Left Atrium: Left atrium is mildly dilated.   Right Ventricle: Right ventricle is mildly dilated.     Right Atrium: Right atrium is mildly dilated.   Pulmonary artery :  Estimated pulmonary arterial systolic pressure is 51 mmhg. Pulmonary hypertension found to be moderate    Mitral Valve: Moderately thickened leaflet. Mildly calcified leaflet. Moderate annular calcification of the mitral valve.   IVC/SVC : IVC diameter is greater than 21 mm and decreases less than 50% during inspiration; therefore the estimated right atrial pressure is elevated (~15 mmHg). IVC is dilated.     Consider LORRAINE for better evaluation of mitral valve if clinically indicated. Images report reviewed by me:  CT (Most Recent) (CT chest reviewed by me) Results from Hospital Encounter encounter on 03/02/22    CT HEAD WO CONT    Narrative  EXAM: CT of the brain without intravenous contrast.    INDICATION:  \"AMS. \"    COMPARISON:  CT March 11, 2022. DOSE REDUCTION AND DICOM INFORMATION: One or more dose reduction techniques were  used on this CT: automated exposure control, adjustment of the mAs and/or kVp  according to patient size, and iterative reconstruction techniques. The  specific techniques used on this CT exam have been documented in the patient's  electronic medical record. Digital Imaging and Communications in Medicine  (DICOM) format image data are available to nonaffiliated external healthcare  facilities or entities on a secure, media free, reciprocally searchable basis  with patient authorization for at least a 12-month period after this study. _______________    FINDINGS:    IMAGE QUALITY:  Diagnostic. BRAIN AND EXTRA-AXIAL SPACE:    SUBACUTE TERRITORIAL TRANSCORTICAL INFARCT:  None detected. MASS:  None detected. HEMORRHAGE:  None. SUBDURAL FLUID COLLECTION:  None detected. HYDROCEPHALUS:  None. REMOTE  TERRITORIAL CEREBRAL INFARCT:  None detected. REMOTE CEREBELLAR INFARCT:  None detected.     STRIVE (STandards for Reporting Vascular changes on nEuroimaging):  --[place] of white matter hypodensity  (\"leukoaraiosis\") of presumed vascular origin:  Low-grade. --Burr of lacunes of presumed vascular origin  (often undetectable on CT):  None definite. --Degree of brain atrophy:  Moderate. BURDEN OF CALCIFIC INTRACRANIAL ATHEROSCLEROSIS:   Moderate. HEENT:    INCLUDED UPPER ORBITS:  There are bilateral lens replacements. INCLUDED UPPER PARANASAL SINUSES:  Predominantly clear. MASTOID AIR CELLS:  Predominantly clear. BONES:  Unremarkable. SUPERFICIAL SOFT TISSUES: Unremarkable.    _______________    Impression  Generalized chronic changes, however, no mass or acute findings. _______________         CXR reviewed by me:  XR (Most Recent). CXR  reviewed by me and compared with previous CXR Results from Hospital Encounter encounter on 03/02/22    XR CHEST PORT    Narrative  EXAM: Portable upright chest radiograph. INDICATION: \"Respiratory failure. \"    COMPARISON: March 16, 2022.    _______________    FINDINGS:    DEVICES:  None. LUNGS:  --Expansion:  Adequate. --Focal opacity:  None detected. --Diffuse abnormality:  None detected. PLEURAL SPACE:  --Pleural effusion:  Unchanged small left and equivocal right basilar  effusions. --Pneumothorax:  None detected. MISCELLANEOUS:  There is cardiac silhouette enlargement versus artifact of  portable/AP technique. _______________    Impression  Unchanged basal effusions. _______________         CXR 3/13/2022:  FINDINGS:  SUPPORT DEVICES: Endotracheal tube and visualized gastric tube both project in  stable position from prior exam.     HEART AND MEDIASTINUM: Enlarged appearing cardiac silhouette with stable  mediastinal contours.     LUNGS AND PLEURAL SPACES: Faint/hazy right lower lung zone opacity. No  pneumothorax or pleural effusion demonstrated.     BONY THORAX AND SOFT TISSUES: Unremarkable.  ______________     IMPRESSION  1. Support devices in stable position. 2. Mild interval increase in right basilar atelectasis and likely small right  pleural effusion.   3. Cardiomegaly, as previously. ·Please note: Voice-recognition software may have been used to generate this report, which may have resulted in some phonetic-based errors in grammar and contents. Even though attempts were made to correct all the mistakes, some may have been missed, and remained in the body of the document.       Yordan Griffith MD  3/18/2022

## 2022-03-18 NOTE — PROGRESS NOTES
[facility]  (A Division of [address] [place] [address])                                                                                                                      Miguel Ángel Mcgee MD  Office #: - Option # [phone]  Fax #: [phone]     Date of Admission: 3/2/2022Date of Note: 3/18/2022  Reason for FU: Antibiotic management of for positive resp/urine cultures requested by Dr Ana Dorantes. Dr Francoise Cheema will be covering the weekend. Please call  for questions/ consults by phone [phone] or via [employer] during the weekend.  Thanks          Current Antimicrobials:    Prior Antimicrobials:    Cefepime 3/14 to 3/16  Meropenem/Vanco 3/16 to date  Zithromax 3/2- 3/4  cTX 3/2 to 3/4  CTC 3/7-- X2 dose  Levofloxacin 500 mg X1- 3/13 X 1 dose  Meropenem 500 mg 3/14 X1 dose           Assessment- ID related:  --------------------------------------------------------------------------  Acutely sick and complicated patient in ICU with:    · Enterobacter Clacae complex + resp cutlure 3/10/22  · Acute resp failure- intubated since OR day 3/10/22  --CXR on 3/14-- infiltrate with atelectasis and pleural effusion  · Encephalopathy- unresponsiveness overnight->   --CO2 high on ABG, pt with SERENA  · S/P R hip anterior arthroplasty- wound vac in place  · Klebsiella bacteruria 3/2/22  --UA clean and Urine culture +  Kleb: Colonized    Other Medical Issues- Mx per respective team:  · CKD  · Morbidly obese  · Hx of SERENA  · A.fib  · Acute on chronic  CHF     Recommendation for ID issues I am following:  ------------------------------------------------------------------------------  DW Dr Ana Dorantes, RN      Can switch to oral Levofloxacin on discharge 500 mg daily, to complete to March 19 to cover for E.cloacae treatment    Unable to use Bactrim     Prognosis : guarded               -> diuresis per respective team. Opacity with layering of pleural  fluid on CXR       Subjective:  Remains extubated, no acute events overnight  Has low grade fever  Pt doesn't converse at time of exam, grimaces wherever she is touched  Her box is out  She is not on pressors    Notes and labs reviewed. Imaging reports reviewed --WBC 13K, CMP shows some mild elevation of LFT  US of Liver done 3/17:    1. Limited examination due to technical factors as above. 2. Heterogeneously hyperechoic liver parenchyma compatible with steatosis or  other hepatic parenchymal disease. 3. No acute findings or suspicious mass. 4. Cholelithiasis. 5. Mild ascites. CXR in am: last done 3/16/22       HPI:  Joni Welch is a 80 y.o. BLACK/ with PMH of congestive heart failure, sleep apnea, right leg weakness, admitted on 3/2 for acute resp failure with hypoxia due to CHF and a.fib with RVR. No fever or leucocytosis. UA done on admission was clean, urine culture grew Klebsiella. She had right hip fracture and was taken to OR on 3/10 for BLAIR. She had a wound vac in place. She didn't extubate after procedure and was transferred to ICU where she is getting care. resp cutture from 3/10-- has Enterobacter that was final on 3/13-- she was given Levofloxacin. Today it was changed to meropenem. DW with RN- little resp secretion, she has low grade fever today, no leucocytosis. She isnot on pressors. Per dtr, she doesn't recall when she got treated with ABX as OP. No prior history of UTI diagnosis.              Active Hospital Problems    Diagnosis Date Noted    Hypotension 03/17/2022    Hypokalemia 03/14/2022    Fracture of neck of right femur ([address] 03/08/2022    Stage 3 chronic kidney disease ([address] 03/03/2022    SERENA (obstructive sleep apnea) 03/03/2022    Adult BMI 39.0-39.9 kg/sq m 03/03/2022    Acute respiratory failure with hypoxemia (HCC) 03/02/2022    Atrial fibrillation (HCC) 03/02/2022    Acute on chronic diastolic congestive heart failure ([address] 04/23/2021    Elevated troponin 04/23/2021    HTN (hypertension) 04/23/2021     Past Medical History:   Diagnosis Date    Hypertension     Sleep disorder      Past Surgical History:   Procedure Laterality Date    HX ORTHOPAEDIC      broken right ankle, left femur 30 yrs ago     Family History   Problem Relation Age of Onset    Diabetes Mother     Heart Disease Mother     Heart Disease Father      Social History     Socioeconomic History    Marital status: SINGLE     Spouse name: Not on file    Number of children: Not on file    Years of education: Not on file    Highest education level: Not on file   Occupational History    Not on file   Tobacco Use    Smoking status: Never Smoker    Smokeless tobacco: Never Used   Vaping Use    Vaping Use: Never used   Substance and Sexual Activity    Alcohol use: Yes     Alcohol/week: 1.0 standard drink     Types: 1 Glasses of wine per week     Comment: soc    Drug use: No    Sexual activity: Not on file   Other Topics Concern     Service Not Asked    Blood Transfusions Not Asked    Caffeine Concern Not Asked    Occupational Exposure Not Asked    Hobby Hazards Not Asked    Sleep Concern Not Asked    Stress Concern Not Asked    Weight Concern Not Asked    Special Diet Not Asked    Back Care Not Asked    Exercise Not Asked    Bike Helmet Not Asked   [address] Not Asked    Self-Exams Not Asked   Social History Narrative    Not on file     Social Determinants of Health     Financial Resource Strain:     Difficulty of Paying Living Expenses: Not on file   Food Insecurity:     Worried About Running Out of Food in the Last Year: Not on file    [place] of Food in the Last Year: Not on file   Transportation Needs:     Lack of Transportation (Medical): Not on file    Lack of Transportation (Non-Medical):  Not on file   Physical Activity:     Days of Exercise per Week: Not on file    Minutes of Exercise per Session: Not on file   Stress:     Feeling of Stress : Not on file   Social Connections:     Frequency of Communication with Friends and Family: Not on file    Frequency of Social Gatherings with Friends and Family: Not on file    Attends Druze Services: Not on file    Active Member of Clubs or Organizations: Not on file    Attends Club or Organization Meetings: Not on file    Marital Status: Not on file   Intimate Partner Violence:     Fear of Current or Ex-Partner: Not on file    Emotionally Abused: Not on file    Physically Abused: Not on file    Sexually Abused: Not on file   Housing Stability:     Unable to Pay for Housing in the Last Year: Not on file    Number of Places Lived in the Last Year: Not on file    Unstable Housing in the Last Year: Not on file       Allergies:  Seafood, Statins-hmg-coa reductase inhibitors, and Sulfa (sulfonamide antibiotics)     Medications:  Current Facility-Administered Medications   Medication Dose Route Frequency    furosemide (LASIX) injection 20 mg  20 mg IntraVENous BID    midodrine (PROAMATINE) tablet 5 mg  5 mg Oral TID WITH MEALS    meropenem (MERREM) 1 g in 0.9% sodium chloride (MBP/ADV) 50 mL MBP  1 g IntraVENous Q12H    arformoteroL (BROVANA) neb solution 15 mcg  15 mcg Nebulization BID RT    famotidine (PF) (PEPCID) 20 mg in 0.9% sodium chloride 10 mL injection  20 mg IntraVENous DAILY    sodium chloride (NS) flush 5-40 mL  5-40 mL IntraVENous PRN    acetaminophen (TYLENOL) tablet 650 mg  650 mg Oral Q6H    naloxone (NARCAN) injection 0.4 mg  0.4 mg IntraVENous PRN    ferrous sulfate tablet 325 mg  1 Tablet Oral TID    diphenhydrAMINE (BENADRYL) capsule 25 mg  25 mg Oral Q4H PRN    bisacodyL (DULCOLAX) suppository 10 mg  10 mg Rectal DAILY PRN    ELECTROLYTE REPLACEMENT PROTOCOL - Magnesium   1 Each Other PRN    ELECTROLYTE REPLACEMENT PROTOCOL - Calcium   1 Each Other PRN    ELECTROLYTE REPLACEMENT PROTOCOL  - Phosphorus Renal Dosing  1 Each Other PRN    ELECTROLYTE REPLACEMENT PROTOCOL - Potassium Renal Dosing  1 Each Other PRN    midazolam (VERSED) injection 1 mg  1 mg IntraVENous Q2H PRN    fentaNYL citrate (PF) injection 25 mcg  25 mcg IntraVENous Q4H PRN    nystatin (MYCOSTATIN) 100,000 unit/mL oral suspension 500,000 Units  500,000 Units Oral QID    apixaban (ELIQUIS) tablet 2.5 mg  2.5 mg Oral BID    sodium chloride (NS) flush 5-40 mL  5-40 mL IntraVENous Q8H    sodium chloride (NS) flush 5-40 mL  5-40 mL IntraVENous PRN    acetaminophen (TYLENOL) tablet 650 mg  650 mg Oral Q6H PRN    Or    acetaminophen (TYLENOL) suppository 650 mg  650 mg Rectal Q6H PRN    polyethylene glycol (MIRALAX) packet 17 g  17 g Oral DAILY PRN    ondansetron (ZOFRAN ODT) tablet 4 mg  4 mg Oral Q8H PRN    Or    ondansetron (ZOFRAN) injection 4 mg  4 mg IntraVENous Q6H PRN    [Held by provider] carvediloL (COREG) tablet 3.125 mg  3.125 mg Oral BID WITH MEALS    aspirin delayed-release tablet 81 mg  81 mg Oral DAILY        ROS:  Review of systems not obtained due to patient factors. Physical Exam:    Temp (24hrs), Av.2 °F (37.3 °C), Min:98.5 °F (36.9 °C), Max:100.5 °F (38.1 °C)    Visit Vitals  BP (!) 103/59   Pulse 92   Temp 99 °F (37.2 °C)   Resp 23   Ht 5' 5\" (1.651 m)   Wt 116.7 kg (257 lb 4.4 oz)   SpO2 100%   Breastfeeding No   BMI 42.81 kg/m²        GEN: WD obese, Extubated, NC in place. No acute resp distress noted  Non conversant  HEENT: Unicteric. EOMI intact  CHEST: Non laboured breathing. CTA anteriorly  CVS:RRR, no mur/gallop  ABD: Obese/soft. Non tender. Non distended abd  KAREN: box is out  EXT:--right groin vac is removed-- dressing over groin is dry  Skin: Dry and intact. No rash, no redness. CNS:Awake, alert but not conversing. Follows with eyes.   CN grossly normal.        Microbiology  All Micro Results     Procedure Component Value Units Date/Time    CULTURE, BLOOD [[id number]] Collected: 22 0419    Order Status: Completed Specimen: Blood Updated: 22     Special Requests: NO SPECIAL REQUESTS        Culture result: NO GROWTH 4 DAYS       CULTURE, BLOOD [[id number]] Collected: 03/14/22 0420    Order Status: Completed Specimen: Blood Updated: 03/18/22 0827     Special Requests: NO SPECIAL REQUESTS        Culture result: NO GROWTH 4 DAYS       CULTURE, RESPIRATORY/SPUTUM/BRONCH W GRAM STAIN [[id number]]  (Abnormal)  (Susceptibility) Collected: 03/10/22 1630    Order Status: Completed Specimen: Sputum from Endotracheal aspirate Updated: 03/13/22 0905     Special Requests: NO SPECIAL REQUESTS        GRAM STAIN NO WBC'S SEEN         NO EPITHELIAL CELLS SEEN         NO ORGANISMS SEEN        Culture result:       LIGHT ENTEROBACTER CLOACAE COMPLEX                  HEAVY NORMAL RESPIRATORY ELSIE          CULTURE, RESPIRATORY/SPUTUM/BRONCH [place] [[id number]] Collected: 03/10/22 1915    Order Status: Canceled Specimen: Sputum from Endotracheal aspirate     CULTURE, BLOOD [[id number]] Collected: 03/02/22 2048    Order Status: Completed Specimen: Blood Updated: 03/08/22 0655     Special Requests: NO SPECIAL REQUESTS        Culture result: NO GROWTH 6 DAYS       CULTURE, BLOOD [[id number]] Collected: 03/02/22 2048    Order Status: Completed Specimen: Blood Updated: 03/08/22 0655     Special Requests: NO SPECIAL REQUESTS        Culture result: NO GROWTH 6 DAYS       CULTURE, URINE [[id number]]  (Abnormal)  (Susceptibility) Collected: 03/02/22 2104    Order Status: Completed Specimen: Cath Urine Updated: 03/05/22 1226     Special Requests: NO SPECIAL REQUESTS        [place] Count --        >100,000  COLONIES/mL       Culture result: KLEBSIELLA PNEUMONIAE       COVID-19 RAPID TEST [[id number]] Collected: 03/02/22 2025    Order Status: Completed Specimen: Nasopharyngeal Updated: 03/02/22 2052     Specimen source Nasopharyngeal        COVID-19 rapid test Not detected        Comment: Rapid Abbott ID Now       Rapid NAAT:  The specimen is NEGATIVE for SARS-CoV-2, the novel coronavirus associated with COVID-19. Negative results should be treated as presumptive and, if inconsistent with clinical signs and symptoms or necessary for patient management, should be tested with an alternative molecular assay. Negative results do not preclude SARS-CoV-2 infection and should not be used as the sole basis for patient management decisions. This test has been authorized by the FDA under an Emergency Use Authorization (EUA) for use by authorized laboratories. Fact sheet for Healthcare Providers: [employer]  Fact sheet for Patients: [employer]       Methodology: Isothermal Nucleic Acid Amplification         INFLUENZA A & B AG (RAPID TEST) [[id number]] Collected: 03/02/22 2025    Order Status: Completed Specimen: Nasopharyngeal from Nasal washing Updated: 03/02/22 2047     Influenza A Antigen Negative        Comment: A negative result does not exclude influenza virus infection, seasonal or H1N1 due to suboptimal sensitivity. If influenza is circulating in your community, a diagnosis of influenza should be considered based on a patients clinical presentation and empiric antiviral treatment should be considered, if indicated.         Influenza B Antigen Negative              Lab results:    Chemistry  Recent Labs     03/18/22  0214 03/17/22  2115 03/17/22  1357 03/17/22  0512 03/17/22  0512 03/16/22  0435 03/16/22  0435   *  --   --   --  101*  --  133*     --   --   --  144  --  142   K 3.7 4.9 3.6   < > 3.3*   < > 3.5     --   --   --  105  --  104   CO2 31  --   --   --  31  --  31   *  --   --   --  90*  --  91*   CREA 1.92*  --   --   --  1.63*  --  1.78*   CA 10.3*  --   --   --  10.4*  --  10.9*   AGAP 7  --   --   --  8  --  7   BUCR 54*  --   --   --  55*  --  51*   *  --   --   --  187*  --  190*   TP 6.7  --   --   --  6.1*  --  7.0   ALB 2.9*  --   --   --  3.3*  --  3.7   GLOB 3.8  --   --   --  2.8  --  3.3 AGRAT 0.8  --   --   --  1.2  --  1.1    < > = values in this interval not displayed. CBC w/ Diff  Recent Labs     03/18/22  0214 03/16/22  0435   WBC 13.0 11.1   RBC 2.80* 3.01*   HGB 8.4* 9.0*   HCT 26.7* 29.0*    221   GRANS 81*  --    LYMPH 8*  --    EOS 0  --        Imaging: report reviewed and as posted by radiologist     CXR:   1. Support devices in stable/appropriate position as visualized. 2. Mild cardiac enlargement, unchanged. 3. Hazy right lung base opacity, favored a combination of atelectasis and  posteriorly layering pleural effusion. US liver:    1. Limited examination due to technical factors as above. 2. Heterogeneously hyperechoic liver parenchyma compatible with steatosis or  other hepatic parenchymal disease. 3. No acute findings or suspicious mass. 4. Cholelithiasis.   5. Mild ascites.

## 2022-03-18 NOTE — PROGRESS NOTES
Problem: Mobility Impaired (Adult and Pediatric)  Goal: *Acute Goals and Plan of Care (Insert Text)  Description: Physical TherapyGoals   Initiated 3/13/2022 to be met within 7 days  1. Supine <> sit with max A with use of HR for positioning. (met)  2. Tolerate EOB sitting 5-10 minutes for ADL/balance activities. (progressing)  3. Therapeutic Exercises: Participate with LE strengthening exercise program to facilitate achievement of above. (progressing)    Initiated 3/17/2022 to be met within 7 days  1. Supine <>sit with mod A with use of HR for positioning  2. Tolerate EOB sitting 5-10 min for ADL/balance activities. 3. Therapeutic Exercises: Participate with LE strengthening exercise program to facilitate achievement of above. Outcome: Progressing Towards Goal   physical Therapy TREATMENT    Patient: Jeane Quintanilla (89 y.o. female)  Date: 3/18/2022  Diagnosis: Acute exacerbation of CHF (congestive heart failure) (HCC) [I50.9] Acute respiratory failure with hypoxemia (HCC)  Procedure(s) (LRB):  RIGHT TOTAL HIP ARTHROPLASTY WITH C-ARM  (PT IN ROOM # 358) (Right) 8 Days Post-Op  Precautions: Fall,WBAT   Chart, physical therapy assessment, plan of care and goals were reviewed. ASSESSMENT:  Pt is found awake, with daughter at bedside. Daughter attempt to motivate pt through [facility], but pt provides no self assist for LE movement. Minimal attempts at verbal communication, but has sharp facial reactions to pain (all extremities, especially UEs). Pt totally assisted to EOB, with knee control, to reduce anterior sliding. Pt tolerated 4 min of supported sitting, with cervical PROM. Pt reach a neutral position for 1 min, before becoming more slumped. Pt totally assisted back to bed. Pt will need placment at D/c.  Will increase activity as pt increases her attempts at mobility    Progression toward goals:  []      Improving appropriately and progressing toward goals  [x]      Improving slowly and progressing toward goals  []      Not making progress toward goals and plan of care will be adjusted     PLAN:  Patient continues to benefit from skilled intervention to address the above impairments. Continue treatment per established plan of care. Discharge Recommendations:  Perry Abraham  Further Equipment Recommendations for Discharge:  hospital bed, mechanical lift, and rolling walker     SUBJECTIVE:   Patient stated [facility].  (Pt repeats some words from daughter and this writer\"    OBJECTIVE DATA SUMMARY:   Critical Behavior:  Neurologic State: Eyes open to voice,Confused,Drowsy  Orientation Level: Oriented to person  Cognition: Follows commands  Safety/Judgement: Decreased awareness of environment  Functional Mobility Training:  Bed Mobility:  Supine to Sit: Maximum assistance; Total assistance  Sit to Supine: Total assistance  Scooting: Total assistance  Balance:  Sitting: Impaired; With support  Sitting - Static: Poor (constant support)  Sitting - Dynamic: None  Therapeutic Exercises:   PROM of Bilateral ankles, knees and hips  Pain:  Pain in: 0  Pain out: 0  Activity Tolerance:   Fair-  Please refer to the flowsheet for vital signs taken during this treatment.   After treatment:   [] Patient left in no apparent distress sitting up in chair  [x] Patient left in no apparent distress in bed  [x] Call bell left within reach  [x] Nursing notified  [x] Caregiver present  [] Bed alarm activated      Chen Terry PTA   Time Calculation: 46 mins

## 2022-03-18 NOTE — PROGRESS NOTES
D/C Plan: Discharge to [facility] when medically appropriate, please note facility can not get Bipap delivered to the facility until Monday, earliest discharge date whill be Monday 3/21. Patient has been accepted at [facility]. They will need an order for the Bipap so they can have it available for patient upon arrival. CM spoke with ICU physician and will get Bipap settings to place order to sent to facility. Patient can go today if medically appropriate and Bipap is obtained by [facility]. CM tentatively set up transport today to [place] [id number] for  in case patient is cleared for discharge today. Please note [facility] does not accept patients over the weekend. CM spoke with [facility] and the medical company they use can not get the Bipap delivered until Monday. Physician and family updated that Monday would be the earliest date patient could leave for [facility] due to availability of Bipap. Care Management Interventions  PCP Verified by CM:  (Dr. Sherwin Scott (female) )  Mode of Transport at Discharge: BLS (.)  Transition of Care Consult (CM Consult): SNF [facility] has stated that according to the current therapy notes, she is not Acute inpt rehab appropriate. SNF choices obtained from the pt's dtr;  [facility]. )  Partner SNF: No  Discharge Durable Medical Equipment:  (TBD)  Physical Therapy Consult: Yes  Occupational Therapy Consult: Yes  Support Systems: Child(doc)  Confirm Follow Up Transport: Family  The Plan for Transition of Care is Related to the Following Treatment Goals : skilled nursing facility  The Patient and/or Patient Representative was Provided with a Choice of Provider and Agrees with the Discharge Plan?: Yes (The pt and her dtr: Marlena)  Freedom of Choice List was Provided with Basic Dialogue that Supports the Patient's Individualized Plan of Care/Goals, Treatment Preferences and Shares the Quality Data Associated with the Providers?: Yes ([facility] worth)  Discharge Location  Patient Expects to be Discharged to[de-identified] Other: (TBD)

## 2022-03-18 NOTE — PROGRESS NOTES
Hospitalist Progress Note-critical care note     Patient: Alfredito Melchor MRN: [id number]  CSN: [id number]    YOB: 1940  Age: 80 y.o. Sex: female    DOA: 3/2/2022 LOS:  LOS: 16 days            Chief complaint: hip fracture m ckd3, afib chf , acute respiratory failure     Assessment/Plan         Hospital Problems  Date Reviewed: 3/9/2022          Codes Class Noted POA    Hypotension ICD-10-CM: I95.9  ICD-9-CM: 458.9  3/17/2022 Unknown        Hypokalemia ICD-10-CM: E87.6  ICD-9-CM: 276.8  3/14/2022 Unknown        Fracture of neck of right femur ([facility] [address] ICD-10-CM: S72.001A  ICD-9-CM: 820.8  3/8/2022 Unknown        Stage 3 chronic kidney disease ([facility] [address] ICD-10-CM: N18.30  ICD-9-CM: 585.3  3/3/2022 Unknown        SERENA (obstructive sleep apnea) ICD-10-CM: G47.33  ICD-9-CM: 327.23  3/3/2022 Unknown        Adult BMI 39.0-39.9 kg/sq m ICD-10-CM: Z68.39  ICD-9-CM: V85.39  3/3/2022 Unknown        * (Principal) Acute respiratory failure with hypoxemia (HCC) ICD-10-CM: J96.01  ICD-9-CM: 518.81  3/2/2022 Unknown        Atrial fibrillation (HCC) ICD-10-CM: I48.91  ICD-9-CM: 427.31  3/2/2022 Unknown        Elevated troponin ICD-10-CM: R77.8  ICD-9-CM: 790.6  4/23/2021 Yes        HTN (hypertension) ICD-10-CM: I10  ICD-9-CM: 401.9  4/23/2021 Yes        Acute on chronic diastolic congestive heart failure (HCC) ICD-10-CM: I50.33  ICD-9-CM: 428.33, 428.0  4/23/2021 Yes             pt was admitted for afib and chf, found rt hip fracture. She has serena on cpap at night, she received rt hip replacement, she was noted decreased tide volume and hypoxia while extubation post procedure, intubated with oxygen 65%, peep 6.  Case discussed with Dr. Khurram Obregon and Dr. Deedee Meigs     Acute on chronic respiratory failure with hypoxia and hypercapnia   Planning Extubate on 3/15, now on bipap last night   V/q scan :Perfusion images show no segmental perfusion defects to suggest the presence of  pulmonary embolism, pvl negative for dvt  on 3/3  Enterobacter Clacae complex from resp cutlure 3/10/22: id on board and on cefepime till 19th -on  merrem        Pulmonary hypertension   Echo on 3/3 pulmonary arterial systolic pressure is 51 mmhg  LORRAINE is deferred     Congestive heart failure , acute on chronic diastolic ,  Continue lasix   Echo done Mitral stenosis,  Pulmonary hypertension found to be moderate-LORRAINE is deferred  Dr. Chan Conde and On-hold  Coreg due to hypotension      htn meds hold due to hypotension  afib new   On eliquis      Elevated trop   No acs cardiologist on board        UTI  Urine pos for klebsiella  Completed IV Rocephin     Hip fracture:  Right press fit direct anterior total hip arthroplasty   Continue post surgery care          ckd3 -cr 1.89 mild improving   Continue watch for renal function ,  Renal f/u     Hypokalemia   k replacement     Anemia   Continue monitoring h/h     Hypokalemia resolved and calcium low   K and nikita replacement     Encephalopathy   eeg no seizure   Neuro consulted       Subjective :fine     She is more alert , still confused       Palliative care f/u . dnr /I   Disposition :tbd,   Review of systems:  Limited due to pt condition   Vital signs/Intake and Output:  Visit Vitals  /71   Pulse 89   Temp 97.8 °F (36.6 °C)   Resp 21   Ht 5' 5\" (1.651 m)   Wt 116.7 kg (257 lb 4.4 oz)   SpO2 98%   Breastfeeding No   BMI 42.81 kg/m²     Current Shift:  03/18 0701 - 03/18 1900  In: 50 [P.O.:50]  Out: -   Last three shifts:  03/16 1901 - 03/18 0700  In: 1001.8 [P.O.:75; I.V.:926.8]  Out: 1550 [Urine:1550]    Physical Exam:  General: Alert, no acute distress   HEENT: NC, Atraumatic. anicteric sclerae. Lungs: CTA Bilaterally. No Wheezing/Rhonchi/Rales. Heart:  Regular  rhythm,  No murmur, No Rubs, No Gallops  Abdomen: Soft, Non distended, Non tender. +Bowel sounds,   Extremities: No c/c, rt hip surgical site covered with gauze   Psych:   Not anxious or agitated.   Neurologic:  Alert and follow the command , but still  Confused           Labs: Results:       Chemistry Recent Labs     03/18/22  1115 03/18/22  0214 03/17/22  2115 03/17/22  1357 03/17/22  0512 03/16/22  0435 03/16/22 0435   GLU  --  120*  --   --  101*  --  133*   NA  --  145  --   --  144  --  142   K 3.8 3.7 4.9   < > 3.3*   < > 3.5   CL  --  107  --   --  105  --  104   CO2  --  31  --   --  31  --  31   BUN  --  103*  --   --  90*  --  91*   CREA  --  1.92*  --   --  1.63*  --  1.78*   CA  --  10.3*  --   --  10.4*  --  10.9*   AGAP  --  7  --   --  8  --  7   BUCR  --  54*  --   --  55*  --  51*   AP  --  200*  --   --  187*  --  190*   TP  --  6.7  --   --  6.1*  --  7.0   ALB  --  2.9*  --   --  3.3*  --  3.7   GLOB  --  3.8  --   --  2.8  --  3.3   AGRAT  --  0.8  --   --  1.2  --  1.1    < > = values in this interval not displayed. CBC w/Diff Recent Labs     03/18/22 0214 03/16/22 0435   WBC 13.0 11.1   RBC 2.80* 3.01*   HGB 8.4* 9.0*   HCT 26.7* 29.0*    221   GRANS 81*  --    LYMPH 8*  --    EOS 0  --       Cardiac Enzymes No results for input(s): CPK, CKND1, FARA in the last 72 hours. No lab exists for component: CKRMB, TROIP   Coagulation No results for input(s): PTP, INR, APTT, INREXT, INREXT in the last 72 hours. Lipid Panel Lab Results   Component Value Date/Time    Cholesterol, total 183 04/25/2021 04:00 PM    HDL Cholesterol 101 (H) 04/25/2021 04:00 PM    LDL, calculated 69.8 04/25/2021 04:00 PM    VLDL, calculated 12.2 04/25/2021 04:00 PM    Triglyceride 61 04/25/2021 04:00 PM    CHOL/HDL Ratio 1.8 04/25/2021 04:00 PM      BNP No results for input(s): BNPP in the last 72 hours.    Liver Enzymes Recent Labs     03/18/22  0214   TP 6.7   ALB 2.9*   *      Thyroid Studies Lab Results   Component Value Date/Time    TSH 0.38 03/16/2022 04:35 AM        Procedures/imaging: see electronic medical records for all procedures/Xrays and details which were not copied into this note but were reviewed prior to creation of Plan    NM LUNG SCAN PERF    Result Date: 3/3/2022  EXAM: NM LUNG SCAN PERF. CLINICAL INDICATION/HISTORY: d dimer increase dyspnea. -Additional: Clinical concern for pulmonary embolus. COMPARISON: Correlation is made with the radiographic study performed one day prior. TECHNIQUE: 7.2 mCi Tc-99m MAA was injected intravenously and the 8 standard views of the chest were obtained. This perfusion only examination is interpreted using the PISAPED criteria. _______________ FINDINGS: Perfusion images show no segmental perfusion defects to suggest the presence of pulmonary embolism. _______________     o scintigraphic findings to suggest the presence of acute pulmonary embolism. _______________     XR HIP RT W OR WO PELV 2-3 VWS    Result Date: 3/7/2022  EXAM: RIGHT HIP RADIOGRAPHS CLINICAL INDICATION/HISTORY: right hip pain -Additional: None COMPARISON: May March 3, 2022. TECHNIQUE: AP pelvis and frog-leg lateral view of right hip. _______________ FINDINGS: BONES: Intact appearing ilioischial and iliopectineal lines. There is a displaced and impacted subcapital right femoral neck fracture, with mild progressive displacement on follow-up imaging. Mild-moderate bilateral hip joint arthrosis. SOFT TISSUES: Unremarkable. _______________     1. Displaced and impacted subcapital right femoral neck fracture, increased in displacement from CT performed 4 days prior. CT HEAD WO CONT    Result Date: 3/7/2022  EXAM: CT head INDICATION: Altered mental status. COMPARISON: 4/23/2021. TECHNIQUE: Axial CT imaging of the head was performed without intravenous contrast. Standard multiplanar coronal and sagittal reformatted images were obtained and are included in interpretation. One or more dose reduction techniques were used on this CT: automated exposure control, adjustment of the mAs and/or kVp according to patient size, and iterative reconstruction techniques.   The specific techniques used on this CT exam have been documented in the patient's electronic medical record. Digital Imaging and Communications in Medicine (DICOM) format image data are available to nonaffiliated external healthcare facilities or entities on a secure, media free, reciprocally searchable basis with patient authorization for at least a 12-month period after this study. _______________ FINDINGS: BRAIN AND POSTERIOR FOSSA: Periventricular white matter hypoattenuation which is nonspecific but likely represents chronic ischemic changes. No evidence of acute large vessel transcortical infarct or acute parenchymal hemorrhage. No midline shift or hydrocephalus. EXTRA-AXIAL SPACES AND MENINGES: There are no abnormal extra-axial fluid collections. CALVARIUM: Intact. SINUSES: Clear. OTHER: None. _______________     No acute intracranial abnormality. CT CHEST ABD PELV WO CONT    Result Date: 3/3/2022  EXAM: CT CHEST, ABDOMEN AND PELVIS CLINICAL INDICATION/HISTORY: Hypoxemia, retrocardiac density, diarrhea, weakness and shortness of breath COMPARISON: 3/2/2022 TECHNIQUE: Axial CT imaging of the chest, abdomen, and pelvis was performed without intravenous contrast. Multiplanar reformats were generated. One or more dose reduction techniques were used on this CT: automated exposure control, adjustment of the mAs and/or kVp according to patient size, and iterative reconstruction techniques. The specific techniques used on this CT exam have been documented in the patient's electronic medical record. Digital Imaging and Communications in Medicine (DICOM) format image data are available to nonaffiliated external healthcare facilities or entities on a secure, media free, reciprocally searchable basis with patient authorization for at least a 12-month period after this study.  ________________ FINDINGS: LIMITATIONS:   > Suboptimal evaluation given lack of intravenous contrast.   > Motion artifact is present which limits evaluation.   > Suboptimal exam due to patient body habitus and arms by the side. CHEST: LUNGS: Respiratory motion significantly limits evaluation. Interlobar septal thickening in the upper lobes. Mild bilateral dependent atelectasis. No large consolidation or suspicious pulmonary mass. PLEURA: Very small volume bilateral pleural effusions. AIRWAY: Normal. MEDIASTINUM: Four-chamber cardiomegaly with asymmetric biatrial enlargement, mitral and aortic annular calcifications. The main pulmonary artery is enlarged suggesting pulmonary arterial hypertension. Normal caliber aorta with scattered calcified atherosclerotic plaque. No pericardial effusion. LYMPH NODES: No enlarged lymph nodes. CHEST WALL: Diffuse anasarca. Heterogeneous thyroid. _______________ ABDOMEN/PELVIS: LIVER: No enhancing hepatic mass. The portal and hepatic veins are patent. BILIARY: Gallstones are present in the nondistended gallbladder lumen. No biliary dilation. SPLEEN: Normal. PANCREAS: Normal. ADRENALS: Left adrenal gland measures 9 HU (axial image 76), most consistent with lipid rich adenoma. Normal right adrenal gland. KIDNEYS: Asymmetrically small left kidney. No rate of a stone. No hydronephrosis. GI TRACT:  A small hiatal hernia is present. Normal caliber small and large bowel loops. No morphology of bowel obstruction. Normal appendix. BLADDER: Diffuse wall thickening in the largely decompressed bladder. PELVIC ORGANS: No acute abnormality. VASCULATURE: No arterial aneurysm. 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 LYMPH NODES: No mesenteric or retroperitoneal lymphadenopathy. OTHER: No free intraperitoneal air. No ascites. Diffuse anasarca. OSSEOUS STRUCTURES: No acute osseous abnormality. Multilevel degenerative changes most pronounced in the lumbar spine. Degenerative changes in both shoulders (right greater than left). Please note the included portions of the upper extremities are not well evaluated on this study _______________     1.   Findings of congestive heart failure with cardiomegaly, interstitial edema, very small bilateral pleural effusions, and diffuse anasarca. 2.  Bladder wall thickening may be due to underdistention though superimposed infection cannot be excluded. Recommend correlation for cystitis. 3.  Osseous degenerative changes and additional findings, as detailed in the body of the report. XR CHEST PORT    Result Date: 3/6/2022  EXAM: PORTABLE CHEST HISTORY: Shortness of breath. COMPARISON: 3/2/2022 TECHNIQUE: Single portable view. _______________ FINDINGS: SUPPORT DEVICES: None HEART AND MEDIASTINUM: Moderate cardiomegaly. LUNGS AND PLEURAL SPACES: Subsegmental atelectasis left base. Possible small effusions. BONES AND SOFT TISSUES: Dextroscoliosis. _______________     Subsegmental atelectasis left base. Possible small effusions. Moderate cardiomegaly without change. XR CHEST PORT    Result Date: 3/2/2022  EXAM:  AP Portable Chest X-ray 1 view INDICATION: Shortness of breath COMPARISON: April 23, 2021 _______________ FINDINGS: Cardiomegaly and mediastinal contours are within normal limits for portable radiograph. There is increased right retrocardiac/right paraspinal density. There are no pleural effusions. No acute osseous findings. ________________      Increased right retrocardiac density which may represent airspace disease or underlying pulmonary nodule. Recommend CT chest for further evaluation. ECHO ADULT COMPLETE    Result Date: 3/3/2022    Left Ventricle: Left ventricle size is normal. Moderately increased wall thickness. Findings consistent with moderate concentric hypertrophy. Normal wall motion. Normal left ventricular systolic function with a visually estimated EF of 55 - 60%.   Left Atrium: Left atrium is mildly dilated.   Right Ventricle: Right ventricle is mildly dilated.   Right Atrium: Right atrium is mildly dilated.   Pulmonary artery :  Estimated pulmonary arterial systolic pressure is 51 mmhg.   Pulmonary hypertension found to be moderate   Mitral Valve: Moderately thickened leaflet. Mildly calcified leaflet. Moderate annular calcification of the mitral valve.   IVC/SVC : IVC diameter is greater than 21 mm and decreases less than 50% during inspiration; therefore the estimated right atrial pressure is elevated (~15 mmHg). IVC is dilated. Consider LORRAINE for better evaluation of mitral valve if clinically indicated. DUPLEX LOWER EXT VENOUS BILAT    Result Date: 3/4/2022  · No evidence of deep vein thrombosis in the right lower extremity. · No evidence of deep vein thrombosis in the left lower extremity.         Mariano Maxwell MD

## 2022-03-18 NOTE — PROGRESS NOTES
Recurrent hypercarbic respiratory failure patient failed CPAP documented multiple time switched to BIPAP 16/6 RR12 30 % fio2( or 2L)  Spoke to case management   ENEDINA Rosa MD

## 2022-03-18 NOTE — PROGRESS NOTES
Bedside shift change report given to Chadwick Moran (oncoming nurse) by Marialuisa Leija (offgoing nurse). Report included the following information [employer], KARDEX, MAR. Pt was to be transferred to [facility] but transportation was cancelled due to lack of BiPAP. Family advised. 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 Aspiration is a concern. Physical therapy worked with Pt. Family at bedside.

## 2022-03-19 ENCOUNTER — APPOINTMENT (OUTPATIENT)
Dept: GENERAL RADIOLOGY | Age: 82
DRG: 981 | End: 2022-03-19
Attending: INTERNAL MEDICINE
Payer: MEDICARE

## 2022-03-19 LAB
ALBUMIN SERPL-MCNC: 2.7 G/DL (ref 3.4–5)
ALBUMIN/GLOB SERPL: 0.7 {RATIO} (ref 0.8–1.7)
ALP SERPL-CCNC: 182 U/L (ref 45–117)
ALT SERPL-CCNC: 47 U/L (ref 13–56)
ANION GAP SERPL CALC-SCNC: 6 MMOL/L (ref 3–18)
AST SERPL-CCNC: 38 U/L (ref 10–38)
BILIRUB SERPL-MCNC: 1.4 MG/DL (ref 0.2–1)
BUN SERPL-MCNC: 113 MG/DL (ref 7–18)
BUN/CREAT SERPL: 57 (ref 12–20)
CA-I SERPL-SCNC: 1.39 MMOL/L (ref 1.12–1.32)
CALCIUM SERPL-MCNC: 10.7 MG/DL (ref 8.5–10.1)
CHLORIDE SERPL-SCNC: 108 MMOL/L (ref 100–111)
CO2 SERPL-SCNC: 31 MMOL/L (ref 21–32)
CREAT SERPL-MCNC: 1.99 MG/DL (ref 0.6–1.3)
GLOBULIN SER CALC-MCNC: 3.7 G/DL (ref 2–4)
GLUCOSE BLD STRIP.AUTO-MCNC: 107 MG/DL (ref 70–110)
GLUCOSE BLD STRIP.AUTO-MCNC: 111 MG/DL (ref 70–110)
GLUCOSE BLD STRIP.AUTO-MCNC: 150 MG/DL (ref 70–110)
GLUCOSE SERPL-MCNC: 113 MG/DL (ref 74–99)
MAGNESIUM SERPL-MCNC: 2.5 MG/DL (ref 1.6–2.6)
PHOSPHATE SERPL-MCNC: 3.3 MG/DL (ref 2.5–4.9)
POTASSIUM SERPL-SCNC: 3.7 MMOL/L (ref 3.5–5.5)
POTASSIUM SERPL-SCNC: 4.8 MMOL/L (ref 3.5–5.5)
PROT SERPL-MCNC: 6.4 G/DL (ref 6.4–8.2)
SODIUM SERPL-SCNC: 145 MMOL/L (ref 136–145)

## 2022-03-19 PROCEDURE — 74011250636 HC RX REV CODE- 250/636: Performed by: FAMILY MEDICINE

## 2022-03-19 PROCEDURE — 71045 X-RAY EXAM CHEST 1 VIEW: CPT

## 2022-03-19 PROCEDURE — 65610000006 HC RM INTENSIVE CARE

## 2022-03-19 PROCEDURE — 74011250636 HC RX REV CODE- 250/636: Performed by: INTERNAL MEDICINE

## 2022-03-19 PROCEDURE — 74011000250 HC RX REV CODE- 250: Performed by: INTERNAL MEDICINE

## 2022-03-19 PROCEDURE — 82962 GLUCOSE BLOOD TEST: CPT

## 2022-03-19 PROCEDURE — 74011000258 HC RX REV CODE- 258: Performed by: INTERNAL MEDICINE

## 2022-03-19 PROCEDURE — 84100 ASSAY OF PHOSPHORUS: CPT

## 2022-03-19 PROCEDURE — 92526 ORAL FUNCTION THERAPY: CPT

## 2022-03-19 PROCEDURE — 74011250637 HC RX REV CODE- 250/637: Performed by: PHYSICIAN ASSISTANT

## 2022-03-19 PROCEDURE — 36415 COLL VENOUS BLD VENIPUNCTURE: CPT

## 2022-03-19 PROCEDURE — 94660 CPAP INITIATION&MGMT: CPT

## 2022-03-19 PROCEDURE — 83735 ASSAY OF MAGNESIUM: CPT

## 2022-03-19 PROCEDURE — 74011250637 HC RX REV CODE- 250/637: Performed by: INTERNAL MEDICINE

## 2022-03-19 PROCEDURE — 94640 AIRWAY INHALATION TREATMENT: CPT

## 2022-03-19 PROCEDURE — 84132 ASSAY OF SERUM POTASSIUM: CPT

## 2022-03-19 PROCEDURE — 99232 SBSQ HOSP IP/OBS MODERATE 35: CPT | Performed by: INTERNAL MEDICINE

## 2022-03-19 PROCEDURE — 77010033678 HC OXYGEN DAILY

## 2022-03-19 PROCEDURE — 82330 ASSAY OF CALCIUM: CPT

## 2022-03-19 RX ORDER — POTASSIUM CHLORIDE 7.45 MG/ML
10 INJECTION INTRAVENOUS
Status: COMPLETED | OUTPATIENT
Start: 2022-03-19 | End: 2022-03-19

## 2022-03-19 RX ORDER — MIDODRINE HYDROCHLORIDE 2.5 MG/1
2.5 TABLET ORAL
Status: DISCONTINUED | OUTPATIENT
Start: 2022-03-19 | End: 2022-03-21

## 2022-03-19 RX ADMIN — SODIUM CHLORIDE, PRESERVATIVE FREE 10 ML: 5 INJECTION INTRAVENOUS at 05:09

## 2022-03-19 RX ADMIN — ARFORMOTEROL TARTRATE 15 MCG: 15 SOLUTION RESPIRATORY (INHALATION) at 20:14

## 2022-03-19 RX ADMIN — ASPIRIN 81 MG: 81 TABLET, COATED ORAL at 08:52

## 2022-03-19 RX ADMIN — FUROSEMIDE 20 MG: 10 INJECTION, SOLUTION INTRAMUSCULAR; INTRAVENOUS at 08:53

## 2022-03-19 RX ADMIN — APIXABAN 2.5 MG: 2.5 TABLET, FILM COATED ORAL at 08:53

## 2022-03-19 RX ADMIN — FERROUS SULFATE TAB 325 MG (65 MG ELEMENTAL FE) 325 MG: 325 (65 FE) TAB at 08:53

## 2022-03-19 RX ADMIN — NYSTATIN 500000 UNITS: 100000 SUSPENSION ORAL at 17:23

## 2022-03-19 RX ADMIN — NYSTATIN 500000 UNITS: 100000 SUSPENSION ORAL at 08:52

## 2022-03-19 RX ADMIN — APIXABAN 2.5 MG: 2.5 TABLET, FILM COATED ORAL at 21:18

## 2022-03-19 RX ADMIN — POTASSIUM CHLORIDE 10 MEQ: 7.46 INJECTION, SOLUTION INTRAVENOUS at 05:08

## 2022-03-19 RX ADMIN — ACETAMINOPHEN 650 MG: 325 TABLET ORAL at 11:28

## 2022-03-19 RX ADMIN — SODIUM CHLORIDE, PRESERVATIVE FREE 10 ML: 5 INJECTION INTRAVENOUS at 14:26

## 2022-03-19 RX ADMIN — MIDODRINE HYDROCHLORIDE 5 MG: 2.5 TABLET ORAL at 11:28

## 2022-03-19 RX ADMIN — MIDODRINE HYDROCHLORIDE 2.5 MG: 2.5 TABLET ORAL at 17:23

## 2022-03-19 RX ADMIN — ARFORMOTEROL TARTRATE 15 MCG: 15 SOLUTION RESPIRATORY (INHALATION) at 07:46

## 2022-03-19 RX ADMIN — FERROUS SULFATE TAB 325 MG (65 MG ELEMENTAL FE) 325 MG: 325 (65 FE) TAB at 17:23

## 2022-03-19 RX ADMIN — ACETAMINOPHEN 650 MG: 325 TABLET ORAL at 17:23

## 2022-03-19 RX ADMIN — NYSTATIN 500000 UNITS: 100000 SUSPENSION ORAL at 14:26

## 2022-03-19 RX ADMIN — MIDODRINE HYDROCHLORIDE 5 MG: 2.5 TABLET ORAL at 08:53

## 2022-03-19 RX ADMIN — SODIUM CHLORIDE, PRESERVATIVE FREE 20 MG: 5 INJECTION INTRAVENOUS at 08:52

## 2022-03-19 RX ADMIN — SODIUM CHLORIDE, PRESERVATIVE FREE 10 ML: 5 INJECTION INTRAVENOUS at 21:17

## 2022-03-19 RX ADMIN — ACETAMINOPHEN 650 MG: 325 TABLET ORAL at 05:08

## 2022-03-19 RX ADMIN — FERROUS SULFATE TAB 325 MG (65 MG ELEMENTAL FE) 325 MG: 325 (65 FE) TAB at 21:18

## 2022-03-19 RX ADMIN — MEROPENEM 1 G: 1 INJECTION, POWDER, FOR SOLUTION INTRAVENOUS at 20:30

## 2022-03-19 RX ADMIN — NYSTATIN 500000 UNITS: 100000 SUSPENSION ORAL at 21:17

## 2022-03-19 RX ADMIN — MEROPENEM 1 G: 1 INJECTION, POWDER, FOR SOLUTION INTRAVENOUS at 08:52

## 2022-03-19 RX ADMIN — FUROSEMIDE 20 MG: 10 INJECTION, SOLUTION INTRAMUSCULAR; INTRAVENOUS at 21:17

## 2022-03-19 NOTE — PROGRESS NOTES
Hospitalist Progress Note-critical care note     Patient: Sachi Guzman MRN: [id number]  CSN: [id number]    YOB: 1940  Age: 80 y.o. Sex: female    DOA: 3/2/2022 LOS:  LOS: 17 days            Chief complaint: hip fracture m ckd3, afib chf , acute respiratory failure     Assessment/Plan         Hospital Problems  Date Reviewed: 3/9/2022          Codes Class Noted POA    Hypotension ICD-10-CM: I95.9  ICD-9-CM: 458.9  3/17/2022 Unknown        Hypokalemia ICD-10-CM: E87.6  ICD-9-CM: 276.8  3/14/2022 Unknown        Fracture of neck of right femur ([facility] [address] ICD-10-CM: S72.001A  ICD-9-CM: 820.8  3/8/2022 Unknown        Stage 3 chronic kidney disease ([facility] [address] ICD-10-CM: N18.30  ICD-9-CM: 585.3  3/3/2022 Unknown        SERENA (obstructive sleep apnea) ICD-10-CM: G47.33  ICD-9-CM: 327.23  3/3/2022 Unknown        Adult BMI 39.0-39.9 kg/sq m ICD-10-CM: Z68.39  ICD-9-CM: V85.39  3/3/2022 Unknown        * (Principal) Acute respiratory failure with hypoxemia (HCC) ICD-10-CM: J96.01  ICD-9-CM: 518.81  3/2/2022 Unknown        Atrial fibrillation (HCC) ICD-10-CM: I48.91  ICD-9-CM: 427.31  3/2/2022 Unknown        Elevated troponin ICD-10-CM: R77.8  ICD-9-CM: 790.6  4/23/2021 Yes        HTN (hypertension) ICD-10-CM: I10  ICD-9-CM: 401.9  4/23/2021 Yes        Acute on chronic diastolic congestive heart failure (HCC) ICD-10-CM: I50.33  ICD-9-CM: 428.33, 428.0  4/23/2021 Yes             pt was admitted for afib and chf, found rt hip fracture. She has serena on cpap at night, she received rt hip replacement, she was noted decreased tide volume and hypoxia while extubation post procedure, intubated with oxygen 65%, peep 6.  Case discussed with Dr. Montse Ba and Dr. Cara Gottron     Acute on chronic respiratory failure with hypoxia and hypercapnia   Planning Extubate on 3/15,   bipap at night   V/q scan :Perfusion images show no segmental perfusion defects to suggest the presence of  pulmonary embolism, pvl negative for dvt  on 3/3  Enterobacter Clacae complex from resp cutlure 3/10/22: id on board on  merrem       Pulmonary hypertension   Echo on 3/3 pulmonary arterial systolic pressure is 51 mmhg  LORRAINE is deferred     Congestive heart failure , acute on chronic diastolic ,  Continue lasix -swelling improving   Echo done Mitral stenosis,  Pulmonary hypertension found to be moderate-LORRAINE is deferred  Dr. Chan Conde and On-hold  Coreg due to hypotension      htn meds hold due to hypotension  afib new   On eliquis      Elevated trop   No acs cardiologist on board        UTI  Urine pos for klebsiella  Completed IV Rocephin     Hip fracture:    Right press fit direct anterior total hip arthroplasty   Continue post surgery care , need rehab placement          ckd3 -cr stable   Continue watch for renal function ,  Renal f/u     Hypokalemia   k replacement     Anemia   Continue monitoring h/h     Hypokalemia resolved and calcium low   K and nikita replacement     Encephalopathy   eeg no seizure   Neuro consulted       rn stable     Palliative care f/u . dnr /I   Disposition :tbd,   Review of systems:  Limited due to pt on bipap   Vital signs/Intake and Output:  Visit Vitals  /85   Pulse 98   Temp 98.2 °F (36.8 °C)   Resp 22   Ht 5' 5\" (1.651 m)   Wt 116.7 kg (257 lb 4.4 oz)   SpO2 99%   Breastfeeding No   BMI 42.81 kg/m²     Current Shift:  03/19 0701 - 03/19 1900  In: 350 [P.O.:50; I.V.:300]  Out: 100 [Urine:100]  Last three shifts:  03/17 1901 - 03/19 0700  In: 50 [P.O.:50]  Out: 1600 [Urine:1600]    Physical Exam:  General: Open eyes per voice no acute distress   HEENT: NC, Atraumatic. anicteric sclerae. bipap mask noted   Lungs: CTA Bilaterally. No Wheezing/Rhonchi/Rales. Heart:  Regular  rhythm,  No murmur, No Rubs, No Gallops  Abdomen: Soft, Non distended, Non tender. +Bowel sounds,   Extremities: No c/c, rt hip surgical site covered with gauze , leg swelling better   Psych:   Not anxious or agitated.   Neurologic:  Open eyes per voice Labs: Results:       Chemistry Recent Labs     03/19/22  0940 03/19/22  0359 03/18/22  1115 03/18/22  0214 03/18/22  0214 03/17/22  1357 03/17/22  0512   GLU  --  113*  --   --  120*  --  101*   NA  --  145  --   --  145  --  144   K 4.8 3.7 3.8   < > 3.7   < > 3.3*   CL  --  108  --   --  107  --  105   CO2  --  31  --   --  31  --  31   BUN  --  113*  --   --  103*  --  90*   CREA  --  1.99*  --   --  1.92*  --  1.63*   CA  --  10.7*  --   --  10.3*  --  10.4*   AGAP  --  6  --   --  7  --  8   BUCR  --  57*  --   --  54*  --  55*   AP  --  182*  --   --  200*  --  187*   TP  --  6.4  --   --  6.7  --  6.1*   ALB  --  2.7*  --   --  2.9*  --  3.3*   GLOB  --  3.7  --   --  3.8  --  2.8   AGRAT  --  0.7*  --   --  0.8  --  1.2    < > = values in this interval not displayed. CBC w/Diff Recent Labs     03/18/22 0214   WBC 13.0   RBC 2.80*   HGB 8.4*   HCT 26.7*      GRANS 81*   LYMPH 8*   EOS 0      Cardiac Enzymes No results for input(s): CPK, CKND1, FARA in the last 72 hours. No lab exists for component: CKRMB, TROIP   Coagulation No results for input(s): PTP, INR, APTT, INREXT, INREXT in the last 72 hours. Lipid Panel Lab Results   Component Value Date/Time    Cholesterol, total 183 04/25/2021 04:00 PM    HDL Cholesterol 101 (H) 04/25/2021 04:00 PM    LDL, calculated 69.8 04/25/2021 04:00 PM    VLDL, calculated 12.2 04/25/2021 04:00 PM    Triglyceride 61 04/25/2021 04:00 PM    CHOL/HDL Ratio 1.8 04/25/2021 04:00 PM      BNP No results for input(s): BNPP in the last 72 hours. Liver Enzymes Recent Labs     03/19/22  0359   TP 6.4   ALB 2.7*   *      Thyroid Studies Lab Results   Component Value Date/Time    TSH 0.38 03/16/2022 04:35 AM        Procedures/imaging: see electronic medical records for all procedures/Xrays and details which were not copied into this note but were reviewed prior to creation of Plan    NM LUNG SCAN PERF    Result Date: 3/3/2022  EXAM: NM LUNG SCAN PERF. CLINICAL INDICATION/HISTORY: d dimer increase dyspnea. -Additional: Clinical concern for pulmonary embolus. COMPARISON: Correlation is made with the radiographic study performed one day prior. TECHNIQUE: 7.2 mCi Tc-99m MAA was injected intravenously and the 8 standard views of the chest were obtained. This perfusion only examination is interpreted using the PISAPED criteria. _______________ FINDINGS: Perfusion images show no segmental perfusion defects to suggest the presence of pulmonary embolism. _______________     o scintigraphic findings to suggest the presence of acute pulmonary embolism. _______________     XR HIP RT W OR WO PELV 2-3 VWS    Result Date: 3/7/2022  EXAM: RIGHT HIP RADIOGRAPHS CLINICAL INDICATION/HISTORY: right hip pain -Additional: None COMPARISON: May March 3, 2022. TECHNIQUE: AP pelvis and frog-leg lateral view of right hip. _______________ FINDINGS: BONES: Intact appearing ilioischial and iliopectineal lines. There is a displaced and impacted subcapital right femoral neck fracture, with mild progressive displacement on follow-up imaging. Mild-moderate bilateral hip joint arthrosis. SOFT TISSUES: Unremarkable. _______________     1. Displaced and impacted subcapital right femoral neck fracture, increased in displacement from CT performed 4 days prior. CT HEAD WO CONT    Result Date: 3/7/2022  EXAM: CT head INDICATION: Altered mental status. COMPARISON: 4/23/2021. TECHNIQUE: Axial CT imaging of the head was performed without intravenous contrast. Standard multiplanar coronal and sagittal reformatted images were obtained and are included in interpretation. One or more dose reduction techniques were used on this CT: automated exposure control, adjustment of the mAs and/or kVp according to patient size, and iterative reconstruction techniques. The specific techniques used on this CT exam have been documented in the patient's electronic medical record.   Digital Imaging and Communications in Medicine (DICOM) format image data are available to nonaffiliated external healthcare facilities or entities on a secure, media free, reciprocally searchable basis with patient authorization for at least a 12-month period after this study. _______________ FINDINGS: BRAIN AND POSTERIOR FOSSA: Periventricular white matter hypoattenuation which is nonspecific but likely represents chronic ischemic changes. No evidence of acute large vessel transcortical infarct or acute parenchymal hemorrhage. No midline shift or hydrocephalus. EXTRA-AXIAL SPACES AND MENINGES: There are no abnormal extra-axial fluid collections. CALVARIUM: Intact. SINUSES: Clear. OTHER: None. _______________     No acute intracranial abnormality. CT CHEST ABD PELV WO CONT    Result Date: 3/3/2022  EXAM: CT CHEST, ABDOMEN AND PELVIS CLINICAL INDICATION/HISTORY: Hypoxemia, retrocardiac density, diarrhea, weakness and shortness of breath COMPARISON: 3/2/2022 TECHNIQUE: Axial CT imaging of the chest, abdomen, and pelvis was performed without intravenous contrast. Multiplanar reformats were generated. One or more dose reduction techniques were used on this CT: automated exposure control, adjustment of the mAs and/or kVp according to patient size, and iterative reconstruction techniques. The specific techniques used on this CT exam have been documented in the patient's electronic medical record. Digital Imaging and Communications in Medicine (DICOM) format image data are available to nonaffiliated external healthcare facilities or entities on a secure, media free, reciprocally searchable basis with patient authorization for at least a 12-month period after this study. ________________ FINDINGS: LIMITATIONS:   > Suboptimal evaluation given lack of intravenous contrast.   > Motion artifact is present which limits evaluation.   > Suboptimal exam due to patient body habitus and arms by the side.  CHEST: LUNGS: Respiratory motion significantly limits evaluation. Interlobar septal thickening in the upper lobes. Mild bilateral dependent atelectasis. No large consolidation or suspicious pulmonary mass. PLEURA: Very small volume bilateral pleural effusions. AIRWAY: Normal. MEDIASTINUM: Four-chamber cardiomegaly with asymmetric biatrial enlargement, mitral and aortic annular calcifications. The main pulmonary artery is enlarged suggesting pulmonary arterial hypertension. Normal caliber aorta with scattered calcified atherosclerotic plaque. No pericardial effusion. LYMPH NODES: No enlarged lymph nodes. CHEST WALL: Diffuse anasarca. Heterogeneous thyroid. _______________ ABDOMEN/PELVIS: LIVER: No enhancing hepatic mass. The portal and hepatic veins are patent. BILIARY: Gallstones are present in the nondistended gallbladder lumen. No biliary dilation. SPLEEN: Normal. PANCREAS: Normal. ADRENALS: Left adrenal gland measures 9 HU (axial image 76), most consistent with lipid rich adenoma. Normal right adrenal gland. KIDNEYS: Asymmetrically small left kidney. No rate of a stone. No hydronephrosis. GI TRACT:  A small hiatal hernia is present. Normal caliber small and large bowel loops. No morphology of bowel obstruction. Normal appendix. BLADDER: Diffuse wall thickening in the largely decompressed bladder. PELVIC ORGANS: No acute abnormality. VASCULATURE: No arterial aneurysm. 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 LYMPH NODES: No mesenteric or retroperitoneal lymphadenopathy. OTHER: No free intraperitoneal air. No ascites. Diffuse anasarca. OSSEOUS STRUCTURES: No acute osseous abnormality. Multilevel degenerative changes most pronounced in the lumbar spine. Degenerative changes in both shoulders (right greater than left). Please note the included portions of the upper extremities are not well evaluated on this study _______________     1.   Findings of congestive heart failure with cardiomegaly, interstitial edema, very small bilateral pleural effusions, and diffuse anasarca. 2.  Bladder wall thickening may be due to underdistention though superimposed infection cannot be excluded. Recommend correlation for cystitis. 3.  Osseous degenerative changes and additional findings, as detailed in the body of the report. XR CHEST PORT    Result Date: 3/6/2022  EXAM: PORTABLE CHEST HISTORY: Shortness of breath. COMPARISON: 3/2/2022 TECHNIQUE: Single portable view. _______________ FINDINGS: SUPPORT DEVICES: None HEART AND MEDIASTINUM: Moderate cardiomegaly. LUNGS AND PLEURAL SPACES: Subsegmental atelectasis left base. Possible small effusions. BONES AND SOFT TISSUES: Dextroscoliosis. _______________     Subsegmental atelectasis left base. Possible small effusions. Moderate cardiomegaly without change. XR CHEST PORT    Result Date: 3/2/2022  EXAM:  AP Portable Chest X-ray 1 view INDICATION: Shortness of breath COMPARISON: April 23, 2021 _______________ FINDINGS: Cardiomegaly and mediastinal contours are within normal limits for portable radiograph. There is increased right retrocardiac/right paraspinal density. There are no pleural effusions. No acute osseous findings. ________________      Increased right retrocardiac density which may represent airspace disease or underlying pulmonary nodule. Recommend CT chest for further evaluation. ECHO ADULT COMPLETE    Result Date: 3/3/2022    Left Ventricle: Left ventricle size is normal. Moderately increased wall thickness. Findings consistent with moderate concentric hypertrophy. Normal wall motion. Normal left ventricular systolic function with a visually estimated EF of 55 - 60%.   Left Atrium: Left atrium is mildly dilated.   Right Ventricle: Right ventricle is mildly dilated.   Right Atrium: Right atrium is mildly dilated.   Pulmonary artery :  Estimated pulmonary arterial systolic pressure is 51 mmhg. Pulmonary hypertension found to be moderate   Mitral Valve: Moderately thickened leaflet. Mildly calcified leaflet. Moderate annular calcification of the mitral valve.   IVC/SVC : IVC diameter is greater than 21 mm and decreases less than 50% during inspiration; therefore the estimated right atrial pressure is elevated (~15 mmHg). IVC is dilated. Consider LORRAINE for better evaluation of mitral valve if clinically indicated. DUPLEX LOWER EXT VENOUS BILAT    Result Date: 3/4/2022  · No evidence of deep vein thrombosis in the right lower extremity. · No evidence of deep vein thrombosis in the left lower extremity.         Mark Gan MD

## 2022-03-19 NOTE — PROGRESS NOTES
[facility] Specialists  Pulmonary, Critical Care, and Sleep Medicine    Name: Scahi Guzman MRN: [id number]   : 1940 Hospital: [facility]    Date: 3/19/2022  Room: [address] [place] [address] Note                                              Consult requesting physician: Dr. Dang Vaughan  Reason for Consult: Respiratory failure    IMPRESSION:   Acute respiratory failure with hypoxemia. Acute on chronic hypercarbic respiratory failure   Acute on chronic diastolic congestive heart failure. Fracture of neck of right femur. SERENA. OHS      Active Hospital Problems    Diagnosis Date Noted    Hypotension 2022    Hypokalemia 2022    Fracture of neck of right femur ([facility] 2022    Stage 3 chronic kidney disease ([facility] 2022    SERENA (obstructive sleep apnea) 2022    Adult BMI 39.0-39.9 kg/sq m 2022    Acute respiratory failure with hypoxemia (HCC) 2022    Atrial fibrillation ([facility]) 2022    Acute on chronic diastolic congestive heart failure ([facility] 2021    Elevated troponin 2021    HTN (hypertension) 2021   ·      Patient Active Problem List   Diagnosis Code    Fatigue R53.83    Elevated troponin R77.8    Obesity (BMI 30-39. 9) E66.9    HTN (hypertension) I10    SERENA treated with BiPAP G47.33    Acute on chronic diastolic congestive heart failure (HCC) I50.33    Acute respiratory failure with hypoxemia (HCC) J96.01    Atrial fibrillation (HCC) I48.91    Stage 3 chronic kidney disease (HCC) N18.30    SERENA (obstructive sleep apnea) G47.33    Adult BMI 39.0-39.9 kg/sq m Z68.39    Fracture of neck of right femur ([facility]) S72.001A    Hypokalemia E87.6    Hypotension I95.9         · Code status: DNR       RECOMMENDATIONS:     Respiratory: History of obesity, SERENA on BiPAP.   BIPAP 16/6 RR12 30 % fio2 every night tolerating well  I recommend to have BIPAP at rehab [facility] night ( I spoke to )  Daytime 2L NC  CXR improving      Postoperative respiratory failure, reintubated in PACU 3/10/2022. Extubated on 3/15/2022  fialed home CPAP with oxygen desaturations changed to BIPAP 16-9 /5 2L oxygen   BIPAP at night   Diuresis add bronchodilators and add albumins   Last night again lethargic CT negative  ABG on 3/16 7.38/53/ back on BIPAP  All night off in AM  CXR revised   IMPRESSION  Cardiomegaly.     Small layering right effusion/atelectasis. Prominent central vasculature. Keep SPO2 >=92%. HOB 30 degree elevation all the time. Aggressive pulmonary toileting. Aspiration precautions. CVS:  CHF/cardiomyopatrhy on diuretics and low dose midodrine     At night was briefly on pressors now off    Severe cardiomyopathy on max tx   BP borderline so limited   worsening BNP   New onset A. fib on admission, chronic diastolic CHF. Continue aspirin. I will lower midodrine dose today  Continue Coreg but hold for SBP less than 100. Continue Eliquis; monitor for any external bleeding. Lasix held due to metabolic alkalosis since 9/16/1770; and received Diamox I adj used the dose today   Cardiologist on board. Echo 3/3/2022: LVEF 55 to 60%. RV mildly dilated. RA mildly dilated. PVL LE 3/3/2022: No DVT. VQ scan 3/3/2022: No PE.    ID: WBC normal  Dc vanc  ABX adjusted by ID if able to swallow ok to change to oral     No fever or leukocytosis. Rapid influenza and COVID19 3/2/2022: Negative. Urine culture 3/2/2022: Klebsiella pneumonia. Blood culture 3/2/2022: Negative. Endotracheal aspirate culture: Light Enterobacter cloacae complex. Heavy normal forrest. Antibiotics: Rocephin was discontinued on 3/12/2022 (Enterobacter is resistant to Rocephin). Was on Levaquin but I change to meropenem unclear     Hematology/Oncology:   Anemia; but no external bleeding. Normal platelets. Renal:   Mild TK; likely due to diuretics use. S/p mild hypernatremia; resolved.   Lasix held due to metabolic alkalosis since 3/26/9270; and received Diamox 250 mg every 12 hours x2 doses on 3/12/2022 and on D5 0.45 NS at 50 mill per hour; with improved ABG. Dr. John Farias on board. GI/: No acute issues. USG stable no acute issues   IMPRESSION  1. Limited examination due to technical factors as above. 2. Heterogeneously hyperechoic liver parenchyma compatible with steatosis or  other hepatic parenchymal disease. 3. No acute findings or suspicious mass. 4. Cholelithiasis. 5. Mild ascites. Elevated LFt and juliann I suspect due to chronic illness and CHF but will check GB usg  No abdominal pain nausea    Endocrine: Monitor BS. SSI. Neurology:   On/off periods of lethargy   Ct normal ammonia TSH normal  Improved with BIPAP  I consulted neurology   Patient was lethargic and confused before going for hip surgery on 3/10/2022 morning. Patient remains off propofol since 3/11/2022; mental status is improving since improvement in ABG. Patient is more alert and awake today. CT head 3/7/2022: Nil acute. Repeat CT head 3/11/2022: Nil acute. Psychiatry: No acute issues. Toxicology: No acute issues. Pain/Sedation: Sedation protocol as above    Skin/Wound: Right hip wound VAC in place after surgery; local care per nursing protocol. Electrolytes: Replace electrolytes per ICU electrolyte replacement protocol. IVF: kvo albumins     Nutrition: Tolerating OGT feed at 40 mL/h. Prophylaxis: DVT Prophylaxis: Eliquis. GI Prophylaxis: Protonix. Restraints: wrist soft restraints for patient interfering with medical therapy/management and patient safety. Lines/Tubes: PIV  ETT: 3/10/2022. OGT: 3/11/2022. [place]: 3/10/2022 (Medically necessary for strict input/output monitoring in critically ill patient, will remove it when not needed. [place] bundle followed). Advance Directive/Palliative Care: Consulted.     Will defer respective systems problem management to primary and other respective consultant and follow patient in ICU with primary and other medical team.  Further recommendations will be based on the patient's response to recommended treatment and results of the investigation ordered. Quality Care: PPI, DVT prophylaxis, HOB elevated, Infection control all reviewed and addressed. Care of plan d/w RN, RT, hospitalist team.    High complexity decision making was performed during the evaluation of this patient at high risk for decompensation with multiple organ involvement. I updated daughter today . Palliative care on board     Subjective/History of Present Illness:     Patient is a 80 y.o. female with PMHx significant for morbid obesity, SERENA, right leg weakness, HTN, A. fib, CHF; admitted to medical floor on 3/3/2022 for dyspnea. Patient had new onset of A. fib on admission. Patient was admitted on medical floor and A. fib/CHF was being treated medically; and was using home BiPAP nightly. Found to have right femoral fracture; underwent right press-fit direct anterior total hip arthroplasty for femoral neck fracture. Postoperatively patient was extubated in PACU; but due to respiratory distress, reintubated and transferred to ICU.      3/19/2022 :     Remains in . Periods of lethargy but responsive to BIPAP  Lower midorine dose  She will go to rehab with BIPAP    DNR/DNI  I/O last 24 hrs: Intake/Output Summary (Last 24 hours) at 3/19/2022 1258  Last data filed at 3/19/2022 2745  Gross per 24 hour   Intake 400 ml   Output 1100 ml   Net -700 ml         The patient is critically ill and can not provide additional history due to confusion        Review of Systems:  ROS not obtained due to patient factor.            Allergies   Allergen Reactions    Seafood Hives     crabs    Statins-Hmg-Coa Reductase Inhibitors Unknown (comments)    Sulfa (Sulfonamide Antibiotics) Hives      Past Medical History:   Diagnosis Date    Hypertension     Sleep disorder       Past Surgical History:   Procedure Laterality Date    HX ORTHOPAEDIC      broken right ankle, left femur 30 yrs ago      Social History     Tobacco Use    Smoking status: Never Smoker    Smokeless tobacco: Never Used   Substance Use Topics    Alcohol use: Yes     Alcohol/week: 1.0 standard drink     Types: 1 Glasses of wine per week     Comment: soc      Family History   Problem Relation Age of Onset    Diabetes Mother     Heart Disease Mother     Heart Disease Father       Prior to Admission medications    Medication Sig Start Date End Date Taking? Authorizing Provider   allopurinoL (ZYLOPRIM) 100 mg tablet Take 100 mg by mouth daily. Yes Provider, Historical   acetaminophen (TYLENOL) 325 mg tablet Take 650 mg by mouth every six (6) hours as needed for Pain. Yes Provider, Historical   niacin (NIASPAN) 1,000 mg Tb24 tab Take 1,000 mg by mouth daily. Yes Provider, Historical   trolamine salicylate-aloe vera 45% (ASPERCREME) topical cream Apply 2 g to affected area as needed for Pain. Yes Provider, Historical   multivitamin, tx-iron-ca-min (THERA-M w/ IRON) 9 mg iron-400 mcg tab tablet Take 1 Tab by mouth daily. Yes Provider, Historical   aspirin delayed-release 81 mg tablet Take 81 mg by mouth daily. Yes Provider, Historical   potassium chloride SR (KLOR-CON 10) 10 mEq tablet Take 10 mEq by mouth daily. Yes Provider, Historical   carvediloL (COREG) 3.125 mg tablet Take 3.125 mg by mouth daily.    Yes Provider, Historical     Current Facility-Administered Medications   Medication Dose Route Frequency    midodrine (PROAMATINE) tablet 2.5 mg  2.5 mg Oral TID WITH MEALS    furosemide (LASIX) injection 20 mg  20 mg IntraVENous BID    meropenem (MERREM) 1 g in 0.9% sodium chloride (MBP/ADV) 50 mL MBP  1 g IntraVENous Q12H    arformoteroL (BROVANA) neb solution 15 mcg  15 mcg Nebulization BID RT    famotidine (PF) (PEPCID) 20 mg in 0.9% sodium chloride 10 mL injection  20 mg IntraVENous DAILY    acetaminophen (TYLENOL) tablet 650 mg  650 mg Oral Q6H    ferrous sulfate tablet 325 mg  1 Tablet Oral TID    nystatin (MYCOSTATIN) 100,000 unit/mL oral suspension 500,000 Units  500,000 Units Oral QID    apixaban (ELIQUIS) tablet 2.5 mg  2.5 mg Oral BID    sodium chloride (NS) flush 5-40 mL  5-40 mL IntraVENous Q8H    [Held by provider] carvediloL (COREG) tablet 3.125 mg  3.125 mg Oral BID WITH MEALS    aspirin delayed-release tablet 81 mg  81 mg Oral DAILY         Objective:   Vital Signs:    Visit Vitals  /77   Pulse 90   Temp 98.6 °F (37 °C)   Resp 22   Ht 5' 5\" (1.651 m)   Wt 116.7 kg (257 lb 4.4 oz)   SpO2 99%   Breastfeeding No   BMI 42.81 kg/m²       O2 Device: Nasal cannula (3LNC)   O2 Flow Rate (L/min): 3 l/min   Temp (24hrs), Av.1 °F (36.7 °C), Min:97.4 °F (36.3 °C), Max:98.6 °F (37 °C)       Intake/Output:   Last shift:       07 - 1900  In: 350 [P.O.:50; I.V.:300]  Out: 100 [Urine:100]    Last 3 shifts: 1901 -  0700  In: 50 [P.O.:50]  Out: 1600 [Urine:1600]      Intake/Output Summary (Last 24 hours) at 3/19/2022 1258  Last data filed at 3/19/2022 0852  Gross per 24 hour   Intake 400 ml   Output 1100 ml   Net -700 ml       Last 3 Recorded Weights in this Encounter    03/15/22 0840 22 0735 22 0807   Weight: 111.1 kg (245 lb) 113.5 kg (250 lb 3.6 oz) 116.7 kg (257 lb 4.4 oz)         Ventilator Settings:  Mode Rate Tidal Volume Pressure FiO2 PEEP   Spontaneous   375 ml  7 cm H2O 30 % 5 cm H20     Peak airway pressure: 19 cm H2O    Plateau pressure:     Tidal volume:    Minute ventilation: 9 l/min     Recent Labs     22  1925   PHI 7.38   PCO2I 53.5*   PO2I 97   HCO3I 31.7*   FIO2I 40       Physical Exam:       General/Neurology: Alert, more awake now in AM was lethrgic  Following commands very weak , pupils reactive non focal   Head:   NCAT. Eye:   EOM intact. No icterus/pallor/cyanosis. Nose:   Normal no discharge   Neck:   Trachea midline. Lung:    Moderate air entry bilateral equal. At the base sed decreased breath sounds R>L   No rales, rhonchi. No wheezing or stridor. No prolonged expiration or accessory muscle use. Heart:   S1 S2 present. irregularly irregular  No murmur or JVD. Abdomen:  Soft. NT. ND. No palpable masses. Extremities:  No edema. No cyanosis or clubbing. Right hip surgical site wound VAC dressing intact. Pulses: 2+ and symmetric in DP. Data:       Recent Results (from the past 24 hour(s))   GLUCOSE, POC    Collection Time: 03/18/22  4:59 PM   Result Value Ref Range    Glucose (POC) 138 (H) 70 - 110 mg/dL   GLUCOSE, POC    Collection Time: 03/18/22  9:16 PM   Result Value Ref Range    Glucose (POC) 137 (H) 70 - 110 mg/dL   MAGNESIUM    Collection Time: 03/19/22  3:59 AM   Result Value Ref Range    Magnesium 2.5 1.6 - 2.6 mg/dL   METABOLIC PANEL, COMPREHENSIVE    Collection Time: 03/19/22  3:59 AM   Result Value Ref Range    Sodium 145 136 - 145 mmol/L    Potassium 3.7 3.5 - 5.5 mmol/L    Chloride 108 100 - 111 mmol/L    CO2 31 21 - 32 mmol/L    Anion gap 6 3.0 - 18 mmol/L    Glucose 113 (H) 74 - 99 mg/dL     (H) 7.0 - 18 MG/DL    Creatinine 1.99 (H) 0.6 - 1.3 MG/DL    BUN/Creatinine ratio 57 (H) 12 - 20      GFR est AA 29 (L) >60 ml/min/1.73m2    GFR est non-AA 24 (L) >60 ml/min/1.73m2    Calcium 10.7 (H) 8.5 - 10.1 MG/DL    Bilirubin, total 1.4 (H) 0.2 - 1.0 MG/DL    ALT (SGPT) 47 13 - 56 U/L    AST (SGOT) 38 10 - 38 U/L    Alk.  phosphatase 182 (H) 45 - 117 U/L    Protein, total 6.4 6.4 - 8.2 g/dL    Albumin 2.7 (L) 3.4 - 5.0 g/dL    Globulin 3.7 2.0 - 4.0 g/dL    A-G Ratio 0.7 (L) 0.8 - 1.7     CALCIUM, IONIZED    Collection Time: 03/19/22  3:59 AM   Result Value Ref Range    Ionized Calcium 1.39 (H) 1.12 - 1.32 MMOL/L   PHOSPHORUS    Collection Time: 03/19/22  3:59 AM   Result Value Ref Range    Phosphorus 3.3 2.5 - 4.9 MG/DL   GLUCOSE, POC    Collection Time: 03/19/22  6:26 AM   Result Value Ref Range    Glucose (POC) 111 (H) 70 - 110 mg/dL   POTASSIUM    Collection Time: 03/19/22  9:40 AM   Result Value Ref Range    Potassium 4.8 3.5 - 5.5 mmol/L   GLUCOSE, POC    Collection Time: 03/19/22 11:10 AM   Result Value Ref Range    Glucose (POC) 150 (H) 70 - 110 mg/dL         Chemistry Recent Labs     03/19/22  0940 03/19/22  0359 03/18/22  1115 03/18/22  0214 03/18/22  0214 03/17/22  1357 03/17/22  0512   GLU  --  113*  --   --  120*  --  101*   NA  --  145  --   --  145  --  144   K 4.8 3.7 3.8   < > 3.7   < > 3.3*   CL  --  108  --   --  107  --  105   CO2  --  31  --   --  31  --  31   BUN  --  113*  --   --  103*  --  90*   CREA  --  1.99*  --   --  1.92*  --  1.63*   CA  --  10.7*  --   --  10.3*  --  10.4*   MG  --  2.5  --   --  2.3  --  2.3   PHOS  --  3.3 3.4  --  2.6  --  3.3   AGAP  --  6  --   --  7  --  8   BUCR  --  57*  --   --  54*  --  55*   AP  --  182*  --   --  200*  --  187*   TP  --  6.4  --   --  6.7  --  6.1*   ALB  --  2.7*  --   --  2.9*  --  3.3*   GLOB  --  3.7  --   --  3.8  --  2.8   AGRAT  --  0.7*  --   --  0.8  --  1.2    < > = values in this interval not displayed. Lactic Acid Lactic acid   Date Value Ref Range Status   03/17/2022 1.3 0.4 - 2.0 MMOL/L Final     Recent Labs     03/17/22  1025   LAC 1.3        Liver Enzymes Protein, total   Date Value Ref Range Status   03/19/2022 6.4 6.4 - 8.2 g/dL Final     Albumin   Date Value Ref Range Status   03/19/2022 2.7 (L) 3.4 - 5.0 g/dL Final     Globulin   Date Value Ref Range Status   03/19/2022 3.7 2.0 - 4.0 g/dL Final     A-G Ratio   Date Value Ref Range Status   03/19/2022 0.7 (L) 0.8 - 1.7   Final     Alk.  phosphatase   Date Value Ref Range Status   03/19/2022 182 (H) 45 - 117 U/L Final     Recent Labs     03/19/22  0359 03/18/22  0214 03/17/22  0512   TP 6.4 6.7 6.1*   ALB 2.7* 2.9* 3.3*   GLOB 3.7 3.8 2.8   AGRAT 0.7* 0.8 1.2   * 200* 187*        CBC w/Diff Recent Labs     03/18/22 0214   WBC 13.0   RBC 2.80*   HGB 8.4*   HCT 26.7*      GRANS 81*   LYMPH 8*   EOS 0 Cardiac Enzymes No results found for: CPK, CK, CKMMB, CKMB, RCK3, CKMBT, CKNDX, CKND1, FARA, TROPT, TROIQ, ADOLFO, TROPT, TNIPOC, BNP, BNPP     BNP No results found for: BNP, BNPP, XBNPT     Coagulation No results for input(s): PTP, INR, APTT, INREXT, INREXT in the last 72 hours. Thyroid  Lab Results   Component Value Date/Time    TSH 0.38 03/16/2022 04:35 AM       No results found for: T4     Urinalysis Lab Results   Component Value Date/Time    Color YELLOW 03/02/2022 09:04 PM    Appearance CLOUDY 03/02/2022 09:04 PM    Specific gravity 1.021 03/02/2022 09:04 PM    pH (UA) 5.0 03/02/2022 09:04 PM    Protein 300 (A) 03/02/2022 09:04 PM    Glucose Negative 03/02/2022 09:04 PM    Ketone TRACE (A) 03/02/2022 09:04 PM    Bilirubin Negative 03/02/2022 09:04 PM    Urobilinogen 1.0 03/02/2022 09:04 PM    Nitrites Negative 03/02/2022 09:04 PM    Leukocyte Esterase Negative 03/02/2022 09:04 PM    Epithelial cells 2+ 03/02/2022 09:04 PM    Bacteria FEW (A) 03/02/2022 09:04 PM    WBC 0 to 3 03/02/2022 09:04 PM    RBC 0 to 3 03/02/2022 09:04 PM        Micro  No results for input(s): SDES, CULT in the last 72 hours. No results for input(s): CULT in the last 72 hours. Culture data during this hospitalization.    All Micro Results     Procedure Component Value Units Date/Time    CULTURE, BLOOD [[id number]] Collected: 03/14/22 0420    Order Status: Completed Specimen: Blood Updated: 03/19/22 0813     Special Requests: NO SPECIAL REQUESTS        Culture result: NO GROWTH 5 DAYS       CULTURE, BLOOD [[id number]] Collected: 03/14/22 0419    Order Status: Completed Specimen: Blood Updated: 03/19/22 0813     Special Requests: NO SPECIAL REQUESTS        Culture result: NO GROWTH 5 DAYS       CULTURE, RESPIRATORY/SPUTUM/BRONCH W GRAM STAIN [[id number]]  (Abnormal)  (Susceptibility) Collected: 03/10/22 1630    Order Status: Completed Specimen: Sputum from Endotracheal aspirate Updated: 03/13/22 0905     Special Requests: NO SPECIAL REQUESTS        GRAM STAIN NO WBC'S SEEN         NO EPITHELIAL CELLS SEEN         NO ORGANISMS SEEN        Culture result:       LIGHT ENTEROBACTER CLOACAE COMPLEX                  HEAVY NORMAL RESPIRATORY ELSIE          CULTURE, RESPIRATORY/SPUTUM/BRONCH Paxton Nedra [[id number]] Collected: 03/10/22 1915    Order Status: Canceled Specimen: Sputum from Endotracheal aspirate     CULTURE, BLOOD [[id number]] Collected: 03/02/22 2048    Order Status: Completed Specimen: Blood Updated: 03/08/22 0655     Special Requests: NO SPECIAL REQUESTS        Culture result: NO GROWTH 6 DAYS       CULTURE, BLOOD [[id number]] Collected: 03/02/22 2048    Order Status: Completed Specimen: Blood Updated: 03/08/22 0655     Special Requests: NO SPECIAL REQUESTS        Culture result: NO GROWTH 6 DAYS       CULTURE, URINE [[id number]]  (Abnormal)  (Susceptibility) Collected: 03/02/22 2104    Order Status: Completed Specimen: Cath Urine Updated: 03/05/22 1226     Special Requests: NO SPECIAL REQUESTS        [place] Count --        >100,000  COLONIES/mL       Culture result: KLEBSIELLA PNEUMONIAE       COVID-19 RAPID TEST [[id number]] Collected: 03/02/22 2025    Order Status: Completed Specimen: Nasopharyngeal Updated: 03/02/22 2052     Specimen source Nasopharyngeal        COVID-19 rapid test Not detected        Comment: Rapid Abbott ID Now       Rapid NAAT:  The specimen is NEGATIVE for SARS-CoV-2, the novel coronavirus associated with COVID-19. Negative results should be treated as presumptive and, if inconsistent with clinical signs and symptoms or necessary for patient management, should be tested with an alternative molecular assay. Negative results do not preclude SARS-CoV-2 infection and should not be used as the sole basis for patient management decisions. This test has been authorized by the FDA under an Emergency Use Authorization (EUA) for use by authorized laboratories.    Fact sheet for Healthcare Providers: ConventionUpdate.co.nz  Fact sheet for Patients: ConventionUpdate.co.nz       Methodology: Isothermal Nucleic Acid Amplification         INFLUENZA A & B AG (RAPID TEST) [[id number]] Collected: 03/02/22 2025    Order Status: Completed Specimen: Nasopharyngeal from Nasal washing Updated: 03/02/22 2047     Influenza A Antigen Negative        Comment: A negative result does not exclude influenza virus infection, seasonal or H1N1 due to suboptimal sensitivity. If influenza is circulating in your community, a diagnosis of influenza should be considered based on a patients clinical presentation and empiric antiviral treatment should be considered, if indicated. Influenza B Antigen Negative                NM LUNG SCAN PERF  Result Date: 3/3/2022  EXAM: NM LUNG SCAN PERF. CLINICAL INDICATION/HISTORY: d dimer increase dyspnea. -Additional: Clinical concern for pulmonary embolus. COMPARISON: Correlation is made with the radiographic study performed one day prior. TECHNIQUE: 7.2 mCi Tc-99m MAA was injected intravenously and the 8 standard views of the chest were obtained. This perfusion only examination is interpreted using the PISAPED criteria. _______________ FINDINGS: Perfusion images show no segmental perfusion defects to suggest the presence of pulmonary embolism. _______________     o scintigraphic findings to suggest the presence of acute pulmonary embolism. _______________       XR HIP RT W OR WO PELV 2-3 VWS  Result Date: 3/7/2022  EXAM: RIGHT HIP RADIOGRAPHS CLINICAL INDICATION/HISTORY: right hip pain -Additional: None COMPARISON: May March 3, 2022. TECHNIQUE: AP pelvis and frog-leg lateral view of right hip. _______________ FINDINGS: BONES: Intact appearing ilioischial and iliopectineal lines. There is a displaced and impacted subcapital right femoral neck fracture, with mild progressive displacement on follow-up imaging. Mild-moderate bilateral hip joint arthrosis.  SOFT TISSUES: Unremarkable. _______________     1. Displaced and impacted subcapital right femoral neck fracture, increased in displacement from CT performed 4 days prior. CT HEAD WO CONT  Result Date: 3/7/2022  EXAM: CT head INDICATION: Altered mental status. COMPARISON: 4/23/2021. TECHNIQUE: Axial CT imaging of the head was performed without intravenous contrast. Standard multiplanar coronal and sagittal reformatted images were obtained and are included in interpretation. One or more dose reduction techniques were used on this CT: automated exposure control, adjustment of the mAs and/or kVp according to patient size, and iterative reconstruction techniques. The specific techniques used on this CT exam have been documented in the patient's electronic medical record. Digital Imaging and Communications in Medicine (DICOM) format image data are available to nonaffiliated external healthcare facilities or entities on a secure, media free, reciprocally searchable basis with patient authorization for at least a 12-month period after this study. _______________ FINDINGS: BRAIN AND POSTERIOR FOSSA: Periventricular white matter hypoattenuation which is nonspecific but likely represents chronic ischemic changes. No evidence of acute large vessel transcortical infarct or acute parenchymal hemorrhage. No midline shift or hydrocephalus. EXTRA-AXIAL SPACES AND MENINGES: There are no abnormal extra-axial fluid collections. CALVARIUM: Intact. SINUSES: Clear. OTHER: None. _______________     No acute intracranial abnormality. CT CHEST ABD PELV WO CONT  Result Date: 3/3/2022  EXAM: CT CHEST, ABDOMEN AND PELVIS CLINICAL INDICATION/HISTORY: Hypoxemia, retrocardiac density, diarrhea, weakness and shortness of breath COMPARISON: 3/2/2022 TECHNIQUE: Axial CT imaging of the chest, abdomen, and pelvis was performed without intravenous contrast. Multiplanar reformats were generated.  One or more dose reduction techniques were used on this CT: automated exposure control, adjustment of the mAs and/or kVp according to patient size, and iterative reconstruction techniques. The specific techniques used on this CT exam have been documented in the patient's electronic medical record. Digital Imaging and Communications in Medicine (DICOM) format image data are available to nonaffiliated external healthcare facilities or entities on a secure, media free, reciprocally searchable basis with patient authorization for at least a 12-month period after this study. ________________ FINDINGS: LIMITATIONS:   > Suboptimal evaluation given lack of intravenous contrast.   > Motion artifact is present which limits evaluation.   > Suboptimal exam due to patient body habitus and arms by the side. CHEST: LUNGS: Respiratory motion significantly limits evaluation. Interlobar septal thickening in the upper lobes. Mild bilateral dependent atelectasis. No large consolidation or suspicious pulmonary mass. PLEURA: Very small volume bilateral pleural effusions. AIRWAY: Normal. MEDIASTINUM: Four-chamber cardiomegaly with asymmetric biatrial enlargement, mitral and aortic annular calcifications. The main pulmonary artery is enlarged suggesting pulmonary arterial hypertension. Normal caliber aorta with scattered calcified atherosclerotic plaque. No pericardial effusion. LYMPH NODES: No enlarged lymph nodes. CHEST WALL: Diffuse anasarca. Heterogeneous thyroid. _______________ ABDOMEN/PELVIS: LIVER: No enhancing hepatic mass. The portal and hepatic veins are patent. BILIARY: Gallstones are present in the nondistended gallbladder lumen. No biliary dilation. SPLEEN: Normal. PANCREAS: Normal. ADRENALS: Left adrenal gland measures 9 HU (axial image 76), most consistent with lipid rich adenoma. Normal right adrenal gland. KIDNEYS: Asymmetrically small left kidney. No rate of a stone. No hydronephrosis. GI TRACT:  A small hiatal hernia is present.  Normal caliber small and large bowel loops. No morphology of bowel obstruction. Normal appendix. BLADDER: Diffuse wall thickening in the largely decompressed bladder. PELVIC ORGANS: No acute abnormality. VASCULATURE: No arterial aneurysm. 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 LYMPH NODES: No mesenteric or retroperitoneal lymphadenopathy. OTHER: No free intraperitoneal air. No ascites. Diffuse anasarca. OSSEOUS STRUCTURES: No acute osseous abnormality. Multilevel degenerative changes most pronounced in the lumbar spine. Degenerative changes in both shoulders (right greater than left). Please note the included portions of the upper extremities are not well evaluated on this study _______________     1. Findings of congestive heart failure with cardiomegaly, interstitial edema, very small bilateral pleural effusions, and diffuse anasarca. 2.  Bladder wall thickening may be due to underdistention though superimposed infection cannot be excluded. Recommend correlation for cystitis. 3.  Osseous degenerative changes and additional findings, as detailed in the body of the report. Hip x-ray 3/11/2022:  Status post total right hip arthroplasty, without complication. ECHO ADULT COMPLETE  Result Date: 3/3/2022    Left Ventricle: Left ventricle size is normal. Moderately increased wall thickness. Findings consistent with moderate concentric hypertrophy. Normal wall motion. Normal left ventricular systolic function with a visually estimated EF of 55 - 60%.   Left Atrium: Left atrium is mildly dilated.   Right Ventricle: Right ventricle is mildly dilated.   Right Atrium: Right atrium is mildly dilated.   Pulmonary artery :  Estimated pulmonary arterial systolic pressure is 51 mmhg. Pulmonary hypertension found to be moderate   Mitral Valve: Moderately thickened leaflet. Mildly calcified leaflet. Moderate annular calcification of the mitral valve.    IVC/SVC : IVC diameter is greater than 21 mm and decreases less than 50% during inspiration; therefore the estimated right atrial pressure is elevated (~15 mmHg). IVC is dilated. Consider LORRAINE for better evaluation of mitral valve if clinically indicated. DUPLEX LOWER EXT VENOUS BILAT  Result Date: 3/4/2022  · No evidence of deep vein thrombosis in the right lower extremity. · No evidence of deep vein thrombosis in the left lower extremity. USG retroperitoneum 3/12/2022: No hydronephrosis. ECHO 3/3/2022: Result status: Final result       Left Ventricle: Left ventricle size is normal. Moderately increased wall thickness. Findings consistent with moderate concentric hypertrophy. Normal wall motion. Normal left ventricular systolic function with a visually estimated EF of 55 - 60%.   Left Atrium: Left atrium is mildly dilated.   Right Ventricle: Right ventricle is mildly dilated.   Right Atrium: Right atrium is mildly dilated.   Pulmonary artery :  Estimated pulmonary arterial systolic pressure is 51 mmhg. Pulmonary hypertension found to be moderate    Mitral Valve: Moderately thickened leaflet. Mildly calcified leaflet. Moderate annular calcification of the mitral valve.   IVC/SVC : IVC diameter is greater than 21 mm and decreases less than 50% during inspiration; therefore the estimated right atrial pressure is elevated (~15 mmHg). IVC is dilated.     Consider LORRAINE for better evaluation of mitral valve if clinically indicated. Images report reviewed by me:  CT (Most Recent) (CT chest reviewed by me) Results from Hospital Encounter encounter on 03/02/22    CT HEAD WO CONT    Narrative  EXAM: CT of the brain without intravenous contrast.    INDICATION:  \"AMS. \"    COMPARISON:  CT March 11, 2022. DOSE REDUCTION AND DICOM INFORMATION: One or more dose reduction techniques were  used on this CT: automated exposure control, adjustment of the mAs and/or kVp  according to patient size, and iterative reconstruction techniques. The  specific techniques used on this CT exam have been documented in the patient's  electronic medical record. Digital Imaging and Communications in Medicine  (DICOM) format image data are available to nonaffiliated external healthcare  facilities or entities on a secure, media free, reciprocally searchable basis  with patient authorization for at least a 12-month period after this study. _______________    FINDINGS:    IMAGE QUALITY:  Diagnostic. BRAIN AND EXTRA-AXIAL SPACE:    SUBACUTE TERRITORIAL TRANSCORTICAL INFARCT:  None detected. MASS:  None detected. HEMORRHAGE:  None. SUBDURAL FLUID COLLECTION:  None detected. HYDROCEPHALUS:  None. REMOTE  TERRITORIAL CEREBRAL INFARCT:  None detected. REMOTE CEREBELLAR INFARCT:  None detected. STRIVE (STandards for Reporting Vascular changes on nEuroimaging):  --[place] of white matter hypodensity  (\"leukoaraiosis\") of presumed vascular origin:  Low-grade. --[place] of lacunes of presumed vascular origin  (often undetectable on CT):  None definite. --Degree of brain atrophy:  Moderate. BURDEN OF CALCIFIC INTRACRANIAL ATHEROSCLEROSIS:   Moderate. HEENT:    INCLUDED UPPER ORBITS:  There are bilateral lens replacements. INCLUDED UPPER PARANASAL SINUSES:  Predominantly clear. MASTOID AIR CELLS:  Predominantly clear. BONES:  Unremarkable. SUPERFICIAL SOFT TISSUES: Unremarkable.    _______________    Impression  Generalized chronic changes, however, no mass or acute findings. _______________         CXR reviewed by me:  XR (Most Recent). CXR  reviewed by me and compared with previous CXR Results from Hospital Encounter encounter on 03/02/22    XR CHEST PORT    Narrative  EXAM: Portable upright chest radiograph. INDICATION: \"Respiratory failure. \"    COMPARISON: March 16, 2022.    _______________    FINDINGS:    DEVICES:  None. LUNGS:  --Expansion:  Adequate. --Focal opacity:  None detected.   --Diffuse abnormality:  None detected. PLEURAL SPACE:  --Pleural effusion:  Unchanged small left and equivocal right basilar  effusions. --Pneumothorax:  None detected. MISCELLANEOUS:  There is cardiac silhouette enlargement versus artifact of  portable/AP technique. _______________    Impression  Unchanged basal effusions. _______________         CXR 3/13/2022:  FINDINGS:  SUPPORT DEVICES: Endotracheal tube and visualized gastric tube both project in  stable position from prior exam.     HEART AND MEDIASTINUM: Enlarged appearing cardiac silhouette with stable  mediastinal contours.     LUNGS AND PLEURAL SPACES: Faint/hazy right lower lung zone opacity. No  pneumothorax or pleural effusion demonstrated.     BONY THORAX AND SOFT TISSUES: Unremarkable.  ______________     IMPRESSION  1. Support devices in stable position. 2. Mild interval increase in right basilar atelectasis and likely small right  pleural effusion. 3. Cardiomegaly, as previously. ·Please note: Voice-recognition software may have been used to generate this report, which may have resulted in some phonetic-based errors in grammar and contents. Even though attempts were made to correct all the mistakes, some may have been missed, and remained in the body of the document.       Gretel Keita MD  3/19/2022

## 2022-03-19 NOTE — PROGRESS NOTES
2000 pt taken over awake alert oriented to self on 2L/nc. Denies pain at present . Pure wick in place suctioning urine. SCD's remains on bilateral feet well tolerated. Assessment done and charted in  flow sheets. Daughter at bedside. Nursing management continues. @0000 pt reassessed no changes observation continues. @0400 reassessment completed no changes. @6500 Bedside and Verbal shift change report given to Donold Habermann (oncoming nurse) by Betty Prieto (offgoing nurse). Report included the following information SBAR, Kardex, Intake/Output, MAR, Recent Results, Med Rec Status and Alarm Parameters .

## 2022-03-19 NOTE — PROGRESS NOTES
Cardiology Progress Note        Patient: Kendall Dia        Sex: female          DOA: 3/2/2022  YOB: 1940      Age:  80 y.o.        LOS:  LOS: 17 days   Assessment/Plan     Principal Problem:    Acute respiratory failure with hypoxemia ([address] (3/2/2022)    Active Problems:    Elevated troponin (4/23/2021)      HTN (hypertension) (4/23/2021)      Acute on chronic diastolic congestive heart failure ([address] (4/23/2021)      Atrial fibrillation ([address] (3/2/2022)      Stage 3 chronic kidney disease ([address] (3/3/2022)      SERENA (obstructive sleep apnea) (3/3/2022)      Adult BMI 39.0-39.9 kg/sq m (3/3/2022)      Fracture of neck of right femur ([address] (3/8/2022)      Hypokalemia (3/14/2022)      Hypotension (3/17/2022)        Plan:    Awake but not very communicative. Apparently this is chronic. Continue with Lasix and apixaban. Blood pressure is not too low or high. Cardiology follow-up. Will check tomorrow and Dr. Olga Farnsworth will return on Monday. No cardiac complaints  Continue with Lasix  Continue with apixaban 2.5 mg twice daily  Discussed with patient/family  Dr. Tristin Dyson will be covering during the weekend. A. fib rate controlled  Blood pressure stable  Continue with current midodrine and Lasix  Altered mental status, seen by neurology  Discussed with family in the room.         Blood pressure is intermittently low normal  Coreg is on hold  Increase midodrine from 5 mg twice daily to 3 times daily  Discussed with patient and daughter    Extubated  Denied any symptoms  Blood pressure is low normal  Hold carvedilol  Echocardiogram done with preserved LV function, severe biatrial enlargement moderate MR, TR, pulmonary hypertension  Continue with Lasix  Discussed with patient and daughter        Patient remained intubated  Cardiac telemetry rate controlled A. fib with occasional PVCs, saturating around 100%  Tolerating carvedilol 3.125 mg twice daily and Eliquis 2.5 mg twice daily  Ventilatory management as per pulmonary/intensivist  Discussed with patient's daughter in the room  Continue with current cardiac medications. Patient remained intubated  Hemodynamically stable  A. fib rate controlled  Discussed with patient's daughter Guero Walker on phone    Patient is status post hip surgery  Patient is intubated due to difficulty in breathing and hypoxemia  A. fib rate controlled  Discussed with Dr. Stephane Galarza with the current medical treatment        Patient denied chest pain or shortness of breath   Atrial fibrillation rate controlled   Patient is scheduled for hip surgery tomorrow  Discussed with patient's daughter  Resume anticoagulation after surgery. Patient is diagnosed with hip fracture and being scheduled for surgery  I have long lengthy discussion with patient and daughter Guero Walker in the room  Patient have negative stress test in April 2021  EF is stable  Patient is with elevated but acceptable risk for hip surgery  Eliquis can be hold  Consider DVT prophylaxis anticoagulation from tomorrow patient have taken morning dose of Eliquis      Patient denied any chest pain   Mitral calcification without stenosis  Continue with current medical treatment    Patient continues to do well. H&H stable  Patient will need diuretic on discharge probably Lasix 40 mg twice daily  Discussed with patient and family in the room      A. fib rate controlled  Patient will need p.o. Lasix on discharge  Continue Eliquis 2.5 mg twice daily  Discussed with patient and family      Continue with Lasix 40 mg twice daily  I have long lengthy discussion with the patient and daughter about anticoagulation both of them agreed with low-dose Eliquis 2.5 mg twice daily  DC Lovenox   start Eliquis 2.5 mg twice daily from a.m.     Discussed with patient and daughter findings of echocardiogram including mitral valve disease, prefer not to do transesophageal echocardiogram  Continue with current medical treatment                        Subjective:    cc:      Shortness of breath  A. fib        REVIEW OF SYSTEMS:     Extubated and no complaints    Objective:      Visit Vitals  /70   Pulse 87   Temp 98.5 °F (36.9 °C)   Resp 24   Ht 5' 5\" (1.651 m)   Wt 116.7 kg (257 lb 4.4 oz)   SpO2 98%   Breastfeeding No   BMI 42.81 kg/m²     Body mass index is 42.81 kg/m². Physical Exam:  General Appearance: Comfortable, extubated  NECK: No JVD, no thyroidomeglay. LUNGS: Clear bilaterally. HEART: S1 irregular +S2 audible,    ABD: Non-tender, BS Audible    EXT: No edema, and no cysnosis. VASCULAR EXAM: Pulses are intact. PSYCHIATRIC EXAM: Mood is appropriate.     Medication:  Current Facility-Administered Medications   Medication Dose Route Frequency    midodrine (PROAMATINE) tablet 2.5 mg  2.5 mg Oral TID WITH MEALS    furosemide (LASIX) injection 20 mg  20 mg IntraVENous BID    meropenem (MERREM) 1 g in 0.9% sodium chloride (MBP/ADV) 50 mL MBP  1 g IntraVENous Q12H    arformoteroL (BROVANA) neb solution 15 mcg  15 mcg Nebulization BID RT    famotidine (PF) (PEPCID) 20 mg in 0.9% sodium chloride 10 mL injection  20 mg IntraVENous DAILY    acetaminophen (TYLENOL) tablet 650 mg  650 mg Oral Q6H    naloxone (NARCAN) injection 0.4 mg  0.4 mg IntraVENous PRN    ferrous sulfate tablet 325 mg  1 Tablet Oral TID    diphenhydrAMINE (BENADRYL) capsule 25 mg  25 mg Oral Q4H PRN    bisacodyL (DULCOLAX) suppository 10 mg  10 mg Rectal DAILY PRN    ELECTROLYTE REPLACEMENT PROTOCOL - Magnesium   1 Each Other PRN    ELECTROLYTE REPLACEMENT PROTOCOL - Calcium   1 Each Other PRN    ELECTROLYTE REPLACEMENT PROTOCOL  - Phosphorus Renal Dosing  1 Each Other PRN    ELECTROLYTE REPLACEMENT PROTOCOL - Potassium Renal Dosing  1 Each Other PRN    midazolam (VERSED) injection 1 mg  1 mg IntraVENous Q2H PRN    fentaNYL citrate (PF) injection 25 mcg  25 mcg IntraVENous Q4H PRN    nystatin (MYCOSTATIN) 100,000 unit/mL oral suspension 500,000 Units  500,000 Units Oral QID    apixaban (ELIQUIS) tablet 2.5 mg  2.5 mg Oral BID    sodium chloride (NS) flush 5-40 mL  5-40 mL IntraVENous Q8H    sodium chloride (NS) flush 5-40 mL  5-40 mL IntraVENous PRN    acetaminophen (TYLENOL) tablet 650 mg  650 mg Oral Q6H PRN    Or    acetaminophen (TYLENOL) suppository 650 mg  650 mg Rectal Q6H PRN    polyethylene glycol (MIRALAX) packet 17 g  17 g Oral DAILY PRN    ondansetron (ZOFRAN ODT) tablet 4 mg  4 mg Oral Q8H PRN    Or    ondansetron (ZOFRAN) injection 4 mg  4 mg IntraVENous Q6H PRN    [Held by provider] carvediloL (COREG) tablet 3.125 mg  3.125 mg Oral BID WITH MEALS    aspirin delayed-release tablet 81 mg  81 mg Oral DAILY               Lab/Data Reviewed:  Procedures/imaging: see electronic medical records for all procedures/Xrays   and details which were not copied into this note but were reviewed prior to creation of Plan       All lab results for the last 24 hours reviewed. Recent Labs     03/18/22  0214   WBC 13.0   HGB 8.4*   HCT 26.7*        Recent Labs     03/19/22  0940 03/19/22  0359 03/18/22  1115 03/18/22  0214 03/18/22  0214 03/17/22  1357 03/17/22  0512   NA  --  145  --   --  145  --  144   K 4.8 3.7 3.8   < > 3.7   < > 3.3*   CL  --  108  --   --  107  --  105   CO2  --  31  --   --  31  --  31   GLU  --  113*  --   --  120*  --  101*   BUN  --  113*  --   --  103*  --  90*   CREA  --  1.99*  --   --  1.92*  --  1.63*   CA  --  10.7*  --   --  10.3*  --  10.4*    < > = values in this interval not displayed. RADIOLOGY:  CT Results  (Last 48 hours)    None        CXR Results  (Last 48 hours)               03/19/22 0540  XR CHEST PORT Final result    Impression:      1. Central bronchial/peribronchial thickening, differential including bronchitis   and mild interstitial edema. 2. Small pleural effusions, unchanged.        Narrative:  CLINICAL HISTORY:  Respiratory failure        COMPARISONS:  Chest x-ray 3/16/2022       TECHNIQUE:  single frontal view of the chest       ------------------------------------------       FINDINGS:       Lungs:  Mild central bronchial wall thickening. Streaky opacity in the lung   bases, slightly improved, suspect atelectasis. Small left pleural effusion and   possible tiny right pleural effusion, not appreciably changed. Mediastinum: At least moderate cardiomegaly. Atherosclerotic arterial   calcification. Bones: No evidence of fracture or suspicious bone lesion.  Thoracic scoliosis           ------------------------------------------               Cardiology Procedures:   Results for orders placed or performed during the hospital encounter of 03/02/22   EKG, 12 LEAD, INITIAL   Result Value Ref Range    Ventricular Rate 72 BPM    Atrial Rate 340 BPM    QRS Duration 86 ms    Q-T Interval 374 ms    QTC Calculation (Bezet) 409 ms    Calculated R Axis -2 degrees    Calculated T Axis -3 degrees    Diagnosis       Atrial fibrillation  Abnormal ECG  When compared with ECG of 02-MAR-2022 20:12,  No significant change was found  Confirmed by Jovana Frances MD. ([id number]) on 3/17/2022 10:28:04 PM        Echo Results  (Last 48 hours)    None       Cardiolite (Tc-99m Sestamibi) stress test    Signed By: Agueda Prado MD     March 19, 2022

## 2022-03-20 ENCOUNTER — APPOINTMENT (OUTPATIENT)
Dept: GENERAL RADIOLOGY | Age: 82
DRG: 981 | End: 2022-03-20
Attending: INTERNAL MEDICINE
Payer: MEDICARE

## 2022-03-20 LAB
BACTERIA SPEC CULT: NORMAL
BACTERIA SPEC CULT: NORMAL
CA-I SERPL-SCNC: 1.34 MMOL/L (ref 1.12–1.32)
GLUCOSE BLD STRIP.AUTO-MCNC: 103 MG/DL (ref 70–110)
GLUCOSE BLD STRIP.AUTO-MCNC: 107 MG/DL (ref 70–110)
GLUCOSE BLD STRIP.AUTO-MCNC: 111 MG/DL (ref 70–110)
GLUCOSE BLD STRIP.AUTO-MCNC: 134 MG/DL (ref 70–110)
GLUCOSE BLD STRIP.AUTO-MCNC: 153 MG/DL (ref 70–110)
MAGNESIUM SERPL-MCNC: 2.6 MG/DL (ref 1.6–2.6)
PHOSPHATE SERPL-MCNC: 3.5 MG/DL (ref 2.5–4.9)
POTASSIUM SERPL-SCNC: 3.6 MMOL/L (ref 3.5–5.5)
SERVICE CMNT-IMP: NORMAL
SERVICE CMNT-IMP: NORMAL

## 2022-03-20 PROCEDURE — 36415 COLL VENOUS BLD VENIPUNCTURE: CPT

## 2022-03-20 PROCEDURE — 74011000250 HC RX REV CODE- 250: Performed by: INTERNAL MEDICINE

## 2022-03-20 PROCEDURE — 71045 X-RAY EXAM CHEST 1 VIEW: CPT

## 2022-03-20 PROCEDURE — 74011000258 HC RX REV CODE- 258: Performed by: INTERNAL MEDICINE

## 2022-03-20 PROCEDURE — 94660 CPAP INITIATION&MGMT: CPT

## 2022-03-20 PROCEDURE — 99232 SBSQ HOSP IP/OBS MODERATE 35: CPT | Performed by: INTERNAL MEDICINE

## 2022-03-20 PROCEDURE — 82330 ASSAY OF CALCIUM: CPT

## 2022-03-20 PROCEDURE — 65660000000 HC RM CCU STEPDOWN

## 2022-03-20 PROCEDURE — 74011250636 HC RX REV CODE- 250/636: Performed by: INTERNAL MEDICINE

## 2022-03-20 PROCEDURE — 82962 GLUCOSE BLOOD TEST: CPT

## 2022-03-20 PROCEDURE — 74011250637 HC RX REV CODE- 250/637: Performed by: INTERNAL MEDICINE

## 2022-03-20 PROCEDURE — 83735 ASSAY OF MAGNESIUM: CPT

## 2022-03-20 PROCEDURE — 74011250637 HC RX REV CODE- 250/637: Performed by: PHYSICIAN ASSISTANT

## 2022-03-20 PROCEDURE — 77010033678 HC OXYGEN DAILY

## 2022-03-20 PROCEDURE — 84100 ASSAY OF PHOSPHORUS: CPT

## 2022-03-20 PROCEDURE — 84132 ASSAY OF SERUM POTASSIUM: CPT

## 2022-03-20 PROCEDURE — 94640 AIRWAY INHALATION TREATMENT: CPT

## 2022-03-20 PROCEDURE — 97530 THERAPEUTIC ACTIVITIES: CPT

## 2022-03-20 RX ORDER — POTASSIUM CHLORIDE 7.45 MG/ML
10 INJECTION INTRAVENOUS
Status: COMPLETED | OUTPATIENT
Start: 2022-03-20 | End: 2022-03-20

## 2022-03-20 RX ADMIN — FERROUS SULFATE TAB 325 MG (65 MG ELEMENTAL FE) 325 MG: 325 (65 FE) TAB at 21:13

## 2022-03-20 RX ADMIN — FERROUS SULFATE TAB 325 MG (65 MG ELEMENTAL FE) 325 MG: 325 (65 FE) TAB at 08:44

## 2022-03-20 RX ADMIN — FENTANYL CITRATE 25 MCG: 50 INJECTION, SOLUTION INTRAMUSCULAR; INTRAVENOUS at 14:09

## 2022-03-20 RX ADMIN — NYSTATIN 500000 UNITS: 100000 SUSPENSION ORAL at 17:27

## 2022-03-20 RX ADMIN — MIDODRINE HYDROCHLORIDE 2.5 MG: 2.5 TABLET ORAL at 17:27

## 2022-03-20 RX ADMIN — ACETAMINOPHEN 650 MG: 325 TABLET ORAL at 00:15

## 2022-03-20 RX ADMIN — SODIUM CHLORIDE, PRESERVATIVE FREE 10 ML: 5 INJECTION INTRAVENOUS at 06:22

## 2022-03-20 RX ADMIN — ACETAMINOPHEN 650 MG: 325 TABLET ORAL at 11:35

## 2022-03-20 RX ADMIN — ASPIRIN 81 MG: 81 TABLET, COATED ORAL at 08:44

## 2022-03-20 RX ADMIN — FUROSEMIDE 20 MG: 10 INJECTION, SOLUTION INTRAMUSCULAR; INTRAVENOUS at 08:44

## 2022-03-20 RX ADMIN — NYSTATIN 500000 UNITS: 100000 SUSPENSION ORAL at 21:13

## 2022-03-20 RX ADMIN — ARFORMOTEROL TARTRATE 15 MCG: 15 SOLUTION RESPIRATORY (INHALATION) at 08:31

## 2022-03-20 RX ADMIN — FUROSEMIDE 20 MG: 10 INJECTION, SOLUTION INTRAMUSCULAR; INTRAVENOUS at 21:12

## 2022-03-20 RX ADMIN — MEROPENEM 1 G: 1 INJECTION, POWDER, FOR SOLUTION INTRAVENOUS at 08:36

## 2022-03-20 RX ADMIN — SODIUM CHLORIDE, PRESERVATIVE FREE 20 ML: 5 INJECTION INTRAVENOUS at 21:13

## 2022-03-20 RX ADMIN — APIXABAN 2.5 MG: 2.5 TABLET, FILM COATED ORAL at 08:44

## 2022-03-20 RX ADMIN — NYSTATIN 500000 UNITS: 100000 SUSPENSION ORAL at 14:00

## 2022-03-20 RX ADMIN — MIDODRINE HYDROCHLORIDE 2.5 MG: 2.5 TABLET ORAL at 08:44

## 2022-03-20 RX ADMIN — ACETAMINOPHEN 650 MG: 325 TABLET ORAL at 23:18

## 2022-03-20 RX ADMIN — SODIUM CHLORIDE, PRESERVATIVE FREE 10 ML: 5 INJECTION INTRAVENOUS at 14:01

## 2022-03-20 RX ADMIN — ARFORMOTEROL TARTRATE 15 MCG: 15 SOLUTION RESPIRATORY (INHALATION) at 20:39

## 2022-03-20 RX ADMIN — POTASSIUM CHLORIDE 10 MEQ: 7.46 INJECTION, SOLUTION INTRAVENOUS at 06:54

## 2022-03-20 RX ADMIN — NYSTATIN 500000 UNITS: 100000 SUSPENSION ORAL at 08:44

## 2022-03-20 RX ADMIN — ACETAMINOPHEN 650 MG: 325 TABLET ORAL at 17:27

## 2022-03-20 RX ADMIN — POTASSIUM CHLORIDE 10 MEQ: 7.46 INJECTION, SOLUTION INTRAVENOUS at 08:43

## 2022-03-20 RX ADMIN — ACETAMINOPHEN 650 MG: 325 TABLET ORAL at 06:17

## 2022-03-20 RX ADMIN — MEROPENEM 1 G: 1 INJECTION, POWDER, FOR SOLUTION INTRAVENOUS at 21:12

## 2022-03-20 RX ADMIN — APIXABAN 2.5 MG: 2.5 TABLET, FILM COATED ORAL at 21:12

## 2022-03-20 RX ADMIN — SODIUM CHLORIDE, PRESERVATIVE FREE 20 MG: 5 INJECTION INTRAVENOUS at 08:44

## 2022-03-20 RX ADMIN — FERROUS SULFATE TAB 325 MG (65 MG ELEMENTAL FE) 325 MG: 325 (65 FE) TAB at 17:27

## 2022-03-20 RX ADMIN — MIDODRINE HYDROCHLORIDE 2.5 MG: 2.5 TABLET ORAL at 11:35

## 2022-03-20 NOTE — PROGRESS NOTES
[facility]  (A Division of [address] [place] [address])                                                                                                                      Modesta Baker MD  Office #: - Option [phone]  Fax #: [phone]     Date of Admission: 3/2/2022Date of Note: 3/20/2022  Reason for FU: Antibiotic management of for positive resp/urine cultures requested by Dr Zan Guillaume. Dr Stacy Ramos will be covering the weekend. Please call  for questions/ consults by phone [phone] or via [employer] during the weekend.  Thanks          Current Antimicrobials:    Prior Antimicrobials:    Cefepime 3/14 to 3/16  Meropenem/Vanco 3/16 to date  Zithromax 3/2- 3/4  cTX 3/2 to 3/4  CTC 3/7-- X2 dose  Levofloxacin 500 mg X1- 3/13 X 1 dose  Meropenem 500 mg 3/14 X1 dose           Assessment- ID related:  --------------------------------------------------------------------------  Acutely sick and complicated patient in ICU with:    · Enterobacter Clacae complex + resp cutlure 3/10/22  · Acute resp failure- intubated since OR day 3/10/22  --CXR on 3/14-- infiltrate with atelectasis and pleural effusion  · Encephalopathy- unresponsiveness overnight->   --CO2 high on ABG, pt with SERENA  · S/P R hip anterior arthroplasty- wound vac in place  · Klebsiella bacteruria 3/2/22  --UA clean and Urine culture +  Kleb: Colonized    Other Medical Issues- Mx per respective team:  · CKD  · Morbidly obese  · Hx of SERENA  · A.fib  · Acute on chronic  CHF     Recommendation for ID issues I am following:  ------------------------------------------------------------------------------  DW Dr Zan Guillaume, RN      Day 8 of abx  Stop meropenem after last dose today    Will follow peripherally while here for next week               -> diuresis per respective team. Opacity with layering of pleural  fluid on CXR       Subjective:  Afebrile  On BIPAP overnight, on 3L NC today  She is not on pressors  Opens eys    Notes and labs reviewed. Imaging reports reviewed --no new WBC today    CXR in am:        1. Probable partial right middle lobe atelectasis, similar to most recent prior  radiograph.     2. Streaky opacity at left lung base, likely atelectasis, though  bronchopneumonia or aspiration not excluded. HPI:  Kendall Dia is a 80 y.o. BLACK/ with PMH of congestive heart failure, sleep apnea, right leg weakness, admitted on 3/2 for acute resp failure with hypoxia due to CHF and a.fib with RVR. No fever or leucocytosis. UA done on admission was clean, urine culture grew Klebsiella. She had right hip fracture and was taken to OR on 3/10 for BLAIR. She had a wound vac in place. She didn't extubate after procedure and was transferred to ICU where she is getting care. resp cutture from 3/10-- has Enterobacter that was final on 3/13-- she was given Levofloxacin. Today it was changed to meropenem. DW with RN- little resp secretion, she has low grade fever today, no leucocytosis. She isnot on pressors. Per dtr, she doesn't recall when she got treated with ABX as OP. No prior history of UTI diagnosis.              Active Hospital Problems    Diagnosis Date Noted    Hypotension 03/17/2022    Hypokalemia 03/14/2022    Fracture of neck of right femur ([address] 03/08/2022    Stage 3 chronic kidney disease ([address] 03/03/2022    SERENA (obstructive sleep apnea) 03/03/2022    Adult BMI 39.0-39.9 kg/sq m 03/03/2022    Acute respiratory failure with hypoxemia (HCC) 03/02/2022    Atrial fibrillation (HCC) 03/02/2022    Acute on chronic diastolic congestive heart failure ([address] 04/23/2021    Elevated troponin 04/23/2021    HTN (hypertension) 04/23/2021     Past Medical History:   Diagnosis Date    Hypertension     Sleep disorder      Past Surgical History:   Procedure Laterality Date    HX ORTHOPAEDIC      broken right ankle, left femur 30 yrs ago     Family History   Problem Relation Age of Onset    Diabetes Mother     Heart Disease Mother     Heart Disease Father      Social History     Socioeconomic History    Marital status: SINGLE     Spouse name: Not on file    Number of children: Not on file    Years of education: Not on file    Highest education level: Not on file   Occupational History    Not on file   Tobacco Use    Smoking status: Never Smoker    Smokeless tobacco: Never Used   Vaping Use    Vaping Use: Never used   Substance and Sexual Activity    Alcohol use: Yes     Alcohol/week: 1.0 standard drink     Types: 1 Glasses of wine per week     Comment: soc    Drug use: No    Sexual activity: Not on file   Other Topics Concern     Service Not Asked    Blood Transfusions Not Asked    Caffeine Concern Not Asked    Occupational Exposure Not Asked    Hobby Hazards Not Asked    Sleep Concern Not Asked    Stress Concern Not Asked    Weight Concern Not Asked    Special Diet Not Asked    Back Care Not Asked    Exercise Not Asked    Bike Helmet Not Asked   [address] Not Asked    Self-Exams Not Asked   Social History Narrative    Not on file     Social Determinants of Health     Financial Resource Strain:     Difficulty of Paying Living Expenses: Not on file   Food Insecurity:     Worried About Running Out of Food in the Last Year: Not on file    Aquiles of Food in the Last Year: Not on file   Transportation Needs:     Lack of Transportation (Medical): Not on file    Lack of Transportation (Non-Medical):  Not on file   Physical Activity:     Days of Exercise per Week: Not on file    Minutes of Exercise per Session: Not on file   Stress:     Feeling of Stress : Not on file   Social Connections:     Frequency of Communication with Friends and Family: Not on file    Frequency of Social Gatherings with Friends and Family: Not on file    Attends Alevism Services: Not on file    Active Member of Clubs or Organizations: Not on file    Attends Club or Organization Meetings: Not on file    Marital Status: Not on file   Intimate Partner Violence:     Fear of Current or Ex-Partner: Not on file    Emotionally Abused: Not on file    Physically Abused: Not on file    Sexually Abused: Not on file   Housing Stability:     Unable to Pay for Housing in the Last Year: Not on file    Number of Places Lived in the Last Year: Not on file    Unstable Housing in the Last Year: Not on file       Allergies:  Seafood, Statins-hmg-coa reductase inhibitors, and Sulfa (sulfonamide antibiotics)     Medications:  Current Facility-Administered Medications   Medication Dose Route Frequency    midodrine (PROAMATINE) tablet 2.5 mg  2.5 mg Oral TID WITH MEALS    furosemide (LASIX) injection 20 mg  20 mg IntraVENous BID    meropenem (MERREM) 1 g in 0.9% sodium chloride (MBP/ADV) 50 mL MBP  1 g IntraVENous Q12H    arformoteroL (BROVANA) neb solution 15 mcg  15 mcg Nebulization BID RT    famotidine (PF) (PEPCID) 20 mg in 0.9% sodium chloride 10 mL injection  20 mg IntraVENous DAILY    acetaminophen (TYLENOL) tablet 650 mg  650 mg Oral Q6H    naloxone (NARCAN) injection 0.4 mg  0.4 mg IntraVENous PRN    ferrous sulfate tablet 325 mg  1 Tablet Oral TID    diphenhydrAMINE (BENADRYL) capsule 25 mg  25 mg Oral Q4H PRN    bisacodyL (DULCOLAX) suppository 10 mg  10 mg Rectal DAILY PRN    ELECTROLYTE REPLACEMENT PROTOCOL - Magnesium   1 Each Other PRN    ELECTROLYTE REPLACEMENT PROTOCOL - Calcium   1 Each Other PRN    ELECTROLYTE REPLACEMENT PROTOCOL  - Phosphorus Renal Dosing  1 Each Other PRN    ELECTROLYTE REPLACEMENT PROTOCOL - Potassium Renal Dosing  1 Each Other PRN    midazolam (VERSED) injection 1 mg  1 mg IntraVENous Q2H PRN    fentaNYL citrate (PF) injection 25 mcg  25 mcg IntraVENous Q4H PRN    nystatin (MYCOSTATIN) 100,000 unit/mL oral suspension 500,000 Units  500,000 Units Oral QID    apixaban (ELIQUIS) tablet 2.5 mg  2.5 mg Oral BID    sodium chloride (NS) flush 5-40 mL  5-40 mL IntraVENous Q8H    sodium chloride (NS) flush 5-40 mL  5-40 mL IntraVENous PRN    acetaminophen (TYLENOL) tablet 650 mg  650 mg Oral Q6H PRN    Or    acetaminophen (TYLENOL) suppository 650 mg  650 mg Rectal Q6H PRN    polyethylene glycol (MIRALAX) packet 17 g  17 g Oral DAILY PRN    ondansetron (ZOFRAN ODT) tablet 4 mg  4 mg Oral Q8H PRN    Or    ondansetron (ZOFRAN) injection 4 mg  4 mg IntraVENous Q6H PRN    [Held by provider] carvediloL (COREG) tablet 3.125 mg  3.125 mg Oral BID WITH MEALS    aspirin delayed-release tablet 81 mg  81 mg Oral DAILY        ROS:  Review of systems not obtained due to patient factors. Physical Exam:    Temp (24hrs), Av.5 °F (36.9 °C), Min:98 °F (36.7 °C), Max:99.1 °F (37.3 °C)    Visit Vitals  /71   Pulse 87   Temp 99.1 °F (37.3 °C)   Resp 19   Ht 5' 5\" (1.651 m)   Wt 116.7 kg (257 lb 4.4 oz)   SpO2 97%   Breastfeeding No   BMI 42.81 kg/m²        GEN: WD obese,  NC in place. No acute resp distress noted  Non conversant  HEENT: Unicteric. EOMI intact  CHEST: Non laboured breathing  KAREN: box is out  Skin: Dry and intact. No rash, no redness.   CNS:Awake       Microbiology  All Micro Results     Procedure Component Value Units Date/Time    CULTURE, BLOOD [[id number]] Collected: 22    Order Status: Completed Specimen: Blood Updated: 22     Special Requests: NO SPECIAL REQUESTS        Culture result: NO GROWTH 6 DAYS       CULTURE, BLOOD [[id number]] Collected: 22    Order Status: Completed Specimen: Blood Updated: 22     Special Requests: NO SPECIAL REQUESTS        Culture result: NO GROWTH 6 DAYS       CULTURE, RESPIRATORY/SPUTUM/BRONCH Marshal Carte STAIN [[id number]]  (Abnormal)  (Susceptibility) Collected: 03/10/22 1630    Order Status: Completed Specimen: Sputum from Endotracheal aspirate Updated: 03/13/22 0905     Special Requests: NO SPECIAL REQUESTS        GRAM STAIN NO WBC'S SEEN         NO EPITHELIAL CELLS SEEN         NO ORGANISMS SEEN        Culture result:       LIGHT ENTEROBACTER CLOACAE COMPLEX                  HEAVY NORMAL RESPIRATORY ELSIE          CULTURE, RESPIRATORY/SPUTUM/BRONCH Nargis Donovan [[id number]] Collected: 03/10/22 1915    Order Status: Canceled Specimen: Sputum from Endotracheal aspirate     CULTURE, BLOOD [[id number]] Collected: 03/02/22 2048    Order Status: Completed Specimen: Blood Updated: 03/08/22 0655     Special Requests: NO SPECIAL REQUESTS        Culture result: NO GROWTH 6 DAYS       CULTURE, BLOOD [[id number]] Collected: 03/02/22 2048    Order Status: Completed Specimen: Blood Updated: 03/08/22 0655     Special Requests: NO SPECIAL REQUESTS        Culture result: NO GROWTH 6 DAYS       CULTURE, URINE [[id number]]  (Abnormal)  (Susceptibility) Collected: 03/02/22 2104    Order Status: Completed Specimen: Cath Urine Updated: 03/05/22 1226     Special Requests: NO SPECIAL REQUESTS        [place] Count --        >100,000  COLONIES/mL       Culture result: KLEBSIELLA PNEUMONIAE       COVID-19 RAPID TEST [[id number]] Collected: 03/02/22 2025    Order Status: Completed Specimen: Nasopharyngeal Updated: 03/02/22 2052     Specimen source Nasopharyngeal        COVID-19 rapid test Not detected        Comment: Rapid Abbott ID Now       Rapid NAAT:  The specimen is NEGATIVE for SARS-CoV-2, the novel coronavirus associated with COVID-19. Negative results should be treated as presumptive and, if inconsistent with clinical signs and symptoms or necessary for patient management, should be tested with an alternative molecular assay. Negative results do not preclude SARS-CoV-2 infection and should not be used as the sole basis for patient management decisions. This test has been authorized by the FDA under an Emergency Use Authorization (EUA) for use by authorized laboratories.    Fact sheet for Healthcare Providers: ConventionUpdate.co.nz  Fact sheet for Patients: [facility]       Methodology: Isothermal Nucleic Acid Amplification         INFLUENZA A & B AG (RAPID TEST) [[id number]] Collected: 03/02/22 2025    Order Status: Completed Specimen: Nasopharyngeal from Nasal washing Updated: 03/02/22 2047     Influenza A Antigen Negative        Comment: A negative result does not exclude influenza virus infection, seasonal or H1N1 due to suboptimal sensitivity. If influenza is circulating in your community, a diagnosis of influenza should be considered based on a patients clinical presentation and empiric antiviral treatment should be considered, if indicated. Influenza B Antigen Negative              Lab results:    Chemistry  Recent Labs     03/20/22  0530 03/19/22  0940 03/19/22  0359 03/18/22  1115 03/18/22 0214   GLU  --   --  113*  --  120*   NA  --   --  145  --  145   K 3.6 4.8 3.7   < > 3.7   CL  --   --  108  --  107   CO2  --   --  31  --  31   BUN  --   --  113*  --  103*   CREA  --   --  1.99*  --  1.92*   CA  --   --  10.7*  --  10.3*   AGAP  --   --  6  --  7   BUCR  --   --  57*  --  54*   AP  --   --  182*  --  200*   TP  --   --  6.4  --  6.7   ALB  --   --  2.7*  --  2.9*   GLOB  --   --  3.7  --  3.8   AGRAT  --   --  0.7*  --  0.8    < > = values in this interval not displayed. CBC w/ Diff  Recent Labs     03/18/22 0214   WBC 13.0   RBC 2.80*   HGB 8.4*   HCT 26.7*      GRANS 81*   LYMPH 8*   EOS 0       Imaging: report reviewed and as posted by radiologist     CXR:   1. Support devices in stable/appropriate position as visualized. 2. Mild cardiac enlargement, unchanged. 3. Hazy right lung base opacity, favored a combination of atelectasis and  posteriorly layering pleural effusion. US liver:    1. Limited examination due to technical factors as above. 2. Heterogeneously hyperechoic liver parenchyma compatible with steatosis or  other hepatic parenchymal disease.   3. No acute findings or suspicious mass. 4. Cholelithiasis.   5. Mild ascites.

## 2022-03-20 NOTE — PROGRESS NOTES
Hospitalist Progress Note-critical care note     Patient: Danette Woodard MRN: [id number]  CSN: [id number]    YOB: 1940  Age: 80 y.o. Sex: female    DOA: 3/2/2022 LOS:  LOS: 18 days            Chief complaint: hip fracture m ckd3, afib chf , acute respiratory failure     Assessment/Plan         Hospital Problems  Date Reviewed: 3/9/2022          Codes Class Noted POA    Hypotension ICD-10-CM: I95.9  ICD-9-CM: 458.9  3/17/2022 Unknown        Hypokalemia ICD-10-CM: E87.6  ICD-9-CM: 276.8  3/14/2022 Unknown        Fracture of neck of right femur ([facility] [address] ICD-10-CM: S72.001A  ICD-9-CM: 820.8  3/8/2022 Unknown        Stage 3 chronic kidney disease ([facility] [address] ICD-10-CM: N18.30  ICD-9-CM: 585.3  3/3/2022 Unknown        SERENA (obstructive sleep apnea) ICD-10-CM: G47.33  ICD-9-CM: 327.23  3/3/2022 Unknown        Adult BMI 39.0-39.9 kg/sq m ICD-10-CM: Z68.39  ICD-9-CM: V85.39  3/3/2022 Unknown        * (Principal) Acute respiratory failure with hypoxemia (HCC) ICD-10-CM: J96.01  ICD-9-CM: 518.81  3/2/2022 Unknown        Atrial fibrillation (HCC) ICD-10-CM: I48.91  ICD-9-CM: 427.31  3/2/2022 Unknown        Elevated troponin ICD-10-CM: R77.8  ICD-9-CM: 790.6  4/23/2021 Yes        HTN (hypertension) ICD-10-CM: I10  ICD-9-CM: 401.9  4/23/2021 Yes        Acute on chronic diastolic congestive heart failure (HCC) ICD-10-CM: I50.33  ICD-9-CM: 428.33, 428.0  4/23/2021 Yes             pt was admitted for afib and chf, found rt hip fracture. She has serena on cpap at night, she received rt hip replacement, she was noted decreased tide volume and hypoxia while extubation post procedure, intubated with oxygen 65%, peep 6. Case discussed with Dr. Mariya Nova and Dr. Lona Leal     Acute on chronic respiratory failure with hypoxia and hypercapnia   Planning Extubate on 3/15, need bipap at night.    V/q scan :Perfusion images show no segmental perfusion defects to suggest the presence of  pulmonary embolism, pvl negative for dvt  on 3/3  Enterobacter Clacae complex from resp cutlure 3/10/22: id on board on  merrem -id f/u       Pulmonary hypertension   Echo on 3/3 pulmonary arterial systolic pressure is 51 mmhg  LORRAINE is deferred     Congestive heart failure , acute on chronic diastolic ,  Continue lasix   Echo done Mitral stenosis,  Pulmonary hypertension found to be moderate-LORRAINE is deferred  Dr. Rowena Caicedo and On-hold  Coreg due to hypotension      htn meds hold due to hypotension  afib new   On eliquis      Elevated trop   No acs cardiologist on board        UTI  Urine pos for klebsiella  Completed IV Rocephin     Hip fracture:    Right press fit direct anterior total hip arthroplasty   Continue post surgery care , need rehab placement        ckd3 -cr stable   Continue watch for renal function ,  Renal f/u     Hypokalemia   Resolved     Anemia   Continue monitoring h/h       Encephalopathy   eeg no seizure   Neuro consulted         Palliative care f/u . dnr /I   Disposition :tbd,   Review of systems:  Limited due to pt on bipap   Vital signs/Intake and Output:  Visit Vitals  /75 (BP 1 Location: Left lower arm, BP Patient Position: At rest)   Pulse 99   Temp 98 °F (36.7 °C)   Resp 22   Ht 5' 5\" (1.651 m)   Wt 117.4 kg (258 lb 13.1 oz)   SpO2 95%   Breastfeeding No   BMI 43.07 kg/m²     Current Shift:  No intake/output data recorded. Last three shifts:  03/18 1901 - 03/20 0700  In: 400 [P.O.:100; I.V.:300]  Out: 1800 [Urine:1800]    Physical Exam:  General: Open eyes per voice no acute distress   HEENT: NC, Atraumatic. anicteric sclerae. bipap mask noted   Lungs: CTA Bilaterally. No Wheezing/Rhonchi/Rales. Heart:  Regular  rhythm,  No murmur, No Rubs, No Gallops  Abdomen: Soft, Non distended, Non tender. +Bowel sounds,   Extremities: No c/c, rt hip surgical site covered with gauze , leg swelling better   Psych:   Not anxious or agitated.   Neurologic:  Open eyes per voice         Labs: Results:       Chemistry Recent Labs 03/20/22  0530 03/19/22  0940 03/19/22  0359 03/18/22  1115 03/18/22 0214   GLU  --   --  113*  --  120*   NA  --   --  145  --  145   K 3.6 4.8 3.7   < > 3.7   CL  --   --  108  --  107   CO2  --   --  31  --  31   BUN  --   --  113*  --  103*   CREA  --   --  1.99*  --  1.92*   CA  --   --  10.7*  --  10.3*   AGAP  --   --  6  --  7   BUCR  --   --  57*  --  54*   AP  --   --  182*  --  200*   TP  --   --  6.4  --  6.7   ALB  --   --  2.7*  --  2.9*   GLOB  --   --  3.7  --  3.8   AGRAT  --   --  0.7*  --  0.8    < > = values in this interval not displayed. CBC w/Diff Recent Labs     03/18/22 0214   WBC 13.0   RBC 2.80*   HGB 8.4*   HCT 26.7*      GRANS 81*   LYMPH 8*   EOS 0      Cardiac Enzymes No results for input(s): CPK, CKND1, FARA in the last 72 hours. No lab exists for component: CKRMB, TROIP   Coagulation No results for input(s): PTP, INR, APTT, INREXT, INREXT in the last 72 hours. Lipid Panel Lab Results   Component Value Date/Time    Cholesterol, total 183 04/25/2021 04:00 PM    HDL Cholesterol 101 (H) 04/25/2021 04:00 PM    LDL, calculated 69.8 04/25/2021 04:00 PM    VLDL, calculated 12.2 04/25/2021 04:00 PM    Triglyceride 61 04/25/2021 04:00 PM    CHOL/HDL Ratio 1.8 04/25/2021 04:00 PM      BNP No results for input(s): BNPP in the last 72 hours. Liver Enzymes Recent Labs     03/19/22  0359   TP 6.4   ALB 2.7*   *      Thyroid Studies Lab Results   Component Value Date/Time    TSH 0.38 03/16/2022 04:35 AM        Procedures/imaging: see electronic medical records for all procedures/Xrays and details which were not copied into this note but were reviewed prior to creation of Plan    NM LUNG SCAN PERF    Result Date: 3/3/2022  EXAM: NM LUNG SCAN PERF. CLINICAL INDICATION/HISTORY: d dimer increase dyspnea. -Additional: Clinical concern for pulmonary embolus. COMPARISON: Correlation is made with the radiographic study performed one day prior.  TECHNIQUE: 7.2 mCi Tc-99m MAA was injected intravenously and the 8 standard views of the chest were obtained. This perfusion only examination is interpreted using the PISAPED criteria. _______________ FINDINGS: Perfusion images show no segmental perfusion defects to suggest the presence of pulmonary embolism. _______________     o scintigraphic findings to suggest the presence of acute pulmonary embolism. _______________     XR HIP RT W OR WO PELV 2-3 VWS    Result Date: 3/7/2022  EXAM: RIGHT HIP RADIOGRAPHS CLINICAL INDICATION/HISTORY: right hip pain -Additional: None COMPARISON: May March 3, 2022. TECHNIQUE: AP pelvis and frog-leg lateral view of right hip. _______________ FINDINGS: BONES: Intact appearing ilioischial and iliopectineal lines. There is a displaced and impacted subcapital right femoral neck fracture, with mild progressive displacement on follow-up imaging. Mild-moderate bilateral hip joint arthrosis. SOFT TISSUES: Unremarkable. _______________     1. Displaced and impacted subcapital right femoral neck fracture, increased in displacement from CT performed 4 days prior. CT HEAD WO CONT    Result Date: 3/7/2022  EXAM: CT head INDICATION: Altered mental status. COMPARISON: 4/23/2021. TECHNIQUE: Axial CT imaging of the head was performed without intravenous contrast. Standard multiplanar coronal and sagittal reformatted images were obtained and are included in interpretation. One or more dose reduction techniques were used on this CT: automated exposure control, adjustment of the mAs and/or kVp according to patient size, and iterative reconstruction techniques. The specific techniques used on this CT exam have been documented in the patient's electronic medical record.   Digital Imaging and Communications in Medicine (DICOM) format image data are available to nonaffiliated external healthcare facilities or entities on a secure, media free, reciprocally searchable basis with patient authorization for at least a 12-month period after this study. _______________ FINDINGS: BRAIN AND POSTERIOR FOSSA: Periventricular white matter hypoattenuation which is nonspecific but likely represents chronic ischemic changes. No evidence of acute large vessel transcortical infarct or acute parenchymal hemorrhage. No midline shift or hydrocephalus. EXTRA-AXIAL SPACES AND MENINGES: There are no abnormal extra-axial fluid collections. CALVARIUM: Intact. SINUSES: Clear. OTHER: None. _______________     No acute intracranial abnormality. CT CHEST ABD PELV WO CONT    Result Date: 3/3/2022  EXAM: CT CHEST, ABDOMEN AND PELVIS CLINICAL INDICATION/HISTORY: Hypoxemia, retrocardiac density, diarrhea, weakness and shortness of breath COMPARISON: 3/2/2022 TECHNIQUE: Axial CT imaging of the chest, abdomen, and pelvis was performed without intravenous contrast. Multiplanar reformats were generated. One or more dose reduction techniques were used on this CT: automated exposure control, adjustment of the mAs and/or kVp according to patient size, and iterative reconstruction techniques. The specific techniques used on this CT exam have been documented in the patient's electronic medical record. Digital Imaging and Communications in Medicine (DICOM) format image data are available to nonaffiliated external healthcare facilities or entities on a secure, media free, reciprocally searchable basis with patient authorization for at least a 12-month period after this study. ________________ FINDINGS: LIMITATIONS:   > Suboptimal evaluation given lack of intravenous contrast.   > Motion artifact is present which limits evaluation.   > Suboptimal exam due to patient body habitus and arms by the side. CHEST: LUNGS: Respiratory motion significantly limits evaluation. Interlobar septal thickening in the upper lobes. Mild bilateral dependent atelectasis. No large consolidation or suspicious pulmonary mass. PLEURA: Very small volume bilateral pleural effusions.  AIRWAY: Normal. MEDIASTINUM: Four-chamber cardiomegaly with asymmetric biatrial enlargement, mitral and aortic annular calcifications. The main pulmonary artery is enlarged suggesting pulmonary arterial hypertension. Normal caliber aorta with scattered calcified atherosclerotic plaque. No pericardial effusion. LYMPH NODES: No enlarged lymph nodes. CHEST WALL: Diffuse anasarca. Heterogeneous thyroid. _______________ ABDOMEN/PELVIS: LIVER: No enhancing hepatic mass. The portal and hepatic veins are patent. BILIARY: Gallstones are present in the nondistended gallbladder lumen. No biliary dilation. SPLEEN: Normal. PANCREAS: Normal. ADRENALS: Left adrenal gland measures 9 HU (axial image 76), most consistent with lipid rich adenoma. Normal right adrenal gland. KIDNEYS: Asymmetrically small left kidney. No rate of a stone. No hydronephrosis. GI TRACT:  A small hiatal hernia is present. Normal caliber small and large bowel loops. No morphology of bowel obstruction. Normal appendix. BLADDER: Diffuse wall thickening in the largely decompressed bladder. PELVIC ORGANS: No acute abnormality. VASCULATURE: No arterial aneurysm. 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 LYMPH NODES: No mesenteric or retroperitoneal lymphadenopathy. OTHER: No free intraperitoneal air. No ascites. Diffuse anasarca. OSSEOUS STRUCTURES: No acute osseous abnormality. Multilevel degenerative changes most pronounced in the lumbar spine. Degenerative changes in both shoulders (right greater than left). Please note the included portions of the upper extremities are not well evaluated on this study _______________     1. Findings of congestive heart failure with cardiomegaly, interstitial edema, very small bilateral pleural effusions, and diffuse anasarca. 2.  Bladder wall thickening may be due to underdistention though superimposed infection cannot be excluded. Recommend correlation for cystitis.  3.  Osseous degenerative changes and additional findings, as detailed in the body of the report. XR CHEST PORT    Result Date: 3/6/2022  EXAM: PORTABLE CHEST HISTORY: Shortness of breath. COMPARISON: 3/2/2022 TECHNIQUE: Single portable view. _______________ FINDINGS: SUPPORT DEVICES: None HEART AND MEDIASTINUM: Moderate cardiomegaly. LUNGS AND PLEURAL SPACES: Subsegmental atelectasis left base. Possible small effusions. BONES AND SOFT TISSUES: Dextroscoliosis. _______________     Subsegmental atelectasis left base. Possible small effusions. Moderate cardiomegaly without change. XR CHEST PORT    Result Date: 3/2/2022  EXAM:  AP Portable Chest X-ray 1 view INDICATION: Shortness of breath COMPARISON: April 23, 2021 _______________ FINDINGS: Cardiomegaly and mediastinal contours are within normal limits for portable radiograph. There is increased right retrocardiac/right paraspinal density. There are no pleural effusions. No acute osseous findings. ________________      Increased right retrocardiac density which may represent airspace disease or underlying pulmonary nodule. Recommend CT chest for further evaluation. ECHO ADULT COMPLETE    Result Date: 3/3/2022    Left Ventricle: Left ventricle size is normal. Moderately increased wall thickness. Findings consistent with moderate concentric hypertrophy. Normal wall motion. Normal left ventricular systolic function with a visually estimated EF of 55 - 60%.   Left Atrium: Left atrium is mildly dilated.   Right Ventricle: Right ventricle is mildly dilated.   Right Atrium: Right atrium is mildly dilated.   Pulmonary artery :  Estimated pulmonary arterial systolic pressure is 51 mmhg. Pulmonary hypertension found to be moderate   Mitral Valve: Moderately thickened leaflet. Mildly calcified leaflet. Moderate annular calcification of the mitral valve.    IVC/SVC : IVC diameter is greater than 21 mm and decreases less than 50% during inspiration; therefore the estimated right atrial pressure is elevated (~15 mmHg). IVC is dilated. Consider LORRAINE for better evaluation of mitral valve if clinically indicated. DUPLEX LOWER EXT VENOUS BILAT    Result Date: 3/4/2022  · No evidence of deep vein thrombosis in the right lower extremity. · No evidence of deep vein thrombosis in the left lower extremity.         Tito Nelson MD

## 2022-03-20 NOTE — PROGRESS NOTES
[facility]  Pulmonary, Critical Care, and Sleep Medicine    Name: Marily Hartley MRN: [id number]   : 1940 Hospital: [facility] [place]    Date: 3/20/2022  Room: [address] [place] [address] Note                                              Consult requesting physician: Dr. Cathy Haley  Reason for Consult: Respiratory failure    IMPRESSION:   Acute respiratory failure with hypoxemia. Acute on chronic hypercarbic respiratory failure   Acute on chronic diastolic congestive heart failure. Atrial fibrillation   Fracture of neck of right femur. SERENA. OHS      Active Hospital Problems    Diagnosis Date Noted    Hypotension 2022    Hypokalemia 2022    Fracture of neck of right femur ([facility] 2022    [facility]    SERENA (obstructive sleep apnea) 2022    Adult BMI 39.0-39.9 kg/sq m 2022    Acute respiratory failure with hypoxemia (HCC) 2022    Atrial fibrillation ([facility]) 2022    Acute on chronic diastolic congestive heart failure ([facility] 2021    Elevated troponin 2021    HTN (hypertension) 2021   ·      Patient Active Problem List   Diagnosis Code    Fatigue R53.83    Elevated troponin R77.8    Obesity (BMI 30-39. 9) E66.9    HTN (hypertension) I10    SERENA treated with BiPAP G47.33    Acute on chronic diastolic congestive heart failure (HCC) I50.33    Acute respiratory failure with hypoxemia ([facility]) J96.01    Atrial fibrillation ([facility]) I48.91    Stage 3 chronic kidney disease (HCC) N18.30    SERENA (obstructive sleep apnea) G47.33    Adult BMI 39.0-39.9 kg/sq m Z68.39    Fracture of neck of right femur ([facility]) S72.001A    Hypokalemia E87.6    Hypotension I95.9         · Code status: DNR       RECOMMENDATIONS:     Respiratory: History of obesity, SERENA on BiPAP.   BIPAP 16/6 RR12 30 % fio2 every night tolerating well  I recommend to have BIPAP at rehab [facility] night ( I spoke to )  Daytime 2L NC  CXR    Atelectasis and pulmonary congestion   Add incentive spirometry already on Brovana   continue CHF tx       Postoperative respiratory failure, reintubated in PACU 3/10/2022. Extubated on 3/15/2022  fialed home CPAP with oxygen desaturations changed to BIPAP 16-9 /5 2L oxygen   BIPAP at night   Diuresis add bronchodilators and add albumins   Last night again lethargic CT negative  ABG on 3/16 7.38/53/ back on BIPAP  All night off in AM  CXR revised   IMPRESSION  Cardiomegaly.     Small layering right effusion/atelectasis. Prominent central vasculature. Keep SPO2 >=92%. HOB 30 degree elevation all the time. Aggressive pulmonary toileting. Aspiration precautions. CVS:  CHF/cardiomyopathy  on diuretics and low dose midodrine   At night was briefly on pressors now off  Severe cardiomyopathy on max tx   BP borderline so limited   worsening BNP   New onset A. fib on admission, chronic diastolic CHF. Continue aspirin. I will lower midodrine dose today  Continue Coreg but hold for SBP less than 100. Continue Eliquis; monitor for any external bleeding. Lasix held due to metabolic alkalosis since 8/46/4026; and received Diamox I adj used the dose today   Cardiologist on board. Echo 3/3/2022: LVEF 55 to 60%. RV mildly dilated. RA mildly dilated. PVL LE 3/3/2022: No DVT. VQ scan 3/3/2022: No PE.    ID: WBC normal  Dc vanc  ABX adjusted by ID if able to swallow ok to change to oral     No fever or leukocytosis. Rapid influenza and COVID19 3/2/2022: Negative. Urine culture 3/2/2022: Klebsiella pneumonia. Blood culture 3/2/2022: Negative. Endotracheal aspirate culture: Light Enterobacter cloacae complex. Heavy normal forrest. Antibiotics: Rocephin was discontinued on 3/12/2022 (Enterobacter is resistant to Rocephin). Was on Levaquin but I change to meropenem unclear     Hematology/Oncology:   Anemia; but no external bleeding. Normal platelets.     Renal:   Mild TK; likely due to diuretics use.  S/p mild hypernatremia; resolved. Lasix held due to metabolic alkalosis since 6/68/4541; and received Diamox 250 mg every 12 hours x2 doses on 3/12/2022 and on D5 0.45 NS at 50 mill per hour; with improved ABG. Dr. Christi Ariza on board. GI/: No acute issues. USG stable no acute issues   IMPRESSION  1. Limited examination due to technical factors as above. 2. Heterogeneously hyperechoic liver parenchyma compatible with steatosis or  other hepatic parenchymal disease. 3. No acute findings or suspicious mass. 4. Cholelithiasis. 5. Mild ascites. Elevated LFt and juliann I suspect due to chronic illness and CHF but will check GB usg  No abdominal pain nausea    Endocrine: Monitor BS. SSI. Neurology:   On/off periods of lethargy   Ct normal ammonia TSH normal  Improved with BIPAP  I consulted neurology   Patient was lethargic and confused before going for hip surgery on 3/10/2022 morning. Patient remains off propofol since 3/11/2022; mental status is improving since improvement in ABG. Patient is more alert and awake today. CT head 3/7/2022: Nil acute. Repeat CT head 3/11/2022: Nil acute. Psychiatry: No acute issues. Toxicology: No acute issues. Pain/Sedation: Sedation protocol as above    Skin/Wound: Right hip wound VAC in place after surgery; local care per nursing protocol. Electrolytes: Replace electrolytes per ICU electrolyte replacement protocol. IVF: kvo albumins     Nutrition: Tolerating OGT feed at 40 mL/h. Prophylaxis: DVT Prophylaxis: Eliquis. GI Prophylaxis: Protonix. Restraints: wrist soft restraints for patient interfering with medical therapy/management and patient safety. Lines/Tubes: PIV  ETT: 3/10/2022. OGT: 3/11/2022. [place]: 3/10/2022 (Medically necessary for strict input/output monitoring in critically ill patient, will remove it when not needed. [place] bundle followed). Advance Directive/Palliative Care: Consulted.     Will defer respective systems problem management to primary and other respective consultant and follow patient in ICU with primary and other medical team.  Further recommendations will be based on the patient's response to recommended treatment and results of the investigation ordered. Quality Care: PPI, DVT prophylaxis, HOB elevated, Infection control all reviewed and addressed. Care of plan d/w RN, RT, hospitalist team.    High complexity decision making was performed during the evaluation of this patient at high risk for decompensation with multiple organ involvement. I updated daughter today . Palliative care on board     Subjective/History of Present Illness:     Patient is a 80 y.o. female with PMHx significant for morbid obesity, SERENA, right leg weakness, HTN, A. fib, CHF; admitted to medical floor on 3/3/2022 for dyspnea. Patient had new onset of A. fib on admission. Patient was admitted on medical floor and A. fib/CHF was being treated medically; and was using home BiPAP nightly. Found to have right femoral fracture; underwent right press-fit direct anterior total hip arthroplasty for femoral neck fracture. Postoperatively patient was extubated in PACU; but due to respiratory distress, reintubated and transferred to ICU.      3/20/2022 :     Remains in . Periods of lethargy but responsive to BIPAP  Lower midorine dose  Diuresis continue  Incentive spirometry has atelectasis  She will go to rehab with BIPAP    DNR/DNI  I/O last 24 hrs: Intake/Output Summary (Last 24 hours) at 3/20/2022 1414  Last data filed at 3/20/2022 0437  Gross per 24 hour   Intake 0 ml   Output 1050 ml   Net -1050 ml         The patient is critically ill and can not provide additional history due to confusion        Review of Systems:  ROS not obtained due to patient factor.            Allergies   Allergen Reactions    Seafood Hives     crabs    Statins-Hmg-Coa Reductase Inhibitors Unknown (comments)    Sulfa (Sulfonamide Antibiotics) Hives      Past Medical History:   Diagnosis Date    Hypertension     Sleep disorder       Past Surgical History:   Procedure Laterality Date    HX ORTHOPAEDIC      broken right ankle, left femur 30 yrs ago      Social History     Tobacco Use    Smoking status: Never Smoker    Smokeless tobacco: Never Used   Substance Use Topics    Alcohol use: Yes     Alcohol/week: 1.0 standard drink     Types: 1 Glasses of wine per week     Comment: soc      Family History   Problem Relation Age of Onset    Diabetes Mother     Heart Disease Mother     Heart Disease Father       Prior to Admission medications    Medication Sig Start Date End Date Taking? Authorizing Provider   allopurinoL (ZYLOPRIM) 100 mg tablet Take 100 mg by mouth daily. Yes Provider, Historical   acetaminophen (TYLENOL) 325 mg tablet Take 650 mg by mouth every six (6) hours as needed for Pain. Yes Provider, Historical   niacin (NIASPAN) 1,000 mg Tb24 tab Take 1,000 mg by mouth daily. Yes Provider, Historical   trolamine salicylate-aloe vera 59% (ASPERCREME) topical cream Apply 2 g to affected area as needed for Pain. Yes Provider, Historical   multivitamin, tx-iron-ca-min (THERA-M w/ IRON) 9 mg iron-400 mcg tab tablet Take 1 Tab by mouth daily. Yes Provider, Historical   aspirin delayed-release 81 mg tablet Take 81 mg by mouth daily. Yes Provider, Historical   potassium chloride SR (KLOR-CON 10) 10 mEq tablet Take 10 mEq by mouth daily. Yes Provider, Historical   carvediloL (COREG) 3.125 mg tablet Take 3.125 mg by mouth daily.    Yes Provider, Historical     Current Facility-Administered Medications   Medication Dose Route Frequency    midodrine (PROAMATINE) tablet 2.5 mg  2.5 mg Oral TID WITH MEALS    furosemide (LASIX) injection 20 mg  20 mg IntraVENous BID    meropenem (MERREM) 1 g in 0.9% sodium chloride (MBP/ADV) 50 mL MBP  1 g IntraVENous Q12H    arformoteroL (BROVANA) neb solution 15 mcg  15 mcg Nebulization BID RT    famotidine (PF) (PEPCID) 20 mg in 0.9% sodium chloride 10 mL injection  20 mg IntraVENous DAILY    acetaminophen (TYLENOL) tablet 650 mg  650 mg Oral Q6H    ferrous sulfate tablet 325 mg  1 Tablet Oral TID    nystatin (MYCOSTATIN) 100,000 unit/mL oral suspension 500,000 Units  500,000 Units Oral QID    apixaban (ELIQUIS) tablet 2.5 mg  2.5 mg Oral BID    sodium chloride (NS) flush 5-40 mL  5-40 mL IntraVENous Q8H    [Held by provider] carvediloL (COREG) tablet 3.125 mg  3.125 mg Oral BID WITH MEALS    aspirin delayed-release tablet 81 mg  81 mg Oral DAILY         Objective:   Vital Signs:    Visit Vitals  /75 (BP 1 Location: Left lower arm, BP Patient Position: At rest)   Pulse 99   Temp 98 °F (36.7 °C)   Resp 22   Ht 5' 5\" (1.651 m)   Wt 116.7 kg (257 lb 4.4 oz)   SpO2 95%   Breastfeeding No   BMI 42.81 kg/m²       O2 Device: Nasal cannula   O2 Flow Rate (L/min): 2 l/min   Temp (24hrs), Av.4 °F (36.9 °C), Min:98 °F (36.7 °C), Max:99.1 °F (37.3 °C)       Intake/Output:   Last shift:      No intake/output data recorded. Last 3 shifts:  1901 -  0700  In: 400 [P.O.:100; I.V.:300]  Out: 1800 [Urine:1800]      Intake/Output Summary (Last 24 hours) at 3/20/2022 1414  Last data filed at 3/20/2022 0437  Gross per 24 hour   Intake 0 ml   Output 1050 ml   Net -1050 ml       Last 3 Recorded Weights in this Encounter    03/15/22 0840 22 0735 22 0807   Weight: 111.1 kg (245 lb) 113.5 kg (250 lb 3.6 oz) 116.7 kg (257 lb 4.4 oz)         Ventilator Settings:  Mode Rate Tidal Volume Pressure FiO2 PEEP   Spontaneous   375 ml  7 cm H2O 30 % 5 cm H20     Peak airway pressure: 19 cm H2O    Plateau pressure:     Tidal volume:    Minute ventilation: 6.4 l/min     No results for input(s): PHI, PHI, POC2, PCO2I, PO2, PO2I, HCO3, HCO3I, FIO2, FIO2I in the last 72 hours.     Physical Exam:       General/Neurology: Alert, more awake now in AM was lethrgic  Following commands very weak , pupils reactive non focal   Head:   NCAT. Eye:   EOM intact. No icterus/pallor/cyanosis. Nose:   Normal no discharge   Neck:   Trachea midline. Lung: Moderate air entry bilateral equal. At the base sed decreased breath sounds R>L   No rales, rhonchi. No wheezing or stridor. No prolonged expiration or accessory muscle use. Heart:   S1 S2 present. irregularly irregular  No murmur or JVD. Abdomen:  Soft. NT. ND. No palpable masses. Extremities:  No edema. No cyanosis or clubbing. Right hip surgical site wound VAC dressing intact. Pulses: 2+ and symmetric in DP.     Data:       Recent Results (from the past 24 hour(s))   GLUCOSE, POC    Collection Time: 03/19/22  3:59 PM   Result Value Ref Range    Glucose (POC) 107 70 - 110 mg/dL   GLUCOSE, POC    Collection Time: 03/20/22 12:34 AM   Result Value Ref Range    Glucose (POC) 107 70 - 110 mg/dL   MAGNESIUM    Collection Time: 03/20/22  5:30 AM   Result Value Ref Range    Magnesium 2.6 1.6 - 2.6 mg/dL   CALCIUM, IONIZED    Collection Time: 03/20/22  5:30 AM   Result Value Ref Range    Ionized Calcium 1.34 (H) 1.12 - 1.32 MMOL/L   POTASSIUM    Collection Time: 03/20/22  5:30 AM   Result Value Ref Range    Potassium 3.6 3.5 - 5.5 mmol/L   PHOSPHORUS    Collection Time: 03/20/22  5:30 AM   Result Value Ref Range    Phosphorus 3.5 2.5 - 4.9 MG/DL   GLUCOSE, POC    Collection Time: 03/20/22  7:26 AM   Result Value Ref Range    Glucose (POC) 111 (H) 70 - 110 mg/dL   GLUCOSE, POC    Collection Time: 03/20/22 11:02 AM   Result Value Ref Range    Glucose (POC) 134 (H) 70 - 110 mg/dL         Chemistry Recent Labs     03/20/22  0530 03/19/22  0940 03/19/22  0359 03/18/22  1115 03/18/22  1115 03/18/22  0214 03/18/22  0214   GLU  --   --  113*  --   --   --  120*   NA  --   --  145  --   --   --  145   K 3.6 4.8 3.7   < > 3.8   < > 3.7   CL  --   --  108  --   --   --  107   CO2  --   --  31  --   --   --  31   BUN  --   --  113*  --   --   --  103*   CREA  --   --  1.99*  --   --   -- 1.92*   CA  --   --  10.7*  --   --   --  10.3*   MG 2.6  --  2.5  --   --   --  2.3   PHOS 3.5  --  3.3  --  3.4   < > 2.6   AGAP  --   --  6  --   --   --  7   BUCR  --   --  57*  --   --   --  54*   AP  --   --  182*  --   --   --  200*   TP  --   --  6.4  --   --   --  6.7   ALB  --   --  2.7*  --   --   --  2.9*   GLOB  --   --  3.7  --   --   --  3.8   AGRAT  --   --  0.7*  --   --   --  0.8    < > = values in this interval not displayed. Lactic Acid Lactic acid   Date Value Ref Range Status   03/17/2022 1.3 0.4 - 2.0 MMOL/L Final     No results for input(s): LAC in the last 72 hours. Liver Enzymes Protein, total   Date Value Ref Range Status   03/19/2022 6.4 6.4 - 8.2 g/dL Final     Albumin   Date Value Ref Range Status   03/19/2022 2.7 (L) 3.4 - 5.0 g/dL Final     Globulin   Date Value Ref Range Status   03/19/2022 3.7 2.0 - 4.0 g/dL Final     A-G Ratio   Date Value Ref Range Status   03/19/2022 0.7 (L) 0.8 - 1.7   Final     Alk. phosphatase   Date Value Ref Range Status   03/19/2022 182 (H) 45 - 117 U/L Final     Recent Labs     03/19/22  0359 03/18/22 0214   TP 6.4 6.7   ALB 2.7* 2.9*   GLOB 3.7 3.8   AGRAT 0.7* 0.8   * 200*        CBC w/Diff Recent Labs     03/18/22 0214   WBC 13.0   RBC 2.80*   HGB 8.4*   HCT 26.7*      GRANS 81*   LYMPH 8*   EOS 0        Cardiac Enzymes No results found for: CPK, CK, CKMMB, CKMB, RCK3, CKMBT, CKNDX, CKND1, FARA, TROPT, TROIQ, ADOLFO, TROPT, TNIPOC, BNP, BNPP     BNP No results found for: BNP, BNPP, XBNPT     Coagulation No results for input(s): PTP, INR, APTT, INREXT, INREXT in the last 72 hours.       Thyroid  Lab Results   Component Value Date/Time    TSH 0.38 03/16/2022 04:35 AM       No results found for: T4     Urinalysis Lab Results   Component Value Date/Time    Color YELLOW 03/02/2022 09:04 PM    Appearance CLOUDY 03/02/2022 09:04 PM    Specific gravity 1.021 03/02/2022 09:04 PM    pH (UA) 5.0 03/02/2022 09:04 PM    Protein 300 (A) 03/02/2022 09:04 PM    Glucose Negative 03/02/2022 09:04 PM    Ketone TRACE (A) 03/02/2022 09:04 PM    Bilirubin Negative 03/02/2022 09:04 PM    Urobilinogen 1.0 03/02/2022 09:04 PM    Nitrites Negative 03/02/2022 09:04 PM    Leukocyte Esterase Negative 03/02/2022 09:04 PM    Epithelial cells 2+ 03/02/2022 09:04 PM    Bacteria FEW (A) 03/02/2022 09:04 PM    WBC 0 to 3 03/02/2022 09:04 PM    RBC 0 to 3 03/02/2022 09:04 PM        Micro  No results for input(s): SDES, CULT in the last 72 hours. No results for input(s): CULT in the last 72 hours. Culture data during this hospitalization.    All Micro Results     Procedure Component Value Units Date/Time    CULTURE, BLOOD [[id number]] Collected: 03/14/22 0419    Order Status: Completed Specimen: Blood Updated: 03/20/22 0739     Special Requests: NO SPECIAL REQUESTS        Culture result: NO GROWTH 6 DAYS       CULTURE, BLOOD [[id number]] Collected: 03/14/22 0420    Order Status: Completed Specimen: Blood Updated: 03/20/22 0739     Special Requests: NO SPECIAL REQUESTS        Culture result: NO GROWTH 6 DAYS       CULTURE, RESPIRATORY/SPUTUM/BRONCH W GRAM STAIN [[id number]]  (Abnormal)  (Susceptibility) Collected: 03/10/22 1630    Order Status: Completed Specimen: Sputum from Endotracheal aspirate Updated: 03/13/22 0905     Special Requests: NO SPECIAL REQUESTS        GRAM STAIN NO WBC'S SEEN         NO EPITHELIAL CELLS SEEN         NO ORGANISMS SEEN        Culture result:       LIGHT ENTEROBACTER CLOACAE COMPLEX                  HEAVY NORMAL RESPIRATORY ELSIE          CULTURE, RESPIRATORY/SPUTUM/BRONCH Virgel Mcburney [[id number]] Collected: 03/10/22 1915    Order Status: Canceled Specimen: Sputum from Endotracheal aspirate     CULTURE, BLOOD [[id number]] Collected: 03/02/22 2048    Order Status: Completed Specimen: Blood Updated: 03/08/22 0655     Special Requests: NO SPECIAL REQUESTS        Culture result: NO GROWTH 6 DAYS       CULTURE, BLOOD [[id number]] Collected: 03/02/22 2048    Order Status: Completed Specimen: Blood Updated: 03/08/22 0655     Special Requests: NO SPECIAL REQUESTS        Culture result: NO GROWTH 6 DAYS       CULTURE, URINE [[id number]]  (Abnormal)  (Susceptibility) Collected: 03/02/22 2104    Order Status: Completed Specimen: Cath Urine Updated: 03/05/22 1226     Special Requests: NO SPECIAL REQUESTS        [place] Count --        >100,000  COLONIES/mL       Culture result: KLEBSIELLA PNEUMONIAE       COVID-19 RAPID TEST [[id number]] Collected: 03/02/22 2025    Order Status: Completed Specimen: Nasopharyngeal Updated: 03/02/22 2052     Specimen source Nasopharyngeal        COVID-19 rapid test Not detected        Comment: Rapid Abbott ID Now       Rapid NAAT:  The specimen is NEGATIVE for SARS-CoV-2, the novel coronavirus associated with COVID-19. Negative results should be treated as presumptive and, if inconsistent with clinical signs and symptoms or necessary for patient management, should be tested with an alternative molecular assay. Negative results do not preclude SARS-CoV-2 infection and should not be used as the sole basis for patient management decisions. This test has been authorized by the FDA under an Emergency Use Authorization (EUA) for use by authorized laboratories. Fact sheet for Healthcare Providers: ConventionUpdate.co.nz  Fact sheet for Patients: ConventionUpdate.co.nz       Methodology: Isothermal Nucleic Acid Amplification         INFLUENZA A & B AG (RAPID TEST) [[id number]] Collected: 03/02/22 2025    Order Status: Completed Specimen: Nasopharyngeal from Nasal washing Updated: 03/02/22 2047     Influenza A Antigen Negative        Comment: A negative result does not exclude influenza virus infection, seasonal or H1N1 due to suboptimal sensitivity.  If influenza is circulating in your community, a diagnosis of influenza should be considered based on a patients clinical presentation and empiric antiviral treatment should be considered, if indicated. Influenza B Antigen Negative                NM LUNG SCAN PERF  Result Date: 3/3/2022  EXAM: NM LUNG SCAN PERF. CLINICAL INDICATION/HISTORY: d dimer increase dyspnea. -Additional: Clinical concern for pulmonary embolus. COMPARISON: Correlation is made with the radiographic study performed one day prior. TECHNIQUE: 7.2 mCi Tc-99m MAA was injected intravenously and the 8 standard views of the chest were obtained. This perfusion only examination is interpreted using the PISAPED criteria. _______________ FINDINGS: Perfusion images show no segmental perfusion defects to suggest the presence of pulmonary embolism. _______________     o scintigraphic findings to suggest the presence of acute pulmonary embolism. _______________       XR HIP RT W OR WO PELV 2-3 VWS  Result Date: 3/7/2022  EXAM: RIGHT HIP RADIOGRAPHS CLINICAL INDICATION/HISTORY: right hip pain -Additional: None COMPARISON: May March 3, 2022. TECHNIQUE: AP pelvis and frog-leg lateral view of right hip. _______________ FINDINGS: BONES: Intact appearing ilioischial and iliopectineal lines. There is a displaced and impacted subcapital right femoral neck fracture, with mild progressive displacement on follow-up imaging. Mild-moderate bilateral hip joint arthrosis. SOFT TISSUES: Unremarkable. _______________     1. Displaced and impacted subcapital right femoral neck fracture, increased in displacement from CT performed 4 days prior. CT HEAD WO CONT  Result Date: 3/7/2022  EXAM: CT head INDICATION: Altered mental status. COMPARISON: 4/23/2021. TECHNIQUE: Axial CT imaging of the head was performed without intravenous contrast. Standard multiplanar coronal and sagittal reformatted images were obtained and are included in interpretation.  One or more dose reduction techniques were used on this CT: automated exposure control, adjustment of the mAs and/or kVp according to patient size, and iterative reconstruction techniques. The specific techniques used on this CT exam have been documented in the patient's electronic medical record. Digital Imaging and Communications in Medicine (DICOM) format image data are available to nonaffiliated external healthcare facilities or entities on a secure, media free, reciprocally searchable basis with patient authorization for at least a 12-month period after this study. _______________ FINDINGS: BRAIN AND POSTERIOR FOSSA: Periventricular white matter hypoattenuation which is nonspecific but likely represents chronic ischemic changes. No evidence of acute large vessel transcortical infarct or acute parenchymal hemorrhage. No midline shift or hydrocephalus. EXTRA-AXIAL SPACES AND MENINGES: There are no abnormal extra-axial fluid collections. CALVARIUM: Intact. SINUSES: Clear. OTHER: None. _______________     No acute intracranial abnormality. CT CHEST ABD PELV WO CONT  Result Date: 3/3/2022  EXAM: CT CHEST, ABDOMEN AND PELVIS CLINICAL INDICATION/HISTORY: Hypoxemia, retrocardiac density, diarrhea, weakness and shortness of breath COMPARISON: 3/2/2022 TECHNIQUE: Axial CT imaging of the chest, abdomen, and pelvis was performed without intravenous contrast. Multiplanar reformats were generated. One or more dose reduction techniques were used on this CT: automated exposure control, adjustment of the mAs and/or kVp according to patient size, and iterative reconstruction techniques. The specific techniques used on this CT exam have been documented in the patient's electronic medical record. Digital Imaging and Communications in Medicine (DICOM) format image data are available to nonaffiliated external healthcare facilities or entities on a secure, media free, reciprocally searchable basis with patient authorization for at least a 12-month period after this study.  ________________ FINDINGS: LIMITATIONS:   > Suboptimal evaluation given lack of intravenous contrast.   > Motion artifact is present which limits evaluation.   > Suboptimal exam due to patient body habitus and arms by the side. CHEST: LUNGS: Respiratory motion significantly limits evaluation. Interlobar septal thickening in the upper lobes. Mild bilateral dependent atelectasis. No large consolidation or suspicious pulmonary mass. PLEURA: Very small volume bilateral pleural effusions. AIRWAY: Normal. MEDIASTINUM: Four-chamber cardiomegaly with asymmetric biatrial enlargement, mitral and aortic annular calcifications. The main pulmonary artery is enlarged suggesting pulmonary arterial hypertension. Normal caliber aorta with scattered calcified atherosclerotic plaque. No pericardial effusion. LYMPH NODES: No enlarged lymph nodes. CHEST WALL: Diffuse anasarca. Heterogeneous thyroid. _______________ ABDOMEN/PELVIS: LIVER: No enhancing hepatic mass. The portal and hepatic veins are patent. BILIARY: Gallstones are present in the nondistended gallbladder lumen. No biliary dilation. SPLEEN: Normal. PANCREAS: Normal. ADRENALS: Left adrenal gland measures 9 HU (axial image 76), most consistent with lipid rich adenoma. Normal right adrenal gland. KIDNEYS: Asymmetrically small left kidney. No rate of a stone. No hydronephrosis. GI TRACT:  A small hiatal hernia is present. Normal caliber small and large bowel loops. No morphology of bowel obstruction. Normal appendix. BLADDER: Diffuse wall thickening in the largely decompressed bladder. PELVIC ORGANS: No acute abnormality. VASCULATURE: No arterial aneurysm. 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 LYMPH NODES: No mesenteric or retroperitoneal lymphadenopathy. OTHER: No free intraperitoneal air. No ascites. Diffuse anasarca. OSSEOUS STRUCTURES: No acute osseous abnormality. Multilevel degenerative changes most pronounced in the lumbar spine. Degenerative changes in both shoulders (right greater than left).  Please note the included portions of the upper extremities are not well evaluated on this study _______________     1. Findings of congestive heart failure with cardiomegaly, interstitial edema, very small bilateral pleural effusions, and diffuse anasarca. 2.  Bladder wall thickening may be due to underdistention though superimposed infection cannot be excluded. Recommend correlation for cystitis. 3.  Osseous degenerative changes and additional findings, as detailed in the body of the report. Hip x-ray 3/11/2022:  Status post total right hip arthroplasty, without complication. ECHO ADULT COMPLETE  Result Date: 3/3/2022    Left Ventricle: Left ventricle size is normal. Moderately increased wall thickness. Findings consistent with moderate concentric hypertrophy. Normal wall motion. Normal left ventricular systolic function with a visually estimated EF of 55 - 60%.   Left Atrium: Left atrium is mildly dilated.   Right Ventricle: Right ventricle is mildly dilated.   Right Atrium: Right atrium is mildly dilated.   Pulmonary artery :  Estimated pulmonary arterial systolic pressure is 51 mmhg. Pulmonary hypertension found to be moderate   Mitral Valve: Moderately thickened leaflet. Mildly calcified leaflet. Moderate annular calcification of the mitral valve.   IVC/SVC : IVC diameter is greater than 21 mm and decreases less than 50% during inspiration; therefore the estimated right atrial pressure is elevated (~15 mmHg). IVC is dilated. Consider LORRAINE for better evaluation of mitral valve if clinically indicated. DUPLEX LOWER EXT VENOUS BILAT  Result Date: 3/4/2022  · No evidence of deep vein thrombosis in the right lower extremity. · No evidence of deep vein thrombosis in the left lower extremity. USG retroperitoneum 3/12/2022: No hydronephrosis. ECHO 3/3/2022: Result status: Final result       Left Ventricle: Left ventricle size is normal. Moderately increased wall thickness.  Findings consistent with moderate concentric hypertrophy. Normal wall motion. Normal left ventricular systolic function with a visually estimated EF of 55 - 60%.   Left Atrium: Left atrium is mildly dilated.   Right Ventricle: Right ventricle is mildly dilated.   Right Atrium: Right atrium is mildly dilated.   Pulmonary artery :  Estimated pulmonary arterial systolic pressure is 51 mmhg. Pulmonary hypertension found to be moderate    Mitral Valve: Moderately thickened leaflet. Mildly calcified leaflet. Moderate annular calcification of the mitral valve.   IVC/SVC : IVC diameter is greater than 21 mm and decreases less than 50% during inspiration; therefore the estimated right atrial pressure is elevated (~15 mmHg). IVC is dilated.     Consider LORRAINE for better evaluation of mitral valve if clinically indicated. Images report reviewed by me:  CT (Most Recent) (CT chest reviewed by me) Results from Hospital Encounter encounter on 03/02/22    CT HEAD WO CONT    Narrative  EXAM: CT of the brain without intravenous contrast.    INDICATION:  \"AMS. \"    COMPARISON:  CT March 11, 2022. DOSE REDUCTION AND DICOM INFORMATION: One or more dose reduction techniques were  used on this CT: automated exposure control, adjustment of the mAs and/or kVp  according to patient size, and iterative reconstruction techniques. The  specific techniques used on this CT exam have been documented in the patient's  electronic medical record. Digital Imaging and Communications in Medicine  (DICOM) format image data are available to nonaffiliated external healthcare  facilities or entities on a secure, media free, reciprocally searchable basis  with patient authorization for at least a 12-month period after this study. _______________    FINDINGS:    IMAGE QUALITY:  Diagnostic. BRAIN AND EXTRA-AXIAL SPACE:    SUBACUTE TERRITORIAL TRANSCORTICAL INFARCT:  None detected. MASS:  None detected. HEMORRHAGE:  None.     SUBDURAL FLUID COLLECTION: None detected. HYDROCEPHALUS:  None. REMOTE  TERRITORIAL CEREBRAL INFARCT:  None detected. REMOTE CEREBELLAR INFARCT:  None detected. STRIVE (STandards for Reporting Vascular changes on nEuroimaging):  --[place] of white matter hypodensity  (\"leukoaraiosis\") of presumed vascular origin:  Low-grade. --[place] of lacunes of presumed vascular origin  (often undetectable on CT):  None definite. --Degree of brain atrophy:  Moderate. BURDEN OF CALCIFIC INTRACRANIAL ATHEROSCLEROSIS:   Moderate. HEENT:    INCLUDED UPPER ORBITS:  There are bilateral lens replacements. INCLUDED UPPER PARANASAL SINUSES:  Predominantly clear. MASTOID AIR CELLS:  Predominantly clear. BONES:  Unremarkable. SUPERFICIAL SOFT TISSUES: Unremarkable.    _______________    Impression  Generalized chronic changes, however, no mass or acute findings. _______________         CXR reviewed by me:  XR (Most Recent). CXR  reviewed by me and compared with previous CXR Results from Hospital Encounter encounter on 03/02/22    XR CHEST PORT    Narrative  CLINICAL HISTORY:  Short of breath. COMPARISONS:  Chest x-ray 3/19/2022, and earlier studies. TECHNIQUE:  single frontal view of the chest    ------------------------------------------    FINDINGS:    Lungs:  Somewhat unusual air interface at the right lung base, present to  varying degrees on prior x-rays and suspect reflects right middle lobe  atelectasis. No other evidence of pneumothorax. Streaky opacity at left lung  base, likely atelectasis, not appreciably changed. Mild central bronchial wall  thickening, likely bronchitis or asthma. Probable tiny left pleural effusion, not appreciably changed. Mediastinum: Moderate to severe cardiomegaly. Atherosclerotic arterial  calcification. Bones: Scoliosis. Mild to moderate thoracic degenerative disc disease.      ------------------------------------------    Impression  1.  Probable partial right middle lobe atelectasis, similar to most recent prior  radiograph. 2. Streaky opacity at left lung base, likely atelectasis, though  bronchopneumonia or aspiration not excluded. CXR 3/13/2022:  FINDINGS:  SUPPORT DEVICES: Endotracheal tube and visualized gastric tube both project in  stable position from prior exam.     HEART AND MEDIASTINUM: Enlarged appearing cardiac silhouette with stable  mediastinal contours.     LUNGS AND PLEURAL SPACES: Faint/hazy right lower lung zone opacity. No  pneumothorax or pleural effusion demonstrated.     BONY THORAX AND SOFT TISSUES: Unremarkable.  ______________     IMPRESSION  1. Support devices in stable position. 2. Mild interval increase in right basilar atelectasis and likely small right  pleural effusion. 3. Cardiomegaly, as previously. ·Please note: Voice-recognition software may have been used to generate this report, which may have resulted in some phonetic-based errors in grammar and contents. Even though attempts were made to correct all the mistakes, some may have been missed, and remained in the body of the document.       Annamarie Taylor MD  3/20/2022

## 2022-03-20 NOTE — PROGRESS NOTES
Problem: Mobility Impaired (Adult and Pediatric)  Goal: *Acute Goals and Plan of Care (Insert Text)  Description: Physical TherapyGoals   Initiated 3/13/2022 to be met within 7 days  1. Supine <> sit with max A with use of HR for positioning. (met)  2. Tolerate EOB sitting 5-10 minutes for ADL/balance activities. (progressing)  3. Therapeutic Exercises: Participate with LE strengthening exercise program to facilitate achievement of above. (progressing)    Initiated 3/17/2022 to be met within 7 days  1. Supine <>sit with mod A with use of HR for positioning  2. Tolerate EOB sitting 5-10 min for ADL/balance activities. 3. Therapeutic Exercises: Participate with LE strengthening exercise program to facilitate achievement of above. physical Therapy TREATMENT    Patient: Ridge Dowling (89 y.o. female)  Date: 3/20/2022  Diagnosis: Acute exacerbation of CHF (congestive heart failure) (HCC) [I50.9] Acute respiratory failure with hypoxemia ([facility])  Procedure(s) (LRB):  RIGHT TOTAL HIP ARTHROPLASTY WITH C-ARM  (PT IN ROOM # 358) (Right) 10 Days Post-Op  Precautions: Fall,WBAT   Chart, physical therapy assessment, plan of care and goals were reviewed. ASSESSMENT:  Pt. Received in supine and giving only facial responses to verbal introduction. Pt. Response throughout session including facial grimaces of pain/discomfort during transfers with no verbal feedback. Pt. Supine <>sit with total assistance and required mod A for sitting balance at the EOB. Pt. Tolerated 5 minutes of sitting with PROM Cervical motions as well as attempted AAROM of UE/LE with minimal feedback. Pt. Totally assisted back into supine with Nursing help and left with all needs met and nursing hand off made.     Progression toward goals:  []      Improving appropriately and progressing toward goals  [x]      Improving slowly and progressing toward goals  []      Not making progress toward goals and plan of care will be adjusted     PLAN:  Patient continues to benefit from skilled intervention to address the above impairments. Continue treatment per established plan of care. Discharge Recommendations: To Be Determined  Further Equipment Recommendations for Discharge:  N/A     SUBJECTIVE:   Patient stated facial grimaces.     OBJECTIVE DATA SUMMARY:   Critical Behavior:  Neurologic State: Confused,Eyes open spontaneously  Orientation Level: Oriented to person  Cognition: Poor safety awareness,Decreased attention/concentration  Safety/Judgement: Decreased awareness of environment  Functional Mobility Training:  Bed Mobility:     Supine to Sit: Maximum assistance; Total assistance  Sit to Supine: Total assistance  Scooting: Total assistance    Balance:  Sitting: Impaired; With support  Sitting - Static: Poor (constant support)  Sitting - Dynamic: None    Pain:  Pain Scale 1: Visual  Pain Intensity 1: 0    Activity Tolerance:   Fair    Please refer to the flowsheet for vital signs taken during this treatment.   After treatment:   [] Patient left in no apparent distress sitting up in chair  [x] Patient left in no apparent distress in bed  [] Call bell left within reach  [] Nursing notified  [] Caregiver present  [] Bed alarm activated      Carrol Sy PT, DPT   Time Calculation: 26 mins

## 2022-03-20 NOTE — PROGRESS NOTES
pt taken over awake alert oriented to self on 2L/nc. Denies pain at present . Pure wick in place suctioning urine. SCD's remains on bilateral feet well tolerated. Assessment completed and documented in  flow sheets. Daughter at bedside. Nursing management continues.       @0000 pt place don BIPAP by RT , reassessment completed no changes care continues. @0030 Bedside and Verbal shift change report given to Elvin Lassiter (oncoming nurse) by Naomi Martínez (offgoing nurse). Report included the following information SBAR, Kardex, Intake/Output, MAR, Recent Results, Med Rec Status and Alarm Parameters .

## 2022-03-20 NOTE — PROGRESS NOTES
Order placed to transfer pt, report called in to reporting unit. Pt transferred on a bed to receiving unit and family present during transfer. Bed bath performed prior to pt moving. x1 bm. All belongings sent with pt.

## 2022-03-20 NOTE — PROGRESS NOTES
Report received from [facility]. Patient received to floor, family at bedside, no acute distress at this time, Dinner tray ordered, call bell in reach and instructed on use.

## 2022-03-20 NOTE — PROGRESS NOTES
Cardiology Progress Note        Patient: Jessy Alves        Sex: female          DOA: 3/2/2022  YOB: 1940      Age:  80 y.o.        LOS:  LOS: 18 days   Assessment/Plan     Principal Problem:    Acute respiratory failure with hypoxemia ([address] (3/2/2022)    Active Problems:    Elevated troponin (4/23/2021)      HTN (hypertension) (4/23/2021)      Acute on chronic diastolic congestive heart failure ([address] (4/23/2021)      Atrial fibrillation ([address] (3/2/2022)      Stage 3 chronic kidney disease ([address] (3/3/2022)      SERENA (obstructive sleep apnea) (3/3/2022)      Adult BMI 39.0-39.9 kg/sq m (3/3/2022)      Fracture of neck of right femur ([address] (3/8/2022)      Hypokalemia (3/14/2022)      Hypotension (3/17/2022)        Plan:    Patient remained stable from the cardiac standpoint. Continue current medication regimen. Blood pressure is okay. Dr. Dang Murphy returns tomorrow. Awake but not very communicative. Apparently this is chronic. Continue with Lasix and apixaban. Blood pressure is not too low or high. Cardiology follow-up. Will check tomorrow and Dr. Dang Murphy will return on Monday. No cardiac complaints  Continue with Lasix  Continue with apixaban 2.5 mg twice daily  Discussed with patient/family  Dr. Nia Ovalles will be covering during the weekend. A. fib rate controlled  Blood pressure stable  Continue with current midodrine and Lasix  Altered mental status, seen by neurology  Discussed with family in the room.         Blood pressure is intermittently low normal  Coreg is on hold  Increase midodrine from 5 mg twice daily to 3 times daily  Discussed with patient and daughter    Extubated  Denied any symptoms  Blood pressure is low normal  Hold carvedilol  Echocardiogram done with preserved LV function, severe biatrial enlargement moderate MR, TR, pulmonary hypertension  Continue with Lasix  Discussed with patient and daughter        Patient remained intubated  Cardiac telemetry rate controlled A. fib with occasional PVCs, saturating around 100%  Tolerating carvedilol 3.125 mg twice daily and Eliquis 2.5 mg twice daily  Ventilatory management as per pulmonary/intensivist  Discussed with patient's daughter in the room  Continue with current cardiac medications. Patient remained intubated  Hemodynamically stable  A. fib rate controlled  Discussed with patient's daughter Andrews Gtz on phone    Patient is status post hip surgery  Patient is intubated due to difficulty in breathing and hypoxemia  A. fib rate controlled  Discussed with Dr. Jorge Dawson with the current medical treatment        Patient denied chest pain or shortness of breath   Atrial fibrillation rate controlled   Patient is scheduled for hip surgery tomorrow  Discussed with patient's daughter  Resume anticoagulation after surgery. Patient is diagnosed with hip fracture and being scheduled for surgery  I have long lengthy discussion with patient and daughter Andrews Gtz in the room  Patient have negative stress test in April 2021  EF is stable  Patient is with elevated but acceptable risk for hip surgery  Eliquis can be hold  Consider DVT prophylaxis anticoagulation from tomorrow patient have taken morning dose of Eliquis      Patient denied any chest pain   Mitral calcification without stenosis  Continue with current medical treatment    Patient continues to do well. H&H stable  Patient will need diuretic on discharge probably Lasix 40 mg twice daily  Discussed with patient and family in the room      A. fib rate controlled  Patient will need p.o. Lasix on discharge  Continue Eliquis 2.5 mg twice daily  Discussed with patient and family      Continue with Lasix 40 mg twice daily  I have long lengthy discussion with the patient and daughter about anticoagulation both of them agreed with low-dose Eliquis 2.5 mg twice daily  DC Lovenox   start Eliquis 2.5 mg twice daily from a.m.     Discussed with patient and daughter findings of echocardiogram including mitral valve disease, prefer not to do transesophageal echocardiogram  Continue with current medical treatment                        Subjective:    cc:      Shortness of breath  A. fib        REVIEW OF SYSTEMS:     Extubated and no complaints    Objective:      Visit Vitals  /75 (BP 1 Location: Left lower arm, BP Patient Position: At rest)   Pulse 99   Temp 98 °F (36.7 °C)   Resp 22   Ht 5' 5\" (1.651 m)   Wt 116.7 kg (257 lb 4.4 oz)   SpO2 95%   Breastfeeding No   BMI 42.81 kg/m²     Body mass index is 42.81 kg/m². Physical Exam:  General Appearance: Comfortable, extubated  NECK: No JVD, no thyroidomeglay. LUNGS: Clear bilaterally. HEART: S1 irregular +S2 audible,    ABD: Non-tender, BS Audible    EXT: No edema, and no cysnosis. VASCULAR EXAM: Pulses are intact. PSYCHIATRIC EXAM: Mood is appropriate.     Medication:  Current Facility-Administered Medications   Medication Dose Route Frequency    midodrine (PROAMATINE) tablet 2.5 mg  2.5 mg Oral TID WITH MEALS    furosemide (LASIX) injection 20 mg  20 mg IntraVENous BID    meropenem (MERREM) 1 g in 0.9% sodium chloride (MBP/ADV) 50 mL MBP  1 g IntraVENous Q12H    arformoteroL (BROVANA) neb solution 15 mcg  15 mcg Nebulization BID RT    famotidine (PF) (PEPCID) 20 mg in 0.9% sodium chloride 10 mL injection  20 mg IntraVENous DAILY    acetaminophen (TYLENOL) tablet 650 mg  650 mg Oral Q6H    naloxone (NARCAN) injection 0.4 mg  0.4 mg IntraVENous PRN    ferrous sulfate tablet 325 mg  1 Tablet Oral TID    diphenhydrAMINE (BENADRYL) capsule 25 mg  25 mg Oral Q4H PRN    bisacodyL (DULCOLAX) suppository 10 mg  10 mg Rectal DAILY PRN    ELECTROLYTE REPLACEMENT PROTOCOL - Magnesium   1 Each Other PRN    ELECTROLYTE REPLACEMENT PROTOCOL - Calcium   1 Each Other PRN    ELECTROLYTE REPLACEMENT PROTOCOL  - Phosphorus Renal Dosing  1 Each Other PRN    ELECTROLYTE REPLACEMENT PROTOCOL - Potassium Renal Dosing  1 Each Other PRN    midazolam (VERSED) injection 1 mg  1 mg IntraVENous Q2H PRN    fentaNYL citrate (PF) injection 25 mcg  25 mcg IntraVENous Q4H PRN    nystatin (MYCOSTATIN) 100,000 unit/mL oral suspension 500,000 Units  500,000 Units Oral QID    apixaban (ELIQUIS) tablet 2.5 mg  2.5 mg Oral BID    sodium chloride (NS) flush 5-40 mL  5-40 mL IntraVENous Q8H    sodium chloride (NS) flush 5-40 mL  5-40 mL IntraVENous PRN    acetaminophen (TYLENOL) tablet 650 mg  650 mg Oral Q6H PRN    Or    acetaminophen (TYLENOL) suppository 650 mg  650 mg Rectal Q6H PRN    polyethylene glycol (MIRALAX) packet 17 g  17 g Oral DAILY PRN    ondansetron (ZOFRAN ODT) tablet 4 mg  4 mg Oral Q8H PRN    Or    ondansetron (ZOFRAN) injection 4 mg  4 mg IntraVENous Q6H PRN    [Held by provider] carvediloL (COREG) tablet 3.125 mg  3.125 mg Oral BID WITH MEALS    aspirin delayed-release tablet 81 mg  81 mg Oral DAILY       Medication        Lab/Data Reviewed:  Procedures/imaging: see electronic medical records for all procedures/Xrays   and details which were not copied into this note but were reviewed prior to creation of Plan       All lab results for the last 24 hours reviewed. Recent Labs     03/18/22  0214   WBC 13.0   HGB 8.4*   HCT 26.7*        Recent Labs     03/20/22  0530 03/19/22  0940 03/19/22  0359 03/18/22  1115 03/18/22  0214   NA  --   --  145  --  145   K 3.6 4.8 3.7   < > 3.7   CL  --   --  108  --  107   CO2  --   --  31  --  31   GLU  --   --  113*  --  120*   BUN  --   --  113*  --  103*   CREA  --   --  1.99*  --  1.92*   CA  --   --  10.7*  --  10.3*    < > = values in this interval not displayed. RADIOLOGY:  CT Results  (Last 48 hours)    None        CXR Results  (Last 48 hours)               03/20/22 0653  XR CHEST PORT Final result    Impression:      1. Probable partial right middle lobe atelectasis, similar to most recent prior   radiograph.        2. Streaky opacity at left lung base, likely atelectasis, though   bronchopneumonia or aspiration not excluded. Narrative:  CLINICAL HISTORY:  Short of breath. COMPARISONS:  Chest x-ray 3/19/2022, and earlier studies. TECHNIQUE:  single frontal view of the chest       ------------------------------------------       FINDINGS:       Lungs:  Somewhat unusual air interface at the right lung base, present to   varying degrees on prior x-rays and suspect reflects right middle lobe   atelectasis. No other evidence of pneumothorax. Streaky opacity at left lung   base, likely atelectasis, not appreciably changed. Mild central bronchial wall   thickening, likely bronchitis or asthma. Probable tiny left pleural effusion, not appreciably changed. Mediastinum: Moderate to severe cardiomegaly. Atherosclerotic arterial   calcification. Bones: Scoliosis. Mild to moderate thoracic degenerative disc disease.           ------------------------------------------       03/19/22 0540  XR CHEST PORT Final result    Impression:      1. Central bronchial/peribronchial thickening, differential including bronchitis   and mild interstitial edema. 2. Small pleural effusions, unchanged. Narrative:  CLINICAL HISTORY:  Respiratory failure        COMPARISONS:  Chest x-ray 3/16/2022       TECHNIQUE:  single frontal view of the chest       ------------------------------------------       FINDINGS:       Lungs:  Mild central bronchial wall thickening. Streaky opacity in the lung   bases, slightly improved, suspect atelectasis. Small left pleural effusion and   possible tiny right pleural effusion, not appreciably changed. Mediastinum: At least moderate cardiomegaly. Atherosclerotic arterial   calcification. Bones: No evidence of fracture or suspicious bone lesion.  Thoracic scoliosis           ------------------------------------------               Cardiology Procedures:   Results for orders placed or performed during the hospital encounter of 03/02/22   EKG, 12 LEAD, INITIAL   Result Value Ref Range    Ventricular Rate 72 BPM    Atrial Rate 340 BPM    QRS Duration 86 ms    Q-T Interval 374 ms    QTC Calculation (Bezet) 409 ms    Calculated R Axis -2 degrees    Calculated T Axis -3 degrees    Diagnosis       Atrial fibrillation  Abnormal ECG  When compared with ECG of 02-MAR-2022 20:12,  No significant change was found  Confirmed by Sundeep Foster MD. ([id number]) on 3/17/2022 10:28:04 PM        Echo Results  (Last 48 hours)    None       Cardiolite (Tc-99m Sestamibi) stress test    Signed By: Elizabeth Jarvis MD     March 20, 2022

## 2022-03-21 PROBLEM — R53.83 FATIGUE: Status: RESOLVED | Noted: 2021-04-23 | Resolved: 2022-03-21

## 2022-03-21 PROBLEM — E66.01 MORBID OBESITY (HCC): Status: ACTIVE | Noted: 2022-03-21

## 2022-03-21 PROBLEM — E66.9 OBESITY (BMI 30-39.9): Status: RESOLVED | Noted: 2021-04-23 | Resolved: 2022-03-21

## 2022-03-21 LAB
ANION GAP SERPL CALC-SCNC: 1 MMOL/L (ref 3–18)
BASOPHILS # BLD: 0 K/UL (ref 0–0.1)
BASOPHILS NFR BLD: 0 % (ref 0–2)
BUN SERPL-MCNC: 129 MG/DL (ref 7–18)
BUN/CREAT SERPL: 72 (ref 12–20)
CALCIUM SERPL-MCNC: 10.8 MG/DL (ref 8.5–10.1)
CHLORIDE SERPL-SCNC: 113 MMOL/L (ref 100–111)
CO2 SERPL-SCNC: 37 MMOL/L (ref 21–32)
CREAT SERPL-MCNC: 1.78 MG/DL (ref 0.6–1.3)
DIFFERENTIAL METHOD BLD: ABNORMAL
EOSINOPHIL # BLD: 0.1 K/UL (ref 0–0.4)
EOSINOPHIL NFR BLD: 1 % (ref 0–5)
ERYTHROCYTE [DISTWIDTH] IN BLOOD BY AUTOMATED COUNT: 14.9 % (ref 11.6–14.5)
GLUCOSE BLD STRIP.AUTO-MCNC: 111 MG/DL (ref 70–110)
GLUCOSE BLD STRIP.AUTO-MCNC: 115 MG/DL (ref 70–110)
GLUCOSE BLD STRIP.AUTO-MCNC: 124 MG/DL (ref 70–110)
GLUCOSE BLD STRIP.AUTO-MCNC: 125 MG/DL (ref 70–110)
GLUCOSE SERPL-MCNC: 140 MG/DL (ref 74–99)
HCT VFR BLD AUTO: 27.3 % (ref 35–45)
HGB BLD-MCNC: 8.7 G/DL (ref 12–16)
IMM GRANULOCYTES # BLD AUTO: 0.2 K/UL (ref 0–0.04)
IMM GRANULOCYTES NFR BLD AUTO: 2 % (ref 0–0.5)
LYMPHOCYTES # BLD: 1.2 K/UL (ref 0.9–3.6)
LYMPHOCYTES NFR BLD: 12 % (ref 21–52)
MAGNESIUM SERPL-MCNC: 2.5 MG/DL (ref 1.6–2.6)
MAGNESIUM SERPL-MCNC: 2.5 MG/DL (ref 1.6–2.6)
MCH RBC QN AUTO: 30.2 PG (ref 24–34)
MCHC RBC AUTO-ENTMCNC: 31.9 G/DL (ref 31–37)
MCV RBC AUTO: 94.8 FL (ref 78–100)
MONOCYTES # BLD: 0.8 K/UL (ref 0.05–1.2)
MONOCYTES NFR BLD: 9 % (ref 3–10)
NEUTS SEG # BLD: 7.3 K/UL (ref 1.8–8)
NEUTS SEG NFR BLD: 76 % (ref 40–73)
NRBC # BLD: 0 K/UL (ref 0–0.01)
NRBC BLD-RTO: 0 PER 100 WBC
PLATELET # BLD AUTO: 393 K/UL (ref 135–420)
PMV BLD AUTO: 11.3 FL (ref 9.2–11.8)
POTASSIUM SERPL-SCNC: 3.9 MMOL/L (ref 3.5–5.5)
RBC # BLD AUTO: 2.88 M/UL (ref 4.2–5.3)
SODIUM SERPL-SCNC: 151 MMOL/L (ref 136–145)
WBC # BLD AUTO: 9.6 K/UL (ref 4.6–13.2)

## 2022-03-21 PROCEDURE — 94640 AIRWAY INHALATION TREATMENT: CPT

## 2022-03-21 PROCEDURE — 74011250637 HC RX REV CODE- 250/637: Performed by: INTERNAL MEDICINE

## 2022-03-21 PROCEDURE — 74011000250 HC RX REV CODE- 250: Performed by: INTERNAL MEDICINE

## 2022-03-21 PROCEDURE — 77010033678 HC OXYGEN DAILY

## 2022-03-21 PROCEDURE — 65660000000 HC RM CCU STEPDOWN

## 2022-03-21 PROCEDURE — 92526 ORAL FUNCTION THERAPY: CPT

## 2022-03-21 PROCEDURE — 74011250637 HC RX REV CODE- 250/637: Performed by: PHYSICIAN ASSISTANT

## 2022-03-21 PROCEDURE — 74011250636 HC RX REV CODE- 250/636: Performed by: INTERNAL MEDICINE

## 2022-03-21 PROCEDURE — 82962 GLUCOSE BLOOD TEST: CPT

## 2022-03-21 PROCEDURE — 80048 BASIC METABOLIC PNL TOTAL CA: CPT

## 2022-03-21 PROCEDURE — 94660 CPAP INITIATION&MGMT: CPT

## 2022-03-21 PROCEDURE — 2709999900 HC NON-CHARGEABLE SUPPLY

## 2022-03-21 PROCEDURE — 36415 COLL VENOUS BLD VENIPUNCTURE: CPT

## 2022-03-21 PROCEDURE — 85025 COMPLETE CBC W/AUTO DIFF WBC: CPT

## 2022-03-21 PROCEDURE — 83735 ASSAY OF MAGNESIUM: CPT

## 2022-03-21 PROCEDURE — 97530 THERAPEUTIC ACTIVITIES: CPT

## 2022-03-21 PROCEDURE — 97535 SELF CARE MNGMENT TRAINING: CPT

## 2022-03-21 RX ORDER — ACETAMINOPHEN 325 MG/1
650 TABLET ORAL
Status: DISCONTINUED | OUTPATIENT
Start: 2022-03-21 | End: 2022-03-21

## 2022-03-21 RX ORDER — MIDODRINE HYDROCHLORIDE 2.5 MG/1
5 TABLET ORAL
Status: DISCONTINUED | OUTPATIENT
Start: 2022-03-22 | End: 2022-03-24 | Stop reason: HOSPADM

## 2022-03-21 RX ORDER — NYSTATIN 100000 [USP'U]/G
POWDER TOPICAL 2 TIMES DAILY
Status: DISCONTINUED | OUTPATIENT
Start: 2022-03-21 | End: 2022-03-24 | Stop reason: HOSPADM

## 2022-03-21 RX ORDER — ZINC OXIDE 20 G/100G
OINTMENT TOPICAL AS NEEDED
Status: DISCONTINUED | OUTPATIENT
Start: 2022-03-21 | End: 2022-03-24 | Stop reason: HOSPADM

## 2022-03-21 RX ADMIN — ACETAMINOPHEN 650 MG: 325 TABLET ORAL at 12:34

## 2022-03-21 RX ADMIN — NYSTATIN 500000 UNITS: 100000 SUSPENSION ORAL at 22:30

## 2022-03-21 RX ADMIN — NYSTATIN 500000 UNITS: 100000 SUSPENSION ORAL at 12:35

## 2022-03-21 RX ADMIN — APIXABAN 2.5 MG: 2.5 TABLET, FILM COATED ORAL at 22:30

## 2022-03-21 RX ADMIN — ACETAMINOPHEN 650 MG: 325 TABLET ORAL at 05:44

## 2022-03-21 RX ADMIN — APIXABAN 2.5 MG: 2.5 TABLET, FILM COATED ORAL at 11:01

## 2022-03-21 RX ADMIN — ARFORMOTEROL TARTRATE 15 MCG: 15 SOLUTION RESPIRATORY (INHALATION) at 07:50

## 2022-03-21 RX ADMIN — NYSTATIN 500000 UNITS: 100000 SUSPENSION ORAL at 17:11

## 2022-03-21 RX ADMIN — FERROUS SULFATE TAB 325 MG (65 MG ELEMENTAL FE) 325 MG: 325 (65 FE) TAB at 11:01

## 2022-03-21 RX ADMIN — MIDODRINE HYDROCHLORIDE 2.5 MG: 2.5 TABLET ORAL at 12:34

## 2022-03-21 RX ADMIN — FUROSEMIDE 20 MG: 10 INJECTION, SOLUTION INTRAMUSCULAR; INTRAVENOUS at 11:01

## 2022-03-21 RX ADMIN — ASPIRIN 81 MG: 81 TABLET, COATED ORAL at 11:01

## 2022-03-21 RX ADMIN — SODIUM CHLORIDE, PRESERVATIVE FREE 10 ML: 5 INJECTION INTRAVENOUS at 05:51

## 2022-03-21 RX ADMIN — MIDODRINE HYDROCHLORIDE 2.5 MG: 2.5 TABLET ORAL at 17:10

## 2022-03-21 RX ADMIN — SODIUM CHLORIDE, PRESERVATIVE FREE 20 MG: 5 INJECTION INTRAVENOUS at 11:01

## 2022-03-21 RX ADMIN — SODIUM CHLORIDE, PRESERVATIVE FREE 20 ML: 5 INJECTION INTRAVENOUS at 22:30

## 2022-03-21 RX ADMIN — FERROUS SULFATE TAB 325 MG (65 MG ELEMENTAL FE) 325 MG: 325 (65 FE) TAB at 17:10

## 2022-03-21 RX ADMIN — ARFORMOTEROL TARTRATE 15 MCG: 15 SOLUTION RESPIRATORY (INHALATION) at 22:30

## 2022-03-21 RX ADMIN — FERROUS SULFATE TAB 325 MG (65 MG ELEMENTAL FE) 325 MG: 325 (65 FE) TAB at 22:30

## 2022-03-21 NOTE — PROGRESS NOTES
D/C Home with [facility] pending medically appropriate, patient has [facility] accepting facility, and medical equipment is delivered    CM received a call from patient's daughter stating that she wanted to take her mother home instead of sending her to a facility as she states her mother has developed a sacral wound and the family wants to care for her at home. The family wants a hospital bed with gel overlay and want to ensure that the home health company has a wound care nurse who can take care of the wound to her sacrum. In addition the patient will need the Bipap ordered and will need home O2. Per patient family, patient had gold hoop earrings and they are now not with the patient's belongings. CM left message for [employer] as the patient gets her CPAP supplies from this company. CM left message for patient advocate. CM spoke with Amanda Rebolledo who informed CM they would need sleep study notes. CM called pre-op, they stated that the gold hoop earrings were placed in a specimen cup and then sent to PACU. PACU remembers the earrings but stated they are not there now. CM left second message today for [facility] as the patient gets her CPAP supplies there and they may have record of her sleep study. MED can provided a Bipap with a sleep study, notes that the patient has failed tx with CPAP, insurance info, an order stating switch to Bipap, and consult notes. Lack of sleep study notes continues to be a barrier. CHRISTINE called Dr. Dakota Yu office, they will fax the sleep study. CMS has spoken with Yovanny at [facility], [phone], concerning ordering the electric bed. Bed may not beable to be delivered until tomorrow. Charline BARRIGA has reported that the patient's PCP is not licensed to bill Medicare so they can not accept. CMS called other [facility] companies and they reported the same issue. The patient's PCP is not [employer] certified so they can't bill Medicare for [facility].      Patient's PCP called and stated that she is licensed with [employer]. CHRISTINE stated that [facility] is reporting that the provider is not showing up under [employer]. Provider to call [facility] and figure out the problem. CM spoke with Anusha Gonzalez at [facility], who stated that the patient currently has a [employer] at home, delivered on October 2018, with last shown usage on 3/13/2022. The patient's home settings are iPAP 15, ePAP 5. CM called patient's daughter who reported that the machine the mom uses at home is not working properly, as told to her by respiratory.

## 2022-03-21 NOTE — WOUND CARE
IP WOUND CONSULT    [address] [facility] RECORD NUMBER:  [id number]  AGE: 80 y.o. GENDER: female  : 1940  TODAY'S DATE:  3/21/2022    GENERAL     [x] Follow-up   [] New Consult    Marily Hartley is a 80 y.o. female referred by:   [] Physician  [x] Nursing  [] Other:         PAST MEDICAL HISTORY    Past Medical History:   Diagnosis Date    Hypertension     Sleep disorder         PAST SURGICAL HISTORY    Past Surgical History:   Procedure Laterality Date    HX ORTHOPAEDIC      broken right ankle, left femur 30 yrs ago       FAMILY HISTORY    Family History   Problem Relation Age of Onset    Diabetes Mother     Heart Disease Mother     Heart Disease Father        SOCIAL HISTORY    Social History     Tobacco Use    Smoking status: Never Smoker    Smokeless tobacco: Never Used   Vaping Use    Vaping Use: Never used   Substance Use Topics    Alcohol use: Yes     Alcohol/week: 1.0 standard drink     Types: 1 Glasses of wine per week     Comment: soc    Drug use: No       ALLERGIES    Allergies   Allergen Reactions    Seafood Hives     crabs    Statins-Hmg-Coa Reductase Inhibitors Unknown (comments)    Sulfa (Sulfonamide Antibiotics) Hives       MEDICATIONS    No current facility-administered medications on file prior to encounter. Current Outpatient Medications on File Prior to Encounter   Medication Sig Dispense Refill    allopurinoL (ZYLOPRIM) 100 mg tablet Take 100 mg by mouth daily.  acetaminophen (TYLENOL) 325 mg tablet Take 650 mg by mouth every six (6) hours as needed for Pain.  niacin (NIASPAN) 1,000 mg Tb24 tab Take 1,000 mg by mouth daily.  trolamine salicylate-aloe vera 50% (ASPERCREME) topical cream Apply 2 g to affected area as needed for Pain.  multivitamin, tx-iron-ca-min (THERA-M w/ IRON) 9 mg iron-400 mcg tab tablet Take 1 Tab by mouth daily.  aspirin delayed-release 81 mg tablet Take 81 mg by mouth daily.       potassium chloride SR (KLOR-CON 10) 10 mEq tablet Take 10 mEq by mouth daily.  carvediloL (COREG) 3.125 mg tablet Take 3.125 mg by mouth daily. Wt Readings from Last 3 Encounters:   03/20/22 117.4 kg (258 lb 13.1 oz)   04/25/21 101.6 kg (224 lb)   10/01/17 125.6 kg (277 lb)       [unfilled]  Visit Vitals  /69   Pulse 82   Temp 97.9 °F (36.6 °C)   Resp 18   Ht 5' 5\" (1.651 m)   Wt 117.4 kg (258 lb 13.1 oz)   SpO2 98%   Breastfeeding No   BMI 43.07 kg/m²       ASSESSMENT     Skin impairment Identification:  Type: shearing and moisture related    Contributing Factors: decreased mobility, shear force, incontinence of stool, incontinence of urine and obesity    Wound Abdomen Left open area in abdominal fold (Active)   Wound Image   03/21/22 1547   Wound Etiology Other (Comment) 03/21/22 1547   Dressing Status Intact; Old drainage noted 03/21/22 1547   Cleansed Other (Comment) 03/21/22 1547   Dressing/Treatment Open to air 03/21/22 1547   Wound Assessment Epithelialization;Dry 03/21/22 1547   Drainage Amount None 03/21/22 1547   Wound Odor None 03/14/22 0400   Mayela-Wound/Incision Assessment Intact;Fragile 03/14/22 0400   Number of days: 12       Wound Buttocks (Active)   Wound Image   03/21/22 1547   Wound Etiology Other (Comment) 03/21/22 1547   Dressing Status Old drainage noted 03/21/22 1547   Cleansed Other (Comment) 03/21/22 1547   Dressing/Treatment Collagen;Silicone border 47/29/80 1547   Dressing Change Due 03/24/22 03/17/22 1443   Wound Length (cm) 7 cm 03/21/22 1547   Wound Width (cm) 6.5 cm 03/21/22 1547   Wound Depth (cm) 0.1 cm 03/21/22 1547   Wound Surface Area (cm^2) 45.5 cm^2 03/21/22 1547   Wound Volume (cm^3) 4.55 cm^3 03/21/22 1547   Wound Assessment Purple/maroon;Granulation tissue 03/21/22 1547   Drainage Amount Small 03/21/22 1547   Drainage Description Serosanguinous 03/21/22 1547   Wound Odor None 03/21/22 1547   Mayela-Wound/Incision Assessment Intact 03/21/22 1547   Edges Attached edges 03/21/22 1547   Wound Thickness Description Partial thickness 03/21/22 1547   Number of days: 4       Wound Thigh Right;Medial superficial open areas (Active)   Wound Image   03/21/22 1547   Wound Etiology Other (Comment) 03/21/22 1547   Dressing Status Other (Comment) 03/21/22 1547   Dressing/Treatment Zinc paste 03/21/22 1547   Wound Length (cm) 0.5 cm 03/21/22 1547   Wound Width (cm) 0.5 cm 03/21/22 1547   Wound Depth (cm) 0.1 cm 03/21/22 1547   Wound Surface Area (cm^2) 0.25 cm^2 03/21/22 1547   Wound Volume (cm^3) 0.025 cm^3 03/21/22 1547   Wound Assessment Pink/red 03/21/22 1547   Drainage Amount Small 03/21/22 1547   Drainage Description Sanguineous 03/21/22 1547   Wound Odor None 03/21/22 1547   Mayela-Wound/Incision Assessment Fragile; Intact 03/21/22 1547   Edges Attached edges 03/21/22 1547   Wound Thickness Description Partial thickness 03/21/22 1547   Number of days: 0       Incision 03/10/22 Hip Right (Active)   Dressing Status Clean;Dry; Intact 03/20/22 0924   Dressing/Treatment Negative Pressure Wound Therapy 03/14/22 0400   Margins Approximated 03/15/22 0800   Drainage Amount None 03/20/22 0924   Drainage Description Thin;Serosanguinous 03/12/22 2000   Wound Odor None 03/20/22 0924   Number of days: 11          PLAN     Skin Care & Pressure Relief Recommendations  Minimize layers of linen  Pads under patient to optimize support surface  Turn/reposition approximately every 2 hours  Pillow wedges  Manage incontinence     Physician/Provider notified: Yes  Recommendations: apply zinc cream to open areas on inner thighs  Apply antifungal cream under pannus with inter dry cloth or pillow case  Keep dressing to buttock in place and change as needed if soiled assess skin every shift and consult wound care if change in wounds are noted  Teaching completed with:   [] Patient           [x] Family member       [] Caregiver          [x] Nursing  [] Other    Patient/Caregiver Teaching:  Level of patient/caregiver understanding able to: [x] Indicates understanding       [] Needs reinforcement  [] Unsuccessful      [] Verbal Understanding  [] Demonstrated understanding       [] No evidence of learning  [] Refused teaching         [] N/A       Electronically signed by Pablo Forman RN on 3/21/2022 at 4:35 PM

## 2022-03-21 NOTE — ACP (ADVANCE CARE PLANNING)
Advance Care Planning     General Advance Care Planning (ACP) Conversation      Date of Conversation: 3/18/2022  Conducted with: Patient with Decision Making Capacity and Healthcare Decision Maker: Named in Advance Directive or Healthcare Power of [address]:     Primary Decision Maker: Leydi Barnes - Leo - [phone]  Click here to complete [address] including selection of the Healthcare Decision Maker Relationship (ie \"Primary\")          Content/Action Overview:    Has ACP document(s) on file - reflects the patient's care preferences  Reviewed DNR/DNI and patient elects DNR order - completed portable DNR form & placed order    Length of Voluntary ACP Conversation in minutes:  <16 minutes (Non-Billable)    Michael Ruiz RN

## 2022-03-21 NOTE — PROGRESS NOTES
Problem: Mobility Impaired (Adult and Pediatric)  Goal: *Acute Goals and Plan of Care (Insert Text)  Description: Physical TherapyGoals   Initiated 3/13/2022 to be met within 7 days  1. Supine <> sit with max A with use of HR for positioning. (met)  2. Tolerate EOB sitting 5-10 minutes for ADL/balance activities. (progressing)  3. Therapeutic Exercises: Participate with LE strengthening exercise program to facilitate achievement of above. (progressing)    Initiated 3/17/2022 to be met within 7 days  1. Supine <>sit with mod A with use of HR for positioning  2. Tolerate EOB sitting 5-10 min for ADL/balance activities. 3. Therapeutic Exercises: Participate with LE strengthening exercise program to facilitate achievement of above. Note:     PHYSICAL THERAPY TREATMENT    Patient: Ridge Dowling (75 y.o. female)  Date: 3/21/2022  Diagnosis: Acute exacerbation of CHF (congestive heart failure) ([facility]) [I50.9] Acute respiratory failure with hypoxemia ([facility])  Procedure(s) (LRB):  RIGHT TOTAL HIP ARTHROPLASTY WITH C-ARM  (PT IN ROOM # 358) (Right) 11 Days Post-Op  Precautions: Fall,WBAT,Skin    ASSESSMENT:  Pt is making slow progress towards goals. Pt was supine in bed with sister in the room when PT arrived for the session. PT performed PROM on UEs and LEs to maintain current mobility. Pt was able to rotate her head to the left and right with verbal cueing to track objects/people. Pt's sister stated that the pt developed a skin tear on her R hip and provided a picture of the wound. Pt was rolled to the L max assist x3, wound was unable to be seen as pt's sister stated the wound is on her posterior side. Pt was given a pillow on the R side to attempt to relieve pressure around the area and was given a support to the R LE to prevent excessive external rotation in supine. Pt was left supine in bed with all needs within reach. PT recommends continued rehab services to address the impairments listed above.  At discharge PT recommends long term care facility. Progression toward goals:   []      Improving appropriately and progressing toward goals  [x]      Improving slowly and progressing toward goals  []      Not making progress toward goals and plan of care will be adjusted     PLAN:  Patient continues to benefit from skilled intervention to address the above impairments. Continue treatment per established plan of care. Discharge Recommendations:  Long term care  Further Equipment Recommendations for Discharge:  N/A     SUBJECTIVE:   Patient stated yes.     OBJECTIVE DATA SUMMARY:   Critical Behavior:  Neurologic State: Confused  Orientation Level: Oriented to person  Cognition: Decreased attention/concentration,Decreased command following  Safety/Judgement: Fall prevention  Functional Mobility Training:  Bed Mobility:  Rolling: Total assistance;Assist x2   Range Of Motion:   AROM: Grossly decreased, non-functional   PROM: Grossly decreased, non-functional  Ambulation/Gait Training:  Right Side Weight Bearing: As tolerated    Pain:  Pain level pre-treatment: 0/10  Pain level post-treatment: 0/10   Pain Intervention(s): Medication (see MAR); Rest, Ice, Repositioning   Response to intervention: Nurse notified, See doc flow    Activity Tolerance:   Fair  Please refer to the flowsheet for vital signs taken during this treatment. After treatment:   [] Patient left in no apparent distress sitting up in chair  [x] Patient left in no apparent distress in bed  [x] Call bell left within reach  [x] Nursing notified  [x] Caregiver present  [] Bed alarm activated  [] SCDs applied      COMMUNICATION/EDUCATION:   [x]         Role of Physical Therapy in the acute care setting. [x]         Fall prevention education was provided and the patient/caregiver indicated understanding. [x]         Patient/family have participated as able in working toward goals and plan of care.   [x]         Patient/family agree to work toward stated goals and plan of care. []         Patient understands intent and goals of therapy, but is neutral about his/her participation.   []         Patient is unable to participate in stated goals/plan of care: ongoing with therapy staff.  []         Other:        Pratik Rodriguez, SPT   Time Calculation: 27 mins

## 2022-03-21 NOTE — PROGRESS NOTES
Problem: Self Care Deficits Care Plan (Adult)  Goal: *Acute Goals and Plan of Care (Insert Text)  Description: Occupational Therapy Goals  Initiated 3/16/2022 within 7 day(s). 1.  Patient will perform grooming with maximal assistance seated EOB. 2.  Patient will perform upper body dressing with maximal assistance. 3.  Patient will perform self-feeding with moderate assistance . 4. Patient will perform toilet transfers with maximal assistance. 5.  Patient will perform all aspects of toileting with maximal assistance. 6.  Patient will participate in upper extremity therapeutic exercise/activities with maximal assistance for 10 minutes. 7.  Patient will utilize energy conservation techniques during functional activities with verbal, visual, and tactile cues. Outcome: Not Progressing Towards Goal  OCCUPATIONAL THERAPY TREATMENT    Patient: Marily Hartley (55 y.o. female)  Date: 3/21/2022  Diagnosis: Acute exacerbation of CHF (congestive heart failure) ([facility]) [I50.9] Acute respiratory failure with hypoxemia ([facility])  Procedure(s) (LRB):  RIGHT TOTAL HIP ARTHROPLASTY WITH C-ARM  (PT IN ROOM # 358) (Right) 11 Days Post-Op  Precautions: Fall,WBAT,Skin    Chart, occupational therapy assessment, plan of care, and goals were reviewed. ASSESSMENT:  Pt found supine in bed, sister at bedside. Pt seen with PT for additional set of skilled hands. Pt with decreased command following and does not respond appropriately to questions, will repeat some words sister speaks. Provided warm cloth for sensory input and to encourage initiation of task, pt required total A for face washing. Facilitated BUE PROM/AAROM for increased performance of ADLs, pt demonstrates little active motion, and increased flexor tone on BUE. Pt also grimaces during initiation of slight movement to BUE, however grimace tends to lesson over time with increased ROM. Pt requires total Ax2-3 for rolling to L.  Pt left supine in bed, pillows placed under R side for pressure relief, call bell/phone within reach. Progression toward goals:  []          Improving appropriately and progressing toward goals  []          Improving slowly and progressing toward goals  [x]          Not making progress toward goals and plan of care will be adjusted     PLAN:  Patient continues to benefit from skilled intervention to address the above impairments. Continue treatment per established plan of care. Discharge Recommendations:  [facility] vs. [facility]  Further Equipment Recommendations for Discharge: To Be Determined (TBD) at next level of care     SUBJECTIVE:   Patient stated yes or no.  (when sister instructed pt to say yes or no\"    OBJECTIVE DATA SUMMARY:   Cognitive/Behavioral Status:  Neurologic State: Confused  Orientation Level: Oriented to person  Cognition: Decreased attention/concentration,Decreased command following  Safety/Judgement: Decreased awareness of environment    Functional Mobility and Transfers for ADLs:   Bed Mobility:  Rolling: Total assistance;Assist x2    ADL Intervention:  Feeding  Feeding Assistance: Total assistance (dependent)  Food to Mouth: Total assistance (dependent)    Grooming  Grooming Assistance: Total assistance(dependent)  Position Performed: Long sitting on bed  Washing Hands: Total assistance (dependent)    Pain:  Pt unable to provide numerical value, grimaces when BUE/BLE moved  Pain Intervention(s): Rest, Ice, Repositioning   Response to intervention: Nurse notified, See doc flow sheet    Please refer to the flowsheet for vital signs taken during this treatment.   After treatment:   []  Patient left in no apparent distress sitting up in chair  [x]  Patient left in no apparent distress in bed  [x]  Call bell left within reach  [x]  Nursing notified  [x]  Caregiver present  []  Bed alarm activated    COMMUNICATION/EDUCATION:   [x] Role of Occupational Therapy in the acute care setting  [x] Home safety education was provided and the patient/caregiver indicated understanding. [x] Patient/family have participated as able in working towards goals and plan of care. [x] Patient/family agree to work toward stated goals and plan of care. [] Patient understands intent and goals of therapy, but is neutral about his/her participation. [] Patient is unable to participate in goal setting and plan of care.       Thank you for this referral.  Roderick Chaparro, OTR/L  Time Calculation: 32 mins

## 2022-03-21 NOTE — PROGRESS NOTES
[facility]  Pulmonary, Critical Care, and Sleep Medicine    Name: Chirag Hoang MRN: [id number]   : 1940 Hospital: [facility] [place]    Date: 3/21/2022  Room: [address] [place] [address] Note                                              Consult requesting physician: Dr. David Cruz  Reason for Consult: Respiratory failure    IMPRESSION:   Acute respiratory failure with hypoxemia - J96.01  Acute on chronic hypercarbic respiratory failure - J96.22  Acute on chronic diastolic congestive heart failure - I50.33  SERENA - G47.33  OHS - E66.2  Aspiration pneumonia, E. Cloacae complex in respi cx 3/10/22  Atrial fibrillation   Fracture of neck of right femur. Patient Active Problem List   Diagnosis Code    Elevated troponin R77.8    HTN (hypertension) I10    SERENA treated with BiPAP G47.33    Acute on chronic diastolic congestive heart failure (HCC) I50.33    Acute respiratory failure with hypoxemia (HCC) J96.01    Atrial fibrillation (HCC) I48.91    Stage 3 chronic kidney disease (HCC) N18.30    SERENA (obstructive sleep apnea) G47.33    Fracture of neck of right femur ([facility]) S72.001A    Hypokalemia E87.6    Hypotension I95.9    Morbid obesity ([facility] [address] E66.01   Code status: DNR       RECOMMENDATIONS:     Respiratory: History of obesity, SERENA on BiPAP  Patient has not received new supplies from DME beyond last visit in office  Daughter interested in changing DME for supplies from [employer]  She has benefited from BIPAP 16/6, PS 10 cwp. Through [employer], changed pt's unit settings to IPAP 20 cwp, EPAP 6 cwp, PS 10 cwp  During prior device therapy efficiency download, mask leaks may have contributed to residual respiratory events on BiPAP therapy. Advised the daughter to monitor any mask leaks  Patient would benefit from full facemask.   I will discuss with RT if patient could get mask to take home with otherwise we will either try to get a sample or new mask supplies through new DME prior to discharge  Continue supplemental O2 via nasal cannula  Keep SPO2 >=92%. HOB 30 degree elevation all the time. Aggressive pulmonary toileting. Aspiration precautions    Postoperative respiratory failure, reintubated in PACU 3/10/2022. Extubated on 3/15/2022  Patient has been on home BiPAP and presented for acute CHF with Afib RVR during this hospitalization. Later found to have RT hip # leading to surgery and eventful post-op course including ventilator use in ICU   Continue BIPAP at night while at hospital    [facility]: manage per cardiology on board. Diuretics - lasix 20 mg x q12   Midodrine for BP support  A. fib RVR, acute on chronic diastolic CHF on admission  Continue aspirin, Eliquis; monitor for any external bleeding  Coreg on hold - restart per cardiology    Echo 3/3/2022: LVEF 55 to 60%. RV mildly dilated. RA mildly dilated. PVL LE 3/3/2022: No DVT. VQ scan 3/3/2022: No PE.    ID: Finished ABX Merrem 3/20/22 per ID   No fever or leukocytosis or active sepsis. Defer to hospitalist  Rapid influenza and COVID19 3/2/2022: Negative. Urine culture 3/2/2022: Klebsiella pneumonia. Blood culture 3/2/2022: Negative. Endotracheal aspirate culture 3/10/22: Light Enterobacter cloacae complex. Heavy normal forrest. Hematology/Oncology: Anemia; Hgb stable. Defer to hospitalist  No external bleeding. Normal platelets. Renal: stable S. Cr  Mild hypernatremia; manage per hospitalist.  Lasix held due to elevated S. Na+  Dr. Charlee Marina followed    GI/: No acute issues. USG - hepatic steatosis, cholelithiasis. Elevated T. Bili  No abdominal pain or signs of acute abdomen    Endocrine: Monitor FSBS    Neurology: On/off periods of lethargy, generalized weak  Ct Head ammonia TSH normal  Neurology followed  CT head 3/7/2022: Nil acute. Repeat CT head 3/11/2022: Nil acute.     Pain/Sedation: Judicious sedation, anxiolytic, opiate    Skin/Wound: Right hip wound VAC in place after surgery; local care per nursing protocol. Electrolytes: Replace electrolytes per hospitalist and floor    Nutrition: Tolerating dysphagia diet    Prophylaxis: DVT Prophylaxis: Eliquis. GI Prophylaxis: Protonix. Restraints: none    Advance Directive/Palliative Care: Consulted. DNR    Will defer respective systems problem management to primary and other respective consultant and follow patient with primary and other medical team.  Further recommendations will be based on the patient's response to recommended treatment and results of the investigation ordered. Quality Care: PPI, DVT prophylaxis, HOB elevated, Infection control all reviewed and addressed. Care of plan d/w RN, RT, hospitalist team,     High complexity decision making was performed during the evaluation of this patient excluding family discussion and any procedures  I updated daughter today at bedside     Subjective/History of Present Illness:   Patient is a 80 y.o. female with PMHx significant for morbid obesity, SERENA, right leg weakness, HTN, A. fib, CHF; admitted to medical floor on 3/3/2022 for dyspnea. Patient had new onset of A. fib on admission. Patient was admitted on medical floor and A. fib/CHF was being treated medically; and was using home BiPAP nightly. Found to have right femoral fracture; underwent right press-fit direct anterior total hip arthroplasty for femoral neck fracture. Postoperatively patient was extubated in PACU; but due to respiratory distress, reintubated and transferred to ICU.      3/21/2022 :   Patient seen at bedside in room #360  Daughter in room  Patient remained generalized weak and mostly moaning but does not speak much  On O2 via nasal cannula  Using BiPAP at nighttime  Answered all the questions of daughter to their satisfaction    DNR/DNI    I/O last 24 hrs:      Intake/Output Summary (Last 24 hours) at 3/21/2022 1884  Last data filed at 3/21/2022 0418  Gross per 24 hour   Intake    Output 500 ml   Net -500 ml Review of Systems:  ROS not obtained due to patient factor. Allergies   Allergen Reactions    Seafood Hives     crabs    Statins-Hmg-Coa Reductase Inhibitors Unknown (comments)    Sulfa (Sulfonamide Antibiotics) Hives      Past Medical History:   Diagnosis Date    Hypertension     Sleep disorder       Past Surgical History:   Procedure Laterality Date    HX ORTHOPAEDIC      broken right ankle, left femur 30 yrs ago      Social History     Tobacco Use    Smoking status: Never Smoker    Smokeless tobacco: Never Used   Substance Use Topics    Alcohol use: Yes     Alcohol/week: 1.0 standard drink     Types: 1 Glasses of wine per week     Comment: soc      Family History   Problem Relation Age of Onset    Diabetes Mother     Heart Disease Mother     Heart Disease Father       Prior to Admission medications    Medication Sig Start Date End Date Taking? Authorizing Provider   allopurinoL (ZYLOPRIM) 100 mg tablet Take 100 mg by mouth daily. Yes Provider, Historical   acetaminophen (TYLENOL) 325 mg tablet Take 650 mg by mouth every six (6) hours as needed for Pain. Yes Provider, Historical   niacin (NIASPAN) 1,000 mg Tb24 tab Take 1,000 mg by mouth daily. Yes Provider, Historical   trolamine salicylate-aloe vera 48% (ASPERCREME) topical cream Apply 2 g to affected area as needed for Pain. Yes Provider, Historical   multivitamin, tx-iron-ca-min (THERA-M w/ IRON) 9 mg iron-400 mcg tab tablet Take 1 Tab by mouth daily. Yes Provider, Historical   aspirin delayed-release 81 mg tablet Take 81 mg by mouth daily. Yes Provider, Historical   potassium chloride SR (KLOR-CON 10) 10 mEq tablet Take 10 mEq by mouth daily. Yes Provider, Historical   carvediloL (COREG) 3.125 mg tablet Take 3.125 mg by mouth daily.    Yes Provider, Historical     Current Facility-Administered Medications   Medication Dose Route Frequency    nystatin (MYCOSTATIN) 100,000 unit/gram powder   Topical BID    midodrine (PROAMATINE) tablet 2.5 mg  2.5 mg Oral TID WITH MEALS    furosemide (LASIX) injection 20 mg  20 mg IntraVENous BID    arformoteroL (BROVANA) neb solution 15 mcg  15 mcg Nebulization BID RT    famotidine (PF) (PEPCID) 20 mg in 0.9% sodium chloride 10 mL injection  20 mg IntraVENous DAILY    ferrous sulfate tablet 325 mg  1 Tablet Oral TID    nystatin (MYCOSTATIN) 100,000 unit/mL oral suspension 500,000 Units  500,000 Units Oral QID    apixaban (ELIQUIS) tablet 2.5 mg  2.5 mg Oral BID    sodium chloride (NS) flush 5-40 mL  5-40 mL IntraVENous Q8H    [Held by provider] carvediloL (COREG) tablet 3.125 mg  3.125 mg Oral BID WITH MEALS    aspirin delayed-release tablet 81 mg  81 mg Oral DAILY         Objective:   Vital Signs:    Visit Vitals  /71   Pulse 86   Temp 98.4 °F (36.9 °C)   Resp 18   Ht 5' 5\" (1.651 m)   Wt 117.4 kg (258 lb 13.1 oz)   SpO2 100%   Breastfeeding No   BMI 43.07 kg/m²       O2 Device: Nasal cannula   O2 Flow Rate (L/min): 2 l/min   Temp (24hrs), Av.4 °F (36.9 °C), Min:97.9 °F (36.6 °C), Max:98.7 °F (37.1 °C)       Intake/Output:   Last shift:      No intake/output data recorded. Last 3 shifts:  1901 -  0700  In: 0   Out: 1200 [Urine:1200]      Intake/Output Summary (Last 24 hours) at 3/21/2022 1704  Last data filed at 3/21/2022 0418  Gross per 24 hour   Intake    Output 500 ml   Net -500 ml       Last 3 Recorded Weights in this Encounter    22 0735 22 0807 22 1416   Weight: 113.5 kg (250 lb 3.6 oz) 116.7 kg (257 lb 4.4 oz) 117.4 kg (258 lb 13.1 oz)       No results for input(s): PHI, PHI, POC2, PCO2I, PO2, PO2I, HCO3, HCO3I, FIO2, FIO2I in the last 72 hours.     Physical Exam:     General: in no respiratory distress, cooperative, appears stated age, morbidly obese, chronically ill looking, on O2 via nasal cannula  HEENT: PERRL, throat normal without erythema or exudate  Neck: No abnormally enlarged lymph nodes or thyroid, supple  Chest: Obese chest wall limiting exam  Lungs: moderate air entry, rhonchi scattered and no wheezes bilaterally, no tenderness/ rash  Heart: Regular rate and rhythm, S1S2 present, or without murmur or extra heart sounds  Abdomen: Obese, bowel sounds normoactive, tympanic, abdomen is soft without significant tenderness, masses, organomegaly or guarding, rigidity, rebound  Extremity: Trace, pitting and BL LE edema, no cyanosis; peripheral pulses 2+ and symmetric  Neuro: generalized weak, moves all extremities slowly, no involuntary movements  Skin: Skin color, texture, turgor fair       Data:       Recent Results (from the past 24 hour(s))   GLUCOSE, POC    Collection Time: 03/20/22  9:00 PM   Result Value Ref Range    Glucose (POC) 153 (H) 70 - 110 mg/dL   MAGNESIUM    Collection Time: 03/21/22  3:00 AM   Result Value Ref Range    Magnesium 2.5 1.6 - 2.6 mg/dL   GLUCOSE, POC    Collection Time: 03/21/22  6:06 AM   Result Value Ref Range    Glucose (POC) 111 (H) 70 - 110 mg/dL   GLUCOSE, POC    Collection Time: 03/21/22 12:07 PM   Result Value Ref Range    Glucose (POC) 125 (H) 70 - 110 mg/dL   CBC WITH AUTOMATED DIFF    Collection Time: 03/21/22  2:11 PM   Result Value Ref Range    WBC 9.6 4.6 - 13.2 K/uL    RBC 2.88 (L) 4.20 - 5.30 M/uL    HGB 8.7 (L) 12.0 - 16.0 g/dL    HCT 27.3 (L) 35.0 - 45.0 %    MCV 94.8 78.0 - 100.0 FL    MCH 30.2 24.0 - 34.0 PG    MCHC 31.9 31.0 - 37.0 g/dL    RDW 14.9 (H) 11.6 - 14.5 %    PLATELET 773 164 - 109 K/uL    MPV 11.3 9.2 - 11.8 FL    NRBC 0.0 0  WBC    ABSOLUTE NRBC 0.00 0.00 - 0.01 K/uL    NEUTROPHILS 76 (H) 40 - 73 %    LYMPHOCYTES 12 (L) 21 - 52 %    MONOCYTES 9 3 - 10 %    EOSINOPHILS 1 0 - 5 %    BASOPHILS 0 0 - 2 %    IMMATURE GRANULOCYTES 2 (H) 0.0 - 0.5 %    ABS. NEUTROPHILS 7.3 1.8 - 8.0 K/UL    ABS. LYMPHOCYTES 1.2 0.9 - 3.6 K/UL    ABS. MONOCYTES 0.8 0.05 - 1.2 K/UL    ABS. EOSINOPHILS 0.1 0.0 - 0.4 K/UL    ABS. BASOPHILS 0.0 0.0 - 0.1 K/UL    ABS. IMM.  GRANS. 0.2 (H) 0.00 - 0.04 K/UL    DF AUTOMATED     METABOLIC PANEL, BASIC    Collection Time: 03/21/22  2:11 PM   Result Value Ref Range    Sodium 151 (H) 136 - 145 mmol/L    Potassium 3.9 3.5 - 5.5 mmol/L    Chloride 113 (H) 100 - 111 mmol/L    CO2 37 (H) 21 - 32 mmol/L    Anion gap 1 (L) 3.0 - 18 mmol/L    Glucose 140 (H) 74 - 99 mg/dL     (H) 7.0 - 18 MG/DL    Creatinine 1.78 (H) 0.6 - 1.3 MG/DL    BUN/Creatinine ratio 72 (H) 12 - 20      GFR est AA 33 (L) >60 ml/min/1.73m2    GFR est non-AA 27 (L) >60 ml/min/1.73m2    Calcium 10.8 (H) 8.5 - 10.1 MG/DL   MAGNESIUM    Collection Time: 03/21/22  2:11 PM   Result Value Ref Range    Magnesium 2.5 1.6 - 2.6 mg/dL   GLUCOSE, POC    Collection Time: 03/21/22  4:38 PM   Result Value Ref Range    Glucose (POC) 115 (H) 70 - 110 mg/dL         Chemistry Recent Labs     03/21/22  1411 03/21/22  0300 03/20/22  0530 03/19/22  0940 03/19/22  0359 03/19/22  0359   *  --   --   --   --  113*   *  --   --   --   --  145   K 3.9  --  3.6 4.8   < > 3.7   *  --   --   --   --  108   CO2 37*  --   --   --   --  31   *  --   --   --   --  113*   CREA 1.78*  --   --   --   --  1.99*   CA 10.8*  --   --   --   --  10.7*   MG 2.5 2.5 2.6  --    < > 2.5   PHOS  --   --  3.5  --   --  3.3   AGAP 1*  --   --   --   --  6   BUCR 72*  --   --   --   --  57*   AP  --   --   --   --   --  182*   TP  --   --   --   --   --  6.4   ALB  --   --   --   --   --  2.7*   GLOB  --   --   --   --   --  3.7   AGRAT  --   --   --   --   --  0.7*    < > = values in this interval not displayed. Lactic Acid Lactic acid   Date Value Ref Range Status   03/17/2022 1.3 0.4 - 2.0 MMOL/L Final     No results for input(s): LAC in the last 72 hours.      Liver Enzymes Protein, total   Date Value Ref Range Status   03/19/2022 6.4 6.4 - 8.2 g/dL Final     Albumin   Date Value Ref Range Status   03/19/2022 2.7 (L) 3.4 - 5.0 g/dL Final     Globulin   Date Value Ref Range Status   03/19/2022 3.7 2.0 - 4.0 g/dL Final     A-G Ratio   Date Value Ref Range Status   03/19/2022 0.7 (L) 0.8 - 1.7   Final     Alk. phosphatase   Date Value Ref Range Status   03/19/2022 182 (H) 45 - 117 U/L Final     Recent Labs     03/19/22  0359   TP 6.4   ALB 2.7*   GLOB 3.7   AGRAT 0.7*   *        CBC w/Diff Recent Labs     03/21/22  1411   WBC 9.6   RBC 2.88*   HGB 8.7*   HCT 27.3*      GRANS 76*   LYMPH 12*   EOS 1        Cardiac Enzymes No results found for: CPK, CK, CKMMB, CKMB, RCK3, CKMBT, CKNDX, CKND1, FARA, TROPT, TROIQ, ADOLFO, TROPT, TNIPOC, BNP, BNPP     BNP No results found for: BNP, BNPP, XBNPT     Coagulation No results for input(s): PTP, INR, APTT, INREXT, INREXT in the last 72 hours. Thyroid  Lab Results   Component Value Date/Time    TSH 0.38 03/16/2022 04:35 AM       No results found for: T4     Urinalysis Lab Results   Component Value Date/Time    Color YELLOW 03/02/2022 09:04 PM    Appearance CLOUDY 03/02/2022 09:04 PM    Specific gravity 1.021 03/02/2022 09:04 PM    pH (UA) 5.0 03/02/2022 09:04 PM    Protein 300 (A) 03/02/2022 09:04 PM    Glucose Negative 03/02/2022 09:04 PM    Ketone TRACE (A) 03/02/2022 09:04 PM    Bilirubin Negative 03/02/2022 09:04 PM    Urobilinogen 1.0 03/02/2022 09:04 PM    Nitrites Negative 03/02/2022 09:04 PM    Leukocyte Esterase Negative 03/02/2022 09:04 PM    Epithelial cells 2+ 03/02/2022 09:04 PM    Bacteria FEW (A) 03/02/2022 09:04 PM    WBC 0 to 3 03/02/2022 09:04 PM    RBC 0 to 3 03/02/2022 09:04 PM        Micro  No results for input(s): SDES, CULT in the last 72 hours. No results for input(s): CULT in the last 72 hours. Culture data during this hospitalization.    All Micro Results     Procedure Component Value Units Date/Time    CULTURE, BLOOD [[id number]] Collected: 03/14/22 0419    Order Status: Completed Specimen: Blood Updated: 03/20/22 0739     Special Requests: NO SPECIAL REQUESTS        Culture result: NO GROWTH 6 DAYS       CULTURE, BLOOD [[id number]] Collected: 03/14/22 0420    Order Status: Completed Specimen: Blood Updated: 03/20/22 0739     Special Requests: NO SPECIAL REQUESTS        Culture result: NO GROWTH 6 DAYS       CULTURE, RESPIRATORY/SPUTUM/BRONCH W GRAM STAIN [[id number]]  (Abnormal)  (Susceptibility) Collected: 03/10/22 1630    Order Status: Completed Specimen: Sputum from Endotracheal aspirate Updated: 03/13/22 0905     Special Requests: NO SPECIAL REQUESTS        GRAM STAIN NO WBC'S SEEN         NO EPITHELIAL CELLS SEEN         NO ORGANISMS SEEN        Culture result:       LIGHT ENTEROBACTER CLOACAE COMPLEX                  HEAVY NORMAL RESPIRATORY ELSIE          CULTURE, RESPIRATORY/SPUTUM/BRONCH Amye Chroman [[id number]] Collected: 03/10/22 1915    Order Status: Canceled Specimen: Sputum from Endotracheal aspirate     CULTURE, BLOOD [[id number]] Collected: 03/02/22 2048    Order Status: Completed Specimen: Blood Updated: 03/08/22 0655     Special Requests: NO SPECIAL REQUESTS        Culture result: NO GROWTH 6 DAYS       CULTURE, BLOOD [[id number]] Collected: 03/02/22 2048    Order Status: Completed Specimen: Blood Updated: 03/08/22 0655     Special Requests: NO SPECIAL REQUESTS        Culture result: NO GROWTH 6 DAYS       CULTURE, URINE [[id number]]  (Abnormal)  (Susceptibility) Collected: 03/02/22 2104    Order Status: Completed Specimen: Cath Urine Updated: 03/05/22 1226     Special Requests: NO SPECIAL REQUESTS        [place] Count --        >100,000  COLONIES/mL       Culture result: KLEBSIELLA PNEUMONIAE       COVID-19 RAPID TEST [[id number]] Collected: 03/02/22 2025    Order Status: Completed Specimen: Nasopharyngeal Updated: 03/02/22 2052     Specimen source Nasopharyngeal        COVID-19 rapid test Not detected        Comment: Rapid Abbott ID Now       Rapid NAAT:  The specimen is NEGATIVE for SARS-CoV-2, the novel coronavirus associated with COVID-19.        Negative results should be treated as presumptive and, if inconsistent with clinical signs and symptoms or necessary for patient management, should be tested with an alternative molecular assay. Negative results do not preclude SARS-CoV-2 infection and should not be used as the sole basis for patient management decisions. This test has been authorized by the FDA under an Emergency Use Authorization (EUA) for use by authorized laboratories. Fact sheet for Healthcare Providers: ConventionVanderbilt Universitydate.co.nz  Fact sheet for Patients: UYA100.co.nz       Methodology: Isothermal Nucleic Acid Amplification         INFLUENZA A & B AG (RAPID TEST) [[id number]] Collected: 03/02/22 2025    Order Status: Completed Specimen: Nasopharyngeal from Nasal washing Updated: 03/02/22 2047     Influenza A Antigen Negative        Comment: A negative result does not exclude influenza virus infection, seasonal or H1N1 due to suboptimal sensitivity. If influenza is circulating in your community, a diagnosis of influenza should be considered based on a patients clinical presentation and empiric antiviral treatment should be considered, if indicated. Influenza B Antigen Negative                NM LUNG SCAN PERF  Result Date: 3/3/2022  EXAM: NM LUNG SCAN PERF. CLINICAL INDICATION/HISTORY: d dimer increase dyspnea. -Additional: Clinical concern for pulmonary embolus. COMPARISON: Correlation is made with the radiographic study performed one day prior. TECHNIQUE: 7.2 mCi Tc-99m MAA was injected intravenously and the 8 standard views of the chest were obtained. This perfusion only examination is interpreted using the PISAPED criteria. _______________ FINDINGS: Perfusion images show no segmental perfusion defects to suggest the presence of pulmonary embolism. _______________     o scintigraphic findings to suggest the presence of acute pulmonary embolism.  _______________       XR HIP RT W OR WO PELV 2-3 VWS  Result Date: 3/7/2022  EXAM: RIGHT HIP RADIOGRAPHS CLINICAL INDICATION/HISTORY: right hip pain -Additional: None COMPARISON: May March 3, 2022. TECHNIQUE: AP pelvis and frog-leg lateral view of right hip. _______________ FINDINGS: BONES: Intact appearing ilioischial and iliopectineal lines. There is a displaced and impacted subcapital right femoral neck fracture, with mild progressive displacement on follow-up imaging. Mild-moderate bilateral hip joint arthrosis. SOFT TISSUES: Unremarkable. _______________     1. Displaced and impacted subcapital right femoral neck fracture, increased in displacement from CT performed 4 days prior. CT HEAD WO CONT  Result Date: 3/7/2022  EXAM: CT head INDICATION: Altered mental status. COMPARISON: 4/23/2021. TECHNIQUE: Axial CT imaging of the head was performed without intravenous contrast. Standard multiplanar coronal and sagittal reformatted images were obtained and are included in interpretation. One or more dose reduction techniques were used on this CT: automated exposure control, adjustment of the mAs and/or kVp according to patient size, and iterative reconstruction techniques. The specific techniques used on this CT exam have been documented in the patient's electronic medical record. Digital Imaging and Communications in Medicine (DICOM) format image data are available to nonaffiliated external healthcare facilities or entities on a secure, media free, reciprocally searchable basis with patient authorization for at least a 12-month period after this study. _______________ FINDINGS: BRAIN AND POSTERIOR FOSSA: Periventricular white matter hypoattenuation which is nonspecific but likely represents chronic ischemic changes. No evidence of acute large vessel transcortical infarct or acute parenchymal hemorrhage. No midline shift or hydrocephalus. EXTRA-AXIAL SPACES AND MENINGES: There are no abnormal extra-axial fluid collections. CALVARIUM: Intact. SINUSES: Clear.  OTHER: None. _______________     No acute intracranial abnormality. CT CHEST ABD PELV WO CONT  Result Date: 3/3/2022  EXAM: CT CHEST, ABDOMEN AND PELVIS CLINICAL INDICATION/HISTORY: Hypoxemia, retrocardiac density, diarrhea, weakness and shortness of breath COMPARISON: 3/2/2022 TECHNIQUE: Axial CT imaging of the chest, abdomen, and pelvis was performed without intravenous contrast. Multiplanar reformats were generated. One or more dose reduction techniques were used on this CT: automated exposure control, adjustment of the mAs and/or kVp according to patient size, and iterative reconstruction techniques. The specific techniques used on this CT exam have been documented in the patient's electronic medical record. Digital Imaging and Communications in Medicine (DICOM) format image data are available to nonaffiliated external healthcare facilities or entities on a secure, media free, reciprocally searchable basis with patient authorization for at least a 12-month period after this study. ________________ FINDINGS: LIMITATIONS:   > Suboptimal evaluation given lack of intravenous contrast.   > Motion artifact is present which limits evaluation.   > Suboptimal exam due to patient body habitus and arms by the side. CHEST: LUNGS: Respiratory motion significantly limits evaluation. Interlobar septal thickening in the upper lobes. Mild bilateral dependent atelectasis. No large consolidation or suspicious pulmonary mass. PLEURA: Very small volume bilateral pleural effusions. AIRWAY: Normal. MEDIASTINUM: Four-chamber cardiomegaly with asymmetric biatrial enlargement, mitral and aortic annular calcifications. The main pulmonary artery is enlarged suggesting pulmonary arterial hypertension. Normal caliber aorta with scattered calcified atherosclerotic plaque. No pericardial effusion. LYMPH NODES: No enlarged lymph nodes. CHEST WALL: Diffuse anasarca. Heterogeneous thyroid. _______________ ABDOMEN/PELVIS: LIVER: No enhancing hepatic mass.  The portal and hepatic veins are patent. BILIARY: Gallstones are present in the nondistended gallbladder lumen. No biliary dilation. SPLEEN: Normal. PANCREAS: Normal. ADRENALS: Left adrenal gland measures 9 HU (axial image 76), most consistent with lipid rich adenoma. Normal right adrenal gland. KIDNEYS: Asymmetrically small left kidney. No rate of a stone. No hydronephrosis. GI TRACT:  A small hiatal hernia is present. Normal caliber small and large bowel loops. No morphology of bowel obstruction. Normal appendix. BLADDER: Diffuse wall thickening in the largely decompressed bladder. PELVIC ORGANS: No acute abnormality. VASCULATURE: No arterial aneurysm. 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 LYMPH NODES: No mesenteric or retroperitoneal lymphadenopathy. OTHER: No free intraperitoneal air. No ascites. Diffuse anasarca. OSSEOUS STRUCTURES: No acute osseous abnormality. Multilevel degenerative changes most pronounced in the lumbar spine. Degenerative changes in both shoulders (right greater than left). Please note the included portions of the upper extremities are not well evaluated on this study _______________     1. Findings of congestive heart failure with cardiomegaly, interstitial edema, very small bilateral pleural effusions, and diffuse anasarca. 2.  Bladder wall thickening may be due to underdistention though superimposed infection cannot be excluded. Recommend correlation for cystitis. 3.  Osseous degenerative changes and additional findings, as detailed in the body of the report. Hip x-ray 3/11/2022:  Status post total right hip arthroplasty, without complication. ECHO ADULT COMPLETE  Result Date: 3/3/2022    Left Ventricle: Left ventricle size is normal. Moderately increased wall thickness. Findings consistent with moderate concentric hypertrophy. Normal wall motion. Normal left ventricular systolic function with a visually estimated EF of 55 - 60%.    Left Atrium: Left atrium is mildly dilated.   Right Ventricle: Right ventricle is mildly dilated.   Right Atrium: Right atrium is mildly dilated.   Pulmonary artery :  Estimated pulmonary arterial systolic pressure is 51 mmhg. Pulmonary hypertension found to be moderate   Mitral Valve: Moderately thickened leaflet. Mildly calcified leaflet. Moderate annular calcification of the mitral valve.   IVC/SVC : IVC diameter is greater than 21 mm and decreases less than 50% during inspiration; therefore the estimated right atrial pressure is elevated (~15 mmHg). IVC is dilated. Consider LORRAINE for better evaluation of mitral valve if clinically indicated. DUPLEX LOWER EXT VENOUS BILAT  Result Date: 3/4/2022  · No evidence of deep vein thrombosis in the right lower extremity. · No evidence of deep vein thrombosis in the left lower extremity. USG retroperitoneum 3/12/2022: No hydronephrosis. ECHO 3/3/2022: Result status: Final result       Left Ventricle: Left ventricle size is normal. Moderately increased wall thickness. Findings consistent with moderate concentric hypertrophy. Normal wall motion. Normal left ventricular systolic function with a visually estimated EF of 55 - 60%.   Left Atrium: Left atrium is mildly dilated.   Right Ventricle: Right ventricle is mildly dilated.   Right Atrium: Right atrium is mildly dilated.   Pulmonary artery :  Estimated pulmonary arterial systolic pressure is 51 mmhg. Pulmonary hypertension found to be moderate    Mitral Valve: Moderately thickened leaflet. Mildly calcified leaflet. Moderate annular calcification of the mitral valve.   IVC/SVC : IVC diameter is greater than 21 mm and decreases less than 50% during inspiration; therefore the estimated right atrial pressure is elevated (~15 mmHg). IVC is dilated.     Consider LORRAINE for better evaluation of mitral valve if clinically indicated.        Images report reviewed by me:  CT (Most Recent) (CT chest reviewed by me) [facility]    CT HEAD WO CONT    Narrative  EXAM: CT of the brain without intravenous contrast.    INDICATION:  \"AMS. \"    COMPARISON:  CT March 11, 2022. DOSE REDUCTION AND DICOM INFORMATION: One or more dose reduction techniques were  used on this CT: automated exposure control, adjustment of the mAs and/or kVp  according to patient size, and iterative reconstruction techniques. The  specific techniques used on this CT exam have been documented in the patient's  electronic medical record. Digital Imaging and Communications in Medicine  (DICOM) format image data are available to nonaffiliated external healthcare  facilities or entities on a secure, media free, reciprocally searchable basis  with patient authorization for at least a 12-month period after this study. _______________    FINDINGS:    IMAGE QUALITY:  Diagnostic. BRAIN AND EXTRA-AXIAL SPACE:    SUBACUTE TERRITORIAL TRANSCORTICAL INFARCT:  None detected. MASS:  None detected. HEMORRHAGE:  None. SUBDURAL FLUID COLLECTION:  None detected. HYDROCEPHALUS:  None. REMOTE  TERRITORIAL CEREBRAL INFARCT:  None detected. REMOTE CEREBELLAR INFARCT:  None detected. STRIVE (STandards for Reporting Vascular changes on nEuroimaging):  --[place] of white matter hypodensity  (\"leukoaraiosis\") of presumed vascular origin:  Low-grade. --[place] of lacunes of presumed vascular origin  (often undetectable on CT):  None definite. --Degree of brain atrophy:  Moderate. BURDEN OF CALCIFIC INTRACRANIAL ATHEROSCLEROSIS:   Moderate. HEENT:    INCLUDED UPPER ORBITS:  There are bilateral lens replacements. INCLUDED UPPER PARANASAL SINUSES:  Predominantly clear. MASTOID AIR CELLS:  Predominantly clear. BONES:  Unremarkable. SUPERFICIAL SOFT TISSUES: Unremarkable.    _______________    Impression  Generalized chronic changes, however, no mass or acute findings.     _______________         CXR reviewed by me:  XR (Most Recent). CXR  reviewed by me and compared with previous CXR Results from Hospital Encounter encounter on 03/02/22    XR CHEST PORT    Narrative  CLINICAL HISTORY:  Short of breath. COMPARISONS:  Chest x-ray 3/19/2022, and earlier studies. TECHNIQUE:  single frontal view of the chest    ------------------------------------------    FINDINGS:    Lungs:  Somewhat unusual air interface at the right lung base, present to  varying degrees on prior x-rays and suspect reflects right middle lobe  atelectasis. No other evidence of pneumothorax. Streaky opacity at left lung  base, likely atelectasis, not appreciably changed. Mild central bronchial wall  thickening, likely bronchitis or asthma. Probable tiny left pleural effusion, not appreciably changed. Mediastinum: Moderate to severe cardiomegaly. Atherosclerotic arterial  calcification. Bones: Scoliosis. Mild to moderate thoracic degenerative disc disease.      ------------------------------------------    Impression  1. Probable partial right middle lobe atelectasis, similar to most recent prior  radiograph. 2. Streaky opacity at left lung base, likely atelectasis, though  bronchopneumonia or aspiration not excluded. CXR 3/13/2022:  FINDINGS:  SUPPORT DEVICES: Endotracheal tube and visualized gastric tube both project in  stable position from prior exam.     HEART AND MEDIASTINUM: Enlarged appearing cardiac silhouette with stable  mediastinal contours.     LUNGS AND PLEURAL SPACES: Faint/hazy right lower lung zone opacity. No  pneumothorax or pleural effusion demonstrated.     BONY THORAX AND SOFT TISSUES: Unremarkable.  ______________     IMPRESSION  1. Support devices in stable position. 2. Mild interval increase in right basilar atelectasis and likely small right  pleural effusion. 3. Cardiomegaly, as previously.        ·Please note: Voice-recognition software may have been used to generate this report, which may have resulted in some phonetic-based errors in grammar and contents. Even though attempts were made to correct all the mistakes, some may have been missed, and remained in the body of the document.       Elizabeth Harris MD  3/21/2022

## 2022-03-21 NOTE — PROGRESS NOTES
Problem: Dysphagia (Adult)  Description: Patient will:  1. Tolerate PO trials with 0 s/s overt distress in 4/5 trials. 2. Utilize compensatory swallow strategies/maneuvers (decrease bite/sip, size/rate, alt. liq/sol) with min cues in 4/5 trials. Patient will:  3. When more alert, perform oral-motor/laryngeal exercises to increase oropharyngeal swallow function with min cues. Rec:     Puree diet with thin liquids   Pt is a feeder  Aspiration precautions  HOB >45 during po intake, remain >30 for 30-45 minutes after po   Small bites/sips; alternate liquid/solid with slow feeding rate   Oral care TID  Meds crushed in applesauce  Outcome: Progressing Towards Goal     SPEECH LANGUAGE PATHOLOGY DYSPHAGIA TREATMENT    Patient: Marilee Wiley (78 y.o. female)  Date: 3/21/2022  Diagnosis: Acute exacerbation of CHF (congestive heart failure) (HCC) [I50.9] Acute respiratory failure with hypoxemia (HCC)  Procedure(s) (LRB):  RIGHT TOTAL HIP ARTHROPLASTY WITH C-ARM  (PT IN ROOM # 358) (Right) 11 Days Post-Op  Precautions: Aspiration, Fall,WBAT  PLOF: Regular diet    ASSESSMENT:  Patient seen by ST for dysphagia follow-up. Sister in the room during treatment and provided verbal cues to patient to increase alertness. Patient upright in bed, alert, and oriented to self. Patient attempted to verbalize x1 but primarily used gestures (head nod) to communicate. Followed commands to open/close mouth during tx. ST observed patient with breakfast tray today (puree and thins). Patient required verbal cue to close mouth around spoon ~50% of time. Patient with improved lip seal around straw and able to drink thin and mildly thick (Ensure) liquids via straw x 5. Patient with delayed throat clear x 1 after thin liquid. Patient tolerated all other PO trials (thin, mildly thick, puree) with no overt s/sx of aspiration or distress. Patient continues to be a feeder and at elevated risk for aspiration.  ST educated sister on importance of slow feeding rate, waiting for patient to initiate eating by closing lips around spoon, and checking for residuals before providing next bite. Patient's sister verbalized understanding. Continue diet and POC. Progression toward goals:  []         Improving appropriately and progressing toward goals  [x]         Improving slowly and progressing toward goals  []         Not making progress toward goals and plan of care will be adjusted     PLAN:  Recommendations and Planned Interventions:  See above  Patient continues to benefit from skilled intervention to address the above impairments. Continue treatment per established plan of care. Discharge Recommendations: To Be Determined     SUBJECTIVE:   Patient stated hi. OBJECTIVE:   Cognitive and Communication Status:  Neurologic State: Confused  Orientation Level: Oriented to person  Cognition: Decreased attention/concentration,Decreased command following  Perception: Appears intact  Perseveration: No perseveration noted  Safety/Judgement: Decreased awareness of environment  Dysphagia Treatment:  Oral Assessment:  Oral Assessment  Labial: Decreased seal  Dentition: Limited  Oral Hygiene:  (fair)  Lingual: Decreased rate,Decreased strength,Incoordinated  Velum: No impairment  Mandible: Other (comment) (unable to assess)  Gag Reflex:  (did not assess)  P.O.  Trials:   Patient Position:  (upright in bed)   Vocal quality prior to P.O.: Breathy,Low volume   Consistency Presented: Puree,Thin liquid   How Presented: SLP-fed/presented,Spoon,Straw       Bolus Acceptance: No impairment   Bolus Formation/Control: Impaired   Type of Impairment: Mastication   Propulsion: Delayed (# of seconds),Discoordination   Oral Residue: Less than 10% of bolus   Initiation of Swallow: Delayed (# of seconds)   Laryngeal Elevation: Functional   Aspiration Signs/Symptoms: Clear throat   Pharyngeal Phase Characteristics: Easily fatigued ,Multiple swallows,Poor endurance,Suspected pharyngeal residue   Effective Modifications: Alternate liquids/solids,Small sips and bites   Cues for Modifications: Moderate-maximal         PAIN:  Pain level pre-treatment: 0/10   Pain level post-treatment: 0/10     After treatment:   []              Patient left in no apparent distress sitting up in chair  [x]              Patient left in no apparent distress in bed  []              Call bell left within reach  [x]              Nursing notified  [x]              Family present  []              Caregiver present  []              Bed alarm activated      COMMUNICATION/EDUCATION:   [x] Aspiration precautions; swallow safety; compensatory techniques  [x]        Patient unable to participate in education; education ongoing with staff  []  Posted safety precautions in patient's room.   [] Oral-motor/laryngeal strengthening exercises    BETO Carrillo  Time Calculation: 17 mins

## 2022-03-21 NOTE — PROGRESS NOTES
Hospitalist Progress Note-critical care note     Patient: Kishore Moya MRN: [id number]  CSN: [id number]    YOB: 1940  Age: 80 y.o. Sex: female    DOA: 3/2/2022 LOS:  LOS: 19 days            Chief complaint: hip fracture m ckd3, afib chf , acute respiratory failure     Assessment/Plan         Hospital Problems  Date Reviewed: 3/9/2022          Codes Class Noted POA    Hypotension ICD-10-CM: I95.9  ICD-9-CM: 458.9  3/17/2022 Unknown        Hypokalemia ICD-10-CM: E87.6  ICD-9-CM: 276.8  3/14/2022 Unknown        Fracture of neck of right femur ([facility] [address] ICD-10-CM: S72.001A  ICD-9-CM: 820.8  3/8/2022 Unknown        Stage 3 chronic kidney disease ([facility] [address] ICD-10-CM: N18.30  ICD-9-CM: 585.3  3/3/2022 Unknown        SERENA (obstructive sleep apnea) ICD-10-CM: G47.33  ICD-9-CM: 327.23  3/3/2022 Unknown        Adult BMI 39.0-39.9 kg/sq m ICD-10-CM: Z68.39  ICD-9-CM: V85.39  3/3/2022 Unknown        * (Principal) Acute respiratory failure with hypoxemia (HCC) ICD-10-CM: J96.01  ICD-9-CM: 518.81  3/2/2022 Unknown        Atrial fibrillation (HCC) ICD-10-CM: I48.91  ICD-9-CM: 427.31  3/2/2022 Unknown        Elevated troponin ICD-10-CM: R77.8  ICD-9-CM: 790.6  4/23/2021 Yes        HTN (hypertension) ICD-10-CM: I10  ICD-9-CM: 401.9  4/23/2021 Yes        Acute on chronic diastolic congestive heart failure (HCC) ICD-10-CM: I50.33  ICD-9-CM: 428.33, 428.0  4/23/2021 Yes             pt was admitted for afib and chf, found rt hip fracture. She has serena on cpap at night, she received rt hip replacement, she was noted decreased tide volume and hypoxia while extubation post procedure, intubated with oxygen 65%, peep 6.  Case discussed with Dr. Nasima Stephens and Dr. Holly Copeland     Acute on chronic respiratory failure with hypoxia and hypercapnia   Planning Extubate on 3/15, need bipap at night. -cm I working for it   V/q scan :negative for PE , pvl negative for dvt  on 3/3  Enterobacter Clacae complex from resp cutlure 3/10/22: id on board on  merrem -id f/u       Pulmonary hypertension   Echo on 3/3 pulmonary arterial systolic pressure is 51 mmhg  LORRAINE is deferred     Congestive heart failure , acute on chronic diastolic ,  Continue lasix   Echo done Mitral stenosis,  Pulmonary hypertension found to be moderate-LORRAINE is deferred  Dr. Elfego Guerrier and On-hold  Coreg due to hypotension      htn meds hold due to hypotension  afib new   On eliquis      Elevated trop   No acs cardiologist on board        UTI  Urine pos for klebsiella  Completed IV Rocephin     Hip fracture:    Right press fit direct anterior total hip arthroplasty -continue PT/OT   Continue post surgery care , need rehab placement        ckd3 -cr stable   Continue watch for renal function ,  Renal f/u     Hypokalemia   Resolved     Anemia   Continue monitoring h/h, so far  H/h stable        Encephalopathy   eeg no seizure   Neuro consulted   Now alert     Subjective: it is my sister     Sister was at the bedside and would like to bring pt home. Family would like to take care of pt home . Discussed with CM have to reorder bipap  For home use. All questions have been answered. 35 total min's spent on patient care including >50% on counseling/coordinating care. Discussed the above assessments. also discussed labs, medications and hospital course    Disposition :tbd,   Review of systems:  General: no f/c   Lung : no sob , no wheezing   Heart: no chest pain,   GI: no abdomen pain , no n/v   Vital signs/Intake and Output:  Visit Vitals  /63   Pulse 97   Temp 98.2 °F (36.8 °C)   Resp 20   Ht 5' 5\" (1.651 m)   Wt 117.4 kg (258 lb 13.1 oz)   SpO2 97%   Breastfeeding No   BMI 43.07 kg/m²     Current Shift:  No intake/output data recorded. Last three shifts:  03/19 1901 - 03/21 0700  In: 0   Out: 1200 [Urine:1200]    Physical Exam:  General: Alert and no acute distress   HEENT: NC, Atraumatic. anicteric sclerae. Nc oxygen noted   Lungs: CTA Bilaterally. No Wheezing/Rhonchi/Rales.   Heart:  Regular rhythm,  No murmur, No Rubs, No Gallops  Abdomen: Soft, Non distended, Non tender. +Bowel sounds,   Extremities: No c/c, rt hip surgical site covered with gauze , leg swelling better   Psych:   Not anxious or agitated. Neurologic:  No acute neuro deficit, but respond slow. Labs: Results:       Chemistry Recent Labs     03/20/22  0530 03/19/22  0940 03/19/22  0359   GLU  --   --  113*   NA  --   --  145   K 3.6 4.8 3.7   CL  --   --  108   CO2  --   --  31   BUN  --   --  113*   CREA  --   --  1.99*   CA  --   --  10.7*   AGAP  --   --  6   BUCR  --   --  57*   AP  --   --  182*   TP  --   --  6.4   ALB  --   --  2.7*   GLOB  --   --  3.7   AGRAT  --   --  0.7*      CBC w/Diff No results for input(s): WBC, RBC, HGB, HCT, PLT, GRANS, LYMPH, EOS, HGBEXT, HCTEXT, PLTEXT, HGBEXT, HCTEXT, PLTEXT in the last 72 hours. Cardiac Enzymes No results for input(s): CPK, CKND1, FARA in the last 72 hours. No lab exists for component: CKRMB, TROIP   Coagulation No results for input(s): PTP, INR, APTT, INREXT, INREXT in the last 72 hours. Lipid Panel Lab Results   Component Value Date/Time    Cholesterol, total 183 04/25/2021 04:00 PM    HDL Cholesterol 101 (H) 04/25/2021 04:00 PM    LDL, calculated 69.8 04/25/2021 04:00 PM    VLDL, calculated 12.2 04/25/2021 04:00 PM    Triglyceride 61 04/25/2021 04:00 PM    CHOL/HDL Ratio 1.8 04/25/2021 04:00 PM      BNP No results for input(s): BNPP in the last 72 hours. Liver Enzymes Recent Labs     03/19/22  0359   TP 6.4   ALB 2.7*   *      Thyroid Studies Lab Results   Component Value Date/Time    TSH 0.38 03/16/2022 04:35 AM        Procedures/imaging: see electronic medical records for all procedures/Xrays and details which were not copied into this note but were reviewed prior to creation of Plan    NM LUNG SCAN PERF    Result Date: 3/3/2022  EXAM: NM LUNG SCAN PERF.  CLINICAL INDICATION/HISTORY: d dimer increase dyspnea. -Additional: Clinical concern for pulmonary embolus. COMPARISON: Correlation is made with the radiographic study performed one day prior. TECHNIQUE: 7.2 mCi Tc-99m MAA was injected intravenously and the 8 standard views of the chest were obtained. This perfusion only examination is interpreted using the PISAPED criteria. _______________ FINDINGS: Perfusion images show no segmental perfusion defects to suggest the presence of pulmonary embolism. _______________     o scintigraphic findings to suggest the presence of acute pulmonary embolism. _______________     XR HIP RT W OR WO PELV 2-3 VWS    Result Date: 3/7/2022  EXAM: RIGHT HIP RADIOGRAPHS CLINICAL INDICATION/HISTORY: right hip pain -Additional: None COMPARISON: May March 3, 2022. TECHNIQUE: AP pelvis and frog-leg lateral view of right hip. _______________ FINDINGS: BONES: Intact appearing ilioischial and iliopectineal lines. There is a displaced and impacted subcapital right femoral neck fracture, with mild progressive displacement on follow-up imaging. Mild-moderate bilateral hip joint arthrosis. SOFT TISSUES: Unremarkable. _______________     1. Displaced and impacted subcapital right femoral neck fracture, increased in displacement from CT performed 4 days prior. CT HEAD WO CONT    Result Date: 3/7/2022  EXAM: CT head INDICATION: Altered mental status. COMPARISON: 4/23/2021. TECHNIQUE: Axial CT imaging of the head was performed without intravenous contrast. Standard multiplanar coronal and sagittal reformatted images were obtained and are included in interpretation. One or more dose reduction techniques were used on this CT: automated exposure control, adjustment of the mAs and/or kVp according to patient size, and iterative reconstruction techniques. The specific techniques used on this CT exam have been documented in the patient's electronic medical record.   Digital Imaging and Communications in Medicine (DICOM) format image data are available to nonaffiliated external healthcare facilities or entities on a secure, media free, reciprocally searchable basis with patient authorization for at least a 12-month period after this study. _______________ FINDINGS: BRAIN AND POSTERIOR FOSSA: Periventricular white matter hypoattenuation which is nonspecific but likely represents chronic ischemic changes. No evidence of acute large vessel transcortical infarct or acute parenchymal hemorrhage. No midline shift or hydrocephalus. EXTRA-AXIAL SPACES AND MENINGES: There are no abnormal extra-axial fluid collections. CALVARIUM: Intact. SINUSES: Clear. OTHER: None. _______________     No acute intracranial abnormality. CT CHEST ABD PELV WO CONT    Result Date: 3/3/2022  EXAM: CT CHEST, ABDOMEN AND PELVIS CLINICAL INDICATION/HISTORY: Hypoxemia, retrocardiac density, diarrhea, weakness and shortness of breath COMPARISON: 3/2/2022 TECHNIQUE: Axial CT imaging of the chest, abdomen, and pelvis was performed without intravenous contrast. Multiplanar reformats were generated. One or more dose reduction techniques were used on this CT: automated exposure control, adjustment of the mAs and/or kVp according to patient size, and iterative reconstruction techniques. The specific techniques used on this CT exam have been documented in the patient's electronic medical record. Digital Imaging and Communications in Medicine (DICOM) format image data are available to nonaffiliated external healthcare facilities or entities on a secure, media free, reciprocally searchable basis with patient authorization for at least a 12-month period after this study. ________________ FINDINGS: LIMITATIONS:   > Suboptimal evaluation given lack of intravenous contrast.   > Motion artifact is present which limits evaluation.   > Suboptimal exam due to patient body habitus and arms by the side. CHEST: LUNGS: Respiratory motion significantly limits evaluation. Interlobar septal thickening in the upper lobes. Mild bilateral dependent atelectasis.  No large consolidation or suspicious pulmonary mass. PLEURA: Very small volume bilateral pleural effusions. AIRWAY: Normal. MEDIASTINUM: Four-chamber cardiomegaly with asymmetric biatrial enlargement, mitral and aortic annular calcifications. The main pulmonary artery is enlarged suggesting pulmonary arterial hypertension. Normal caliber aorta with scattered calcified atherosclerotic plaque. No pericardial effusion. LYMPH NODES: No enlarged lymph nodes. CHEST WALL: Diffuse anasarca. Heterogeneous thyroid. _______________ ABDOMEN/PELVIS: LIVER: No enhancing hepatic mass. The portal and hepatic veins are patent. BILIARY: Gallstones are present in the nondistended gallbladder lumen. No biliary dilation. SPLEEN: Normal. PANCREAS: Normal. ADRENALS: Left adrenal gland measures 9 HU (axial image 76), most consistent with lipid rich adenoma. Normal right adrenal gland. KIDNEYS: Asymmetrically small left kidney. No rate of a stone. No hydronephrosis. GI TRACT:  A small hiatal hernia is present. Normal caliber small and large bowel loops. No morphology of bowel obstruction. Normal appendix. BLADDER: Diffuse wall thickening in the largely decompressed bladder. PELVIC ORGANS: No acute abnormality. VASCULATURE: No arterial aneurysm. 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 LYMPH NODES: No mesenteric or retroperitoneal lymphadenopathy. OTHER: No free intraperitoneal air. No ascites. Diffuse anasarca. OSSEOUS STRUCTURES: No acute osseous abnormality. Multilevel degenerative changes most pronounced in the lumbar spine. Degenerative changes in both shoulders (right greater than left). Please note the included portions of the upper extremities are not well evaluated on this study _______________     1. Findings of congestive heart failure with cardiomegaly, interstitial edema, very small bilateral pleural effusions, and diffuse anasarca.  2.  Bladder wall thickening may be due to underdistention though superimposed infection cannot be excluded. Recommend correlation for cystitis. 3.  Osseous degenerative changes and additional findings, as detailed in the body of the report. XR CHEST PORT    Result Date: 3/6/2022  EXAM: PORTABLE CHEST HISTORY: Shortness of breath. COMPARISON: 3/2/2022 TECHNIQUE: Single portable view. _______________ FINDINGS: SUPPORT DEVICES: None HEART AND MEDIASTINUM: Moderate cardiomegaly. LUNGS AND PLEURAL SPACES: Subsegmental atelectasis left base. Possible small effusions. BONES AND SOFT TISSUES: Dextroscoliosis. _______________     Subsegmental atelectasis left base. Possible small effusions. Moderate cardiomegaly without change. XR CHEST PORT    Result Date: 3/2/2022  EXAM:  AP Portable Chest X-ray 1 view INDICATION: Shortness of breath COMPARISON: April 23, 2021 _______________ FINDINGS: Cardiomegaly and mediastinal contours are within normal limits for portable radiograph. There is increased right retrocardiac/right paraspinal density. There are no pleural effusions. No acute osseous findings. ________________      Increased right retrocardiac density which may represent airspace disease or underlying pulmonary nodule. Recommend CT chest for further evaluation. ECHO ADULT COMPLETE    Result Date: 3/3/2022    Left Ventricle: Left ventricle size is normal. Moderately increased wall thickness. Findings consistent with moderate concentric hypertrophy. Normal wall motion. Normal left ventricular systolic function with a visually estimated EF of 55 - 60%.   Left Atrium: Left atrium is mildly dilated.   Right Ventricle: Right ventricle is mildly dilated.   Right Atrium: Right atrium is mildly dilated.   Pulmonary artery :  Estimated pulmonary arterial systolic pressure is 51 mmhg. Pulmonary hypertension found to be moderate   Mitral Valve: Moderately thickened leaflet. Mildly calcified leaflet. Moderate annular calcification of the mitral valve.    IVC/SVC : IVC diameter is greater than 21 mm and decreases less than 50% during inspiration; therefore the estimated right atrial pressure is elevated (~15 mmHg). IVC is dilated. Consider LORRAINE for better evaluation of mitral valve if clinically indicated. DUPLEX LOWER EXT VENOUS BILAT    Result Date: 3/4/2022  · No evidence of deep vein thrombosis in the right lower extremity. · No evidence of deep vein thrombosis in the left lower extremity.         Kaye Vaughan MD

## 2022-03-22 LAB
ANION GAP SERPL CALC-SCNC: 5 MMOL/L (ref 3–18)
BASOPHILS # BLD: 0 K/UL (ref 0–0.1)
BASOPHILS NFR BLD: 0 % (ref 0–2)
BUN SERPL-MCNC: 120 MG/DL (ref 7–18)
BUN/CREAT SERPL: 76 (ref 12–20)
CALCIUM SERPL-MCNC: 10.4 MG/DL (ref 8.5–10.1)
CHLORIDE SERPL-SCNC: 113 MMOL/L (ref 100–111)
CO2 SERPL-SCNC: 34 MMOL/L (ref 21–32)
CREAT SERPL-MCNC: 1.57 MG/DL (ref 0.6–1.3)
DIFFERENTIAL METHOD BLD: ABNORMAL
EOSINOPHIL # BLD: 0.2 K/UL (ref 0–0.4)
EOSINOPHIL NFR BLD: 2 % (ref 0–5)
ERYTHROCYTE [DISTWIDTH] IN BLOOD BY AUTOMATED COUNT: 15.2 % (ref 11.6–14.5)
GLUCOSE BLD STRIP.AUTO-MCNC: 106 MG/DL (ref 70–110)
GLUCOSE BLD STRIP.AUTO-MCNC: 126 MG/DL (ref 70–110)
GLUCOSE BLD STRIP.AUTO-MCNC: 139 MG/DL (ref 70–110)
GLUCOSE SERPL-MCNC: 114 MG/DL (ref 74–99)
HCT VFR BLD AUTO: 27.2 % (ref 35–45)
HGB BLD-MCNC: 8.6 G/DL (ref 12–16)
IMM GRANULOCYTES # BLD AUTO: 0.2 K/UL (ref 0–0.04)
IMM GRANULOCYTES NFR BLD AUTO: 3 % (ref 0–0.5)
LYMPHOCYTES # BLD: 1.3 K/UL (ref 0.9–3.6)
LYMPHOCYTES NFR BLD: 14 % (ref 21–52)
MCH RBC QN AUTO: 31 PG (ref 24–34)
MCHC RBC AUTO-ENTMCNC: 31.6 G/DL (ref 31–37)
MCV RBC AUTO: 98.2 FL (ref 78–100)
MONOCYTES # BLD: 0.7 K/UL (ref 0.05–1.2)
MONOCYTES NFR BLD: 8 % (ref 3–10)
NEUTS SEG # BLD: 6.4 K/UL (ref 1.8–8)
NEUTS SEG NFR BLD: 73 % (ref 40–73)
NRBC # BLD: 0.02 K/UL (ref 0–0.01)
NRBC BLD-RTO: 0.2 PER 100 WBC
PLATELET # BLD AUTO: 431 K/UL (ref 135–420)
PMV BLD AUTO: 11.1 FL (ref 9.2–11.8)
POTASSIUM SERPL-SCNC: 4 MMOL/L (ref 3.5–5.5)
RBC # BLD AUTO: 2.77 M/UL (ref 4.2–5.3)
SODIUM SERPL-SCNC: 152 MMOL/L (ref 136–145)
WBC # BLD AUTO: 8.8 K/UL (ref 4.6–13.2)

## 2022-03-22 PROCEDURE — 74011000250 HC RX REV CODE- 250: Performed by: INTERNAL MEDICINE

## 2022-03-22 PROCEDURE — 74011250636 HC RX REV CODE- 250/636: Performed by: HOSPITALIST

## 2022-03-22 PROCEDURE — 94640 AIRWAY INHALATION TREATMENT: CPT

## 2022-03-22 PROCEDURE — 74011250637 HC RX REV CODE- 250/637: Performed by: INTERNAL MEDICINE

## 2022-03-22 PROCEDURE — 36415 COLL VENOUS BLD VENIPUNCTURE: CPT

## 2022-03-22 PROCEDURE — 85025 COMPLETE CBC W/AUTO DIFF WBC: CPT

## 2022-03-22 PROCEDURE — 97530 THERAPEUTIC ACTIVITIES: CPT

## 2022-03-22 PROCEDURE — 97110 THERAPEUTIC EXERCISES: CPT

## 2022-03-22 PROCEDURE — 82962 GLUCOSE BLOOD TEST: CPT

## 2022-03-22 PROCEDURE — 65660000000 HC RM CCU STEPDOWN

## 2022-03-22 PROCEDURE — 74011250637 HC RX REV CODE- 250/637: Performed by: PHYSICIAN ASSISTANT

## 2022-03-22 PROCEDURE — 74011250636 HC RX REV CODE- 250/636: Performed by: INTERNAL MEDICINE

## 2022-03-22 PROCEDURE — 77010033678 HC OXYGEN DAILY

## 2022-03-22 PROCEDURE — 74011250637 HC RX REV CODE- 250/637: Performed by: HOSPITALIST

## 2022-03-22 PROCEDURE — 94660 CPAP INITIATION&MGMT: CPT

## 2022-03-22 PROCEDURE — 80048 BASIC METABOLIC PNL TOTAL CA: CPT

## 2022-03-22 RX ORDER — DEXTROSE MONOHYDRATE 50 MG/ML
50 INJECTION, SOLUTION INTRAVENOUS CONTINUOUS
Status: DISCONTINUED | OUTPATIENT
Start: 2022-03-22 | End: 2022-03-22

## 2022-03-22 RX ORDER — DEXTROSE MONOHYDRATE 50 MG/ML
50 INJECTION, SOLUTION INTRAVENOUS CONTINUOUS
Status: DISPENSED | OUTPATIENT
Start: 2022-03-22 | End: 2022-03-22

## 2022-03-22 RX ADMIN — FERROUS SULFATE TAB 325 MG (65 MG ELEMENTAL FE) 325 MG: 325 (65 FE) TAB at 22:17

## 2022-03-22 RX ADMIN — SODIUM CHLORIDE, PRESERVATIVE FREE 10 ML: 5 INJECTION INTRAVENOUS at 22:17

## 2022-03-22 RX ADMIN — NYSTATIN 500000 UNITS: 100000 SUSPENSION ORAL at 13:11

## 2022-03-22 RX ADMIN — MIDODRINE HYDROCHLORIDE 5 MG: 2.5 TABLET ORAL at 09:31

## 2022-03-22 RX ADMIN — ARFORMOTEROL TARTRATE 15 MCG: 15 SOLUTION RESPIRATORY (INHALATION) at 21:31

## 2022-03-22 RX ADMIN — NYSTATIN 500000 UNITS: 100000 SUSPENSION ORAL at 17:22

## 2022-03-22 RX ADMIN — SODIUM CHLORIDE, PRESERVATIVE FREE 20 ML: 5 INJECTION INTRAVENOUS at 05:56

## 2022-03-22 RX ADMIN — NYSTATIN: 100000 POWDER TOPICAL at 12:38

## 2022-03-22 RX ADMIN — ASPIRIN 81 MG: 81 TABLET, COATED ORAL at 09:31

## 2022-03-22 RX ADMIN — NYSTATIN 500000 UNITS: 100000 SUSPENSION ORAL at 22:17

## 2022-03-22 RX ADMIN — NYSTATIN: 100000 POWDER TOPICAL at 22:17

## 2022-03-22 RX ADMIN — FERROUS SULFATE TAB 325 MG (65 MG ELEMENTAL FE) 325 MG: 325 (65 FE) TAB at 17:22

## 2022-03-22 RX ADMIN — SODIUM CHLORIDE, PRESERVATIVE FREE 20 MG: 5 INJECTION INTRAVENOUS at 09:31

## 2022-03-22 RX ADMIN — DEXTROSE MONOHYDRATE 50 ML/HR: 50 INJECTION, SOLUTION INTRAVENOUS at 07:23

## 2022-03-22 RX ADMIN — FERROUS SULFATE TAB 325 MG (65 MG ELEMENTAL FE) 325 MG: 325 (65 FE) TAB at 09:31

## 2022-03-22 RX ADMIN — APIXABAN 2.5 MG: 2.5 TABLET, FILM COATED ORAL at 22:17

## 2022-03-22 RX ADMIN — APIXABAN 2.5 MG: 2.5 TABLET, FILM COATED ORAL at 09:31

## 2022-03-22 RX ADMIN — CARVEDILOL 3.12 MG: 3.12 TABLET, FILM COATED ORAL at 17:22

## 2022-03-22 RX ADMIN — MIDODRINE HYDROCHLORIDE 5 MG: 2.5 TABLET ORAL at 13:11

## 2022-03-22 RX ADMIN — DEXTROSE MONOHYDRATE 50 ML/HR: 50 INJECTION, SOLUTION INTRAVENOUS at 14:09

## 2022-03-22 RX ADMIN — MIDODRINE HYDROCHLORIDE 5 MG: 2.5 TABLET ORAL at 17:22

## 2022-03-22 RX ADMIN — CARVEDILOL 3.12 MG: 3.12 TABLET, FILM COATED ORAL at 09:31

## 2022-03-22 RX ADMIN — NYSTATIN 500000 UNITS: 100000 SUSPENSION ORAL at 09:31

## 2022-03-22 RX ADMIN — ARFORMOTEROL TARTRATE 15 MCG: 15 SOLUTION RESPIRATORY (INHALATION) at 08:02

## 2022-03-22 NOTE — PROGRESS NOTES
Modified O2 test:    O2 100% on 2L at rest.   O2 removed from patient  O2 97% on RA after 2-3 minutes at rest.   To imitate walking/exertion, we rolled and changed the patient for about 7 minutes. During activity: 80-87% when moving  After activity:  O2 reapplied, 97% on 2L NC after 1 minute.

## 2022-03-22 NOTE — PROGRESS NOTES
[facility]  (A Division of [address] [place] [address])                                                                                                                      Sharon Melvin MD  Office #: - Option [phone]  Fax #: [phone]     Date of Admission: 3/2/2022Date of Note: 3/22/2022  Reason for FU: Antibiotic management of for positive resp/urine cultures requested by Dr Nataly Rosales. Current Antimicrobials:    Prior Antimicrobials:    None from 3/21 Zithromax 3/2- 3/4  cTX 3/2 to 3/4  CTC 3/7-- X2 dose  Levofloxacin 500 mg X1- 3/13 X 1 dose  Meropenem 500 mg 3/14 X1 dose  Cefepime 3/14 to 3/16  Meropenem/Vanco 3/16 to 3/20         Assessment- ID related:  --------------------------------------------------------------------------  Acutely sick and complicated patient in ICU with:    · Enterobacter Clacae complex + resp cutlure 3/10/22  · Acute resp failure- intubated since OR day 3/10/22  --CXR on 3/14-- infiltrate with atelectasis and pleural effusion  · Encephalopathy- unresponsiveness overnight->   --CO2 high on ABG, pt with SERENA  · S/P R hip anterior arthroplasty- wound vac in place  · Klebsiella bacteruria 3/2/22  --UA clean and Urine culture +  Kleb: Colonized    Other Medical Issues- Mx per respective team:  · CKD  · Morbidly obese  · Hx of SERENA  · A.fib  · Acute on chronic  CHF     Recommendation for ID issues I am following:  ------------------------------------------------------------------------------  DW RN      Follow off ABX      Will sign off. Please re-consult as needed. Subjective:  Afebrile  On BIPAP overnight, on 3L NC today  She is not on pressors  Opens eys    Notes and labs reviewed. Imaging reports reviewed -- WBC 8.8K    CXR from 3/20:      1.  Probable partial right middle lobe atelectasis, similar to most recent prior  radiograph.     2. Streaky opacity at left lung base, likely atelectasis, though  bronchopneumonia or aspiration not excluded. HPI:  Carmen Mota is a 80 y.o. BLACK/ with PMH of congestive heart failure, sleep apnea, right leg weakness, admitted on 3/2 for acute resp failure with hypoxia due to CHF and a.fib with RVR. No fever or leucocytosis. UA done on admission was clean, urine culture grew Klebsiella. She had right hip fracture and was taken to OR on 3/10 for BLAIR. She had a wound vac in place. She didn't extubate after procedure and was transferred to ICU where she is getting care. resp cutture from 3/10-- has Enterobacter that was final on 3/13-- she was given Levofloxacin. Today it was changed to meropenem. DW with RN- little resp secretion, she has low grade fever today, no leucocytosis. She isnot on pressors. Per dtr, she doesn't recall when she got treated with ABX as OP. No prior history of UTI diagnosis.              Active Hospital Problems    Diagnosis Date Noted    Morbid obesity ([address] 03/21/2022    Hypotension 03/17/2022    Hypokalemia 03/14/2022    Fracture of neck of right femur ([address] 03/08/2022    Stage 3 chronic kidney disease ([address] 03/03/2022    SERENA (obstructive sleep apnea) 03/03/2022    Acute respiratory failure with hypoxemia (HCC) 03/02/2022    Atrial fibrillation (HCC) 03/02/2022    Acute on chronic diastolic congestive heart failure ([address] 04/23/2021    Elevated troponin 04/23/2021    HTN (hypertension) 04/23/2021     Past Medical History:   Diagnosis Date    Hypertension     Sleep disorder      Past Surgical History:   Procedure Laterality Date    HX ORTHOPAEDIC      broken right ankle, left femur 30 yrs ago     Family History   Problem Relation Age of Onset    Diabetes Mother     Heart Disease Mother     Heart Disease Father      Social History     Socioeconomic History    Marital status: SINGLE     Spouse name: Not on file    Number of children: Not on file    Years of education: Not on file    Highest education level: Not on file   Occupational History    Not on file   Tobacco Use    Smoking status: Never Smoker    Smokeless tobacco: Never Used   Vaping Use    Vaping Use: Never used   Substance and Sexual Activity    Alcohol use: Yes     Alcohol/week: 1.0 standard drink     Types: 1 Glasses of wine per week     Comment: soc    Drug use: No    Sexual activity: Not on file   Other Topics Concern     Service Not Asked    Blood Transfusions Not Asked    Caffeine Concern Not Asked    Occupational Exposure Not Asked    Hobby Hazards Not Asked    Sleep Concern Not Asked    Stress Concern Not Asked    Weight Concern Not Asked    Special Diet Not Asked    Back Care Not Asked    Exercise Not Asked    Bike Helmet Not Asked   [address] Not Asked    Self-Exams Not Asked   Social History Narrative    Not on file     Social Determinants of Health     Financial Resource Strain:     Difficulty of Paying Living Expenses: Not on file   Food Insecurity:     Worried About Running Out of Food in the Last Year: Not on file    Aquiles of Food in the Last Year: Not on file   Transportation Needs:     Lack of Transportation (Medical): Not on file    Lack of Transportation (Non-Medical):  Not on file   Physical Activity:     Days of Exercise per Week: Not on file    Minutes of Exercise per Session: Not on file   Stress:     Feeling of Stress : Not on file   Social Connections:     Frequency of Communication with Friends and Family: Not on file    Frequency of Social Gatherings with Friends and Family: Not on file    Attends [employer] Services: Not on file    Active Member of Clubs or Organizations: Not on file    Attends Club or Organization Meetings: Not on file    Marital Status: Not on file   Intimate Partner Violence:     Fear of Current or Ex-Partner: Not on file    Emotionally Abused: Not on file    Physically Abused: Not on file    Sexually Abused: Not on file   Housing Stability:     Unable to Pay for Housing in the Last Year: Not on file    Number of Places Lived in the Last Year: Not on file    Unstable Housing in the Last Year: Not on file       Allergies:  Seafood, Statins-hmg-coa reductase inhibitors, and Sulfa (sulfonamide antibiotics)     Medications:  Current Facility-Administered Medications   Medication Dose Route Frequency    dextrose 5% infusion  50 mL/hr IntraVENous CONTINUOUS    nystatin (MYCOSTATIN) 100,000 unit/gram powder   Topical BID    zinc oxide 20 % ointment   Topical PRN    midodrine (PROAMATINE) tablet 5 mg  5 mg Oral TID WITH MEALS    [Held by provider] furosemide (LASIX) injection 20 mg  20 mg IntraVENous BID    arformoteroL (BROVANA) neb solution 15 mcg  15 mcg Nebulization BID RT    famotidine (PF) (PEPCID) 20 mg in 0.9% sodium chloride 10 mL injection  20 mg IntraVENous DAILY    naloxone (NARCAN) injection 0.4 mg  0.4 mg IntraVENous PRN    ferrous sulfate tablet 325 mg  1 Tablet Oral TID    diphenhydrAMINE (BENADRYL) capsule 25 mg  25 mg Oral Q4H PRN    bisacodyL (DULCOLAX) suppository 10 mg  10 mg Rectal DAILY PRN    nystatin (MYCOSTATIN) 100,000 unit/mL oral suspension 500,000 Units  500,000 Units Oral QID    apixaban (ELIQUIS) tablet 2.5 mg  2.5 mg Oral BID    sodium chloride (NS) flush 5-40 mL  5-40 mL IntraVENous Q8H    sodium chloride (NS) flush 5-40 mL  5-40 mL IntraVENous PRN    acetaminophen (TYLENOL) tablet 650 mg  650 mg Oral Q6H PRN    Or    acetaminophen (TYLENOL) suppository 650 mg  650 mg Rectal Q6H PRN    polyethylene glycol (MIRALAX) packet 17 g  17 g Oral DAILY PRN    ondansetron (ZOFRAN ODT) tablet 4 mg  4 mg Oral Q8H PRN    Or    ondansetron (ZOFRAN) injection 4 mg  4 mg IntraVENous Q6H PRN    carvediloL (COREG) tablet 3.125 mg  3.125 mg Oral BID WITH MEALS    aspirin delayed-release tablet 81 mg  81 mg Oral DAILY        ROS:  Review of systems not obtained due to patient factors.            Physical Exam:    Temp (24hrs), Av.5 °F (36.9 °C), Min:97.7 °F (36.5 °C), Max:99.3 °F (37.4 °C)    Visit Vitals  /76   Pulse 89   Temp 98.1 °F (36.7 °C)   Resp 16   Ht 5' 5\" (1.651 m)   Wt 117.4 kg (258 lb 13.1 oz)   SpO2 100%   Breastfeeding No   BMI 43.07 kg/m²        GEN: WD obese,  NC in place. No acute resp distress noted  Non conversant  HEENT: Unicteric. EOMI intact  CHEST: Non laboured breathing  KAREN: box is out  Skin: Dry and intact. No rash, no redness.   CNS:Awake       Microbiology  All Micro Results     Procedure Component Value Units Date/Time    CULTURE, BLOOD [[id number]] Collected: 03/14/22 0419    Order Status: Completed Specimen: Blood Updated: 03/20/22 0739     Special Requests: NO SPECIAL REQUESTS        Culture result: NO GROWTH 6 DAYS       CULTURE, BLOOD [[id number]] Collected: 03/14/22 0420    Order Status: Completed Specimen: Blood Updated: 03/20/22 0739     Special Requests: NO SPECIAL REQUESTS        Culture result: NO GROWTH 6 DAYS       CULTURE, RESPIRATORY/SPUTUM/BRONCH W GRAM STAIN [[id number]]  (Abnormal)  (Susceptibility) Collected: 03/10/22 1630    Order Status: Completed Specimen: Sputum from Endotracheal aspirate Updated: 03/13/22 0905     Special Requests: NO SPECIAL REQUESTS        GRAM STAIN NO WBC'S SEEN         NO EPITHELIAL CELLS SEEN         NO ORGANISMS SEEN        Culture result:       LIGHT ENTEROBACTER CLOACAE COMPLEX                  HEAVY NORMAL RESPIRATORY ELSIE          CULTURE, RESPIRATORY/SPUTUM/BRONCH L[place] Stair [[id number]] Collected: 03/10/22 1915    Order Status: Canceled Specimen: Sputum from Endotracheal aspirate     CULTURE, BLOOD [[id number]] Collected: 03/02/22 2048    Order Status: Completed Specimen: Blood Updated: 03/08/22 0655     Special Requests: NO SPECIAL REQUESTS        Culture result: NO GROWTH 6 DAYS       CULTURE, BLOOD [[id number]] Collected: 03/02/22 2048    Order Status: Completed Specimen: Blood Updated: 03/08/22 0655     Special Requests: NO SPECIAL REQUESTS        Culture result: NO GROWTH 6 DAYS CULTURE, URINE [[id number]]  (Abnormal)  (Susceptibility) Collected: 03/02/22 2104    Order Status: Completed Specimen: Cath Urine Updated: 03/05/22 1226     Special Requests: NO SPECIAL REQUESTS        [place] Count --        >100,000  COLONIES/mL       Culture result: KLEBSIELLA PNEUMONIAE       COVID-19 RAPID TEST [[id number]] Collected: 03/02/22 2025    Order Status: Completed Specimen: Nasopharyngeal Updated: 03/02/22 2052     Specimen source Nasopharyngeal        COVID-19 rapid test Not detected        Comment: Rapid Abbott ID Now       Rapid NAAT:  The specimen is NEGATIVE for SARS-CoV-2, the novel coronavirus associated with COVID-19. Negative results should be treated as presumptive and, if inconsistent with clinical signs and symptoms or necessary for patient management, should be tested with an alternative molecular assay. Negative results do not preclude SARS-CoV-2 infection and should not be used as the sole basis for patient management decisions. This test has been authorized by the FDA under an Emergency Use Authorization (EUA) for use by authorized laboratories. Fact sheet for Healthcare Providers: ConventionUpdate.co.nz  Fact sheet for Patients: ConventionUpdate.co.nz       Methodology: Isothermal Nucleic Acid Amplification         INFLUENZA A & B AG (RAPID TEST) [[id number]] Collected: 03/02/22 2025    Order Status: Completed Specimen: Nasopharyngeal from Nasal washing Updated: 03/02/22 2047     Influenza A Antigen Negative        Comment: A negative result does not exclude influenza virus infection, seasonal or H1N1 due to suboptimal sensitivity. If influenza is circulating in your community, a diagnosis of influenza should be considered based on a patients clinical presentation and empiric antiviral treatment should be considered, if indicated.         Influenza B Antigen Negative              Lab results:    Chemistry  Recent Labs 03/22/22  0440 03/21/22  1411 03/20/22  0530   * 140*  --    * 151*  --    K 4.0 3.9 3.6   * 113*  --    CO2 34* 37*  --    * 129*  --    CREA 1.57* 1.78*  --    CA 10.4* 10.8*  --    AGAP 5 1*  --    BUCR 76* 72*  --        CBC w/ Diff  Recent Labs     03/22/22  0440 03/21/22  1411   WBC 8.8 9.6   RBC 2.77* 2.88*   HGB 8.6* 8.7*   HCT 27.2* 27.3*   * 393   GRANS 73 76*   LYMPH 14* 12*   EOS 2 1       Imaging: report reviewed and as posted by radiologist     CXR:   1. Support devices in stable/appropriate position as visualized. 2. Mild cardiac enlargement, unchanged. 3. Hazy right lung base opacity, favored a combination of atelectasis and  posteriorly layering pleural effusion. US liver:    1. Limited examination due to technical factors as above. 2. Heterogeneously hyperechoic liver parenchyma compatible with steatosis or  other hepatic parenchymal disease. 3. No acute findings or suspicious mass. 4. Cholelithiasis.   5. Mild ascites.

## 2022-03-22 NOTE — PROGRESS NOTES
Hospitalist Progress Note-critical care note     Patient: Jeane Quintanilla MRN: [id number]  CSN: [id number]    YOB: 1940  Age: 80 y.o. Sex: female    DOA: 3/2/2022 LOS:  LOS: 20 days            Chief complaint: hip fracture m ckd3, afib chf , acute respiratory failure     Assessment/Plan         Hospital Problems  Date Reviewed: 3/9/2022          Codes Class Noted POA    Morbid obesity ([facility] ICD-10-CM: E66.01  ICD-9-CM: 278.01  3/21/2022 Unknown        Hypotension ICD-10-CM: I95.9  ICD-9-CM: 458.9  3/17/2022 Unknown        Hypokalemia ICD-10-CM: E87.6  ICD-9-CM: 276.8  3/14/2022 Unknown        Fracture of neck of right femur ([facility] ICD-10-CM: S72.001A  ICD-9-CM: 820.8  3/8/2022 Unknown        Stage 3 chronic kidney disease ([facility] ICD-10-CM: N18.30  ICD-9-CM: 585.3  3/3/2022 Unknown        SERENA (obstructive sleep apnea) ICD-10-CM: G47.33  ICD-9-CM: 327.23  3/3/2022 Unknown        * (Principal) Acute respiratory failure with hypoxemia (HCC) ICD-10-CM: J96.01  ICD-9-CM: 518.81  3/2/2022 Unknown        Atrial fibrillation (HCC) ICD-10-CM: I48.91  ICD-9-CM: 427.31  3/2/2022 Unknown        Elevated troponin ICD-10-CM: R77.8  ICD-9-CM: 790.6  4/23/2021 Yes        HTN (hypertension) ICD-10-CM: I10  ICD-9-CM: 401.9  4/23/2021 Yes        Acute on chronic diastolic congestive heart failure (HCC) ICD-10-CM: I50.33  ICD-9-CM: 428.33, 428.0  4/23/2021 Yes             pt was admitted for afib and chf, found rt hip fracture. She has serena on cpap at night, she received rt hip replacement, she was noted decreased tide volume and hypoxia while extubation post procedure, intubated with oxygen 65%, peep 6.  Case discussed with Dr. Mainor Santos and Dr. Harrison Augustin     Acute on chronic respiratory failure with hypoxia and hypercapnia   Planning Extubate on 3/15, need bipap at night , pulm recommend new mask on dc home   V/q scan :negative for PE , pvl negative for dvt  on 3/3  Enterobacter Clacae complex from resp cutlure 3/10/22: id on board on  merrem -id f/u       Pulmonary hypertension   Echo on 3/3 pulmonary arterial systolic pressure is 51 mmhg  LORRAINE is deferred     Congestive heart failure , acute on chronic diastolic ,  Continue lasix -hold due to hyponatremia   Echo done Mitral stenosis,  Pulmonary hypertension found to be moderate-LORRAINE is deferred  Dr. Montano More  Coreg restart     htn meds hold due to hypotension  afib new   On eliquis      Elevated trop   No acs cardiologist on board        UTI  Urine pos for klebsiella  Completed IV Rocephin     Hip fracture:    Right press fit direct anterior total hip arthroplasty -continue PT/OT   Continue post surgery care , need rehab placement        ckd3 -cr stable   Continue watch for renal function ,  Renal f/u     Hypokalemia   Resolved     Anemia   Continue monitoring h/h, so far  H/h stable        Encephalopathy   eeg no seizure   Neuro consulted   Now alert     hpernatremia due to dehydration   Hold lasix, low rate d5 for one day     Subjective: how are you? Disposition :tbd,   Review of systems:  General: no f/c   Lung : no sob , no wheezing   Heart: no chest pain,   GI: no abdomen pain , no n/v   Vital signs/Intake and Output:  Visit Vitals  /76   Pulse 89   Temp 98.1 °F (36.7 °C)   Resp 16   Ht 5' 5\" (1.651 m)   Wt 117.4 kg (258 lb 13.1 oz)   SpO2 100%   Breastfeeding No   BMI 43.07 kg/m²     Current Shift:  03/22 0701 - 03/22 1900  In: 240 [P.O.:240]  Out: -   Last three shifts:  03/20 1901 - 03/22 0700  In: -   Out: 500 [Urine:500]    Physical Exam:  General: Alert and no acute distress   HEENT: NC, Atraumatic. anicteric sclerae. Nc oxygen noted   Lungs: CTA Bilaterally. No Wheezing/Rhonchi/Rales. Heart:  Regular  rhythm,  No murmur, No Rubs, No Gallops  Abdomen: Soft, Non distended, Non tender. +Bowel sounds,   Extremities: No c/c, rt hip surgical site covered with gauze ,   Psych:   Not anxious or agitated.   Neurologic:  No acute neuro deficit, but respond slow.        Labs: Results:       Chemistry Recent Labs     03/22/22  0440 03/21/22  1411 03/20/22  0530   * 140*  --    * 151*  --    K 4.0 3.9 3.6   * 113*  --    CO2 34* 37*  --    * 129*  --    CREA 1.57* 1.78*  --    CA 10.4* 10.8*  --    AGAP 5 1*  --    BUCR 76* 72*  --       CBC w/Diff Recent Labs     03/22/22  0440 03/21/22  1411   WBC 8.8 9.6   RBC 2.77* 2.88*   HGB 8.6* 8.7*   HCT 27.2* 27.3*   * 393   GRANS 73 76*   LYMPH 14* 12*   EOS 2 1      Cardiac Enzymes No results for input(s): CPK, CKND1, FARA in the last 72 hours. No lab exists for component: CKRMB, TROIP   Coagulation No results for input(s): PTP, INR, APTT, INREXT, INREXT in the last 72 hours. Lipid Panel Lab Results   Component Value Date/Time    Cholesterol, total 183 04/25/2021 04:00 PM    HDL Cholesterol 101 (H) 04/25/2021 04:00 PM    LDL, calculated 69.8 04/25/2021 04:00 PM    VLDL, calculated 12.2 04/25/2021 04:00 PM    Triglyceride 61 04/25/2021 04:00 PM    CHOL/HDL Ratio 1.8 04/25/2021 04:00 PM      BNP No results for input(s): BNPP in the last 72 hours. Liver Enzymes No results for input(s): TP, ALB, TBIL, AP in the last 72 hours. No lab exists for component: SGOT, GPT, DBIL   Thyroid Studies Lab Results   Component Value Date/Time    TSH 0.38 03/16/2022 04:35 AM        Procedures/imaging: see electronic medical records for all procedures/Xrays and details which were not copied into this note but were reviewed prior to creation of Plan    NM LUNG SCAN PERF    Result Date: 3/3/2022  EXAM: NM LUNG SCAN PERF. CLINICAL INDICATION/HISTORY: d dimer increase dyspnea. -Additional: Clinical concern for pulmonary embolus. COMPARISON: Correlation is made with the radiographic study performed one day prior. TECHNIQUE: 7.2 mCi Tc-99m MAA was injected intravenously and the 8 standard views of the chest were obtained.  This perfusion only examination is interpreted using the PISAPED criteria. _______________ FINDINGS: Perfusion images show no segmental perfusion defects to suggest the presence of pulmonary embolism. _______________     o scintigraphic findings to suggest the presence of acute pulmonary embolism. _______________     XR HIP RT W OR WO PELV 2-3 VWS    Result Date: 3/7/2022  EXAM: RIGHT HIP RADIOGRAPHS CLINICAL INDICATION/HISTORY: right hip pain -Additional: None COMPARISON: May March 3, 2022. TECHNIQUE: AP pelvis and frog-leg lateral view of right hip. _______________ FINDINGS: BONES: Intact appearing ilioischial and iliopectineal lines. There is a displaced and impacted subcapital right femoral neck fracture, with mild progressive displacement on follow-up imaging. Mild-moderate bilateral hip joint arthrosis. SOFT TISSUES: Unremarkable. _______________     1. Displaced and impacted subcapital right femoral neck fracture, increased in displacement from CT performed 4 days prior. CT HEAD WO CONT    Result Date: 3/7/2022  EXAM: CT head INDICATION: Altered mental status. COMPARISON: 4/23/2021. TECHNIQUE: Axial CT imaging of the head was performed without intravenous contrast. Standard multiplanar coronal and sagittal reformatted images were obtained and are included in interpretation. One or more dose reduction techniques were used on this CT: automated exposure control, adjustment of the mAs and/or kVp according to patient size, and iterative reconstruction techniques. The specific techniques used on this CT exam have been documented in the patient's electronic medical record. Digital Imaging and Communications in Medicine (DICOM) format image data are available to nonaffiliated external healthcare facilities or entities on a secure, media free, reciprocally searchable basis with patient authorization for at least a 12-month period after this study.  _______________ FINDINGS: BRAIN AND POSTERIOR FOSSA: Periventricular white matter hypoattenuation which is nonspecific but likely represents chronic ischemic changes. No evidence of acute large vessel transcortical infarct or acute parenchymal hemorrhage. No midline shift or hydrocephalus. EXTRA-AXIAL SPACES AND MENINGES: There are no abnormal extra-axial fluid collections. CALVARIUM: Intact. SINUSES: Clear. OTHER: None. _______________     No acute intracranial abnormality. CT CHEST ABD PELV WO CONT    Result Date: 3/3/2022  EXAM: CT CHEST, ABDOMEN AND PELVIS CLINICAL INDICATION/HISTORY: Hypoxemia, retrocardiac density, diarrhea, weakness and shortness of breath COMPARISON: 3/2/2022 TECHNIQUE: Axial CT imaging of the chest, abdomen, and pelvis was performed without intravenous contrast. Multiplanar reformats were generated. One or more dose reduction techniques were used on this CT: automated exposure control, adjustment of the mAs and/or kVp according to patient size, and iterative reconstruction techniques. The specific techniques used on this CT exam have been documented in the patient's electronic medical record. Digital Imaging and Communications in Medicine (DICOM) format image data are available to nonaffiliated external healthcare facilities or entities on a secure, media free, reciprocally searchable basis with patient authorization for at least a 12-month period after this study. ________________ FINDINGS: LIMITATIONS:   > Suboptimal evaluation given lack of intravenous contrast.   > Motion artifact is present which limits evaluation.   > Suboptimal exam due to patient body habitus and arms by the side. CHEST: LUNGS: Respiratory motion significantly limits evaluation. Interlobar septal thickening in the upper lobes. Mild bilateral dependent atelectasis. No large consolidation or suspicious pulmonary mass. PLEURA: Very small volume bilateral pleural effusions. AIRWAY: Normal. MEDIASTINUM: Four-chamber cardiomegaly with asymmetric biatrial enlargement, mitral and aortic annular calcifications.  The main pulmonary artery is enlarged suggesting pulmonary arterial hypertension. Normal caliber aorta with scattered calcified atherosclerotic plaque. No pericardial effusion. LYMPH NODES: No enlarged lymph nodes. CHEST WALL: Diffuse anasarca. Heterogeneous thyroid. _______________ ABDOMEN/PELVIS: LIVER: No enhancing hepatic mass. The portal and hepatic veins are patent. BILIARY: Gallstones are present in the nondistended gallbladder lumen. No biliary dilation. SPLEEN: Normal. PANCREAS: Normal. ADRENALS: Left adrenal gland measures 9 HU (axial image 76), most consistent with lipid rich adenoma. Normal right adrenal gland. KIDNEYS: Asymmetrically small left kidney. No rate of a stone. No hydronephrosis. GI TRACT:  A small hiatal hernia is present. Normal caliber small and large bowel loops. No morphology of bowel obstruction. Normal appendix. BLADDER: Diffuse wall thickening in the largely decompressed bladder. PELVIC ORGANS: No acute abnormality. VASCULATURE: No arterial aneurysm. 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 LYMPH NODES: No mesenteric or retroperitoneal lymphadenopathy. OTHER: No free intraperitoneal air. No ascites. Diffuse anasarca. OSSEOUS STRUCTURES: No acute osseous abnormality. Multilevel degenerative changes most pronounced in the lumbar spine. Degenerative changes in both shoulders (right greater than left). Please note the included portions of the upper extremities are not well evaluated on this study _______________     1. Findings of congestive heart failure with cardiomegaly, interstitial edema, very small bilateral pleural effusions, and diffuse anasarca. 2.  Bladder wall thickening may be due to underdistention though superimposed infection cannot be excluded. Recommend correlation for cystitis. 3.  Osseous degenerative changes and additional findings, as detailed in the body of the report. XR CHEST PORT    Result Date: 3/6/2022  EXAM: PORTABLE CHEST HISTORY: Shortness of breath.  COMPARISON: 3/2/2022 TECHNIQUE: Single portable view. _______________ FINDINGS: SUPPORT DEVICES: None HEART AND MEDIASTINUM: Moderate cardiomegaly. LUNGS AND PLEURAL SPACES: Subsegmental atelectasis left base. Possible small effusions. BONES AND SOFT TISSUES: Dextroscoliosis. _______________     Subsegmental atelectasis left base. Possible small effusions. Moderate cardiomegaly without change. XR CHEST PORT    Result Date: 3/2/2022  EXAM:  AP Portable Chest X-ray 1 view INDICATION: Shortness of breath COMPARISON: April 23, 2021 _______________ FINDINGS: Cardiomegaly and mediastinal contours are within normal limits for portable radiograph. There is increased right retrocardiac/right paraspinal density. There are no pleural effusions. No acute osseous findings. ________________      Increased right retrocardiac density which may represent airspace disease or underlying pulmonary nodule. Recommend CT chest for further evaluation. ECHO ADULT COMPLETE    Result Date: 3/3/2022    Left Ventricle: Left ventricle size is normal. Moderately increased wall thickness. Findings consistent with moderate concentric hypertrophy. Normal wall motion. Normal left ventricular systolic function with a visually estimated EF of 55 - 60%.   Left Atrium: Left atrium is mildly dilated.   Right Ventricle: Right ventricle is mildly dilated.   Right Atrium: Right atrium is mildly dilated.   Pulmonary artery :  Estimated pulmonary arterial systolic pressure is 51 mmhg. Pulmonary hypertension found to be moderate   Mitral Valve: Moderately thickened leaflet. Mildly calcified leaflet. Moderate annular calcification of the mitral valve.   IVC/SVC : IVC diameter is greater than 21 mm and decreases less than 50% during inspiration; therefore the estimated right atrial pressure is elevated (~15 mmHg). IVC is dilated. Consider LORRAINE for better evaluation of mitral valve if clinically indicated.      DUPLEX LOWER EXT VENOUS BILAT    Result Date: 3/4/2022  · No evidence of deep vein thrombosis in the right lower extremity. · No evidence of deep vein thrombosis in the left lower extremity.         Consuella Claude, MD

## 2022-03-22 NOTE — PROGRESS NOTES
Cardiology Progress Note        Patient: Carmen Mota        Sex: female          DOA: 3/2/2022  YOB: 1940      Age:  80 y.o.        LOS:  LOS: 19 days   Assessment/Plan     Principal Problem:    Acute respiratory failure with hypoxemia ([address] (3/2/2022)    Active Problems:    Elevated troponin (2021)      HTN (hypertension) (2021)      Acute on chronic diastolic congestive heart failure ([address] (2021)      Atrial fibrillation ([address] (3/2/2022)      Stage 3 chronic kidney disease ([address] (3/3/2022)      SERENA (obstructive sleep apnea) (3/3/2022)      Fracture of neck of right femur ([address] (3/8/2022)      Hypokalemia (3/14/2022)      Hypotension (3/17/2022)      Morbid obesity ([address] (3/21/2022)        Plan:    Midodrine from 2.5 mg 3 times daily to 5 mg 3 times daily   Carvedilol from tomorrow  Discussed with patient and daughter            No cardiac complaints  Continue with Lasix  Continue with apixaban 2.5 mg twice daily  Discussed with patient/family  Dr. Victoria  will be covering during the weekend. A. fib rate controlled  Blood pressure stable  Continue with current midodrine and Lasix  Altered mental status, seen by neurology  Discussed with family in the room.         Blood pressure is intermittently low normal  Coreg is on hold  Increase midodrine from 5 mg twice daily to 3 times daily  Discussed with patient and daughter    Extubated  Denied any symptoms  Blood pressure is low normal  Hold carvedilol  Echocardiogram done with preserved LV function, severe biatrial enlargement moderate MR, TR, pulmonary hypertension  Continue with Lasix  Discussed with patient and daughter        Patient remained intubated  Cardiac telemetry rate controlled A. fib with occasional PVCs, saturating around 100%  Tolerating carvedilol 3.125 mg twice daily and Eliquis 2.5 mg twice daily  Ventilatory management as per pulmonary/intensivist  Discussed with patient's daughter in the room  Continue with current cardiac medications. Patient remained intubated  Hemodynamically stable  A. fib rate controlled  Discussed with patient's daughter Guero Walker on phone    Patient is status post hip surgery  Patient is intubated due to difficulty in breathing and hypoxemia  A. fib rate controlled  Discussed with Dr. Stephane Galarza with the current medical treatment        Patient denied chest pain or shortness of breath   Atrial fibrillation rate controlled   Patient is scheduled for hip surgery tomorrow  Discussed with patient's daughter  Resume anticoagulation after surgery. Patient is diagnosed with hip fracture and being scheduled for surgery  I have long lengthy discussion with patient and daughter Guero Walker in the room  Patient have negative stress test in April 2021  EF is stable  Patient is with elevated but acceptable risk for hip surgery  Eliquis can be hold  Consider DVT prophylaxis anticoagulation from tomorrow patient have taken morning dose of Eliquis      Patient denied any chest pain   Mitral calcification without stenosis  Continue with current medical treatment    Patient continues to do well. H&H stable  Patient will need diuretic on discharge probably Lasix 40 mg twice daily  Discussed with patient and family in the room      A. fib rate controlled  Patient will need p.o. Lasix on discharge  Continue Eliquis 2.5 mg twice daily  Discussed with patient and family      Continue with Lasix 40 mg twice daily  I have long lengthy discussion with the patient and daughter about anticoagulation both of them agreed with low-dose Eliquis 2.5 mg twice daily  DC Lovenox   start Eliquis 2.5 mg twice daily from a.m.     Discussed with patient and daughter findings of echocardiogram including mitral valve disease, prefer not to do transesophageal echocardiogram  Continue with current medical treatment                        Subjective:    cc:      Shortness of breath  A. fib        REVIEW OF SYSTEMS:     Extubated and no complaints    Objective:      Visit Vitals  /71   Pulse 86   Temp 98.4 °F (36.9 °C)   Resp 18   Ht 5' 5\" (1.651 m)   Wt 117.4 kg (258 lb 13.1 oz)   SpO2 100%   Breastfeeding No   BMI 43.07 kg/m²     Body mass index is 43.07 kg/m². Physical Exam:  General Appearance: Comfortable, extubated  NECK: No JVD, no thyroidomeglay. LUNGS: Clear bilaterally. HEART: S1 irregular +S2 audible,    ABD: Non-tender, BS Audible    EXT: No edema, and no cysnosis. VASCULAR EXAM: Pulses are intact. PSYCHIATRIC EXAM: Mood is appropriate.     Medication:  Current Facility-Administered Medications   Medication Dose Route Frequency    nystatin (MYCOSTATIN) 100,000 unit/gram powder   Topical BID    zinc oxide 20 % ointment   Topical PRN    [START ON 3/22/2022] midodrine (PROAMATINE) tablet 5 mg  5 mg Oral TID WITH MEALS    [Held by provider] furosemide (LASIX) injection 20 mg  20 mg IntraVENous BID    arformoteroL (BROVANA) neb solution 15 mcg  15 mcg Nebulization BID RT    famotidine (PF) (PEPCID) 20 mg in 0.9% sodium chloride 10 mL injection  20 mg IntraVENous DAILY    naloxone (NARCAN) injection 0.4 mg  0.4 mg IntraVENous PRN    ferrous sulfate tablet 325 mg  1 Tablet Oral TID    diphenhydrAMINE (BENADRYL) capsule 25 mg  25 mg Oral Q4H PRN    bisacodyL (DULCOLAX) suppository 10 mg  10 mg Rectal DAILY PRN    nystatin (MYCOSTATIN) 100,000 unit/mL oral suspension 500,000 Units  500,000 Units Oral QID    apixaban (ELIQUIS) tablet 2.5 mg  2.5 mg Oral BID    sodium chloride (NS) flush 5-40 mL  5-40 mL IntraVENous Q8H    sodium chloride (NS) flush 5-40 mL  5-40 mL IntraVENous PRN    acetaminophen (TYLENOL) tablet 650 mg  650 mg Oral Q6H PRN    Or    acetaminophen (TYLENOL) suppository 650 mg  650 mg Rectal Q6H PRN    polyethylene glycol (MIRALAX) packet 17 g  17 g Oral DAILY PRN    ondansetron (ZOFRAN ODT) tablet 4 mg  4 mg Oral Q8H PRN    Or    ondansetron (ZOFRAN) injection 4 mg 4 mg IntraVENous Q6H PRN    carvediloL (COREG) tablet 3.125 mg  3.125 mg Oral BID WITH MEALS    aspirin delayed-release tablet 81 mg  81 mg Oral DAILY               Lab/Data Reviewed:  Procedures/imaging: see electronic medical records for all procedures/Xrays   and details which were not copied into this note but were reviewed prior to creation of Plan       All lab results for the last 24 hours reviewed. Recent Labs     03/21/22  1411   WBC 9.6   HGB 8.7*   HCT 27.3*        Recent Labs     03/21/22  1411 03/20/22  0530 03/19/22  0940 03/19/22  0359 03/19/22  0359   *  --   --   --  145   K 3.9 3.6 4.8   < > 3.7   *  --   --   --  108   CO2 37*  --   --   --  31   *  --   --   --  113*   *  --   --   --  113*   CREA 1.78*  --   --   --  1.99*   CA 10.8*  --   --   --  10.7*    < > = values in this interval not displayed. RADIOLOGY:  CT Results  (Last 48 hours)    None        CXR Results  (Last 48 hours)               03/20/22 0653  XR CHEST PORT Final result    Impression:      1. Probable partial right middle lobe atelectasis, similar to most recent prior   radiograph. 2. Streaky opacity at left lung base, likely atelectasis, though   bronchopneumonia or aspiration not excluded. Narrative:  CLINICAL HISTORY:  Short of breath. COMPARISONS:  Chest x-ray 3/19/2022, and earlier studies. TECHNIQUE:  single frontal view of the chest       ------------------------------------------       FINDINGS:       Lungs:  Somewhat unusual air interface at the right lung base, present to   varying degrees on prior x-rays and suspect reflects right middle lobe   atelectasis. No other evidence of pneumothorax. Streaky opacity at left lung   base, likely atelectasis, not appreciably changed. Mild central bronchial wall   thickening, likely bronchitis or asthma. Probable tiny left pleural effusion, not appreciably changed.        Mediastinum: Moderate to severe cardiomegaly. Atherosclerotic arterial   calcification. Bones: Scoliosis.  Mild to moderate thoracic degenerative disc disease.           ------------------------------------------               Cardiology Procedures:   Results for orders placed or performed during the hospital encounter of 03/02/22   EKG, 12 LEAD, INITIAL   Result Value Ref Range    Ventricular Rate 72 BPM    Atrial Rate 340 BPM    QRS Duration 86 ms    Q-T Interval 374 ms    QTC Calculation (Bezet) 409 ms    Calculated R Axis -2 degrees    Calculated T Axis -3 degrees    Diagnosis       Atrial fibrillation  Abnormal ECG  When compared with ECG of 02-MAR-2022 20:12,  No significant change was found  Confirmed by Leatha Gabriel MD. ([id number]) on 3/17/2022 10:28:04 PM        Echo Results  (Last 48 hours)    None       Cardiolite (Tc-99m Sestamibi) stress test    Signed By: Natalya Camargo MD     March 21, 2022

## 2022-03-22 NOTE — PROGRESS NOTES
Bedside shift change report given to Sallie Mancilla (oncoming nurse) by Jose Toth (offgoing nurse). Report included the following information SBAR, Kardex, MAR and Recent Results.

## 2022-03-22 NOTE — PROGRESS NOTES
Problem: Mobility Impaired (Adult and Pediatric)  Goal: *Acute Goals and Plan of Care (Insert Text)  Description: In one week:  1. Pt will transfer supine <> sitting EOB min assist for increased ability to sit upright during meal times. 2. Pt will transfer sit <> stand min assist for improved lower extremity strengthening. 3. Pt will roll in bed in both directions for increased independence with pressure relief. 4. Pt will sit EOB unsupported for > 5 minutes without LOB for increased safety and improved sitting balance. Outcome: Not Progressing Towards Goal   PHYSICAL THERAPY TREATMENT    Patient: Joni Welch (35 y.o. female)  Date: 3/22/2022  Diagnosis: Acute exacerbation of CHF (congestive heart failure) ([facility]) [I50.9] Acute respiratory failure with hypoxemia ([facility])  Procedure(s) (LRB):  RIGHT TOTAL HIP ARTHROPLASTY WITH C-ARM  (PT IN ROOM # 358) (Right) 12 Days Post-Op  Precautions: Fall,WBAT,Skin  PLOF: ambulatory with  rollator    ASSESSMENT:  Pt makes no effort to mobilize, at risk for continued skin breakdown, needs to be wt shifted every 2 hours for prevention. Placed bed in full trendelenburg to scoot pt up to [facility] with total A. Attempted AAROM  strengthening exercises, pt shaking head no vigorously, no assistance provided from pt so PROM performed on (B)LEs. Family plan to take pt home with [facility] and provided total care for the pt. Pt is total care at this time, 2-3 people are required just for bed rolling for cleanup. Pt is unable to support self while sitting EOB according to past notes. Sitting up EOB deferred this session due to pt making no effort to assist. At this time a hospital bed with gel mattress topper and mechanical lift is recommended. Pt is unable to support self in sitting and therefore a w/c or BSC are not safe.   Progression toward goals:   []      Improving appropriately and progressing toward goals  []      Improving slowly and progressing toward goals  [x]      Not making progress toward goals and plan of care will be adjusted     PLAN:  Patient continues to benefit from skilled intervention to address the above impairments. Continue treatment per established plan of care. Discharge Recommendations:  LTC vs home health with total care vs home with hospice  Further Equipment Recommendations for Discharge:  hospital bed and mechanical lift     SUBJECTIVE:   Patient stated I'll get there.     OBJECTIVE DATA SUMMARY:   Critical Behavior:  Neurologic State: Alert,Confused  Orientation Level: Disoriented to time,Disoriented to situation  Cognition: Impaired decision making  Safety/Judgement: Fall prevention  Functional Mobility Training:  Bed Mobility:  Rolling: Total assistance  Scooting: Total assistance  Therapeutic Exercises:         EXERCISE   Sets   Reps   Active Active Assist   Passive Self ROM   Comments   Ankle Pumps  10  [] [] [x] []    Quad Sets/Glut Sets  10  [] [x] [] [] Hold for 5 secs   Hamstring Sets   [] [] [] []    Short Arc Quads   [] [] [] []    Heel Slides  10 [] [] [x] []    Straight Leg Raises   [] [] [] []    Hip Add   [] [] [] [] Hold for 5 secs, w/ pillow squeeze   Long Arc Quads   [] [] [] []    Seated Marching   [] [] [] []    Standing Marching   [] [] [] []       [] [] [] []        Pain:  Pain level pre-treatment: 0/10  Pain level post-treatment: 5/10   Pain Intervention(s): Medication (see MAR); Rest, Ice, Repositioning   Response to intervention: Nurse notified, See doc flow    Activity Tolerance:   Poor/none  Please refer to the flowsheet for vital signs taken during this treatment. After treatment:   [] Patient left in no apparent distress sitting up in chair  [x] Patient left in no apparent distress in bed  [x] Call bell left within reach  [x] Nursing notified  [x] Caregiver present  [] Bed alarm activated  [x] SCDs applied      COMMUNICATION/EDUCATION:   [x]         Role of Physical Therapy in the acute care setting.   [x]         Fall prevention education was provided and the patient/caregiver indicated understanding. [x]         Patient/family have participated as able in working toward goals and plan of care. [x]         Patient/family agree to work toward stated goals and plan of care. []         Patient understands intent and goals of therapy, but is neutral about his/her participation.   []         Patient is unable to participate in stated goals/plan of care: ongoing with therapy staff.  []         Other:        Leroy Tyson, PTA   Time Calculation: 23 mins

## 2022-03-22 NOTE — PROGRESS NOTES
D/C Plan: Discharge home with [facility] pending equipment delivery and the patient remains medically stable. Patient to use home Bipap tonight to ensure that it will adequately meet patient needs. CM called [employer] and left message for equipment rep concerning the patient's Bipap: per family member, Lesley Donis is not functioning properly. CM awaiting return call from [facility] to pursue timely replacement of Bipap or if loaner is available while Bipap is repaired. CM notes that PT recommends long term care facility. Family insists on bringing patient home on [facility]. HH can not currently be ordered as PCP is not showing as licensed through [employer]. CM to follow up with PCP provider to see if the issue with [facility] has been resolved. CM spoke with patient's family. CM again stressed to family that PT is recommending longterm care for discharge, and this is considered the safest discharge plan for the patient. Patient's family member (daughter Mansi Garcia) stressed that the family will take the patient home with [facility]. CM spoke with patient's daughter concerning patient's mobility as PT is recommending long term care. Patient will need to attend follow up visits with PCP and pulmonology. CM asked if family member had thought about how they would get patient out of bed and transport patient. The family member stated that she would use a stretcher company and she stated they want a sunitha lift at home. Patient to bring Bipap up to hospital. CM to ask [employer] to come verify if it works. Patient stated that she wants a sunitha lift, a hospital bed and a wheelchair for the patient. CM stated that PT would recommend the equipment required for the patient. CM spoke with respiratory who stated that per respiratory note on 3/16, therapist noted that the patient was desating on the home machine and that it may be only the settings that were a problem. Patient settings have been reset yesterday.  Patient would benefit from staying another night and using home Bipap to determine if it is sufficient to support patient oxygenation overnight. [facility] can accept the patient and can offer next day service. CM spoke with bedside nurse who states that during modified walk test patient desated down to 80% on RA. Nursing to apply Bipap in order to assess if new settings are adequate for patient. Per [facility], they can order the lift but the PT note must say that the patient needs more than 1 person to move her and that without the lift the patient would be bed confined. CM spoke with the PT manager who will contact the therapist concerning recommendations for lift. CM noted pulmonology wants full face mask for patient's Bipap. [facility] will  Call patient's pulmonology office to request prescription for face mask. CM spoke with respiratory and made them aware that patient has her home Bipap and plan is to use it tonight to assess if it adequately provides her oxygen needs. Care Management Interventions  PCP Verified by CM: Yes  Mode of Transport at Discharge: BLS (.)  Transition of Care Consult (CM Consult): SNF [facility] has stated that according to the current therapy notes, she is not Acute inpt rehab appropriate. SNF choices obtained from the pt's dtr;  [facility]. )  Partner SNF: No  Discharge Durable Medical Equipment:  (TBD)  Physical Therapy Consult: Yes  Occupational Therapy Consult: Yes  Support Systems: Child(doc)  Confirm Follow Up Transport: Family  The Plan for Transition of Care is Related to the Following Treatment Goals : skilled nursing facility  The Patient and/or Patient Representative was Provided with a Choice of Provider and Agrees with the Discharge Plan?: Yes (The pt and her dtr: Marlena)  Freedom of Choice List was Provided with Basic Dialogue that Supports the Patient's Individualized Plan of Care/Goals, Treatment Preferences and Shares the Quality Data Associated with the Providers?: Yes ([facility])  Discharge Location  Patient Expects to be Discharged to[de-identified]  (discharge home with [facility] and physician follow up)

## 2022-03-22 NOTE — PROGRESS NOTES
[facility]  Pulmonary, Critical Care, and Sleep Medicine    Name: Heather Burleson MRN: [id number]   : 1940 Hospital: [facility] [place]    Date: 3/22/2022  Room: [address] [place] [address] Note                                              Consult requesting physician: Dr. Vic Hercules  Reason for Consult: Respiratory failure    IMPRESSION:   Acute respiratory failure with hypoxemia - J96.01  Acute on chronic hypercarbic respiratory failure - J96.22  Acute on chronic diastolic congestive heart failure - I50.33  SERENA - G47.33  OHS - E66.2  Aspiration pneumonia, E. Cloacae complex in respi cx 3/10/22  Atrial fibrillation   Fracture of neck of right femur. Patient Active Problem List   Diagnosis Code    Elevated troponin R77.8    HTN (hypertension) I10    SERENA treated with BiPAP G47.33    Acute on chronic diastolic congestive heart failure (HCC) I50.33    Acute respiratory failure with hypoxemia (HCC) J96.01    Atrial fibrillation (HCC) I48.91    Stage 3 chronic kidney disease (HCC) N18.30    SERENA (obstructive sleep apnea) G47.33    Fracture of neck of right femur ([facility] [address] S72.001A    Hypokalemia E87.6    Hypotension I95.9    Morbid obesity ([facility] [address] E66.01   Code status: DNR       RECOMMENDATIONS:     Respiratory: History of obesity, SERENA on BiPAP  Patient has not received new supplies from DME beyond last visit in office 2021  Daughter ok to give another chance to DME for supplies from [employer]  Patient device in  and I confirmed that new changes are already synched at 20/6, PS 10 cwp  Patient placed on BiPAP while sleeping with full face mask on her BiPAP w/o supplemental O2 and SPO2 remained 95-99% on present settings on room air with BiPAP  She has benefited from BIPAP 16/6, PS 10 cwp.  Through [employer], I had changed pt's unit settings to IPAP 20 cwp, EPAP 6 cwp, PS 10 cwp  I discussed findings with daughter that during prior device therapy efficiency download, mask leaks may have contributed to residual respiratory events on BiPAP therapy. Advised the daughter to monitor any mask leaks  Patient would benefit from full facemask. Per RN and RT, patient could take FF mask home  Continue supplemental O2 via nasal cannula and screen for home O2 need prior to DC with activity  Keep SPO2 >=92%. HOB 30 degree elevation all the time. Aggressive pulmonary toileting. Aspiration precautions    Postoperative respiratory failure, reintubated in PACU 3/10/2022. Extubated on 3/15/2022  Patient has been on home BiPAP and presented for acute CHF with Afib RVR during this hospitalization. Later found to have RT hip # leading to surgery and eventful post-op course including ventilator use in ICU, aspiration pneumonia  Continue BIPAP at night and PRN during daytime while at hospital    [facility]: manage per cardiology on board. Diuretics - lasix 20 mg x q12 on hold for hyperNa+  Midodrine for BP support  A. fib RVR, acute on chronic diastolic CHF on admission  Continue aspirin, Eliquis; monitor for any external bleeding  Coreg on hold - restart per cardiology    Echo 3/3/2022: LVEF 55 to 60%. RV mildly dilated. RA mildly dilated. PVL LE 3/3/2022: No DVT. VQ scan 3/3/2022: No PE.    ID: Finished ABX Merrem 3/20/22 per ID   No fever or leukocytosis or active sepsis. Defer to hospitalist  Rapid influenza and COVID19 3/2/2022: Negative. Urine culture 3/2/2022: Klebsiella pneumonia. Blood culture 3/2/2022: Negative. Endotracheal aspirate culture 3/10/22: Light Enterobacter cloacae complex. Heavy normal forrest. Hematology/Oncology: Anemia; Hgb stable. Defer to hospitalist  No external bleeding. Normal platelets. Renal: stable S. Cr  Mild hypernatremia; manage per hospitalist.  Lasix held due to elevated S. Na+  Dr. Angel Burgos followed    GI/: No acute issues. USG - hepatic steatosis, cholelithiasis.   Elevated T. Bili  No abdominal pain or signs of acute abdomen    Endocrine: Monitor FSBS    Neurology: On/off periods of lethargy, generalized weak  Ct Head ammonia TSH normal  Neurology followed  CT head 3/7/2022: Nil acute. Repeat CT head 3/11/2022: Nil acute. Pain/Sedation: Avoid sedation, anxiolytic; judicious opiate - avoid if possible    Skin/Wound: Right hip wound VAC in place after surgery; local care per nursing protocol. Electrolytes: Replace electrolytes per hospitalist and floor    Nutrition: Tolerating dysphagia diet    Prophylaxis: DVT Prophylaxis: Eliquis. GI Prophylaxis: Protonix. Restraints: none    Advance Directive/Palliative Care: Consulted. DNR    Will defer respective systems problem management to primary and other respective consultant and follow patient with primary and other medical team.  Further recommendations will be based on the patient's response to recommended treatment and results of the investigation ordered. Quality Care: PPI, DVT prophylaxis, HOB elevated, Infection control all reviewed and addressed. Care of plan d/w RN, RT, hospitalist team,     High complexity decision making was performed during the evaluation of this patient excluding family discussion and any procedures  I updated daughter today at bedside     Subjective/History of Present Illness:   Patient is a 80 y.o. female with PMHx significant for morbid obesity, SERENA, right leg weakness, HTN, A. fib, CHF; admitted to medical floor on 3/3/2022 for dyspnea. Patient had new onset of A. fib on admission. Patient was admitted on medical floor and A. fib/CHF was being treated medically; and was using home BiPAP nightly. Found to have right femoral fracture; underwent right press-fit direct anterior total hip arthroplasty for femoral neck fracture.   Postoperatively patient was extubated in PACU; but due to respiratory distress, reintubated and transferred to ICU.      3/22/2022 :   Patient seen at bedside in rm#[id number]  Daughter at bedside and has brought in pt's BiPAP  Pt remained generalized weak, moaning, doesn't speak much; follows some simple commands  On O2 via NC and BiPAP at night  Answered all the questions of daughter to their satisfaction    DNR/DNI    I/O last 24 hrs: Intake/Output Summary (Last 24 hours) at 3/22/2022 1715  Last data filed at 3/22/2022 0902  Gross per 24 hour   Intake 240 ml   Output    Net 240 ml         Review of Systems:  ROS not obtained due to patient factor. Allergies   Allergen Reactions    Seafood Hives     crabs    Statins-Hmg-Coa Reductase Inhibitors Unknown (comments)    Sulfa (Sulfonamide Antibiotics) Hives      Past Medical History:   Diagnosis Date    Hypertension     Sleep disorder       Past Surgical History:   Procedure Laterality Date    HX ORTHOPAEDIC      broken right ankle, left femur 30 yrs ago      Social History     Tobacco Use    Smoking status: Never Smoker    Smokeless tobacco: Never Used   Substance Use Topics    Alcohol use: Yes     Alcohol/week: 1.0 standard drink     Types: 1 Glasses of wine per week     Comment: soc      Family History   Problem Relation Age of Onset    Diabetes Mother     Heart Disease Mother     Heart Disease Father       Prior to Admission medications    Medication Sig Start Date End Date Taking? Authorizing Provider   allopurinoL (ZYLOPRIM) 100 mg tablet Take 100 mg by mouth daily. Yes Provider, Historical   acetaminophen (TYLENOL) 325 mg tablet Take 650 mg by mouth every six (6) hours as needed for Pain. Yes Provider, Historical   niacin (NIASPAN) 1,000 mg Tb24 tab Take 1,000 mg by mouth daily. Yes Provider, Historical   trolamine salicylate-aloe vera 01% (ASPERCREME) topical cream Apply 2 g to affected area as needed for Pain. Yes Provider, Historical   multivitamin, tx-iron-ca-min (THERA-M w/ IRON) 9 mg iron-400 mcg tab tablet Take 1 Tab by mouth daily. Yes Provider, Historical   aspirin delayed-release 81 mg tablet Take 81 mg by mouth daily.    Yes Provider, Historical   potassium chloride SR (KLOR-CON 10) 10 mEq tablet Take 10 mEq by mouth daily. Yes Provider, Historical   carvediloL (COREG) 3.125 mg tablet Take 3.125 mg by mouth daily.    Yes Provider, Historical     Current Facility-Administered Medications   Medication Dose Route Frequency    dextrose 5% infusion  50 mL/hr IntraVENous CONTINUOUS    nystatin (MYCOSTATIN) 100,000 unit/gram powder   Topical BID    midodrine (PROAMATINE) tablet 5 mg  5 mg Oral TID WITH MEALS    [Held by provider] furosemide (LASIX) injection 20 mg  20 mg IntraVENous BID    arformoteroL (BROVANA) neb solution 15 mcg  15 mcg Nebulization BID RT    famotidine (PF) (PEPCID) 20 mg in 0.9% sodium chloride 10 mL injection  20 mg IntraVENous DAILY    ferrous sulfate tablet 325 mg  1 Tablet Oral TID    nystatin (MYCOSTATIN) 100,000 unit/mL oral suspension 500,000 Units  500,000 Units Oral QID    apixaban (ELIQUIS) tablet 2.5 mg  2.5 mg Oral BID    sodium chloride (NS) flush 5-40 mL  5-40 mL IntraVENous Q8H    carvediloL (COREG) tablet 3.125 mg  3.125 mg Oral BID WITH MEALS    aspirin delayed-release tablet 81 mg  81 mg Oral DAILY         Objective:   Vital Signs:    Visit Vitals  BP (!) 108/52   Pulse 95   Temp 98.8 °F (37.1 °C)   Resp 16   Ht 5' 5\" (1.651 m)   Wt 117.4 kg (258 lb 13.1 oz)   SpO2 100%   Breastfeeding No   BMI 43.07 kg/m²       O2 Device: Nasal cannula   O2 Flow Rate (L/min): 2 l/min   Temp (24hrs), Av.5 °F (36.9 °C), Min:97.7 °F (36.5 °C), Max:99.3 °F (37.4 °C)       Intake/Output:   Last shift:       07 - 1900  In: 240 [P.O.:240]  Out: -     Last 3 shifts: 1901 -  0700  In: -   Out: 500 [Urine:500]      Intake/Output Summary (Last 24 hours) at 3/22/2022 1715  Last data filed at 3/22/2022 0902  Gross per 24 hour   Intake 240 ml   Output    Net 240 ml       Last 3 Recorded Weights in this Encounter    22 0735 22 0807 22 1416   Weight: 113.5 kg (250 lb 3.6 oz) 116.7 kg (257 lb 4.4 oz) 117.4 kg (258 lb 13.1 oz)       No results for input(s): PHI, PHI, POC2, PCO2I, PO2, PO2I, HCO3, HCO3I, FIO2, FIO2I in the last 72 hours. Physical Exam:     General/Neurology: Alert, generalized weak, NAD, moving all extremities slowly, follows some commands, no involuntary movements. On O2 via NC  Head:   Normocephalic, without obvious abnormality, atraumatic. Eye:   No scleral icterus  Nose:   No sinus tenderness  Throat:  Visible lips, mucosa, and tongue normal. No oral thrush. Neck:   Supple, symmetric. No lymphadenopathy. Trachea midline  Lung: Moderate air entry bilateral equal. No rales. BL scattered rhonchi. No wheezing. No stridors. No prolongded expiration. No accessory muscle use. Heart:   Regular rate & rhythm. S1 S2 present. No murmur. No JVD. Abdomen:  Soft. NT. ND. +BS. No masses. No guarding, rigidity or rebound  Extremities:  Trace BL pedal edema. No cyanosis. Pulses: 2+ and symmetric in DP. Capillary refill: normal  Lymphatic:  No cervical or supraclavicular palpable lymphadenopathy.    Skin:   Color, texture, turgor unchanged       Data:       Recent Results (from the past 24 hour(s))   GLUCOSE, POC    Collection Time: 03/21/22  9:17 PM   Result Value Ref Range    Glucose (POC) 124 (H) 70 - 210 mg/dL   METABOLIC PANEL, BASIC    Collection Time: 03/22/22  4:40 AM   Result Value Ref Range    Sodium 152 (H) 136 - 145 mmol/L    Potassium 4.0 3.5 - 5.5 mmol/L    Chloride 113 (H) 100 - 111 mmol/L    CO2 34 (H) 21 - 32 mmol/L    Anion gap 5 3.0 - 18 mmol/L    Glucose 114 (H) 74 - 99 mg/dL     (H) 7.0 - 18 MG/DL    Creatinine 1.57 (H) 0.6 - 1.3 MG/DL    BUN/Creatinine ratio 76 (H) 12 - 20      GFR est AA 38 (L) >60 ml/min/1.73m2    GFR est non-AA 32 (L) >60 ml/min/1.73m2    Calcium 10.4 (H) 8.5 - 10.1 MG/DL   CBC WITH AUTOMATED DIFF    Collection Time: 03/22/22  4:40 AM   Result Value Ref Range    WBC 8.8 4.6 - 13.2 K/uL    RBC 2.77 (L) 4.20 - 5.30 M/uL    HGB 8.6 (L) 12.0 - 16.0 g/dL    HCT 27.2 (L) 35.0 - 45.0 %    MCV 98.2 78.0 - 100.0 FL    MCH 31.0 24.0 - 34.0 PG    MCHC 31.6 31.0 - 37.0 g/dL    RDW 15.2 (H) 11.6 - 14.5 %    PLATELET 786 (H) 479 - 420 K/uL    MPV 11.1 9.2 - 11.8 FL    NRBC 0.2 (H) 0  WBC    ABSOLUTE NRBC 0.02 (H) 0.00 - 0.01 K/uL    NEUTROPHILS 73 40 - 73 %    LYMPHOCYTES 14 (L) 21 - 52 %    MONOCYTES 8 3 - 10 %    EOSINOPHILS 2 0 - 5 %    BASOPHILS 0 0 - 2 %    IMMATURE GRANULOCYTES 3 (H) 0.0 - 0.5 %    ABS. NEUTROPHILS 6.4 1.8 - 8.0 K/UL    ABS. LYMPHOCYTES 1.3 0.9 - 3.6 K/UL    ABS. MONOCYTES 0.7 0.05 - 1.2 K/UL    ABS. EOSINOPHILS 0.2 0.0 - 0.4 K/UL    ABS. BASOPHILS 0.0 0.0 - 0.1 K/UL    ABS. IMM. GRANS. 0.2 (H) 0.00 - 0.04 K/UL    DF AUTOMATED     GLUCOSE, POC    Collection Time: 03/22/22  6:38 AM   Result Value Ref Range    Glucose (POC) 106 70 - 110 mg/dL   GLUCOSE, POC    Collection Time: 03/22/22 11:53 AM   Result Value Ref Range    Glucose (POC) 139 (H) 70 - 110 mg/dL   GLUCOSE, POC    Collection Time: 03/22/22  4:34 PM   Result Value Ref Range    Glucose (POC) 126 (H) 70 - 110 mg/dL         Chemistry Recent Labs     03/22/22  0440 03/21/22  1411 03/21/22  0300 03/20/22  0530   * 140*  --   --    * 151*  --   --    K 4.0 3.9  --  3.6   * 113*  --   --    CO2 34* 37*  --   --    * 129*  --   --    CREA 1.57* 1.78*  --   --    CA 10.4* 10.8*  --   --    MG  --  2.5 2.5 2.6   PHOS  --   --   --  3.5   AGAP 5 1*  --   --    BUCR 76* 72*  --   --         Lactic Acid Lactic acid   Date Value Ref Range Status   03/17/2022 1.3 0.4 - 2.0 MMOL/L Final     No results for input(s): LAC in the last 72 hours. Liver Enzymes Protein, total   Date Value Ref Range Status   03/19/2022 6.4 6.4 - 8.2 g/dL Final     Albumin   Date Value Ref Range Status   03/19/2022 2.7 (L) 3.4 - 5.0 g/dL Final     Globulin   Date Value Ref Range Status   03/19/2022 3.7 2.0 - 4.0 g/dL Final     A-G Ratio   Date Value Ref Range Status   03/19/2022 0.7 (L) 0.8 - 1.7   Final     Alk. phosphatase   Date Value Ref Range Status   03/19/2022 182 (H) 45 - 117 U/L Final     No results for input(s): TP, ALB, GLOB, AGRAT, AP, TBIL in the last 72 hours. No lab exists for component: SGOT, GPT, DBIL     CBC w/Diff Recent Labs     03/22/22  0440 03/21/22  1411   WBC 8.8 9.6   RBC 2.77* 2.88*   HGB 8.6* 8.7*   HCT 27.2* 27.3*   * 393   GRANS 73 76*   LYMPH 14* 12*   EOS 2 1        Cardiac Enzymes No results found for: CPK, CK, CKMMB, CKMB, RCK3, CKMBT, CKNDX, CKND1, FARA, TROPT, TROIQ, ADOLFO, TROPT, TNIPOC, BNP, BNPP     BNP No results found for: BNP, BNPP, XBNPT     Coagulation No results for input(s): PTP, INR, APTT, INREXT, INREXT in the last 72 hours. Thyroid  Lab Results   Component Value Date/Time    TSH 0.38 03/16/2022 04:35 AM       No results found for: T4     Urinalysis Lab Results   Component Value Date/Time    Color YELLOW 03/02/2022 09:04 PM    Appearance CLOUDY 03/02/2022 09:04 PM    Specific gravity 1.021 03/02/2022 09:04 PM    pH (UA) 5.0 03/02/2022 09:04 PM    Protein 300 (A) 03/02/2022 09:04 PM    Glucose Negative 03/02/2022 09:04 PM    Ketone TRACE (A) 03/02/2022 09:04 PM    Bilirubin Negative 03/02/2022 09:04 PM    Urobilinogen 1.0 03/02/2022 09:04 PM    Nitrites Negative 03/02/2022 09:04 PM    Leukocyte Esterase Negative 03/02/2022 09:04 PM    Epithelial cells 2+ 03/02/2022 09:04 PM    Bacteria FEW (A) 03/02/2022 09:04 PM    WBC 0 to 3 03/02/2022 09:04 PM    RBC 0 to 3 03/02/2022 09:04 PM        Micro  No results for input(s): SDES, CULT in the last 72 hours. No results for input(s): CULT in the last 72 hours. Culture data during this hospitalization.    All Micro Results     Procedure Component Value Units Date/Time    CULTURE, BLOOD [[id number]] Collected: 03/14/22 0419    Order Status: Completed Specimen: Blood Updated: 03/20/22 0739     Special Requests: NO SPECIAL REQUESTS        Culture result: NO GROWTH 6 DAYS       CULTURE, BLOOD [[id number]] Collected: 03/14/22 6262    Order Status: Completed Specimen: Blood Updated: 03/20/22 0739     Special Requests: NO SPECIAL REQUESTS        Culture result: NO GROWTH 6 DAYS       CULTURE, RESPIRATORY/SPUTUM/BRONCH Buzz Patch STAIN [[id number]]  (Abnormal)  (Susceptibility) Collected: 03/10/22 1630    Order Status: Completed Specimen: Sputum from Endotracheal aspirate Updated: 03/13/22 0905     Special Requests: NO SPECIAL REQUESTS        GRAM STAIN NO WBC'S SEEN         NO EPITHELIAL CELLS SEEN         NO ORGANISMS SEEN        Culture result:       LIGHT ENTEROBACTER CLOACAE COMPLEX                  HEAVY NORMAL RESPIRATORY ELSIE          CULTURE, RESPIRATORY/SPUTUM/BRONCH Tomma Daron [[id number]] Collected: 03/10/22 1915    Order Status: Canceled Specimen: Sputum from Endotracheal aspirate     CULTURE, BLOOD [[id number]] Collected: 03/02/22 2048    Order Status: Completed Specimen: Blood Updated: 03/08/22 0655     Special Requests: NO SPECIAL REQUESTS        Culture result: NO GROWTH 6 DAYS       CULTURE, BLOOD [[id number]] Collected: 03/02/22 2048    Order Status: Completed Specimen: Blood Updated: 03/08/22 0655     Special Requests: NO SPECIAL REQUESTS        Culture result: NO GROWTH 6 DAYS       CULTURE, URINE [[id number]]  (Abnormal)  (Susceptibility) Collected: 03/02/22 2104    Order Status: Completed Specimen: Cath Urine Updated: 03/05/22 1226     Special Requests: NO SPECIAL REQUESTS        [place] Count --        >100,000  COLONIES/mL       Culture result: KLEBSIELLA PNEUMONIAE       COVID-19 RAPID TEST [[id number]] Collected: 03/02/22 2025    Order Status: Completed Specimen: Nasopharyngeal Updated: 03/02/22 2052     Specimen source Nasopharyngeal        COVID-19 rapid test Not detected        Comment: Rapid Abbott ID Now       Rapid NAAT:  The specimen is NEGATIVE for SARS-CoV-2, the novel coronavirus associated with COVID-19.        Negative results should be treated as presumptive and, if inconsistent with clinical signs and symptoms or necessary for patient management, should be tested with an alternative molecular assay. Negative results do not preclude SARS-CoV-2 infection and should not be used as the sole basis for patient management decisions. This test has been authorized by the FDA under an Emergency Use Authorization (EUA) for use by authorized laboratories. Fact sheet for Healthcare Providers: ConventionBounce Mobiledate.co.nz  Fact sheet for Patients: SpotOnWaydate.co.nz       Methodology: Isothermal Nucleic Acid Amplification         INFLUENZA A & B AG (RAPID TEST) [[id number]] Collected: 03/02/22 2025    Order Status: Completed Specimen: Nasopharyngeal from Nasal washing Updated: 03/02/22 2047     Influenza A Antigen Negative        Comment: A negative result does not exclude influenza virus infection, seasonal or H1N1 due to suboptimal sensitivity. If influenza is circulating in your community, a diagnosis of influenza should be considered based on a patients clinical presentation and empiric antiviral treatment should be considered, if indicated. Influenza B Antigen Negative                NM LUNG SCAN PERF  Result Date: 3/3/2022  EXAM: NM LUNG SCAN PERF. CLINICAL INDICATION/HISTORY: d dimer increase dyspnea. -Additional: Clinical concern for pulmonary embolus. COMPARISON: Correlation is made with the radiographic study performed one day prior. TECHNIQUE: 7.2 mCi Tc-99m MAA was injected intravenously and the 8 standard views of the chest were obtained. This perfusion only examination is interpreted using the PISAPED criteria. _______________ FINDINGS: Perfusion images show no segmental perfusion defects to suggest the presence of pulmonary embolism. _______________     o scintigraphic findings to suggest the presence of acute pulmonary embolism.  _______________       XR HIP RT W OR WO PELV 2-3 VWS  Result Date: 3/7/2022  EXAM: RIGHT HIP RADIOGRAPHS CLINICAL INDICATION/HISTORY: right hip pain -Additional: None COMPARISON: May March 3, 2022. TECHNIQUE: AP pelvis and frog-leg lateral view of right hip. _______________ FINDINGS: BONES: Intact appearing ilioischial and iliopectineal lines. There is a displaced and impacted subcapital right femoral neck fracture, with mild progressive displacement on follow-up imaging. Mild-moderate bilateral hip joint arthrosis. SOFT TISSUES: Unremarkable. _______________     1. Displaced and impacted subcapital right femoral neck fracture, increased in displacement from CT performed 4 days prior. CT HEAD WO CONT  Result Date: 3/7/2022  EXAM: CT head INDICATION: Altered mental status. COMPARISON: 4/23/2021. TECHNIQUE: Axial CT imaging of the head was performed without intravenous contrast. Standard multiplanar coronal and sagittal reformatted images were obtained and are included in interpretation. One or more dose reduction techniques were used on this CT: automated exposure control, adjustment of the mAs and/or kVp according to patient size, and iterative reconstruction techniques. The specific techniques used on this CT exam have been documented in the patient's electronic medical record. Digital Imaging and Communications in Medicine (DICOM) format image data are available to nonaffiliated external healthcare facilities or entities on a secure, media free, reciprocally searchable basis with patient authorization for at least a 12-month period after this study. _______________ FINDINGS: BRAIN AND POSTERIOR FOSSA: Periventricular white matter hypoattenuation which is nonspecific but likely represents chronic ischemic changes. No evidence of acute large vessel transcortical infarct or acute parenchymal hemorrhage. No midline shift or hydrocephalus. EXTRA-AXIAL SPACES AND MENINGES: There are no abnormal extra-axial fluid collections. CALVARIUM: Intact. SINUSES: Clear. OTHER: None. _______________     No acute intracranial abnormality.          CT CHEST ABD PELV WO CONT  Result Date: 3/3/2022  EXAM: CT CHEST, ABDOMEN AND PELVIS CLINICAL INDICATION/HISTORY: Hypoxemia, retrocardiac density, diarrhea, weakness and shortness of breath COMPARISON: 3/2/2022 TECHNIQUE: Axial CT imaging of the chest, abdomen, and pelvis was performed without intravenous contrast. Multiplanar reformats were generated. One or more dose reduction techniques were used on this CT: automated exposure control, adjustment of the mAs and/or kVp according to patient size, and iterative reconstruction techniques. The specific techniques used on this CT exam have been documented in the patient's electronic medical record. Digital Imaging and Communications in Medicine (DICOM) format image data are available to nonaffiliated external healthcare facilities or entities on a secure, media free, reciprocally searchable basis with patient authorization for at least a 12-month period after this study. ________________ FINDINGS: LIMITATIONS:   > Suboptimal evaluation given lack of intravenous contrast.   > Motion artifact is present which limits evaluation.   > Suboptimal exam due to patient body habitus and arms by the side. CHEST: LUNGS: Respiratory motion significantly limits evaluation. Interlobar septal thickening in the upper lobes. Mild bilateral dependent atelectasis. No large consolidation or suspicious pulmonary mass. PLEURA: Very small volume bilateral pleural effusions. AIRWAY: Normal. MEDIASTINUM: Four-chamber cardiomegaly with asymmetric biatrial enlargement, mitral and aortic annular calcifications. The main pulmonary artery is enlarged suggesting pulmonary arterial hypertension. Normal caliber aorta with scattered calcified atherosclerotic plaque. No pericardial effusion. LYMPH NODES: No enlarged lymph nodes. CHEST WALL: Diffuse anasarca. Heterogeneous thyroid. _______________ ABDOMEN/PELVIS: LIVER: No enhancing hepatic mass. The portal and hepatic veins are patent.  BILIARY: Gallstones are present in the nondistended gallbladder lumen. No biliary dilation. SPLEEN: Normal. PANCREAS: Normal. ADRENALS: Left adrenal gland measures 9 HU (axial image 76), most consistent with lipid rich adenoma. Normal right adrenal gland. KIDNEYS: Asymmetrically small left kidney. No rate of a stone. No hydronephrosis. GI TRACT:  A small hiatal hernia is present. Normal caliber small and large bowel loops. No morphology of bowel obstruction. Normal appendix. BLADDER: Diffuse wall thickening in the largely decompressed bladder. PELVIC ORGANS: No acute abnormality. VASCULATURE: No arterial aneurysm. 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 LYMPH NODES: No mesenteric or retroperitoneal lymphadenopathy. OTHER: No free intraperitoneal air. No ascites. Diffuse anasarca. OSSEOUS STRUCTURES: No acute osseous abnormality. Multilevel degenerative changes most pronounced in the lumbar spine. Degenerative changes in both shoulders (right greater than left). Please note the included portions of the upper extremities are not well evaluated on this study _______________     1. Findings of congestive heart failure with cardiomegaly, interstitial edema, very small bilateral pleural effusions, and diffuse anasarca. 2.  Bladder wall thickening may be due to underdistention though superimposed infection cannot be excluded. Recommend correlation for cystitis. 3.  Osseous degenerative changes and additional findings, as detailed in the body of the report. Hip x-ray 3/11/2022:  Status post total right hip arthroplasty, without complication. ECHO ADULT COMPLETE  Result Date: 3/3/2022    Left Ventricle: Left ventricle size is normal. Moderately increased wall thickness. Findings consistent with moderate concentric hypertrophy. Normal wall motion. Normal left ventricular systolic function with a visually estimated EF of 55 - 60%.   Left Atrium: Left atrium is mildly dilated.   Right Ventricle: Right ventricle is mildly dilated.   Right Atrium: Right atrium is mildly dilated.   Pulmonary artery :  Estimated pulmonary arterial systolic pressure is 51 mmhg. Pulmonary hypertension found to be moderate   Mitral Valve: Moderately thickened leaflet. Mildly calcified leaflet. Moderate annular calcification of the mitral valve.   IVC/SVC : IVC diameter is greater than 21 mm and decreases less than 50% during inspiration; therefore the estimated right atrial pressure is elevated (~15 mmHg). IVC is dilated. Consider LORRAINE for better evaluation of mitral valve if clinically indicated. DUPLEX LOWER EXT VENOUS BILAT  Result Date: 3/4/2022  · No evidence of deep vein thrombosis in the right lower extremity. · No evidence of deep vein thrombosis in the left lower extremity. USG retroperitoneum 3/12/2022: No hydronephrosis. ECHO 3/3/2022: Result status: Final result       Left Ventricle: Left ventricle size is normal. Moderately increased wall thickness. Findings consistent with moderate concentric hypertrophy. Normal wall motion. Normal left ventricular systolic function with a visually estimated EF of 55 - 60%.   Left Atrium: Left atrium is mildly dilated.   Right Ventricle: Right ventricle is mildly dilated.   Right Atrium: Right atrium is mildly dilated.   Pulmonary artery :  Estimated pulmonary arterial systolic pressure is 51 mmhg. Pulmonary hypertension found to be moderate    Mitral Valve: Moderately thickened leaflet. Mildly calcified leaflet. Moderate annular calcification of the mitral valve.   IVC/SVC : IVC diameter is greater than 21 mm and decreases less than 50% during inspiration; therefore the estimated right atrial pressure is elevated (~15 mmHg). IVC is dilated.     Consider LORRAINE for better evaluation of mitral valve if clinically indicated.        Images report reviewed by me:  CT (Most Recent) (CT chest reviewed by me) Results from [facility] encounter on 03/02/22    CT HEAD WO CONT    Narrative  EXAM: CT of the brain without intravenous contrast.    INDICATION:  \"AMS. \"    COMPARISON:  CT March 11, 2022. DOSE REDUCTION AND DICOM INFORMATION: One or more dose reduction techniques were  used on this CT: automated exposure control, adjustment of the mAs and/or kVp  according to patient size, and iterative reconstruction techniques. The  specific techniques used on this CT exam have been documented in the patient's  electronic medical record. Digital Imaging and Communications in Medicine  (DICOM) format image data are available to nonaffiliated external healthcare  facilities or entities on a secure, media free, reciprocally searchable basis  with patient authorization for at least a 12-month period after this study. _______________    FINDINGS:    IMAGE QUALITY:  Diagnostic. BRAIN AND EXTRA-AXIAL SPACE:    SUBACUTE TERRITORIAL TRANSCORTICAL INFARCT:  None detected. MASS:  None detected. HEMORRHAGE:  None. SUBDURAL FLUID COLLECTION:  None detected. HYDROCEPHALUS:  None. REMOTE  TERRITORIAL CEREBRAL INFARCT:  None detected. REMOTE CEREBELLAR INFARCT:  None detected. STRIVE (STandards for Reporting Vascular changes on nEuroimaging):  --[place] of white matter hypodensity  (\"leukoaraiosis\") of presumed vascular origin:  Low-grade. --[place] of lacunes of presumed vascular origin  (often undetectable on CT):  None definite. --Degree of brain atrophy:  Moderate. BURDEN OF CALCIFIC INTRACRANIAL ATHEROSCLEROSIS:   Moderate. HEENT:    INCLUDED UPPER ORBITS:  There are bilateral lens replacements. INCLUDED UPPER PARANASAL SINUSES:  Predominantly clear. MASTOID AIR CELLS:  Predominantly clear. BONES:  Unremarkable. SUPERFICIAL SOFT TISSUES: Unremarkable.    _______________    Impression  Generalized chronic changes, however, no mass or acute findings. _______________         CXR reviewed by me:  XR (Most Recent).  CXR reviewed by me and compared with previous CXR Results from Hospital Encounter encounter on 03/02/22    XR CHEST PORT    Narrative  CLINICAL HISTORY:  Short of breath. COMPARISONS:  Chest x-ray 3/19/2022, and earlier studies. TECHNIQUE:  single frontal view of the chest    ------------------------------------------    FINDINGS:    Lungs:  Somewhat unusual air interface at the right lung base, present to  varying degrees on prior x-rays and suspect reflects right middle lobe  atelectasis. No other evidence of pneumothorax. Streaky opacity at left lung  base, likely atelectasis, not appreciably changed. Mild central bronchial wall  thickening, likely bronchitis or asthma. Probable tiny left pleural effusion, not appreciably changed. Mediastinum: Moderate to severe cardiomegaly. Atherosclerotic arterial  calcification. Bones: Scoliosis. Mild to moderate thoracic degenerative disc disease.      ------------------------------------------    Impression  1. Probable partial right middle lobe atelectasis, similar to most recent prior  radiograph. 2. Streaky opacity at left lung base, likely atelectasis, though  bronchopneumonia or aspiration not excluded. CXR 3/13/2022:  FINDINGS:  SUPPORT DEVICES: Endotracheal tube and visualized gastric tube both project in  stable position from prior exam.     HEART AND MEDIASTINUM: Enlarged appearing cardiac silhouette with stable  mediastinal contours.     LUNGS AND PLEURAL SPACES: Faint/hazy right lower lung zone opacity. No  pneumothorax or pleural effusion demonstrated.     BONY THORAX AND SOFT TISSUES: Unremarkable.  ______________     IMPRESSION  1. Support devices in stable position. 2. Mild interval increase in right basilar atelectasis and likely small right  pleural effusion. 3. Cardiomegaly, as previously.        ·Please note: Voice-recognition software may have been used to generate this report, which may have resulted in some phonetic-based errors in grammar and contents. Even though attempts were made to correct all the mistakes, some may have been missed, and remained in the body of the document.       Kristine Almonte MD  3/22/2022

## 2022-03-22 NOTE — PROGRESS NOTES
Cardiology Progress Note        Patient: Marily Hartley        Sex: female          DOA: 3/2/2022  YOB: 1940      Age:  80 y.o.        LOS:  LOS: 20 days   Assessment/Plan     Principal Problem:    Acute respiratory failure with hypoxemia ([address] (3/2/2022)    Active Problems:    Elevated troponin (4/23/2021)      HTN (hypertension) (4/23/2021)      Acute on chronic diastolic congestive heart failure ([address] (4/23/2021)      Atrial fibrillation ([address] (3/2/2022)      Stage 3 chronic kidney disease ([address] (3/3/2022)      SERENA (obstructive sleep apnea) (3/3/2022)      Fracture of neck of right femur ([address] (3/8/2022)      Hypokalemia (3/14/2022)      Hypotension (3/17/2022)      Morbid obesity ([address] (3/21/2022)        Plan:  Continue with Eliquis 2.5 mg twice daily  Continue with carvedilol 3.125 mg twice daily  Continue midodrine 5 mg 3 times daily  Discussed with patient's daughter      Midodrine from 2.5 mg 3 times daily to 5 mg 3 times daily   Carvedilol from tomorrow  Discussed with patient and daughter            No cardiac complaints  Continue with Lasix  Continue with apixaban 2.5 mg twice daily  Discussed with patient/family  Dr. Kristel Horne will be covering during the weekend. A. fib rate controlled  Blood pressure stable  Continue with current midodrine and Lasix  Altered mental status, seen by neurology  Discussed with family in the room.         Blood pressure is intermittently low normal  Coreg is on hold  Increase midodrine from 5 mg twice daily to 3 times daily  Discussed with patient and daughter    Extubated  Denied any symptoms  Blood pressure is low normal  Hold carvedilol  Echocardiogram done with preserved LV function, severe biatrial enlargement moderate MR, TR, pulmonary hypertension  Continue with Lasix  Discussed with patient and daughter        Patient remained intubated  Cardiac telemetry rate controlled A. fib with occasional PVCs, saturating around 100%  Tolerating carvedilol 3.125 mg twice daily and Eliquis 2.5 mg twice daily  Ventilatory management as per pulmonary/intensivist  Discussed with patient's daughter in the room  Continue with current cardiac medications. Patient remained intubated  Hemodynamically stable  A. fib rate controlled  Discussed with patient's daughter Kelly Umaña on phone    Patient is status post hip surgery  Patient is intubated due to difficulty in breathing and hypoxemia  A. fib rate controlled  Discussed with Dr. Deisi Resendez with the current medical treatment        Patient denied chest pain or shortness of breath   Atrial fibrillation rate controlled   Patient is scheduled for hip surgery tomorrow  Discussed with patient's daughter  Resume anticoagulation after surgery. Patient is diagnosed with hip fracture and being scheduled for surgery  I have long lengthy discussion with patient and daughter Kelly Umaña in the room  Patient have negative stress test in April 2021  EF is stable  Patient is with elevated but acceptable risk for hip surgery  Eliquis can be hold  Consider DVT prophylaxis anticoagulation from tomorrow patient have taken morning dose of Eliquis      Patient denied any chest pain   Mitral calcification without stenosis  Continue with current medical treatment    Patient continues to do well. H&H stable  Patient will need diuretic on discharge probably Lasix 40 mg twice daily  Discussed with patient and family in the room      A. fib rate controlled  Patient will need p.o. Lasix on discharge  Continue Eliquis 2.5 mg twice daily  Discussed with patient and family      Continue with Lasix 40 mg twice daily  I have long lengthy discussion with the patient and daughter about anticoagulation both of them agreed with low-dose Eliquis 2.5 mg twice daily  DC Lovenox   start Eliquis 2.5 mg twice daily from a.m.     Discussed with patient and daughter findings of echocardiogram including mitral valve disease, prefer not to do transesophageal echocardiogram  Continue with current medical treatment                        Subjective:    cc:      Shortness of breath  A. fib        REVIEW OF SYSTEMS:     Extubated and no complaints    Objective:      Visit Vitals  BP (!) 108/52   Pulse 95   Temp 98.8 °F (37.1 °C)   Resp 16   Ht 5' 5\" (1.651 m)   Wt 117.4 kg (258 lb 13.1 oz)   SpO2 100%   Breastfeeding No   BMI 43.07 kg/m²     Body mass index is 43.07 kg/m². Physical Exam:  General Appearance: Comfortable, extubated  NECK: No JVD, no thyroidomeglay. LUNGS: Clear bilaterally. HEART: S1 irregular +S2 audible,    ABD: Non-tender, BS Audible    EXT: No edema, and no cysnosis. VASCULAR EXAM: Pulses are intact. PSYCHIATRIC EXAM: Mood is appropriate.     Medication:  Current Facility-Administered Medications   Medication Dose Route Frequency    dextrose 5% infusion  50 mL/hr IntraVENous CONTINUOUS    nystatin (MYCOSTATIN) 100,000 unit/gram powder   Topical BID    zinc oxide 20 % ointment   Topical PRN    midodrine (PROAMATINE) tablet 5 mg  5 mg Oral TID WITH MEALS    [Held by provider] furosemide (LASIX) injection 20 mg  20 mg IntraVENous BID    arformoteroL (BROVANA) neb solution 15 mcg  15 mcg Nebulization BID RT    famotidine (PF) (PEPCID) 20 mg in 0.9% sodium chloride 10 mL injection  20 mg IntraVENous DAILY    naloxone (NARCAN) injection 0.4 mg  0.4 mg IntraVENous PRN    ferrous sulfate tablet 325 mg  1 Tablet Oral TID    diphenhydrAMINE (BENADRYL) capsule 25 mg  25 mg Oral Q4H PRN    bisacodyL (DULCOLAX) suppository 10 mg  10 mg Rectal DAILY PRN    nystatin (MYCOSTATIN) 100,000 unit/mL oral suspension 500,000 Units  500,000 Units Oral QID    apixaban (ELIQUIS) tablet 2.5 mg  2.5 mg Oral BID    sodium chloride (NS) flush 5-40 mL  5-40 mL IntraVENous Q8H    sodium chloride (NS) flush 5-40 mL  5-40 mL IntraVENous PRN    acetaminophen (TYLENOL) tablet 650 mg  650 mg Oral Q6H PRN    Or    acetaminophen (TYLENOL) suppository 650 mg  650 mg Rectal Q6H PRN    polyethylene glycol (MIRALAX) packet 17 g  17 g Oral DAILY PRN    ondansetron (ZOFRAN ODT) tablet 4 mg  4 mg Oral Q8H PRN    Or    ondansetron (ZOFRAN) injection 4 mg  4 mg IntraVENous Q6H PRN    carvediloL (COREG) tablet 3.125 mg  3.125 mg Oral BID WITH MEALS    aspirin delayed-release tablet 81 mg  81 mg Oral DAILY               Lab/Data Reviewed:  Procedures/imaging: see electronic medical records for all procedures/Xrays   and details which were not copied into this note but were reviewed prior to creation of Plan       All lab results for the last 24 hours reviewed.      Recent Labs     03/22/22  0440 03/21/22  1411   WBC 8.8 9.6   HGB 8.6* 8.7*   HCT 27.2* 27.3*   * 393     Recent Labs     03/22/22  0440 03/21/22  1411 03/20/22  0530   * 151*  --    K 4.0 3.9 3.6   * 113*  --    CO2 34* 37*  --    * 140*  --    * 129*  --    CREA 1.57* 1.78*  --    CA 10.4* 10.8*  --        RADIOLOGY:  CT Results  (Last 48 hours)    None        CXR Results  (Last 48 hours)    None            Cardiology Procedures:   Results for orders placed or performed during the hospital encounter of 03/02/22   EKG, 12 LEAD, INITIAL   Result Value Ref Range    Ventricular Rate 72 BPM    Atrial Rate 340 BPM    QRS Duration 86 ms    Q-T Interval 374 ms    QTC Calculation (Bezet) 409 ms    Calculated R Axis -2 degrees    Calculated T Axis -3 degrees    Diagnosis       Atrial fibrillation  Abnormal ECG  When compared with ECG of 02-MAR-2022 20:12,  No significant change was found  Confirmed by Martinez Delacruz MD. ([id number]) on 3/17/2022 10:28:04 PM        Echo Results  (Last 48 hours)    None       Cardiolite (Tc-99m Sestamibi) stress test    Signed By: Zara Cleaning MD     March 22, 2022

## 2022-03-22 NOTE — PROGRESS NOTES
Nutrition Assessment     Type and Reason for Visit: Reassess    Nutrition Recommendations/Plan: Continue w/ POC    Nutrition Assessment:  PMHx: htn, sleep disorder, right side weakness. Admitted for Congestive heart failure acute on chronic diastolic, recent fall- x ray shows hip fracture. CKD 3. Pt s/p right total hip arthroplasty on 3/10. Patient extubated 3/15. Patent moved out of ICU and to floor. Estimated Daily Nutrient Needs:  Energy (kcal):  1968  Protein (g):         Fluid (ml/day):  1968    Nutrition Related Findings:  labs: Na 152, GFR est AA 38, creatine 1.57. Med: miralax prn, lasix, folic aicd, pepcid, D0% @ 50ml/hr. BM 3/20. Spoke with pt and sister was at bedside. Stated pt had been eating good. Pt has been drinking the ensure and enjoys them. Sister stated have been getting the vanilla ensure and today pt had the chocolate flavor. Will continue with Ensure enlive TID. No questions or concerns at this time. Per documentation, intake ranges from 1-75% since last assessment. Noted appetite has been better recently.  Noted PO of 51-75% this AM.      Current Nutrition Therapies:  ADULT DIET Dysphagia - Pureed  ADULT ORAL NUTRITION SUPPLEMENT Breakfast, Dinner; Standard High Calorie/High Protein    Anthropometric Measures:  · Height:  5' 5\" (165.1 cm)  · Current Body Wt:  117.4 kg (258 lb 13.1 oz)  · BMI: 43.1    Nutrition Diagnosis:   · No nutrition diagnosis at this time     Nutrition Intervention:  Food and/or Nutrient Delivery: Continue current diet,Continue oral nutrition supplement  Nutrition Education and Counseling: No recommendations at this time  Coordination of Nutrition Care: Continue to monitor while inpatient    Goals:  Continue PO intake >50% of needs throughout the next 4-7 days       Nutrition Monitoring and Evaluation:   Behavioral-Environmental Outcomes: None identified  Food/Nutrient Intake Outcomes: Food and nutrient intake,Supplement intake,Diet advancement/tolerance  Physical Signs/Symptoms Outcomes: Biochemical data,Meal time behavior,Skin,Weight,GI status    Discharge Planning:    Continue current diet,Continue oral nutrition supplement     Electronically signed by Gabriel Cortes RD on 3/22/2022 at 1:12 PM

## 2022-03-23 VITALS
HEART RATE: 89 BPM | BODY MASS INDEX: 43.67 KG/M2 | RESPIRATION RATE: 20 BRPM | WEIGHT: 262.1 LBS | HEIGHT: 65 IN | DIASTOLIC BLOOD PRESSURE: 57 MMHG | TEMPERATURE: 98.4 F | OXYGEN SATURATION: 100 % | SYSTOLIC BLOOD PRESSURE: 113 MMHG

## 2022-03-23 LAB
ANION GAP SERPL CALC-SCNC: ABNORMAL MMOL/L (ref 3–18)
BASOPHILS # BLD: 0 K/UL (ref 0–0.1)
BASOPHILS NFR BLD: 0 % (ref 0–2)
BUN SERPL-MCNC: 118 MG/DL (ref 7–18)
BUN/CREAT SERPL: 87 (ref 12–20)
CALCIUM SERPL-MCNC: 10.2 MG/DL (ref 8.5–10.1)
CHLORIDE SERPL-SCNC: 113 MMOL/L (ref 100–111)
CO2 SERPL-SCNC: 38 MMOL/L (ref 21–32)
CREAT SERPL-MCNC: 1.36 MG/DL (ref 0.6–1.3)
DIFFERENTIAL METHOD BLD: ABNORMAL
EOSINOPHIL # BLD: 0.1 K/UL (ref 0–0.4)
EOSINOPHIL NFR BLD: 1 % (ref 0–5)
ERYTHROCYTE [DISTWIDTH] IN BLOOD BY AUTOMATED COUNT: 15.2 % (ref 11.6–14.5)
GLUCOSE BLD STRIP.AUTO-MCNC: 113 MG/DL (ref 70–110)
GLUCOSE BLD STRIP.AUTO-MCNC: 117 MG/DL (ref 70–110)
GLUCOSE BLD STRIP.AUTO-MCNC: 125 MG/DL (ref 70–110)
GLUCOSE BLD STRIP.AUTO-MCNC: 96 MG/DL (ref 70–110)
GLUCOSE SERPL-MCNC: 119 MG/DL (ref 74–99)
HCT VFR BLD AUTO: 25.3 % (ref 35–45)
HGB BLD-MCNC: 7.7 G/DL (ref 12–16)
IMM GRANULOCYTES # BLD AUTO: 0.3 K/UL (ref 0–0.04)
IMM GRANULOCYTES NFR BLD AUTO: 4 % (ref 0–0.5)
LYMPHOCYTES # BLD: 1.3 K/UL (ref 0.9–3.6)
LYMPHOCYTES NFR BLD: 17 % (ref 21–52)
MCH RBC QN AUTO: 29.8 PG (ref 24–34)
MCHC RBC AUTO-ENTMCNC: 30.4 G/DL (ref 31–37)
MCV RBC AUTO: 98.1 FL (ref 78–100)
MONOCYTES # BLD: 0.8 K/UL (ref 0.05–1.2)
MONOCYTES NFR BLD: 10 % (ref 3–10)
NEUTS SEG # BLD: 5.4 K/UL (ref 1.8–8)
NEUTS SEG NFR BLD: 68 % (ref 40–73)
NRBC # BLD: 0.07 K/UL (ref 0–0.01)
NRBC BLD-RTO: 0.9 PER 100 WBC
PLATELET # BLD AUTO: 430 K/UL (ref 135–420)
PMV BLD AUTO: 10.6 FL (ref 9.2–11.8)
POTASSIUM SERPL-SCNC: 3.9 MMOL/L (ref 3.5–5.5)
RBC # BLD AUTO: 2.58 M/UL (ref 4.2–5.3)
SODIUM SERPL-SCNC: 150 MMOL/L (ref 136–145)
WBC # BLD AUTO: 7.9 K/UL (ref 4.6–13.2)

## 2022-03-23 PROCEDURE — 74011250636 HC RX REV CODE- 250/636: Performed by: INTERNAL MEDICINE

## 2022-03-23 PROCEDURE — 74011250637 HC RX REV CODE- 250/637: Performed by: INTERNAL MEDICINE

## 2022-03-23 PROCEDURE — 74011000250 HC RX REV CODE- 250: Performed by: FAMILY MEDICINE

## 2022-03-23 PROCEDURE — 82962 GLUCOSE BLOOD TEST: CPT

## 2022-03-23 PROCEDURE — 2709999900 HC NON-CHARGEABLE SUPPLY

## 2022-03-23 PROCEDURE — 92526 ORAL FUNCTION THERAPY: CPT

## 2022-03-23 PROCEDURE — 74011000250 HC RX REV CODE- 250: Performed by: INTERNAL MEDICINE

## 2022-03-23 PROCEDURE — 94640 AIRWAY INHALATION TREATMENT: CPT

## 2022-03-23 PROCEDURE — 77030040392 HC DRSG OPTIFOAM MDII -A

## 2022-03-23 PROCEDURE — 36415 COLL VENOUS BLD VENIPUNCTURE: CPT

## 2022-03-23 PROCEDURE — 74011250637 HC RX REV CODE- 250/637: Performed by: PHYSICIAN ASSISTANT

## 2022-03-23 PROCEDURE — 77010033678 HC OXYGEN DAILY

## 2022-03-23 PROCEDURE — 80048 BASIC METABOLIC PNL TOTAL CA: CPT

## 2022-03-23 PROCEDURE — 94660 CPAP INITIATION&MGMT: CPT

## 2022-03-23 PROCEDURE — 97110 THERAPEUTIC EXERCISES: CPT

## 2022-03-23 PROCEDURE — 85025 COMPLETE CBC W/AUTO DIFF WBC: CPT

## 2022-03-23 RX ORDER — NYSTATIN 100000 [USP'U]/G
POWDER TOPICAL 2 TIMES DAILY
Qty: 30 G | Refills: 0 | Status: SHIPPED | OUTPATIENT
Start: 2022-03-23

## 2022-03-23 RX ORDER — FUROSEMIDE 20 MG/1
20 TABLET ORAL DAILY
Qty: 30 TABLET | Refills: 0 | Status: SHIPPED | OUTPATIENT
Start: 2022-03-23

## 2022-03-23 RX ORDER — ASPIRIN 81 MG/1
81 TABLET ORAL DAILY
Qty: 30 TABLET | Refills: 0 | Status: SHIPPED | OUTPATIENT
Start: 2022-03-24

## 2022-03-23 RX ORDER — LANOLIN ALCOHOL/MO/W.PET/CERES
325 CREAM (GRAM) TOPICAL 3 TIMES DAILY
Qty: 30 TABLET | Refills: 0 | Status: SHIPPED | OUTPATIENT
Start: 2022-03-23

## 2022-03-23 RX ORDER — ARFORMOTEROL TARTRATE 15 UG/2ML
15 SOLUTION RESPIRATORY (INHALATION)
Status: COMPLETED | OUTPATIENT
Start: 2022-03-23 | End: 2022-03-23

## 2022-03-23 RX ORDER — FAMOTIDINE 20 MG/1
20 TABLET, FILM COATED ORAL DAILY
Status: DISCONTINUED | OUTPATIENT
Start: 2022-03-24 | End: 2022-03-24 | Stop reason: HOSPADM

## 2022-03-23 RX ORDER — MIDODRINE HYDROCHLORIDE 5 MG/1
5 TABLET ORAL
Qty: 90 TABLET | Refills: 0 | Status: SHIPPED | OUTPATIENT
Start: 2022-03-23 | End: 2022-04-22

## 2022-03-23 RX ORDER — ARFORMOTEROL TARTRATE 15 UG/2ML
15 SOLUTION RESPIRATORY (INHALATION) 2 TIMES DAILY
Qty: 120 ML | Refills: 0 | Status: SHIPPED | OUTPATIENT
Start: 2022-03-23

## 2022-03-23 RX ORDER — ZINC OXIDE 20 G/100G
OINTMENT TOPICAL AS NEEDED
Qty: 30 G | Refills: 0 | Status: SHIPPED | OUTPATIENT
Start: 2022-03-23

## 2022-03-23 RX ORDER — CARVEDILOL 3.12 MG/1
3.12 TABLET ORAL 2 TIMES DAILY WITH MEALS
Qty: 60 TABLET | Refills: 0 | Status: SHIPPED | OUTPATIENT
Start: 2022-03-23

## 2022-03-23 RX ADMIN — FERROUS SULFATE TAB 325 MG (65 MG ELEMENTAL FE) 325 MG: 325 (65 FE) TAB at 17:47

## 2022-03-23 RX ADMIN — ARFORMOTEROL TARTRATE 15 MCG: 15 SOLUTION RESPIRATORY (INHALATION) at 17:22

## 2022-03-23 RX ADMIN — NYSTATIN 500000 UNITS: 100000 SUSPENSION ORAL at 17:48

## 2022-03-23 RX ADMIN — NYSTATIN: 100000 POWDER TOPICAL at 09:02

## 2022-03-23 RX ADMIN — MIDODRINE HYDROCHLORIDE 5 MG: 2.5 TABLET ORAL at 17:48

## 2022-03-23 RX ADMIN — SODIUM CHLORIDE, PRESERVATIVE FREE 10 ML: 5 INJECTION INTRAVENOUS at 08:16

## 2022-03-23 RX ADMIN — FERROUS SULFATE TAB 325 MG (65 MG ELEMENTAL FE) 325 MG: 325 (65 FE) TAB at 08:57

## 2022-03-23 RX ADMIN — NYSTATIN 500000 UNITS: 100000 SUSPENSION ORAL at 13:00

## 2022-03-23 RX ADMIN — APIXABAN 2.5 MG: 2.5 TABLET, FILM COATED ORAL at 08:57

## 2022-03-23 RX ADMIN — CARVEDILOL 3.12 MG: 3.12 TABLET, FILM COATED ORAL at 09:01

## 2022-03-23 RX ADMIN — SODIUM CHLORIDE, PRESERVATIVE FREE 10 ML: 5 INJECTION INTRAVENOUS at 14:00

## 2022-03-23 RX ADMIN — ARFORMOTEROL TARTRATE 15 MCG: 15 SOLUTION RESPIRATORY (INHALATION) at 07:23

## 2022-03-23 RX ADMIN — ASPIRIN 81 MG: 81 TABLET, COATED ORAL at 08:57

## 2022-03-23 RX ADMIN — NYSTATIN 500000 UNITS: 100000 SUSPENSION ORAL at 08:52

## 2022-03-23 RX ADMIN — SODIUM CHLORIDE, PRESERVATIVE FREE 20 MG: 5 INJECTION INTRAVENOUS at 08:52

## 2022-03-23 NOTE — PROGRESS NOTES
03/23/22 0442   CPAP/BIPAP   Device Mode BIPAP;S/T   $$ Bipap Daily Yes   [employer] ID # [id number]   Mask Type and Size Full face; Large   IPAP (cm H2O) 16.2 cm H2O   EPAP (cm H2O) 5.2 cm H2O   Patient tolerated her own NIV unit for sleep

## 2022-03-23 NOTE — PROGRESS NOTES
1104: Pt is extremely groggy, will not answer questions by myself or sister. Will allow pt to sleep and follow up again. 1340: Pt sister currently feeding pt, will follow up as schedule permits.

## 2022-03-23 NOTE — PROGRESS NOTES
IV to PO Conversion     Patient: Shaquille Sanders   MRN#: [id number]   Admission Date: [id number]     IV TO PO  Medication(s) Famotidine   Oral Meds  Yes   Diet:     Active Orders   Diet    ADULT DIET Dysphagia - Pureed      TEMP 98 °F (36.7 °C)   WBC Lab Results   Component Value Date/Time    WBC 7.9 03/23/2022 05:03 AM           Pharmacy Dosing Services:  Summary:   Does the patient meet all criteria for IV to PO switch as listed below? Yes   Is the patient excluded from IV to PO automatic switch based on criteria listed below? No     Criteria for IV to PO switch - Nonantibiotics   Functioning GI tract   Taking scheduled oral medications  Toleratind duet more advanced than clear liquids  2. No signs/symptoms of shock  No vasopressor support        3. No seizures for >72 hours (antiepileptic medications only)    Exclusion Criteria   Patient is being treated for an infection that requires IV therapy such as: endocarditis, osteomyelitis, meningitis, pancreatitis (antibiotics only)   NG tube with continous suction   Nausea and/or vomiting or severe diarrhea within past 24 hours  Malabsorption syndromes, partial or total gastrectomy, ileus, gastric outlet or bowel obstruction  Active GI bleeding   Significant painful oral ulceration  Unable to swallow  On total parenteral nutrition with a NPO order  NPO  Febrile in last 24 hours (antibiotics only)  Clinically deteriorating or unstable (antibiotics only)      Assessment/Recommendation:    Famotidine 20 mg IV daily was changed to Famotidine 20 mg PO daily ( discussed with nurse )     This IV to PO conversion is based on the [facility] [employer] approved automatic conversion policy for eligible patients.       Fernanda Singh, [facility], [facility]   Clinical Pharmacist  [phone]

## 2022-03-23 NOTE — WOUND CARE
IP WOUND CONSULT    [id number] [facility] RECORD NUMBER:  [id number]  AGE: 80 y.o. GENDER: female  : 1940  TODAY'S DATE:  3/23/2022    GENERAL     [x] Follow-up   [] New Consult    Aurelio Adhikari is a 80 y.o. female referred by:   [] Physician  [x] Nursing  [] Other:         PAST MEDICAL HISTORY    Past Medical History:   Diagnosis Date    Hypertension     Sleep disorder         PAST SURGICAL HISTORY    Past Surgical History:   Procedure Laterality Date    HX ORTHOPAEDIC      broken right ankle, left femur 30 yrs ago       FAMILY HISTORY    Family History   Problem Relation Age of Onset    Diabetes Mother     Heart Disease Mother     Heart Disease Father        SOCIAL HISTORY    Social History     Tobacco Use    Smoking status: Never Smoker    Smokeless tobacco: Never Used   Vaping Use    Vaping Use: Never used   Substance Use Topics    Alcohol use: Yes     Alcohol/week: 1.0 standard drink     Types: 1 Glasses of wine per week     Comment: soc    Drug use: No       ALLERGIES    Allergies   Allergen Reactions    Seafood Hives     crabs    Statins-Hmg-Coa Reductase Inhibitors Unknown (comments)    Sulfa (Sulfonamide Antibiotics) Hives       MEDICATIONS    No current facility-administered medications on file prior to encounter. Current Outpatient Medications on File Prior to Encounter   Medication Sig Dispense Refill    allopurinoL (ZYLOPRIM) 100 mg tablet Take 100 mg by mouth daily.  acetaminophen (TYLENOL) 325 mg tablet Take 650 mg by mouth every six (6) hours as needed for Pain.  niacin (NIASPAN) 1,000 mg Tb24 tab Take 1,000 mg by mouth daily.  trolamine salicylate-aloe vera 89% (ASPERCREME) topical cream Apply 2 g to affected area as needed for Pain.  multivitamin, tx-iron-ca-min (THERA-M w/ IRON) 9 mg iron-400 mcg tab tablet Take 1 Tab by mouth daily.  aspirin delayed-release 81 mg tablet Take 81 mg by mouth daily.       potassium chloride SR (KLOR-CON 10) 10 mEq tablet Take 10 mEq by mouth daily.  carvediloL (COREG) 3.125 mg tablet Take 3.125 mg by mouth daily. Wt Readings from Last 3 Encounters:   03/23/22 118.9 kg (262 lb 1.6 oz)   04/25/21 101.6 kg (224 lb)   10/01/17 125.6 kg (277 lb)       [unfilled]  Visit Vitals  /62   Pulse 72   Temp 97.9 °F (36.6 °C)   Resp 20   Ht 5' 5\" (1.651 m)   Wt 118.9 kg (262 lb 1.6 oz)   SpO2 100%   Breastfeeding No   BMI 43.62 kg/m²       ASSESSMENT     Skin impairment Identification:  Type: shearing and pressure mixed    Contributing Factors: chronic pressure, decreased mobility, shear force, incontinence of stool, incontinence of urine and obesity    Wound Abdomen Left open area in abdominal fold (Active)   Wound Image   03/21/22 1547   Wound Etiology Other (Comment) 03/21/22 1547   Dressing Status Intact; Old drainage noted 03/21/22 1547   Cleansed Other (Comment) 03/21/22 1547   Dressing/Treatment Open to air 03/21/22 1547   Wound Assessment Epithelialization;Dry 03/21/22 1547   Drainage Amount None 03/21/22 1547   Wound Odor None 03/14/22 0400   Mayela-Wound/Incision Assessment Intact;Fragile 03/14/22 0400   Number of days: 14       Wound Buttocks (Active)   Wound Image  picture did not save to the record 03/23/22 1346   Wound Etiology Pressure Unstageable 03/23/22 1346   Dressing Status Clean;Dry; Intact 03/23/22 1346   Cleansed Other (Comment) 03/21/22 1547   Dressing/Treatment Collagen;Silicone border 08/78/15 1346   Dressing Change Due 03/24/22 03/17/22 1443   Wound Length (cm) 7 cm 03/23/22 1346   Wound Width (cm) 6.5 cm 03/23/22 1346   Wound Depth (cm) 0.1 cm 03/23/22 1346   Wound Surface Area (cm^2) 45.5 cm^2 03/23/22 1346   Change in Wound Size % 0 03/23/22 1346   Wound Volume (cm^3) 4.55 cm^3 03/23/22 1346   Wound Healing % 0 03/23/22 1346   Wound Assessment Granulation tissue;Eschar Less than 20% 03/23/22 1346   Drainage Amount Small 03/23/22 1346   Drainage Description Serosanguinous 03/23/22 1346 Wound Odor None 03/23/22 1346   Mayela-Wound/Incision Assessment Intact 03/23/22 1346   Edges Attached edges 03/23/22 1346   Wound Thickness Description Partial thickness 03/23/22 1346   Number of days: 6       Wound Thigh Right;Medial superficial open areas (Active)   Wound Image   03/21/22 1547   Wound Etiology Other (Comment) 03/21/22 1547   Dressing Status Other (Comment) 03/21/22 1547   Dressing/Treatment Zinc paste 03/23/22 1346   Wound Length (cm) 0.5 cm 03/23/22 1346   Wound Width (cm) 0.5 cm 03/23/22 1346   Wound Depth (cm) 0.1 cm 03/23/22 1346   Wound Surface Area (cm^2) 0.25 cm^2 03/23/22 1346   Change in Wound Size % 0 03/23/22 1346   Wound Volume (cm^3) 0.025 cm^3 03/23/22 1346   Wound Healing % 0 03/23/22 1346   Wound Assessment Pink/red 03/23/22 1346   Drainage Amount None 03/23/22 1346   Drainage Description Sanguineous 03/21/22 1547   Wound Odor None 03/23/22 1346   Mayela-Wound/Incision Assessment Fragile; Intact 03/23/22 1346   Edges Attached edges 03/23/22 1346   Wound Thickness Description Partial thickness 03/23/22 1346   Number of days: 2       Incision 03/10/22 Hip Right (Active)   Dressing Status Clean;Dry; Intact 03/20/22 0924   Dressing/Treatment Negative Pressure Wound Therapy 03/14/22 0400   Margins Approximated 03/15/22 0800   Drainage Amount None 03/20/22 0924   Drainage Description Thin;Serosanguinous 03/12/22 2000   Wound Odor None 03/20/22 0924   Number of days: 13          PLAN     Skin Care & Pressure Relief Recommendations  Minimize layers of linen  Pads under patient to optimize support surface  Turn/reposition approximately every 2 hours  Pillow wedges  Manage incontinence    Patient needs to be rotated from right to left hip to keep pressure off buttock as much as possible. Alternately if patient is alert enough to maintain trunk control and it is safe patient could be in a seated position so that pressure is on back of thighs and lower portion of buttocks.       Recommendations: sushant dressing covered with a silicone dressing to buttock wound until seen by primary care provider. Abdominal folds manage moisture and apply nystatin powder as needed for fungal rash. Zinc oxide cream to inner thighs and groin area as needed to protect from incontinence. Teaching completed with:   [] Patient           [x] Family member       [] Caregiver          [] Nursing  [] Other    Patient/Caregiver Teaching:  Level of patient/caregiver understanding able to:   [x] Indicates understanding       [] Needs reinforcement  [] Unsuccessful      [] Verbal Understanding  [] Demonstrated understanding       [] No evidence of learning  [] Refused teaching         [] N/A       Electronically signed by Bebe Malloy RN on 3/23/2022 at 3:29 PM       RN spoke with patient's daughter by phone and patient's sister in person at the bedside. RN reviewed where to apply the zinc oxide and how to get more when needed. Discussed the importance of keeping the patient off her buttock to prevent more pressure injury. Daughter and sister voiced understanding.     Family was very upset that patient was not being turned enough and that zinc cream has not been used on patient routinely

## 2022-03-23 NOTE — PROGRESS NOTES
Problem: Mobility Impaired (Adult and Pediatric)  Goal: *Acute Goals and Plan of Care (Insert Text)  Description: In one week:  1. Pt will transfer supine <> sitting EOB min assist for increased ability to sit upright during meal times. 2. Pt will transfer sit <> stand min assist for improved lower extremity strengthening. 3. Pt will roll in bed in both directions for increased independence with pressure relief. 4. Pt will sit EOB unsupported for > 5 minutes without LOB for increased safety and improved sitting balance. Outcome: Progressing Towards Goal   PHYSICAL THERAPY TREATMENT    Patient: Joni Welch (4 y.o. female)  Date: 3/23/2022  Diagnosis: Acute exacerbation of CHF (congestive heart failure) ([facility]) [I50.9] Acute respiratory failure with hypoxemia ([facility])  Procedure(s) (LRB):  RIGHT TOTAL HIP ARTHROPLASTY WITH C-ARM  (PT IN ROOM # 358) (Right) 13 Days Post-Op  Precautions: Fall,WBAT,Skin  PLOF: ambulatory with a rollator    ASSESSMENT:  Pt makes no effort to move UEs or LEs. Pt yells in pain no matter where pt is touched and shakes head vigorously to mobilization. Pt will not answer questions just makes noises but is capable of speaking words. Sister present and assisting with getting pt to speak and mobilize. PROM performed on (B)LEs. Ax3 to pull pt up to [facility]. Supported (B)LEs into neutral position with blanket rolls at (B) hips/thighs. Progression toward goals:   []      Improving appropriately and progressing toward goals  []      Improving slowly and progressing toward goals  [x]      Not making progress toward goals and plan of care will be adjusted     PLAN:  Patient continues to benefit from skilled intervention to address the above impairments. Continue treatment per established plan of care.   Discharge Recommendations:  LTC or home with hospice  Further Equipment Recommendations for Discharge:  hospital bed and mechanical lift     SUBJECTIVE:   Patient stated .    OBJECTIVE DATA SUMMARY:   Critical Behavior:  Neurologic State: Alert,Eyes open to voice,Eyes open spontaneously  Orientation Level: Oriented to person  Cognition: Decreased command following  Safety/Judgement: Fall prevention  Functional Mobility Training:  Bed Mobility:  Scooting: Total assistance (x3)  Therapeutic Exercises:   (B)LEs      EXERCISE   Sets   Reps   Active Active Assist   Passive Self ROM   Comments   Ankle Pumps    [] [] [] []    Quad Sets/Glut Sets    [] [] [] [] Hold for 5 secs   Hamstring Sets   [] [] [] []    Short Arc Quads   [] [] [] []    Heel Slides  10 [] [] [x] []    Straight Leg Raises   [] [] [] []    Hip Add   [] [] [] [] Hold for 5 secs, w/ pillow squeeze   Long Arc Quads   [] [] [] []    Seated Marching   [] [] [] []    Standing Marching   [] [] [] []       [] [] [] []        Pain:  Pain level pre-treatment: 0/10  Pain level post-treatment: 0/10   Pain Intervention(s): Medication (see MAR); Rest, Ice, Repositioning   Response to intervention: Nurse notified, See doc flow    Activity Tolerance:   none  Please refer to the flowsheet for vital signs taken during this treatment. After treatment:   [] Patient left in no apparent distress sitting up in chair  [x] Patient left in no apparent distress in bed  [x] Call bell left within reach  [] Nursing notified  [x] Caregiver present  [] Bed alarm activated  [x] SCDs applied      COMMUNICATION/EDUCATION:   [x]         Role of Physical Therapy in the acute care setting. [x]         Fall prevention education was provided and the patient/caregiver indicated understanding. [x]         Patient/family have participated as able in working toward goals and plan of care. [x]         Patient/family agree to work toward stated goals and plan of care. []         Patient understands intent and goals of therapy, but is neutral about his/her participation. []         Patient is unable to participate in stated goals/plan of care: ongoing with therapy staff.   [] Other:         Bryanna Edgar, PTA   Time Calculation: 34 mins

## 2022-03-23 NOTE — PROGRESS NOTES
03/22/22 2142   Oxygen Therapy   O2 Sat (%) 95 %   Pulse via Oximetry 83 beats per minute   O2 Device BIPAP   CPAP/BIPAP   CPAP/BIPAP Start/Stop On   Device Mode BIPAP   Mask Type and Size Full face; Large   IPAP (cm H2O) 15.2 cm H2O   EPAP (cm H2O) 5.2 cm H2O   Pt's Home Machine Yes (type/vendor)

## 2022-03-23 NOTE — DISCHARGE SUMMARY
Discharge Summary    Patient: Vidal Tierney MRN: [id number]  CSN: [id number]    YOB: 1940  Age: 80 y.o. Sex: female    DOA: 3/2/2022 LOS:  LOS: 21 days   Discharge Date:      Primary Care Provider:  Gabriella Shell NP    Admission Diagnoses: Acute exacerbation of CHF (congestive heart failure) ([place] [address] [I50.9]    Discharge Diagnoses:    Problem List as of 3/23/2022 Date Reviewed: 3/9/2022          Codes Class Noted - Resolved    Morbid obesity ([place] [address].) ICD-10-CM: E66.01  ICD-9-CM: 278.01  3/21/2022 - Present        Hypotension ICD-10-CM: I95.9  ICD-9-CM: 458.9  3/17/2022 - Present        Hypokalemia ICD-10-CM: E87.6  ICD-9-CM: 276.8  3/14/2022 - Present        Fracture of neck of right femur ([place] [address].) ICD-10-CM: S72.001A  ICD-9-CM: 820.8  3/8/2022 - Present        Stage 3 chronic kidney disease ([place] [address] ICD-10-CM: N18.30  ICD-9-CM: 585.3  3/3/2022 - Present        SERENA (obstructive sleep apnea) ICD-10-CM: G47.33  ICD-9-CM: 327.23  3/3/2022 - Present        * (Principal) Acute respiratory failure with hypoxemia ([place] [address].) ICD-10-CM: J96.01  ICD-9-CM: 518.81  3/2/2022 - Present        Atrial fibrillation ([facility] ICD-10-CM: I48.91  ICD-9-CM: 427.31  3/2/2022 - Present        Elevated troponin ICD-10-CM: R77.8  ICD-9-CM: 790.6  4/23/2021 - Present        HTN (hypertension) ICD-10-CM: I10  ICD-9-CM: 401.9  4/23/2021 - Present        SERENA treated with BiPAP ICD-10-CM: G47.33  ICD-9-CM: 327.23  4/23/2021 - Present        Acute on chronic diastolic congestive heart failure ([facility] ICD-10-CM: I50.33  ICD-9-CM: 428.33, 428.0  4/23/2021 - Present        RESOLVED: Adult BMI 39.0-39.9 kg/sq m ICD-10-CM: Z68.39  ICD-9-CM: V85.39  3/3/2022 - 3/21/2022        RESOLVED: Fatigue ICD-10-CM: R53.83  ICD-9-CM: 780.79  4/23/2021 - 3/21/2022        RESOLVED: Obesity (BMI 30-39. 9) ICD-10-CM: E66.9  ICD-9-CM: 278.00  4/23/2021 - 3/21/2022              Discharge Medications:     Current Discharge Medication List      START taking these medications    Details   apixaban (ELIQUIS) 2.5 mg tablet Take 1 Tablet by mouth two (2) times a day. Qty: 60 Tablet, Refills: 0  Start date: 3/23/2022      arformoteroL (BROVANA) 15 mcg/2 mL nebu neb solution 2 mL by Nebulization route two (2) times a day. Qty: 120 mL, Refills: 0  Start date: 3/23/2022      ferrous sulfate 325 mg (65 mg iron) tablet Take 1 Tablet by mouth three (3) times daily. Qty: 30 Tablet, Refills: 0  Start date: 3/23/2022      furosemide (Lasix) 20 mg tablet Take 1 Tablet by mouth daily. Qty: 30 Tablet, Refills: 0  Start date: 3/23/2022      midodrine (PROAMATINE) 5 mg tablet Take 1 Tablet by mouth three (3) times daily (with meals) for 30 days. Qty: 90 Tablet, Refills: 0  Start date: 3/23/2022, End date: 4/22/2022      nystatin (MYCOSTATIN) powder Apply  to affected area two (2) times a day. Qty: 30 g, Refills: 0  Start date: 3/23/2022      zinc oxide 20 % ointment Apply  to affected area as needed for Skin Irritation. Qty: 30 g, Refills: 0  Start date: 3/23/2022         CONTINUE these medications which have CHANGED    Details   aspirin delayed-release 81 mg tablet Take 1 Tablet by mouth daily. Qty: 30 Tablet, Refills: 0  Start date: 3/24/2022      carvediloL (COREG) 3.125 mg tablet Take 1 Tablet by mouth two (2) times daily (with meals). Qty: 60 Tablet, Refills: 0  Start date: 3/23/2022         CONTINUE these medications which have NOT CHANGED    Details   allopurinoL (ZYLOPRIM) 100 mg tablet Take 100 mg by mouth daily. niacin (NIASPAN) 1,000 mg Tb24 tab Take 1,000 mg by mouth daily. trolamine salicylate-aloe vera 93% (ASPERCREME) topical cream Apply 2 g to affected area as needed for Pain.      multivitamin, tx-iron-ca-min (THERA-M w/ IRON) 9 mg iron-400 mcg tab tablet Take 1 Tab by mouth daily. potassium chloride SR (KLOR-CON 10) 10 mEq tablet Take 10 mEq by mouth daily.          STOP taking these medications       acetaminophen (TYLENOL) 325 mg tablet Comments: Reason for Stopping:             Discharge Condition: stable     Procedures :    1. S/p right anterior total hip arthroplasty  2. reintubated in PACU 3/10/2022. Extubated on 3/15/2022    Consults: Cardiology-Dr. Mary Jarrett, infectious disease-Dr. Silvina Elizabeth, orthopedic surgery -Dr. Salma Mariscal, pulmonologyDrBraulio Johnson April    PHYSICAL EXAM   Visit Vitals  /62   Pulse 72   Temp 97.9 °F (36.6 °C)   Resp 20   Ht 5' 5\" (1.651 m)   Wt 118.9 kg (262 lb 1.6 oz)   SpO2 100%   Breastfeeding No   BMI 43.62 kg/m²     General: Awake, cooperative, no acute distress    HEENT: NC, Atraumatic. PERRLA, EOMI. Anicteric sclerae. Lungs:  CTA Bilaterally. No Wheezing/Rhonchi/Rales. Heart:  Regular  rhythm,  No murmur, No Rubs, No Gallops  Abdomen: Soft, Non distended, Non tender. +Bowel sounds,   Extremities: No c/c/e  Psych:   Not anxious or agitated. Neurologic:  No acute neurological deficits. Admission HPI : Marily Hartley is a 80 y.o.  female who has a history of congestive heart failure, sleep apnea, right leg weakness, presents to the emergency room with acute onset of dyspnea and shortness of breath. Patient was found to be hypoxic on room air with sats in the 83% based likely resolve that improved to 95% on 3 L patient was also found to be a new onset rapid ventricular response A. fib that also improved with the addition of oxygen. Patient notes that for the last 4 days she has taken short of breath with minimal exertion and easily fatigued. Her exercise tolerance and activities has also diminished over the last 4 to 5 days. She chronically right leg when she walks but she notes that the last few days she is not been able to move or lift her right leg. Patient lives with her daughter and has had decreased mobility over the last few months. He denies any COVID-19 exposures she has had 2 out of 3 vaccines.   In the emergency room she was found to be in new onset A. fib which improved with oxygen as well as CHF she was given 40 of Lasix with diuresis of about 500 cc and improvement of her dyspnea  Patient also complains of a constant tickle down her throat that is causing her to cough but no fevers chills sputum production that is green in nature. Hospital Course : pt was admitted for afib and chf, found rt hip fracture. She has serena on cpap at night, she received rt hip replacement, she was noted decreased tide volume and hypoxia while extubation post procedure, intubated with oxygen 65%, peep 6. Case discussed with Dr. Montse Ba and Dr. Cara Gottron      Acute on chronic respiratory failure with hypoxia and hypercapnia   History of obesity, SERENA on BiPAP  Postoperative respiratory failure, reintubated in PACU 3/10/2022.  Extubated on 3/15/2022  appreciate pulmonology expertise, bipap settings adjusted for max support   Will be able to take home FF mask   V/q scan :negative for PE , pvl negative for dvt  on 3/3  Enterobacter Clacae complex from resp cutlure 3/10/22: Infectious disease followed while inpatient and patient on IV antibiotics     Pulmonary hypertension   Echo on 3/3 pulmonary arterial systolic pressure is 51 mmhg  LORRAINE is deferred      Congestive heart failure , acute on chronic diastolic ,  Echo done Mitral stenosis,  Pulmonary hypertension found to be moderate-LORRAINE is deferred  Dr. Devorah Mendes expertise appreciated,  discussed with Dr. Ace Mireles discharge regimen which is as follows   continue with Eliquis 2.5 mg twice daily  Continue with carvedilol 3.125 mg twice daily  Continue midodrine 5 mg 3 times daily  Low-dose Lasix 20 mg p.o. daily     New onset atrial fibrillation with RVR -  RVR now resolved  Discharge home on eliquis       Elevated trop   No acs cardiologist on board      UTI  Urine pos for klebsiella  Completed IV Rocephin     Hip fracture -  S/p right anterior total hip arthroplasty  Continue post surgery care, he had placement initially suggested but patient's family has opted to take patient home, daughter will live with patient and patient will have full DME     ckd3 -cr stable      Hypokalemia   Resolved      Anemia   Continue monitoring h/h, so far  H/h stable   Will need to follow-up with H&H with PCP     Encephalopathy   eeg no seizure   Neuro consulted   Now alert      hpernatremia due to dehydration   Improved  Encourage fluid intake    Activity: Hospital bed ordered, activity as tolerated    Diet: Dysphagia puréed diet with high-calorie high-protein nutrition supplement    Follow-up: follow-up with PCP in 3 to 5 days after discharge, she needs to follow-up with her own, follow-up with orthopedic surgery for post surgery care cardiologist    Disposition: Discharge home today with home health via medical transportation, full facemask, home oxygen reportable times for travel, hospital bed with gel overlay and Tiffany lift to be delivered. Manager added MSW to [facility] order to assist family with accessing community resources to aid of care of patient. Daughter is aware that if any time prior to discharge if she changes her mind and chooses for her mother to go to a skilled nursing facility or change to hospice that these options are available    Minutes spent on discharge: 45 minutes      Labs: Results:       Chemistry Recent Labs     03/23/22  0503 03/22/22  0440 03/21/22  1411   * 114* 140*   * 152* 151*   K 3.9 4.0 3.9   * 113* 113*   CO2 38* 34* 37*   * 120* 129*   CREA 1.36* 1.57* 1.78*   CA 10.2* 10.4* 10.8*   AGAP NEG 1 5 1*   BUCR 87* 76* 72*      CBC w/Diff Recent Labs     03/23/22  0503 03/22/22  0440 03/21/22  1411   WBC 7.9 8.8 9.6   RBC 2.58* 2.77* 2.88*   HGB 7.7* 8.6* 8.7*   HCT 25.3* 27.2* 27.3*   * 431* 393   GRANS 68 73 76*   LYMPH 17* 14* 12*   EOS 1 2 1      Cardiac Enzymes No results for input(s): CPK, CKND1, FARA in the last 72 hours.     No lab exists for component: CKRMB, TROIP   Coagulation No results for input(s): PTP, INR, APTT, INREXT, INREXT in the last 72 hours. Lipid Panel Lab Results   Component Value Date/Time    Cholesterol, total 183 04/25/2021 04:00 PM    HDL Cholesterol 101 (H) 04/25/2021 04:00 PM    LDL, calculated 69.8 04/25/2021 04:00 PM    VLDL, calculated 12.2 04/25/2021 04:00 PM    Triglyceride 61 04/25/2021 04:00 PM    CHOL/HDL Ratio 1.8 04/25/2021 04:00 PM      BNP No results for input(s): BNPP in the last 72 hours. Liver Enzymes No results for input(s): TP, ALB, TBIL, AP in the last 72 hours. No lab exists for component: SGOT, GPT, DBIL   Thyroid Studies Lab Results   Component Value Date/Time    TSH 0.38 03/16/2022 04:35 AM            Significant Diagnostic Studies: NM LUNG SCAN PERF    Result Date: 3/3/2022  EXAM: NM LUNG SCAN PERF. CLINICAL INDICATION/HISTORY: d dimer increase dyspnea. -Additional: Clinical concern for pulmonary embolus. COMPARISON: Correlation is made with the radiographic study performed one day prior. TECHNIQUE: 7.2 mCi Tc-99m MAA was injected intravenously and the 8 standard views of the chest were obtained. This perfusion only examination is interpreted using the PISAPED criteria. _______________ FINDINGS: Perfusion images show no segmental perfusion defects to suggest the presence of pulmonary embolism. _______________     o scintigraphic findings to suggest the presence of acute pulmonary embolism. _______________     XR HIP RT W OR WO PELV 2-3 VWS    Result Date: 3/7/2022  EXAM: RIGHT HIP RADIOGRAPHS CLINICAL INDICATION/HISTORY: right hip pain -Additional: None COMPARISON: May March 3, 2022. TECHNIQUE: AP pelvis and frog-leg lateral view of right hip. _______________ FINDINGS: BONES: Intact appearing ilioischial and iliopectineal lines. There is a displaced and impacted subcapital right femoral neck fracture, with mild progressive displacement on follow-up imaging. Mild-moderate bilateral hip joint arthrosis. SOFT TISSUES: Unremarkable. _______________     1.  Displaced and impacted subcapital right femoral neck fracture, increased in displacement from CT performed 4 days prior. XR ABD (KUB)    Result Date: 3/11/2022  EXAM: SUPINE ABDOMINAL RADIOGRAPH CLINICAL INDICATION/HISTORY: og tube placement -Additional: None COMPARISON: None TECHNIQUE: Single supine view of the abdomen. _______________ FINDINGS: BOWEL GAS PATTERN: Enteric tube tip within the distal stomach. No evidence of obstruction. No visible free air, given limitations of supine view. BONES: Scoliosis and multilevel degenerative changes. Right hip prosthesis. OTHER: None. _______________     No significant abnormality. CT HEAD WO CONT    Result Date: 3/16/2022  EXAM: CT of the brain without intravenous contrast. INDICATION:  \"AMS. \" COMPARISON:  CT March 11, 2022. DOSE REDUCTION AND DICOM INFORMATION: One or more dose reduction techniques were used on this CT: automated exposure control, adjustment of the mAs and/or kVp according to patient size, and iterative reconstruction techniques. The specific techniques used on this CT exam have been documented in the patient's electronic medical record. Digital Imaging and Communications in Medicine (DICOM) format image data are available to nonaffiliated external healthcare facilities or entities on a secure, media free, reciprocally searchable basis with patient authorization for at least a 12-month period after this study. _______________ FINDINGS:      IMAGE QUALITY:  Diagnostic. BRAIN AND EXTRA-AXIAL SPACE:            SUBACUTE TERRITORIAL TRANSCORTICAL INFARCT:  None detected. MASS:  None detected. HEMORRHAGE:  None. SUBDURAL FLUID COLLECTION:  None detected. HYDROCEPHALUS:  None. REMOTE  TERRITORIAL CEREBRAL INFARCT:  None detected. REMOTE CEREBELLAR INFARCT:  None detected.            STRIVE (STandards for Reporting Vascular changes on nEuroimaging):                            --Blanchard of white matter hypodensity (\"leukoaraiosis\") of presumed vascular origin:  Low-grade. --[place] of lacunes of presumed vascular origin (often undetectable on CT):  None definite. --Degree of brain atrophy:  Moderate. BURDEN OF CALCIFIC INTRACRANIAL ATHEROSCLEROSIS:   Moderate. HEENT:            INCLUDED UPPER ORBITS:  There are bilateral lens replacements. INCLUDED UPPER PARANASAL SINUSES:  Predominantly clear. MASTOID AIR CELLS:  Predominantly clear. BONES:  Unremarkable. SUPERFICIAL SOFT TISSUES: Unremarkable. _______________     Generalized chronic changes, however, no mass or acute findings. _______________     CT HEAD WO CONT    Result Date: 3/11/2022  EXAM: CT head INDICATION: Headache. COMPARISON: 3/7/2022 TECHNIQUE: Axial CT imaging of the head was performed without intravenous contrast. Standard multiplanar coronal and sagittal reformatted images were obtained and are included in interpretation. One or more dose reduction techniques were used on this CT: automated exposure control, adjustment of the mAs and/or kVp according to patient size, and iterative reconstruction techniques. The specific techniques used on this CT exam have been documented in the patient's electronic medical record. Digital Imaging and Communications in Medicine (DICOM) format image data are available to nonaffiliated external healthcare facilities or entities on a secure, media free, reciprocally searchable basis with patient authorization for at least a 12-month period after this study. _______________ FINDINGS: BRAIN AND POSTERIOR FOSSA: No evidence of acute large vessel transcortical infarct or acute parenchymal hemorrhage. No midline shift or hydrocephalus. EXTRA-AXIAL SPACES AND MENINGES: There are no abnormal extra-axial fluid collections. CALVARIUM: Intact. SINUSES: Clear. OTHER: None. _______________     No acute intracranial abnormality. CT HEAD WO CONT    Result Date: 3/7/2022  EXAM: CT head INDICATION: Altered mental status. COMPARISON: 4/23/2021. TECHNIQUE: Axial CT imaging of the head was performed without intravenous contrast. Standard multiplanar coronal and sagittal reformatted images were obtained and are included in interpretation. One or more dose reduction techniques were used on this CT: automated exposure control, adjustment of the mAs and/or kVp according to patient size, and iterative reconstruction techniques. The specific techniques used on this CT exam have been documented in the patient's electronic medical record. Digital Imaging and Communications in Medicine (DICOM) format image data are available to nonaffiliated external healthcare facilities or entities on a secure, media free, reciprocally searchable basis with patient authorization for at least a 12-month period after this study. _______________ FINDINGS: BRAIN AND POSTERIOR FOSSA: Periventricular white matter hypoattenuation which is nonspecific but likely represents chronic ischemic changes. No evidence of acute large vessel transcortical infarct or acute parenchymal hemorrhage. No midline shift or hydrocephalus. EXTRA-AXIAL SPACES AND MENINGES: There are no abnormal extra-axial fluid collections. CALVARIUM: Intact. SINUSES: Clear. OTHER: None. _______________     No acute intracranial abnormality. CT CHEST ABD PELV WO CONT    Result Date: 3/3/2022  EXAM: CT CHEST, ABDOMEN AND PELVIS CLINICAL INDICATION/HISTORY: Hypoxemia, retrocardiac density, diarrhea, weakness and shortness of breath COMPARISON: 3/2/2022 TECHNIQUE: Axial CT imaging of the chest, abdomen, and pelvis was performed without intravenous contrast. Multiplanar reformats were generated. One or more dose reduction techniques were used on this CT: automated exposure control, adjustment of the mAs and/or kVp according to patient size, and iterative reconstruction techniques.   The specific techniques used on this CT exam have been documented in the patient's electronic medical record. Digital Imaging and Communications in Medicine (DICOM) format image data are available to nonaffiliated external healthcare facilities or entities on a secure, media free, reciprocally searchable basis with patient authorization for at least a 12-month period after this study. ________________ FINDINGS: LIMITATIONS:   > Suboptimal evaluation given lack of intravenous contrast.   > Motion artifact is present which limits evaluation.   > Suboptimal exam due to patient body habitus and arms by the side. CHEST: LUNGS: Respiratory motion significantly limits evaluation. Interlobar septal thickening in the upper lobes. Mild bilateral dependent atelectasis. No large consolidation or suspicious pulmonary mass. PLEURA: Very small volume bilateral pleural effusions. AIRWAY: Normal. MEDIASTINUM: Four-chamber cardiomegaly with asymmetric biatrial enlargement, mitral and aortic annular calcifications. The main pulmonary artery is enlarged suggesting pulmonary arterial hypertension. Normal caliber aorta with scattered calcified atherosclerotic plaque. No pericardial effusion. LYMPH NODES: No enlarged lymph nodes. CHEST WALL: Diffuse anasarca. Heterogeneous thyroid. _______________ ABDOMEN/PELVIS: LIVER: No enhancing hepatic mass. The portal and hepatic veins are patent. BILIARY: Gallstones are present in the nondistended gallbladder lumen. No biliary dilation. SPLEEN: Normal. PANCREAS: Normal. ADRENALS: Left adrenal gland measures 9 HU (axial image 76), most consistent with lipid rich adenoma. Normal right adrenal gland. KIDNEYS: Asymmetrically small left kidney. No rate of a stone. No hydronephrosis. GI TRACT:  A small hiatal hernia is present. Normal caliber small and large bowel loops. No morphology of bowel obstruction. Normal appendix. BLADDER: Diffuse wall thickening in the largely decompressed bladder.  PELVIC ORGANS: No acute abnormality. VASCULATURE: No arterial aneurysm. 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 LYMPH NODES: No mesenteric or retroperitoneal lymphadenopathy. OTHER: No free intraperitoneal air. No ascites. Diffuse anasarca. OSSEOUS STRUCTURES: No acute osseous abnormality. Multilevel degenerative changes most pronounced in the lumbar spine. Degenerative changes in both shoulders (right greater than left). Please note the included portions of the upper extremities are not well evaluated on this study _______________     1. Findings of congestive heart failure with cardiomegaly, interstitial edema, very small bilateral pleural effusions, and diffuse anasarca. 2.  Bladder wall thickening may be due to underdistention though superimposed infection cannot be excluded. Recommend correlation for cystitis. 3.  Osseous degenerative changes and additional findings, as detailed in the body of the report. US ABD COMP    Result Date: 3/17/2022  EXAM: US ABD COMP INDICATION: abnromal LFt/ abdominal dyscomfot COMPARISON: CT dated March 3, 2022. TECHNIQUE: Real-time sonography of the abdomen was performed with multiple static images of the liver, gallbladder, pancreas, spleen, kidneys, abdominal aorta and IVC obtained. Technically challenging exam: Limited by patient body habitus, inability to breath-hold or breathing movement into various positions as well as overlying bowel gas. FINDINGS: The liver parenchyma is seen is a hyperechoic with no mass or other focal abnormality. The gallbladder contains multiple nonmobile gallstones. There is no wall thickening or fluid around the gallbladder. There is no biliary duct dilatation and the common duct measures 4.3 mm in diameter. The pancreas is secured by overlying bowel gas. The spleen is obscured by overlying bowel gas and not adequately assessed. The kidneys demonstrate normal echogenicity with no mass, stone or hydronephrosis.   The right kidney measures 9.0 cm and the left kidney measures 7.8 cm in length. Several small bilateral renal cysts noted. The aorta is normal in diameter proximally measuring 1.9 cm and is not clearly visualized distally, obscured by overlying bowel gas and patient body habitus. The IVC and retroperitoneum are normal. Perihepatic ascites is noted. 1. Limited examination due to technical factors as above. 2. Heterogeneously hyperechoic liver parenchyma compatible with steatosis or other hepatic parenchymal disease. 3. No acute findings or suspicious mass. 4. Cholelithiasis. 5. Mild ascites. US RETROPERITONEUM COMP    Result Date: 3/12/2022  EXAM: Complete retroperitoneal ultrasound INDICATION: Renal failure. COMPARISON: CT 3/3/2022 TECHNIQUE:Multiple gray scale sonographic images of the retroperitoneum were obtained. Additional color Doppler images were also acquired. _______________ FINDINGS: RIGHT KIDNEY: 9.2 cm in length. A 0.7 cm cyst is present in the right kidney. No hydronephrosis or solid renal mass. No visible calculi. Normal renal echotexture and cortical thickness. LEFT KIDNEY: 8.3 cm in length. A 1.3 cm cyst is present in the lower pole. No hydronephrosis or solid renal mass. No visible calculi. Normal renal echotexture and cortical thickness. BLADDER: Bladder is decompressed with a Willoughby catheter in place. OTHER: Incidentally noted is cholelithiasis. The gallbladder is contracted. Trace perihepatic ascites is present. _______________     No hydronephrosis. XR CHEST PORT    Result Date: 3/20/2022  CLINICAL HISTORY:  Short of breath. COMPARISONS:  Chest x-ray 3/19/2022, and earlier studies. TECHNIQUE:  single frontal view of the chest ------------------------------------------ FINDINGS: Lungs:  Somewhat unusual air interface at the right lung base, present to varying degrees on prior x-rays and suspect reflects right middle lobe atelectasis. No other evidence of pneumothorax.  Streaky opacity at left lung base, likely atelectasis, not appreciably changed. Mild central bronchial wall thickening, likely bronchitis or asthma. Probable tiny left pleural effusion, not appreciably changed. Mediastinum: Moderate to severe cardiomegaly. Atherosclerotic arterial calcification. Bones: Scoliosis. Mild to moderate thoracic degenerative disc disease. ------------------------------------------    1. Probable partial right middle lobe atelectasis, similar to most recent prior radiograph. 2. Streaky opacity at left lung base, likely atelectasis, though bronchopneumonia or aspiration not excluded. XR CHEST PORT    Result Date: 3/19/2022  CLINICAL HISTORY:  Respiratory failure COMPARISONS:  Chest x-ray 3/16/2022 TECHNIQUE:  single frontal view of the chest ------------------------------------------ FINDINGS: Lungs:  Mild central bronchial wall thickening. Streaky opacity in the lung bases, slightly improved, suspect atelectasis. Small left pleural effusion and possible tiny right pleural effusion, not appreciably changed. Mediastinum: At least moderate cardiomegaly. Atherosclerotic arterial calcification. Bones: No evidence of fracture or suspicious bone lesion. Thoracic scoliosis ------------------------------------------    1. Central bronchial/peribronchial thickening, differential including bronchitis and mild interstitial edema. 2. Small pleural effusions, unchanged. XR CHEST PORT    Result Date: 3/16/2022  EXAM: Portable upright chest radiograph. INDICATION: \"Respiratory failure. \" COMPARISON: March 16, 2022. _______________ FINDINGS:    DEVICES:  None. LUNGS:           --Expansion:  Adequate. --Focal opacity:  None detected. --Diffuse abnormality:  None detected. PLEURAL SPACE:          --Pleural effusion:  Unchanged small left and equivocal right basilar effusions. --Pneumothorax:  None detected. MISCELLANEOUS:  There is cardiac silhouette enlargement versus artifact of portable/AP technique. _______________     Unchanged basal effusions. _______________     XR CHEST PORT    Result Date: 3/16/2022  EXAM: XR CHEST PORT CLINICAL INDICATION/HISTORY: hypoxia -Additional: None COMPARISON: One day prior TECHNIQUE: Frontal view of the chest _______________ FINDINGS: HEART AND MEDIASTINUM: Cardiomegaly. LUNGS AND PLEURAL SPACES: Small layering right effusion/atelectasis. Prominent central vasculature. No pneumothorax. BONY THORAX AND SOFT TISSUES: No acute osseous abnormality _______________     Cardiomegaly. Small layering right effusion/atelectasis. Prominent central vasculature. XR CHEST PORT    Result Date: 3/15/2022  EXAM: XR CHEST PORT CLINICAL INDICATION/HISTORY: intubated -Additional: None COMPARISON: 1 day prior TECHNIQUE: Portable frontal view of the chest _______________ FINDINGS: SUPPORT DEVICES: ET tube and visualized gastric tube both project in stable position. HEART AND MEDIASTINUM: Enlarged appearing cardiac silhouette with stable mediastinal contours. Mild prominence of the central pulmonary vessels without gross evidence for vascular congestion. LUNGS AND PLEURAL SPACES: No pneumothorax. Hazy right lung base opacity, stable to minimally increased. Minor left basilar atelectasis. No dense consolidation. BONY THORAX AND SOFT TISSUES: Unremarkable. _______________     1. Support devices in stable/appropriate position as visualized. 2. Mild cardiac enlargement, unchanged. 3. Hazy right lung base opacity, favored a combination of atelectasis and posteriorly layering pleural effusion, stable to minimally increased. XR CHEST PORT    Result Date: 3/14/2022  EXAM: XR CHEST PORT CLINICAL INDICATION/HISTORY: intubated -Additional: None COMPARISON: 1 day prior TECHNIQUE: Portable frontal view of the chest _______________ FINDINGS: SUPPORT DEVICES: ET tube and visualized gastric tube both project in stable position.  HEART AND MEDIASTINUM: Enlarged appearing cardiac silhouette with stable mediastinal contours. LUNGS AND PLEURAL SPACES: No pneumothorax. Hazy right lung base opacity. Minor left basilar atelectasis. No dense consolidation. BONY THORAX AND SOFT TISSUES: Unremarkable. _______________     1. Support devices in stable/appropriate position as visualized. 2. Mild cardiac enlargement, unchanged. 3. Hazy right lung base opacity, favored a combination of atelectasis and posteriorly layering pleural effusion. XR CHEST PORT    Result Date: 3/13/2022  EXAM: XR CHEST PORT CLINICAL INDICATION/HISTORY: intubated -Additional: None COMPARISON: March 12, 2022 TECHNIQUE: Portable frontal view of the chest _______________ FINDINGS: SUPPORT DEVICES: Endotracheal tube and visualized gastric tube both project in stable position from prior exam. HEART AND MEDIASTINUM: Enlarged appearing cardiac silhouette with stable mediastinal contours. LUNGS AND PLEURAL SPACES: Faint/hazy right lower lung zone opacity. No pneumothorax or pleural effusion demonstrated. BONY THORAX AND SOFT TISSUES: Unremarkable. _______________     1. Support devices in stable position. 2. Mild interval increase in right basilar atelectasis and likely small right pleural effusion. 3. Cardiomegaly, as previously. XR CHEST PORT    Result Date: 3/12/2022  EXAM: XR CHEST PORT CLINICAL INDICATION/HISTORY: intubated -Additional: None COMPARISON: One hour prior TECHNIQUE: Portable frontal view of the chest _______________ FINDINGS: SUPPORT DEVICES: Endotracheal tube and visualized gastric tube both project in stable position. HEART AND MEDIASTINUM: Enlarged cardiac silhouette. Stable mediastinal contours. LUNGS AND PLEURAL SPACES: No focal pneumonic opacity. No pneumothorax or significant pleural effusion. BONY THORAX AND SOFT TISSUES: Unremarkable. _______________     Cardiomegaly without superimposed acute radiographic cardiopulmonary abnormality. Support devices in stable/appropriate position.      XR CHEST PORT    Result Date: 3/11/2022  HISTORY: -Provided with order: intubated -Additional: Respiratory failure Technique : AP PORTABLE CHEST Comparison : 03/10/22 FINDINGS: HEART AND MEDIASTINUM: Enlarged cardiopericardial silhouette. Aortic vascular calcification. Endotracheal tube tip estimated at 3.5 cm above bella. LUNGS AND PLEURAL SPACES: No consolidation, congestive heart failure, pleural effusion or pneumothorax. BONY THORAX AND SOFT TISSUES: Degenerative scoliosis. No plain film evidence of acute cardiopulmonary disease, allowing for technique. XR CHEST PORT    Result Date: 3/10/2022  HISTORY: Respiratory failure. Intubation. . COMPARISON: 3/5/2022. FINDINGS: A portable view of the chest: Patient is intubated, endotracheal tube tip approximately 26 cm over bella. Mediastinal contour stable. Cardiomegaly. Atherosclerosis. No focal consolidation, pleural effusion or pneumothorax. Lungs are overall clear. Patient is intubated, endotracheal tube tip approximately 26 cm over bella. XR CHEST PORT    Result Date: 3/6/2022  EXAM: PORTABLE CHEST HISTORY: Shortness of breath. COMPARISON: 3/2/2022 TECHNIQUE: Single portable view. _______________ FINDINGS: SUPPORT DEVICES: None HEART AND MEDIASTINUM: Moderate cardiomegaly. LUNGS AND PLEURAL SPACES: Subsegmental atelectasis left base. Possible small effusions. BONES AND SOFT TISSUES: Dextroscoliosis. _______________     Subsegmental atelectasis left base. Possible small effusions. Moderate cardiomegaly without change. XR CHEST PORT    Result Date: 3/2/2022  EXAM:  AP Portable Chest X-ray 1 view INDICATION: Shortness of breath COMPARISON: April 23, 2021 _______________ FINDINGS: Cardiomegaly and mediastinal contours are within normal limits for portable radiograph. There is increased right retrocardiac/right paraspinal density. There are no pleural effusions. No acute osseous findings.  ________________      Increased right retrocardiac density which may represent airspace disease or underlying pulmonary nodule. Recommend CT chest for further evaluation. [facility] XR TECHNOLOGIST SERVICE    Result Date: 3/16/2022  See impression. Fluoroscopy was provided for this procedure under the supervision and/or direction of the attending provider. ? For further information regarding this procedure, see patient medical record. XR HIP RT W OR WO PELV  1 VW    Result Date: 3/11/2022  EXAM: Right hip, single view COMPARISON: None. INDICATION: Right hip pain, status post right hip arthroplasty. _______________ FINDINGS: Single portable AP view of the right hip was obtained. Surgical changes and hardware of total right hip arthroplasty. No lucency adjacent to the hardware or other findings to suggest complication. No acute fracture. Expected adjacent soft tissue swelling and emphysema. _______________    Status post total right hip arthroplasty, without complication. ECHO ADULT COMPLETE    Result Date: 3/3/2022    Left Ventricle: Left ventricle size is normal. Moderately increased wall thickness. Findings consistent with moderate concentric hypertrophy. Normal wall motion. Normal left ventricular systolic function with a visually estimated EF of 55 - 60%.   Left Atrium: Left atrium is mildly dilated.   Right Ventricle: Right ventricle is mildly dilated.   Right Atrium: Right atrium is mildly dilated.   Pulmonary artery :  Estimated pulmonary arterial systolic pressure is 51 mmhg. Pulmonary hypertension found to be moderate   Mitral Valve: Moderately thickened leaflet. Mildly calcified leaflet. Moderate annular calcification of the mitral valve.   IVC/SVC : IVC diameter is greater than 21 mm and decreases less than 50% during inspiration; therefore the estimated right atrial pressure is elevated (~15 mmHg). IVC is dilated. Consider LORRAINE for better evaluation of mitral valve if clinically indicated.      ECHO ADULT FOLLOW-UP OR LIMITED    Result Date: 3/15/2022    Left Ventricle: Left ventricle size is normal. Moderately increased wall thickness. Findings consistent with moderate concentric hypertrophy. Normal wall motion. Low normal left ventricular systolic function with a visually estimated EF of 55%. Abnormal diastolic function.   Left Atrium: Left atrium is severely dilated.   Right Ventricle: Mildly reduced systolic function.   Right Atrium: Right atrium is severely dilated.   Mitral Valve: Moderately thickened leaflet. Mildly calcified leaflet. Moderate annular calcification of the mitral valve. Moderate transvalvular regurgitation.   Tricuspid Valve: Mildly thickened leaflets.   Pulmonary artery : Estimated pulmonary arterial systolic pressure is 57 mmhg. Pulmonary hypertension found to be moderate.   IVC /SVC : Patient is ventilated, cannot use IVC diameter to estimate right atrial pressure. IVC is dilated. DUPLEX LOWER EXT VENOUS BILAT    Result Date: 3/4/2022  · No evidence of deep vein thrombosis in the right lower extremity. · No evidence of deep vein thrombosis in the left lower extremity. No results found for this or any previous visit.         CC: Stephanie Lew NP

## 2022-03-23 NOTE — DISCHARGE INSTRUCTIONS
DISCHARGE SUMMARY from Nurse    PATIENT INSTRUCTIONS:    After general anesthesia or intravenous sedation, for 24 hours or while taking prescription Narcotics:  · Limit your activities  · Do not drive and operate hazardous machinery  · Do not make important personal or business decisions  · Do  not drink alcoholic beverages  · If you have not urinated within 8 hours after discharge, please contact your surgeon on call. Report the following to your surgeon:  · Excessive pain, swelling, redness or odor of or around the surgical area  · Temperature over 100.5  · Nausea and vomiting lasting longer than 4 hours or if unable to take medications  · Any signs of decreased circulation or nerve impairment to extremity: change in color, persistent  numbness, tingling, coldness or increase pain  · Any questions    What to do at Home:  Recommended activity: Activity as tolerated,     If you experience any of the following symptoms noted in your discharge instructions, please follow up with Primary Care Provider. *  Please give a list of your current medications to your Primary Care Provider. *  Please update this list whenever your medications are discontinued, doses are      changed, or new medications (including over-the-counter products) are added. *  Please carry medication information at all times in case of emergency situations. These are general instructions for a healthy lifestyle:    No smoking/ No tobacco products/ Avoid exposure to second hand smoke  Surgeon General's Warning:  Quitting smoking now greatly reduces serious risk to your health.     Obesity, smoking, and sedentary lifestyle greatly increases your risk for illness    A healthy diet, regular physical exercise & weight monitoring are important for maintaining a healthy lifestyle    You may be retaining fluid if you have a history of heart failure or if you experience any of the following symptoms:  Weight gain of 3 pounds or more overnight or 5 pounds in a week, increased swelling in our hands or feet or shortness of breath while lying flat in bed. Please call your doctor as soon as you notice any of these symptoms; do not wait until your next office visit. The discharge information has been reviewed with the patient and caregiver. The caregiver verbalized understanding. Discharge medications reviewed with the caregiver and appropriate educational materials and side effects teaching were provided.   ___________________________________________________________________________________________________________________________________

## 2022-03-23 NOTE — WOUND CARE
Wound CareRecommendations:      Cleanse wound with NS or soap and water or commercial wound cleanser  Apply the zinc oxide to inner thighs and groin area   Apply the following dressings to buttock wound: For Home Care/Self Care  Frequency: change dressing every 3 days until patient follows up with PCP  Cleanse wound with NS or soap and water or commercial wound cleanser  Apply the following to wound bed: collagen with silver and cover with silicone border dressing.

## 2022-03-23 NOTE — PROGRESS NOTES
Cardiology Progress Note        Patient: Jamar Herman        Sex: female          DOA: 3/2/2022  YOB: 1940      Age:  80 y.o.        LOS:  LOS: 21 days   Assessment/Plan     Principal Problem:    Acute respiratory failure with hypoxemia ([address] (3/2/2022)    Active Problems:    Elevated troponin (4/23/2021)      HTN (hypertension) (4/23/2021)      Acute on chronic diastolic congestive heart failure ([address] (4/23/2021)      Atrial fibrillation ([address] (3/2/2022)      Stage 3 chronic kidney disease ([address] (3/3/2022)      SERENA (obstructive sleep apnea) (3/3/2022)      Fracture of neck of right femur ([address] (3/8/2022)      Hypokalemia (3/14/2022)      Hypotension (3/17/2022)      Morbid obesity ([address] (3/21/2022)        Plan:  Patient is being discharged home today  On the cardiac medication discussed in detail with patient's daughter with holding parameter  Continue with Eliquis, midodrine, carvedilol and Lasix as needed basis    Continue with Eliquis 2.5 mg twice daily  Continue with carvedilol 3.125 mg twice daily  Continue midodrine 5 mg 3 times daily  Discussed with patient's daughter      Midodrine from 2.5 mg 3 times daily to 5 mg 3 times daily   Carvedilol from tomorrow  Discussed with patient and daughter            No cardiac complaints  Continue with Lasix  Continue with apixaban 2.5 mg twice daily  Discussed with patient/family  Dr. Billie Conte will be covering during the weekend. A. fib rate controlled  Blood pressure stable  Continue with current midodrine and Lasix  Altered mental status, seen by neurology  Discussed with family in the room.         Blood pressure is intermittently low normal  Coreg is on hold  Increase midodrine from 5 mg twice daily to 3 times daily  Discussed with patient and daughter    Extubated  Denied any symptoms  Blood pressure is low normal  Hold carvedilol  Echocardiogram done with preserved LV function, severe biatrial enlargement moderate MR, TR, pulmonary hypertension  Continue with Lasix  Discussed with patient and daughter        Patient remained intubated  Cardiac telemetry rate controlled A. fib with occasional PVCs, saturating around 100%  Tolerating carvedilol 3.125 mg twice daily and Eliquis 2.5 mg twice daily  Ventilatory management as per pulmonary/intensivist  Discussed with patient's daughter in the room  Continue with current cardiac medications. Patient remained intubated  Hemodynamically stable  A. fib rate controlled  Discussed with patient's daughter Mike Joy on phone    Patient is status post hip surgery  Patient is intubated due to difficulty in breathing and hypoxemia  A. fib rate controlled  Discussed with Dr. Cher Griffin with the current medical treatment        Patient denied chest pain or shortness of breath   Atrial fibrillation rate controlled   Patient is scheduled for hip surgery tomorrow  Discussed with patient's daughter  Resume anticoagulation after surgery. Patient is diagnosed with hip fracture and being scheduled for surgery  I have long lengthy discussion with patient and daughter Mike Joy in the room  Patient have negative stress test in April 2021  EF is stable  Patient is with elevated but acceptable risk for hip surgery  Eliquis can be hold  Consider DVT prophylaxis anticoagulation from tomorrow patient have taken morning dose of Eliquis      Patient denied any chest pain   Mitral calcification without stenosis  Continue with current medical treatment    Patient continues to do well. H&H stable  Patient will need diuretic on discharge probably Lasix 40 mg twice daily  Discussed with patient and family in the room      A. fib rate controlled  Patient will need p.o.  Lasix on discharge  Continue Eliquis 2.5 mg twice daily  Discussed with patient and family      Continue with Lasix 40 mg twice daily  I have long lengthy discussion with the patient and daughter about anticoagulation both of them agreed with low-dose Eliquis 2.5 mg twice daily  DC Lovenox   start Eliquis 2.5 mg twice daily from a.m. Discussed with patient and daughter findings of echocardiogram including mitral valve disease, prefer not to do transesophageal echocardiogram  Continue with current medical treatment                        Subjective:    cc:      Shortness of breath  A. fib        REVIEW OF SYSTEMS:     Extubated and no complaints    Objective:      Visit Vitals  BP (!) 98/54   Pulse 74   Temp 98.2 °F (36.8 °C)   Resp 20   Ht 5' 5\" (1.651 m)   Wt 118.9 kg (262 lb 1.6 oz)   SpO2 100%   Breastfeeding No   BMI 43.62 kg/m²     Body mass index is 43.62 kg/m². Physical Exam:  General Appearance: Comfortable, extubated  NECK: No JVD, no thyroidomeglay. LUNGS: Clear bilaterally. HEART: S1 irregular +S2 audible,    ABD: Non-tender, BS Audible    EXT: No edema, and no cysnosis. VASCULAR EXAM: Pulses are intact. PSYCHIATRIC EXAM: Mood is appropriate.     Medication:  Current Facility-Administered Medications   Medication Dose Route Frequency    [START ON 3/24/2022] famotidine (PEPCID) tablet 20 mg  20 mg Oral DAILY    nystatin (MYCOSTATIN) 100,000 unit/gram powder   Topical BID    zinc oxide 20 % ointment   Topical PRN    midodrine (PROAMATINE) tablet 5 mg  5 mg Oral TID WITH MEALS    [Held by provider] furosemide (LASIX) injection 20 mg  20 mg IntraVENous BID    arformoteroL (BROVANA) neb solution 15 mcg  15 mcg Nebulization BID RT    naloxone (NARCAN) injection 0.4 mg  0.4 mg IntraVENous PRN    ferrous sulfate tablet 325 mg  1 Tablet Oral TID    diphenhydrAMINE (BENADRYL) capsule 25 mg  25 mg Oral Q4H PRN    bisacodyL (DULCOLAX) suppository 10 mg  10 mg Rectal DAILY PRN    nystatin (MYCOSTATIN) 100,000 unit/mL oral suspension 500,000 Units  500,000 Units Oral QID    apixaban (ELIQUIS) tablet 2.5 mg  2.5 mg Oral BID    sodium chloride (NS) flush 5-40 mL  5-40 mL IntraVENous Q8H    sodium chloride (NS) flush 5-40 mL  5-40 mL IntraVENous PRN    acetaminophen (TYLENOL) tablet 650 mg  650 mg Oral Q6H PRN    Or    acetaminophen (TYLENOL) suppository 650 mg  650 mg Rectal Q6H PRN    polyethylene glycol (MIRALAX) packet 17 g  17 g Oral DAILY PRN    ondansetron (ZOFRAN ODT) tablet 4 mg  4 mg Oral Q8H PRN    Or    ondansetron (ZOFRAN) injection 4 mg  4 mg IntraVENous Q6H PRN    carvediloL (COREG) tablet 3.125 mg  3.125 mg Oral BID WITH MEALS    aspirin delayed-release tablet 81 mg  81 mg Oral DAILY               Lab/Data Reviewed:  Procedures/imaging: see electronic medical records for all procedures/Xrays   and details which were not copied into this note but were reviewed prior to creation of Plan       All lab results for the last 24 hours reviewed.      Recent Labs     03/23/22  0503 03/22/22  0440 03/21/22  1411   WBC 7.9 8.8 9.6   HGB 7.7* 8.6* 8.7*   HCT 25.3* 27.2* 27.3*   * 431* 393     Recent Labs     03/23/22  0503 03/22/22  0440 03/21/22  1411   * 152* 151*   K 3.9 4.0 3.9   * 113* 113*   CO2 38* 34* 37*   * 114* 140*   * 120* 129*   CREA 1.36* 1.57* 1.78*   CA 10.2* 10.4* 10.8*       RADIOLOGY:  CT Results  (Last 48 hours)    None        CXR Results  (Last 48 hours)    None            Cardiology Procedures:   Results for orders placed or performed during the hospital encounter of 03/02/22   EKG, 12 LEAD, INITIAL   Result Value Ref Range    Ventricular Rate 72 BPM    Atrial Rate 340 BPM    QRS Duration 86 ms    Q-T Interval 374 ms    QTC Calculation (Bezet) 409 ms    Calculated R Axis -2 degrees    Calculated T Axis -3 degrees    Diagnosis       Atrial fibrillation  Abnormal ECG  When compared with ECG of 02-MAR-2022 20:12,  No significant change was found  Confirmed by Edwige Davenport MD. ([id number]) on 3/17/2022 10:28:04 PM        Echo Results  (Last 48 hours)    None       Cardiolite (Tc-99m Sestamibi) stress test    Signed By: Christianne Poe MD     March 23, 2022

## 2022-03-23 NOTE — PROGRESS NOTES
Hospitalist Progress Note-critical care note     Patient: Marilee Wiley MRN: [id number]  [facility]: [id number]    YOB: 1940  Age: 80 y.o. Sex: female    DOA: 3/2/2022 LOS:  LOS: 21 days          Chief complaint: hip fracture m ckd3, afib chf , acute respiratory failure     Assessment/Plan     Hospital Problems  Date Reviewed: 3/9/2022          Codes Class Noted POA    Morbid obesity ([facility] ICD-10-CM: E66.01  ICD-9-CM: 278.01  3/21/2022 Unknown        Hypotension ICD-10-CM: I95.9  ICD-9-CM: 458.9  3/17/2022 Unknown        Hypokalemia ICD-10-CM: E87.6  ICD-9-CM: 276.8  3/14/2022 Unknown        Fracture of neck of right femur ([facility] ICD-10-CM: S72.001A  ICD-9-CM: 820.8  3/8/2022 Unknown        Stage 3 chronic kidney disease ([facility] ICD-10-CM: N18.30  ICD-9-CM: 585.3  3/3/2022 Unknown        SERENA (obstructive sleep apnea) ICD-10-CM: G47.33  ICD-9-CM: 327.23  3/3/2022 Unknown        * (Principal) Acute respiratory failure with hypoxemia (HCC) ICD-10-CM: J96.01  ICD-9-CM: 518.81  3/2/2022 Unknown        Atrial fibrillation (HCC) ICD-10-CM: I48.91  ICD-9-CM: 427.31  3/2/2022 Unknown        Elevated troponin ICD-10-CM: R77.8  ICD-9-CM: 790.6  4/23/2021 Yes        HTN (hypertension) ICD-10-CM: I10  ICD-9-CM: 401.9  4/23/2021 Yes        Acute on chronic diastolic congestive heart failure (HCC) ICD-10-CM: I50.33  ICD-9-CM: 428.33, 428.0  4/23/2021 Yes            pt was admitted for afib and chf, found rt hip fracture. She has serena on cpap at night, she received rt hip replacement, she was noted decreased tide volume and hypoxia while extubation post procedure, intubated with oxygen 65%, peep 6. Case discussed with Dr. Gautam Mcgovern and Dr. Vipul Terry     Acute on chronic respiratory failure with hypoxia and hypercapnia   History of obesity, SERENA on BiPAP  Postoperative respiratory failure, reintubated in PACU 3/10/2022.  Extubated on 3/15/2022  appreciate pulmonology expertise, bipap settings adjusted for max support   Will be able to take home FF mask   V/q scan :negative for PE , pvl negative for dvt  on 3/3  Enterobacter Clacae complex from resp cutlure 3/10/22: id on board on  merrem -id has s/o    Pulmonary hypertension   Echo on 3/3 pulmonary arterial systolic pressure is 51 mmhg  LORRAINE is deferred     Congestive heart failure , acute on chronic diastolic ,  Continue lasix -documented that has been held due to hyponatremia   Echo done Mitral stenosis,  Pulmonary hypertension found to be moderate-LORRAINE is deferred  Dr. Chan Quiet expertise appreciated, Continue with Eliquis 2.5 mg twice daily  Continue with carvedilol 3.125 mg twice daily  Continue midodrine 5 mg 3 times daily    htn meds hold due to hypotension  afib new   On eliquis      Elevated trop   No acs cardiologist on board     UTI  Urine pos for klebsiella  Completed IV Rocephin     Hip fracture:    Right press fit direct anterior total hip arthroplasty -continue PT/OT   Continue post surgery care , need rehab placement     ckd3 -cr stable   Continue watch for renal function ,  Renal f/u     Hypokalemia   Resolved     Anemia   Continue monitoring h/h, so far  H/h stable      Encephalopathy   eeg no seizure   Neuro consulted   Now alert     hpernatremia due to dehydration   Hold lasix, low rate d5 for one day     Disposition : pt's family has decided to DC with H/H and DME equipment and home 02,   Anticipated plan is to DC home today      Subjective:  CM relayed that pt's family wants prescriptions filled at hospital based OP pharmacy  Daughter wants to know if lasix will be restarted, \"my mom will fill up with fluid if she is not taking it'\"      Review of systems:  General: no f/c   Lung : no sob , no wheezing   Heart: no chest pain,   GI: no abdomen pain , no n/v   Vital signs/Intake and Output:  Visit Vitals  /72   Pulse 84   Temp 98 °F (36.7 °C)   Resp 20   Ht 5' 5\" (1.651 m)   Wt 118.9 kg (262 lb 1.6 oz)   SpO2 100%   Breastfeeding No   BMI 43.62 kg/m²     Current Shift:  03/23 0701 - 03/23 1900  In: 240 [P.O.:240]  Out: -   Last three shifts:  03/21 1901 - 03/23 0700  In: 720 [P.O.:720]  Out: 800 [Urine:800]    Physical Exam:  General: Alert and no acute distress   HEENT: NC, Atraumatic. anicteric sclerae. Nc oxygen noted   Lungs: CTA Bilaterally. No Wheezing/Rhonchi/Rales. Heart:  Regular  rhythm,  No murmur, No Rubs, No Gallops  Abdomen: Soft, Non distended, Non tender. +Bowel sounds,   Extremities: No c/c, rt hip surgical site covered with gauze ,   Psych:   Not anxious or agitated. Neurologic:  No acute neuro deficit, but respond slow. Labs: Results:       Chemistry Recent Labs     03/23/22  0503 03/22/22  0440 03/21/22  1411   * 114* 140*   * 152* 151*   K 3.9 4.0 3.9   * 113* 113*   CO2 38* 34* 37*   * 120* 129*   CREA 1.36* 1.57* 1.78*   CA 10.2* 10.4* 10.8*   AGAP NEG 1 5 1*   BUCR 87* 76* 72*      CBC w/Diff Recent Labs     03/23/22  0503 03/22/22  0440 03/21/22  1411   WBC 7.9 8.8 9.6   RBC 2.58* 2.77* 2.88*   HGB 7.7* 8.6* 8.7*   HCT 25.3* 27.2* 27.3*   * 431* 393   GRANS 68 73 76*   LYMPH 17* 14* 12*   EOS 1 2 1      Cardiac Enzymes No results for input(s): CPK, CKND1, FARA in the last 72 hours. No lab exists for component: CKRMB, TROIP   Coagulation No results for input(s): PTP, INR, APTT, INREXT, INREXT in the last 72 hours. Lipid Panel Lab Results   Component Value Date/Time    Cholesterol, total 183 04/25/2021 04:00 PM    HDL Cholesterol 101 (H) 04/25/2021 04:00 PM    LDL, calculated 69.8 04/25/2021 04:00 PM    VLDL, calculated 12.2 04/25/2021 04:00 PM    Triglyceride 61 04/25/2021 04:00 PM    CHOL/HDL Ratio 1.8 04/25/2021 04:00 PM      BNP No results for input(s): BNPP in the last 72 hours. Liver Enzymes No results for input(s): TP, ALB, TBIL, AP in the last 72 hours.     No lab exists for component: SGOT, GPT, DBIL   Thyroid Studies Lab Results   Component Value Date/Time    TSH 0.38 03/16/2022 04:35 AM Procedures/imaging: see electronic medical records for all procedures/Xrays and details which were not copied into this note but were reviewed prior to creation of Plan    NM LUNG SCAN PERF    Result Date: 3/3/2022  EXAM: NM LUNG SCAN PERF. CLINICAL INDICATION/HISTORY: d dimer increase dyspnea. -Additional: Clinical concern for pulmonary embolus. COMPARISON: Correlation is made with the radiographic study performed one day prior. TECHNIQUE: 7.2 mCi Tc-99m MAA was injected intravenously and the 8 standard views of the chest were obtained. This perfusion only examination is interpreted using the PISAPED criteria. _______________ FINDINGS: Perfusion images show no segmental perfusion defects to suggest the presence of pulmonary embolism. _______________     o scintigraphic findings to suggest the presence of acute pulmonary embolism. _______________     XR HIP RT W OR WO PELV 2-3 VWS    Result Date: 3/7/2022  EXAM: RIGHT HIP RADIOGRAPHS CLINICAL INDICATION/HISTORY: right hip pain -Additional: None COMPARISON: May March 3, 2022. TECHNIQUE: AP pelvis and frog-leg lateral view of right hip. _______________ FINDINGS: BONES: Intact appearing ilioischial and iliopectineal lines. There is a displaced and impacted subcapital right femoral neck fracture, with mild progressive displacement on follow-up imaging. Mild-moderate bilateral hip joint arthrosis. SOFT TISSUES: Unremarkable. _______________     1. Displaced and impacted subcapital right femoral neck fracture, increased in displacement from CT performed 4 days prior. CT HEAD WO CONT    Result Date: 3/7/2022  EXAM: CT head INDICATION: Altered mental status. COMPARISON: 4/23/2021. TECHNIQUE: Axial CT imaging of the head was performed without intravenous contrast. Standard multiplanar coronal and sagittal reformatted images were obtained and are included in interpretation.  One or more dose reduction techniques were used on this CT: automated exposure control, adjustment of the mAs and/or kVp according to patient size, and iterative reconstruction techniques. The specific techniques used on this CT exam have been documented in the patient's electronic medical record. Digital Imaging and Communications in Medicine (DICOM) format image data are available to nonaffiliated external healthcare facilities or entities on a secure, media free, reciprocally searchable basis with patient authorization for at least a 12-month period after this study. _______________ FINDINGS: BRAIN AND POSTERIOR FOSSA: Periventricular white matter hypoattenuation which is nonspecific but likely represents chronic ischemic changes. No evidence of acute large vessel transcortical infarct or acute parenchymal hemorrhage. No midline shift or hydrocephalus. EXTRA-AXIAL SPACES AND MENINGES: There are no abnormal extra-axial fluid collections. CALVARIUM: Intact. SINUSES: Clear. OTHER: None. _______________     No acute intracranial abnormality. CT CHEST ABD PELV WO CONT    Result Date: 3/3/2022  EXAM: CT CHEST, ABDOMEN AND PELVIS CLINICAL INDICATION/HISTORY: Hypoxemia, retrocardiac density, diarrhea, weakness and shortness of breath COMPARISON: 3/2/2022 TECHNIQUE: Axial CT imaging of the chest, abdomen, and pelvis was performed without intravenous contrast. Multiplanar reformats were generated. One or more dose reduction techniques were used on this CT: automated exposure control, adjustment of the mAs and/or kVp according to patient size, and iterative reconstruction techniques. The specific techniques used on this CT exam have been documented in the patient's electronic medical record. Digital Imaging and Communications in Medicine (DICOM) format image data are available to nonaffiliated external healthcare facilities or entities on a secure, media free, reciprocally searchable basis with patient authorization for at least a 12-month period after this study.  ________________ FINDINGS: LIMITATIONS:   > Suboptimal evaluation given lack of intravenous contrast.   > Motion artifact is present which limits evaluation.   > Suboptimal exam due to patient body habitus and arms by the side. CHEST: LUNGS: Respiratory motion significantly limits evaluation. Interlobar septal thickening in the upper lobes. Mild bilateral dependent atelectasis. No large consolidation or suspicious pulmonary mass. PLEURA: Very small volume bilateral pleural effusions. AIRWAY: Normal. MEDIASTINUM: Four-chamber cardiomegaly with asymmetric biatrial enlargement, mitral and aortic annular calcifications. The main pulmonary artery is enlarged suggesting pulmonary arterial hypertension. Normal caliber aorta with scattered calcified atherosclerotic plaque. No pericardial effusion. LYMPH NODES: No enlarged lymph nodes. CHEST WALL: Diffuse anasarca. Heterogeneous thyroid. _______________ ABDOMEN/PELVIS: LIVER: No enhancing hepatic mass. The portal and hepatic veins are patent. BILIARY: Gallstones are present in the nondistended gallbladder lumen. No biliary dilation. SPLEEN: Normal. PANCREAS: Normal. ADRENALS: Left adrenal gland measures 9 HU (axial image 76), most consistent with lipid rich adenoma. Normal right adrenal gland. KIDNEYS: Asymmetrically small left kidney. No rate of a stone. No hydronephrosis. GI TRACT:  A small hiatal hernia is present. Normal caliber small and large bowel loops. No morphology of bowel obstruction. Normal appendix. BLADDER: Diffuse wall thickening in the largely decompressed bladder. PELVIC ORGANS: No acute abnormality. VASCULATURE: No arterial aneurysm. 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 LYMPH NODES: No mesenteric or retroperitoneal lymphadenopathy. OTHER: No free intraperitoneal air. No ascites. Diffuse anasarca. OSSEOUS STRUCTURES: No acute osseous abnormality. Multilevel degenerative changes most pronounced in the lumbar spine.  Degenerative changes in both shoulders (right greater than left). Please note the included portions of the upper extremities are not well evaluated on this study _______________     1. Findings of congestive heart failure with cardiomegaly, interstitial edema, very small bilateral pleural effusions, and diffuse anasarca. 2.  Bladder wall thickening may be due to underdistention though superimposed infection cannot be excluded. Recommend correlation for cystitis. 3.  Osseous degenerative changes and additional findings, as detailed in the body of the report. XR CHEST PORT    Result Date: 3/6/2022  EXAM: PORTABLE CHEST HISTORY: Shortness of breath. COMPARISON: 3/2/2022 TECHNIQUE: Single portable view. _______________ FINDINGS: SUPPORT DEVICES: None HEART AND MEDIASTINUM: Moderate cardiomegaly. LUNGS AND PLEURAL SPACES: Subsegmental atelectasis left base. Possible small effusions. BONES AND SOFT TISSUES: Dextroscoliosis. _______________     Subsegmental atelectasis left base. Possible small effusions. Moderate cardiomegaly without change. XR CHEST PORT    Result Date: 3/2/2022  EXAM:  AP Portable Chest X-ray 1 view INDICATION: Shortness of breath COMPARISON: April 23, 2021 _______________ FINDINGS: Cardiomegaly and mediastinal contours are within normal limits for portable radiograph. There is increased right retrocardiac/right paraspinal density. There are no pleural effusions. No acute osseous findings. ________________      Increased right retrocardiac density which may represent airspace disease or underlying pulmonary nodule. Recommend CT chest for further evaluation. ECHO ADULT COMPLETE    Result Date: 3/3/2022    Left Ventricle: Left ventricle size is normal. Moderately increased wall thickness. Findings consistent with moderate concentric hypertrophy. Normal wall motion. Normal left ventricular systolic function with a visually estimated EF of 55 - 60%.   Left Atrium: Left atrium is mildly dilated.    Right Ventricle: Right ventricle is mildly dilated.   Right Atrium: Right atrium is mildly dilated.   Pulmonary artery :  Estimated pulmonary arterial systolic pressure is 51 mmhg. Pulmonary hypertension found to be moderate   Mitral Valve: Moderately thickened leaflet. Mildly calcified leaflet. Moderate annular calcification of the mitral valve.   IVC/SVC : IVC diameter is greater than 21 mm and decreases less than 50% during inspiration; therefore the estimated right atrial pressure is elevated (~15 mmHg). IVC is dilated. Consider LORRAINE for better evaluation of mitral valve if clinically indicated. DUPLEX LOWER EXT VENOUS BILAT    Result Date: 3/4/2022  · No evidence of deep vein thrombosis in the right lower extremity. · No evidence of deep vein thrombosis in the left lower extremity.

## 2022-03-23 NOTE — PROGRESS NOTES
Problem: Dysphagia (Adult)  Goal: *Acute Goals and Plan of Care (Insert Text)  Description: Patient will:  1. Tolerate PO trials with 0 s/s overt distress in 4/5 trials. 2. Utilize compensatory swallow strategies/maneuvers (decrease bite/sip, size/rate, alt. liq/sol) with min cues in 4/5 trials. Patient will:  3. When more alert, perform oral-motor/laryngeal exercises to increase oropharyngeal swallow function with min cues. Rec:     Puree diet with thin liquids via spoon  Pt is a feeder  Aspiration precautions  HOB >45 during po intake, remain >30 for 30-45 minutes after po   Small bites/sips; alternate liquid/solid with slow feeding rate   Oral care TID  Meds crushed in applesauce  Outcome: Progressing Towards Goal     SPEECH LANGUAGE PATHOLOGY DYSPHAGIA TREATMENT    Patient: Dinesh Mayo (89 y.o. female)  Date: 3/23/2022  Diagnosis: Acute exacerbation of CHF (congestive heart failure) ([facility]) [I50.9] Acute respiratory failure with hypoxemia ([facility])  Procedure(s) (LRB):  RIGHT TOTAL HIP ARTHROPLASTY WITH C-ARM  (PT IN ROOM # 358) (Right) 13 Days Post-Op  Precautions: aspiration; Fall,WBAT,Skin  PLOF:as per H&P     ASSESSMENT:  Patient seen on this date for dysphagia management. Patient's sister at bedside. Sister reported patient finished dinner with no concerns. Patient aroused easily to voice. PO trials consumed: thin via straw and puree. Patient's sister and daughter requested to not trial regular textures at this time and to remain on current diet. Patient demonstrated wet throat clear throughout session. Patient able to create labial seal around spoon independently. No residue observed in oral cavity. Recommend patient remain on current diet of puree with thin liquids. Discussed results and progress with daughter, patient, and sister.      Progression toward goals:  []         Improving appropriately and progressing toward goals  [x]         Improving slowly and progressing toward goals  []         Not making progress toward goals and plan of care will be adjusted     PLAN:  Recommendations and Planned Interventions:  See above  Patient continues to benefit from skilled intervention to address the above impairments. Continue treatment per established plan of care. Discharge Recommendations: To Be Determined     SUBJECTIVE:   Patient stated Yes. OBJECTIVE:   Cognitive and Communication Status:  Neurologic State: Alert,Eyes open to voice,Eyes open spontaneously  Orientation Level: Oriented to person  Cognition: Decreased command following  Perception: Cues to attend left visual field,Cues to attend right visual field  Perseveration: No perseveration noted  Safety/Judgement: Fall prevention  Dysphagia Treatment:  Oral Assessment:  Oral Assessment  Labial: Decreased seal  Dentition: Limited  Oral Hygiene:  (fair)  Lingual: Decreased rate,Decreased strength,Incoordinated  Velum: No impairment  Mandible: Other (comment) (unable to assess)  Gag Reflex:  (did not assess)  P.O. Trials:   Patient Position:  (upright in bed)   Vocal quality prior to P.O.: Breathy,Low volume   Consistency Presented: Puree,Thin liquid   How Presented: SLP-fed/presented,Straw,Successive swallows,Spoon   Bolus Acceptance: No impairment   Bolus Formation/Control: Impaired   Type of Impairment: Mastication   Propulsion: Delayed (# of seconds),Discoordination   Oral Residue: Less than 10% of bolus   Initiation of Swallow: Delayed (# of seconds)   Laryngeal Elevation: Functional   Aspiration Signs/Symptoms: Clear throat   Pharyngeal Phase Characteristics: Easily fatigued ,Multiple swallows,Poor endurance,Suspected pharyngeal residue   Effective Modifications: Alternate liquids/solids,Small sips and bites   Cues for Modifications:  Moderate-maximal   Oral Phase Severity: Mild   Pharyngeal Phase Severity : Mild-moderate  PAIN:  Pain level pre-treatment: 0/10   Pain level post-treatment: 0/10   After treatment:   []              Patient left in no apparent distress sitting up in chair  [x]              Patient left in no apparent distress in bed  [x]              Call bell left within reach  [x]              Nursing notified  [x]              Family present  []              Caregiver present  []              Bed alarm activated      COMMUNICATION/EDUCATION:   [x] Aspiration precautions; swallow safety; compensatory techniques  []        Patient unable to participate in education; education ongoing with staff  []  Posted safety precautions in patient's room.   [] Oral-motor/laryngeal strengthening exercises      Anastasia Cochran, SLP-CFY

## 2022-03-23 NOTE — PROGRESS NOTES
[facility]  Pulmonary, Critical Care, and Sleep Medicine    Name: Charo Owens MRN: [id number]   : 1940 Hospital: [facility] [place]    Date: 3/23/2022  Room: [address] [place] [address] Note                                              Consult requesting physician: Dr. London Beyer  Reason for Consult: Respiratory failure    IMPRESSION:   Acute respiratory failure with hypoxemia - J96.01  Acute on chronic hypercarbic respiratory failure - J96.22  Acute on chronic diastolic congestive heart failure - I50.33  SERENA - G47.33  OHS - E66.2  Aspiration pneumonia, E. Cloacae complex in respi cx 3/10/22  Atrial fibrillation   Fracture of neck of right femur. Patient Active Problem List   Diagnosis Code    Elevated troponin R77.8    HTN (hypertension) I10    SERENA treated with BiPAP G47.33    Acute on chronic diastolic congestive heart failure (HCC) I50.33    Acute respiratory failure with hypoxemia (HCC) J96.01    Atrial fibrillation (HCC) I48.91    Stage 3 chronic kidney disease (HCC) N18.30    SERENA (obstructive sleep apnea) G47.33    Fracture of neck of right femur ([facility] [address] S72.001A    Hypokalemia E87.6    Hypotension I95.9    Morbid obesity ([address] E66.01   Code status: DNR       RECOMMENDATIONS:     Respiratory: History of obesity, SERENA, OHS on BiPAP  Patient not received new supplies from DME beyond last visit in office 2021 - after communicating with her DME - pt has new supplies and O2 coming home today. Per daughter over the phone, she has everything that patient needs now    Daughter ok to give another chance to DME for supplies from [employer]  Patient device in  and I confirmed that new changes are already synched at 20/6, PS 10 cwp during bedside visit on 3/22/22  Patient placed on BiPAP while sleeping with full face mask on her BiPAP w/o supplemental O2 and SPO2 remained 95-99% on present settings on room air with BiPAP on 3/22/22  She has benefited from BIPAP 16/6, PS 10 cwp. Through [employer], I had changed pt's unit settings to IPAP 20 cwp, EPAP 6 cwp, PS 10 cwp  I discussed findings with daughter that during prior device therapy efficiency download, mask leaks may have contributed to residual respiratory events on BiPAP therapy. Advised the daughter to monitor any mask leaks  Patient would benefit from full facemask. Per RN and RT, patient could take FF mask home  Patient to be scheduled for titration study based on transportation availability  Continue supplemental O2 via nasal cannula and screen for home O2 need prior to DC with activity  Keep SPO2 >=92%. HOB 30 degree elevation all the time. Aggressive pulmonary toileting. Aspiration precautions    Postoperative respiratory failure, reintubated in PACU 3/10/2022. Extubated on 3/15/2022  Patient has been on home BiPAP and presented for acute CHF with Afib RVR during this hospitalization. Later found to have RT hip # leading to surgery and eventful post-op course including ventilator use in ICU, aspiration pneumonia  Continue BIPAP at night and PRN during daytime while at hospital    Daughter agrees to have patient follow up in office, possibly after sleep study given pt would need transportation, earlier per clinical course    CVS: manage per cardiology on board. Diuretics - lasix 20 mg x q12 on hold for hyperNa+ - defer to cardiology; hospitalist - aware  Midodrine for BP support  A. fib RVR, acute on chronic diastolic CHF on admission  Continue aspirin, Eliquis; monitor for any external bleeding  Coreg on hold - restart per cardiology    Echo 3/3/2022: LVEF 55 to 60%. RV mildly dilated. RA mildly dilated. PVL LE 3/3/2022: No DVT. VQ scan 3/3/2022: No PE.    ID: Finished ABX Merrem 3/20/22 per ID   No fever or leukocytosis or active sepsis. Defer to hospitalist  Rapid influenza and COVID19 3/2/2022: Negative. Urine culture 3/2/2022: Klebsiella pneumonia. Blood culture 3/2/2022: Negative.   Endotracheal aspirate culture 3/10/22: Light Enterobacter cloacae complex. Heavy normal forrest. Hematology/Oncology: Anemia. Defer to hospitalist  No external bleeding. Normal platelets. Renal: stable S. Cr  Mild hypernatremia; manage per hospitalist.  Lasix held due to elevated S. Na+  Dr. Dariel Barone followed    GI/: No acute issues. USG - hepatic steatosis, cholelithiasis. Elevated T. Bili  No abdominal pain or signs of acute abdomen    Endocrine: Monitor FSBS    Neurology: On/off periods of lethargy, generalized weak  Ct Head ammonia TSH normal  Neurology followed  CT head 3/7/2022: Nil acute. Repeat CT head 3/11/2022: Nil acute. Pain/Sedation: Avoid sedation, anxiolytic; judicious opiate - avoid if possible    Skin/Wound: Right hip wound VAC in place after surgery; local care per nursing protocol. Electrolytes: Replace electrolytes per hospitalist and floor    Nutrition: Tolerating dysphagia diet    Prophylaxis: DVT Prophylaxis: Eliquis. GI Prophylaxis: Protonix. Restraints: none    Advance Directive/Palliative Care: Consulted. DNR    Will defer respective systems problem management to primary and other respective consultant and follow patient with primary and other medical team.  Further recommendations will be based on the patient's response to recommended treatment and results of the investigation ordered. Quality Care: PPI, DVT prophylaxis, HOB elevated, Infection control all reviewed and addressed. Care of plan d/w RN, RT, hospitalist team,     Moderate complexity decision making was performed during the evaluation of this patient excluding family discussion and any procedures  I updated daughter today over the phone     Subjective/History of Present Illness:   Patient is a 80 y.o. female with PMHx significant for morbid obesity, SERENA, right leg weakness, HTN, A. fib, CHF; admitted to medical floor on 3/3/2022 for dyspnea. Patient had new onset of A. fib on admission.   Patient was admitted on medical floor and A. fib/CHF was being treated medically; and was using home BiPAP nightly. Found to have right femoral fracture; underwent right press-fit direct anterior total hip arthroplasty for femoral neck fracture. Postoperatively patient was extubated in PACU; but due to respiratory distress, reintubated and transferred to ICU.      3/23/2022 :   Patient seen at bedside in rm#[id number]  Sister at bedside and daughter at home awaiting O2 and other supplies  On O2 via NC and used BiPAP at night per RN  Follows simple commands; remains generalized weak  Limited information available  No fever  Answered all the questions of daughter to their satisfaction    DNR/DNI    I/O last 24 hrs: Intake/Output Summary (Last 24 hours) at 3/23/2022 1533  Last data filed at 3/23/2022 0851  Gross per 24 hour   Intake 480 ml   Output 800 ml   Net -320 ml         Review of Systems:  ROS not obtained due to patient factor. Allergies   Allergen Reactions    Seafood Hives     crabs    Statins-Hmg-Coa Reductase Inhibitors Unknown (comments)    Sulfa (Sulfonamide Antibiotics) Hives      Past Medical History:   Diagnosis Date    Hypertension     Sleep disorder       Past Surgical History:   Procedure Laterality Date    HX ORTHOPAEDIC      broken right ankle, left femur 30 yrs ago      Social History     Tobacco Use    Smoking status: Never Smoker    Smokeless tobacco: Never Used   Substance Use Topics    Alcohol use: Yes     Alcohol/week: 1.0 standard drink     Types: 1 Glasses of wine per week     Comment: soc      Family History   Problem Relation Age of Onset    Diabetes Mother     Heart Disease Mother     Heart Disease Father       Prior to Admission medications    Medication Sig Start Date End Date Taking? Authorizing Provider   allopurinoL (ZYLOPRIM) 100 mg tablet Take 100 mg by mouth daily.    Yes Provider, Historical   acetaminophen (TYLENOL) 325 mg tablet Take 650 mg by mouth every six (6) hours as needed for Pain.   Yes Provider, Historical   niacin (NIASPAN) 1,000 mg Tb24 tab Take 1,000 mg by mouth daily. Yes Provider, Historical   trolamine salicylate-aloe vera 20% (ASPERCREME) topical cream Apply 2 g to affected area as needed for Pain. Yes Provider, Historical   multivitamin, tx-iron-ca-min (THERA-M w/ IRON) 9 mg iron-400 mcg tab tablet Take 1 Tab by mouth daily. Yes Provider, Historical   aspirin delayed-release 81 mg tablet Take 81 mg by mouth daily. Yes Provider, Historical   potassium chloride SR (KLOR-CON 10) 10 mEq tablet Take 10 mEq by mouth daily. Yes Provider, Historical   carvediloL (COREG) 3.125 mg tablet Take 3.125 mg by mouth daily.    Yes Provider, Historical     Current Facility-Administered Medications   Medication Dose Route Frequency    [START ON 3/24/2022] famotidine (PEPCID) tablet 20 mg  20 mg Oral DAILY    nystatin (MYCOSTATIN) 100,000 unit/gram powder   Topical BID    midodrine (PROAMATINE) tablet 5 mg  5 mg Oral TID WITH MEALS    [Held by provider] furosemide (LASIX) injection 20 mg  20 mg IntraVENous BID    arformoteroL (BROVANA) neb solution 15 mcg  15 mcg Nebulization BID RT    ferrous sulfate tablet 325 mg  1 Tablet Oral TID    nystatin (MYCOSTATIN) 100,000 unit/mL oral suspension 500,000 Units  500,000 Units Oral QID    apixaban (ELIQUIS) tablet 2.5 mg  2.5 mg Oral BID    sodium chloride (NS) flush 5-40 mL  5-40 mL IntraVENous Q8H    carvediloL (COREG) tablet 3.125 mg  3.125 mg Oral BID WITH MEALS    aspirin delayed-release tablet 81 mg  81 mg Oral DAILY         Objective:   Vital Signs:    Visit Vitals  /62   Pulse 72   Temp 97.9 °F (36.6 °C)   Resp 20   Ht 5' 5\" (1.651 m)   Wt 118.9 kg (262 lb 1.6 oz)   SpO2 100%   Breastfeeding No   BMI 43.62 kg/m²       O2 Device: Nasal cannula   O2 Flow Rate (L/min): 2 l/min   Temp (24hrs), Av.2 °F (36.8 °C), Min:97.9 °F (36.6 °C), Max:98.8 °F (37.1 °C)       Intake/Output:   Last shift:      701 -  1900  In: 240 [P.O.:240]  Out: -     Last 3 shifts: 03/21 1901 - 03/23 0700  In: 720 [P.O.:720]  Out: 800 [Urine:800]      Intake/Output Summary (Last 24 hours) at 3/23/2022 1533  Last data filed at 3/23/2022 0851  Gross per 24 hour   Intake 480 ml   Output 800 ml   Net -320 ml       Last 3 Recorded Weights in this Encounter    03/18/22 0807 03/20/22 1416 03/23/22 0442   Weight: 116.7 kg (257 lb 4.4 oz) 117.4 kg (258 lb 13.1 oz) 118.9 kg (262 lb 1.6 oz)       No results for input(s): PHI, PHI, POC2, PCO2I, PO2, PO2I, HCO3, HCO3I, FIO2, FIO2I in the last 72 hours.     Physical Exam:     General: in no respiratory distress, chronically ill looking, on O2 via NC  HEENT: PERRL, throat dry without erythema or exudate  Neck: No abnormally enlarged lymph nodes or thyroid, supple  Chest: obese chest wall  Lungs: moderate air entry, few rhonchi scattered to auscultation bilaterally, normal percussion anterior chest wall bilaterally, no tenderness/ rash; exam limitation  Heart: Regular rate and rhythm, S1S2 present, or without murmur or extra heart sounds  Abdomen: obese, bowel sounds normoactive, tympanic, abdomen is soft without significant tenderness, masses, organomegaly or guarding, rigidity, rebound  Extremity: Trace and bl LE edema, no cyanosis; peripheral pulses 2+ and symmetric  Skin: Skin color, texture, turgor fair      Data:       Recent Results (from the past 24 hour(s))   GLUCOSE, POC    Collection Time: 03/22/22  4:34 PM   Result Value Ref Range    Glucose (POC) 126 (H) 70 - 110 mg/dL   CBC WITH AUTOMATED DIFF    Collection Time: 03/23/22  5:03 AM   Result Value Ref Range    WBC 7.9 4.6 - 13.2 K/uL    RBC 2.58 (L) 4.20 - 5.30 M/uL    HGB 7.7 (L) 12.0 - 16.0 g/dL    HCT 25.3 (L) 35.0 - 45.0 %    MCV 98.1 78.0 - 100.0 FL    MCH 29.8 24.0 - 34.0 PG    MCHC 30.4 (L) 31.0 - 37.0 g/dL    RDW 15.2 (H) 11.6 - 14.5 %    PLATELET 409 (H) 924 - 420 K/uL    MPV 10.6 9.2 - 11.8 FL    NRBC 0.9 (H) 0  WBC    ABSOLUTE NRBC 0.07 (H) 0.00 - 0.01 K/uL    NEUTROPHILS 68 40 - 73 %    LYMPHOCYTES 17 (L) 21 - 52 %    MONOCYTES 10 3 - 10 %    EOSINOPHILS 1 0 - 5 %    BASOPHILS 0 0 - 2 %    IMMATURE GRANULOCYTES 4 (H) 0.0 - 0.5 %    ABS. NEUTROPHILS 5.4 1.8 - 8.0 K/UL    ABS. LYMPHOCYTES 1.3 0.9 - 3.6 K/UL    ABS. MONOCYTES 0.8 0.05 - 1.2 K/UL    ABS. EOSINOPHILS 0.1 0.0 - 0.4 K/UL    ABS. BASOPHILS 0.0 0.0 - 0.1 K/UL    ABS. IMM. GRANS. 0.3 (H) 0.00 - 0.04 K/UL    DF AUTOMATED     METABOLIC PANEL, BASIC    Collection Time: 03/23/22  5:03 AM   Result Value Ref Range    Sodium 150 (H) 136 - 145 mmol/L    Potassium 3.9 3.5 - 5.5 mmol/L    Chloride 113 (H) 100 - 111 mmol/L    CO2 38 (H) 21 - 32 mmol/L    Anion gap NEG 1 3.0 - 18 mmol/L    Glucose 119 (H) 74 - 99 mg/dL     (H) 7.0 - 18 MG/DL    Creatinine 1.36 (H) 0.6 - 1.3 MG/DL    BUN/Creatinine ratio 87 (H) 12 - 20      GFR est AA 45 (L) >60 ml/min/1.73m2    GFR est non-AA 37 (L) >60 ml/min/1.73m2    Calcium 10.2 (H) 8.5 - 10.1 MG/DL   GLUCOSE, POC    Collection Time: 03/23/22  6:51 AM   Result Value Ref Range    Glucose (POC) 113 (H) 70 - 110 mg/dL   GLUCOSE, POC    Collection Time: 03/23/22 11:31 AM   Result Value Ref Range    Glucose (POC) 117 (H) 70 - 110 mg/dL         Chemistry Recent Labs     03/23/22  0503 03/22/22  0440 03/21/22  1411 03/21/22  0300   * 114* 140*  --    * 152* 151*  --    K 3.9 4.0 3.9  --    * 113* 113*  --    CO2 38* 34* 37*  --    * 120* 129*  --    CREA 1.36* 1.57* 1.78*  --    CA 10.2* 10.4* 10.8*  --    MG  --   --  2.5 2.5   AGAP NEG 1 5 1*  --    BUCR 87* 76* 72*  --         Lactic Acid Lactic acid   Date Value Ref Range Status   03/17/2022 1.3 0.4 - 2.0 MMOL/L Final     No results for input(s): LAC in the last 72 hours.      Liver Enzymes Protein, total   Date Value Ref Range Status   03/19/2022 6.4 6.4 - 8.2 g/dL Final     Albumin   Date Value Ref Range Status   03/19/2022 2.7 (L) 3.4 - 5.0 g/dL Final     Globulin   Date Value Ref Range Status   03/19/2022 3.7 2.0 - 4.0 g/dL Final     A-G Ratio   Date Value Ref Range Status   03/19/2022 0.7 (L) 0.8 - 1.7   Final     Alk. phosphatase   Date Value Ref Range Status   03/19/2022 182 (H) 45 - 117 U/L Final     No results for input(s): TP, ALB, GLOB, AGRAT, AP, TBIL in the last 72 hours. No lab exists for component: SGOT, GPT, DBIL     CBC w/Diff Recent Labs     03/23/22  0503 03/22/22  0440 03/21/22  1411   WBC 7.9 8.8 9.6   RBC 2.58* 2.77* 2.88*   HGB 7.7* 8.6* 8.7*   HCT 25.3* 27.2* 27.3*   * 431* 393   GRANS 68 73 76*   LYMPH 17* 14* 12*   EOS 1 2 1        Cardiac Enzymes No results found for: CPK, CK, CKMMB, CKMB, RCK3, CKMBT, CKNDX, CKND1, FARA, TROPT, TROIQ, ADOLFO, TROPT, TNIPOC, BNP, BNPP     BNP No results found for: BNP, BNPP, XBNPT     Coagulation No results for input(s): PTP, INR, APTT, INREXT, INREXT in the last 72 hours. Thyroid  Lab Results   Component Value Date/Time    TSH 0.38 03/16/2022 04:35 AM       No results found for: T4     Urinalysis Lab Results   Component Value Date/Time    Color YELLOW 03/02/2022 09:04 PM    Appearance CLOUDY 03/02/2022 09:04 PM    Specific gravity 1.021 03/02/2022 09:04 PM    pH (UA) 5.0 03/02/2022 09:04 PM    Protein 300 (A) 03/02/2022 09:04 PM    Glucose Negative 03/02/2022 09:04 PM    Ketone TRACE (A) 03/02/2022 09:04 PM    Bilirubin Negative 03/02/2022 09:04 PM    Urobilinogen 1.0 03/02/2022 09:04 PM    Nitrites Negative 03/02/2022 09:04 PM    Leukocyte Esterase Negative 03/02/2022 09:04 PM    Epithelial cells 2+ 03/02/2022 09:04 PM    Bacteria FEW (A) 03/02/2022 09:04 PM    WBC 0 to 3 03/02/2022 09:04 PM    RBC 0 to 3 03/02/2022 09:04 PM        Micro  No results for input(s): SDES, CULT in the last 72 hours. No results for input(s): CULT in the last 72 hours. Culture data during this hospitalization.    All Micro Results     Procedure Component Value Units Date/Time    CULTURE, BLOOD [[id number]] Collected: 03/14/22 5477    Order Status: Completed Specimen: Blood Updated: 03/20/22 0739     Special Requests: NO SPECIAL REQUESTS        Culture result: NO GROWTH 6 DAYS       CULTURE, BLOOD [[id number]] Collected: 03/14/22 0420    Order Status: Completed Specimen: Blood Updated: 03/20/22 0739     Special Requests: NO SPECIAL REQUESTS        Culture result: NO GROWTH 6 DAYS       CULTURE, RESPIRATORY/SPUTUM/BRONCH W GRAM STAIN [[id number]]  (Abnormal)  (Susceptibility) Collected: 03/10/22 1630    Order Status: Completed Specimen: Sputum from Endotracheal aspirate Updated: 03/13/22 0905     Special Requests: NO SPECIAL REQUESTS        GRAM STAIN NO WBC'S SEEN         NO EPITHELIAL CELLS SEEN         NO ORGANISMS SEEN        Culture result:       LIGHT ENTEROBACTER CLOACAE COMPLEX                  HEAVY NORMAL RESPIRATORY ELSIE          CULTURE, RESPIRATORY/SPUTUM/BRONCH [place] Max[place] [[id number]] Collected: 03/10/22 1915    Order Status: Canceled Specimen: Sputum from Endotracheal aspirate     CULTURE, BLOOD [[id number]] Collected: 03/02/22 2048    Order Status: Completed Specimen: Blood Updated: 03/08/22 0655     Special Requests: NO SPECIAL REQUESTS        Culture result: NO GROWTH 6 DAYS       CULTURE, BLOOD [[id number]] Collected: 03/02/22 2048    Order Status: Completed Specimen: Blood Updated: 03/08/22 0655     Special Requests: NO SPECIAL REQUESTS        Culture result: NO GROWTH 6 DAYS       CULTURE, URINE [[id number]]  (Abnormal)  (Susceptibility) Collected: 03/02/22 2104    Order Status: Completed Specimen: Cath Urine Updated: 03/05/22 1226     Special Requests: NO SPECIAL REQUESTS        [place] Count --        >100,000  COLONIES/mL       Culture result: KLEBSIELLA PNEUMONIAE       COVID-19 RAPID TEST [[id number]] Collected: 03/02/22 2025    Order Status: Completed Specimen: Nasopharyngeal Updated: 03/02/22 2052     Specimen source Nasopharyngeal        COVID-19 rapid test Not detected        Comment: Rapid Abbott ID Now Rapid NAAT:  The specimen is NEGATIVE for SARS-CoV-2, the novel coronavirus associated with COVID-19. Negative results should be treated as presumptive and, if inconsistent with clinical signs and symptoms or necessary for patient management, should be tested with an alternative molecular assay. Negative results do not preclude SARS-CoV-2 infection and should not be used as the sole basis for patient management decisions. This test has been authorized by the FDA under an Emergency Use Authorization (EUA) for use by authorized laboratories. Fact sheet for Healthcare Providers: [employer] Systems[employer].co[employer]nz  Fact sheet for Patients: [employer]atchaf[employer]ate.co.nz       Methodology: Isothermal Nucleic Acid Amplification         INFLUENZA A & B AG (RAPID TEST) [[id number]] Collected: 03/02/22 2025    Order Status: Completed Specimen: Nasopharyngeal from Nasal washing Updated: 03/02/22 2047     Influenza A Antigen Negative        Comment: A negative result does not exclude influenza virus infection, seasonal or H1N1 due to suboptimal sensitivity. If influenza is circulating in your community, a diagnosis of influenza should be considered based on a patients clinical presentation and empiric antiviral treatment should be considered, if indicated. Influenza B Antigen Negative                NM LUNG SCAN PERF  Result Date: 3/3/2022  EXAM: NM LUNG SCAN PERF. CLINICAL INDICATION/HISTORY: d dimer increase dyspnea. -Additional: Clinical concern for pulmonary embolus. COMPARISON: Correlation is made with the radiographic study performed one day prior. TECHNIQUE: 7.2 mCi Tc-99m MAA was injected intravenously and the 8 standard views of the chest were obtained. This perfusion only examination is interpreted using the PISAPED criteria. _______________ FINDINGS: Perfusion images show no segmental perfusion defects to suggest the presence of pulmonary embolism.  _______________     Kam Perches scintigraphic findings to suggest the presence of acute pulmonary embolism. _______________       XR HIP RT W OR WO PELV 2-3 VWS  Result Date: 3/7/2022  EXAM: RIGHT HIP RADIOGRAPHS CLINICAL INDICATION/HISTORY: right hip pain -Additional: None COMPARISON: May March 3, 2022. TECHNIQUE: AP pelvis and frog-leg lateral view of right hip. _______________ FINDINGS: BONES: Intact appearing ilioischial and iliopectineal lines. There is a displaced and impacted subcapital right femoral neck fracture, with mild progressive displacement on follow-up imaging. Mild-moderate bilateral hip joint arthrosis. SOFT TISSUES: Unremarkable. _______________     1. Displaced and impacted subcapital right femoral neck fracture, increased in displacement from CT performed 4 days prior. CT HEAD WO CONT  Result Date: 3/7/2022  EXAM: CT head INDICATION: Altered mental status. COMPARISON: 4/23/2021. TECHNIQUE: Axial CT imaging of the head was performed without intravenous contrast. Standard multiplanar coronal and sagittal reformatted images were obtained and are included in interpretation. One or more dose reduction techniques were used on this CT: automated exposure control, adjustment of the mAs and/or kVp according to patient size, and iterative reconstruction techniques. The specific techniques used on this CT exam have been documented in the patient's electronic medical record. Digital Imaging and Communications in Medicine (DICOM) format image data are available to nonaffiliated external healthcare facilities or entities on a secure, media free, reciprocally searchable basis with patient authorization for at least a 12-month period after this study. _______________ FINDINGS: BRAIN AND POSTERIOR FOSSA: Periventricular white matter hypoattenuation which is nonspecific but likely represents chronic ischemic changes. No evidence of acute large vessel transcortical infarct or acute parenchymal hemorrhage.  No midline shift or hydrocephalus. EXTRA-AXIAL SPACES AND MENINGES: There are no abnormal extra-axial fluid collections. CALVARIUM: Intact. SINUSES: Clear. OTHER: None. _______________     No acute intracranial abnormality. CT CHEST ABD PELV WO CONT  Result Date: 3/3/2022  EXAM: CT CHEST, ABDOMEN AND PELVIS CLINICAL INDICATION/HISTORY: Hypoxemia, retrocardiac density, diarrhea, weakness and shortness of breath COMPARISON: 3/2/2022 TECHNIQUE: Axial CT imaging of the chest, abdomen, and pelvis was performed without intravenous contrast. Multiplanar reformats were generated. One or more dose reduction techniques were used on this CT: automated exposure control, adjustment of the mAs and/or kVp according to patient size, and iterative reconstruction techniques. The specific techniques used on this CT exam have been documented in the patient's electronic medical record. Digital Imaging and Communications in Medicine (DICOM) format image data are available to nonaffiliated external healthcare facilities or entities on a secure, media free, reciprocally searchable basis with patient authorization for at least a 12-month period after this study. ________________ FINDINGS: LIMITATIONS:   > Suboptimal evaluation given lack of intravenous contrast.   > Motion artifact is present which limits evaluation.   > Suboptimal exam due to patient body habitus and arms by the side. CHEST: LUNGS: Respiratory motion significantly limits evaluation. Interlobar septal thickening in the upper lobes. Mild bilateral dependent atelectasis. No large consolidation or suspicious pulmonary mass. PLEURA: Very small volume bilateral pleural effusions. AIRWAY: Normal. MEDIASTINUM: Four-chamber cardiomegaly with asymmetric biatrial enlargement, mitral and aortic annular calcifications. The main pulmonary artery is enlarged suggesting pulmonary arterial hypertension. Normal caliber aorta with scattered calcified atherosclerotic plaque. No pericardial effusion. LYMPH NODES: No enlarged lymph nodes. CHEST WALL: Diffuse anasarca. Heterogeneous thyroid. _______________ ABDOMEN/PELVIS: LIVER: No enhancing hepatic mass. The portal and hepatic veins are patent. BILIARY: Gallstones are present in the nondistended gallbladder lumen. No biliary dilation. SPLEEN: Normal. PANCREAS: Normal. ADRENALS: Left adrenal gland measures 9 HU (axial image 76), most consistent with lipid rich adenoma. Normal right adrenal gland. KIDNEYS: Asymmetrically small left kidney. No rate of a stone. No hydronephrosis. GI TRACT:  A small hiatal hernia is present. Normal caliber small and large bowel loops. No morphology of bowel obstruction. Normal appendix. BLADDER: Diffuse wall thickening in the largely decompressed bladder. PELVIC ORGANS: No acute abnormality. VASCULATURE: No arterial aneurysm. 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 LYMPH NODES: No mesenteric or retroperitoneal lymphadenopathy. OTHER: No free intraperitoneal air. No ascites. Diffuse anasarca. OSSEOUS STRUCTURES: No acute osseous abnormality. Multilevel degenerative changes most pronounced in the lumbar spine. Degenerative changes in both shoulders (right greater than left). Please note the included portions of the upper extremities are not well evaluated on this study _______________     1. Findings of congestive heart failure with cardiomegaly, interstitial edema, very small bilateral pleural effusions, and diffuse anasarca. 2.  Bladder wall thickening may be due to underdistention though superimposed infection cannot be excluded. Recommend correlation for cystitis. 3.  Osseous degenerative changes and additional findings, as detailed in the body of the report. Hip x-ray 3/11/2022:  Status post total right hip arthroplasty, without complication. ECHO ADULT COMPLETE  Result Date: 3/3/2022    Left Ventricle: Left ventricle size is normal. Moderately increased wall thickness.  Findings consistent with moderate concentric hypertrophy. Normal wall motion. Normal left ventricular systolic function with a visually estimated EF of 55 - 60%.   Left Atrium: Left atrium is mildly dilated.   Right Ventricle: Right ventricle is mildly dilated.   Right Atrium: Right atrium is mildly dilated.   Pulmonary artery :  Estimated pulmonary arterial systolic pressure is 51 mmhg. Pulmonary hypertension found to be moderate   Mitral Valve: Moderately thickened leaflet. Mildly calcified leaflet. Moderate annular calcification of the mitral valve.   IVC/SVC : IVC diameter is greater than 21 mm and decreases less than 50% during inspiration; therefore the estimated right atrial pressure is elevated (~15 mmHg). IVC is dilated. Consider LORRAINE for better evaluation of mitral valve if clinically indicated. DUPLEX LOWER EXT VENOUS BILAT  Result Date: 3/4/2022  · No evidence of deep vein thrombosis in the right lower extremity. · No evidence of deep vein thrombosis in the left lower extremity. USG retroperitoneum 3/12/2022: No hydronephrosis. ECHO 3/3/2022: Result status: Final result       Left Ventricle: Left ventricle size is normal. Moderately increased wall thickness. Findings consistent with moderate concentric hypertrophy. Normal wall motion. Normal left ventricular systolic function with a visually estimated EF of 55 - 60%.   Left Atrium: Left atrium is mildly dilated.   Right Ventricle: Right ventricle is mildly dilated.   Right Atrium: Right atrium is mildly dilated.   Pulmonary artery :  Estimated pulmonary arterial systolic pressure is 51 mmhg. Pulmonary hypertension found to be moderate    Mitral Valve: Moderately thickened leaflet. Mildly calcified leaflet. Moderate annular calcification of the mitral valve.   IVC/SVC : IVC diameter is greater than 21 mm and decreases less than 50% during inspiration; therefore the estimated right atrial pressure is elevated (~15 mmHg).  IVC is dilated.     Consider LORRAINE for better evaluation of mitral valve if clinically indicated. Images report reviewed by me:  CT (Most Recent) (CT chest reviewed by me) Results from Hospital Encounter encounter on 03/02/22    CT HEAD WO CONT    Narrative  EXAM: CT of the brain without intravenous contrast.    INDICATION:  \"AMS. \"    COMPARISON:  CT March 11, 2022. DOSE REDUCTION AND DICOM INFORMATION: One or more dose reduction techniques were  used on this CT: automated exposure control, adjustment of the mAs and/or kVp  according to patient size, and iterative reconstruction techniques. The  specific techniques used on this CT exam have been documented in the patient's  electronic medical record. Digital Imaging and Communications in Medicine  (DICOM) format image data are available to nonaffiliated external healthcare  facilities or entities on a secure, media free, reciprocally searchable basis  with patient authorization for at least a 12-month period after this study. _______________    FINDINGS:    IMAGE QUALITY:  Diagnostic. BRAIN AND EXTRA-AXIAL SPACE:    SUBACUTE TERRITORIAL TRANSCORTICAL INFARCT:  None detected. MASS:  None detected. HEMORRHAGE:  None. SUBDURAL FLUID COLLECTION:  None detected. HYDROCEPHALUS:  None. REMOTE  TERRITORIAL CEREBRAL INFARCT:  None detected. REMOTE CEREBELLAR INFARCT:  None detected. STRIVE (STandards for Reporting Vascular changes on nEuroimaging):  --[place] of white matter hypodensity  (\"leukoaraiosis\") of presumed vascular origin:  Low-grade. --[place] of lacunes of presumed vascular origin  (often undetectable on CT):  None definite. --Degree of brain atrophy:  Moderate. BURDEN OF CALCIFIC INTRACRANIAL ATHEROSCLEROSIS:   Moderate. HEENT:    INCLUDED UPPER ORBITS:  There are bilateral lens replacements. INCLUDED UPPER PARANASAL SINUSES:  Predominantly clear. MASTOID AIR CELLS:  Predominantly clear.     BONES: Unremarkable. SUPERFICIAL SOFT TISSUES: Unremarkable.    _______________    Impression  Generalized chronic changes, however, no mass or acute findings. _______________         CXR reviewed by me:  XR (Most Recent). CXR  reviewed by me and compared with previous CXR Results from Hospital Encounter encounter on 03/02/22    XR CHEST PORT    Narrative  CLINICAL HISTORY:  Short of breath. COMPARISONS:  Chest x-ray 3/19/2022, and earlier studies. TECHNIQUE:  single frontal view of the chest    ------------------------------------------    FINDINGS:    Lungs:  Somewhat unusual air interface at the right lung base, present to  varying degrees on prior x-rays and suspect reflects right middle lobe  atelectasis. No other evidence of pneumothorax. Streaky opacity at left lung  base, likely atelectasis, not appreciably changed. Mild central bronchial wall  thickening, likely bronchitis or asthma. Probable tiny left pleural effusion, not appreciably changed. Mediastinum: Moderate to severe cardiomegaly. Atherosclerotic arterial  calcification. Bones: Scoliosis. Mild to moderate thoracic degenerative disc disease.      ------------------------------------------    Impression  1. Probable partial right middle lobe atelectasis, similar to most recent prior  radiograph. 2. Streaky opacity at left lung base, likely atelectasis, though  bronchopneumonia or aspiration not excluded. CXR 3/13/2022:  FINDINGS:  SUPPORT DEVICES: Endotracheal tube and visualized gastric tube both project in  stable position from prior exam.     HEART AND MEDIASTINUM: Enlarged appearing cardiac silhouette with stable  mediastinal contours.     LUNGS AND PLEURAL SPACES: Faint/hazy right lower lung zone opacity. No  pneumothorax or pleural effusion demonstrated.     BONY THORAX AND SOFT TISSUES: Unremarkable.  ______________     IMPRESSION  1. Support devices in stable position.   2. Mild interval increase in right basilar atelectasis and likely small right  pleural effusion. 3. Cardiomegaly, as previously. ·Please note: Voice-recognition software may have been used to generate this report, which may have resulted in some phonetic-based errors in grammar and contents. Even though attempts were made to correct all the mistakes, some may have been missed, and remained in the body of the document.       Triny Nugent MD  3/23/2022

## 2022-03-23 NOTE — PROGRESS NOTES
Problem: Pressure Injury - Risk of  Goal: *Prevention of pressure injury  Description: Document Capo Scale and appropriate interventions in the flowsheet. Outcome: Progressing Towards Goal  Note: Pressure Injury Interventions:  Sensory Interventions: Assess changes in LOC    Moisture Interventions: Absorbent underpads    Activity Interventions: Pressure redistribution bed/mattress(bed type)    Mobility Interventions: HOB 30 degrees or less    Nutrition Interventions: Document food/fluid/supplement intake    Friction and Shear Interventions: HOB 30 degrees or less                Problem: Patient Education: Go to Patient Education Activity  Goal: Patient/Family Education  Outcome: Progressing Towards Goal     Problem: Falls - Risk of  Goal: *Absence of Falls  Description: Document Andrei Fall Risk and appropriate interventions in the flowsheet.   Outcome: Progressing Towards Goal  Note: Fall Risk Interventions:  Mobility Interventions: Bed/chair exit alarm    Mentation Interventions: Bed/chair exit alarm    Medication Interventions: Bed/chair exit alarm    Elimination Interventions: Bed/chair exit alarm    History of Falls Interventions: Consult care management for discharge planning         Problem: Patient Education: Go to Patient Education Activity  Goal: Patient/Family Education  Outcome: Progressing Towards Goal     Problem: Patient Education: Go to Patient Education Activity  Goal: Patient/Family Education  Outcome: Progressing Towards Goal     Problem: Patient Education: Go to Patient Education Activity  Goal: Patient/Family Education  Outcome: Progressing Towards Goal

## 2022-03-23 NOTE — ROUTINE PROCESS
Bedside shift change report given to Jasen Oneal RN (oncoming nurse) by Tsering Collins RN (offgoing nurse). Report included the following information SBAR, Procedure Summary, Intake/Output, MAR and Recent Results.

## 2022-03-23 NOTE — PROGRESS NOTES
.Bedside and Verbal shift change report given to Maritza Yo RN (oncoming nurse) by this nurse, Judy Cornell (offgoing nurse). Report included the following information SBAR, Kardex and MAR.

## 2022-03-23 NOTE — PROGRESS NOTES
D/C Plan: Discharge home today with [facility] via medical transportation at 7:15 PM.   RN aware and asked to ensure the new full face mask for the patient's home  Bipap goes home with the patient (it was delivered today by [facility]). .     CM notes patient tolerated her home Bipap overnight. CM has ordered full face mask per pulmonology recommendation, this will be delivered by [facility]. Home O2 arranged with portable tanks for travel. Hospital bed with gel overlay and sunitha lift to be delivered by [facility]. CM added MSW to [facility] order to assist family with accessing community resources to aid in the care of the patient. CM spoke with patient's daughter and caretaker, and reviewed patient's care challenges, including that patient needs 2-3 people to turn her to change, patient is bedbound, patient can not hold herself upright so wheelchair is unsafe, patient will need transport set up by family to take her to appointments. CM offered to order a medical social worker with [facility], patient declined at this time but stated she knows how to get one if she needs it. Patient has not heard when medical equipment will be delivered. CM has set up a tentative transport for 1700 if all equipment is delivered in time today. CM verified address with patient's daughter. CM suggested daughter come up to the hospital to participate in care of her mother so she knows what to expect at home. CM made daughter aware that at anytime prior to discharge if she changes her mind and chooses for her mother to go to a SNF or change to hospice, that those options are available. CM informed daughter that if the patient declines at home and the family wants to move her to hospice, that option is available through her PCP. CM spoken with daughter to review equipment that will be delivered. Family wants patient's prescriptions filled at [facility].      [facility] called, they have a tech coming to the hospital to check out he patient's Bipap and drop off the full face mask for home around 11:30-12:30 PM. [facility] will update CM when delivery time of equipment is confirmed. CM received update that patient equipment would be delivered \"around 5 PM.\" CM called [facility]  to reschedule transport. Patient will now be picked up by transport at 7:15 PM.     [facility] pharmacy can not fill all the meds. Discharging physican approved for patient to go ome as the elequis, fursomide and aspirin were able to be picked up, and the patient will get her Brovana and midodrine prior to discharge as per physician order. Per physician, the [place] and midodrine prescriptions will be sent to the patient's home pharmacy, [address] [place] [address]. Care Management Interventions  PCP Verified by CM: Yes  Mode of Transport at Discharge: BLS (.)  Transition of Care Consult (CM Consult): SNF [facility] has stated that according to the current therapy notes, she is not Acute inpt rehab appropriate. SNF choices obtained from the pt's dtr;  [facility]. )  Partner SNF: No  Discharge Durable Medical Equipment:  (TBD)  Physical Therapy Consult: Yes  Occupational Therapy Consult: Yes  Support Systems: Child(doc)  Confirm Follow Up Transport: Family  The Plan for Transition of Care is Related to the Following Treatment Goals : skilled nursing facility  The Patient and/or Patient Representative was Provided with a Choice of Provider and Agrees with the Discharge Plan?: Yes (The pt and her dtr: Jacek Mcnally)  Freedom of Choice List was Provided with Basic Dialogue that Supports the Patient's Individualized Plan of Care/Goals, Treatment Preferences and Shares the Quality Data Associated with the Providers?: Yes ([facility])  Discharge Location  Patient Expects to be Discharged to[de-identified]  (discharge home with [facility] and physician follow up)

## 2022-03-24 RX ORDER — NEBULIZER AND COMPRESSOR
1 EACH MISCELLANEOUS
Qty: 1 EACH | Refills: 0 | Status: SHIPPED | OUTPATIENT
Start: 2022-03-24

## 2022-03-24 NOTE — PROGRESS NOTES
Bedside and Verbal shift change report given to Nayeli Willoughby RN (oncoming nurse) by Vandana Timmons RN (offgoing nurse). Report included the following information SBAR, Kardex, ED Summary, Intake/Output, MAR, Recent Results, Med Rec Status and Cardiac Rhythm NSR.       2130  [facility] here to  patient   purewick taken off, IV removed, cardiac monitor removed    [address] [place] [address] Cardiac/Medical Night Shift Chart Audit    Chart Audit completed?  YES    2200  Pt off floor with [facility] and family present

## 2022-03-24 NOTE — PROGRESS NOTES
CM contacted by physician who would liekt o order a nebulizer for patient. CM contacted [facility], awating the order form from [facility] the nebulizer. CM updated the patient's daughter that CM was working on order for nebulizer. Family expressed appreciation.

## 2022-05-05 ENCOUNTER — HOSPITAL ENCOUNTER (OUTPATIENT)
Dept: WOUND CARE | Age: 82
Discharge: HOME | End: 2022-05-05
Attending: HOSPITALIST
Payer: MEDICARE

## 2022-05-05 VITALS
HEART RATE: 96 BPM | OXYGEN SATURATION: 100 % | TEMPERATURE: 97.6 F | SYSTOLIC BLOOD PRESSURE: 125 MMHG | DIASTOLIC BLOOD PRESSURE: 67 MMHG | RESPIRATION RATE: 18 BRPM

## 2022-05-05 PROCEDURE — 11042 DBRDMT SUBQ TIS 1ST 20SQCM/<: CPT

## 2022-05-06 NOTE — WOUND CARE
05/05/22 1403   Wound Groin Right 05/05/22   Date First Assessed/Time First Assessed: 05/05/22 1403   Present on Hospital Admission: Yes  Primary Wound Type: Open incision/surgical site  Location: Groin  Wound Location Orientation: Right  Date of First Observation: 05/05/22   Wound Image     Wound Etiology Non-Healing Surgical   Dressing Status Breakthrough drainage noted; Old drainage noted   Cleansed Wound cleanser   Dressing/Treatment Alginate with Ag;Collagen with Ag;Silicone border   Dressing Change Due 05/12/22   Wound Length (cm) 4.5 cm   Wound Width (cm) 1.5 cm   Wound Depth (cm) 3.3 cm   Wound Surface Area (cm^2) 6.75 cm^2   Wound Volume (cm^3) 22.275 cm^3   Post-Procedure Length (cm) 5 cm   Post-Procedure Width (cm) 2 cm   Post-Procedure Depth (cm) 3.8 cm   Post-Procedure Surface Area (cm^2) 10 cm^2   Post-Procedure Volume (cm^3) 38 cm^3   Undermining Starts ___ O'Clock 1 o'clock   Undermining Ends ___ O'Clock 3 o'clock   Undermining Maximum Distance (cm) 1.5 cm   Wound Assessment Granulation tissue;Eschar moist   Drainage Amount Moderate   Drainage Description Serosanguinous   Wound Odor Malodorous/Putrid   Mayela-Wound/Incision Assessment Blanchable erythema; Intact   Wound Thickness Description Full thickness   Wound Buttocks Left 05/05/22   Date First Assessed/Time First Assessed: 05/05/22 1403   Present on Hospital Admission: Yes  Wound Approximate Age at First Assessment (Weeks): 3 weeks  Primary Wound Type: Pressure Injury  Location: Buttocks  Wound Location Orientation: Left  Date of. ..    Wound Image    Wound Etiology Pressure Stage 3   Dressing Status Intact   Cleansed Wound cleanser   Dressing/Treatment Alginate with Ag;Collagen with Ag;Silicone border   Dressing Change Due 05/12/22   Wound Length (cm) 4.5 cm   Wound Width (cm) 3.8 cm   Wound Depth (cm) 0.3 cm   Wound Surface Area (cm^2) 17.1 cm^2   Wound Volume (cm^3) 5.13 cm^3   Wound Assessment Devitalized tissue;Fibrinous;Pink/red   Drainage Amount Moderate   Drainage Description Serosanguinous   Wound Odor None   Mayela-Wound/Incision Assessment Blanchable erythema   Edges Attached edges   Wound Thickness Description Full thickness   Wound Sacral/coccyx   Date First Assessed/Time First Assessed: 05/05/22 1412   Present on Hospital Admission: Yes  Wound Approximate Age at First Assessment (Weeks): 2 weeks  Primary Wound Type: Pressure Injury  Location: Sacral/coccyx   Wound Image    Wound Etiology Pressure Stage 3   Dressing Status Intact; New drainage noted; Old drainage noted   Cleansed Wound cleanser   Dressing/Treatment Alginate with Ag;Collagen with Ag;Silicone border   Dressing Change Due 05/12/22   Wound Length (cm) 2 cm   Wound Width (cm) 2.5 cm   Wound Depth (cm) 0.3 cm   Wound Surface Area (cm^2) 5 cm^2   Wound Volume (cm^3) 1.5 cm^3   Wound Assessment Devitalized tissue;Fibrinous;Pink/red;Slough   Drainage Amount Small   Drainage Description Thick   Wound Odor Mild   Mayela-Wound/Incision Assessment Blanchable erythema; Intact   Edges Attached edges   Wound Thickness Description Full thickness

## 2022-05-09 NOTE — DISCHARGE INSTRUCTIONS
Discharge Instructions for  [address]  [address], [facility]  [phone] Fax [phone]    NAME:  Merle Hill  YOB: 1940  MEDICAL RECORD NUMBER:  [id number]  DATE:  5/5/2022    Wound Cleansing:   Do not scrub or use excessive force. Cleanse wound prior to applying a clean dressing with:  [] Normal Saline [] Keep Wound Dry in Shower    [x] Wound Cleanser   [] Cleanse wound with Mild Soap & Water  [] May Shower at Discharge   [] Other:      Topical Treatments:  Do not apply lotions, creams, or ointments to wound bed unless directed. [] Apply moisturizing lotion to skin surrounding the wound prior to dressing change.  [] Apply antifungal ointment to skin surrounding the wound prior to dressing change. [x] Apply thin film of moisture barrier ointment to skin immediately around wound. [] Other:       Dressings:           Wound Location ***   [x] Apply Primary Dressing:       [] MediHoney Gel [x] Alginate with Silver [] Alginate   [] Collagen [x] Collagen with Silver   [] Santyl with Moisten saline gauze     [] Hydrocolloid   [] MediHoney Alginate [] Foam with Silver   [] Foam   [] Hydrofera Blue    [] Mepilex Border    [] Moisten with Saline [] Hydrogel [] Mepitel     [] Bactroban/Mupirocin [] Polysporin  [] Other:    [] Pack wound loosely with  [] Iodoform   [] Plain Packing  [] Other   [x] Cover and Secure with:     [] Gauze [] Destiny [] Kerlix   [] Ace Wrap [] Cover Roll Tape [] ABD     [x] Other: silicone border   Avoid contact of tape with skin.   [x] Change dressing: [] Daily    [x] Every Other Day [] Three times per week   [] Once a week [] Do Not Change Dressing   [] Other:     Negative Pressure:           Wound Location:   [] Pressure@           mm/Hg  []Continuous []Intermittent   [] Black  [] White Foam [] Other:   []Change dressing: []Three times per week    []Other:     Pressure Relief:  [x] When sitting, shift position or do seat lifts every 15 minutes. [x] Wheelchair cushion [x] Specialty Bed/Mattress  [x] Turn every 2 hours when in bed. Avoid position directing pressure on wound site. Limit side lying to 30 degree tilt. Limit HOB elevation to 30 degrees. Edema Control:  Apply: [] Compression Stocking []Right Leg []Left Leg   [] Tubigrip []Right Leg Double Layer []Left Leg Double Layer       []Right Leg Single Layer []Left Leg Single Layer   [] SpandaGrip []Right Leg  []Left Leg      []Low compression 5-10 mm/Hg      []Medium compression 10-20 mm/Hg     []High compression  20-30 mm/Hg   every morning immediately when getting up should be applied to affected leg(s) from mid foot to knee making sure to cover the heel. Remove every night before going to bed. [] Elevate leg(s) above the level of the heart when sitting. [] Avoid prolonged standing in one place. [] Elevate arm/hand above the level of the heart []RightArm []LeftArm     Compression:  Apply: [] Multilayer Compression Wrap Applied in Clinic []RightLeg []Left Leg   [] Multi-layer compression. Do not get leg(s) with wrap wet. If wraps become too tight call the center or completely remove the wrap. [] Elevate leg(s) above the level of the heart when sitting. [] Avoid prolonged standing in one place. Off-Loading:   [] Off-loading when [] walking  [] in bed [] sitting  [] Total non-weight bearing  [] Right Leg  [] Left Leg   [x] Assistive Device [] Walker [] Cane  [x] Wheelchair  [] Crutches   [] Surgical shoe    [] Podus Boot(s)   [] Foam Boot(s)  [] Roll About    [] Cast Boot [] CROW Boot  [] Other:    Contact Cast:  Apply: [] Total Contact Cast Applied in Clinic []RightLeg []Left Leg   [] Do not get cast wet. Contact center or go to emergency room if there is a foul odor or becomes uncomfortable due to feeling tight or swelling. Do not use objects inside of cast to scratch.       Dietary:  [x] Diet as tolerated: [x] Calorie Diabetic Diet: [] No Added Salt:  [] Increase Protein: [] Other:   Activity:  [x] Activity as tolerated:  [x] Patient has no activity restrictions     [] Strict Bedrest: [] Remain off Work:     [] May return to full duty work:                                   [] Return to work with restrictions:             Return Appointment:  [x] Wound and dressing supply provider:   [] ECF or Home Healthcare:  [x] Wound Assessment: [x] Physician or NP scheduled for Wound Assessment:   [x] Return Appointment: 1 Week(s)  [] Ordered tests:      Electronically signed Lloyd Sandoval LPN on 2/5/5393 at 3:70 PM     [address] [place] [address] Information: Should you experience any significant changes in your wound(s) or have questions about your wound care, please contact the [address] [place] [address] at [address] [place] [address] 8:00 am - 4:30. If you need help with your wound outside these hours and cannot wait until we are again available, contact your PCP or go to the hospital emergency room. PLEASE NOTE: IF YOU ARE UNABLE TO OBTAIN WOUND SUPPLIES, CONTINUE TO USE THE SUPPLIES YOU HAVE AVAILABLE UNTIL YOU ARE ABLE TO REACH US. IT IS MOST IMPORTANT TO KEEP THE WOUND COVERED AT ALL TIMES.      Physician Signature:_______________________    Date: ___________ Time:  ____________

## 2022-05-11 ENCOUNTER — HOSPITAL ENCOUNTER (OUTPATIENT)
Dept: WOUND CARE | Age: 82
Discharge: HOME | End: 2022-05-11
Attending: HOSPITALIST
Payer: MEDICARE

## 2022-05-11 DIAGNOSIS — S71.001A OPEN WOUND OF RIGHT HIP, INITIAL ENCOUNTER: Primary | ICD-10-CM

## 2022-05-11 DIAGNOSIS — L89.43 PRESSURE INJURY OF CONTIGUOUS REGION INVOLVING BACK AND LEFT BUTTOCK, STAGE 3 (HCC): ICD-10-CM

## 2022-05-11 PROCEDURE — 11042 DBRDMT SUBQ TIS 1ST 20SQCM/<: CPT

## 2022-05-12 NOTE — DISCHARGE INSTRUCTIONS
Discharge Instructions for  Luis Corona  [address] [place], [facility]0 [facility]  [phone] Fax [phone]    NAME:  Reji Champagne  YOB: 1940  MEDICAL RECORD NUMBER:  [id number]  DATE:  5/11/2022    Wound Cleansing:   Do not scrub or use excessive force. Cleanse wound prior to applying a clean dressing with:  [] Normal Saline [] Keep Wound Dry in Shower    [] Wound Cleanser   [] Cleanse wound with Mild Soap & Water  [] May Shower at Discharge   [] Other:      Topical Treatments:  Do not apply lotions, creams, or ointments to wound bed unless directed. [] Apply moisturizing lotion to skin surrounding the wound prior to dressing change.  [] Apply antifungal ointment to skin surrounding the wound prior to dressing change.  [] Apply thin film of moisture barrier ointment to skin immediately around wound. [] Other:       Dressings:           Wound Location ***   [x] Apply Primary Dressing:       [] MediHoney Gel [] Alginate with Silver [] Alginate   [] Collagen [x] Collagen with Silver   [] Santyl with Moisten saline gauze     [] Hydrocolloid   [] MediHoney Alginate [] Foam with Silver   [] Foam   [] Hydrofera Blue    [] Mepilex Border    [] Moisten with Saline [] Hydrogel [] Mepitel     [] Bactroban/Mupirocin [] Polysporin  [] Other:    [] Pack wound loosely with  [] Iodoform   [] Plain Packing  [] Other   [] Cover and Secure with:     [] Gauze [] Destiny [] Kerlix   [] Ace Wrap [] Cover Roll Tape [] ABD     [] Other:    Avoid contact of tape with skin.   [x] Change dressing: [] Daily    [] Every Other Day [x] Three times per week   [] Once a week [] Do Not Change Dressing   [] Other:     Negative Pressure:           Wound Location:   [x] Pressure@           mm/Hg  []Continuous []Intermittent   [x] Black  [] White Foam [] Other:   []Change dressing: [x]Three times per week    []Other:     Pressure Relief:  [x] When sitting, shift position or do seat lifts every 15 minutes. [x] Wheelchair cushion [] Specialty Bed/Mattress  [x] Turn every 2 hours when in bed. Avoid position directing pressure on wound site. Limit side lying to 30 degree tilt. Limit HOB elevation to 30 degrees. Edema Control:  Apply: [] Compression Stocking []Right Leg []Left Leg   [] Tubigrip []Right Leg Double Layer []Left Leg Double Layer       []Right Leg Single Layer []Left Leg Single Layer   [] SpandaGrip []Right Leg  []Left Leg      []Low compression 5-10 mm/Hg      []Medium compression 10-20 mm/Hg     []High compression  20-30 mm/Hg   every morning immediately when getting up should be applied to affected leg(s) from mid foot to knee making sure to cover the heel. Remove every night before going to bed. [] Elevate leg(s) above the level of the heart when sitting. [] Avoid prolonged standing in one place. [] Elevate arm/hand above the level of the heart []RightArm []LeftArm     Compression:  Apply: [] Multilayer Compression Wrap Applied in Clinic []RightLeg []Left Leg   [] Multi-layer compression. Do not get leg(s) with wrap wet. If wraps become too tight call the center or completely remove the wrap. [] Elevate leg(s) above the level of the heart when sitting. [x] Avoid prolonged standing in one place. Off-Loading:   [] Off-loading when [] walking  [] in bed [] sitting  [] Total non-weight bearing  [] Right Leg  [] Left Leg   [x] Assistive Device [] Walker [] Cane  [x] Wheelchair  [] Crutches   [] Surgical shoe    [] Podus Boot(s)   [] Foam Boot(s)  [] Roll About    [] Cast Boot [] CROW Boot  [] Other:    Contact Cast:  Apply: [] Total Contact Cast Applied in Clinic []RightLeg []Left Leg   [] Do not get cast wet. Contact center or go to emergency room if there is a foul odor or becomes uncomfortable due to feeling tight or swelling. Do not use objects inside of cast to scratch.       Dietary:  [x] Diet as tolerated: [x] Calorie Diabetic Diet: [] No Added Salt:  [x] Increase Protein: [] Other:   Activity:  [x] Activity as tolerated:  [x] Patient has no activity restrictions     [] Strict Bedrest: [] Remain off Work:     [] May return to full duty work:                                   [] Return to work with restrictions:             Return Appointment:  [x] Wound and dressing supply provider:   [x] ECF or Home Healthcare:  [x] Wound Assessment: [x] Physician or NP scheduled for Wound Assessment:   [x] Return Appointment: 3 Week(s)  [] Ordered tests:      Electronically signed Jamir Mott LPN on 4/76/4919 at [facility] [address]: Should you experience any significant changes in your wound(s) or have questions about your wound care, please contact the [address] [place] [address] at [address] [place] [address] 8:00 am - 4:30. If you need help with your wound outside these hours and cannot wait until we are again available, contact your PCP or go to the hospital emergency room. PLEASE NOTE: IF YOU ARE UNABLE TO OBTAIN WOUND SUPPLIES, CONTINUE TO USE THE SUPPLIES YOU HAVE AVAILABLE UNTIL YOU ARE ABLE TO REACH US. IT IS MOST IMPORTANT TO KEEP THE WOUND COVERED AT ALL TIMES.      Physician Signature:_______________________    Date: ___________ Time:  ____________

## 2022-05-12 NOTE — WOUND CARE
Discharge Instructions for  [address]ona  [address], [facility]0 [facility]  [phone] Fax [phone]    NAME:  Kaden Mitchell  YOB: 1940  MEDICAL RECORD NUMBER:  [id number]  DATE:  5/11/2022    Wound Cleansing:   Do not scrub or use excessive force. Cleanse wound prior to applying a clean dressing with:  [] Normal Saline [] Keep Wound Dry in Shower    [x] Wound Cleanser   [] Cleanse wound with Mild Soap & Water  [] May Shower at Discharge   [] Other:      Topical Treatments:  Do not apply lotions, creams, or ointments to wound bed unless directed. [] Apply moisturizing lotion to skin surrounding the wound prior to dressing change.  [] Apply antifungal ointment to skin surrounding the wound prior to dressing change. [x] Apply thin film of moisture barrier ointment to skin immediately around wound. [] Other:       Dressings:           Wound Location Right Hip, Buttock and Sacral   [x] Apply Primary Dressing:       [] MediHoney Gel [x] Alginate with Silver [] Alginate   [] Collagen [x] Collagen with Silver   [] Santyl with Moisten saline gauze     [] Hydrocolloid   [] MediHoney Alginate [] Foam with Silver   [] Foam   [] Hydrofera Blue    [] Mepilex Border    [] Moisten with Saline [] Hydrogel [] Mepitel     [] Bactroban/Mupirocin [] Polysporin  [x] Other:   Collagen Ag all wounds  [] Pack wound loosely with  [] Iodoform   [] Plain Packing  [x] Other to buttock and sacral wounds  [x] Cover and Secure with:     [x] Gauze [] Destiny [] Kerlix   [] Ace Wrap [] Cover Roll Tape [] ABD      [x] Other:secure with  Silicone sacral border to Sacral and buttock wounds. Avoid contact of tape with skin.   [x] Change dressing: [] Daily    [] Every Other Day [x] Three times per week   [] Once a week [] Do Not Change Dressing   [] Other:     Negative Pressure:           Wound Location:   [x] Pressure@ 125 mm/Hg  [x]Continuous []Intermittent   [x] Black  [] White Foam [] Other:   [x]Change dressing: [x]Three times per week    [x]Other: Right hip wound  Pressure Relief:  [] When sitting, shift position or do seat lifts every 15 minutes. [x] Wheelchair cushion [] Specialty Bed/Mattress  [] Turn every 2 hours when in bed. Avoid position directing pressure on wound site. Limit side lying to 30 degree tilt. Limit HOB elevation to 30 degrees. Edema Control:  Apply: [] Compression Stocking []Right Leg []Left Leg   [] Tubigrip []Right Leg Double Layer []Left Leg Double Layer       []Right Leg Single Layer []Left Leg Single Layer   [] SpandaGrip []Right Leg  []Left Leg      []Low compression 5-10 mm/Hg      []Medium compression 10-20 mm/Hg     []High compression  20-30 mm/Hg   every morning immediately when getting up should be applied to affected leg(s) from mid foot to knee making sure to cover the heel. Remove every night before going to bed. [] Elevate leg(s) above the level of the heart when sitting. [] Avoid prolonged standing in one place. [] Elevate arm/hand above the level of the heart []RightArm []LeftArm     Compression:  Apply: [] Multilayer Compression Wrap Applied in Clinic []RightLeg []Left Leg   [] Multi-layer compression. Do not get leg(s) with wrap wet. If wraps become too tight call the center or completely remove the wrap. [] Elevate leg(s) above the level of the heart when sitting. [] Avoid prolonged standing in one place. Off-Loading:   [] Off-loading when [] walking  [] in bed [] sitting  [] Total non-weight bearing  [] Right Leg  [] Left Leg   [x] Assistive Device [] Walker [] Cane  [x] Wheelchair  [] Crutches   [] Surgical shoe    [] Podus Boot(s)   [] Foam Boot(s)  [] Roll About    [] Cast Boot [] CROW Boot  [] Other:    Contact Cast:  Apply: [] Total Contact Cast Applied in Clinic []RightLeg []Left Leg   [] Do not get cast wet.   Contact center or go to emergency room if there is a foul odor or becomes uncomfortable due to feeling tight or swelling. Do not use objects inside of cast to scratch. Dietary:  [x] Diet as tolerated: [x] Calorie Diabetic Diet: [] No Added Salt:  [x] Increase Protein: [] Other:   Activity:  [x] Activity as tolerated:  [x] Patient has no activity restrictions     [] Strict Bedrest: [] Remain off Work:     [] May return to full duty work:                                   [] Return to work with restrictions:             Return Appointment:  [x] Wound and dressing supply provider:   [x] ECF or Home Healthcare:  [x] Wound Assessment: [x] Physician or NP scheduled for Wound Assessment:   [x] Return Appointment: 3 Week(s)  [] Ordered tests:      Electronically signed David Camarena LPN on 0/67/2776 at 2:02 PM     Wilma Hod[address]: Should you experience any significant changes in your wound(s) or have questions about your wound care, please contact the [facility] at [address] [place] [address] 8:00 am - 4:30. If you need help with your wound outside these hours and cannot wait until we are again available, contact your PCP or go to the hospital emergency room. PLEASE NOTE: IF YOU ARE UNABLE TO OBTAIN WOUND SUPPLIES, CONTINUE TO USE THE SUPPLIES YOU HAVE AVAILABLE UNTIL YOU ARE ABLE TO REACH US. IT IS MOST IMPORTANT TO KEEP THE WOUND COVERED AT ALL TIMES.      Physician Signature:_Verbal order per Dr. Richie Yap______________________    Date: __05/12/2022_________ Time:  1620____________

## 2022-05-12 NOTE — WOUND CARE
05/11/22 1454   Wound Hip Right 05/05/22   Date First Assessed/Time First Assessed: 05/05/22 1403   Present on Hospital Admission: Yes  Primary Wound Type: Open incision/surgical site  Location: Hip  Wound Location Orientation: Right  Date of First Observation: 05/05/22   Wound Image      Wound Etiology Non-Healing Surgical   Dressing Status Breakthrough drainage noted   Cleansed Wound cleanser   Dressing/Treatment Alginate with Ag;Collagen with Ag;Negative Pressure Wound Therapy   Dressing Change Due 06/01/22   Wound Length (cm) 3 cm   Wound Width (cm) 0.8 cm   Wound Depth (cm) 2.7 cm   Wound Surface Area (cm^2) 2.4 cm^2   Change in Wound Size % 64.44   Wound Volume (cm^3) 6.48 cm^3   Wound Healing % 71   Undermining Starts ___ O'Clock 1 o'clock   Undermining Ends ___ O'Clock 2 o'clock   Undermining Maximum Distance (cm) 2 cm   Wound Assessment Slough   Drainage Amount Copious   Drainage Description Serosanguinous   Wound Odor Malodorous/Putrid   Mayela-Wound/Incision Assessment Blanchable erythema   Wound Thickness Description Full thickness   Wound Buttocks Left 05/05/22   Date First Assessed/Time First Assessed: 05/05/22 1403   Present on Hospital Admission: Yes  Wound Approximate Age at First Assessment (Weeks): 3 weeks  Primary Wound Type: Pressure Injury  Location: Buttocks  Wound Location Orientation: Left  Date of. ..    Wound Image     Wound Etiology Pressure Stage 3   Dressing Status Intact   Cleansed Wound cleanser   Dressing/Treatment Alginate with Ag;Collagen with Ag;Silicone border   Dressing Change Due 06/01/22   Wound Length (cm) 4 cm   Wound Width (cm) 3.5 cm   Wound Depth (cm) 0.3 cm   Wound Surface Area (cm^2) 14 cm^2   Change in Wound Size % 18.13   Wound Volume (cm^3) 4.2 cm^3   Wound Healing % 18   Wound Assessment Devitalized tissue   Drainage Amount Moderate   Drainage Description Serosanguinous   Wound Odor None   Mayela-Wound/Incision Assessment Blanchable erythema   Edges Attached edges Wound Thickness Description Full thickness   Wound Sacral/coccyx   Date First Assessed/Time First Assessed: 05/05/22 1412   Present on Hospital Admission: Yes  Wound Approximate Age at First Assessment (Weeks): 2 weeks  Primary Wound Type: Pressure Injury  Location: Sacral/coccyx   Wound Image      Wound Etiology Pressure Stage 3   Dressing Status Intact; New drainage noted   Cleansed Wound cleanser   Dressing/Treatment Alginate with Ag;Collagen with Ag;Silicone border   Dressing Change Due 06/01/22   Wound Length (cm) 1 cm   Wound Width (cm) 2.8 cm   Wound Depth (cm) 0.2 cm   Wound Surface Area (cm^2) 2.8 cm^2   Change in Wound Size % 44   Wound Volume (cm^3) 0.56 cm^3   Wound Healing % 63   Wound Assessment Devitalized tissue   Drainage Amount Small   Drainage Description Thick   Wound Odor Mild   Mayela-Wound/Incision Assessment Blanchable erythema   Edges Attached edges   Wound Thickness Description Full thickness

## 2022-05-13 VITALS
TEMPERATURE: 98 F | HEART RATE: 74 BPM | RESPIRATION RATE: 18 BRPM | SYSTOLIC BLOOD PRESSURE: 152 MMHG | DIASTOLIC BLOOD PRESSURE: 73 MMHG | OXYGEN SATURATION: 100 %

## 2022-05-18 PROBLEM — S71.001A OPEN WOUND OF RIGHT HIP: Status: ACTIVE | Noted: 2022-05-18

## 2022-05-18 PROBLEM — L89.43 PRESSURE INJURY OF CONTIGUOUS REGION INVOLVING BACK AND LEFT BUTTOCK, STAGE 3 (HCC): Status: ACTIVE | Noted: 2022-05-18

## 2022-05-18 RX ORDER — BACITRACIN ZINC AND POLYMYXIN B SULFATE 500; 1000 [USP'U]/G; [USP'U]/G
OINTMENT TOPICAL ONCE
Status: CANCELLED | OUTPATIENT
Start: 2022-05-18 | End: 2022-05-18

## 2022-05-18 RX ORDER — LIDOCAINE HYDROCHLORIDE 20 MG/ML
JELLY TOPICAL ONCE
Status: CANCELLED | OUTPATIENT
Start: 2022-05-18 | End: 2022-05-18

## 2022-05-18 RX ORDER — LIDOCAINE 40 MG/G
CREAM TOPICAL ONCE
Status: CANCELLED | OUTPATIENT
Start: 2022-05-18 | End: 2022-05-18

## 2022-05-18 RX ORDER — CLOBETASOL PROPIONATE 0.5 MG/G
OINTMENT TOPICAL ONCE
Status: CANCELLED | OUTPATIENT
Start: 2022-05-18 | End: 2022-05-18

## 2022-05-18 RX ORDER — LIDOCAINE HYDROCHLORIDE 40 MG/ML
SOLUTION TOPICAL ONCE
Status: CANCELLED | OUTPATIENT
Start: 2022-05-18 | End: 2022-05-18

## 2022-05-18 RX ORDER — LIDOCAINE 50 MG/G
OINTMENT TOPICAL ONCE
Status: CANCELLED | OUTPATIENT
Start: 2022-05-18 | End: 2022-05-18

## 2022-05-18 RX ORDER — MUPIROCIN 20 MG/G
OINTMENT TOPICAL ONCE
Status: CANCELLED | OUTPATIENT
Start: 2022-05-18 | End: 2022-05-18

## 2022-05-25 RX ORDER — LIDOCAINE 40 MG/G
CREAM TOPICAL ONCE
Status: CANCELLED | OUTPATIENT
Start: 2022-05-25 | End: 2022-05-25

## 2022-05-25 RX ORDER — MUPIROCIN 20 MG/G
OINTMENT TOPICAL ONCE
Status: CANCELLED | OUTPATIENT
Start: 2022-05-25 | End: 2022-05-25

## 2022-05-25 RX ORDER — LIDOCAINE HYDROCHLORIDE 20 MG/ML
JELLY TOPICAL ONCE
Status: CANCELLED | OUTPATIENT
Start: 2022-05-25 | End: 2022-05-25

## 2022-05-25 RX ORDER — BACITRACIN ZINC AND POLYMYXIN B SULFATE 500; 1000 [USP'U]/G; [USP'U]/G
OINTMENT TOPICAL ONCE
Status: CANCELLED | OUTPATIENT
Start: 2022-05-25 | End: 2022-05-25

## 2022-05-25 RX ORDER — LIDOCAINE HYDROCHLORIDE 20 MG/ML
JELLY TOPICAL ONCE
Status: ACTIVE | OUTPATIENT
Start: 2022-05-25 | End: 2022-05-26

## 2022-05-25 RX ORDER — LIDOCAINE HYDROCHLORIDE 40 MG/ML
SOLUTION TOPICAL ONCE
Status: CANCELLED | OUTPATIENT
Start: 2022-05-25 | End: 2022-05-25

## 2022-05-25 RX ORDER — LIDOCAINE 50 MG/G
OINTMENT TOPICAL ONCE
Status: CANCELLED | OUTPATIENT
Start: 2022-05-25 | End: 2022-05-25

## 2022-05-25 RX ORDER — CLOBETASOL PROPIONATE 0.5 MG/G
OINTMENT TOPICAL ONCE
Status: CANCELLED | OUTPATIENT
Start: 2022-05-25 | End: 2022-05-25

## 2022-05-25 NOTE — WOUND CARE
[address] [facility]  Initial Consult note         Chief Complaint:  Morgan Sanford is a 80 y.o.  female who presents with open wound of right hip, pressure ulcer of sacral area and left buttock. HPI:   S/p right hip replacement on 03/10/2022. Incision wound opened on 04/05/2022. She is s/p antibiotics course. She also developed sacral and left buttock ulcers. Wound caused by: non-healed surgical wound. Current wound care: Local wound care  Offloading wound: no  Appetite: good  Wound associated pain: mild  Diabetic: No  Smoker: No      Past Medical History:   Diagnosis Date    Hypertension     Sleep disorder       Past Surgical History:   Procedure Laterality Date    HX ORTHOPAEDIC      broken right ankle, left femur 30 yrs ago     Family History   Problem Relation Age of Onset    Diabetes Mother     Heart Disease Mother     Heart Disease Father       Social History     Tobacco Use    Smoking status: Never Smoker    Smokeless tobacco: Never Used   Substance Use Topics    Alcohol use: Yes     Alcohol/week: 1.0 standard drink     Types: 1 Glasses of wine per week     Comment: soc       Prior to Admission medications    Medication Sig Start Date End Date Taking? Authorizing Provider   Nebulizer & Compressor machine 1 Each by Does Not Apply route every six (6) hours as needed for Wheezing, Shortness of Breath or Cough. 3/24/22   Jamar Bernal MD   Nebulizer Accessories kit Use as directed with nebulizer machine 3/24/22   Jamar Bernal MD   aspirin delayed-release 81 mg tablet Take 1 Tablet by mouth daily. 3/24/22   Jamar Bernal MD   carvediloL (COREG) 3.125 mg tablet Take 1 Tablet by mouth two (2) times daily (with meals). 3/23/22   Jamar Bernal MD   apixaban (ELIQUIS) 2.5 mg tablet Take 1 Tablet by mouth two (2) times a day.  3/23/22   Jamar Bernal MD   arformoteroL (BROVANA) 15 mcg/2 mL nebu neb solution 2 mL by Nebulization route two (2) times a day. 3/23/22   Akira Bernal MD   ferrous sulfate 325 mg (65 mg iron) tablet Take 1 Tablet by mouth three (3) times daily. 3/23/22   Akira Bernal MD   furosemide (Lasix) 20 mg tablet Take 1 Tablet by mouth daily. 3/23/22   Akira Bernal MD   nystatin (MYCOSTATIN) powder Apply  to affected area two (2) times a day. 3/23/22   Akira Bernal MD   zinc oxide 20 % ointment Apply  to affected area as needed for Skin Irritation. 3/23/22   Akira Bernal MD   allopurinoL (ZYLOPRIM) 100 mg tablet Take 100 mg by mouth daily. Provider, Historical   niacin (NIASPAN) 1,000 mg Tb24 tab Take 1,000 mg by mouth daily. Provider, Historical   trolamine salicylate-aloe vera 57% (ASPERCREME) topical cream Apply 2 g to affected area as needed for Pain. Provider, Historical   multivitamin, tx-iron-ca-min (THERA-M w/ IRON) 9 mg iron-400 mcg tab tablet Take 1 Tab by mouth daily. Provider, Historical   potassium chloride SR (KLOR-CON 10) 10 mEq tablet Take 10 mEq by mouth daily. Provider, Historical     Allergies   Allergen Reactions    Seafood Hives     crabs    Statins-Hmg-Coa Reductase Inhibitors Unknown (comments)    Sulfa (Sulfonamide Antibiotics) Hives        Review of Systems  Gen: No fever, chills, malaise, weight loss/gain. Heent: No headache, rhinorrhea, epistaxis, ear pain, hearing loss, sinus pain, neck pain/stiffness, sore throat. Heart: No chest pain, palpitations, LOPEZ, pnd, or orthopnea. Resp: No cough, hemoptysis, wheezing and shortness of breath. GI: No nausea, vomiting, diarrhea, constipation, melena or hematochezia. : No urinary obstruction, dysuria or hematuria. Derm: see above   Musc/skeletal: no bone or joint complains. Vasc: No edema, cyanosis or claudication. Endo: No heat/cold intolerance, no polyuria,polydipsia or polyphagia. Neuro: No unilateral weakness, numbness, tingling. No seizures.    Heme: No easy bruising or bleeding. Objective:     Physical Exam:     Visit Vitals  /67   Pulse 96   Temp 97.6 °F (36.4 °C)   Resp 18   SpO2 100%     General: well developed, well nourished, pleasant , NAD. Hygiene good  Psych: cooperative. Pleasant. No anxiety or depression. Normal mood and affect. Neuro: alert and oriented to person/place/situation. Otherwise nonfocal.  Derm: Skin turgor for age, dry skin  HEENT: Normocephalic, atraumatic. EOMI. Conjunctiva clear. No scleral icterus. Neck: Normal range of motion. No masses. Chest: Good air entry bilaterally. Respirations nonlabored  Cardio[de-identified] Normal heart sounds,no rubs, murmurs or gallops  Abdomen: Soft, nontender, nondistended, normoactive bowel sounds  Lower extremities: color normal; temperature normal. Calves are supple, nontender. Capillary refill <3 sec      Wound Description:   Wound Length:      Wound Width :     Wound Depth :   Wound Groin Right 05/05/22   Date First Assessed/Time First Assessed: 05/05/22 1403   Present on Hospital Admission: Yes  Primary Wound Type: Open incision/surgical site  Location: Groin  Wound Location Orientation: Right  Date of First Observation: 05/05/22   Wound Image                Wound Etiology Non-Healing Surgical   Dressing Status Breakthrough drainage noted; Old drainage noted   Cleansed Wound cleanser   Dressing/Treatment Alginate with Ag;Collagen with Ag;Silicone border   Dressing Change Due 05/12/22   Wound Length (cm) 4.5 cm   Wound Width (cm) 1.5 cm   Wound Depth (cm) 3.3 cm   Wound Surface Area (cm^2) 6.75 cm^2   Wound Volume (cm^3) 22.275 cm^3   Post-Procedure Length (cm) 5 cm   Post-Procedure Width (cm) 2 cm   Post-Procedure Depth (cm) 3.8 cm   Post-Procedure Surface Area (cm^2) 10 cm^2   Post-Procedure Volume (cm^3) 38 cm^3   Undermining Starts ___ O'Clock 1 o'clock   Undermining Ends ___ O'Clock 3 o'clock   Undermining Maximum Distance (cm) 1.5 cm   Wound Assessment Granulation tissue;Eschar moist   Drainage Amount Moderate   Drainage Description Serosanguinous   Wound Odor Malodorous/Putrid   Mayela-Wound/Incision Assessment Blanchable erythema; Intact   Wound Thickness Description Full thickness   Wound Buttocks Left 05/05/22   Date First Assessed/Time First Assessed: 05/05/22 1403   Present on Hospital Admission: Yes  Wound Approximate Age at First Assessment (Weeks): 3 weeks  Primary Wound Type: Pressure Injury  Location: Buttocks  Wound Location Orientation: Left  Date of. .. Wound Image    Wound Etiology Pressure Stage 3   Dressing Status Intact   Cleansed Wound cleanser   Dressing/Treatment Alginate with Ag;Collagen with Ag;Silicone border   Dressing Change Due 05/12/22   Wound Length (cm) 4.5 cm   Wound Width (cm) 3.8 cm   Wound Depth (cm) 0.3 cm   Wound Surface Area (cm^2) 17.1 cm^2   Wound Volume (cm^3) 5.13 cm^3   Wound Assessment Devitalized tissue;Fibrinous;Pink/red   Drainage Amount Moderate   Drainage Description Serosanguinous   Wound Odor None   Mayela-Wound/Incision Assessment Blanchable erythema   Edges Attached edges   Wound Thickness Description Full thickness   Wound Sacral/coccyx   Date First Assessed/Time First Assessed: 05/05/22 1412   Present on Hospital Admission: Yes  Wound Approximate Age at First Assessment (Weeks): 2 weeks  Primary Wound Type: Pressure Injury  Location: Sacral/coccyx   Wound Image    Wound Etiology Pressure Stage 3   Dressing Status Intact; New drainage noted; Old drainage noted   Cleansed Wound cleanser   Dressing/Treatment Alginate with Ag;Collagen with Ag;Silicone border   Dressing Change Due 05/12/22   Wound Length (cm) 2 cm   Wound Width (cm) 2.5 cm   Wound Depth (cm) 0.3 cm   Wound Surface Area (cm^2) 5 cm^2   Wound Volume (cm^3) 1.5 cm^3   Wound Assessment Devitalized tissue;Fibrinous;Pink/red;Slough   Drainage Amount Small   Drainage Description Thick   Wound Odor Mild   Mayela-Wound/Incision Assessment Blanchable erythema; Intact   Edges Attached edges   Wound Thickness Description Full thickness            Data Review:   No results found for this or any previous visit (from the past 24 hour(s)). Assessment:     Patient Active Problem List   Diagnosis Code    Elevated troponin R77.8    HTN (hypertension) I10    SERENA treated with BiPAP G47.33    Acute on chronic diastolic congestive heart failure (HCC) I50.33    Acute respiratory failure with hypoxemia ([facility]) J96.01    Atrial fibrillation ([facility]) I48.91    Stage 3 chronic kidney disease ([facility]) N18.30    SERENA (obstructive sleep apnea) G47.33    Fracture of neck of right femur ([facility]) S72.001A    Hypokalemia E87.6    Hypotension I95.9    Morbid obesity ([address] E66.01    Open wound of right hip S71.001A    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 female with open wound right hip, pressure ulcer of sacral and left buttock. Needs :  Serial debridement-   Good local wound care  Nutrition optimization    Plan:     In wound care clinic today:  Cleanse wound with NS or soap and water or commercial wound cleanser  Apply the following topically to wound bed: sushant  Apply the following to cathy-wound: NA  Apply the following dressings: Absorptive dressing    For Home Care/Self Care:  Cleanse wound with NS or soap and water or commercial wound cleanser  Keep dressing dry and intact when bathing  Apply the following to wound bed: sushant  Apply the following to skin around wound: NA  Apply the following dressings: Absorptive dressing        Patient to return for wound care in: 7  Days  Follow up with Nurse visit as recommended. PLEASE CONTACT OFFICE AS SOON AS POSSIBLE IF UNABLE TO MAKE THIS APPOINTMENT. Inspect your wounds, looking for signs of infection which may include the following:  Increase in redness  Red \"streaks\" from wound  Increase in swelling  Fever  Unusual odor  Change in the amount of wound drainage.  Should you experience any significant changes in your wound(s) or have any questions regarding your home care instructions please contact the wound center or your home health company. If after regular business hours, please call your family doctor or local emergency room. Edema Control:   Elevate legs as much as possible. Avoid standing in one position for more than 10 minutes. Avoid setting with legs down. Do not cross legs while sitting. Off-Loading:     Frequent position changes. Do not cross legs while sitting. Shift weight every 20 minutes or more when sitting for prolonged periods of time.   Signed By: Kitty Stauffer MD     May 25, 2022

## 2022-05-25 NOTE — WOUND CARE
[facility]  Follow up note. Chief Complaint:  Harsha Kafuman is a 80 y.o.  female who presents for follow up of open wound of right hip, pressure ulcer of sacral area and left buttock. S/p right hip replacement on 03/10/2022. Incision wound opened on 04/05/2022. She is s/p antibiotics course. She also developed sacral and left buttock ulcers. Review of systems  General: No fevers or chills. Cardiovascular: No chest pain or pressure. No palpitations. Pulmonary: No shortness of breath. Gastrointestinal: No nausea, vomiting. Derm: See above  Physical Exam:     Visit Vitals  BP (!) 152/73   Pulse 74   Temp 98 °F (36.7 °C)   Resp 18   SpO2 100%     General:  pleasant , NAD. Psych: cooperative. Pleasant. No anxiety or depression. Normal mood and affect. Neuro: alert and oriented to person/place/situation. Otherwise nonfocal.  Derm: Skin turgor normal for age, dry skin  HEENT: Normocephalic, atraumatic. EOMI. Conjunctiva clear. No scleral icterus. Chest: Good air entry bilaterally. Respirations non labored  Cardio[de-identified] Normal heart sounds,no rubs, murmurs or gallops  Abdomen: Soft, nontender, nondistended, normoactive bowel sounds  Lower extremities: color normal; temperature normal. Calves are supple, nontender.  Capillary refill <3 sec    Wound Description:   Wound Length:      Wound Width :     Wound Depth :     05/11/22 1454    Wound Hip Right 05/05/22   Date First Assessed/Time First Assessed: 05/05/22 1403   Present on Hospital Admission: Yes  Primary Wound Type: Open incision/surgical site  Location: Hip  Wound Location Orientation: Right  Date of First Observation: 05/05/22   Wound Image                            Wound Etiology Non-Healing Surgical   Dressing Status Breakthrough drainage noted   Cleansed Wound cleanser   Dressing/Treatment Alginate with Ag;Collagen with Ag;Negative Pressure Wound Therapy   Dressing Change Due 06/01/22   Wound Length (cm) 3 cm   Wound Width (cm) 0.8 cm Wound Depth (cm) 2.7 cm   Wound Surface Area (cm^2) 2.4 cm^2   Change in Wound Size % 64.44   Wound Volume (cm^3) 6.48 cm^3   Wound Healing % 71   Undermining Starts ___ O'Clock 1 o'clock   Undermining Ends ___ O'Clock 2 o'clock   Undermining Maximum Distance (cm) 2 cm   Wound Assessment Slough   Drainage Amount Copious   Drainage Description Serosanguinous   Wound Odor Malodorous/Putrid   Mayela-Wound/Incision Assessment Blanchable erythema   Wound Thickness Description Full thickness   Wound Buttocks Left 05/05/22   Date First Assessed/Time First Assessed: 05/05/22 1403   Present on Hospital Admission: Yes  Wound Approximate Age at First Assessment (Weeks): 3 weeks  Primary Wound Type: Pressure Injury  Location: Buttocks  Wound Location Orientation: Left  Date of. .. Wound Image                Wound Etiology Pressure Stage 3   Dressing Status Intact   Cleansed Wound cleanser   Dressing/Treatment Alginate with Ag;Collagen with Ag;Silicone border   Dressing Change Due 06/01/22   Wound Length (cm) 4 cm   Wound Width (cm) 3.5 cm   Wound Depth (cm) 0.3 cm   Wound Surface Area (cm^2) 14 cm^2   Change in Wound Size % 18.13   Wound Volume (cm^3) 4.2 cm^3   Wound Healing % 18   Wound Assessment Devitalized tissue   Drainage Amount Moderate   Drainage Description Serosanguinous   Wound Odor None   Mayela-Wound/Incision Assessment Blanchable erythema   Edges Attached edges   Wound Thickness Description Full thickness   Wound Sacral/coccyx   Date First Assessed/Time First Assessed: 05/05/22 1412   Present on Hospital Admission: Yes  Wound Approximate Age at First Assessment (Weeks): 2 weeks  Primary Wound Type: Pressure Injury  Location: Sacral/coccyx   Wound Image                            Wound Etiology Pressure Stage 3   Dressing Status Intact; New drainage noted   Cleansed Wound cleanser   Dressing/Treatment Alginate with Ag;Collagen with Ag;Silicone border   Dressing Change Due 06/01/22   Wound Length (cm) 1 cm   Wound Width (cm) 2.8 cm   Wound Depth (cm) 0.2 cm   Wound Surface Area (cm^2) 2.8 cm^2   Change in Wound Size % 44   Wound Volume (cm^3) 0.56 cm^3   Wound Healing % 63   Wound Assessment Devitalized tissue   Drainage Amount Small   Drainage Description Thick   Wound Odor Mild   Mayela-Wound/Incision Assessment Blanchable erythema   Edges Attached edges   Wound Thickness Description Full thickness          Data Review:   No results found for this or any previous visit (from the past 24 hour(s)). Assessment:     Patient Active Problem List   Diagnosis Code    Elevated troponin R77.8    HTN (hypertension) I10    SERENA treated with BiPAP G47.33    Acute on chronic diastolic congestive heart failure ([facility]) I50.33    Acute respiratory failure with hypoxemia ([facility]) J96.01    Atrial fibrillation ([facility]) I48.91    Stage 3 chronic kidney disease ([facility]) N18.30    SERENA (obstructive sleep apnea) G47.33    Fracture of neck of right femur ([facility]) S72.001A    Hypokalemia E87.6    Hypotension I95.9    Morbid obesity ([address] E66.01    Open wound of right hip S71.001A    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 female with open wound right hip, pressure ulcer of sacral and left buttock.     Needs :  Serial debridement-   Good local wound care  Nutrition optimization  Plan:   Informed consent obtained. Patient/family member explained about the need of the procedure. Consent in chart  Debridement:   Excisional debridement of pressure ulcer of sacral area, left buttock, right hip. Indication: to remove necrotic tissue/ devitalized tissue/ soft eschar/ infected tissue through subcutaneous tissue epidermis and dermis layer of wound bed  Anesthesia: Topical 2% lidocaine jelly   Instrument: Curette, Blade, , Masonix.    Residual Necrosis: NA  Bleeding: <1ml   Hemostasis: Pressure   Patient tolerated procedure well   Procedural Pain: mild  Post - procedural pain: mild      Surface area debrided: <20 sq. cm        In wound care clinic today:  Cleanse wound with NS or soap and water or commercial wound cleanser  Apply the following topically to wound bed:  Apply the following to cathy-wound: NA  Apply the following dressings: Absorptive dressing    For Home Care/Self Care:  Cleanse wound with NS or soap and water or commercial wound cleanser  Keep dressing dry and intact when bathing  Apply the following to wound bed:  Apply the following to skin around wound: NA  Apply the following dressings: Absorptive dressing    Leave dressings in place until next visit    Patient to return for wound care in: 7  Days  Follow up with Nurse visit as recommended. PLEASE CONTACT OFFICE AS SOON AS POSSIBLE IF UNABLE TO MAKE THIS APPOINTMENT. Inspect your wounds, looking for signs of infection which may include the following:  Increase in redness  Red \"streaks\" from wound  Increase in swelling  Fever  Unusual odor  Change in the amount of wound drainage. Should you experience any significant changes in your wound(s) or have any questions regarding your home care instructions please contact the wound center or your home health company. If after regular business hours, please call your family doctor or local emergency room. Edema Control:   Elevate legs as much as possible. Avoid standing in one position for more than 10 minutes. Avoid setting with legs down. Do not cross legs while sitting. Off-Loading:     Frequent position changes. Do not cross legs while sitting. Shift weight every 20 minutes or more when sitting for prolonged periods of time.     Signed By: Shane Mello MD     May 25, 2022

## 2022-06-01 ENCOUNTER — HOSPITAL ENCOUNTER (OUTPATIENT)
Dept: WOUND CARE | Age: 82
Discharge: HOME | End: 2022-06-01
Attending: HOSPITALIST
Payer: MEDICARE

## 2022-06-01 DIAGNOSIS — S71.001A OPEN WOUND OF RIGHT HIP, INITIAL ENCOUNTER: Primary | ICD-10-CM

## 2022-06-01 DIAGNOSIS — L89.43 PRESSURE INJURY OF CONTIGUOUS REGION INVOLVING BACK AND LEFT BUTTOCK, STAGE 3 (HCC): ICD-10-CM

## 2022-06-01 PROCEDURE — 11042 DBRDMT SUBQ TIS 1ST 20SQCM/<: CPT

## 2022-06-01 RX ORDER — CLOBETASOL PROPIONATE 0.5 MG/G
OINTMENT TOPICAL ONCE
OUTPATIENT
Start: 2022-06-01 | End: 2022-06-01

## 2022-06-01 RX ORDER — LIDOCAINE 40 MG/G
CREAM TOPICAL ONCE
OUTPATIENT
Start: 2022-06-01 | End: 2022-06-01

## 2022-06-01 RX ORDER — MUPIROCIN 20 MG/G
OINTMENT TOPICAL ONCE
OUTPATIENT
Start: 2022-06-01 | End: 2022-06-01

## 2022-06-01 RX ORDER — LIDOCAINE HYDROCHLORIDE 20 MG/ML
JELLY TOPICAL ONCE
OUTPATIENT
Start: 2022-06-01 | End: 2022-06-01

## 2022-06-01 RX ORDER — LIDOCAINE HYDROCHLORIDE 40 MG/ML
SOLUTION TOPICAL ONCE
OUTPATIENT
Start: 2022-06-01 | End: 2022-06-01

## 2022-06-01 RX ORDER — BACITRACIN ZINC AND POLYMYXIN B SULFATE 500; 1000 [USP'U]/G; [USP'U]/G
OINTMENT TOPICAL ONCE
OUTPATIENT
Start: 2022-06-01 | End: 2022-06-01

## 2022-06-01 RX ORDER — LIDOCAINE HYDROCHLORIDE 20 MG/ML
JELLY TOPICAL ONCE
Status: COMPLETED | OUTPATIENT
Start: 2022-06-01 | End: 2022-06-01

## 2022-06-01 RX ORDER — LIDOCAINE 50 MG/G
OINTMENT TOPICAL ONCE
OUTPATIENT
Start: 2022-06-01 | End: 2022-06-01

## 2022-06-01 RX ADMIN — LIDOCAINE HYDROCHLORIDE 5 ML: 20 JELLY TOPICAL at 13:00

## 2022-06-02 VITALS
RESPIRATION RATE: 15 BRPM | HEART RATE: 95 BPM | TEMPERATURE: 98.4 F | SYSTOLIC BLOOD PRESSURE: 125 MMHG | OXYGEN SATURATION: 100 % | DIASTOLIC BLOOD PRESSURE: 70 MMHG

## 2022-06-02 NOTE — WOUND CARE
Discharge Instructions from  [address] [facility]  [address], [facility]0 [facility]  [phone] Fax [phone]    NAME:  Sally Gauthier  YOB: 1940  MEDICAL RECORD NUMBER:  [id number]  DATE:  6/1/2022    Wound Cleansing:   Do not scrub or use excessive force. Cleanse wound prior to applying a clean dressing with:  [] Normal Saline [] Keep Wound Dry in Shower    [x] Wound Cleanser   [] Cleanse wound with Mild Soap & Water  [] May Shower at Discharge   [] Other:      Topical Treatments:  Do not apply lotions, creams, or ointments to wound bed unless directed. [] Apply moisturizing lotion to skin surrounding the wound prior to dressing change. [x] Apply antifungal ointment to skin surrounding the wound prior to dressing change. [x] Apply thin film of moisture barrier ointment to skin immediately around wound. [x] Other: Only to Sacrum and Buttock wounds     Dressings:           Wound Location Sacrum, Buttock and Hip  [x] Apply Primary Dressing:       [] MediHoney Gel [] Alginate with Silver [x] Alginate   [] Collagen [x] Collagen with Silver to all wound.  [] Santyl with Moisten saline gauze     [] Hydrocolloid   [] MediHoney Alginate [] Foam with Silver   [x] Foam   [] Hydrofera Blue    [] Mepilex Border    [] Moisten with Saline [] Hydrogel [] Mepitel     [] Bactroban/Mupirocin [] Polysporin  [x] Other: Apply foam between tubing and vac drape. [] Pack wound loosely with  [] Iodoform   [] Plain Packing  [] Other   [x] Cover and Secure with:     [] Gauze [] Destiny [] Kerlix   [] Ace Wrap [] Cover Roll Tape [] ABD     [x] Other: Silicone borders and cloth or paper tape to Sacrum and Buttock wounds. Avoid contact of tape with skin.   [x] Change dressing: [] Daily    [] Every Other Day [x] Three times per week   [] Once a week [] Do Not Change Dressing   [] Other:     Negative Pressure:           Wound Location:   [x] Pressure@      125 mm/Hg  [x]Continuous []Intermittent   [x] Black  [] White Foam [] Other:   []Change dressing: [x]Three times per week    [x]Other: sushant in hip wound with black foam, being sure that foam is on sushant. Pressure Relief:  [x] When sitting, shift position or do seat lifts every 15 minutes. Patient should not be sitting on bottom no longer than 2 hours or less, before getting off that area. [x] Wheelchair cushion [x] Specialty Bed/Mattress  [x] Turn every 2 hours when in bed. Avoid position directing pressure on wound site. Limit side lying to 30 degree tilt. Limit HOB elevation to 30 degrees. Edema Control:  Apply: [] Compression Stocking []Right Leg []Left Leg   [] Tubigrip []Right Leg Double Layer []Left Leg Double Layer       []Right Leg Single Layer []Left Leg Single Layer   [] SpandaGrip []Right Leg  []Left Leg      []Low compression 5-10 mm/Hg      []Medium compression 10-20 mm/Hg     []High compression  20-30 mm/Hg   every morning immediately when getting up should be applied to affected leg(s) from mid foot to knee making sure to cover the heel. Remove every night before going to bed. [] Elevate leg(s) above the level of the heart when sitting. [] Avoid prolonged standing in one place. Compression:  Apply: [] Multilayer Compression Wrap Applied in Clinic []RightLeg []Left Leg   [] Multi-layer compression. Do not get leg(s) with wrap wet. If wraps become too tight call the center or completely remove the wrap. [] Elevate leg(s) above the level of the heart when sitting. [] Avoid prolonged standing in one place.     Off-Loading:   [x] Off-loading when [] walking  [x] in bed [x] sitting  [] Total non-weight bearing  [] Right Leg  [] Left Leg   [] Assistive Device [] Walker [] Cane  [x] Wheelchair  [] Crutches   [] Surgical shoe    [] Podus Boot(s)   [] Foam Boot(s)  [] Roll About    [] Cast Boot [] CROW Boot  [] Other:    Contact Cast:  Apply: [] Total Contact Cast Applied in Clinic []RightLeg []Left Leg   [] Do not get cast wet. Contact center or go to emergency room if there is a foul odor or becomes uncomfortable due to feeling tight or swelling. Do not use objects inside of cast to scratch. Dietary:  [x] Diet as tolerated: [] Calorie Diabetic Diet: [x] No Added Salt:  [] Increase Protein: [] Other:   Activity:  [] Activity as tolerated:  [] Patient has no activity restrictions     [] Strict Bedrest: [] Remain off Work:     [] May return to full duty work:                                   [] Return to work with restrictions:             Return Appointment:  [x] Wound and dressing supply provider:   [x] ECF or Home Healthcare:  [x] Wound Assessment: [x] Physician or NP scheduled for Wound Assessment:   [x] Return Appointment: 3 Week(s)  [] Ordered tests:      Electronically signed David Camarena LPN on 0/4/1987 at 5:21 PM    A Copy of this order was forward to [facility]. Wound Care Center Information: Should you experience any significant changes in your wound(s) or have questions about your wound care, please contact the [facility] at [address] [place] [address] 8:00 am - 4:30. If you need help with your wound outside these hours and cannot wait until we are again available, contact your PCP or go to the hospital emergency room. PLEASE NOTE: IF YOU ARE UNABLE TO OBTAIN WOUND SUPPLIES, CONTINUE TO USE THE SUPPLIES YOU HAVE AVAILABLE UNTIL YOU ARE ABLE TO REACH US. IT IS MOST IMPORTANT TO KEEP THE WOUND COVERED AT ALL TIMES.      Physician Signature:_______________________    Date: ___________ Time:  ____________

## 2022-06-02 NOTE — WOUND CARE
06/01/22 1610   Anesthetic   Anesthetic 2% Lidocaine Gel Topical   Wound Hip Right 05/05/22   Date First Assessed/Time First Assessed: 05/05/22 1403   Present on Hospital Admission: Yes  Primary Wound Type: Open incision/surgical site  Location: Hip  Wound Location Orientation: Right  Date of First Observation: 05/05/22   Wound Image     Wound Etiology Non-Healing Surgical   Dressing Status Breakthrough drainage noted   Cleansed Wound cleanser   Dressing/Treatment Negative Pressure Wound Therapy   Dressing Change Due 06/22/22   Wound Length (cm) 2.6 cm   Wound Width (cm) 0.5 cm   Wound Depth (cm) 1.5 cm   Wound Surface Area (cm^2) 1.3 cm^2   Change in Wound Size % 80.74   Wound Volume (cm^3) 1.95 cm^3   Wound Healing % 91   Post-Procedure Length (cm) 2.8 cm   Post-Procedure Width (cm) 0.6 cm   Post-Procedure Depth (cm) 1.6 cm   Post-Procedure Surface Area (cm^2) 1.68 cm^2   Post-Procedure Volume (cm^3) 2.688 cm^3   Wound Assessment Slough   Drainage Amount Copious   Drainage Description Serosanguinous   Wound Odor Mild   Mayela-Wound/Incision Assessment Blanchable erythema   Wound Thickness Description Full thickness   Wound Sacral/coccyx   Date First Assessed/Time First Assessed: 05/05/22 1412   Present on Hospital Admission: Yes  Wound Approximate Age at First Assessment (Weeks): 2 weeks  Primary Wound Type: Pressure Injury  Location: Sacral/coccyx   Wound Image    Wound Etiology Pressure Stage 3   Dressing Status Intact; New drainage noted   Cleansed Wound cleanser   Dressing/Treatment Alginate with Ag;Collagen with Ag;Silicone border   Dressing Change Due 06/22/22   Wound Length (cm) 1.2 cm   Wound Width (cm) 2.5 cm   Wound Depth (cm) 1.6 cm   Wound Surface Area (cm^2) 3 cm^2   Change in Wound Size % 40   Wound Volume (cm^3) 4.8 cm^3   Wound Healing % -220   Wound Assessment Devitalized tissue   Drainage Amount Scant   Drainage Description Thick   Wound Odor Mild   Mayela-Wound/Incision Assessment Blanchable erythema   Edges Attached edges   Wound Thickness Description Full thickness   Wound Buttocks Left 05/05/22   Date First Assessed/Time First Assessed: 05/05/22 1403   Present on Hospital Admission: Yes  Wound Approximate Age at First Assessment (Weeks): 3 weeks  Primary Wound Type: Pressure Injury  Location: Buttocks  Wound Location Orientation: Left  Date of. ..    Wound Image     Wound Etiology Pressure Stage 3   Dressing Status Intact   Cleansed Wound cleanser   Dressing/Treatment Alginate with Ag;Collagen with Ag;Silicone border   Dressing Change Due 06/22/22   Wound Length (cm) 4.1 cm   Wound Width (cm) 3.9 cm   Wound Depth (cm) 0.3 cm   Wound Surface Area (cm^2) 15.99 cm^2   Change in Wound Size % 6.49   Wound Volume (cm^3) 4.797 cm^3   Wound Healing % 6   Post-Procedure Length (cm) 4.2 cm   Post-Procedure Width (cm) 4 cm   Post-Procedure Depth (cm) 0.4 cm   Post-Procedure Surface Area (cm^2) 16.8 cm^2   Post-Procedure Volume (cm^3) 6.72 cm^3   Wound Assessment Devitalized tissue   Drainage Amount Moderate   Drainage Description Serosanguinous   Wound Odor None   Mayela-Wound/Incision Assessment Blanchable erythema   Edges Attached edges   Wound Thickness Description Full thickness

## 2022-06-02 NOTE — WOUND CARE
Negative Pressure    NAME:  Aliyah Shelley  YOB: 1940  MEDICAL RECORD NUMBER:  [id number]  DATE:  6/1/2022     Applied Negative Pressure to 1 wound(s)/ulcer(s).  [x] Applied skin barrier prep to cathy-wound.  [x] Cut strips of plastic drape to picture frame wound so that cathy-wound is     covered with the drape.  [x] If bridging dressing to less prominent site, cover any intact skin that will come in contact with the Negative Pressure Therapy sponge, gauze or channel drain with plastic drape. The sponge should never touch intact skin.  [x] Cut sponge, gauze or channel drain to size which will fit into the wound/ulcer bed without being forced.  [x] Be sure the sponge is large enough to hold the entire round plastic flange which is attached to the tubing. Never allow flange to be larger than the sponge or it will produce suction damaging intact skin.  Total number of individual pieces of foam used within the wound bed: 1   [x] If bridging the dressing away from the primary site, be sure the bridge leads to a piece of sponge large enough to hold the entire flange without allowing any of the flange to overlap onto intact skin.  [x] Covered sponge, gauze or channel drain with plastic drape.  [x] Cut a hole in this plastic drape directly over the sponge the same size as the plastic drain tubing.  [x] Removed plastic liner from flange and apply it directly over the hole you cut.  [x] Removed the plastic cover from the flange.  [x] Attached the tubing to the wound/ulcer Negative Pressure Therapy and turn it on to be sure a vacuum is created and that there are no leaks.  [x] If air leaks occur, use plastic drape to patch them.  [x] Secured Negative Pressure Therapy dressing with ace wrap loosely if located on an extremity. Maintain tubing outside of ace wrap. Tubing must not exert pressure on intact skin.     Response to treatment: Well tolerated by patient      Applied per  Guidelines      Electronically signed by Do Carmona LPN on 1/6/8306 at 0:17 PM

## 2022-06-02 NOTE — DISCHARGE INSTRUCTIONS
Discharge Instructions from  [facility]  [address], [facility]0 [facility]  [phone] Fax [phone]    NAME:  Symone Mota  YOB: 1940  MEDICAL RECORD NUMBER:  [id number]  DATE:  6/1/2022    Wound Cleansing:   Do not scrub or use excessive force. Cleanse wound prior to applying a clean dressing with:  [] Normal Saline [] Keep Wound Dry in Shower    [x] Wound Cleanser   [] Cleanse wound with Mild Soap & Water  [] May Shower at Discharge   [] Other:      Topical Treatments:  Do not apply lotions, creams, or ointments to wound bed unless directed. [] Apply moisturizing lotion to skin surrounding the wound prior to dressing change. [x] Apply antifungal ointment to skin surrounding the wound prior to dressing change. [x] Apply thin film of moisture barrier ointment to skin immediately around wound. [x] Other:  Apply the above mention products to Sacrum and Buttock Mayela wounds areas. Dressings:           Wound Location Sacrum and Right Hip. [x] Apply Primary Dressing:       [] MediHoney Gel [] Alginate with Silver [x] Alginate   [] Collagen [x] Collagen with Silver to all three wounds.   [] Santyl with Moisten saline gauze     [] Hydrocolloid   [] MediHoney Alginate [] Foam with Silver   [x] Foam   [] Hydrofera Blue    [] Mepilex Border    [] Moisten with Saline [] Hydrogel [] Mepitel     [] Bactroban/Mupirocin [] Polysporin  [x] Other:   Alginate only to Sacrum and Buttock wounds. [] Pack wound loosely with  [] Iodoform   [] Plain Packing  [] Other   [x] Cover and Secure with:     [] Gauze [] Destiny [] Kerlix   [] Ace Wrap [] Cover Roll Tape [] ABD     [x] Other: Silicone borders and Cloth or paper tape    Avoid contact of tape with skin.   [x] Change dressing: [] Daily    [] Every Other Day [x] Three times per week   [] Once a week [] Do Not Change Dressing   [] Other:     Negative Pressure:           Wound Location:   [x] [facility] mm/[facility]  []Continuous []Intermittent   [x] Black  [] White Foam [] Other:   []Change dressing: []Three times per week    [x]Other: To hip wound  Pressure Relief:  [x] When sitting, shift position or do seat lifts every 15 minutes, limit sitting on bottom to 2 hours or less. [x] Wheelchair cushion [x] Specialty Bed/Mattress  [x] Turn every 2 hours when in bed. Avoid position directing pressure on wound site. Limit side lying to 30 degree tilt. Limit HOB elevation to 30 degrees. Edema Control:  Apply: [] Compression Stocking []Right Leg []Left Leg   [] Tubigrip []Right Leg Double Layer []Left Leg Double Layer       []Right Leg Single Layer []Left Leg Single Layer   [] SpandaGrip []Right Leg  []Left Leg      []Low compression 5-10 mm/Hg      []Medium compression 10-20 mm/Hg     []High compression  20-30 mm/Hg   every morning immediately when getting up should be applied to affected leg(s) from mid foot to knee making sure to cover the heel. Remove every night before going to bed. [] Elevate leg(s) above the level of the heart when sitting. [] Avoid prolonged standing in one place. Compression:  Apply: [] Multilayer Compression Wrap Applied in Clinic []RightLeg []Left Leg   [] Multi-layer compression. Do not get leg(s) with wrap wet. If wraps become too tight call the center or completely remove the wrap. [] Elevate leg(s) above the level of the heart when sitting. [] Avoid prolonged standing in one place. Off-Loading:   [] Off-loading when [] walking  [] in bed [] sitting  [] Total non-weight bearing  [] Right Leg  [] Left Leg   [x] Assistive Device [] Walker [] Cane  [x] Wheelchair  [] Crutches   [] Surgical shoe    [] Podus Boot(s)   [] Foam Boot(s)  [] Roll About    [] Cast Boot [] CROW Boot  [] Other:    Contact Cast:  Apply: [] Total Contact Cast Applied in Clinic []RightLeg []Left Leg   [] Do not get cast wet.   Contact center or go to emergency room if there is a foul odor or becomes uncomfortable due to feeling tight or swelling. Do not use objects inside of cast to scratch. Dietary:  [x] Diet as tolerated: [] Calorie Diabetic Diet: [] No Added Salt:  [] Increase Protein: [] Other:   Activity:  [x] Activity as tolerated:  [] Patient has no activity restrictions     [] Strict Bedrest: [] Remain off Work:     [] May return to full duty work:                                   [] Return to work with restrictions:             Return Appointment:  [x] Wound and dressing supply provider:   [] ECF or Home Healthcare:  [x] Wound Assessment: [x] Physician or NP scheduled for Wound Assessment:   [x] Return Appointment:[facility])  [] Ordered tests:      Electronically signed Jude Mccloud LPN on 9/1/6965 at 74:02 AM     Wilma Hodgson 281: Should you experience any significant changes in your wound(s) or have questions about your wound care, please contact the [address] [place] [address] at [address] [place] [address] 8:00 am - 4:30. If you need help with your wound outside these hours and cannot wait until we are again available, contact your PCP or go to the hospital emergency room. PLEASE NOTE: IF YOU ARE UNABLE TO OBTAIN WOUND SUPPLIES, CONTINUE TO USE THE SUPPLIES YOU HAVE AVAILABLE UNTIL YOU ARE ABLE TO REACH US. IT IS MOST IMPORTANT TO KEEP THE WOUND COVERED AT ALL TIMES.      Physician Signature:_______________________    Date: ___________ Time:  ____________

## 2022-06-13 NOTE — WOUND CARE
[address] [facility]  Follow up note. Chief Complaint:  Cristy Donaldson is a 80 y.o.  female who presents for follow up of open wound of right hip, pressure ulcer of sacral area and left buttock. S/p right hip replacement on 03/10/2022. Incision wound opened on 04/05/2022. She is s/p antibiotics course. She also developed sacral and left buttock ulcers. Review of systems  General: No fevers or chills. Cardiovascular: No chest pain or pressure. No palpitations. Pulmonary: No shortness of breath. Gastrointestinal: No nausea, vomiting. Derm: See above  Physical Exam:     Visit Vitals  /70 (BP 1 Location: Right upper arm, BP Patient Position: At rest;Sitting)   Pulse 95   Temp 98.4 °F (36.9 °C)   Resp 15   SpO2 100%     General:  pleasant , NAD. Psych: cooperative. Pleasant.    Wound Description:   Wound Length:      Wound Width :     Wound Depth :   06/01/22 1610    Anesthetic   Anesthetic 2% Lidocaine Gel Topical   Wound Hip Right 05/05/22   Date First Assessed/Time First Assessed: 05/05/22 1403   Present on Hospital Admission: Yes  Primary Wound Type: Open incision/surgical site  Location: Hip  Wound Location Orientation: Right  Date of First Observation: 05/05/22   Wound Image                Wound Etiology Non-Healing Surgical   Dressing Status Breakthrough drainage noted   Cleansed Wound cleanser   Dressing/Treatment Negative Pressure Wound Therapy   Dressing Change Due 06/22/22   Wound Length (cm) 2.6 cm   Wound Width (cm) 0.5 cm   Wound Depth (cm) 1.5 cm   Wound Surface Area (cm^2) 1.3 cm^2   Change in Wound Size % 80.74   Wound Volume (cm^3) 1.95 cm^3   Wound Healing % 91   Post-Procedure Length (cm) 2.8 cm   Post-Procedure Width (cm) 0.6 cm   Post-Procedure Depth (cm) 1.6 cm   Post-Procedure Surface Area (cm^2) 1.68 cm^2   Post-Procedure Volume (cm^3) 2.688 cm^3   Wound Assessment Slough   Drainage Amount Copious   Drainage Description Serosanguinous   Wound Odor Mild Mayela-Wound/Incision Assessment Blanchable erythema   Wound Thickness Description Full thickness   Wound Sacral/coccyx   Date First Assessed/Time First Assessed: 05/05/22 1412   Present on Hospital Admission: Yes  Wound Approximate Age at First Assessment (Weeks): 2 weeks  Primary Wound Type: Pressure Injury  Location: Sacral/coccyx   Wound Image    Wound Etiology Pressure Stage 3   Dressing Status Intact; New drainage noted   Cleansed Wound cleanser   Dressing/Treatment Alginate with Ag;Collagen with Ag;Silicone border   Dressing Change Due 06/22/22   Wound Length (cm) 1.2 cm   Wound Width (cm) 2.5 cm   Wound Depth (cm) 1.6 cm   Wound Surface Area (cm^2) 3 cm^2   Change in Wound Size % 40   Wound Volume (cm^3) 4.8 cm^3   Wound Healing % -220   Wound Assessment Devitalized tissue   Drainage Amount Scant   Drainage Description Thick   Wound Odor Mild   Mayela-Wound/Incision Assessment Blanchable erythema   Edges Attached edges   Wound Thickness Description Full thickness   Wound Buttocks Left 05/05/22   Date First Assessed/Time First Assessed: 05/05/22 1403   Present on Hospital Admission: Yes  Wound Approximate Age at First Assessment (Weeks): 3 weeks  Primary Wound Type: Pressure Injury  Location: Buttocks  Wound Location Orientation: Left  Date of. ..    Wound Image                Wound Etiology Pressure Stage 3   Dressing Status Intact   Cleansed Wound cleanser   Dressing/Treatment Alginate with Ag;Collagen with Ag;Silicone border   Dressing Change Due 06/22/22   Wound Length (cm) 4.1 cm   Wound Width (cm) 3.9 cm   Wound Depth (cm) 0.3 cm   Wound Surface Area (cm^2) 15.99 cm^2   Change in Wound Size % 6.49   Wound Volume (cm^3) 4.797 cm^3   Wound Healing % 6   Post-Procedure Length (cm) 4.2 cm   Post-Procedure Width (cm) 4 cm   Post-Procedure Depth (cm) 0.4 cm   Post-Procedure Surface Area (cm^2) 16.8 cm^2   Post-Procedure Volume (cm^3) 6.72 cm^3   Wound Assessment Devitalized tissue   Drainage Amount Moderate Drainage Description Serosanguinous   Wound Odor None   Mayela-Wound/Incision Assessment Blanchable erythema   Edges Attached edges   Wound Thickness Description Full thickness             Data Review:   No results found for this or any previous visit (from the past 24 hour(s)). Assessment:     Patient Active Problem List   Diagnosis Code    Elevated troponin R77.8    HTN (hypertension) I10    SERENA treated with BiPAP G47.33    Acute on chronic diastolic congestive heart failure (HCC) I50.33    Acute respiratory failure with hypoxemia ([facility]) J96.01    Atrial fibrillation (HCC) I48.91    Stage 3 chronic kidney disease ([facility]) N18.30    SERENA (obstructive sleep apnea) G47.33    Fracture of neck of right femur ([facility]) S72.001A    Hypokalemia E87.6    Hypotension I95.9    Morbid obesity ([address] E66.01    Open wound of right hip S71.001A    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 female with open wound right hip, pressure ulcer of sacral and left buttock.     Needs :  Serial debridement-   Good local wound care  Nutrition optimization  Plan:   Informed consent obtained. Patient/family member explained about the need of the procedure. Consent in chart  Debridement:   Excisional debridement of pressure ulcer of sacral area, left buttock, right hip. Indication: to remove necrotic tissue/ devitalized tissue/ soft eschar/ infected tissue through subcutaneous tissue epidermis and dermis layer of wound bed  Anesthesia: Topical 2% lidocaine jelly   Instrument: Curette, Blade, , Masonix.    Residual Necrosis: NA  Bleeding: <1ml   Hemostasis: Pressure   Patient tolerated procedure well   Procedural Pain: mild  Post - procedural pain: mild      Surface area debrided: <20 sq. cm        In wound care clinic today:  Cleanse wound with NS or soap and water or commercial wound cleanser  Apply the following topically to wound bed:  Apply the following to cathy-wound: NA  Apply the following dressings: Absorptive dressing    For Home Care/Self Care:  Cleanse wound with NS or soap and water or commercial wound cleanser  Keep dressing dry and intact when bathing  Apply the following to wound bed:  Apply the following to skin around wound: NA  Apply the following dressings: Absorptive dressing    Leave dressings in place until next visit    Patient to return for wound care in: 7  Days  Follow up with Nurse visit as recommended. PLEASE CONTACT OFFICE AS SOON AS POSSIBLE IF UNABLE TO MAKE THIS APPOINTMENT. Inspect your wounds, looking for signs of infection which may include the following:  Increase in redness  Red \"streaks\" from wound  Increase in swelling  Fever  Unusual odor  Change in the amount of wound drainage. Should you experience any significant changes in your wound(s) or have any questions regarding your home care instructions please contact the wound center or your home health company. If after regular business hours, please call your family doctor or local emergency room. Edema Control:   Elevate legs as much as possible. Avoid standing in one position for more than 10 minutes. Avoid setting with legs down. Do not cross legs while sitting. Off-Loading:     Frequent position changes. Do not cross legs while sitting. Shift weight every 20 minutes or more when sitting for prolonged periods of time.     Signed By: Araceli Galeazzi, MD     June 13, 2022

## 2022-06-16 ENCOUNTER — HOSPITAL ENCOUNTER (OUTPATIENT)
Dept: WOUND CARE | Age: 82
Discharge: HOME | End: 2022-06-16
Attending: HOSPITALIST
Payer: MEDICARE

## 2022-06-16 VITALS
OXYGEN SATURATION: 96 % | TEMPERATURE: 98.3 F | HEART RATE: 78 BPM | SYSTOLIC BLOOD PRESSURE: 130 MMHG | DIASTOLIC BLOOD PRESSURE: 108 MMHG | RESPIRATION RATE: 16 BRPM

## 2022-06-16 DIAGNOSIS — S71.001A OPEN WOUND OF RIGHT HIP, INITIAL ENCOUNTER: Primary | ICD-10-CM

## 2022-06-16 DIAGNOSIS — L89.43 PRESSURE INJURY OF CONTIGUOUS REGION INVOLVING BACK AND LEFT BUTTOCK, STAGE 3 (HCC): ICD-10-CM

## 2022-06-16 PROCEDURE — 11042 DBRDMT SUBQ TIS 1ST 20SQCM/<: CPT

## 2022-06-16 PROCEDURE — 11043 DBRDMT MUSC&/FSCA 1ST 20/<: CPT

## 2022-06-16 NOTE — WOUND CARE
Discharge Instructions from  [facility]  [address], [address] [facility]  [phone] Fax [phone]     NAME:  Leonora Marks OF BIRTH:  1940  MEDICAL RECORD NUMBER:  [id number]  DATE:  6/16/22     Wound Cleansing:   Do not scrub or use excessive force. Cleanse wound prior to applying a clean dressing with:  [x]? Normal Saline          [x]? Wound Cleanser   [x]? Cleanse wound with Mild Soap & Water          Topical Treatments:  [x]? Apply antifungal ointment to skin surrounding the wound prior to dressing change. [x]? Apply thin film of moisture barrier ointment to skin immediately around wound. [x]? Other: Only to Sacrum and Buttock wounds                Dressings:                  Wound Location Sacrum, Buttock and Hip  [x]? Apply Primary Dressing:                                          []? MediHoney Gel                [x]? Alginate                  [x]? Collagen with Silver to all wound. [x]? Negative pressure machine has been discontinued at this time. Will apply for another for sacrum if needed in 2 weeks. [x]? Cover and Secure with:                                                   [x]? Other: Silicone borders and cloth or paper tape to wounds. Avoid contact of tape with skin. [x]? Change dressing:    [x]? Three times per week                                Pressure Relief:  [x]? When sitting, shift position or do seat lifts every 15 minutes. Patient should not be sitting on bottom no longer than 2 hours or less, before getting off that area. [x]? Wheelchair cushion           [x]? Specialty Bed/Mattress - in process by Linn rGegory  [x]? Turn every 2 hours when in bed. Avoid position directing pressure on wound site. Limit side lying to 30 degree tilt. Limit HOB elevation to 30 degrees. Dietary:  [x]? Diet as tolerated:   [x]? Calorie Diabetic Diet:         [x]? No Added Salt:  [x]? Increase Protein:          Activity:  [x]? Activity as tolerated:                       Return Appointment:  [x]? Wound and dressing supply provider: Altagracia BARRIGA  [x]? Return Appointment: 2 Week(s)        Electronically signed Ca Bravo  . Wound Care Center Information: Should you experience any significant changes in your wound(s) or have questions about your wound care, please contact the [facility] at [address] [place] [address] 8:00 am - 4:30. If you need help with your wound outside these hours and cannot wait until we are again available, contact your PCP or go to the hospital emergency room.

## 2022-06-16 NOTE — DISCHARGE INSTRUCTIONS
Discharge Instructions from  [address] [facility]  [address], [address] [facility]  [phone] Fax [phone]     NAME:  Kosta Aldrich OF BIRTH:  1940  MEDICAL RECORD NUMBER:  [id number]  DATE:  6/16/22     Wound Cleansing:   Do not scrub or use excessive force. Cleanse wound prior to applying a clean dressing with:  [x]? Normal Saline          [x]? Wound Cleanser   [x]? Cleanse wound with Mild Soap & Water          Topical Treatments:  [x]? Apply antifungal ointment to skin surrounding the wound prior to dressing change. [x]? Apply thin film of moisture barrier ointment to skin immediately around wound. [x]? Other: Only to Sacrum and Buttock wounds                Dressings:                  Wound Location Sacrum, Buttock and Hip  [x]? Apply Primary Dressing:                                          []? MediHoney Gel                [x]? Alginate                  [x]? Collagen with Silver to all wound. [x]? Negative pressure machine has been discontinued at this time. Will apply for another for sacrum if needed in 2 weeks. [x]? Cover and Secure with:                                                   [x]? Other: Silicone borders and cloth or paper tape to wounds. Avoid contact of tape with skin. [x]? Change dressing:    [x]? Three times per week                                Pressure Relief:  [x]? When sitting, shift position or do seat lifts every 15 minutes. Patient should not be sitting on bottom no longer than 2 hours or less, before getting off that area. [x]? Wheelchair cushion           [x]? Specialty Bed/Mattress - in process by Linn Gregory  [x]? Turn every 2 hours when in bed. Avoid position directing pressure on wound site. Limit side lying to 30 degree tilt. Limit HOB elevation to 30 degrees. Dietary:  [x]? Diet as tolerated:   [x]? Calorie Diabetic Diet:         [x]? No Added Salt:  [x]? Increase Protein:          Activity:  [x]? Activity as tolerated:                       Return Appointment:  [x]? Wound and dressing supply provider: Altagracia BARRIGA  [x]? Return Appointment: 2 Week(s)        Electronically signed Nile Andres  . Wound Care Center Information: Should you experience any significant changes in your wound(s) or have questions about your wound care, please contact the [facility] at [address] [place] [address] 8:00 am - 4:30. If you need help with your wound outside these hours and cannot wait until we are again available, contact your PCP or go to the hospital emergency room.

## 2022-06-16 NOTE — WOUND CARE
[address] [facility]  Follow up note. Chief Complaint:  Adriana Dawson is a 80 y.o.  female who presents for follow up of open wound of right hip, pressure ulcer of sacral area and left buttock. S/p right hip replacement on 03/10/2022. Incision wound opened on 04/05/2022. She is s/p antibiotics course. She also developed sacral and left buttock ulcers. Review of systems  General: No fevers or chills. Cardiovascular: No chest pain or pressure. No palpitations. Pulmonary: No shortness of breath. Gastrointestinal: No nausea, vomiting. Derm: See above  Physical Exam:     Visit Vitals  BP (!) 130/108 (BP 1 Location: Right upper arm, BP Patient Position: Lying)   Pulse 78   Temp 98.3 °F (36.8 °C)   Resp 16   SpO2 96%     General:  pleasant , NAD. Psych: cooperative. Pleasant.    Wound Description:   Wound Length:      Wound Width :     Wound Depth :   06/01/22 1610    Anesthetic   Anesthetic 2% Lidocaine Gel Topical   Wound Hip Right 05/05/22   Date First Assessed/Time First Assessed: 05/05/22 1403   Present on Hospital Admission: Yes  Primary Wound Type: Open incision/surgical site  Location: Hip  Wound Location Orientation: Right  Date of First Observation: 05/05/22   Wound Image                Wound Etiology Non-Healing Surgical   Dressing Status Breakthrough drainage noted   Cleansed Wound cleanser   Dressing/Treatment Negative Pressure Wound Therapy   Dressing Change Due 06/22/22   Wound Length (cm) 2.6 cm   Wound Width (cm) 0.5 cm   Wound Depth (cm) 1.5 cm   Wound Surface Area (cm^2) 1.3 cm^2   Change in Wound Size % 80.74   Wound Volume (cm^3) 1.95 cm^3   Wound Healing % 91   Post-Procedure Length (cm) 2.8 cm   Post-Procedure Width (cm) 0.6 cm   Post-Procedure Depth (cm) 1.6 cm   Post-Procedure Surface Area (cm^2) 1.68 cm^2   Post-Procedure Volume (cm^3) 2.688 cm^3   Wound Assessment Slough   Drainage Amount Copious   Drainage Description Serosanguinous   Wound Odor Mild   Mayela-Wound/Incision Assessment Blanchable erythema   Wound Thickness Description Full thickness   Wound Sacral/coccyx   Date First Assessed/Time First Assessed: 05/05/22 1412   Present on Hospital Admission: Yes  Wound Approximate Age at First Assessment (Weeks): 2 weeks  Primary Wound Type: Pressure Injury  Location: Sacral/coccyx   Wound Image    Wound Etiology Pressure Stage 3   Dressing Status Intact; New drainage noted   Cleansed Wound cleanser   Dressing/Treatment Alginate with Ag;Collagen with Ag;Silicone border   Dressing Change Due 06/22/22   Wound Length (cm) 1.2 cm   Wound Width (cm) 2.5 cm   Wound Depth (cm) 1.6 cm   Wound Surface Area (cm^2) 3 cm^2   Change in Wound Size % 40   Wound Volume (cm^3) 4.8 cm^3   Wound Healing % -220   Wound Assessment Devitalized tissue   Drainage Amount Scant   Drainage Description Thick   Wound Odor Mild   Mayela-Wound/Incision Assessment Blanchable erythema   Edges Attached edges   Wound Thickness Description Full thickness   Wound Buttocks Left 05/05/22   Date First Assessed/Time First Assessed: 05/05/22 1403   Present on Hospital Admission: Yes  Wound Approximate Age at First Assessment (Weeks): 3 weeks  Primary Wound Type: Pressure Injury  Location: Buttocks  Wound Location Orientation: Left  Date of. ..    Wound Image                Wound Etiology Pressure Stage 3   Dressing Status Intact   Cleansed Wound cleanser   Dressing/Treatment Alginate with Ag;Collagen with Ag;Silicone border   Dressing Change Due 06/22/22   Wound Length (cm) 4.1 cm   Wound Width (cm) 3.9 cm   Wound Depth (cm) 0.3 cm   Wound Surface Area (cm^2) 15.99 cm^2   Change in Wound Size % 6.49   Wound Volume (cm^3) 4.797 cm^3   Wound Healing % 6   Post-Procedure Length (cm) 4.2 cm   Post-Procedure Width (cm) 4 cm   Post-Procedure Depth (cm) 0.4 cm   Post-Procedure Surface Area (cm^2) 16.8 cm^2   Post-Procedure Volume (cm^3) 6.72 cm^3   Wound Assessment Devitalized tissue   Drainage Amount Moderate   Drainage Description Serosanguinous   Wound Odor None   Mayela-Wound/Incision Assessment Blanchable erythema   Edges Attached edges   Wound Thickness Description Full thickness             Data Review:   No results found for this or any previous visit (from the past 24 hour(s)). Assessment:     Patient Active Problem List   Diagnosis Code    Elevated troponin R77.8    HTN (hypertension) I10    SERENA treated with BiPAP G47.33    Acute on chronic diastolic congestive heart failure (HCC) I50.33    Acute respiratory failure with hypoxemia ([facility]) J96.01    Atrial fibrillation ([facility]) I48.91    Stage 3 chronic kidney disease ([facility]) N18.30    SERENA (obstructive sleep apnea) G47.33    Fracture of neck of right femur ([facility]) S72.001A    Hypokalemia E87.6    Hypotension I95.9    Morbid obesity ([address] E66.01    Open wound of right hip S71.001A    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 female with open wound right hip, pressure ulcer of sacral and left buttock.     Needs :  Serial debridement-     Discussed in detail    A regular re-positioning schedule is in place    Needs better offloading    - group 3- dolphin/clinitron    This patient could benefit from a group 3 air fluidize therapy in the home. Nutrition is optimized. Patient is appropriately turned and repositioned and currently has a Group 2 mattress for more than 30 days and the wound continues to deteriorate. This patient must be placed on air fluidize to begin healing and prevent new wounds from developing. In the absence of an air fluidize therapy the patient may require hospitalization. Air fluidized therapy has been discussed with  POA and all are in agreement  Plan:   Informed consent obtained. Patient/family member explained about the need of the procedure. Consent in chart  Debridement:   Excisional debridement of pressure ulcer of sacral area, left buttock, right hip.  Indication: to remove necrotic tissue/ devitalized tissue/ soft eschar/ infected tissue through subcutaneous tissue epidermis and dermis layer of wound bed  Anesthesia: Topical 2% lidocaine jelly   Instrument: Curette, Blade, , Masonix. Residual Necrosis: NA  Bleeding: <1ml   Hemostasis: Pressure   Patient tolerated procedure well   Procedural Pain: mild  Post - procedural pain: mild      Surface area debrided: <20 sq. cm        In wound care clinic today:  Cleanse wound with NS or soap and water or commercial wound cleanser  Apply the following topically to wound bed:  Apply the following to cathy-wound: NA  Apply the following dressings: Absorptive dressing    For Home Care/Self Care:  Cleanse wound with NS or soap and water or commercial wound cleanser  Keep dressing dry and intact when bathing  Apply the following to wound bed:  Apply the following to skin around wound: NA  Apply the following dressings: Absorptive dressing    Leave dressings in place until next visit    Patient to return for wound care in: 14  Days  Follow up with Nurse visit as recommended. PLEASE CONTACT OFFICE AS SOON AS POSSIBLE IF UNABLE TO MAKE THIS APPOINTMENT. Inspect your wounds, looking for signs of infection which may include the following:  Increase in redness  Red \"streaks\" from wound  Increase in swelling  Fever  Unusual odor  Change in the amount of wound drainage. Should you experience any significant changes in your wound(s) or have any questions regarding your home care instructions please contact the wound center or your home health company. If after regular business hours, please call your family doctor or local emergency room. Edema Control:   Elevate legs as much as possible. Avoid standing in one position for more than 10 minutes. Avoid setting with legs down. Do not cross legs while sitting. Off-Loading:     Frequent position changes. Do not cross legs while sitting.  Shift weight every 20 minutes or more when sitting for prolonged periods of time.     Signed By: Rod Peña MD     June 16, 2022

## 2022-06-17 RX ORDER — LIDOCAINE 40 MG/G
CREAM TOPICAL ONCE
OUTPATIENT
Start: 2022-06-17 | End: 2022-06-17

## 2022-06-17 RX ORDER — BACITRACIN ZINC AND POLYMYXIN B SULFATE 500; 1000 [USP'U]/G; [USP'U]/G
OINTMENT TOPICAL ONCE
OUTPATIENT
Start: 2022-06-17 | End: 2022-06-17

## 2022-06-17 RX ORDER — LIDOCAINE HYDROCHLORIDE 20 MG/ML
JELLY TOPICAL ONCE
OUTPATIENT
Start: 2022-06-17 | End: 2022-06-17

## 2022-06-17 RX ORDER — CLOBETASOL PROPIONATE 0.5 MG/G
OINTMENT TOPICAL ONCE
OUTPATIENT
Start: 2022-06-17 | End: 2022-06-17

## 2022-06-17 RX ORDER — LIDOCAINE 50 MG/G
OINTMENT TOPICAL ONCE
OUTPATIENT
Start: 2022-06-17 | End: 2022-06-17

## 2022-06-17 RX ORDER — LIDOCAINE HYDROCHLORIDE 40 MG/ML
SOLUTION TOPICAL ONCE
OUTPATIENT
Start: 2022-06-17 | End: 2022-06-17

## 2022-06-17 RX ORDER — MUPIROCIN 20 MG/G
OINTMENT TOPICAL ONCE
OUTPATIENT
Start: 2022-06-17 | End: 2022-06-17

## 2022-06-17 NOTE — WOUND CARE
06/16/22 1500   Wound Hip Right 05/05/22   Date First Assessed/Time First Assessed: 05/05/22 1403   Present on Hospital Admission: Yes  Primary Wound Type: Open incision/surgical site  Location: Hip  Wound Location Orientation: Right  Date of First Observation: 05/05/22   Wound Image    Wound Etiology Non-Healing Surgical   Dressing Status Intact   Cleansed Wound cleanser   Dressing/Treatment Alginate   Wound Length (cm) 1.5 cm   Wound Width (cm) 0.5 cm   Wound Depth (cm) 0.3 cm   Wound Surface Area (cm^2) 0.75 cm^2   Change in Wound Size % 88.89   Wound Volume (cm^3) 0.225 cm^3   Wound Healing % 99   Wound Assessment Devitalized tissue   Drainage Amount Scant   Drainage Description Serosanguinous   Wound Odor None   Mayela-Wound/Incision Assessment Intact   Edges Epibole (rolled edges)   Wound Buttocks Left 05/05/22   Date First Assessed/Time First Assessed: 05/05/22 1403   Present on Hospital Admission: Yes  Wound Approximate Age at First Assessment (Weeks): 3 weeks  Primary Wound Type: Pressure Injury  Location: Buttocks  Wound Location Orientation: Left  Date of. ..    Wound Image     Wound Etiology Pressure Stage 3   Dressing Status Intact   Cleansed Wound cleanser   Dressing/Treatment Alginate with Ag   Wound Length (cm) 4.5 cm   Wound Width (cm) 3.5 cm   Wound Depth (cm) 0.2 cm   Wound Surface Area (cm^2) 15.75 cm^2   Change in Wound Size % 7.89   Wound Volume (cm^3) 3.15 cm^3   Wound Healing % 39   Post-Procedure Length (cm) 4.6 cm   Post-Procedure Width (cm) 3.6 cm   Post-Procedure Depth (cm) 0.3 cm   Post-Procedure Surface Area (cm^2) 16.56 cm^2   Post-Procedure Volume (cm^3) 4.968 cm^3   Wound Assessment Devitalized tissue   Drainage Amount Small   Drainage Description Serosanguinous   Wound Odor None   Mayela-Wound/Incision Assessment Blanchable erythema   Edges Defined edges   Wound Sacral/coccyx   Date First Assessed/Time First Assessed: 05/05/22 1412   Present on Hospital Admission: Yes  Wound Approximate Age at First Assessment (Weeks): 2 weeks  Primary Wound Type: Pressure Injury  Location: Sacral/coccyx   Wound Image     Wound Etiology Pressure Stage 3   Dressing Status Intact   Cleansed Wound cleanser   Dressing/Treatment Alginate   Wound Length (cm) 1.5 cm   Wound Width (cm) 1.5 cm   Wound Depth (cm) 1.6 cm   Wound Surface Area (cm^2) 2.25 cm^2   Change in Wound Size % 55   Wound Volume (cm^3) 3.6 cm^3   Wound Healing % -140   Post-Procedure Length (cm) 1.6 cm   Post-Procedure Width (cm) 1.6 cm   Post-Procedure Depth (cm) 1.7 cm   Post-Procedure Surface Area (cm^2) 2.56 cm^2   Post-Procedure Volume (cm^3) 4.352 cm^3   Wound Assessment Devitalized tissue   Drainage Amount Small   Drainage Description Thick; Serosanguinous   Wound Odor None   Mayela-Wound/Incision Assessment Blanchable erythema   Edges Attached edges   Wound Buttocks   Date First Assessed/Time First Assessed: 03/17/22 1446   Present on Hospital Admission: No  Primary Wound Type: Other (comment)  Location: Buttocks   Wound Image    Wound Etiology Pressure Stage 2   Dressing Status Intact   Cleansed Wound cleanser   Wound Length (cm) 1 cm   Wound Width (cm) 1 cm   Wound Depth (cm) 0.1 cm   Wound Surface Area (cm^2) 1 cm^2   Change in Wound Size % 97.8   Wound Volume (cm^3) 0.1 cm^3   Wound Healing % 98   Wound Assessment Granulation tissue   Drainage Amount None   Drainage Description Serous   Wound Odor None   Mayela-Wound/Incision Assessment Fragile; Intact   Edges Attached edges       Applied collagen, alginate, absorbant dressing and silicone border. Wound vac been discontinued,  Will notify [employer]  Physician has requested gel mattress be replaced by a air low pressure mattress by [employer]) . Order in place.

## 2022-06-29 ENCOUNTER — HOSPITAL ENCOUNTER (EMERGENCY)
Age: 82
Discharge: HOME | End: 2022-06-29
Attending: STUDENT IN AN ORGANIZED HEALTH CARE EDUCATION/TRAINING PROGRAM
Payer: MEDICARE

## 2022-06-29 VITALS
HEART RATE: 127 BPM | HEIGHT: 65 IN | SYSTOLIC BLOOD PRESSURE: 134 MMHG | RESPIRATION RATE: 20 BRPM | TEMPERATURE: 98.1 F | OXYGEN SATURATION: 98 % | WEIGHT: 220 LBS | DIASTOLIC BLOOD PRESSURE: 89 MMHG | BODY MASS INDEX: 36.65 KG/M2

## 2022-06-29 DIAGNOSIS — K64.9 HEMORRHOIDS, UNSPECIFIED HEMORRHOID TYPE: ICD-10-CM

## 2022-06-29 DIAGNOSIS — S31.000A WOUND OF SACRAL REGION, INITIAL ENCOUNTER: Primary | ICD-10-CM

## 2022-06-29 PROCEDURE — 99284 EMERGENCY DEPT VISIT MOD MDM: CPT

## 2022-06-29 PROCEDURE — 97602 WOUND(S) CARE NON-SELECTIVE: CPT

## 2022-06-29 RX ORDER — HYDROCORTISONE ACETATE 25 MG/1
25 SUPPOSITORY RECTAL EVERY 12 HOURS
Qty: 10 SUPPOSITORY | Refills: 0 | Status: SHIPPED | OUTPATIENT
Start: 2022-06-29 | End: 2022-06-29

## 2022-06-29 RX ORDER — HYDROCORTISONE 25 MG/G
CREAM TOPICAL 4 TIMES DAILY
Qty: 30 G | Refills: 0 | Status: SHIPPED | OUTPATIENT
Start: 2022-06-29

## 2022-06-29 RX ORDER — HYDROCORTISONE 25 MG/G
CREAM TOPICAL 4 TIMES DAILY
Qty: 30 G | Refills: 0 | Status: SHIPPED | OUTPATIENT
Start: 2022-06-29 | End: 2022-06-29 | Stop reason: SDUPTHER

## 2022-06-29 NOTE — WOUND CARE
IP WOUND CONSULT    [id number] [facility] RECORD NUMBER:  [id number]  AGE: 80 y.o. GENDER: female  : 1940  TODAY'S DATE:  2022    GENERAL     [] Follow-up   [x] New Consult    Carrion Mathew is a 80 y.o. female referred by:   [x] Physician  [] Nursing  [] Other:         PAST MEDICAL HISTORY    Past Medical History:   Diagnosis Date    CAD (coronary artery disease)     CHF [facility]congestive heart failure) ([facility] [address]     Hypertension     Sleep disorder         PAST SURGICAL HISTORY    Past Surgical History:   Procedure Laterality Date    HX HIP REPLACEMENT Right 03/10/2022    HX HYSTERECTOMY      HX ORTHOPAEDIC      broken right ankle, left femur 30 yrs ago       FAMILY HISTORY    Family History   Problem Relation Age of Onset    Diabetes Mother     Heart Disease Mother     Heart Disease Father        SOCIAL HISTORY    Social History     Tobacco Use    Smoking status: Never Smoker    Smokeless tobacco: Never Used   Vaping Use    Vaping Use: Never used   Substance Use Topics    Alcohol use: Yes     Alcohol/week: 1.0 standard drink     Types: 1 Glasses of wine per week     Comment: soc    Drug use: No       ALLERGIES    Allergies   Allergen Reactions    Seafood Hives     crabs    Statins-Hmg-Coa Reductase Inhibitors Unknown (comments)    Sulfa (Sulfonamide Antibiotics) Hives       MEDICATIONS    No current facility-administered medications on file prior to encounter. Current Outpatient Medications on File Prior to Encounter   Medication Sig Dispense Refill    Nebulizer & Compressor machine 1 Each by Does Not Apply route every six (6) hours as needed for Wheezing, Shortness of Breath or Cough. 1 Each 0    Nebulizer Accessories kit Use as directed with nebulizer machine 1 Kit 0    aspirin delayed-release 81 mg tablet Take 1 Tablet by mouth daily. 30 Tablet 0    carvediloL (COREG) 3.125 mg tablet Take 1 Tablet by mouth two (2) times daily (with meals).  60 Tablet 0    apixaban (ELIQUIS) 2.5 mg tablet Take 1 Tablet by mouth two (2) times a day. 60 Tablet 0    arformoteroL (BROVANA) 15 mcg/2 mL nebu neb solution 2 mL by Nebulization route two (2) times a day. 120 mL 0    ferrous sulfate 325 mg (65 mg iron) tablet Take 1 Tablet by mouth three (3) times daily. 30 Tablet 0    furosemide (Lasix) 20 mg tablet Take 1 Tablet by mouth daily. 30 Tablet 0    nystatin (MYCOSTATIN) powder Apply  to affected area two (2) times a day. 30 g 0    zinc oxide 20 % ointment Apply  to affected area as needed for Skin Irritation. 30 g 0    allopurinoL (ZYLOPRIM) 100 mg tablet Take 100 mg by mouth daily.  niacin (NIASPAN) 1,000 mg Tb24 tab Take 1,000 mg by mouth daily.  trolamine salicylate-aloe vera 41% (ASPERCREME) topical cream Apply 2 g to affected area as needed for Pain.  multivitamin, tx-iron-ca-min (THERA-M w/ IRON) 9 mg iron-400 mcg tab tablet Take 1 Tab by mouth daily.  potassium chloride SR (KLOR-CON 10) 10 mEq tablet Take 10 mEq by mouth daily.          Wt Readings from Last 3 Encounters:   06/29/22 99.8 kg (220 lb)   03/23/22 118.9 kg (262 lb 1.6 oz)   04/25/21 101.6 kg (224 lb)       [unfilled]  Visit Vitals  /79 (BP 1 Location: Left upper arm, BP Patient Position: Sitting)   Pulse (!) 103   Temp 98.1 °F (36.7 °C)   Resp 18   Ht 5' 5\" (1.651 m)   Wt 99.8 kg (220 lb)   SpO2 98%   BMI 36.61 kg/m²       ASSESSMENT     Skin impairment Identification:  Type: pressure    Contributing Factors: chronic pressure, decreased mobility, incontinence of stool and decreased tissue oxygenation        Wound Buttocks (Active)   Wound Image   06/29/22 1053   Wound Etiology Pressure Unstageable 06/29/22 1053   Cleansed Cleansed with saline 06/29/22 1053   Dressing/Treatment Gauze dressing/dressing sponge;Moisten with saline;Silicone border 95/43/51 1053   Dressing Change Due 06/30/22 06/29/22 1053   Wound Length (cm) 8 cm 06/29/22 1053   Wound Width (cm) 5 cm 06/29/22 1053   Wound Depth (cm) 4.5 cm 06/29/22 1053   Wound Surface Area (cm^2) 40 cm^2 06/29/22 1053   Change in Wound Size % 12.09 06/29/22 1053   Wound Volume (cm^3) 180 cm^3 06/29/22 1053   Wound Healing % -3856 06/29/22 1053   Wound Assessment Eschar dry;Eschar moist;Granulation tissue 06/29/22 1053   Drainage Amount Small 06/29/22 1053   Drainage Description Serosanguinous 06/29/22 1053   Wound Odor None 06/29/22 1053   Mayela-Wound/Incision Assessment Fragile; Hyperpigmented; Excoriated 06/29/22 1053   Edges Defined edges 06/29/22 1053   Wound Thickness Description Full thickness 06/29/22 1053   Number of days: 104          PLAN     Skin Care & Pressure Relief Recommendations  Minimize layers of linen  Pads under patient to optimize support surface  Turn/reposition approximately every 2 hours    Physician/Provider notified: Yes  Dr. Jenny Hugo   Recommendations: Santyl Nickel thick to wound bed cover with saline moist fluffed gauze and cover dressing. Avoid prolonged sitting 30 - 60 minutes turn patient from hip to hip and prop patient with pillows behind back.     Teaching completed with:   [x] Patient           [x] Family member       [x] Caregiver          [] Nursing  [] Other    Patient/Caregiver Teaching:  Level of patient/caregiver understanding able to:   [x] Indicates understanding       [x] Needs reinforcement  [] Unsuccessful      [] Verbal Understanding  [] Demonstrated understanding       [] No evidence of learning  [] Refused teaching         [] N/A       Electronically signed by Ame Arredondo RN on 6/29/2022 at 11:01 AM

## 2022-06-29 NOTE — DISCHARGE INSTRUCTIONS
Please carefully read all discharge instructions    Please follow-up with a primary care physician and if you do not have one currently use the contact information provided to obtain an appointment. If none was provided please call the number on the back of your insurance card to locate a Primary care doctor. Many offices have \"cancellation lists\" that you can ask to be placed on; should a patient with an earlier appointment cancel you will be notified to take their place. Please return to the Emergency Room immediately if your symptoms worsen. Please return to the Emergency Department if you develop a fever, chills, cannot eat or drink due to nausea or vomiting, or if any of your symptoms worsen. If you do not have insurance you can use the below for your medications. [email or web address]    What are GoodRx coupons? GoodRx coupons will help you pay less than the cash price for your prescription. Lizzeth Sieve free to use and are accepted at virtually every [place] pharmacy. Your pharmacist will know how to enter the codes on the coupon to pull up the lowest discount available. OneRoof Activation    Thank you for requesting access to OneRoof. Please follow the instructions below to securely access and download your online medical record. OneRoof allows you to send messages to your doctor, view your test results, renew your prescriptions, schedule appointments, and more. How Do I Sign Up? In your internet browser, go to [email or web address]  Click on the First Time User? Click Here link in the Sign In box. You will be redirect to the New Member Sign Up page. Enter your OneRoof Access Code exactly as it appears below. You will not need to use this code after youve completed the sign-up process. If you do not sign up before the expiration date, you must request a new code.     OneRoof Access Code: [email or web address]  Expires: 7/23/2022  3:35 PM    Enter the last four digits of your Social Security Number (xxxx) and Date of Birth (mm/dd/yyyy) as indicated and click Submit. You will be taken to the next sign-up page. Create a eKonnekt ID. This will be your eKonnekt login ID and cannot be changed, so think of one that is secure and easy to remember. Create a eKonnekt password. You can change your password at any time. Enter your Password Reset Question and Answer. This can be used at a later time if you forget your password. Enter your e-mail address. You will receive e-mail notification when new information is available in [address] Click Sign Up. You can now view and download portions of your medical record. Click the [employer]is[employer]ytics link to download a portable copy of your medical information. Additional Information    If you have questions, please visit the Frequently Asked Questions section of the eKonnekt website at [email or web address] Remember, eKonnekt is NOT to be used for urgent needs. For medical emergencies, dial 911.

## 2022-06-29 NOTE — ED NOTES
The wound care RN's are currently at the patients bedside using sunitha lift to place patient into ED bed.

## 2022-06-29 NOTE — ED PROVIDER NOTES
EMERGENCY DEPARTMENT HISTORY AND PHYSICAL EXAM  THE [facility] EMERGENCY DEPT        Date: 6/29/2022  Patient Name: Carmen Vasquez    History of Presenting Illness     Chief Complaint   Patient presents with    Wound Check       History Provided By: Patient    HPI:  Carmen Vasquez is a 80 y.o. female with a history of CHF, CAD, bedbound status, HTN, sleep disorder who presents with complaints of possible bleeding from her chronic sacral wound. She is accompanied by her 2 daughters, was driving back from [facility], with where she had a sleep study, when after she had a bowel movement, her daughters are cleaning her up, she noticed there was some blood in the stool, one of the daughters was concerned, and drove her to the emergency department, she normally goes to wound care clinic, once every 2 weeks for evaluation of chronic sacral wound, she called the wound care clinic upstairs, who directed her to the emergency department. Ms. Tressa Cole is otherwise feeling at baseline, denies any fever, chills, nausea, vomiting. The patient denies any aggravating or alleviating factors - and has not taken any medications in an attempt to alleviate her symptoms. PMH, PSH, family history, social history, allergies reviewed with the patient with significant items noted above. PCP: Daniela Stein NP    Current Outpatient Medications   Medication Sig Dispense Refill    hydrocortisone (Anusol-HC) 2.5 % rectal cream Insert  into rectum four (4) times daily. 30 g 0    collagenase (SantyL) 250 unit/gram ointment Apply  to affected area daily. Nickel thick to wound bed cover with saline moist fluffed gauze and cover dressing. 15 g 0    Nebulizer & Compressor machine 1 Each by Does Not Apply route every six (6) hours as needed for Wheezing, Shortness of Breath or Cough.  1 Each 0    Nebulizer Accessories kit Use as directed with nebulizer machine 1 Kit 0    aspirin delayed-release 81 mg tablet Take 1 Tablet by mouth daily. 30 Tablet 0    carvediloL (COREG) 3.125 mg tablet Take 1 Tablet by mouth two (2) times daily (with meals). 60 Tablet 0    apixaban (ELIQUIS) 2.5 mg tablet Take 1 Tablet by mouth two (2) times a day. 60 Tablet 0    arformoteroL (BROVANA) 15 mcg/2 mL nebu neb solution 2 mL by Nebulization route two (2) times a day. 120 mL 0    ferrous sulfate 325 mg (65 mg iron) tablet Take 1 Tablet by mouth three (3) times daily. 30 Tablet 0    furosemide (Lasix) 20 mg tablet Take 1 Tablet by mouth daily. 30 Tablet 0    nystatin (MYCOSTATIN) powder Apply  to affected area two (2) times a day. 30 g 0    zinc oxide 20 % ointment Apply  to affected area as needed for Skin Irritation. 30 g 0    allopurinoL (ZYLOPRIM) 100 mg tablet Take 100 mg by mouth daily.  niacin (NIASPAN) 1,000 mg Tb24 tab Take 1,000 mg by mouth daily.  trolamine salicylate-aloe vera 52% (ASPERCREME) topical cream Apply 2 g to affected area as needed for Pain.  multivitamin, tx-iron-ca-min (THERA-M w/ IRON) 9 mg iron-400 mcg tab tablet Take 1 Tab by mouth daily.  potassium chloride SR (KLOR-CON 10) 10 mEq tablet Take 10 mEq by mouth daily. Past History     Past Medical History:  Past Medical History:   Diagnosis Date    CAD (coronary artery disease)     [facility]est[facility]) ([facility] [address]     Hypertension     Sleep disorder        Past Surgical History:  Past Surgical History:   Procedure Laterality Date    HX HIP REPLACEMENT Right 03/10/2022    HX HYSTERECTOMY      HX ORTHOPAEDIC      broken right ankle, left femur 30 yrs ago       Family History:  Family History   Problem Relation Age of Onset    Diabetes Mother     Heart Disease Mother     Heart Disease Father        Social History:  Social History     Tobacco Use    Smoking status: Never Smoker    Smokeless tobacco: Never Used   Vaping Use    Vaping Use: Never used   Substance Use Topics    Alcohol use:  Yes     Alcohol/week: 1.0 standard drink     Types: 1 Glasses of wine per week     Comment: soc    Drug use: No       Allergies: Allergies   Allergen Reactions    Seafood Hives     crabs    Statins-Hmg-Coa Reductase Inhibitors Unknown (comments)    Sulfa (Sulfonamide Antibiotics) Hives       Review of Systems   Review of Systems    In addition to that documented in the HPI above  All other review of systems negative    Constitutional: Denies fevers or chills  Eyes: Denies vision changes  ENMT: Denies sore throat  CV: Denies chest pain  Resp: Denies SOB  GI: Denies vomiting or diarrhea  : Denies painful urination  MSK: Denies recent trauma  Skin: Denies new rashes  Neuro: Denies new numbness or tingling or weakness  Endocrine: Denies polyuria  Heme: Denies bleeding disorders    Physical Exam     Vitals:    06/29/22 0942 06/29/22 0948 06/29/22 1113   BP: 119/79  134/89   Pulse: (!) 103  (!) 127   Resp: 18  20   Temp: 98.1 °F (36.7 °C)     SpO2: 100% 98% 98%   Weight: 99.8 kg (220 lb)     Height: 5' 5\" (1.651 m)       Physical Exam    She has no physical exam complaints, after being moved she was normal cardiac normotensive    Nursing notes and vital signs reviewed  General: Patient is awake and alert, resting comfortably in no acute distress  Head: Normocephalic, Atraumatic  Eyes: EOMI, no conjunctival pallor  Neck: Supple, Normal external exam  Cardiovascular: tachycardic while moving in sunitha lift to bed. Chest: Normal work of breathing and chest excursion bilaterally  Respiratory: Patient is in no respiratory distress,  Abdomen: Soft  Back: No evidence of trauma or deformity  Extremities: No evidence of trauma or deformity, no LE edema  Skin: Warm and dry, intact  Neuro: Alert and appropriate, CN intact, normal speech, strength and sensation full and symmetric bilaterally, normal gait, normal coordination  Psychiatric: Normal mood and affect    Medical Decision Making   I am the first provider for this patient.     I reviewed the vital signs, available nursing notes, past medical history, past surgical history, family history and social history. Vital Signs-Reviewed the patient's vital signs. Visit Vitals  /89   Pulse (!) 127   Temp 98.1 °F (36.7 °C)   Resp 20   Ht 5' 5\" (1.651 m)   Wt 99.8 kg (220 lb)   SpO2 98%   BMI 36.61 kg/m²       Provider Notes (Medical Decision Making):   Maia Donovan is a 80 y.o. female with possible bleeding from a chronic wound. She has several large hemorrhoids, 2 external hemorrhoids, suspect the bleeding was coming from that location, her chronic wound appears dry, other than having a small amount of stool in the wound, it was cleaned up by wound care, dressed with Santyl. She is cleaned well, doing well after clean up, does not require any intervention from myself for the bleeding. Due to her hemorrhoids, will prescribe her some hemorrhoidal cream.      Procedures:  Procedures    ED Course:   ED Course as of 06/29/22 2004 Wed Jun 29, 2022   1032 No bleeding from wound, wound care at bedside to provide for follow up. [DM]      ED Course User Index  [DM] Nichole Rodarte MD        No acute pathology necessitating further emergent workup or hospital admission is suspected or found. Will discharge home with cream and wound care cream. She is comfortable with the plan and discharge at this time. Expressed the importance of follow up for current symptoms and she agrees and was advised on what signs/symptoms to return immediately to the ER. Vitals Review/addressed -     Diagnostic Study Results     Orders Placed This Encounter    DISCONTD: hydrocortisone (Anusol-HC) 25 mg supp     Sig: Insert 1 Suppository into rectum every twelve (12) hours for 5 days. Dispense:  10 Suppository     Refill:  0    DISCONTD: hydrocortisone (Anusol-HC) 2.5 % rectal cream     Sig: Insert  into rectum four (4) times daily.      Dispense:  30 g     Refill:  0    hydrocortisone (Anusol-HC) 2.5 % rectal cream     Sig: Insert  into rectum four (4) times daily. Dispense:  30 g     Refill:  0    collagenase (SantyL) 250 unit/gram ointment     Sig: Apply  to affected area daily. Nickel thick to wound bed cover with saline moist fluffed gauze and cover dressing. Dispense:  15 g     Refill:  0    IP CONSULT TO WOUND CARE     Standing Status:   Standing     Number of Occurrences:   1     Order Specific Question:   Reason for Consult: Answer:   Wound Check       Labs -   No results found for this or any previous visit (from the past 12 hour(s)). Radiologic Studies -   No orders to display     CT Results  (Last 48 hours)    None        CXR Results  (Last 48 hours)    None          Disposition     Disposition:  Home    CLINICAL IMPRESSION:    1. Wound of sacral region, initial encounter    2. Hemorrhoids, unspecified hemorrhoid type        It should be noted that I will be the provider of record for this patient  José Miguel Meraz MD    Follow-up Information     Follow up With Specialties Details Why [address]    THE [facility] EMERGENCY DEPT Emergency Medicine Go to  If symptoms worsen [address] Garret Dickson [facility], Carolyne Mccauley NP Nurse Practitioner   [facility] [place] [address]  [phone]            Discharge Medication List as of 6/29/2022 11:07 AM      START taking these medications    Details   hydrocortisone (Anusol-HC) 25 mg supp Insert 1 Suppository into rectum every twelve (12) hours for 5 days. , Normal, Disp-10 Suppository, R-0         CONTINUE these medications which have NOT CHANGED    Details   Nebulizer & Compressor machine 1 Each by Does Not Apply route every six (6) hours as needed for Wheezing, Shortness of Breath or Cough. , Print, Disp-1 Each, R-0      Nebulizer Accessories kit Use as directed with nebulizer machine, Print, Disp-1 Kit, R-0      aspirin delayed-release 81 mg tablet Take 1 Tablet by mouth daily. , Normal, Disp-30 Tablet, R-0      carvediloL (COREG) 3.125 mg tablet Take 1 Tablet by mouth two (2) times daily (with meals). , Normal, Disp-60 Tablet, R-0      apixaban (ELIQUIS) 2.5 mg tablet Take 1 Tablet by mouth two (2) times a day., Normal, Disp-60 Tablet, R-0      arformoteroL (BROVANA) 15 mcg/2 mL nebu neb solution 2 mL by Nebulization route two (2) times a day., Normal, Disp-120 mL, R-0      ferrous sulfate 325 mg (65 mg iron) tablet Take 1 Tablet by mouth three (3) times daily. , Normal, Disp-30 Tablet, R-0      furosemide (Lasix) 20 mg tablet Take 1 Tablet by mouth daily. , Normal, Disp-30 Tablet, R-0      nystatin (MYCOSTATIN) powder Apply  to affected area two (2) times a day., Normal, Disp-30 g, R-0      zinc oxide 20 % ointment Apply  to affected area as needed for Skin Irritation. , Normal, Disp-30 g, R-0      allopurinoL (ZYLOPRIM) 100 mg tablet Take 100 mg by mouth daily. , Historical Med      niacin (NIASPAN) 1,000 mg Tb24 tab Take 1,000 mg by mouth daily. , Historical Med      trolamine salicylate-aloe vera 96% (ASPERCREME) topical cream Apply 2 g to affected area as needed for Pain., Historical Med      multivitamin, tx-iron-ca-min (THERA-M w/ IRON) 9 mg iron-400 mcg tab tablet Take 1 Tab by mouth daily. , Historical Med      potassium chloride SR (KLOR-CON 10) 10 mEq tablet Take 10 mEq by mouth daily. , Historical Med             Please note that this dictation was completed with Kelkoo, the [employer] voice recognition software. Quite often unanticipated grammatical, syntax, homophones, and other interpretive errors are inadvertently transcribed by the computer software. Please disregard these errors. Please excuse any errors that have escaped final proofreading.

## 2022-07-06 ENCOUNTER — APPOINTMENT (OUTPATIENT)
Dept: WOUND CARE | Age: 82
End: 2022-07-06
Attending: HOSPITALIST

## 2022-07-06 NOTE — WOUND CARE
[address] has requested additional note for air low pressure mattress for pressure injury 3. Catrachita Kong  1940  Height 165.1 cm 5'5\"  Weight 112.2kg 247 lbs  Ms Jann Bolivar is currently being seen by [facility]  Sloane Liu [address] [facility], [address] [place] [address]  Phone: [phone]. She is currently being seen by [address] [place] [address] by Dr. [facility] [address] [facility] [address]  Phone: [phone] Fax: [phone]. She is seen by Dr Donnamaria Boas every 2-4 weeks for assessment, debridement and treatment.

## 2022-07-13 ENCOUNTER — APPOINTMENT (OUTPATIENT)
Dept: WOUND CARE | Age: 82
End: 2022-07-13
Attending: HOSPITALIST

## 2024-02-19 NOTE — PROGRESS NOTES
Problem: Mobility Impaired (Adult and Pediatric)  Goal: *Acute Goals and Plan of Care (Insert Text)  Description: Physical Therapy Goals  Initiated 4/24/2021 and to be accomplished within 7 day(s)  1. Patient will move from supine to sit and sit to supine  in bed with modified independence. 2.  Patient will transfer from bed to chair and chair to bed with modified independence using the least restrictive device. 3.  Patient will perform sit to stand with modified independence. 4.  Patient will ambulate with modified independence for 150 feet with the least restrictive device. Prior Level of Function:   Patient was modified independence for all mobility including gait using bariatric rolling walker (wheelchair for long distances during community mobility). Patient lives with daughter in a [facility], [address] but pt has a stair lift so she doesn't have to worry about stairs. Pt states that she walks daily around the house and in the backyard. Outcome: Progressing Towards Goal    physical Therapy TREATMENT    Patient: Stella Ritchie (17 y.o. female)  Date: 4/26/2021  Diagnosis: Elevated troponin [R77.8]  Fatigue [R53.83] Elevated troponin       Precautions: Fall   Chart, physical therapy assessment, plan of care and goals were reviewed. ASSESSMENT:  Pt resting in bed on arrival and willing to work with PT session, though eager to go home today. Requires SBA/CGA and additional time with bed mobility. Shoes donned with min/SBA, including AFO R.  Bed elevated for STS with RW/CGA/additional time; pt eager to perform tasks without assist and tends to scoot too far to edge of bed in preparation for transfer. Amb's to bathroom with RW/CGA as desires to have BM. Roopa slow with decr'd foot clearance and step length. Pt brief removed with total assist and CGA stand to sit on commode. Pt left on commode and nurse Lafayette Sis aware of above. Pt for possible discharge home today. Recommend HHPT at discharge.   Progression toward goals:  [x]      Improving appropriately and progressing toward goals  []      Improving slowly and progressing toward goals  []      Not making progress toward goals and plan of care will be adjusted     PLAN:  Patient continues to benefit from skilled intervention to address the above impairments. Continue treatment per established plan of care. Discharge Recommendations:  Home Health  Further Equipment Recommendations for Discharge:  N/A     SUBJECTIVE:   Patient stated I'm okay, waiting to go home.     OBJECTIVE DATA SUMMARY:   Critical Behavior:  Neurologic State: Alert, Appropriate for age  Orientation Level: Oriented X4  Cognition: Follows commands  Safety/Judgement: Awareness of environment  Functional Mobility Training:  Bed Mobility:  Supine to Sit: Stand-by assistance; Additional time  Scooting: Contact guard assistance  Transfers:  Sit to Stand: Contact guard assistance; Additional time(bed elevated)  Stand to Sit: Contact guard assistance; Additional time(as above)  Balance:  Sitting: Intact  Standing: With support; Intact  Standing - Static: Good;Fair  Standing - Dynamic : Fair  Ambulation/Gait Training:  Distance (ft): 18 Feet (ft)  Assistive Device: Gait belt;Orthotic device; Walker, rolling  Ambulation - Level of Assistance: Contact guard assistance  Gait Abnormalities: Decreased step clearance; Foot drop(foot drop R)  Base of Support: Widened  Speed/Roopa: Slow  Step Length: Right shortened;Left shortened  Interventions: Safety awareness training;Verbal cues  Pain:  Pain Scale 1: Numeric (0 - 10)  Pain Intensity 1: 0  Activity Tolerance:   Fair   Please refer to the flowsheet for vital signs taken during this treatment.   After treatment:   [x] Patient left in no apparent distress sitting up on commode  [] Patient left in no apparent distress in bed  [x] Call bell left within reach  [x] Nursing notified-Maycol  [] Caregiver present  [] Bed alarm activated      Aspen Katz, PT   Time Calculation: 30 mins (2) good, crying

## 2025-06-24 NOTE — PROGRESS NOTES
Anesthesia Evaluation     Patient summary reviewed   no history of anesthetic complications:   NPO Solid Status: > 8 hours  NPO Liquid Status: > 8 hours           Airway   Mallampati: II  Small opening  Dental    (+) edentulous    Pulmonary    (+) a smoker Former, COPD,sleep apnea  (-) asthma  Cardiovascular     (+) hypertension, valvular problems/murmurs murmur and TI, CAD, CABG (2002), CHF , PVD, hyperlipidemia,  carotid artery disease right carotid  (-) past MI, angina    ROS comment: Echo:  ·  Left ventricular systolic function is normal. Left ventricular ejection fraction appears to be 56 - 60%.  ·  Left ventricular wall thickness is consistent with mild septal asymmetric hypertrophy.  ·  Left ventricular diastolic function is consistent with (grade II w/high LAP) pseudonormalization.  ·  Normal right ventricular cavity size and systolic function noted.  ·  The left atrial cavity is mild to moderately dilated.  ·  The right atrial cavity is dilated.  ·  Mild aortic valve stenosis is present.  ·  Mild to moderate mitral valve regurgitation is present.  ·  Moderate to severe tricuspid valve regurgitation is present.  ·  Estimated right ventricular systolic pressure from tricuspid regurgitation is markedly elevated (>55 mmHg).      Neuro/Psych- negative ROS  (-) seizures, TIA, CVA  GI/Hepatic/Renal/Endo    (+) GERD, renal disease (last iHD 6/23)- CRI, ESRD and dialysis, thyroid problem hypothyroidism  (-) liver disease, diabetes    Musculoskeletal     Abdominal    Substance History      OB/GYN          Other      history of cancer                      Anesthesia Plan    ASA 4     general     intravenous induction     Anesthetic plan, risks, benefits, and alternatives have been provided, discussed and informed consent has been obtained with: patient.      CODE STATUS:    Code Status (Patient has no pulse and is not breathing): CPR (Attempt to Resuscitate)  Medical Interventions (Patient has pulse or is breathing):  Pharmacy Renal Dosing Services:     Famotidine was automatically dose-adjusted per THE [facility] P&T Committee Protocol, with respect to renal function. Consult provided for this   [de-identified] y.o. , female , for the indication of SUP. Dose adjusted to:  Famotidine 20 mg po daily    Pt Weight:   Wt Readings from Last 1 Encounters:   04/25/21 101.6 kg (224 lb)     Previous Regimen    Famotidine 20 mg po twice daily   Serum Creatinine Lab Results   Component Value Date/Time    Creatinine 1.41 (H) 04/26/2021 03:15 AM       Creatinine Clearance Estimated Creatinine Clearance: 37.6 mL/min (A) (based on SCr of 1.41 mg/dL (H)). BUN Lab Results   Component Value Date/Time    BUN 43 (H) 04/26/2021 03:15 AM           Pharmacy to continue to monitor patient daily. Will make dosage adjustments based upon changing renal function.   Signed Denise Harry information:  [phone] Full Support

## (undated) DEVICE — SOL IRRIGATION INJ NACL 0.9% 500ML BTL

## (undated) DEVICE — GLOVE ORANGE PI 8   MSG9080

## (undated) DEVICE — INSTRUMENT BATTERY

## (undated) DEVICE — GLOVE SURG SZ 75 L12IN FNGR THK79MIL GRN LTX FREE

## (undated) DEVICE — GLOVE SURG SZ 8 L12IN THK75MIL DK GRN LTX FREE

## (undated) DEVICE — GARMENT,MEDLINE,DVT,INT,CALF,MED, GEN2: Brand: MEDLINE

## (undated) DEVICE — SYR 20ML LL STRL LF --

## (undated) DEVICE — INTENDED FOR TISSUE SEPARATION, AND OTHER PROCEDURES THAT REQUIRE A SHARP SURGICAL BLADE TO PUNCTURE OR CUT.: Brand: BARD-PARKER ® CARBON RIB-BACK BLADES

## (undated) DEVICE — THE CANADY HYBRID PLASMA SCALPEL IS AN ELECTROSURGICAL PLASMA SCALPEL THAT USES AN 85MM BENDABLE PADDLE BLADE TIP. THE ELECTROSURGICAL PLASMA SCALPEL IS USED TO SIMULTANEOUSLY CUT AND COAGULATE BIOLOGICAL TISSUE.: Brand: CANADY HYBRID PLASMA PADDLE BLADE

## (undated) DEVICE — BLADE SAW 1.27X13X90 MM FOR LG BNE

## (undated) DEVICE — NDL PRT INJ NSAF BLNT 18GX1.5 --

## (undated) DEVICE — PACK PROCEDURE SURG ANTR HIP

## (undated) DEVICE — DRESSING ALG W4XL8IN AG FOAM SUPERABSORBENT SIL ANTIMIC

## (undated) DEVICE — 3M™ IOBAN™ 2 ANTIMICROBIAL INCISE DRAPE 6650EZ: Brand: IOBAN™ 2

## (undated) DEVICE — SOLUTION IV 250ML 0.9% SOD CHL CLR INJ FLX BG CONT PRT CLSR

## (undated) DEVICE — GAUZE BORDERED 4X8 --

## (undated) DEVICE — SYSTEM SKIN CLSR 22CM DERMBND PRINEO

## (undated) DEVICE — Device

## (undated) DEVICE — SUT VCRL + 1 36IN CT1 VIO --

## (undated) DEVICE — SOL INJ L R 1000ML BG --

## (undated) DEVICE — NEEDLE HYPO 22GA L1.5IN BLK S STL HUB POLYPR SHLD REG BVL

## (undated) DEVICE — GOWN,SIRUS,NONRNF,SETINSLV,XL,20/CS: Brand: MEDLINE

## (undated) DEVICE — OPTIFOAM GENTLE LITE, BORDERED, 4X4: Brand: MEDLINE

## (undated) DEVICE — PREP SKN CHLRAPRP APL 26ML STR --

## (undated) DEVICE — SYR LR LCK 1ML GRAD NSAF 30ML --

## (undated) DEVICE — HANDPIECE SET WITH HIGH FLOW TIP AND SUCTION TUBE: Brand: INTERPULSE

## (undated) DEVICE — SUT VCRL + 2-0 36IN CT1 UD --

## (undated) DEVICE — ORTHOLOCK(R) EX-PIN 4 MM X 150 MM

## (undated) DEVICE — DERMABOND SKIN ADH 0.7ML --